# Patient Record
Sex: FEMALE | Race: WHITE
[De-identification: names, ages, dates, MRNs, and addresses within clinical notes are randomized per-mention and may not be internally consistent; named-entity substitution may affect disease eponyms.]

---

## 2018-08-23 ENCOUNTER — APPOINTMENT (OUTPATIENT)
Dept: INTERNAL MEDICINE | Facility: CLINIC | Age: 83
End: 2018-08-23
Payer: MEDICARE

## 2018-08-23 VITALS
HEIGHT: 64 IN | BODY MASS INDEX: 23.9 KG/M2 | DIASTOLIC BLOOD PRESSURE: 70 MMHG | OXYGEN SATURATION: 96 % | WEIGHT: 140 LBS | SYSTOLIC BLOOD PRESSURE: 108 MMHG | RESPIRATION RATE: 14 BRPM | HEART RATE: 76 BPM | TEMPERATURE: 97.6 F

## 2018-08-23 PROCEDURE — 90670 PCV13 VACCINE IM: CPT

## 2018-08-23 PROCEDURE — G0009: CPT

## 2018-08-23 PROCEDURE — 99214 OFFICE O/P EST MOD 30 MIN: CPT | Mod: 25

## 2018-08-23 NOTE — HISTORY OF PRESENT ILLNESS
[FreeTextEntry8] : cc: gout\par \par Pt is here to establish care. She had a gout attack in April and was started on allopurinol. \par She could not walk at the time and was hospitalized in [facility]. She now has right big toe redness for 10 days, but has no pain. \par \par Has left shoulder pain and has gotten cortisone injections which help temporarily. \par \par She did eat breakfast this morning.

## 2018-08-23 NOTE — PHYSICAL EXAM
[No Acute Distress] : no acute distress [Well Nourished] : well nourished [Well Developed] : well developed [Well-Appearing] : well-appearing [No Respiratory Distress] : no respiratory distress  [Clear to Auscultation] : lungs were clear to auscultation bilaterally [Normal Rate] : normal rate  [Regular Rhythm] : with a regular rhythm [Pedal Pulses Present] : the pedal pulses are present [Soft] : abdomen soft [Non Tender] : non-tender [Normal Affect] : the affect was normal [Normal Insight/Judgement] : insight and judgment were intact [de-identified] : trace to 1+ edema [de-identified] : toes with cool to touch and bluish discoloration [de-identified] : using a walker to walk

## 2018-08-23 NOTE — REVIEW OF SYSTEMS
[Fatigue] : fatigue [Dyspnea on Exertion] : dyspnea on exertion [Joint Pain] : joint pain [Negative] : Heme/Lymph [Chest Pain] : no chest pain

## 2018-08-24 ENCOUNTER — MEDICATION RENEWAL (OUTPATIENT)
Age: 83
End: 2018-08-24

## 2018-08-24 LAB
25(OH)D3 SERPL-MCNC: 33.9 NG/ML
ALBUMIN SERPL ELPH-MCNC: 4 G/DL
ALP BLD-CCNC: 77 U/L
ALT SERPL-CCNC: 16 U/L
ANION GAP SERPL CALC-SCNC: 10 MMOL/L
AST SERPL-CCNC: 27 U/L
BASOPHILS # BLD AUTO: 0.04 K/UL
BASOPHILS NFR BLD AUTO: 0.7 %
BILIRUB SERPL-MCNC: 0.5 MG/DL
BUN SERPL-MCNC: 33 MG/DL
CALCIUM SERPL-MCNC: 9.5 MG/DL
CHLORIDE SERPL-SCNC: 101 MMOL/L
CHOLEST SERPL-MCNC: 157 MG/DL
CHOLEST/HDLC SERPL: 1.9 RATIO
CO2 SERPL-SCNC: 30 MMOL/L
CREAT SERPL-MCNC: 0.96 MG/DL
EOSINOPHIL # BLD AUTO: 0.08 K/UL
EOSINOPHIL NFR BLD AUTO: 1.4 %
FOLATE SERPL-MCNC: 13.3 NG/ML
GLUCOSE SERPL-MCNC: 77 MG/DL
HBA1C MFR BLD HPLC: 6.4 %
HCT VFR BLD CALC: 35.3 %
HDLC SERPL-MCNC: 84 MG/DL
HGB BLD-MCNC: 11.2 G/DL
IMM GRANULOCYTES NFR BLD AUTO: 0.2 %
LDLC SERPL CALC-MCNC: 65 MG/DL
LYMPHOCYTES # BLD AUTO: 1.47 K/UL
LYMPHOCYTES NFR BLD AUTO: 25.4 %
MAN DIFF?: NORMAL
MCHC RBC-ENTMCNC: 31.7 GM/DL
MCHC RBC-ENTMCNC: 31.7 PG
MCV RBC AUTO: 100 FL
MONOCYTES # BLD AUTO: 0.65 K/UL
MONOCYTES NFR BLD AUTO: 11.2 %
NEUTROPHILS # BLD AUTO: 3.54 K/UL
NEUTROPHILS NFR BLD AUTO: 61.1 %
PLATELET # BLD AUTO: 203 K/UL
POTASSIUM SERPL-SCNC: 5.1 MMOL/L
PROT SERPL-MCNC: 7.1 G/DL
RBC # BLD: 3.53 M/UL
RBC # FLD: 16.1 %
SODIUM SERPL-SCNC: 141 MMOL/L
TRIGL SERPL-MCNC: 40 MG/DL
TSH SERPL-ACNC: 0.52 UIU/ML
URATE SERPL-MCNC: 5.5 MG/DL
VIT B12 SERPL-MCNC: 455 PG/ML
WBC # FLD AUTO: 5.79 K/UL

## 2019-08-19 ENCOUNTER — RX RENEWAL (OUTPATIENT)
Age: 84
End: 2019-08-19

## 2019-09-17 ENCOUNTER — RX RENEWAL (OUTPATIENT)
Age: 84
End: 2019-09-17

## 2019-09-23 ENCOUNTER — RX RENEWAL (OUTPATIENT)
Age: 84
End: 2019-09-23

## 2020-02-03 ENCOUNTER — EMERGENCY (EMERGENCY)
Facility: HOSPITAL | Age: 85
LOS: 0 days | Discharge: HOME | End: 2020-02-03
Attending: EMERGENCY MEDICINE
Payer: MEDICARE

## 2020-02-03 VITALS — HEIGHT: 63 IN | WEIGHT: 132.06 LBS

## 2020-02-03 VITALS
OXYGEN SATURATION: 98 % | DIASTOLIC BLOOD PRESSURE: 61 MMHG | RESPIRATION RATE: 18 BRPM | TEMPERATURE: 98 F | HEART RATE: 61 BPM | SYSTOLIC BLOOD PRESSURE: 119 MMHG

## 2020-02-03 DIAGNOSIS — I35.1 NONRHEUMATIC AORTIC (VALVE) INSUFFICIENCY: ICD-10-CM

## 2020-02-03 DIAGNOSIS — I34.0 NONRHEUMATIC MITRAL (VALVE) INSUFFICIENCY: ICD-10-CM

## 2020-02-03 DIAGNOSIS — E78.5 HYPERLIPIDEMIA, UNSPECIFIED: ICD-10-CM

## 2020-02-03 DIAGNOSIS — I48.91 UNSPECIFIED ATRIAL FIBRILLATION: ICD-10-CM

## 2020-02-03 DIAGNOSIS — I50.9 HEART FAILURE, UNSPECIFIED: ICD-10-CM

## 2020-02-03 DIAGNOSIS — Z95.0 PRESENCE OF CARDIAC PACEMAKER: Chronic | ICD-10-CM
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
## 2020-02-03 DIAGNOSIS — I11.0 HYPERTENSIVE HEART DISEASE WITH HEART FAILURE: ICD-10-CM
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
## 2020-02-03 DIAGNOSIS — S89.92XA UNSPECIFIED INJURY OF LEFT LOWER LEG, INITIAL ENCOUNTER: ICD-10-CM

## 2020-02-03 DIAGNOSIS — Z79.01 LONG TERM (CURRENT) USE OF ANTICOAGULANTS: ICD-10-CM

## 2020-02-03 DIAGNOSIS — S80.12XA CONTUSION OF LEFT LOWER LEG, INITIAL ENCOUNTER: ICD-10-CM

## 2020-02-03 DIAGNOSIS — Z96.659 PRESENCE OF UNSPECIFIED ARTIFICIAL KNEE JOINT: ICD-10-CM

## 2020-02-03 DIAGNOSIS — Z95.0 PRESENCE OF CARDIAC PACEMAKER: ICD-10-CM

## 2020-02-03 DIAGNOSIS — J44.9 CHRONIC OBSTRUCTIVE PULMONARY DISEASE, UNSPECIFIED: ICD-10-CM

## 2020-02-03 DIAGNOSIS — E03.9 HYPOTHYROIDISM, UNSPECIFIED: ICD-10-CM

## 2020-02-03 DIAGNOSIS — M85.50 ANEURYSMAL BONE CYST, UNSPECIFIED SITE: ICD-10-CM

## 2020-02-03 DIAGNOSIS — Z96.659 PRESENCE OF UNSPECIFIED ARTIFICIAL KNEE JOINT: Chronic | ICD-10-CM

## 2020-02-03 PROCEDURE — 73590 X-RAY EXAM OF LOWER LEG: CPT | Mod: 26,LT

## 2020-02-03 PROCEDURE — 99284 EMERGENCY DEPT VISIT MOD MDM: CPT | Mod: 25

## 2020-02-03 PROCEDURE — 73610 X-RAY EXAM OF ANKLE: CPT | Mod: 26,LT

## 2020-02-03 PROCEDURE — 99284 EMERGENCY DEPT VISIT MOD MDM: CPT

## 2020-02-03 PROCEDURE — 73610 X-RAY EXAM OF ANKLE: CPT | Mod: LT

## 2020-02-03 PROCEDURE — 73590 X-RAY EXAM OF LOWER LEG: CPT | Mod: LT

## 2020-02-03 RX ORDER — ACETAMINOPHEN 500 MG
1000 TABLET ORAL ONCE
Refills: 0 | Status: COMPLETED | OUTPATIENT
Start: 2020-02-03 | End: 2020-02-03

## 2020-02-03 RX ORDER — OXYCODONE HYDROCHLORIDE 5 MG/1
5 TABLET ORAL ONCE
Refills: 0 | Status: DISCONTINUED | OUTPATIENT
Start: 2020-02-03 | End: 2020-02-03

## 2020-02-03 RX ADMIN — Medication 1000 MILLIGRAM(S): at 18:31

## 2020-02-03 RX ADMIN — OXYCODONE HYDROCHLORIDE 5 MILLIGRAM(S): 5 TABLET ORAL at 18:30

## 2020-02-03 NOTE — ED STATDOCS - PATIENT PORTAL LINK FT
You can access the FollowMyHealth Patient Portal offered by [facility] by registering at the following website: [email or web address]. By joining [facility]’s FollowMyHealth portal, you will also be able to view your health information using other applications (apps) compatible with our system.

## 2020-02-03 NOTE — ED STATDOCS - SKIN, MLM
+30 x 10 cm hematoma to mid lower left leg along belly anterior tibialis with +TTP and mild +swelling extending to dorsum of foot, skin intact, no active bleeding

## 2020-02-03 NOTE — ED STATDOCS - PMH
Afib    Anemia    Anxiety    Aortic valve regurgitation    CAD (coronary artery disease)    CHF (congestive heart failure)    COPD (chronic obstructive pulmonary disease)    Gout    HTN (hypertension)    Hypothyroid    Mitral valve insufficiency    Osteopenia

## 2020-02-03 NOTE — ED STATDOCS - OBJECTIVE STATEMENT
89 y/o female with PMHx of Afib, HTN, HLD, CAD, CHF, s/p pacemaker, and s/p knee replacement presents to the ED c/o +left leg pain. Pt banged her leg in her car door around 11:30 AM this morning and has had pain since then. Also +ecchymosis and +edema to LLE. Has been able to ambulate since. No fever. On daily Xarelto. Allergic to codeine.

## 2020-02-03 NOTE — ED ADULT NURSE NOTE - OBJECTIVE STATEMENT
Pt. c/o of pain to left lower leg s/p hitting it on car door this AM. pt. has edema to LLE, pt. is able to ambulate with steady gait and walker. pt. uses walker at home, pt. daughter states she will stay with pt. tonight for observation and assistance with ADLs.

## 2020-02-03 NOTE — ED STATDOCS - PROGRESS NOTE DETAILS
signed Jerica Manzo PA-C Pt seen in intake initially by Dr Casas.   89F struck left lower leg on car door this morning. No significant findings on xray. Pt on xarelto. Leg wrapped with ace wrap by dr Casas for hematoma. rcommend outpt f/u with pts PMD. return precautions given. Pt albulates at baseline with walker. return precautions given. Pt feeling well, pt and family agree with DC and plan of care.

## 2020-02-03 NOTE — ED STATDOCS - NSFOLLOWUPINSTRUCTIONS_ED_ALL_ED_FT
tylenol as needed for pain.    acewrap for comfort and compression  follow up with wound care  if pain continues to worsen return to ER Hematoma  A hematoma is a collection of blood under the skin, in an organ, in a body space, in a joint space, or in other tissue. The blood can thicken (clot) to form a lump that you can see and feel. The lump is often firm and may become sore and tender. Most hematomas get better in a few days to weeks. However, some hematomas may be serious and require medical care. Hematomas can range from very small to very large.  What are the causes?  This condition is caused by:  A blunt or penetrating injury.A leakage from a blood vessel under the skin.Some medical procedures, including surgeries, such as oral surgery, face lifts, and surgeries on the joints.Some medical conditions that cause bleeding or bruising. There may be multiple hematomas that appear in different areas of the body.What increases the risk?  You are more likely to develop this condition if:  You are an older adult.You use blood thinners.What are the signs or symptoms?     Symptoms of this condition depend on where the hematoma is located.   Common symptoms of a hematoma that is under the skin include:  A firm lump on the body.Pain and tenderness in the area.Bruising. Blue, dark blue, purple-red, or yellowish skin (discoloration) may appear at the site of the hematoma if the hematoma is close to the surface of the skin.Common symptoms of a hematoma that is deep in the tissues or body spaces may be less obvious. They include:  A collection of blood in the stomach (intra-abdominal hematoma). This may cause pain in the abdomen, weakness, fainting, and shortness of breath. A collection of blood in the head (intracranial hematoma). This may cause a headache or symptoms such as weakness, trouble speaking or understanding, or a change in consciousness. How is this diagnosed?  This condition is diagnosed based on:  Your medical history.A physical exam.Imaging tests, such as an ultrasound or CT scan. These may be needed if your health care provider suspects a hematoma in deeper tissues or body spaces.Blood tests. These may be needed if your health care provider believes that the hematoma is caused by a medical condition.How is this treated?  Treatment for this condition depends on the cause, size, and location of the hematoma. Treatment may include:  Doing nothing. The majority of hematomas do not need treatment as many of them go away on their own over time.Surgery or close monitoring. This may be needed for large hematomas or hematomas that affect vital organs.Medicines. Medicines may be given if there is an underlying medical cause for the hematoma.Follow these instructions at home:  Managing pain, stiffness, and swelling        If directed, put ice on the affected area.  Put ice in a plastic bag.Place a towel between your skin and the bag.Leave the ice on for 20 minutes, 2–3 times a day for the first couple of days.If directed, apply heat to the affected area after applying ice for a couple of days. Use the heat source that your health care provider recommends, such as a moist heat pack or a heating pad.  Place a towel between your skin and the heat source. Leave the heat on for 20–30 minutes. Remove the heat if your skin turns bright red. This is especially important if you are unable to feel pain, heat, or cold. You may have a greater risk of getting burned.Raise (elevate) the affected area above the level of your heart while you are sitting or lying down.If told, wrap the affected area with an elastic bandage. The bandage applies pressure (compression) to the area, which may help to reduce swelling and promote healing. Do not wrap the bandage too tightly around the affected area.If your hematoma is on a leg or foot (lower extremity) and is painful, your health care provider may recommend crutches. Use them as told by your health care provider.General instructions     Take over-the-counter and prescription medicines only as told by your health care provider.Keep all follow-up visits as told by your health care provider. This is important.Contact a health care provider if:  You have a fever.The swelling or discoloration gets worse.You develop more hematomas.Get help right away if:  Your pain is worse or your pain is not controlled with medicine.Your skin over the hematoma breaks or starts bleeding.Your hematoma is in your chest or abdomen and you have weakness, shortness of breath, or a change in consciousness.You have a hematoma on your scalp that is caused by a fall or injury, and you also have:  A headache that gets worse.Trouble speaking or understanding speech. Weakness.Change in alertness or consciousness.Summary  A hematoma is a collection of blood under the skin, in an organ, in a body space, in a joint space, or in other tissue.This condition usually does not need treatment because many hematomas go away on their own over time.Large hematomas, or those that may affect vital organs, may need surgical drainage or monitoring. If the hematoma is caused by a medical condition, medicines may be prescribed.Get help right away if your hematoma breaks or starts to bleed, you have shortness of breath, or you have a headache or trouble speaking after a fall.This information is not intended to replace advice given to you by your health care provider. Make sure you discuss any questions you have with your health care provider.     CALL YOUR DOCTOR TOMORROW FOR FOLLOW UP IN 1-2 DAYS. RETURN TO ER FOR ANY WORSENING SYMPTOMS OR NEW CONCERNS.

## 2020-02-03 NOTE — ED STATDOCS - ATTENDING CONTRIBUTION TO CARE
I, Fanny Casas MD,  performed the initial face to face bedside interview with this patient regarding history of present illness, review of symptoms and relevant past medical, social and family history.  I completed an independent physical examination.  I was the initial provider who evaluated this patient. I have signed out the follow up of any pending tests (i.e. labs, radiological studies) to the ACP.  I have communicated the patient’s plan of care and disposition with the ACP.  The history, relevant review of systems, past medical and surgical history, medical decision making, and physical examination was documented by the scribe in my presence and I attest to the accuracy of the documentation.

## 2020-02-03 NOTE — ED ADULT NURSE NOTE - NSIMPLEMENTINTERV_GEN_ALL_ED
Implemented All Fall with Harm Risk Interventions:  [facility] to call system. Call bell, personal items and telephone within reach. Instruct patient to call for assistance. Room bathroom lighting operational. Non-slip footwear when patient is off stretcher. Physically safe environment: no spills, clutter or unnecessary equipment. Stretcher in lowest position, wheels locked, appropriate side rails in place. Provide visual cue, wrist band, yellow gown, etc. Monitor gait and stability. Monitor for mental status changes and reorient to person, place, and time. Review medications for side effects contributing to fall risk. Reinforce activity limits and safety measures with patient and family. Provide visual clues: red socks.

## 2020-02-03 NOTE — ED ADULT TRIAGE NOTE - CHIEF COMPLAINT QUOTE
Pt states she was opening her car door this morning at 11:30 am and banged her left foot, did not fall, on daily Xarelto, now painful and swollen

## 2021-03-15 NOTE — ED ADULT TRIAGE NOTE - PAIN RATING/NUMBER SCALE (0-10): REST
Post-Care Instructions: I reviewed with the patient in detail post-care instructions. Patient is to wear sunprotection, and avoid picking at any of the treated lesions. Pt may apply Vaseline to crusted or scabbing areas. Number Of Freeze-Thaw Cycles: 2 freeze-thaw cycles Detail Level: Detailed Render Note In Bullet Format When Appropriate: No Duration Of Freeze Thaw-Cycle (Seconds): 10 Consent: The patient's consent was obtained including but not limited to risks of crusting, scabbing, blistering, scarring, darker or lighter pigmentary change, recurrence, incomplete removal and infection. 10 no

## 2021-08-19 ENCOUNTER — RESULT REVIEW (OUTPATIENT)
Age: 86
End: 2021-08-19

## 2021-10-14 ENCOUNTER — APPOINTMENT (OUTPATIENT)
Dept: INTERNAL MEDICINE | Facility: CLINIC | Age: 86
End: 2021-10-14

## 2022-02-20 ENCOUNTER — INPATIENT (INPATIENT)
Facility: HOSPITAL | Age: 87
LOS: 8 days | Discharge: NURSING, REHAB OR LONG-TERM CARE | DRG: 535 | End: 2022-03-01
Attending: EMERGENCY MEDICINE | Admitting: SURGERY
Payer: MEDICARE

## 2022-02-20 VITALS
SYSTOLIC BLOOD PRESSURE: 124 MMHG | WEIGHT: 130.07 LBS | HEART RATE: 85 BPM | TEMPERATURE: 98 F | RESPIRATION RATE: 17 BRPM | HEIGHT: 63 IN | DIASTOLIC BLOOD PRESSURE: 72 MMHG

## 2022-02-20 DIAGNOSIS — Z95.0 PRESENCE OF CARDIAC PACEMAKER: Chronic | ICD-10-CM

## 2022-02-20 DIAGNOSIS — Z96.659 PRESENCE OF UNSPECIFIED ARTIFICIAL KNEE JOINT: Chronic | ICD-10-CM

## 2022-02-20 DIAGNOSIS — S32.591A OTHER SPECIFIED FRACTURE OF RIGHT PUBIS, INITIAL ENCOUNTER FOR CLOSED FRACTURE: ICD-10-CM

## 2022-02-20 PROBLEM — J44.9 CHRONIC OBSTRUCTIVE PULMONARY DISEASE, UNSPECIFIED: Chronic | Status: ACTIVE | Noted: 2020-02-03

## 2022-02-20 PROBLEM — M10.9 GOUT, UNSPECIFIED: Chronic | Status: ACTIVE | Noted: 2020-02-03

## 2022-02-20 PROBLEM — E03.9 HYPOTHYROIDISM, UNSPECIFIED: Chronic | Status: ACTIVE | Noted: 2020-02-03

## 2022-02-20 PROBLEM — M85.80 OTHER SPECIFIED DISORDERS OF BONE DENSITY AND STRUCTURE, UNSPECIFIED SITE: Chronic | Status: ACTIVE | Noted: 2020-02-03

## 2022-02-20 PROBLEM — I10 ESSENTIAL (PRIMARY) HYPERTENSION: Chronic | Status: ACTIVE | Noted: 2020-02-03

## 2022-02-20 PROBLEM — I48.91 UNSPECIFIED ATRIAL FIBRILLATION: Chronic | Status: ACTIVE | Noted: 2020-02-03

## 2022-02-20 PROBLEM — I35.1 NONRHEUMATIC AORTIC (VALVE) INSUFFICIENCY: Chronic | Status: ACTIVE | Noted: 2020-02-03

## 2022-02-20 PROBLEM — I50.9 HEART FAILURE, UNSPECIFIED: Chronic | Status: ACTIVE | Noted: 2020-02-03

## 2022-02-20 PROBLEM — F41.9 ANXIETY DISORDER, UNSPECIFIED: Chronic | Status: ACTIVE | Noted: 2020-02-03

## 2022-02-20 PROBLEM — I25.10 ATHEROSCLEROTIC HEART DISEASE OF NATIVE CORONARY ARTERY WITHOUT ANGINA PECTORIS: Chronic | Status: ACTIVE | Noted: 2020-02-03

## 2022-02-20 PROBLEM — D64.9 ANEMIA, UNSPECIFIED: Chronic | Status: ACTIVE | Noted: 2020-02-03

## 2022-02-20 PROBLEM — I34.0 NONRHEUMATIC MITRAL (VALVE) INSUFFICIENCY: Chronic | Status: ACTIVE | Noted: 2020-02-03

## 2022-02-20 LAB
ALBUMIN SERPL ELPH-MCNC: 3.5 G/DL — SIGNIFICANT CHANGE UP (ref 3.3–5)
ALP SERPL-CCNC: 97 U/L — SIGNIFICANT CHANGE UP (ref 40–120)
ALT FLD-CCNC: 28 U/L — SIGNIFICANT CHANGE UP (ref 12–78)
ANION GAP SERPL CALC-SCNC: 3 MMOL/L — LOW (ref 5–17)
APTT BLD: 41.8 SEC — HIGH (ref 27.5–35.5)
AST SERPL-CCNC: 46 U/L — HIGH (ref 15–37)
BASOPHILS # BLD AUTO: 0.04 K/UL — SIGNIFICANT CHANGE UP (ref 0–0.2)
BASOPHILS NFR BLD AUTO: 0.4 % — SIGNIFICANT CHANGE UP (ref 0–2)
BILIRUB SERPL-MCNC: 0.8 MG/DL — SIGNIFICANT CHANGE UP (ref 0.2–1.2)
BUN SERPL-MCNC: 41 MG/DL — HIGH (ref 7–23)
CALCIUM SERPL-MCNC: 9 MG/DL — SIGNIFICANT CHANGE UP (ref 8.5–10.1)
CHLORIDE SERPL-SCNC: 105 MMOL/L — SIGNIFICANT CHANGE UP (ref 96–108)
CO2 SERPL-SCNC: 33 MMOL/L — HIGH (ref 22–31)
CREAT SERPL-MCNC: 1.32 MG/DL — HIGH (ref 0.5–1.3)
EOSINOPHIL # BLD AUTO: 0.02 K/UL — SIGNIFICANT CHANGE UP (ref 0–0.5)
EOSINOPHIL NFR BLD AUTO: 0.2 % — SIGNIFICANT CHANGE UP (ref 0–6)
GLUCOSE SERPL-MCNC: 127 MG/DL — HIGH (ref 70–99)
HCT VFR BLD CALC: 33.8 % — LOW (ref 34.5–45)
HGB BLD-MCNC: 10.8 G/DL — LOW (ref 11.5–15.5)
IMM GRANULOCYTES NFR BLD AUTO: 0.3 % — SIGNIFICANT CHANGE UP (ref 0–1.5)
INR BLD: 1.36 RATIO — HIGH (ref 0.88–1.16)
LYMPHOCYTES # BLD AUTO: 0.87 K/UL — LOW (ref 1–3.3)
LYMPHOCYTES # BLD AUTO: 7.7 % — LOW (ref 13–44)
MCHC RBC-ENTMCNC: 32 GM/DL — SIGNIFICANT CHANGE UP (ref 32–36)
MCHC RBC-ENTMCNC: 34.1 PG — HIGH (ref 27–34)
MCV RBC AUTO: 106.6 FL — HIGH (ref 80–100)
MONOCYTES # BLD AUTO: 0.86 K/UL — SIGNIFICANT CHANGE UP (ref 0–0.9)
MONOCYTES NFR BLD AUTO: 7.6 % — SIGNIFICANT CHANGE UP (ref 2–14)
NEUTROPHILS # BLD AUTO: 9.49 K/UL — HIGH (ref 1.8–7.4)
NEUTROPHILS NFR BLD AUTO: 83.8 % — HIGH (ref 43–77)
PLATELET # BLD AUTO: 126 K/UL — LOW (ref 150–400)
POTASSIUM SERPL-MCNC: 5.2 MMOL/L — SIGNIFICANT CHANGE UP (ref 3.5–5.3)
POTASSIUM SERPL-SCNC: 5.2 MMOL/L — SIGNIFICANT CHANGE UP (ref 3.5–5.3)
PROT SERPL-MCNC: 7 GM/DL — SIGNIFICANT CHANGE UP (ref 6–8.3)
PROTHROM AB SERPL-ACNC: 15.7 SEC — HIGH (ref 10.6–13.6)
RBC # BLD: 3.17 M/UL — LOW (ref 3.8–5.2)
RBC # FLD: 15.9 % — HIGH (ref 10.3–14.5)
SARS-COV-2 RNA SPEC QL NAA+PROBE: SIGNIFICANT CHANGE UP
SODIUM SERPL-SCNC: 141 MMOL/L — SIGNIFICANT CHANGE UP (ref 135–145)
WBC # BLD: 11.31 K/UL — HIGH (ref 3.8–10.5)
WBC # FLD AUTO: 11.31 K/UL — HIGH (ref 3.8–10.5)

## 2022-02-20 PROCEDURE — 85027 COMPLETE CBC AUTOMATED: CPT

## 2022-02-20 PROCEDURE — 27197 CLSD TX PELVIC RING FX: CPT

## 2022-02-20 PROCEDURE — 82533 TOTAL CORTISOL: CPT

## 2022-02-20 PROCEDURE — 72192 CT PELVIS W/O DYE: CPT | Mod: 26,MG

## 2022-02-20 PROCEDURE — 71250 CT THORAX DX C-: CPT

## 2022-02-20 PROCEDURE — 73502 X-RAY EXAM HIP UNI 2-3 VIEWS: CPT | Mod: 26,RT

## 2022-02-20 PROCEDURE — G1004: CPT

## 2022-02-20 PROCEDURE — 84484 ASSAY OF TROPONIN QUANT: CPT

## 2022-02-20 PROCEDURE — 71045 X-RAY EXAM CHEST 1 VIEW: CPT | Mod: 26

## 2022-02-20 PROCEDURE — 83735 ASSAY OF MAGNESIUM: CPT

## 2022-02-20 PROCEDURE — 99285 EMERGENCY DEPT VISIT HI MDM: CPT

## 2022-02-20 PROCEDURE — U0005: CPT

## 2022-02-20 PROCEDURE — 82607 VITAMIN B-12: CPT

## 2022-02-20 PROCEDURE — 82746 ASSAY OF FOLIC ACID SERUM: CPT

## 2022-02-20 PROCEDURE — 82803 BLOOD GASES ANY COMBINATION: CPT

## 2022-02-20 PROCEDURE — 84443 ASSAY THYROID STIM HORMONE: CPT

## 2022-02-20 PROCEDURE — 97162 PT EVAL MOD COMPLEX 30 MIN: CPT | Mod: GP

## 2022-02-20 PROCEDURE — 84100 ASSAY OF PHOSPHORUS: CPT

## 2022-02-20 PROCEDURE — 73552 X-RAY EXAM OF FEMUR 2/>: CPT | Mod: 26,RT

## 2022-02-20 PROCEDURE — 81001 URINALYSIS AUTO W/SCOPE: CPT

## 2022-02-20 PROCEDURE — 83605 ASSAY OF LACTIC ACID: CPT

## 2022-02-20 PROCEDURE — 73560 X-RAY EXAM OF KNEE 1 OR 2: CPT | Mod: 26,LT

## 2022-02-20 PROCEDURE — 87040 BLOOD CULTURE FOR BACTERIA: CPT

## 2022-02-20 PROCEDURE — 97116 GAIT TRAINING THERAPY: CPT | Mod: GP

## 2022-02-20 PROCEDURE — 85025 COMPLETE CBC W/AUTO DIFF WBC: CPT

## 2022-02-20 PROCEDURE — 71045 X-RAY EXAM CHEST 1 VIEW: CPT

## 2022-02-20 PROCEDURE — 36415 COLL VENOUS BLD VENIPUNCTURE: CPT

## 2022-02-20 PROCEDURE — 85730 THROMBOPLASTIN TIME PARTIAL: CPT

## 2022-02-20 PROCEDURE — 82962 GLUCOSE BLOOD TEST: CPT

## 2022-02-20 PROCEDURE — 80053 COMPREHEN METABOLIC PANEL: CPT

## 2022-02-20 PROCEDURE — 93010 ELECTROCARDIOGRAM REPORT: CPT

## 2022-02-20 PROCEDURE — 80076 HEPATIC FUNCTION PANEL: CPT

## 2022-02-20 PROCEDURE — 93970 EXTREMITY STUDY: CPT

## 2022-02-20 PROCEDURE — 93308 TTE F-UP OR LMTD: CPT

## 2022-02-20 PROCEDURE — 93005 ELECTROCARDIOGRAM TRACING: CPT

## 2022-02-20 PROCEDURE — 93306 TTE W/DOPPLER COMPLETE: CPT

## 2022-02-20 PROCEDURE — 94640 AIRWAY INHALATION TREATMENT: CPT

## 2022-02-20 PROCEDURE — 76376 3D RENDER W/INTRP POSTPROCES: CPT | Mod: 26

## 2022-02-20 PROCEDURE — 83880 ASSAY OF NATRIURETIC PEPTIDE: CPT

## 2022-02-20 PROCEDURE — 97530 THERAPEUTIC ACTIVITIES: CPT | Mod: GP

## 2022-02-20 PROCEDURE — 82550 ASSAY OF CK (CPK): CPT

## 2022-02-20 PROCEDURE — C9113: CPT

## 2022-02-20 PROCEDURE — 84145 PROCALCITONIN (PCT): CPT

## 2022-02-20 PROCEDURE — 85610 PROTHROMBIN TIME: CPT

## 2022-02-20 PROCEDURE — 80048 BASIC METABOLIC PNL TOTAL CA: CPT

## 2022-02-20 PROCEDURE — U0003: CPT

## 2022-02-20 PROCEDURE — 72170 X-RAY EXAM OF PELVIS: CPT | Mod: 26,59

## 2022-02-20 RX ORDER — METOPROLOL TARTRATE 50 MG
25 TABLET ORAL DAILY
Refills: 0 | Status: DISCONTINUED | OUTPATIENT
Start: 2022-02-20 | End: 2022-02-22

## 2022-02-20 RX ORDER — PANTOPRAZOLE SODIUM 20 MG/1
40 TABLET, DELAYED RELEASE ORAL DAILY
Refills: 0 | Status: DISCONTINUED | OUTPATIENT
Start: 2022-02-20 | End: 2022-02-26

## 2022-02-20 RX ORDER — ONDANSETRON 8 MG/1
4 TABLET, FILM COATED ORAL ONCE
Refills: 0 | Status: COMPLETED | OUTPATIENT
Start: 2022-02-20 | End: 2022-02-20

## 2022-02-20 RX ORDER — ONDANSETRON 8 MG/1
4 TABLET, FILM COATED ORAL EVERY 6 HOURS
Refills: 0 | Status: DISCONTINUED | OUTPATIENT
Start: 2022-02-20 | End: 2022-03-01

## 2022-02-20 RX ORDER — MORPHINE SULFATE 50 MG/1
2 CAPSULE, EXTENDED RELEASE ORAL ONCE
Refills: 0 | Status: DISCONTINUED | OUTPATIENT
Start: 2022-02-20 | End: 2022-02-20

## 2022-02-20 RX ORDER — FUROSEMIDE 40 MG
1 TABLET ORAL
Qty: 0 | Refills: 0 | DISCHARGE

## 2022-02-20 RX ORDER — CALCIUM CARBONATE 500(1250)
3 TABLET ORAL EVERY 6 HOURS
Refills: 0 | Status: DISCONTINUED | OUTPATIENT
Start: 2022-02-20 | End: 2022-03-01

## 2022-02-20 RX ORDER — SODIUM CHLORIDE 9 MG/ML
3 INJECTION INTRAMUSCULAR; INTRAVENOUS; SUBCUTANEOUS ONCE
Refills: 0 | Status: COMPLETED | OUTPATIENT
Start: 2022-02-20 | End: 2022-02-20

## 2022-02-20 RX ADMIN — SODIUM CHLORIDE 3 MILLILITER(S): 9 INJECTION INTRAMUSCULAR; INTRAVENOUS; SUBCUTANEOUS at 16:49

## 2022-02-20 RX ADMIN — MORPHINE SULFATE 2 MILLIGRAM(S): 50 CAPSULE, EXTENDED RELEASE ORAL at 18:04

## 2022-02-20 RX ADMIN — ONDANSETRON 4 MILLIGRAM(S): 8 TABLET, FILM COATED ORAL at 18:04

## 2022-02-20 NOTE — ED PROVIDER NOTE - PROGRESS NOTE DETAILS
Sandro Jonas for attending Dr. Pettit:  Entered EKG Sandro Jonas for attending Dr. Pettit:  XR suspicious for right superior/inferior pubic rami fractures. Ortho resident aware of ED consult, paging trauma on call. ZHOU Hinojosa MD:  Dr. Bunch aware of case, requests surgicla resident to see pt & initiate eval/admission. ZHOU Hinojosa MD:  Dr. Bunch aware of case, requests surgical resident to see pt & initiate eval/admission.

## 2022-02-20 NOTE — ED ADULT NURSE NOTE - OBJECTIVE STATEMENT
pt presents to ed via ems for evaluation of hip pain- pt reports mechanical fall this morning with walker- denies head strike denies loc . pt on Xarelto, ekg done

## 2022-02-20 NOTE — H&P ADULT - NSHPPHYSICALEXAM_GEN_ALL_CORE
Primary Survey:  Airway: Intact  Breathing: Intact  Circulation: Intact  Deformities: GCS 15      Secondary Survey:  Gen: Awake, Alert and in NAD  HEENT: Pupils equal and  reactive bilaterally, No  blood present in bilateral nares, no hemotympanum, no abrasions  Chest: No gross deformity, Equal chest rise and breathe sounds bilaterally, no audible wheeze or stridor  CV: S1S2 present, no audible murmur  Abd: Soft, non distended, No gross sign of trauma, non tender  Pelvis: Stable palpable Femoral artery bilateral,   Perineum/Genitalia/Rectal: No traumatic injury  RUE: Decreased range of motion present, Sensory intact, Shoulder pain. No gross deformities of the joint, distal pulses palpated  LUE: Decreased range of motion present, Sensory intact, shoulder pain. No gross deformities of the joint, distal pulses palpated  RLE: Full range of motion present, Sensory intact, No gross deformities of the joint, distal pulse palpated  LLE: Full range of motion present, Sensory intact, No gross deformities of the joint, distal pulse palpated Primary Survey:  Airway: Intact  Breathing: Intact  Circulation: Intact  Deformities: GCS 15      Secondary Survey:  Gen: Awake, Alert and in NAD  HEENT: Pupils equal and  reactive bilaterally, No  blood present in bilateral nares, no hemotympanum, no abrasions  Chest: No gross deformity, Equal chest rise and breathe sounds bilaterally, no audible wheeze or stridor  CV: S1S2 present, no audible murmur  Abd: Soft, non distended, No gross sign of trauma, non tender  Pelvis: Stable palpable Femoral artery bilateral,   Perineum/Genitalia/Rectal: No traumatic injury  RUE: Decreased range of motion present, Sensory intact, Shoulder pain. No gross deformities of the joint, distal pulses palpated  LUE: Decreased range of motion present, Sensory intact, shoulder pain. No gross deformities of the joint, distal pulses palpated  RLE: decrease range of motion present, Sensory intact, No gross deformities of the joint, distal pulse palpated  LLE: Decreased range of motion present, Sensory intact, pain on exam of knee. No gross deformities of the joint, distal pulse palpated

## 2022-02-20 NOTE — CONSULT NOTE ADULT - ATTENDING COMMENTS
Orthopedic Sports Attending:  Agree with above resident/PA note.  Note edited where necessary.    Patient seen and images reviewed. Right inf/sup pubic rami fractures. They appear to be stable on xray. Recommend WBAT, PT and pain control. No surgical intervention indicated at this time. May follow up in the office in 2 weeks.   William Dewey, DO  Orthopaedic Surgery

## 2022-02-20 NOTE — ED PROVIDER NOTE - MUSCULOSKELETAL, MLM
Spine appears normal, range of motion is not limited, no muscle or joint tenderness, neck : nt, supple without pain, pelvis: nt and stable,   right foot: nt, normal motor sensory exam, 2+ DP pulse. PT unable to SLR at right hip due to posterior hip pain. +posterior right hip tenderness, no gross deformity, swelling. RLE swelling.  Left SLR 30 degrees with pain to right groin area.

## 2022-02-20 NOTE — H&P ADULT - NSICDXPASTMEDICALHX_GEN_ALL_CORE_FT
PAST MEDICAL HISTORY:  Afib     Anemia     Anxiety     Aortic valve regurgitation     CAD (coronary artery disease)     CHF (congestive heart failure)     COPD (chronic obstructive pulmonary disease)     Gout     HTN (hypertension)     Hypothyroid     Mitral valve insufficiency     Osteopenia

## 2022-02-20 NOTE — H&P ADULT - HISTORY OF PRESENT ILLNESS
Pt is a 89 yo F that presented to the ED for evaluation of right hip pain. She stated that she was ambulating with her walker this morning, when it got stuck on her bathroom door causing her to fall with right buttock. She called her daughter who took her to her house because of the fact that she lives alone. She stated that she has been able to ambulate minimally to get into the car to the ED since the fall but with pain. She reports that she lives at home alone with a home aide during the day for 2 hours. Pt was released from [facility] in late november after knee surgery.  On exam has some lateral tenderness at the left knee. Has had a left TKA at [facility].  Otherwise denies head strike/LOC, numbness/tingling, weakness. Pt has an extensive cardiac history but is currently looking to establish are with someone here.     PmHx: Afib on Xarelto 15mg QD, Hypothyroid, CHF, Gout, CAD, Mitral Valve Regurg, Aortal valve insufficiency, HTN  PsHx: B/L cataract surgery and B/L Total Knee Replacement    PCP: Dr Keith

## 2022-02-20 NOTE — ED PROVIDER NOTE - ENMT, MLM
Airway patent, Nasal mucosa clear. Mouth with slightly dry  mucosa. Throat has no vesicles,  uvula is midline, OP clear,  no evidence of facial injury.

## 2022-02-20 NOTE — H&P ADULT - NSHPLABSRESULTS_GEN_ALL_CORE
10.8<L>                141  | 33<H>| 41<H>        11.31<H> >-----------< 126<L>   ------------------------< 127<H>                          33.8<L>                5.2  | 105  | 1.32<H>                                                                     Ca 9.0   Mg x     Ph x        < from: CT Pelvis Bony Only No Cont (02.20.22 @ 18:00) >    FINDINGS:    BONE: Nondisplaced fracture of the right sacral ala. Fractures of the   right superior and inferior pubic rami. No additional fractures are   identified. Diffuse osteopenia.    JOINTS: No dislocation. Multilevel spondylosis and facet arthropathy of   the imaged lumbar spine. Bilateral sacroiliac joint arthropathy.   Degeneration of the pubic symphysis. Bilateral hip cartilage space   narrowing, subcortical cystic change, and marginal osteophyte formation.   No large joint effusion.    SOFT TISSUE: Vascular calcifications. Calcified fibroid. No pelvic free   fluid or lymphadenopathy. Diverticular disease of the sigmoid colon   without CT evidence of acute diverticulitis. Atrophy and fatty   infiltration of the bilateral hip and thigh musculature.      IMPRESSION:  1.  Nondisplaced fracture of the right sacral ala.  2.  Minimally displaced fractures of the right superior and inferior   pubic rami.

## 2022-02-20 NOTE — ED ADULT NURSE NOTE - NSIMPLEMENTINTERV_GEN_ALL_ED
Implemented All Fall with Harm Risk Interventions:  [place] to call system. Call bell, personal items and telephone within reach. Instruct patient to call for assistance. Room bathroom lighting operational. Non-slip footwear when patient is off stretcher. Physically safe environment: no spills, clutter or unnecessary equipment. Stretcher in lowest position, wheels locked, appropriate side rails in place. Provide visual cue, wrist band, yellow gown, etc. Monitor gait and stability. Monitor for mental status changes and reorient to person, place, and time. Review medications for side effects contributing to fall risk. Reinforce activity limits and safety measures with patient and family. Provide visual clues: red socks.

## 2022-02-20 NOTE — ED PROVIDER NOTE - CLINICAL SUMMARY MEDICAL DECISION MAKING FREE TEXT BOX
89 y/o female with a PMHx of COPD, former smoker, P Afib s/p pacemaker , HTN, anxiety, hypothyroid, gout, CHF, CAD with cardiomyopathy, osteopenia, MV insufficiency presents to the ED s/p fall this morning while ambulating with walker. +posterior right hip pain. Pt unable to bare weight upon RLE due to pain. No distal RLE neurovascular compromise.  Unable to Right SLR due to pain. No head injury. Plan: XR; chest, pelvis, hip right femur, labs, fall precaution. Observe and Reassess

## 2022-02-20 NOTE — CONSULT NOTE ADULT - ASSESSMENT
89F with R LC1 Injury    Plan:  Medical management appreciated  Imaging reviewed with above findings appreciated  XR inlet/outlet pelvis ordered for comparison at outpatient follow up  XR L knee ordered due to pain on secondary  WBAT/PT  Pain control PRN  DVT ppx per primary team  No acute orthopedic surgical intervention indicated at this time. Orthopedically stable for discharge. Patient to follow up with her previous orthopedic surgeon or Dr. Dewey as outpatient for further evaluation and treatment  Will discuss with attending and advise if any changes to plan 89F with R LC1 Injury    Plan:  Medical management appreciated  Imaging reviewed with above findings appreciated  XR inlet/outlet pelvis ordered for comparison at outpatient follow up  XR L knee ordered due to pain on secondary  WBAT/PT  Pain control PRN  DVT ppx per primary team  If xrays of left knee show no acute elaine pathology then no acute orthopedic surgical intervention indicated at this time. Orthopedically stable for discharge. Patient to follow up with her previous orthopedic surgeon or Dr. Dewey as outpatient for further evaluation and treatment  Will discuss with attending and advise if any changes to plan

## 2022-02-20 NOTE — ED PROVIDER NOTE - OBJECTIVE STATEMENT
89 y/o female with a PMHx of COPD, former smoker, Afib s/p pacemaker , HTN, anxiety, hypothyroid, gout, CHF, CAD with cardiomyopathy, osteopenia, MV insufficiency presents to the ED s/p fall this morning while ambulating with walker. Reports her walker got caught on bathroom door resulting in fall on right posterior buttock. +aching hip pain, minimal to absent rest, sharp and severe with movement., weight bearing and ambulation attempt. Denies head injury, lumbar back pain  LOC. Last PO at noon,. On Xarelto.+COVID vax x3.  PCP: Dr. Magana

## 2022-02-20 NOTE — H&P ADULT - ASSESSMENT
89 YO F with right sacral ala fracture and superior and inferior rami fracture    Multimodal pain control  Incentive spirometery  F/U of left knee  Vitals, IS/OS Q 4  Diet:   Soft Cardiac  GI prophylaxis  Local wound care  Activity: WBAT              PT  Hold A/C  Resume other home med  Cardiology consulted  Hospitalist consulted  Orthopedic  following   SW for eventual D/C planning      Case discussed with Dr Bunch

## 2022-02-20 NOTE — ED ADULT TRIAGE NOTE - CHIEF COMPLAINT QUOTE
Pt presents to ED for trip and fall this am sp ambulating with walker. Pt denies head strike, denies LOC. Pt on xarelo. Pt ambulatory after fall with 2 assist. Pt endorsing right sided hip pain. Last alleve taken at 12.

## 2022-02-20 NOTE — CONSULT NOTE ADULT - SUBJECTIVE AND OBJECTIVE BOX
89F presents to [facility] Emergency Department for evaluation of right hip pain. She reports that she was ambulating with her walker this morning, when it caught her bathroom door causing her to fall with right buttock/hip pain. She reports that she has been able to ambulate since the fall but with pain. She reports that she lives at home alone. Otherwise denies head strike/LOC, numbness/tingling, weakness, or any other acute orthopedic complaints.     Vital Signs Last 24 Hrs  T(C): 36.6 (20 Feb 2022 14:42), Max: 36.6 (20 Feb 2022 14:42)  T(F): 97.9 (20 Feb 2022 14:42), Max: 97.9 (20 Feb 2022 14:42)  HR: 85 (20 Feb 2022 14:42) (85 - 85)  BP: 124/72 (20 Feb 2022 14:42) (124/72 - 124/72)  BP(mean): --  RR: 17 (20 Feb 2022 14:42) (17 - 17)  SpO2: --                          10.8   11.31 )-----------( 126      ( 20 Feb 2022 16:43 )             33.8       Exam:  General: Awake alert no acute distress  RLE:   Skin intact. No obvious abrasions/lacerations appreciated  TTP over proximal femur/hip. No other bony TTP appreciated.   Unable to SLR  No pain with log roll or axial load.   +Gsc/TA/EHL/FHL, SILT  DP pulse palpable  Compartments soft and compressible  No calf TTP     Secondary Assessment:  NC/AT, NTTP of clavicles, NTTP of C-,T-,L-Spine, NTTP of Pelvis  UEs: NTTP of Shoulders, Elbows, Wrists, Hands; NT with AROM/PROM of Shoulders, Elbows, Wrists, Hands; AIN/PIN/Med/Uln/Msc/Rad/Ax intact  LLE: Unable to SLR secondary to contralateral groin pain, NT with Log Roll, NT with Heel Strike, (+) TTP over left knee; NTTP of Hip, Ankle, Foot; NT with AROM/PROM of Hip, Knee, Ankle, Foot; Q/H/Gsc/TA/EHL/FHL intact    XR and CT imaging reviewed with right LC1 injury   89F presents to [facility] Emergency Department for evaluation of right hip pain. She reports that she was ambulating with her walker this morning, when it caught her bathroom door causing her to fall with right buttock. She reports that she has been able to ambulate minimally to get into the car to the ED since the fall but with pain. She reports that she lives at home alone with a home aide during the day for 2 hours. On exam has some lateral tenderness at the left knee. Has had a left TKA at [facility].  Otherwise denies head strike/LOC, numbness/tingling, weakness, or any other acute orthopedic complaints.     Vital Signs Last 24 Hrs  T(C): 36.6 (20 Feb 2022 14:42), Max: 36.6 (20 Feb 2022 14:42)  T(F): 97.9 (20 Feb 2022 14:42), Max: 97.9 (20 Feb 2022 14:42)  HR: 85 (20 Feb 2022 14:42) (85 - 85)  BP: 124/72 (20 Feb 2022 14:42) (124/72 - 124/72)  RR: 17 (20 Feb 2022 14:42) (17 - 17)                            10.8   11.31 )-----------( 126      ( 20 Feb 2022 16:43 )             33.8       Exam:  General: Awake alert no acute distress  RLE:   Skin intact. No obvious abrasions/lacerations appreciated  TTP over proximal femur/hip. No other bony TTP appreciated.   Unable to SLR secondary to right groin pain   No pain with log roll or axial load.   +Gsc/TA/EHL/FHL, SILT  DP pulse palpable  Compartments soft and compressible  No calf TTP     Secondary Assessment:  NC/AT, NTTP of clavicles, NTTP of C-,T-,L-Spine, NTTP of Pelvis  UEs: NTTP of Shoulders, Elbows, Wrists, Hands; NT with AROM/PROM of Shoulders, Elbows, Wrists, Hands; AIN/PIN/Med/Uln/Msc/Rad/Ax intact  LLE: Unable to SLR secondary to contralateral groin pain, NT with Log Roll, NT with Heel Strike, (+) TTP over lateral left knee; NTTP of Hip, Ankle, Foot; NT with AROM/PROM of Hip, Knee, Ankle, Foot; Q/H/Gsc/TA/EHL/FHL intact    XR and CT imaging reviewed with right LC1 injury

## 2022-02-21 DIAGNOSIS — I50.9 HEART FAILURE, UNSPECIFIED: ICD-10-CM

## 2022-02-21 DIAGNOSIS — I10 ESSENTIAL (PRIMARY) HYPERTENSION: ICD-10-CM

## 2022-02-21 DIAGNOSIS — I48.20 CHRONIC ATRIAL FIBRILLATION, UNSPECIFIED: ICD-10-CM

## 2022-02-21 LAB
ALBUMIN SERPL ELPH-MCNC: 3.6 G/DL — SIGNIFICANT CHANGE UP (ref 3.3–5)
ALP SERPL-CCNC: 92 U/L — SIGNIFICANT CHANGE UP (ref 40–120)
ALT FLD-CCNC: 28 U/L — SIGNIFICANT CHANGE UP (ref 12–78)
ANION GAP SERPL CALC-SCNC: 4 MMOL/L — LOW (ref 5–17)
APPEARANCE UR: CLEAR — SIGNIFICANT CHANGE UP
AST SERPL-CCNC: 38 U/L — HIGH (ref 15–37)
BASOPHILS # BLD AUTO: 0.03 K/UL — SIGNIFICANT CHANGE UP (ref 0–0.2)
BASOPHILS NFR BLD AUTO: 0.5 % — SIGNIFICANT CHANGE UP (ref 0–2)
BILIRUB SERPL-MCNC: 0.8 MG/DL — SIGNIFICANT CHANGE UP (ref 0.2–1.2)
BILIRUB UR-MCNC: NEGATIVE — SIGNIFICANT CHANGE UP
BUN SERPL-MCNC: 49 MG/DL — HIGH (ref 7–23)
CALCIUM SERPL-MCNC: 8.7 MG/DL — SIGNIFICANT CHANGE UP (ref 8.5–10.1)
CHLORIDE SERPL-SCNC: 103 MMOL/L — SIGNIFICANT CHANGE UP (ref 96–108)
CO2 SERPL-SCNC: 31 MMOL/L — SIGNIFICANT CHANGE UP (ref 22–31)
COLOR SPEC: YELLOW — SIGNIFICANT CHANGE UP
CREAT SERPL-MCNC: 1.62 MG/DL — HIGH (ref 0.5–1.3)
DIFF PNL FLD: NEGATIVE — SIGNIFICANT CHANGE UP
EOSINOPHIL # BLD AUTO: 0.11 K/UL — SIGNIFICANT CHANGE UP (ref 0–0.5)
EOSINOPHIL NFR BLD AUTO: 1.7 % — SIGNIFICANT CHANGE UP (ref 0–6)
GLUCOSE SERPL-MCNC: 111 MG/DL — HIGH (ref 70–99)
GLUCOSE UR QL: NEGATIVE — SIGNIFICANT CHANGE UP
HCT VFR BLD CALC: 32.4 % — LOW (ref 34.5–45)
HCT VFR BLD CALC: 36 % — SIGNIFICANT CHANGE UP (ref 34.5–45)
HGB BLD-MCNC: 10 G/DL — LOW (ref 11.5–15.5)
HGB BLD-MCNC: 11.4 G/DL — LOW (ref 11.5–15.5)
IMM GRANULOCYTES NFR BLD AUTO: 0.3 % — SIGNIFICANT CHANGE UP (ref 0–1.5)
KETONES UR-MCNC: NEGATIVE — SIGNIFICANT CHANGE UP
LEUKOCYTE ESTERASE UR-ACNC: NEGATIVE — SIGNIFICANT CHANGE UP
LYMPHOCYTES # BLD AUTO: 1.02 K/UL — SIGNIFICANT CHANGE UP (ref 1–3.3)
LYMPHOCYTES # BLD AUTO: 15.5 % — SIGNIFICANT CHANGE UP (ref 13–44)
MAGNESIUM SERPL-MCNC: 2.8 MG/DL — HIGH (ref 1.6–2.6)
MCHC RBC-ENTMCNC: 30.9 GM/DL — LOW (ref 32–36)
MCHC RBC-ENTMCNC: 31.7 GM/DL — LOW (ref 32–36)
MCHC RBC-ENTMCNC: 33.8 PG — SIGNIFICANT CHANGE UP (ref 27–34)
MCHC RBC-ENTMCNC: 34.2 PG — HIGH (ref 27–34)
MCV RBC AUTO: 108.1 FL — HIGH (ref 80–100)
MCV RBC AUTO: 109.5 FL — HIGH (ref 80–100)
MONOCYTES # BLD AUTO: 0.63 K/UL — SIGNIFICANT CHANGE UP (ref 0–0.9)
MONOCYTES NFR BLD AUTO: 9.6 % — SIGNIFICANT CHANGE UP (ref 2–14)
NEUTROPHILS # BLD AUTO: 4.75 K/UL — SIGNIFICANT CHANGE UP (ref 1.8–7.4)
NEUTROPHILS NFR BLD AUTO: 72.4 % — SIGNIFICANT CHANGE UP (ref 43–77)
NITRITE UR-MCNC: NEGATIVE — SIGNIFICANT CHANGE UP
NT-PROBNP SERPL-SCNC: HIGH PG/ML (ref 0–450)
PH UR: 5 — SIGNIFICANT CHANGE UP (ref 5–8)
PHOSPHATE SERPL-MCNC: 5 MG/DL — HIGH (ref 2.5–4.5)
PLATELET # BLD AUTO: 103 K/UL — LOW (ref 150–400)
PLATELET # BLD AUTO: 98 K/UL — LOW (ref 150–400)
POTASSIUM SERPL-MCNC: 5.5 MMOL/L — HIGH (ref 3.5–5.3)
POTASSIUM SERPL-SCNC: 5.5 MMOL/L — HIGH (ref 3.5–5.3)
PROT SERPL-MCNC: 7 GM/DL — SIGNIFICANT CHANGE UP (ref 6–8.3)
PROT UR-MCNC: NEGATIVE MG/DL — SIGNIFICANT CHANGE UP
RBC # BLD: 2.96 M/UL — LOW (ref 3.8–5.2)
RBC # BLD: 3.33 M/UL — LOW (ref 3.8–5.2)
RBC # FLD: 15.9 % — HIGH (ref 10.3–14.5)
RBC # FLD: 16 % — HIGH (ref 10.3–14.5)
SODIUM SERPL-SCNC: 138 MMOL/L — SIGNIFICANT CHANGE UP (ref 135–145)
SP GR SPEC: 1.01 — SIGNIFICANT CHANGE UP (ref 1.01–1.02)
TROPONIN I, HIGH SENSITIVITY RESULT: 210.61 NG/L — HIGH
UROBILINOGEN FLD QL: NEGATIVE — SIGNIFICANT CHANGE UP
WBC # BLD: 6.3 K/UL — SIGNIFICANT CHANGE UP (ref 3.8–10.5)
WBC # BLD: 6.56 K/UL — SIGNIFICANT CHANGE UP (ref 3.8–10.5)
WBC # FLD AUTO: 6.3 K/UL — SIGNIFICANT CHANGE UP (ref 3.8–10.5)
WBC # FLD AUTO: 6.56 K/UL — SIGNIFICANT CHANGE UP (ref 3.8–10.5)

## 2022-02-21 PROCEDURE — 71250 CT THORAX DX C-: CPT | Mod: 26

## 2022-02-21 PROCEDURE — 99223 1ST HOSP IP/OBS HIGH 75: CPT

## 2022-02-21 RX ORDER — SODIUM CHLORIDE 9 MG/ML
500 INJECTION INTRAMUSCULAR; INTRAVENOUS; SUBCUTANEOUS ONCE
Refills: 0 | Status: COMPLETED | OUTPATIENT
Start: 2022-02-21 | End: 2022-02-21

## 2022-02-21 RX ORDER — FUROSEMIDE 40 MG
60 TABLET ORAL DAILY
Refills: 0 | Status: DISCONTINUED | OUTPATIENT
Start: 2022-02-21 | End: 2022-02-22

## 2022-02-21 RX ORDER — LEVOTHYROXINE SODIUM 125 MCG
125 TABLET ORAL DAILY
Refills: 0 | Status: DISCONTINUED | OUTPATIENT
Start: 2022-02-21 | End: 2022-03-01

## 2022-02-21 RX ORDER — ALLOPURINOL 300 MG
100 TABLET ORAL DAILY
Refills: 0 | Status: DISCONTINUED | OUTPATIENT
Start: 2022-02-21 | End: 2022-03-01

## 2022-02-21 RX ORDER — AZITHROMYCIN 500 MG/1
500 TABLET, FILM COATED ORAL ONCE
Refills: 0 | Status: COMPLETED | OUTPATIENT
Start: 2022-02-21 | End: 2022-02-21

## 2022-02-21 RX ORDER — CEFTRIAXONE 500 MG/1
1000 INJECTION, POWDER, FOR SOLUTION INTRAMUSCULAR; INTRAVENOUS EVERY 24 HOURS
Refills: 0 | Status: DISCONTINUED | OUTPATIENT
Start: 2022-02-21 | End: 2022-02-22

## 2022-02-21 RX ORDER — ALBUTEROL 90 UG/1
2 AEROSOL, METERED ORAL EVERY 6 HOURS
Refills: 0 | Status: DISCONTINUED | OUTPATIENT
Start: 2022-02-21 | End: 2022-03-01

## 2022-02-21 RX ORDER — ACETAMINOPHEN 500 MG
1000 TABLET ORAL ONCE
Refills: 0 | Status: COMPLETED | OUTPATIENT
Start: 2022-02-21 | End: 2022-02-21

## 2022-02-21 RX ORDER — ACETAMINOPHEN 500 MG
1000 TABLET ORAL EVERY 6 HOURS
Refills: 0 | Status: DISCONTINUED | OUTPATIENT
Start: 2022-02-21 | End: 2022-02-27

## 2022-02-21 RX ORDER — RIVAROXABAN 15 MG-20MG
15 KIT ORAL
Refills: 0 | Status: DISCONTINUED | OUTPATIENT
Start: 2022-02-21 | End: 2022-02-21

## 2022-02-21 RX ORDER — POTASSIUM CHLORIDE 20 MEQ
20 PACKET (EA) ORAL
Refills: 0 | Status: DISCONTINUED | OUTPATIENT
Start: 2022-02-21 | End: 2022-02-21

## 2022-02-21 RX ORDER — KETOROLAC TROMETHAMINE 30 MG/ML
15 SYRINGE (ML) INJECTION EVERY 6 HOURS
Refills: 0 | Status: DISCONTINUED | OUTPATIENT
Start: 2022-02-21 | End: 2022-02-22

## 2022-02-21 RX ORDER — BUDESONIDE AND FORMOTEROL FUMARATE DIHYDRATE 160; 4.5 UG/1; UG/1
2 AEROSOL RESPIRATORY (INHALATION)
Refills: 0 | Status: DISCONTINUED | OUTPATIENT
Start: 2022-02-21 | End: 2022-03-01

## 2022-02-21 RX ORDER — AZITHROMYCIN 500 MG/1
250 TABLET, FILM COATED ORAL DAILY
Refills: 0 | Status: DISCONTINUED | OUTPATIENT
Start: 2022-02-22 | End: 2022-02-22

## 2022-02-21 RX ORDER — ACETAMINOPHEN 500 MG
1000 TABLET ORAL ONCE
Refills: 0 | Status: COMPLETED | OUTPATIENT
Start: 2022-02-21 | End: 2022-02-24

## 2022-02-21 RX ADMIN — Medication 15 MILLIGRAM(S): at 19:54

## 2022-02-21 RX ADMIN — AZITHROMYCIN 500 MILLIGRAM(S): 500 TABLET, FILM COATED ORAL at 15:09

## 2022-02-21 RX ADMIN — Medication 400 MILLIGRAM(S): at 10:15

## 2022-02-21 RX ADMIN — CEFTRIAXONE 100 MILLIGRAM(S): 500 INJECTION, POWDER, FOR SOLUTION INTRAMUSCULAR; INTRAVENOUS at 15:07

## 2022-02-21 RX ADMIN — SODIUM CHLORIDE 166.67 MILLILITER(S): 9 INJECTION INTRAMUSCULAR; INTRAVENOUS; SUBCUTANEOUS at 14:07

## 2022-02-21 RX ADMIN — PANTOPRAZOLE SODIUM 40 MILLIGRAM(S): 20 TABLET, DELAYED RELEASE ORAL at 10:14

## 2022-02-21 RX ADMIN — ALBUTEROL 2 PUFF(S): 90 AEROSOL, METERED ORAL at 16:29

## 2022-02-21 RX ADMIN — ALBUTEROL 2 PUFF(S): 90 AEROSOL, METERED ORAL at 21:17

## 2022-02-21 RX ADMIN — Medication 100 MILLIGRAM(S): at 15:10

## 2022-02-21 RX ADMIN — SODIUM CHLORIDE 166.67 MILLILITER(S): 9 INJECTION INTRAMUSCULAR; INTRAVENOUS; SUBCUTANEOUS at 15:07

## 2022-02-21 NOTE — PHYSICAL THERAPY INITIAL EVALUATION ADULT - GENERAL OBSERVATIONS, REHAB EVAL
Pt seen for 30min PT bedside Eval. Pt s/p fall +Rt Pubic rami fxs and sacral ala fx, WBAT RLE. Pt rec'd semi supine in bed in NAD, seclining OOB 2/2 reports feeling lightheaded, 4L O2 NC not inplace, willing to participate in bedisde eval. O2 in place satting 90-92% RN aware. History taken, pt ROM WFL, and strength grossly 3+/5 throughout, except RLE limited 2/2 pain, pt requires asssist to move RLE in bed. Pt left semi supine in bed in NAD, all needs met, +bed alarm, RN Марина aware.

## 2022-02-21 NOTE — PROGRESS NOTE ADULT - SUBJECTIVE AND OBJECTIVE BOX
CC:Patient is a 89y old  Female who presents with a chief complaint of Fall (20 Feb 2022 20:35)      Subjective:  Pt seen and examined at bedside. Patient continues to have tendernss right hip and gluteus. Patient denies fever, chills, SOB and CP. Urinary retention overnight and straight cath.     ROS:      Vital Signs Last 24 Hrs  T(C): 36.5 (20 Feb 2022 23:00), Max: 36.6 (20 Feb 2022 14:42)  T(F): 97.7 (20 Feb 2022 23:00), Max: 97.9 (20 Feb 2022 14:42)  HR: 110 (20 Feb 2022 23:00) (85 - 110)  BP: 101/71 (20 Feb 2022 23:00) (101/71 - 124/72)  BP(mean): --  RR: 18 (20 Feb 2022 23:00) (17 - 18)  SpO2: 98% (20 Feb 2022 23:00) (98% - 98%)    Labs:                                10.8   11.31 )-----------( 126      ( 20 Feb 2022 16:43 )             33.8     CBC Full  -  ( 20 Feb 2022 16:43 )  WBC Count : 11.31 K/uL  RBC Count : 3.17 M/uL  Hemoglobin : 10.8 g/dL  Hematocrit : 33.8 %  Platelet Count - Automated : 126 K/uL  Mean Cell Volume : 106.6 fl  Mean Cell Hemoglobin : 34.1 pg  Mean Cell Hemoglobin Concentration : 32.0 gm/dL  Auto Neutrophil # : 9.49 K/uL  Auto Lymphocyte # : 0.87 K/uL  Auto Monocyte # : 0.86 K/uL  Auto Eosinophil # : 0.02 K/uL  Auto Basophil # : 0.04 K/uL  Auto Neutrophil % : 83.8 %  Auto Lymphocyte % : 7.7 %  Auto Monocyte % : 7.6 %  Auto Eosinophil % : 0.2 %  Auto Basophil % : 0.4 %    02-20    141  |  105  |  41<H>  ----------------------------<  127<H>  5.2   |  33<H>  |  1.32<H>    Ca    9.0      20 Feb 2022 16:43    TPro  7.0  /  Alb  3.5  /  TBili  0.8  /  DBili  x   /  AST  46<H>  /  ALT  28  /  AlkPhos  97  02-20    LIVER FUNCTIONS - ( 20 Feb 2022 16:43 )  Alb: 3.5 g/dL / Pro: 7.0 gm/dL / ALK PHOS: 97 U/L / ALT: 28 U/L / AST: 46 U/L / GGT: x           PT/INR - ( 20 Feb 2022 16:43 )   PT: 15.7 sec;   INR: 1.36 ratio         PTT - ( 20 Feb 2022 16:43 )  PTT:41.8 sec      Meds:  acetaminophen   IVPB .. 1000 milliGRAM(s) IV Intermittent once  calcium carbonate    500 mG (Tums) Chewable 3 Tablet(s) Chew every 6 hours PRN  metoprolol tartrate 25 milliGRAM(s) Oral daily  ondansetron Injectable 4 milliGRAM(s) IV Push every 6 hours PRN  pantoprazole  Injectable 40 milliGRAM(s) IV Push daily      Radiology:      Physical exam:  Pt is aaox3  Pt in no acute distress  Psych: normal affect  Resp: CTAB  CVS: S1S2(+)  ABD: bowel sounds (+), soft, non distended, no rebound, no guarding  EXT: no calf tenderness or edema to exam b/l, on VTE prophylaxis  Skin: no adverse skin changes to exam

## 2022-02-21 NOTE — CONSULT NOTE ADULT - SUBJECTIVE AND OBJECTIVE BOX
PCP:    REQUESTING PHYSICIAN:    REASON FOR CONSULT:    CHIEF COMPLAINT:    HPI:  Pt is a 89 yo F that presented to the ED for evaluation of right hip pain. She stated that she was ambulating with her walker this morning, when it got stuck on her bathroom door causing her to fall with right buttock. She called her daughter who took her to her house because of the fact that she lives alone. She stated that she has been able to ambulate minimally to get into the car to the ED since the fall but with pain. Has had a left TKA at [facility].  Otherwise denies head strike/LOC, numbness/tingling, weakness. Pt has an extensive cardiac history but is currently looking to establish are with someone here. Cardiology called to evaluate cardiac status. Pt denies chest pain but reiterated her cardiac history as above. Pt has PPM but denies coronary intervention in the past.     PmHx: Afib on Xarelto 15mg QD, Hypothyroid, CHF, Gout, CAD, Mitral Valve Regurg, Aortal valve insufficiency, HTN  PsHx: B/L cataract surgery and B/L Total Knee Replacement    PCP: Dr Keith (20 Feb 2022 20:35)      PAST MEDICAL & SURGICAL HISTORY:  Mitral valve insufficiency    Aortic valve regurgitation    Osteopenia    CAD (coronary artery disease)    Gout    Hypothyroid    CHF (congestive heart failure)    Anxiety    Anemia    HTN (hypertension)    Afib    COPD (chronic obstructive pulmonary disease)    Status cardiac pacemaker    S/P knee replacement        Allergies    codeine (Unknown)    Intolerances        SOCIAL HISTORY:    FAMILY HISTORY:  No pertinent family history in first degree relatives        MEDICATIONS:  MEDICATIONS  (STANDING):  ALBUTerol    90 MICROgram(s) HFA Inhaler 2 Puff(s) Inhalation every 6 hours  allopurinol 100 milliGRAM(s) Oral daily  budesonide 160 MICROgram(s)/formoterol 4.5 MICROgram(s) Inhaler 2 Puff(s) Inhalation two times a day  furosemide    Tablet 60 milliGRAM(s) Oral daily  levothyroxine 125 MICROGram(s) Oral daily  metoprolol tartrate 25 milliGRAM(s) Oral daily  pantoprazole  Injectable 40 milliGRAM(s) IV Push daily    MEDICATIONS  (PRN):  calcium carbonate    500 mG (Tums) Chewable 3 Tablet(s) Chew every 6 hours PRN Dyspepsia  ondansetron Injectable 4 milliGRAM(s) IV Push every 6 hours PRN Nausea      REVIEW OF SYSTEMS:    CONSTITUTIONAL: No weakness, fevers or chills  EYES/ENT: No visual changes;  No vertigo or throat pain   NECK: No pain or stiffness  RESPIRATORY: No cough, wheezing, hemoptysis; No shortness of breath  CARDIOVASCULAR: No chest pain or palpitations  GASTROINTESTINAL: No abdominal or epigastric pain. No nausea, vomiting, or hematemesis; No diarrhea or constipation. No melena or hematochezia.  GENITOURINARY: No dysuria, frequency or hematuria  NEUROLOGICAL: No numbness or weakness  SKIN: No itching, burning, rashes, or lesions   All other review of systems is negative unless indicated above    Vital Signs Last 24 Hrs  T(C): 36.6 (21 Feb 2022 08:23), Max: 36.6 (20 Feb 2022 14:42)  T(F): 97.8 (21 Feb 2022 08:23), Max: 97.9 (20 Feb 2022 14:42)  HR: 89 (21 Feb 2022 08:23) (85 - 110)  BP: 95/64 (21 Feb 2022 08:23) (95/64 - 124/72)  BP(mean): --  RR: 18 (21 Feb 2022 08:23) (17 - 18)  SpO2: 94% (21 Feb 2022 08:23) (94% - 98%)    I&O's Summary      PHYSICAL EXAM:    Constitutional: NAD, awake and alert, well-developed  HEENT: PERR, EOMI,  No oral cyananosis.  Neck:  supple,  No JVD  Respiratory: Breath sounds are clear bilaterally, No wheezing, rales or rhonchi  Cardiovascular: S1 and S2,irr, 1/6 REBECA Murmurs, gallops or rubs  Gastrointestinal: Bowel Sounds present, soft, nontender.   Extremities: pain with movement of lower extremities. Edema  Vascular: 2+ peripheral pulses  Neurological: A/O x 3, no focal deficits  Musculoskeletal: no calf tenderness.  Skin: No rashes.      LABS: All Labs Reviewed:                        11.4   6.56  )-----------( 103      ( 21 Feb 2022 07:10 )             36.0                         10.8   11.31 )-----------( 126      ( 20 Feb 2022 16:43 )             33.8     21 Feb 2022 07:10    138    |  103    |  49     ----------------------------<  111    5.5     |  31     |  1.62   20 Feb 2022 16:43    141    |  105    |  41     ----------------------------<  127    5.2     |  33     |  1.32     Ca    8.7        21 Feb 2022 07:10  Ca    9.0        20 Feb 2022 16:43  Phos  5.0       21 Feb 2022 07:10  Mg     2.8       21 Feb 2022 07:10    TPro  7.0    /  Alb  3.6    /  TBili  0.8    /  DBili  x      /  AST  38     /  ALT  28     /  AlkPhos  92     21 Feb 2022 07:10  TPro  7.0    /  Alb  3.5    /  TBili  0.8    /  DBili  x      /  AST  46     /  ALT  28     /  AlkPhos  97     20 Feb 2022 16:43    PT/INR - ( 20 Feb 2022 16:43 )   PT: 15.7 sec;   INR: 1.36 ratio         PTT - ( 20 Feb 2022 16:43 )  PTT:41.8 sec      Blood Culture:         RADIOLOGY/EKG:

## 2022-02-21 NOTE — CONSULT NOTE ADULT - SUBJECTIVE AND OBJECTIVE BOX
History of Present Illness:   Pt is a 89 yo F that presented to the ED for evaluation of right hip pain. She stated that she was ambulating with her walker this morning, when it got stuck on her bathroom door causing her to fall on her  right buttock. She called her daughter who took her to her house because of the fact that she lives alone. She stated that she has been able to ambulate minimally to get into the car to the ED since the fall but with pain. She reports that she lives at home alone with a home aide during the day for 2 hours. Pt was released from [facility] in late november after knee surgery.  On exam has some lateral tenderness at the left knee. Has had a left TKR at \[facility]".  Otherwise denies head strike/LOC    2.21: patient seen on 3E, not confused, able to give history; c/o some pelvis pain with movements.  requires 4l NC    PAST MEDICAL HISTORY:  Afib   Anemia   Anxiety   Aortic valve regurgitation   CAD (coronary artery disease)   CHF (congestive heart failure)   COPD (chronic obstructive pulmonary disease)   Gout   HTN (hypertension)   Hypothyroid   Mitral valve insufficiency   Osteopenia.     PAST SURGICAL HISTORY:  S/P knee replacement   Status cardiac pacemaker.   Cataracts bilaterally     FAMILY HISTORY:  mother lived till 101  father lived till 87  no h/o CAD/CVA        REVIEW OF SYSTEMS:    CONSTITUTIONAL: No weakness, No fevers or chills  ENT: No ear ache, No sorethroat  NECK: No pain, No stiffness  RESPIRATORY: No cough, No wheezing, No hemoptysis; No dyspnea  CARDIOVASCULAR: No chest pain, No palpitations  GASTROINTESTINAL: No abd pain, No nausea, No vomiting, No hematemesis, No diarrhea or constipation. No melena, No hematochezia.  GENITOURINARY: No dysuria, No  hematuria  NEUROLOGICAL: No diplopia, No paresthesia, No motor dysfunction  MUSCULOSKELETAL: No arthralgia, No myalgia  SKIN: No rashes, or lesions   PSYCH: no anxiety, no suicidal ideation    All other review of systems is negative unless indicated above    Vital Signs Last 24 Hrs  T(C): 36.6 (21 Feb 2022 08:23), Max: 36.6 (20 Feb 2022 14:42)  T(F): 97.8 (21 Feb 2022 08:23), Max: 97.9 (20 Feb 2022 14:42)  HR: 89 (21 Feb 2022 08:23) (85 - 110)  BP: 95/64 (21 Feb 2022 08:23) (95/64 - 124/72)  BP(mean): --  RR: 18 (21 Feb 2022 08:23) (17 - 18)  SpO2: 94% (21 Feb 2022 08:23) (94% - 98%)    PHYSICAL EXAM:    GENERAL: NAD  HEENT:  NC/AT, EOMI, PERRLA, No scleral icterus, Moist mucous membranes  NECK: Supple, No JVD  CNS:  Alert & Oriented X3, Motor Strength 5/5 B/L upper and lower extremities; DTRs 2+ intact   LUNG: Decresaed Breath sounds, Rt crackles, no wheezing   HEART: RRR; No murmurs, No rubs  ABDOMEN: +BS, ST/ND/NT  GENITOURINARY: Voiding, Bladder not distended  EXTREMITIES:  2+ Peripheral Pulses, No clubbing, No cyanosis, No tibial edema  MUSCULOSKELTAL: Joints normal ROM, No TTP, No effusion  VAGINAL: deferred  SKIN: no rashes, intact skin  RECTAL: deferred, not indicated  BREAST: deferred                          11.4   6.56  )-----------( 103      ( 21 Feb 2022 07:10 )             36.0     02-21    138  |  103  |  49<H>  ----------------------------<  111<H>  5.5<H>   |  31  |  1.62<H>    Ca    8.7      21 Feb 2022 07:10  Phos  5.0     02-21  Mg     2.8     02-21    TPro  7.0  /  Alb  3.6  /  TBili  0.8  /  DBili  x   /  AST  38<H>  /  ALT  28  /  AlkPhos  92  02-21    Vancomycin levels:   Cultures:     MEDICATIONS  (STANDING):  metoprolol tartrate 25 milliGRAM(s) Oral daily  pantoprazole  Injectable 40 milliGRAM(s) IV Push daily    MEDICATIONS  (PRN):  calcium carbonate    500 mG (Tums) Chewable 3 Tablet(s) Chew every 6 hours PRN Dyspepsia  ondansetron Injectable 4 milliGRAM(s) IV Push every 6 hours PRN Nausea      all labs reviewed  all imaging reviewed      a/p:    1. Rt pelvis fracture due to mechanical fall:  analgesia prn  PT / Ortho evaluations     2. Hypoxic respiratory failure:  h/o COPD, not wheezing  CT Chest  c/w bronchodilators, ICS    3. Afib: rate controlled on bblockers    History of Present Illness:   Pt is a 89 yo F that presented to the ED for evaluation of right hip pain. She stated that she was ambulating with her walker this morning, when it got stuck on her bathroom door causing her to fall on her  right buttock. She called her daughter who took her to her house because of the fact that she lives alone. She stated that she has been able to ambulate minimally to get into the car to the ED since the fall but with pain. She reports that she lives at home alone with a home aide during the day for 2 hours. Pt was released from [facility] in late november after knee surgery.  On exam has some lateral tenderness at the left knee. Has had a left TKR at [facility].  Otherwise denies head strike/LOC    2.21: patient seen on 3E, not confused, able to give history; c/o some pelvis pain with movements.  requires 4l NC    PAST MEDICAL HISTORY:  Afib   Anemia   Anxiety   Aortic valve regurgitation   CAD (coronary artery disease)   CHF (congestive heart failure)   COPD (chronic obstructive pulmonary disease)   Gout   HTN (hypertension)   Hypothyroid   Mitral valve insufficiency   Osteopenia.     PAST SURGICAL HISTORY:  S/P knee replacement   Status cardiac pacemaker.   Cataracts bilaterally     FAMILY HISTORY:  mother lived till 101  father lived till 87  no h/o CAD/CVA        REVIEW OF SYSTEMS:    CONSTITUTIONAL: No weakness, No fevers or chills  ENT: No ear ache, No sorethroat  NECK: No pain, No stiffness  RESPIRATORY: No cough, No wheezing, No hemoptysis; No dyspnea  CARDIOVASCULAR: No chest pain, No palpitations  GASTROINTESTINAL: No abd pain, No nausea, No vomiting, No hematemesis, No diarrhea or constipation. No melena, No hematochezia.  GENITOURINARY: No dysuria, No  hematuria  NEUROLOGICAL: No diplopia, No paresthesia, No motor dysfunction  MUSCULOSKELETAL: No arthralgia, No myalgia  SKIN: No rashes, or lesions   PSYCH: no anxiety, no suicidal ideation    All other review of systems is negative unless indicated above    Vital Signs Last 24 Hrs  T(C): 36.6 (21 Feb 2022 08:23), Max: 36.6 (20 Feb 2022 14:42)  T(F): 97.8 (21 Feb 2022 08:23), Max: 97.9 (20 Feb 2022 14:42)  HR: 89 (21 Feb 2022 08:23) (85 - 110)  BP: 95/64 (21 Feb 2022 08:23) (95/64 - 124/72)  BP(mean): --  RR: 18 (21 Feb 2022 08:23) (17 - 18)  SpO2: 94% (21 Feb 2022 08:23) (94% - 98%)    PHYSICAL EXAM:    GENERAL: NAD  HEENT:  NC/AT, EOMI, PERRLA, No scleral icterus, Moist mucous membranes  NECK: Supple, No JVD  CNS:  Alert & Oriented X3, Motor Strength 5/5 B/L upper and lower extremities; DTRs 2+ intact   LUNG: Decresaed Breath sounds, Rt crackles, no wheezing   HEART: RRR; No murmurs, No rubs  ABDOMEN: +BS, ST/ND/NT  GENITOURINARY: Voiding, Bladder not distended  EXTREMITIES:  2+ Peripheral Pulses, No clubbing, No cyanosis, No tibial edema  MUSCULOSKELTAL: Joints normal ROM, No TTP, No effusion  VAGINAL: deferred  SKIN: no rashes, intact skin  RECTAL: deferred, not indicated  BREAST: deferred                          11.4   6.56  )-----------( 103      ( 21 Feb 2022 07:10 )             36.0     02-21    138  |  103  |  49<H>  ----------------------------<  111<H>  5.5<H>   |  31  |  1.62<H>    Ca    8.7      21 Feb 2022 07:10  Phos  5.0     02-21  Mg     2.8     02-21    TPro  7.0  /  Alb  3.6  /  TBili  0.8  /  DBili  x   /  AST  38<H>  /  ALT  28  /  AlkPhos  92  02-21    Vancomycin levels:   Cultures:     MEDICATIONS  (STANDING):  metoprolol tartrate 25 milliGRAM(s) Oral daily  pantoprazole  Injectable 40 milliGRAM(s) IV Push daily    MEDICATIONS  (PRN):  calcium carbonate    500 mG (Tums) Chewable 3 Tablet(s) Chew every 6 hours PRN Dyspepsia  ondansetron Injectable 4 milliGRAM(s) IV Push every 6 hours PRN Nausea      all labs reviewed  all imaging reviewed      a/p:    1. Rt pelvis fracture due to mechanical fall:  analgesia prn  PT / Ortho evaluations     2. Hypoxic respiratory failure:  Chf vs Copd  h/o COPD, not wheezing  CT Chest  c/w bronchodilators, ICS    3. HFpEF:  restart Lasix 60mg daily AM  check BNP , check Troponin    4. Afib: rate controlled on bblockers   restart Xarelto    History of Present Illness:   Pt is a 89 yo F that presented to the ED for evaluation of right hip pain. She stated that she was ambulating with her walker this morning, when it got stuck on her bathroom door causing her to fall on her  right buttock. She called her daughter who took her to her house because of the fact that she lives alone. She stated that she has been able to ambulate minimally to get into the car to the ED since the fall but with pain. She reports that she lives at home alone with a home aide during the day for 2 hours. Pt was released from [facility] [place] in late november after knee surgery.  On exam has some lateral tenderness at the left knee. Has had a left TKR at [facility].  Otherwise denies head strike/LOC    2.21: patient seen on 3E, not confused, able to give history; c/o some pelvis pain with movements.  requires 4l NC    PAST MEDICAL HISTORY:  Afib   Anemia   Anxiety   Aortic valve regurgitation   CAD (coronary artery disease)   CHF (congestive heart failure)   COPD (chronic obstructive pulmonary disease)   Gout   HTN (hypertension)   Hypothyroid   Mitral valve insufficiency   Osteopenia.     PAST SURGICAL HISTORY:  S/P knee replacement   Status cardiac pacemaker.   Cataracts bilaterally     FAMILY HISTORY:  mother lived till 101  father lived till 87  no h/o CAD/CVA        REVIEW OF SYSTEMS:    CONSTITUTIONAL: No weakness, No fevers or chills  ENT: No ear ache, No sorethroat  NECK: No pain, No stiffness  RESPIRATORY: No cough, No wheezing, No hemoptysis; No dyspnea  CARDIOVASCULAR: No chest pain, No palpitations  GASTROINTESTINAL: No abd pain, No nausea, No vomiting, No hematemesis, No diarrhea or constipation. No melena, No hematochezia.  GENITOURINARY: No dysuria, No  hematuria  NEUROLOGICAL: No diplopia, No paresthesia, No motor dysfunction  MUSCULOSKELETAL: No arthralgia, No myalgia  SKIN: No rashes, or lesions   PSYCH: no anxiety, no suicidal ideation    All other review of systems is negative unless indicated above    Vital Signs Last 24 Hrs  T(C): 36.6 (21 Feb 2022 08:23), Max: 36.6 (20 Feb 2022 14:42)  T(F): 97.8 (21 Feb 2022 08:23), Max: 97.9 (20 Feb 2022 14:42)  HR: 89 (21 Feb 2022 08:23) (85 - 110)  BP: 95/64 (21 Feb 2022 08:23) (95/64 - 124/72)  BP(mean): --  RR: 18 (21 Feb 2022 08:23) (17 - 18)  SpO2: 94% (21 Feb 2022 08:23) (94% - 98%)    PHYSICAL EXAM:    GENERAL: NAD  HEENT:  NC/AT, EOMI, PERRLA, No scleral icterus, Moist mucous membranes  NECK: Supple, No JVD  CNS:  Alert & Oriented X3, Motor Strength 5/5 B/L upper and lower extremities; DTRs 2+ intact   LUNG: Decresaed Breath sounds, Rt crackles, no wheezing   HEART: RRR; No murmurs, No rubs  ABDOMEN: +BS, ST/ND/NT  GENITOURINARY: Voiding, Bladder not distended  EXTREMITIES:  2+ Peripheral Pulses, No clubbing, No cyanosis, No tibial edema  MUSCULOSKELTAL: Joints normal ROM, No TTP, No effusion  VAGINAL: deferred  SKIN: no rashes, intact skin  RECTAL: deferred, not indicated  BREAST: deferred                          11.4   6.56  )-----------( 103      ( 21 Feb 2022 07:10 )             36.0     02-21    138  |  103  |  49<H>  ----------------------------<  111<H>  5.5<H>   |  31  |  1.62<H>    Ca    8.7      21 Feb 2022 07:10  Phos  5.0     02-21  Mg     2.8     02-21    TPro  7.0  /  Alb  3.6  /  TBili  0.8  /  DBili  x   /  AST  38<H>  /  ALT  28  /  AlkPhos  92  02-21      MEDICATIONS  (STANDING):  acetaminophen   IVPB .. 1000 milliGRAM(s) IV Intermittent every 6 hours  ALBUTerol    90 MICROgram(s) HFA Inhaler 2 Puff(s) Inhalation every 6 hours  allopurinol 100 milliGRAM(s) Oral daily  budesonide 160 MICROgram(s)/formoterol 4.5 MICROgram(s) Inhaler 2 Puff(s) Inhalation two times a day  cefTRIAXone   IVPB 1000 milliGRAM(s) IV Intermittent every 24 hours  furosemide    Tablet 60 milliGRAM(s) Oral daily  levothyroxine 125 MICROGram(s) Oral daily  metoprolol tartrate 25 milliGRAM(s) Oral daily  pantoprazole  Injectable 40 milliGRAM(s) IV Push daily      CT Chest: RLL Pna      a/p:    1. Rt pelvis fracture due to mechanical fall:  analgesia prn  PT / Ortho evaluations     2. Acute  Hypoxic respiratory failure due to Pna:  h/o COPD, not wheezing  CT Chest noted  Ceftriaxone/Zithromax  Blood Cx x 2  c/w bronchodilators, ICS    3. HFpEF: compensated  will hold diuretic due to hypotension   check BNP , check Troponin  cardiology eval noted    4. Afib: rate controlled on bblockers   restart Xarelto

## 2022-02-21 NOTE — PHYSICAL THERAPY INITIAL EVALUATION ADULT - PRECAUTIONS/LIMITATIONS, REHAB EVAL
fall precautions CTAP: Nondisplaced fracture of the right sacral ala.  Minimally displaced fractures of the right superior and inferior pubic rami./fall precautions

## 2022-02-21 NOTE — PHYSICAL THERAPY INITIAL EVALUATION ADULT - ACTIVE RANGE OF MOTION EXAMINATION, REHAB EVAL
RLE limited 2/2 pain/bilateral upper extremity Active ROM was WFL (within functional limits)/Left LE Active ROM was WFL (within functional limits)

## 2022-02-21 NOTE — PATIENT PROFILE ADULT - FALL HARM RISK - HARM RISK INTERVENTIONS

## 2022-02-21 NOTE — PROGRESS NOTE ADULT - SUBJECTIVE AND OBJECTIVE BOX
Called to see pt because multiple nurses unable to place jackson catheter.     Vital Signs Last 24 Hrs  T(C): 36.4 (21 Feb 2022 16:31), Max: 36.6 (21 Feb 2022 08:23)  T(F): 97.5 (21 Feb 2022 16:31), Max: 97.8 (21 Feb 2022 08:23)  HR: 87 (21 Feb 2022 16:31) (86 - 110)  BP: 91/54 (21 Feb 2022 16:31) (72/42 - 101/71)  BP(mean): --  RR: 18 (21 Feb 2022 15:34) (18 - 18)  SpO2: 93% (21 Feb 2022 15:34) (93% - 98%)    I&O's Summary    21 Feb 2022 07:01  -  21 Feb 2022 19:09  --------------------------------------------------------  IN: 0 mL / OUT: 600 mL / NET: -600 mL        Physical Exam  Gen: NAD, A&Ox3  :  Under sterile conditions, placed 16Fr Coude Cath without any problems             UO-600cc, Urine yellow, no clots                          10.0   6.30  )-----------( 98       ( 21 Feb 2022 17:43 )             32.4       02-21    138  |  103  |  49<H>  ----------------------------<  111<H>  5.5<H>   |  31  |  1.62<H>    Ca    8.7      21 Feb 2022 07:10  Phos  5.0     02-21  Mg     2.8     02-21    TPro  7.0  /  Alb  3.6  /  TBili  0.8  /  DBili  x   /  AST  38<H>  /  ALT  28  /  AlkPhos  92  02-21      A/P: 89y/o Female with urinary retention   Leave jackson to bag drainage.

## 2022-02-21 NOTE — PHYSICAL THERAPY INITIAL EVALUATION ADULT - PERTINENT HX OF CURRENT PROBLEM, REHAB EVAL
89yo Female with pmh of TRACY, CAD, HTN, COPD, hypothyroidism, present to  s/p fall with right sacral ala fracture and superior and inferior rami fracture

## 2022-02-21 NOTE — PROGRESS NOTE ADULT - ASSESSMENT
89 YO F with right sacral ala fracture and superior and inferior rami fracture    Plan:   Cardiology consult  PT eval for dispo   Multimodal pain control  Incentive spirometery  F/U of left knee  Vitals, IS/OS Q 4  Diet: Soft Cardiac  GI prophylaxis  Local wound care  Activity: WBAT  PT  Hold A/C  Resume other home med  Hospitalist consulted  Orthopedic  following   SW for eventual D/C planning      Case discussed with Dr Bunch

## 2022-02-22 DIAGNOSIS — I95.9 HYPOTENSION, UNSPECIFIED: ICD-10-CM

## 2022-02-22 DIAGNOSIS — R77.8 OTHER SPECIFIED ABNORMALITIES OF PLASMA PROTEINS: ICD-10-CM

## 2022-02-22 LAB
ADD ON TEST-SPECIMEN IN LAB: SIGNIFICANT CHANGE UP
ALBUMIN SERPL ELPH-MCNC: 2.6 G/DL — LOW (ref 3.3–5)
ALBUMIN SERPL ELPH-MCNC: 2.9 G/DL — LOW (ref 3.3–5)
ALP SERPL-CCNC: 77 U/L — SIGNIFICANT CHANGE UP (ref 40–120)
ALP SERPL-CCNC: 81 U/L — SIGNIFICANT CHANGE UP (ref 40–120)
ALT FLD-CCNC: 24 U/L — SIGNIFICANT CHANGE UP (ref 12–78)
ALT FLD-CCNC: 25 U/L — SIGNIFICANT CHANGE UP (ref 12–78)
ANION GAP SERPL CALC-SCNC: 3 MMOL/L — LOW (ref 5–17)
ANION GAP SERPL CALC-SCNC: 6 MMOL/L — SIGNIFICANT CHANGE UP (ref 5–17)
ANION GAP SERPL CALC-SCNC: 7 MMOL/L — SIGNIFICANT CHANGE UP (ref 5–17)
ANION GAP SERPL CALC-SCNC: 8 MMOL/L — SIGNIFICANT CHANGE UP (ref 5–17)
ANION GAP SERPL CALC-SCNC: 8 MMOL/L — SIGNIFICANT CHANGE UP (ref 5–17)
APPEARANCE UR: ABNORMAL
APTT BLD: 148.8 SEC — CRITICAL HIGH (ref 27.5–35.5)
APTT BLD: 176.8 SEC — CRITICAL HIGH (ref 27.5–35.5)
APTT BLD: 36.3 SEC — HIGH (ref 27.5–35.5)
AST SERPL-CCNC: 33 U/L — SIGNIFICANT CHANGE UP (ref 15–37)
AST SERPL-CCNC: 33 U/L — SIGNIFICANT CHANGE UP (ref 15–37)
BASE EXCESS BLDCOV CALC-SCNC: -1.4 MMOL/L — SIGNIFICANT CHANGE UP (ref -9.3–0.3)
BASE EXCESS BLDV CALC-SCNC: -1.4 MMOL/L — SIGNIFICANT CHANGE UP
BASE EXCESS BLDV CALC-SCNC: -5.6 MMOL/L — SIGNIFICANT CHANGE UP
BASOPHILS # BLD AUTO: 0.03 K/UL — SIGNIFICANT CHANGE UP (ref 0–0.2)
BASOPHILS NFR BLD AUTO: 0.3 % — SIGNIFICANT CHANGE UP (ref 0–2)
BILIRUB SERPL-MCNC: 0.5 MG/DL — SIGNIFICANT CHANGE UP (ref 0.2–1.2)
BILIRUB SERPL-MCNC: 0.6 MG/DL — SIGNIFICANT CHANGE UP (ref 0.2–1.2)
BILIRUB UR-MCNC: NEGATIVE — SIGNIFICANT CHANGE UP
BLOOD GAS COMMENTS, VENOUS: SIGNIFICANT CHANGE UP
BLOOD GAS COMMENTS, VENOUS: SIGNIFICANT CHANGE UP
BUN SERPL-MCNC: 58 MG/DL — HIGH (ref 7–23)
BUN SERPL-MCNC: 59 MG/DL — HIGH (ref 7–23)
BUN SERPL-MCNC: 61 MG/DL — HIGH (ref 7–23)
BUN SERPL-MCNC: 62 MG/DL — HIGH (ref 7–23)
BUN SERPL-MCNC: 63 MG/DL — HIGH (ref 7–23)
CALCIUM SERPL-MCNC: 7.9 MG/DL — LOW (ref 8.5–10.1)
CALCIUM SERPL-MCNC: 8 MG/DL — LOW (ref 8.5–10.1)
CALCIUM SERPL-MCNC: 8 MG/DL — LOW (ref 8.5–10.1)
CALCIUM SERPL-MCNC: 8.4 MG/DL — LOW (ref 8.5–10.1)
CALCIUM SERPL-MCNC: 8.8 MG/DL — SIGNIFICANT CHANGE UP (ref 8.5–10.1)
CHLORIDE SERPL-SCNC: 100 MMOL/L — SIGNIFICANT CHANGE UP (ref 96–108)
CHLORIDE SERPL-SCNC: 102 MMOL/L — SIGNIFICANT CHANGE UP (ref 96–108)
CHLORIDE SERPL-SCNC: 102 MMOL/L — SIGNIFICANT CHANGE UP (ref 96–108)
CHLORIDE SERPL-SCNC: 103 MMOL/L — SIGNIFICANT CHANGE UP (ref 96–108)
CHLORIDE SERPL-SCNC: 99 MMOL/L — SIGNIFICANT CHANGE UP (ref 96–108)
CO2 BLDCOV-SCNC: 29 MMOL/L — SIGNIFICANT CHANGE UP
CO2 BLDV-SCNC: 27 MMOL/L — HIGH (ref 22–26)
CO2 BLDV-SCNC: 29 MMOL/L — HIGH (ref 22–26)
CO2 SERPL-SCNC: 24 MMOL/L — SIGNIFICANT CHANGE UP (ref 22–31)
CO2 SERPL-SCNC: 26 MMOL/L — SIGNIFICANT CHANGE UP (ref 22–31)
CO2 SERPL-SCNC: 27 MMOL/L — SIGNIFICANT CHANGE UP (ref 22–31)
CO2 SERPL-SCNC: 27 MMOL/L — SIGNIFICANT CHANGE UP (ref 22–31)
CO2 SERPL-SCNC: 29 MMOL/L — SIGNIFICANT CHANGE UP (ref 22–31)
COLOR SPEC: YELLOW — SIGNIFICANT CHANGE UP
CREAT SERPL-MCNC: 1.91 MG/DL — HIGH (ref 0.5–1.3)
CREAT SERPL-MCNC: 2.01 MG/DL — HIGH (ref 0.5–1.3)
CREAT SERPL-MCNC: 2.18 MG/DL — HIGH (ref 0.5–1.3)
CREAT SERPL-MCNC: 2.24 MG/DL — HIGH (ref 0.5–1.3)
CREAT SERPL-MCNC: 2.3 MG/DL — HIGH (ref 0.5–1.3)
DIFF PNL FLD: ABNORMAL
EOSINOPHIL # BLD AUTO: 0.18 K/UL — SIGNIFICANT CHANGE UP (ref 0–0.5)
EOSINOPHIL NFR BLD AUTO: 1.9 % — SIGNIFICANT CHANGE UP (ref 0–6)
GAS PNL BLDCOV: 7.26 — SIGNIFICANT CHANGE UP (ref 7.25–7.45)
GLUCOSE SERPL-MCNC: 110 MG/DL — HIGH (ref 70–99)
GLUCOSE SERPL-MCNC: 116 MG/DL — HIGH (ref 70–99)
GLUCOSE SERPL-MCNC: 275 MG/DL — HIGH (ref 70–99)
GLUCOSE SERPL-MCNC: 29 MG/DL — CRITICAL LOW (ref 70–99)
GLUCOSE SERPL-MCNC: 39 MG/DL — CRITICAL LOW (ref 70–99)
GLUCOSE UR QL: NEGATIVE — SIGNIFICANT CHANGE UP
HCO3 BLDCOV-SCNC: 27 MMOL/L — SIGNIFICANT CHANGE UP
HCO3 BLDV-SCNC: 25 MMOL/L — SIGNIFICANT CHANGE UP (ref 22–29)
HCO3 BLDV-SCNC: 27 MMOL/L — SIGNIFICANT CHANGE UP (ref 22–29)
HCT VFR BLD CALC: 30.5 % — LOW (ref 34.5–45)
HCT VFR BLD CALC: 30.5 % — LOW (ref 34.5–45)
HCT VFR BLD CALC: 32.5 % — LOW (ref 34.5–45)
HCT VFR BLD CALC: 33.4 % — LOW (ref 34.5–45)
HGB BLD-MCNC: 10.3 G/DL — LOW (ref 11.5–15.5)
HGB BLD-MCNC: 10.7 G/DL — LOW (ref 11.5–15.5)
HGB BLD-MCNC: 9.7 G/DL — LOW (ref 11.5–15.5)
HGB BLD-MCNC: 9.8 G/DL — LOW (ref 11.5–15.5)
IMM GRANULOCYTES NFR BLD AUTO: 0.6 % — SIGNIFICANT CHANGE UP (ref 0–1.5)
INR BLD: 1.28 RATIO — HIGH (ref 0.88–1.16)
KETONES UR-MCNC: NEGATIVE — SIGNIFICANT CHANGE UP
LACTATE SERPL-SCNC: 1.3 MMOL/L — SIGNIFICANT CHANGE UP (ref 0.7–2)
LACTATE SERPL-SCNC: 1.7 MMOL/L — SIGNIFICANT CHANGE UP (ref 0.7–2)
LEUKOCYTE ESTERASE UR-ACNC: ABNORMAL
LYMPHOCYTES # BLD AUTO: 2.58 K/UL — SIGNIFICANT CHANGE UP (ref 1–3.3)
LYMPHOCYTES # BLD AUTO: 26.6 % — SIGNIFICANT CHANGE UP (ref 13–44)
MAGNESIUM SERPL-MCNC: 2.4 MG/DL — SIGNIFICANT CHANGE UP (ref 1.6–2.6)
MAGNESIUM SERPL-MCNC: 2.5 MG/DL — SIGNIFICANT CHANGE UP (ref 1.6–2.6)
MCHC RBC-ENTMCNC: 31.7 GM/DL — LOW (ref 32–36)
MCHC RBC-ENTMCNC: 31.8 GM/DL — LOW (ref 32–36)
MCHC RBC-ENTMCNC: 32 GM/DL — SIGNIFICANT CHANGE UP (ref 32–36)
MCHC RBC-ENTMCNC: 32.1 GM/DL — SIGNIFICANT CHANGE UP (ref 32–36)
MCHC RBC-ENTMCNC: 34.4 PG — HIGH (ref 27–34)
MCHC RBC-ENTMCNC: 34.9 PG — HIGH (ref 27–34)
MCV RBC AUTO: 107.4 FL — HIGH (ref 80–100)
MCV RBC AUTO: 108.2 FL — HIGH (ref 80–100)
MCV RBC AUTO: 108.5 FL — HIGH (ref 80–100)
MCV RBC AUTO: 108.7 FL — HIGH (ref 80–100)
MONOCYTES # BLD AUTO: 0.56 K/UL — SIGNIFICANT CHANGE UP (ref 0–0.9)
MONOCYTES NFR BLD AUTO: 5.8 % — SIGNIFICANT CHANGE UP (ref 2–14)
NEUTROPHILS # BLD AUTO: 6.28 K/UL — SIGNIFICANT CHANGE UP (ref 1.8–7.4)
NEUTROPHILS NFR BLD AUTO: 64.8 % — SIGNIFICANT CHANGE UP (ref 43–77)
NITRITE UR-MCNC: NEGATIVE — SIGNIFICANT CHANGE UP
PCO2 BLDCOV: 60 MMHG — HIGH (ref 27–49)
PCO2 BLDV: 60 MMHG — HIGH (ref 39–42)
PCO2 BLDV: 71 MMHG — HIGH (ref 39–42)
PH BLDV: 7.15 — LOW (ref 7.32–7.43)
PH BLDV: 7.26 — LOW (ref 7.32–7.43)
PH UR: 5 — SIGNIFICANT CHANGE UP (ref 5–8)
PHOSPHATE SERPL-MCNC: 4.3 MG/DL — SIGNIFICANT CHANGE UP (ref 2.5–4.5)
PHOSPHATE SERPL-MCNC: 4.4 MG/DL — SIGNIFICANT CHANGE UP (ref 2.5–4.5)
PLATELET # BLD AUTO: 100 K/UL — LOW (ref 150–400)
PLATELET # BLD AUTO: 110 K/UL — LOW (ref 150–400)
PLATELET # BLD AUTO: 116 K/UL — LOW (ref 150–400)
PLATELET # BLD AUTO: 118 K/UL — LOW (ref 150–400)
PO2 BLDCOA: 98 MMHG — HIGH (ref 17–41)
PO2 BLDV: 49 MMHG — SIGNIFICANT CHANGE UP
PO2 BLDV: 98 MMHG — SIGNIFICANT CHANGE UP
POTASSIUM SERPL-MCNC: 4.1 MMOL/L — SIGNIFICANT CHANGE UP (ref 3.5–5.3)
POTASSIUM SERPL-MCNC: 4.4 MMOL/L — SIGNIFICANT CHANGE UP (ref 3.5–5.3)
POTASSIUM SERPL-MCNC: 4.4 MMOL/L — SIGNIFICANT CHANGE UP (ref 3.5–5.3)
POTASSIUM SERPL-MCNC: 4.8 MMOL/L — SIGNIFICANT CHANGE UP (ref 3.5–5.3)
POTASSIUM SERPL-MCNC: 6.7 MMOL/L — CRITICAL HIGH (ref 3.5–5.3)
POTASSIUM SERPL-SCNC: 4.1 MMOL/L — SIGNIFICANT CHANGE UP (ref 3.5–5.3)
POTASSIUM SERPL-SCNC: 4.4 MMOL/L — SIGNIFICANT CHANGE UP (ref 3.5–5.3)
POTASSIUM SERPL-SCNC: 4.4 MMOL/L — SIGNIFICANT CHANGE UP (ref 3.5–5.3)
POTASSIUM SERPL-SCNC: 4.8 MMOL/L — SIGNIFICANT CHANGE UP (ref 3.5–5.3)
POTASSIUM SERPL-SCNC: 6.7 MMOL/L — CRITICAL HIGH (ref 3.5–5.3)
PROCALCITONIN SERPL-MCNC: 1.67 NG/ML — HIGH (ref 0.02–0.1)
PROT SERPL-MCNC: 5.9 GM/DL — LOW (ref 6–8.3)
PROT SERPL-MCNC: 6.1 GM/DL — SIGNIFICANT CHANGE UP (ref 6–8.3)
PROT UR-MCNC: 30 MG/DL
PROTHROM AB SERPL-ACNC: 14.7 SEC — HIGH (ref 10.6–13.6)
RBC # BLD: 2.81 M/UL — LOW (ref 3.8–5.2)
RBC # BLD: 2.82 M/UL — LOW (ref 3.8–5.2)
RBC # BLD: 2.99 M/UL — LOW (ref 3.8–5.2)
RBC # BLD: 3.11 M/UL — LOW (ref 3.8–5.2)
RBC # FLD: 15.6 % — HIGH (ref 10.3–14.5)
RBC # FLD: 15.6 % — HIGH (ref 10.3–14.5)
RBC # FLD: 15.8 % — HIGH (ref 10.3–14.5)
RBC # FLD: 15.9 % — HIGH (ref 10.3–14.5)
SAO2 % BLDCOV: 98 % — SIGNIFICANT CHANGE UP
SAO2 % BLDV: 75.5 % — SIGNIFICANT CHANGE UP
SAO2 % BLDV: 98.1 % — SIGNIFICANT CHANGE UP
SODIUM SERPL-SCNC: 132 MMOL/L — LOW (ref 135–145)
SODIUM SERPL-SCNC: 132 MMOL/L — LOW (ref 135–145)
SODIUM SERPL-SCNC: 135 MMOL/L — SIGNIFICANT CHANGE UP (ref 135–145)
SODIUM SERPL-SCNC: 135 MMOL/L — SIGNIFICANT CHANGE UP (ref 135–145)
SODIUM SERPL-SCNC: 137 MMOL/L — SIGNIFICANT CHANGE UP (ref 135–145)
SP GR SPEC: 1.02 — SIGNIFICANT CHANGE UP (ref 1.01–1.02)
TROPONIN I, HIGH SENSITIVITY RESULT: 141.88 NG/L — HIGH
UROBILINOGEN FLD QL: NEGATIVE — SIGNIFICANT CHANGE UP
WBC # BLD: 6.63 K/UL — SIGNIFICANT CHANGE UP (ref 3.8–10.5)
WBC # BLD: 8.92 K/UL — SIGNIFICANT CHANGE UP (ref 3.8–10.5)
WBC # BLD: 9.04 K/UL — SIGNIFICANT CHANGE UP (ref 3.8–10.5)
WBC # BLD: 9.69 K/UL — SIGNIFICANT CHANGE UP (ref 3.8–10.5)
WBC # FLD AUTO: 6.63 K/UL — SIGNIFICANT CHANGE UP (ref 3.8–10.5)
WBC # FLD AUTO: 8.92 K/UL — SIGNIFICANT CHANGE UP (ref 3.8–10.5)
WBC # FLD AUTO: 9.04 K/UL — SIGNIFICANT CHANGE UP (ref 3.8–10.5)
WBC # FLD AUTO: 9.69 K/UL — SIGNIFICANT CHANGE UP (ref 3.8–10.5)

## 2022-02-22 PROCEDURE — 93970 EXTREMITY STUDY: CPT | Mod: 26

## 2022-02-22 PROCEDURE — 99233 SBSQ HOSP IP/OBS HIGH 50: CPT

## 2022-02-22 PROCEDURE — 99231 SBSQ HOSP IP/OBS SF/LOW 25: CPT

## 2022-02-22 PROCEDURE — 99292 CRITICAL CARE ADDL 30 MIN: CPT

## 2022-02-22 PROCEDURE — 71045 X-RAY EXAM CHEST 1 VIEW: CPT | Mod: 26

## 2022-02-22 PROCEDURE — 99291 CRITICAL CARE FIRST HOUR: CPT

## 2022-02-22 PROCEDURE — 93010 ELECTROCARDIOGRAM REPORT: CPT

## 2022-02-22 PROCEDURE — 93306 TTE W/DOPPLER COMPLETE: CPT | Mod: 26

## 2022-02-22 RX ORDER — SODIUM CHLORIDE 9 MG/ML
10 INJECTION INTRAMUSCULAR; INTRAVENOUS; SUBCUTANEOUS
Refills: 0 | Status: DISCONTINUED | OUTPATIENT
Start: 2022-02-22 | End: 2022-03-01

## 2022-02-22 RX ORDER — DEXTROSE 50 % IN WATER 50 %
25 SYRINGE (ML) INTRAVENOUS ONCE
Refills: 0 | Status: COMPLETED | OUTPATIENT
Start: 2022-02-22 | End: 2022-02-22

## 2022-02-22 RX ORDER — SODIUM CHLORIDE 9 MG/ML
1000 INJECTION, SOLUTION INTRAVENOUS ONCE
Refills: 0 | Status: COMPLETED | OUTPATIENT
Start: 2022-02-22 | End: 2022-02-22

## 2022-02-22 RX ORDER — PHENYLEPHRINE HYDROCHLORIDE 10 MG/ML
0.1 INJECTION INTRAVENOUS
Qty: 40 | Refills: 0 | Status: DISCONTINUED | OUTPATIENT
Start: 2022-02-22 | End: 2022-02-26

## 2022-02-22 RX ORDER — DOBUTAMINE HCL 250MG/20ML
2.5 VIAL (ML) INTRAVENOUS
Qty: 500 | Refills: 0 | Status: DISCONTINUED | OUTPATIENT
Start: 2022-02-22 | End: 2022-02-22

## 2022-02-22 RX ORDER — SODIUM ZIRCONIUM CYCLOSILICATE 10 G/10G
10 POWDER, FOR SUSPENSION ORAL ONCE
Refills: 0 | Status: COMPLETED | OUTPATIENT
Start: 2022-02-22 | End: 2022-02-22

## 2022-02-22 RX ORDER — MILRINONE LACTATE 1 MG/ML
0.12 INJECTION, SOLUTION INTRAVENOUS
Qty: 20 | Refills: 0 | Status: DISCONTINUED | OUTPATIENT
Start: 2022-02-22 | End: 2022-02-24

## 2022-02-22 RX ORDER — HEPARIN SODIUM 5000 [USP'U]/ML
INJECTION INTRAVENOUS; SUBCUTANEOUS
Qty: 25000 | Refills: 0 | Status: DISCONTINUED | OUTPATIENT
Start: 2022-02-22 | End: 2022-02-22

## 2022-02-22 RX ORDER — POLYETHYLENE GLYCOL 3350 17 G/17G
17 POWDER, FOR SOLUTION ORAL DAILY
Refills: 0 | Status: DISCONTINUED | OUTPATIENT
Start: 2022-02-22 | End: 2022-02-26

## 2022-02-22 RX ORDER — PIPERACILLIN AND TAZOBACTAM 4; .5 G/20ML; G/20ML
3.38 INJECTION, POWDER, LYOPHILIZED, FOR SOLUTION INTRAVENOUS EVERY 8 HOURS
Refills: 0 | Status: DISCONTINUED | OUTPATIENT
Start: 2022-02-22 | End: 2022-02-22

## 2022-02-22 RX ORDER — PIPERACILLIN AND TAZOBACTAM 4; .5 G/20ML; G/20ML
3.38 INJECTION, POWDER, LYOPHILIZED, FOR SOLUTION INTRAVENOUS ONCE
Refills: 0 | Status: DISCONTINUED | OUTPATIENT
Start: 2022-02-22 | End: 2022-02-22

## 2022-02-22 RX ORDER — VASOPRESSIN 20 [USP'U]/ML
0.04 INJECTION INTRAVENOUS
Qty: 50 | Refills: 0 | Status: DISCONTINUED | OUTPATIENT
Start: 2022-02-22 | End: 2022-02-24

## 2022-02-22 RX ORDER — DEXTROSE 50 % IN WATER 50 %
50 SYRINGE (ML) INTRAVENOUS ONCE
Refills: 0 | Status: COMPLETED | OUTPATIENT
Start: 2022-02-22 | End: 2022-02-22

## 2022-02-22 RX ORDER — HEPARIN SODIUM 5000 [USP'U]/ML
5000 INJECTION INTRAVENOUS; SUBCUTANEOUS EVERY 6 HOURS
Refills: 0 | Status: DISCONTINUED | OUTPATIENT
Start: 2022-02-22 | End: 2022-02-26

## 2022-02-22 RX ORDER — HEPARIN SODIUM 5000 [USP'U]/ML
INJECTION INTRAVENOUS; SUBCUTANEOUS
Qty: 25000 | Refills: 0 | Status: DISCONTINUED | OUTPATIENT
Start: 2022-02-22 | End: 2022-02-26

## 2022-02-22 RX ORDER — DEXTROSE 10 % IN WATER 10 %
1000 INTRAVENOUS SOLUTION INTRAVENOUS
Refills: 0 | Status: DISCONTINUED | OUTPATIENT
Start: 2022-02-22 | End: 2022-02-23

## 2022-02-22 RX ORDER — FUROSEMIDE 40 MG
40 TABLET ORAL ONCE
Refills: 0 | Status: COMPLETED | OUTPATIENT
Start: 2022-02-22 | End: 2022-02-22

## 2022-02-22 RX ORDER — CHLORHEXIDINE GLUCONATE 213 G/1000ML
1 SOLUTION TOPICAL
Refills: 0 | Status: DISCONTINUED | OUTPATIENT
Start: 2022-02-22 | End: 2022-03-01

## 2022-02-22 RX ORDER — CALCIUM GLUCONATE 100 MG/ML
2 VIAL (ML) INTRAVENOUS ONCE
Refills: 0 | Status: COMPLETED | OUTPATIENT
Start: 2022-02-22 | End: 2022-02-22

## 2022-02-22 RX ORDER — PIPERACILLIN AND TAZOBACTAM 4; .5 G/20ML; G/20ML
3.38 INJECTION, POWDER, LYOPHILIZED, FOR SOLUTION INTRAVENOUS EVERY 12 HOURS
Refills: 0 | Status: DISCONTINUED | OUTPATIENT
Start: 2022-02-22 | End: 2022-02-28

## 2022-02-22 RX ORDER — INSULIN HUMAN 100 [IU]/ML
10 INJECTION, SOLUTION SUBCUTANEOUS ONCE
Refills: 0 | Status: COMPLETED | OUTPATIENT
Start: 2022-02-22 | End: 2022-02-22

## 2022-02-22 RX ORDER — HEPARIN SODIUM 5000 [USP'U]/ML
2000 INJECTION INTRAVENOUS; SUBCUTANEOUS EVERY 6 HOURS
Refills: 0 | Status: DISCONTINUED | OUTPATIENT
Start: 2022-02-22 | End: 2022-02-22

## 2022-02-22 RX ORDER — SENNA PLUS 8.6 MG/1
2 TABLET ORAL AT BEDTIME
Refills: 0 | Status: DISCONTINUED | OUTPATIENT
Start: 2022-02-22 | End: 2022-03-01

## 2022-02-22 RX ORDER — HEPARIN SODIUM 5000 [USP'U]/ML
4500 INJECTION INTRAVENOUS; SUBCUTANEOUS EVERY 6 HOURS
Refills: 0 | Status: DISCONTINUED | OUTPATIENT
Start: 2022-02-22 | End: 2022-02-22

## 2022-02-22 RX ORDER — SODIUM CHLORIDE 9 MG/ML
1000 INJECTION, SOLUTION INTRAVENOUS
Refills: 0 | Status: DISCONTINUED | OUTPATIENT
Start: 2022-02-22 | End: 2022-02-23

## 2022-02-22 RX ORDER — HEPARIN SODIUM 5000 [USP'U]/ML
2500 INJECTION INTRAVENOUS; SUBCUTANEOUS EVERY 6 HOURS
Refills: 0 | Status: DISCONTINUED | OUTPATIENT
Start: 2022-02-22 | End: 2022-02-26

## 2022-02-22 RX ORDER — LANOLIN ALCOHOL/MO/W.PET/CERES
3 CREAM (GRAM) TOPICAL AT BEDTIME
Refills: 0 | Status: DISCONTINUED | OUTPATIENT
Start: 2022-02-22 | End: 2022-03-01

## 2022-02-22 RX ADMIN — Medication 3 MILLIGRAM(S): at 21:50

## 2022-02-22 RX ADMIN — Medication 50 MILLILITER(S): at 10:25

## 2022-02-22 RX ADMIN — HEPARIN SODIUM 0 UNIT(S)/HR: 5000 INJECTION INTRAVENOUS; SUBCUTANEOUS at 22:14

## 2022-02-22 RX ADMIN — Medication 25 GRAM(S): at 07:12

## 2022-02-22 RX ADMIN — ALBUTEROL 2 PUFF(S): 90 AEROSOL, METERED ORAL at 08:57

## 2022-02-22 RX ADMIN — VASOPRESSIN 2.4 UNIT(S)/MIN: 20 INJECTION INTRAVENOUS at 10:57

## 2022-02-22 RX ADMIN — Medication 200 GRAM(S): at 05:07

## 2022-02-22 RX ADMIN — Medication 40 MILLIGRAM(S): at 22:31

## 2022-02-22 RX ADMIN — ALBUTEROL 2 PUFF(S): 90 AEROSOL, METERED ORAL at 21:34

## 2022-02-22 RX ADMIN — HEPARIN SODIUM 900 UNIT(S)/HR: 5000 INJECTION INTRAVENOUS; SUBCUTANEOUS at 15:31

## 2022-02-22 RX ADMIN — PIPERACILLIN AND TAZOBACTAM 25 GRAM(S): 4; .5 INJECTION, POWDER, LYOPHILIZED, FOR SOLUTION INTRAVENOUS at 13:26

## 2022-02-22 RX ADMIN — PHENYLEPHRINE HYDROCHLORIDE 2.21 MICROGRAM(S)/KG/MIN: 10 INJECTION INTRAVENOUS at 16:30

## 2022-02-22 RX ADMIN — Medication 50 MILLILITER(S): at 03:30

## 2022-02-22 RX ADMIN — PIPERACILLIN AND TAZOBACTAM 25 GRAM(S): 4; .5 INJECTION, POWDER, LYOPHILIZED, FOR SOLUTION INTRAVENOUS at 21:51

## 2022-02-22 RX ADMIN — MILRINONE LACTATE 2.43 MICROGRAM(S)/KG/MIN: 1 INJECTION, SOLUTION INTRAVENOUS at 23:12

## 2022-02-22 RX ADMIN — HEPARIN SODIUM 700 UNIT(S)/HR: 5000 INJECTION INTRAVENOUS; SUBCUTANEOUS at 23:17

## 2022-02-22 RX ADMIN — INSULIN HUMAN 10 UNIT(S): 100 INJECTION, SOLUTION SUBCUTANEOUS at 03:28

## 2022-02-22 RX ADMIN — BUDESONIDE AND FORMOTEROL FUMARATE DIHYDRATE 2 PUFF(S): 160; 4.5 AEROSOL RESPIRATORY (INHALATION) at 21:34

## 2022-02-22 RX ADMIN — HEPARIN SODIUM 0 UNIT(S)/HR: 5000 INJECTION INTRAVENOUS; SUBCUTANEOUS at 14:07

## 2022-02-22 RX ADMIN — SODIUM ZIRCONIUM CYCLOSILICATE 10 GRAM(S): 10 POWDER, FOR SUSPENSION ORAL at 04:04

## 2022-02-22 RX ADMIN — SODIUM CHLORIDE 1000 MILLILITER(S): 9 INJECTION, SOLUTION INTRAVENOUS at 01:57

## 2022-02-22 RX ADMIN — Medication 125 MICROGRAM(S): at 05:07

## 2022-02-22 RX ADMIN — CHLORHEXIDINE GLUCONATE 1 APPLICATION(S): 213 SOLUTION TOPICAL at 05:08

## 2022-02-22 RX ADMIN — Medication 50 MILLILITER(S): at 07:45

## 2022-02-22 RX ADMIN — PANTOPRAZOLE SODIUM 40 MILLIGRAM(S): 20 TABLET, DELAYED RELEASE ORAL at 09:26

## 2022-02-22 RX ADMIN — SODIUM CHLORIDE 50 MILLILITER(S): 9 INJECTION, SOLUTION INTRAVENOUS at 10:48

## 2022-02-22 RX ADMIN — PHENYLEPHRINE HYDROCHLORIDE 2.21 MICROGRAM(S)/KG/MIN: 10 INJECTION INTRAVENOUS at 09:27

## 2022-02-22 RX ADMIN — Medication 50 MILLILITER(S): at 10:49

## 2022-02-22 RX ADMIN — PHENYLEPHRINE HYDROCHLORIDE 2.21 MICROGRAM(S)/KG/MIN: 10 INJECTION INTRAVENOUS at 03:48

## 2022-02-22 RX ADMIN — HEPARIN SODIUM 1100 UNIT(S)/HR: 5000 INJECTION INTRAVENOUS; SUBCUTANEOUS at 07:27

## 2022-02-22 NOTE — PROCEDURE NOTE - NSINFORMCONSENT_GEN_A_CORE
This was an emergent procedure.
Daughter Telephone/Benefits, risks, and possible complications of procedure explained to patient/caregiver who verbalized understanding and gave verbal consent.
Spoke to daughter/Benefits, risks, and possible complications of procedure explained to patient/caregiver who verbalized understanding and gave verbal consent.

## 2022-02-22 NOTE — PROGRESS NOTE ADULT - ASSESSMENT
89 YO F with right sacral ala fracture and superior and inferior rami fracture    Plan:   Cardiology consult appreciated  PT eval for dispo   Multimodal pain control  Incentive spirometery  Vitals, IS/OS Q 4  Diet: Soft Cardiac  GI prophylaxis  f/u nephrology plan for possible hemodialysis  medical management for hyperkalemia at this time  Local wound care  Activity: WBAT  PT  Hold A/C  Resume other home med  Hospitalist consulted  Orthopedic  following    for eventual D/C planning      Case discussed with Dr Casey

## 2022-02-22 NOTE — PROGRESS NOTE ADULT - ASSESSMENT
A/P: 89 female with hypotension likely from septic shock and pneumonia  CAD  Hypothyroidism  CHF  HTN  COPD    Plan:  CCU    PULM: Continue NC O2, Change Antibiotics to Zosyn    Cardio: Likely hypotensive from septic shock, ECHO PND.  Continue Brandon and Vaso for Hypotension, Continue UFH    Renal: JOAQUÍN likely from ATN.  Repeat BMP at noon.  Monitor UO    GI: NPO for now    ENDO: Continue Synthroid    ID: On Zosyn now, D/C Rocephin and Zithromax, send UA

## 2022-02-22 NOTE — PROGRESS NOTE ADULT - SUBJECTIVE AND OBJECTIVE BOX
Patient is a 89y old  Female who presents with a chief complaint of Fall (2022 19:09)      BRIEF HOSPITAL COURSE: Pt is a 89 yo F that presented to the ED for evaluation of right hip pain. She stated that she was ambulating with her walker this morning, when it got stuck on her bathroom door causing her to fall with right buttock. She called her daughter who took her to her house because of the fact that she lives alone. She stated that she has been able to ambulate minimally to get into the car to the ED since the fall but with pain. Has had a left TKA at [facility].  Otherwise denies head strike/LOC, numbness/tingling, weakness. Pt has an extensive cardiac history but is currently looking to establish are with someone here. Pt has PPM but denies coronary intervention in the past.    Rapid response called, Pt is awake and alert stating she doesn't feel well. Pt is hypotensive 62/40, 's, RR 17. Fluid bolus ordered for Hypotension and EKG ordered showing no evidence of Ischemia, unchanged from her EKG on admission. Upon pt chart, pt has been having refractory hypotension despite fluid bolus administration. Labs drawn CBC, CMP, Mag, Phos, Lactate, Procal and coags ordered. Pt Blood pressure 78/55 post fluid bolus, will admit to CCU for closer monitoring given persistent hypotension and high risk of decompensation.    Events last 24 hours: Pt continues to have refractory hypotension despite fluid resuscitation phenylephrine gtt started. Worsening renal function and hyperkalemia, Insulin/dextrose and Ca gluconate given.    PAST MEDICAL & SURGICAL HISTORY:  Mitral valve insufficiency    Aortic valve regurgitation    Osteopenia    CAD (coronary artery disease)    Gout    Hypothyroid    CHF (congestive heart failure)    Anxiety    Anemia    HTN (hypertension)    Afib    COPD (chronic obstructive pulmonary disease)    Status cardiac pacemaker    S/P knee replacement        Review of Systems:  CONSTITUTIONAL: No fever, chills, or fatigue.  EYES: No eye pain, visual disturbances, or discharge.  ENMT:  No difficulty hearing, tinnitus, or vertigo. No sinus or throat pain.  NECK: No pain or stiffness.  RESPIRATORY: No shortness of breath, cough, or wheezing.  CARDIOVASCULAR: No chest pain, palpitations, dizziness, or leg swelling.  GASTROINTESTINAL: No abdominal or epigastric pain. No nausea, vomiting, diarrhea, or constipation. No hematemesis, melena, or hematochezia.  GENITOURINARY: No dysuria, increased frequency, hematuria, or incontinence.  NEUROLOGICAL: No headaches, memory loss, loss of strength, numbness, or tremors.  SKIN: No itching, burning, rashes, or lesions.  MUSCULOSKELETAL: No joint pain or swelling. No muscle, back, or extremity pain.  PSYCHIATRIC: No depression, anxiety, mood swings, or difficulty sleeping.    Medications:  azithromycin   Tablet 250 milliGRAM(s) Oral daily  cefTRIAXone   IVPB 1000 milliGRAM(s) IV Intermittent every 24 hours    phenylephrine    Infusion 0.1 MICROgram(s)/kG/Min IV Continuous <Continuous>    ALBUTerol    90 MICROgram(s) HFA Inhaler 2 Puff(s) Inhalation every 6 hours  budesonide 160 MICROgram(s)/formoterol 4.5 MICROgram(s) Inhaler 2 Puff(s) Inhalation two times a day    acetaminophen   IVPB .. 1000 milliGRAM(s) IV Intermittent every 6 hours  acetaminophen   IVPB .. 1000 milliGRAM(s) IV Intermittent once  ondansetron Injectable 4 milliGRAM(s) IV Push every 6 hours PRN        calcium carbonate    500 mG (Tums) Chewable 3 Tablet(s) Chew every 6 hours PRN  pantoprazole  Injectable 40 milliGRAM(s) IV Push daily      allopurinol 100 milliGRAM(s) Oral daily  levothyroxine 125 MICROGram(s) Oral daily    calcium gluconate IVPB 2 Gram(s) IV Intermittent once      chlorhexidine 2% Cloths 1 Application(s) Topical <User Schedule>            ICU Vital Signs Last 24 Hrs  T(C): 36 (2022 02:32), Max: 37 (2022 01:42)  T(F): 96.8 (2022 02:32), Max: 98.6 (2022 01:42)  HR: 101 (2022 04:00) (66 - 102)  BP: 82/33 (2022 04:00) (62/40 - 95/64)  BP(mean): 45 (2022 04:00) (45 - 67)  ABP: --  ABP(mean): --  RR: 18 (2022 04:00) (16 - 19)  SpO2: 92% (2022 04:00) (91% - 96%)          I&O's Detail    2022 07:01  -  2022 04:34  --------------------------------------------------------  IN:  Total IN: 0 mL    OUT:    Intermittent Catheterization - Urethral (mL): 600 mL  Total OUT: 600 mL    Total NET: -600 mL          LABS:                        10.3   6.63  )-----------( 100      ( 2022 02:11 )             32.5         132<L>  |  100  |  63<H>  ----------------------------<  116<H>  6.7<HH>   |  29  |  2.30<H>    Ca    7.9<L>      2022 02:11  Phos  5.0       Mg     2.8         TPro  6.1  /  Alb  2.9<L>  /  TBili  0.6  /  DBili  x   /  AST  33  /  ALT  24  /  AlkPhos  81            CAPILLARY BLOOD GLUCOSE      POCT Blood Glucose.: 208 mg/dL (2022 03:57)    PT/INR - ( 2022 02:12 )   PT: 14.7 sec;   INR: 1.28 ratio         PTT - ( 2022 02:12 )  PTT:36.3 sec  Urinalysis Basic - ( 2022 07:49 )    Color: Yellow / Appearance: Clear / S.010 / pH: x  Gluc: x / Ketone: Negative  / Bili: Negative / Urobili: Negative   Blood: x / Protein: negative mg/dL / Nitrite: Negative   Leuk Esterase: Negative / RBC: x / WBC x   Sq Epi: x / Non Sq Epi: x / Bacteria: x      CULTURES:      Physical Examination:    General: Well appearing, lying in bed NAD      HEENT: Pupils equal, reactive to light. Symmetric. No scleral icterus or injection.    PULM: Clear/ diminshed to auscultation B/L. No wheezes, rales, or rhonchi appreciated No significant sputum production or increased respiratory effort.    NECK: Supple, no lymphadenopathy, trachea midline.    CVS: AFIB, no murmurs appreciated, +s1/s2.    ABD: Soft, nondistended, nontender, normoactive bowel sounds.    EXT: No edema, nontender.    SKIN: Warm and well perfused, no rashes noted.    NEURO: Alert, oriented, interactive, nonfocal.    POCUS: Showing Reduced EF approx 40-50% awaiting offical echo        RADIOLOGY: < from: CT Chest No Cont (22 @ 11:00) >  PROCEDURE DATE:  2022          INTERPRETATION:  .  ACC: [id number]  INDICATION: copd, hypoxia  TECHNIQUE: Unenhanced CT of the chest. Coronal, sagittal, and MIP images   were reconstructed and reviewed.  COMPARISON: No prior chest CT.    FINDINGS:    AIRWAYS, LUNGS and PLEURA: Patent central airways. Patchy opacity at the   right base may represent atelectasis or pneumonia. Interlobular septal   thickening and small pleural effusions likely representing superimposed   pulmonary edema.  MEDIASTINUM AND CHICA: No lymphadenopathy.    HEART AND VESSELS: Cardiomegaly. Left pacer in place. Coronary and aortic   calcifications. No pericardial effusion. Thoracic aorta normal in   diameter. Dilated pulmonary artery.    VISUALIZED UPPER ABDOMEN: Arterial calcifications.    CHEST WALL AND BONES: No aggressive osseous lesion. Osteopenia. Advanced   which under changes of the glenohumeral joints.    LOWER NECK: Within normal limits.    IMPRESSION:    Patchy opacity at the right base may represent atelectasis or pneumonia.   Interlobular septal thickening and small pleural effusions likely   representing superimposed pulmonary edema.    --- End of Report ---            ERNESTINA ADAM MD; Attending Radiologist    < end of copied text >

## 2022-02-22 NOTE — PROGRESS NOTE ADULT - SUBJECTIVE AND OBJECTIVE BOX
Pt seen and examined at bedside. Patient continues to have tendernss right hip and gluteus. Central line placed this AM by ICU team. Patient denies fever, chills, SOB and CP.     Vital Signs Last 24 Hrs  T(C): 35.6 (22 Feb 2022 08:00), Max: 37 (22 Feb 2022 01:42)  T(F): 96 (22 Feb 2022 08:00), Max: 98.6 (22 Feb 2022 01:42)  HR: 104 (22 Feb 2022 09:00) (66 - 133)  BP: 97/45 (22 Feb 2022 09:00) (62/40 - 147/84)  BP(mean): 59 (22 Feb 2022 09:00) (45 - 98)  RR: 21 (22 Feb 2022 09:00) (15 - 26)  SpO2: 96% (22 Feb 2022 09:00) (88% - 98%)  Labs:      CARDIAC MARKERS ( 22 Feb 2022 05:51 )  x     / x     / 103 U/L / x     / x                                10.7   9.69  )-----------( 110      ( 22 Feb 2022 05:51 )             33.4     CBC Full  -  ( 22 Feb 2022 05:51 )  WBC Count : 9.69 K/uL  RBC Count : 3.11 M/uL  Hemoglobin : 10.7 g/dL  Hematocrit : 33.4 %  Platelet Count - Automated : 110 K/uL  Mean Cell Volume : 107.4 fl  Mean Cell Hemoglobin : 34.4 pg  Mean Cell Hemoglobin Concentration : 32.0 gm/dL  Auto Neutrophil # : 6.28 K/uL  Auto Lymphocyte # : 2.58 K/uL  Auto Monocyte # : 0.56 K/uL  Auto Eosinophil # : 0.18 K/uL  Auto Basophil # : 0.03 K/uL  Auto Neutrophil % : 64.8 %  Auto Lymphocyte % : 26.6 %  Auto Monocyte % : 5.8 %  Auto Eosinophil % : 1.9 %  Auto Basophil % : 0.3 %    02-22    137  |  102  |  61<H>  ----------------------------<  39<LL>  4.1   |  27  |  2.24<H>    Ca    8.4<L>      22 Feb 2022 09:58  Phos  4.4     02-22  Mg     2.4     02-22    TPro  6.1  /  Alb  2.9<L>  /  TBili  0.6  /  DBili  x   /  AST  33  /  ALT  24  /  AlkPhos  81  02-22    LIVER FUNCTIONS - ( 22 Feb 2022 02:11 )  Alb: 2.9 g/dL / Pro: 6.1 gm/dL / ALK PHOS: 81 U/L / ALT: 24 U/L / AST: 33 U/L / GGT: x           PT/INR - ( 22 Feb 2022 02:12 )   PT: 14.7 sec;   INR: 1.28 ratio         PTT - ( 22 Feb 2022 02:12 )  PTT:36.3 sec              Physical exam:  Pt is aaox3  Pt in no acute distress  Psych: normal affect  Resp: CTAB  CVS: S1S2(+)  ABD: bowel sounds (+), soft, non distended, no rebound, no guarding  EXT: no calf tenderness or edema to exam b/l, on VTE prophylaxis. tender to palpation over gluteus, suprapubic, LLE knee. b/l LE edema      CC: [id number] EXAM:  CT CHEST                          PROCEDURE DATE:  02/21/2022          INTERPRETATION:  .  ACC: [id number]  INDICATION: copd, hypoxia  TECHNIQUE: Unenhanced CT of the chest. Coronal, sagittal, and MIP images   were reconstructed and reviewed.  COMPARISON: No prior chest CT.    FINDINGS:    AIRWAYS, LUNGS and PLEURA: Patent central airways. Patchy opacity at the   right base may represent atelectasis or pneumonia. Interlobular septal   thickening and small pleural effusions likely representing superimposed   pulmonary edema.  MEDIASTINUM AND CHICA: No lymphadenopathy.    HEART AND VESSELS: Cardiomegaly. Left pacer in place. Coronary and aortic   calcifications. No pericardial effusion. Thoracic aorta normal in   diameter. Dilated pulmonary artery.    VISUALIZED UPPER ABDOMEN: Arterial calcifications.    CHEST WALL AND BONES: No aggressive osseous lesion. Osteopenia. Advanced   which under changes of the glenohumeral joints.    LOWER NECK: Within normal limits.    IMPRESSION:    Patchy opacity at the right base may represent atelectasis or pneumonia.   Interlobular septal thickening and small pleural effusions likely   representing superimposed pulmonary edema.    --- End of Report ---            ERNESTINA ADAM MD; Attending Radiologist  This document has been electronically signed. Feb 21 2022 11:29AM      ACC: [id number] EXAM:  XR KNEE 1-2 VIEWS LT                          PROCEDURE DATE:  02/20/2022          INTERPRETATION:  History: Fall.    FINDINGS: Frontal, lateral and oblique projections of the left knee.    Patient has history of total knee replacement. Prosthesis is intact with   good articulation. No acute fracture. No evidence of knee joint effusion.    Vascular calcification noted.    IMPRESSION:    History of total knee replacement with intact hardware.    --- End of Report ---            ABI RAMIREZ MD; Attending Radiologist  This document has been electronically signed. Feb 21 2022  1:12PM    ACC: [id number] EXAM:  XR PELVIS AP ONLY 1-2 VIEWS                          PROCEDURE DATE:  02/20/2022          INTERPRETATION:  History: Fracture.    FINDINGS: Inlet and outlet views of the pelvis performed.    Correlation with plain films earlier same day at 4: 12:00 PM    Fracture right superior and inferior pubic rami again noted. Mild   distraction superior pubic ramus fracture noted.    IMPRESSION:    Fracture right superior and inferior pubic rami with mild distraction   superior pubic ramus fracture.    --- End of Report ---            ABI RAMIREZ MD; Attending Radiologist  This document has been electronically signed. Feb 21 2022 12:54PM

## 2022-02-22 NOTE — PROGRESS NOTE ADULT - SUBJECTIVE AND OBJECTIVE BOX
History of Present Illness:   Pt is a 89 yo F that presented to the ED for evaluation of right hip pain. She stated that she was ambulating with her walker this morning, when it got stuck on her bathroom door causing her to fall on her  right buttock. She called her daughter who took her to her house because of the fact that she lives alone. She stated that she has been able to ambulate minimally to get into the car to the ED since the fall but with pain. She reports that she lives at home alone with a home aide during the day for 2 hours. Pt was released from [facility] in late november after knee surgery.  On exam has some lateral tenderness at the left knee. Has had a left TKR [facility].  Otherwise denies head strike/LOC  -hosp course sign for hypotension requiring ICU care and pressor support     2.21: patient seen on 3E, not confused, able to give history; c/o some pelvis pain with movements.  requires 4l NC  2.22: on pressors ,lethargic       REVIEW OF SYSTEMS:  unable to obtain     All other review of systems is negative unless indicated above    Vital Signs Last 24 Hrs  T(C): 34.8 (22 Feb 2022 12:00), Max: 37 (22 Feb 2022 01:42)  T(F): 94.6 (22 Feb 2022 12:00), Max: 98.6 (22 Feb 2022 01:42)  HR: 88 (22 Feb 2022 13:00) (66 - 133)  BP: 110/79 (22 Feb 2022 13:00) (62/40 - 147/84)  BP(mean): 86 (22 Feb 2022 13:00) (45 - 101)  RR: 13 (22 Feb 2022 13:00) (13 - 26)  SpO2: 95% (22 Feb 2022 13:00) (88% - 100%)    PHYSICAL EXAM:    GENERAL: NAD  HEENT:  NC/AT, EOMI, PERRLA, No scleral icterus, Moist mucous membranes  NECK: Supple, No JVD  CNS:  Alert & Oriented X3, Motor Strength 5/5 B/L upper and lower extremities; DTRs 2+ intact   LUNG: Decresaed Breath sounds, Rt crackles, no wheezing   HEART: RRR; No murmurs, No rubs  ABDOMEN: +BS, ST/ND/NT  GENITOURINARY: Voiding, Bladder not distended  EXTREMITIES:  2+ Peripheral Pulses, No clubbing, No cyanosis, No tibial edema  MUSCULOSKELTAL: Joints normal ROM, No TTP, No effusion  VAGINAL: deferred  SKIN: no rashes, intact skin  RECTAL: deferred, not indicated  BREAST: deferred               Labs:                        9.7    9.04  )-----------( 118      ( 22 Feb 2022 13:20 )             30.5     02-22    135  |  103  |  58<H>  ----------------------------<  110<H>  4.4   |  24  |  2.01<H>    Ca    8.0<L>      22 Feb 2022 12:00  Phos  4.4     02-22  Mg     2.4     02-22    TPro  6.5  /  Alb  3.1<L>  /  TBili  0.6  /  DBili  0.2  /  AST  36  /  ALT  26  /  AlkPhos  87  02-22         MEDICATIONS  (STANDING):  acetaminophen   IVPB .. 1000 milliGRAM(s) IV Intermittent every 6 hours  ALBUTerol    90 MICROgram(s) HFA Inhaler 2 Puff(s) Inhalation every 6 hours  allopurinol 100 milliGRAM(s) Oral daily  budesonide 160 MICROgram(s)/formoterol 4.5 MICROgram(s) Inhaler 2 Puff(s) Inhalation two times a day  chlorhexidine 2% Cloths 1 Application(s) Topical <User Schedule>  heparin  Infusion.  Unit(s)/Hr (11 mL/Hr) IV Continuous <Continuous>  levothyroxine 125 MICROGram(s) Oral daily  pantoprazole  Injectable 40 milliGRAM(s) IV Push daily  phenylephrine    Infusion 0.1 MICROgram(s)/kG/Min (2.21 mL/Hr) IV Continuous <Continuous>  piperacillin/tazobactam IVPB.. 3.375 Gram(s) IV Intermittent every 12 hours  vasopressin Infusion 0.04 Unit(s)/Min (2.4 mL/Hr) IV Continuous <Continuous>      CT Chest: RLL Pna      a/p:    1. Acute  Hypoxic respiratory failure due to Pna  Septic Shock  CT Chest noted  Antibiotic changed to Zosyn IV day#1  on pressor support  Blood Cx x 2 pending  c/w bronchodilators, ICS    2. Rt pelvis fracture due to mechanical fall: stable   analgesia prn  PT / Ortho evaluations     3. HFpEF: compensated  will hold diuretic due to hypotension   check BNP , check Troponin  cardiology eval noted    4. Afib: rate controlled   on IV heparin    History of Present Illness:   Pt is a 89 yo F that presented to the ED for evaluation of right hip pain. She stated that she was ambulating with her walker this morning, when it got stuck on her bathroom door causing her to fall on her  right buttock. She called her daughter who took her to her house because of the fact that she lives alone. She stated that she has been able to ambulate minimally to get into the car to the ED since the fall but with pain. She reports that she lives at home alone with a home aide during the day for 2 hours. Pt was released from [facility] in late november after knee surgery.  On exam has some lateral tenderness at the left knee. Has had a left TKR at [facility].  Otherwise denies head strike/LOC  -hosp course sign for hypotension requiring ICU care and pressor support     2.21: patient seen on 3E, not confused, able to give history; c/o some pelvis pain with movements.  requires 4l NC  2.22: on pressors ,lethargic       REVIEW OF SYSTEMS:  unable to obtain     All other review of systems is negative unless indicated above    Vital Signs Last 24 Hrs  T(C): 34.8 (22 Feb 2022 12:00), Max: 37 (22 Feb 2022 01:42)  T(F): 94.6 (22 Feb 2022 12:00), Max: 98.6 (22 Feb 2022 01:42)  HR: 88 (22 Feb 2022 13:00) (66 - 133)  BP: 110/79 (22 Feb 2022 13:00) (62/40 - 147/84)  BP(mean): 86 (22 Feb 2022 13:00) (45 - 101)  RR: 13 (22 Feb 2022 13:00) (13 - 26)  SpO2: 95% (22 Feb 2022 13:00) (88% - 100%)    PHYSICAL EXAM:    GENERAL: NAD  HEENT:  NC/AT, EOMI, PERRLA, No scleral icterus, Moist mucous membranes  NECK: Supple, No JVD  CNS:  Alert & Oriented X3, Motor Strength 5/5 B/L upper and lower extremities; DTRs 2+ intact   LUNG: Decresaed Breath sounds, Rt crackles, no wheezing   HEART: RRR; No murmurs, No rubs  ABDOMEN: +BS, ST/ND/NT  GENITOURINARY: Voiding, Bladder not distended  EXTREMITIES:  2+ Peripheral Pulses, No clubbing, No cyanosis, No tibial edema  MUSCULOSKELTAL: Joints normal ROM, No TTP, No effusion  VAGINAL: deferred  SKIN: no rashes, intact skin  RECTAL: deferred, not indicated  BREAST: deferred               Labs:                        9.7    9.04  )-----------( 118      ( 22 Feb 2022 13:20 )             30.5     02-22    135  |  103  |  58<H>  ----------------------------<  110<H>  4.4   |  24  |  2.01<H>    Ca    8.0<L>      22 Feb 2022 12:00  Phos  4.4     02-22  Mg     2.4     02-22    TPro  6.5  /  Alb  3.1<L>  /  TBili  0.6  /  DBili  0.2  /  AST  36  /  ALT  26  /  AlkPhos  87  02-22         MEDICATIONS  (STANDING):  acetaminophen   IVPB .. 1000 milliGRAM(s) IV Intermittent every 6 hours  ALBUTerol    90 MICROgram(s) HFA Inhaler 2 Puff(s) Inhalation every 6 hours  allopurinol 100 milliGRAM(s) Oral daily  budesonide 160 MICROgram(s)/formoterol 4.5 MICROgram(s) Inhaler 2 Puff(s) Inhalation two times a day  chlorhexidine 2% Cloths 1 Application(s) Topical <User Schedule>  heparin  Infusion.  Unit(s)/Hr (11 mL/Hr) IV Continuous <Continuous>  levothyroxine 125 MICROGram(s) Oral daily  pantoprazole  Injectable 40 milliGRAM(s) IV Push daily  phenylephrine    Infusion 0.1 MICROgram(s)/kG/Min (2.21 mL/Hr) IV Continuous <Continuous>  piperacillin/tazobactam IVPB.. 3.375 Gram(s) IV Intermittent every 12 hours  vasopressin Infusion 0.04 Unit(s)/Min (2.4 mL/Hr) IV Continuous <Continuous>      CT Chest: RLL Pna      a/p:    1. Acute  Hypoxic respiratory failure due to Pna  Septic Shock  CT Chest noted  Antibiotic changed to Zosyn IV day#1  on pressor support  Blood Cx x 2 pending  c/w bronchodilators, ICS    2. Rt pelvis fracture due to mechanical fall: stable   analgesia prn  PT / Ortho evaluations     3. HFpEF: compensated  will hold diuretic due to hypotension   check BNP , check Troponin  cardiology eval noted    4. Afib: rate controlled   on IV heparin     5. ARF superimposed on Ckd :  likely due to ATN in the setting of sepsis

## 2022-02-22 NOTE — RAPID RESPONSE TEAM SUMMARY - NSSITUATIONBACKGROUNDRRT_GEN_ALL_CORE
Pt is a 89 yo F that presented to the ED for evaluation of right hip pain. She stated that she was ambulating with her walker this morning, when it got stuck on her bathroom door causing her to fall with right buttock. She called her daughter who took her to her house because of the fact that she lives alone. She stated that she has been able to ambulate minimally to get into the car to the ED since the fall but with pain. Has had a left TKA at [facility].  Otherwise denies head strike/LOC, numbness/tingling, weakness. Pt has an extensive cardiac history but is currently looking to establish are with someone here. Pt has PPM but denies coronary intervention in the past.

## 2022-02-22 NOTE — PROVIDER CONTACT NOTE (EICU) - RECOMMENDATIONS
unclear etiology of hypotension.  labs significant for sonja, hyperK, elevated trop.  rec'd 3 liters fluid with little uop. repeat labs, treat K, obtain blood pH, repeat trop, pressors. possible renal cx for HD

## 2022-02-22 NOTE — PROGRESS NOTE ADULT - SUBJECTIVE AND OBJECTIVE BOX
HPI:  Pt is a 89 yo F that presented to the ED for evaluation of right hip pain. She stated that she was ambulating with her walker when it got stuck on her bathroom door causing her to fall on right buttock. Pt was released from [facility] in late november after knee surgery.  On exam has some lateral tenderness at the left knee. Has had a left TKA at [facility].      Patient transferred to the CCU for Hypotension    : Patient seen in bed encephalopathic, hypotensive on escalating doses of pressors, oliguric     PCP: Dr Keith       PAST MEDICAL & SURGICAL HISTORY:  Mitral valve insufficiency    Aortic valve regurgitation    Osteopenia    CAD (coronary artery disease)    Gout    Hypothyroid    CHF (congestive heart failure)    Anxiety    Anemia    HTN (hypertension)    Afib    COPD (chronic obstructive pulmonary disease)    Status cardiac pacemaker    S/P knee replacement        FAMILY HISTORY:  No pertinent family history in first degree relatives        Social Hx:    Allergies    codeine (Unknown)    Intolerances          Weight (kg): 64.7 ( @ 02:32)    ICU Vital Signs Last 24 Hrs  T(C): 35.6 (2022 08:00), Max: 37 (2022 01:42)  T(F): 96 (2022 08:00), Max: 98.6 (2022 01:42)  HR: 104 (2022 09:00) (66 - 133)  BP: 97/45 (2022 09:00) (62/40 - 147/84)  BP(mean): 59 (2022 09:00) (45 - 98)  ABP: 94/47 (2022 09:00) (91/50 - 113/63)  ABP(mean): 66 (2022 09:00) (66 - 104)  RR: 21 (2022 09:00) (15 - 26)  SpO2: 96% (2022 09:00) (88% - 98%)          I&O's Summary    2022 07:01  -  2022 07:00  --------------------------------------------------------  IN: 0 mL / OUT: 875 mL / NET: -875 mL                              10.7   9.69  )-----------( 110      ( 2022 05:51 )             33.4           137  |  102  |  61<H>  ----------------------------<  39<LL>  4.1   |  27  |  2.24<H>    Ca    8.4<L>      2022 09:58  Phos  4.4       Mg     2.4         TPro  6.1  /  Alb  2.9<L>  /  TBili  0.6  /  DBili  x   /  AST  33  /  ALT  24  /  AlkPhos  81                  Urinalysis Basic - ( 2022 07:49 )    Color: Yellow / Appearance: Clear / S.010 / pH: x  Gluc: x / Ketone: Negative  / Bili: Negative / Urobili: Negative   Blood: x / Protein: negative mg/dL / Nitrite: Negative   Leuk Esterase: Negative / RBC: x / WBC x   Sq Epi: x / Non Sq Epi: x / Bacteria: x        MEDICATIONS  (STANDING):  acetaminophen   IVPB .. 1000 milliGRAM(s) IV Intermittent every 6 hours  acetaminophen   IVPB .. 1000 milliGRAM(s) IV Intermittent once  ALBUTerol    90 MICROgram(s) HFA Inhaler 2 Puff(s) Inhalation every 6 hours  allopurinol 100 milliGRAM(s) Oral daily  azithromycin   Tablet 250 milliGRAM(s) Oral daily  budesonide 160 MICROgram(s)/formoterol 4.5 MICROgram(s) Inhaler 2 Puff(s) Inhalation two times a day  cefTRIAXone   IVPB 1000 milliGRAM(s) IV Intermittent every 24 hours  chlorhexidine 2% Cloths 1 Application(s) Topical <User Schedule>  dextrose 10%. 1000 milliLiter(s) (50 mL/Hr) IV Continuous <Continuous>  dextrose 5%. 1000 milliLiter(s) (50 mL/Hr) IV Continuous <Continuous>  dextrose 50% Injectable 50 milliLiter(s) IV Push once  dextrose 50% Injectable 50 milliLiter(s) IV Push once  heparin  Infusion.  Unit(s)/Hr (11 mL/Hr) IV Continuous <Continuous>  levothyroxine 125 MICROGram(s) Oral daily  pantoprazole  Injectable 40 milliGRAM(s) IV Push daily  phenylephrine    Infusion 0.1 MICROgram(s)/kG/Min (2.21 mL/Hr) IV Continuous <Continuous>  vasopressin Infusion 0.04 Unit(s)/Min (2.4 mL/Hr) IV Continuous <Continuous>    MEDICATIONS  (PRN):  calcium carbonate    500 mG (Tums) Chewable 3 Tablet(s) Chew every 6 hours PRN Dyspepsia  heparin   Injectable 5000 Unit(s) IV Push every 6 hours PRN For aPTT less than 40  heparin   Injectable 2500 Unit(s) IV Push every 6 hours PRN For aPTT between 40 - 57  ondansetron Injectable 4 milliGRAM(s) IV Push every 6 hours PRN Nausea      DVT Prophylaxis: UFH    Advanced Directives:  Discussed with:    Visit Information: 90 min    ** Time is exclusive of billed procedures and/or teaching and/or routine family updates.

## 2022-02-22 NOTE — PROCEDURE NOTE - NSPROCDETAILS_GEN_ALL_CORE
location identified, draped/prepped, sterile technique used, needle inserted/introduced/positive blood return obtained via catheter/connected to a pressurized flush line/sutured in place/hemostasis with direct pressure, dressing applied/Seldinger technique/all materials/supplies accounted for at end of procedure
guidewire recovered/lumen(s) aspirated and flushed/sterile dressing applied/sterile technique, catheter placed/ultrasound guidance with use of sterile gel and probe cove
guidewire recovered/lumen(s) aspirated and flushed/sterile dressing applied/sterile technique, catheter placed/ultrasound guidance with use of sterile gel and probe cove

## 2022-02-22 NOTE — PROGRESS NOTE ADULT - SUBJECTIVE AND OBJECTIVE BOX
REASON FOR CONSULT: elevated troponin     CHIEF COMPLAINT:    HPI:  Pt is a 89 yo F that presented to the ED for evaluation of right hip pain. She stated that she was ambulating with her walker this morning, when it got stuck on her bathroom door causing her to fall with right buttock. She called her daughter who took her to her house because of the fact that she lives alone. She stated that she has been able to ambulate minimally to get into the car to the ED since the fall but with pain. Has had a left TKA at [facility].  Otherwise denies head strike/LOC, numbness/tingling, weakness. Pt has an extensive cardiac history but is currently looking to establish are with someone here. Cardiology called to evaluate cardiac status. Pt denies chest pain but reiterated her cardiac history as above. Pt has PPM but denies coronary intervention in the past.     PmHx: Afib on Xarelto 15mg QD, Hypothyroid, CHF, Gout, CAD, Mitral Valve Regurg, Aortal valve insufficiency, HTN  PsHx: B/L cataract surgery and B/L Total Knee Replacement    PCP: Dr Keith (20 Feb 2022 20:35)    2/22/22  Events noted , patient was hypotensive ,stable hemoglobin , became less responsive last night , markedly hypoglycemic , started on pressors , patient very lethargic arousable with vocal stimuli , hypoxic on VM  , heart rate high 90s         PAST MEDICAL & SURGICAL HISTORY:    Atrial fibrillation   Mitral valve insufficiency    Aortic valve regurgitation    Osteopenia    CAD (coronary artery disease)    Gout    Hypothyroid    CHF (congestive heart failure)    Anxiety    Anemia    HTN (hypertension)    Afib    COPD (chronic obstructive pulmonary disease)    Status cardiac pacemaker    S/P knee replacement        Allergies    codeine (Unknown)    Intolerances        SOCIAL HISTORY: non smoker     FAMILY HISTORY:  No pertinent family history in first degree relatives        MEDICATIONS:  MEDICATIONS  (STANDING):  ALBUTerol    90 MICROgram(s) HFA Inhaler 2 Puff(s) Inhalation every 6 hours  allopurinol 100 milliGRAM(s) Oral daily  budesonide 160 MICROgram(s)/formoterol 4.5 MICROgram(s) Inhaler 2 Puff(s) Inhalation two times a day  furosemide    Tablet 60 milliGRAM(s) Oral daily  levothyroxine 125 MICROGram(s) Oral daily  metoprolol tartrate 25 milliGRAM(s) Oral daily  pantoprazole  Injectable 40 milliGRAM(s) IV Push daily    MEDICATIONS  (PRN):  calcium carbonate    500 mG (Tums) Chewable 3 Tablet(s) Chew every 6 hours PRN Dyspepsia  ondansetron Injectable 4 milliGRAM(s) IV Push every 6 hours PRN Nausea      REVIEW OF SYSTEMS:     limited due to lethargy     Vital Signs Last 24 Hrs  T(C): 36.6 (21 Feb 2022 08:23), Max: 36.6 (20 Feb 2022 14:42)  T(F): 97.8 (21 Feb 2022 08:23), Max: 97.9 (20 Feb 2022 14:42)  HR: 89 (21 Feb 2022 08:23) (85 - 110)  BP: 95/64 (21 Feb 2022 08:23) (95/64 - 124/72)  BP(mean): --  RR: 18 (21 Feb 2022 08:23) (17 - 18)  SpO2: 94% (21 Feb 2022 08:23) (94% - 98%)    I&O's Summary      PHYSICAL EXAM:    Constitutional: lethargic   HEENT: PERR, EOMI,  No oral cyananosis.  Neck:  supple,  No JVD  Respiratory: Breath sounds are clear bilaterally, No wheezing, rales or rhonchi  Cardiovascular: S1 and S2,irr, 1/6 REBECA Murmurs, gallops or rubs  Gastrointestinal: Bowel Sounds present, soft, nontender.   Extremities: pain with movement of lower extremities. Edema  Vascular: 2+ peripheral pulses  Neurological:  lethargic   Musculoskeletal: no calf tenderness.  Skin: No rashes.      LABS: All Labs Reviewed:                        11.4   6.56  )-----------( 103      ( 21 Feb 2022 07:10 )             36.0                         10.8   11.31 )-----------( 126      ( 20 Feb 2022 16:43 )             33.8     21 Feb 2022 07:10    138    |  103    |  49     ----------------------------<  111    5.5     |  31     |  1.62   20 Feb 2022 16:43    141    |  105    |  41     ----------------------------<  127    5.2     |  33     |  1.32     Ca    8.7        21 Feb 2022 07:10  Ca    9.0        20 Feb 2022 16:43  Phos  5.0       21 Feb 2022 07:10  Mg     2.8       21 Feb 2022 07:10    TPro  7.0    /  Alb  3.6    /  TBili  0.8    /  DBili  x      /  AST  38     /  ALT  28     /  AlkPhos  92     21 Feb 2022 07:10  TPro  7.0    /  Alb  3.5    /  TBili  0.8    /  DBili  x      /  AST  46     /  ALT  28     /  AlkPhos  97     20 Feb 2022 16:43    PT/INR - ( 20 Feb 2022 16:43 )   PT: 15.7 sec;   INR: 1.36 ratio         PTT - ( 20 Feb 2022 16:43 )  PTT:41.8 sec      Blood Culture:       Monitor atrial fibrillation with CVR           RADIOLOGY/EKG:

## 2022-02-22 NOTE — CONSULT NOTE ADULT - ASSESSMENT
89 yo woman with A FIB, CM and CHF admitted with sacral ala and pelvic rami fx.  JOAQUÍN due to hypotension.  Continue pressor support for perfusion.  Shock of unclear etiology, cardiac vs infectious.   Check Echo for EF.   BC done and on  ceftriaxone.  Repeat labs with K 4.4.    No indication for urgent dialysis intervention.  Check repeat labs.

## 2022-02-22 NOTE — PROCEDURE NOTE - ADDITIONAL PROCEDURE DETAILS
Patient is a 89 yo female admitted to ICU with shock, JOAQUÍN, hyperkalemia, hypoglycemia, CHF, afib who is requiring high dose vasopressors. Patient with limited IV access and central venous access required.   Patient originally with L IJ that was not in proper position. Catheter removed, pressure applied and hemostasis obtained.   Patient with poor vasculature. Line attempted in R IJ and R Fem vein with unsuccessful passage of guide wire. Wire removed on both attempts, pressure applied, hemostasis obtained and dressings applied.    L fem accessed under ultrasound guidance. Vessel visualized and accessed, wire passed easily, placement confirmed with ultrasound. VBG sent confirming. Minimal blood loss.
CVC tip in the Right brachiocephalic vein, Dr Duncan aware and will readdress line.

## 2022-02-22 NOTE — PROCEDURE NOTE - NSINDICATIONS_GEN_A_CORE
arterial puncture to obtain ABG's/critical patient/monitoring purposes
critical illness/emergency venous access/hypertonic/irritant infusion/venous access
critical illness/emergency venous access/venous access

## 2022-02-22 NOTE — PROVIDER CONTACT NOTE (CHANGE IN STATUS NOTIFICATION) - ASSESSMENT
pt BP 62/40; manually taken. pt reports of feeling like she doesn't feel well, dizzy, nausea, (zofran given prior to RR).

## 2022-02-22 NOTE — PROCEDURE NOTE - NSPOSTPRCRAD_GEN_A_CORE
Femoral vein/central line located in the
CVC tip in the Right brachiocephalic/post-procedure radiography performed

## 2022-02-22 NOTE — CONSULT NOTE ADULT - SUBJECTIVE AND OBJECTIVE BOX
Chief complaints. Post mechanical fall at home.    HPI:  89 yo woman with PMHX of A fib/PPM, CAD with CM, CHF and HTN presented post mechanical fall at home.  Walker got stuck on bathroom door leading to fall.  Admitted with Sacral ala and pelvic rami fx.  Given Toradol x 1 and Morphine for pain control.  Hx of CHF on lasix and K at home.  Creat 1.3 with no known hx of CKD.  Noted with borderline BP yesterday, lasix held, NS ~ 2 L given for volume resuscitation.  CT chest done for resp insuff with R base patchy inf--Ceftriaxone started.  Post RR for Hypotension last night.  Resuscitated with 1L LR.  Phenylephrine started.  stats lab with K 6.7 and creat of 2.3.  Lokelma, Ca gluconate, D 50 and insulin given.    PMHX and PSHX.  1.A FIB on Xarelto/  PPM  2.HTN  3.CAD with CM  4. CHF  5.COPD  6.L TKR    FAMILY HISTORY:  No pertinent family history in first degree relatives    SOCIAL HISTORY : Former smoker.    Allergies :  codeine (Unknown)    REVIEW OF SYSTEMS :  unable to obtain    MEDICATIONS  (STANDING):  acetaminophen   IVPB .. 1000 milliGRAM(s) IV Intermittent every 6 hours  acetaminophen   IVPB .. 1000 milliGRAM(s) IV Intermittent once  ALBUTerol    90 MICROgram(s) HFA Inhaler 2 Puff(s) Inhalation every 6 hours  allopurinol 100 milliGRAM(s) Oral daily  azithromycin   Tablet 250 milliGRAM(s) Oral daily  budesonide 160 MICROgram(s)/formoterol 4.5 MICROgram(s) Inhaler 2 Puff(s) Inhalation two times a day  cefTRIAXone   IVPB 1000 milliGRAM(s) IV Intermittent every 24 hours  chlorhexidine 2% Cloths 1 Application(s) Topical <User Schedule>  dextrose 50% Injectable 25 Gram(s) IV Push once  DOBUTamine Infusion 2.5 MICROgram(s)/kG/Min (4.85 mL/Hr) IV Continuous <Continuous>  heparin  Infusion.  Unit(s)/Hr (11 mL/Hr) IV Continuous <Continuous>  levothyroxine 125 MICROGram(s) Oral daily  pantoprazole  Injectable 40 milliGRAM(s) IV Push daily  phenylephrine    Infusion 0.1 MICROgram(s)/kG/Min (2.21 mL/Hr) IV Continuous <Continuous>    MEDICATIONS  (PRN):  calcium carbonate    500 mG (Tums) Chewable 3 Tablet(s) Chew every 6 hours PRN Dyspepsia  heparin   Injectable 5000 Unit(s) IV Push every 6 hours PRN For aPTT less than 40  heparin   Injectable 2500 Unit(s) IV Push every 6 hours PRN For aPTT between 40 - 57  ondansetron Injectable 4 milliGRAM(s) IV Push every 6 hours PRN Nausea    Vital Signs Last 24 Hrs  T(C): 36 (2022 02:32), Max: 37 (2022 01:42)  T(F): 96.8 (2022 02:32), Max: 98.6 (2022 01:42)  HR: 101 (2022 04:00) (66 - 102)  BP: 82/33 (2022 04:00) (62/40 - 95/64)  BP(mean): 45 (2022 04:00) (45 - 67)  RR: 18 (2022 04:00) (16 - 19)  SpO2: 92% (2022 04:00) (91% - 96%)  Daily     Daily Weight in k.7 (2022 02:32)  I&O's Summary    2022 07:01  -  2022 07:00  --------------------------------------------------------  IN: 0 mL / OUT: 875 mL / NET: -875 mL    PHYSICAL EXAM:  GEN: Alert and responsive.  Unable to assess cognition.    HEENT: FM in place  NECK : supple  CV: S1S2 RRR  LUNGS: bilateral rhonchi  ABD: soft  EXT: trace edema    LABS:                        10.7   9.69  )-----------( 110      ( 2022 05:51 )             33.4     02-    135  |  102  |  62<H>  ----------------------------<  29<LL>  4.4   |  27  |  2.18<H>    Ca    8.8      2022 05:51  Phos  4.4       Mg     2.4         TPro  6.1  /  Alb  2.9<L>  /  TBili  0.6  /  DBili  x   /  AST  33  /  ALT  24  /  AlkPhos  81      PT/INR - ( 2022 02:12 )   PT: 14.7 sec;   INR: 1.28 ratio         PTT - ( 2022 02:12 )  PTT:36.3 sec  Urinalysis Basic - ( 2022 07:49 )    Color: Yellow / Appearance: Clear / S.010 / pH: x  Gluc: x / Ketone: Negative  / Bili: Negative / Urobili: Negative   Blood: x / Protein: negative mg/dL / Nitrite: Negative   Leuk Esterase: Negative / RBC: x / WBC x   Sq Epi: x / Non Sq Epi: x / Bacteria: x      Magnesium, Serum: 2.4 mg/dL ( @ 05:51)  Phosphorus Level, Serum: 4.4 mg/dL ( @ 05:51)  Magnesium, Serum: 2.8 mg/dL ( @ 07:10)  Phosphorus Level, Serum: 5.0 mg/dL ( @ 07:10)

## 2022-02-22 NOTE — PROGRESS NOTE ADULT - ASSESSMENT
Pt is a 89 yo F wit Pmhx of Afib on xarelto, Hypothyroid, CHF, CAD, MVR, AI and HTn admitted to  s/p Fall found to have Right inf/sup pubic rami fractures. Pt now presenting to  for    1. Shock unclear etiology  2. JOAQUÍN/ARF  3. Hyperkalemia  4. CHF  5. AFIB    Neuro:  - No active issues  - Avoid Neuro Delirious / sedative medications    CV:  - Pt Hypotensive, started Brandon gtt actively titrating to maintain a MAP >65.  - Avoiding fluid overload  - EKG ordered for hyperkalemia,  earlier troponin 210 will trend, BNP 47120  - Bedside POCUS showing Reduced EF, Official ECHO ordered  - Cardiology Following  - Hold antihypertensive medications    Pulm:  - Supplemental oxygen to maintain SpO2 > 92%,  - Incentive spirometry                  GI:  - No Active issues      Renal:  - Even to net negative fluid balance as tolerated by hemodynamics, pt history of CHF unknown EF.    - Worsening Renal Function Continue to monitor Bun/Cr 2.3 and UOP.  - Worsening hyperkalemia 6.7, Insulin/dextrose, Ca gluconate, Lokelma given  - Replacing electrolytes as needed with Goal K> 4, PO> 3, Mg> 2               - Strict I&O's, Espinoza to BSD  - Avoid Nephro toxic medication  - Will consult nephrology    Heme:  - SCDs for DVT/PE ppx   - Hgb 10.3    ID:  - WC 6.63 remains afebrile with no antipyretics administered, Lactate 1.7  - Continue Azithromycin/Ceftriaxone  - Microbiology and Radiology reviewed   - trend CBC with diff, CMP and fever curve    Endo:  - ISS for aggressive glycemic control to limit FS glucose to < 180mg/dl.  - Keep Euthyroid    Critical Care Time:  50 minutes were spent assessing the patient's presenting problems of acute illness that pose a high probability of life threatening  deterioration or end organ damage / dysfunction.  Medical desicion making includes initiation / continuation of plan or care review data/ labwork/ radiographic study, direct patient care bedside ,  discussions with  consultants regarding care,  evaluation and interpretation of vital signs, any necessary ventilator management,   NIV or BIPAP changes  or initiation,    discussions with multidisciplinary team,  am or pm rounds, discussions of goals of care with patient and family all non-inclusive of procedures.    Plan discussed with Dr Nelson Pt is a 89 yo F wit Pmhx of Afib on xarelto, Hypothyroid, CHF, CAD, MVR, AI and HTn admitted to  s/p Fall found to have Right inf/sup pubic rami fractures. Pt now presenting to  for    1. Shock unclear etiology  2. JOAQUÍN/ARF  3. Hyperkalemia  4. CHF  5. AFIB    Neuro:  - No active issues  - Avoid Neuro Delirious / sedative medications    CV:  - Pt Hypotensive, started Brandon gtt actively titrating to maintain a MAP >65.  - Avoiding fluid overload  - EKG ordered for hyperkalemia,  earlier troponin 210 will trend, BNP 52495  - Bedside POCUS showing Reduced EF, Official ECHO ordered  - Cardiology Following  - Hold antihypertensive medications    Pulm:  - Supplemental oxygen to maintain SpO2 > 92%,  - Incentive spirometry                  GI:  - No Active issues      Renal:  - Even to net negative fluid balance as tolerated by hemodynamics, pt history of CHF unknown EF.    - Worsening Renal Function Continue to monitor Bun/Cr 2.3 and UOP.  - Worsening hyperkalemia 6.7, Insulin/dextrose, Ca gluconate, Lokelma given  - Replacing electrolytes as needed with Goal K> 4, PO> 3, Mg> 2               - Strict I&O's, Espinoza to BSD  - Avoid Nephro toxic medication  - Will consult nephrology    Heme:  - SCDs for DVT/PE ppx   - Hgb 10.3  - Heparin gtt started 2/2 elevated troponin, okay from surgical stand point    ID:  - WC 6.63 remains afebrile with no antipyretics administered, Lactate 1.7  - Continue Azithromycin/Ceftriaxone  - Microbiology and Radiology reviewed   - trend CBC with diff, CMP and fever curve    Endo:  - ISS for aggressive glycemic control to limit FS glucose to < 180mg/dl.  - Keep Euthyroid    Critical Care Time:  50 minutes were spent assessing the patient's presenting problems of acute illness that pose a high probability of life threatening  deterioration or end organ damage / dysfunction.  Medical desicion making includes initiation / continuation of plan or care review data/ labwork/ radiographic study, direct patient care bedside ,  discussions with  consultants regarding care,  evaluation and interpretation of vital signs, any necessary ventilator management,   NIV or BIPAP changes  or initiation,    discussions with multidisciplinary team,  am or pm rounds, discussions of goals of care with patient and family all non-inclusive of procedures.    Plan discussed with Dr Nelson

## 2022-02-22 NOTE — RAPID RESPONSE TEAM SUMMARY - NSADDTLFINDINGSRRT_GEN_ALL_CORE
Upon initial evaluation of Pt, Pt is awake and alert stating she doesn't feel well. Pt is hypotensive 62/40, 's, RR 17. Fluid bolus ordered for Hypotension and EKG ordered showing no evidence of Ischemia, unchanged from her EKG on admission. Upon pt chart, pt has been having refractory hypotension despite fluid bolus administration. Labs drawn CBC, CMP, Mag, Phos, Lactate, Procal and coags ordered. Pt Blood pressure 78/55 post fluid bolus, will admit to CCU for closer monitoring given persistent hypotension and high risk of decompensation.

## 2022-02-23 LAB
ANION GAP SERPL CALC-SCNC: 6 MMOL/L — SIGNIFICANT CHANGE UP (ref 5–17)
ANION GAP SERPL CALC-SCNC: 7 MMOL/L — SIGNIFICANT CHANGE UP (ref 5–17)
APTT BLD: 110 SEC — HIGH (ref 27.5–35.5)
APTT BLD: 75.1 SEC — HIGH (ref 27.5–35.5)
APTT BLD: 93.8 SEC — HIGH (ref 27.5–35.5)
BUN SERPL-MCNC: 51 MG/DL — HIGH (ref 7–23)
BUN SERPL-MCNC: 54 MG/DL — HIGH (ref 7–23)
CALCIUM SERPL-MCNC: 7.6 MG/DL — LOW (ref 8.5–10.1)
CALCIUM SERPL-MCNC: 7.7 MG/DL — LOW (ref 8.5–10.1)
CHLORIDE SERPL-SCNC: 98 MMOL/L — SIGNIFICANT CHANGE UP (ref 96–108)
CHLORIDE SERPL-SCNC: 99 MMOL/L — SIGNIFICANT CHANGE UP (ref 96–108)
CO2 SERPL-SCNC: 28 MMOL/L — SIGNIFICANT CHANGE UP (ref 22–31)
CO2 SERPL-SCNC: 29 MMOL/L — SIGNIFICANT CHANGE UP (ref 22–31)
CREAT SERPL-MCNC: 1.6 MG/DL — HIGH (ref 0.5–1.3)
CREAT SERPL-MCNC: 1.75 MG/DL — HIGH (ref 0.5–1.3)
GLUCOSE SERPL-MCNC: 187 MG/DL — HIGH (ref 70–99)
GLUCOSE SERPL-MCNC: 254 MG/DL — HIGH (ref 70–99)
HCT VFR BLD CALC: 26.5 % — LOW (ref 34.5–45)
HGB BLD-MCNC: 8.7 G/DL — LOW (ref 11.5–15.5)
MAGNESIUM SERPL-MCNC: 2.3 MG/DL — SIGNIFICANT CHANGE UP (ref 1.6–2.6)
MCHC RBC-ENTMCNC: 32.8 GM/DL — SIGNIFICANT CHANGE UP (ref 32–36)
MCHC RBC-ENTMCNC: 34.5 PG — HIGH (ref 27–34)
MCV RBC AUTO: 105.2 FL — HIGH (ref 80–100)
NT-PROBNP SERPL-SCNC: HIGH PG/ML (ref 0–450)
PHOSPHATE SERPL-MCNC: 4 MG/DL — SIGNIFICANT CHANGE UP (ref 2.5–4.5)
PLATELET # BLD AUTO: 108 K/UL — LOW (ref 150–400)
POTASSIUM SERPL-MCNC: 3.8 MMOL/L — SIGNIFICANT CHANGE UP (ref 3.5–5.3)
POTASSIUM SERPL-MCNC: 4.1 MMOL/L — SIGNIFICANT CHANGE UP (ref 3.5–5.3)
POTASSIUM SERPL-SCNC: 3.8 MMOL/L — SIGNIFICANT CHANGE UP (ref 3.5–5.3)
POTASSIUM SERPL-SCNC: 4.1 MMOL/L — SIGNIFICANT CHANGE UP (ref 3.5–5.3)
PROCALCITONIN SERPL-MCNC: 1.16 NG/ML — HIGH (ref 0.02–0.1)
RBC # BLD: 2.52 M/UL — LOW (ref 3.8–5.2)
RBC # FLD: 15.3 % — HIGH (ref 10.3–14.5)
SODIUM SERPL-SCNC: 133 MMOL/L — LOW (ref 135–145)
SODIUM SERPL-SCNC: 134 MMOL/L — LOW (ref 135–145)
WBC # BLD: 7.97 K/UL — SIGNIFICANT CHANGE UP (ref 3.8–10.5)
WBC # FLD AUTO: 7.97 K/UL — SIGNIFICANT CHANGE UP (ref 3.8–10.5)

## 2022-02-23 PROCEDURE — 99233 SBSQ HOSP IP/OBS HIGH 50: CPT

## 2022-02-23 PROCEDURE — 99231 SBSQ HOSP IP/OBS SF/LOW 25: CPT

## 2022-02-23 PROCEDURE — 99291 CRITICAL CARE FIRST HOUR: CPT

## 2022-02-23 PROCEDURE — 99292 CRITICAL CARE ADDL 30 MIN: CPT

## 2022-02-23 RX ORDER — SENNA PLUS 8.6 MG/1
2 TABLET ORAL ONCE
Refills: 0 | Status: COMPLETED | OUTPATIENT
Start: 2022-02-23 | End: 2022-02-23

## 2022-02-23 RX ORDER — FUROSEMIDE 40 MG
40 TABLET ORAL ONCE
Refills: 0 | Status: DISCONTINUED | OUTPATIENT
Start: 2022-02-23 | End: 2022-02-23

## 2022-02-23 RX ORDER — POLYETHYLENE GLYCOL 3350 17 G/17G
17 POWDER, FOR SOLUTION ORAL ONCE
Refills: 0 | Status: COMPLETED | OUTPATIENT
Start: 2022-02-23 | End: 2022-02-23

## 2022-02-23 RX ORDER — MIDODRINE HYDROCHLORIDE 2.5 MG/1
10 TABLET ORAL EVERY 8 HOURS
Refills: 0 | Status: DISCONTINUED | OUTPATIENT
Start: 2022-02-23 | End: 2022-02-26

## 2022-02-23 RX ORDER — FUROSEMIDE 40 MG
40 TABLET ORAL ONCE
Refills: 0 | Status: COMPLETED | OUTPATIENT
Start: 2022-02-23 | End: 2022-02-23

## 2022-02-23 RX ORDER — ACETAMINOPHEN 500 MG
650 TABLET ORAL EVERY 6 HOURS
Refills: 0 | Status: DISCONTINUED | OUTPATIENT
Start: 2022-02-23 | End: 2022-03-01

## 2022-02-23 RX ADMIN — BUDESONIDE AND FORMOTEROL FUMARATE DIHYDRATE 2 PUFF(S): 160; 4.5 AEROSOL RESPIRATORY (INHALATION) at 09:00

## 2022-02-23 RX ADMIN — ALBUTEROL 2 PUFF(S): 90 AEROSOL, METERED ORAL at 15:16

## 2022-02-23 RX ADMIN — Medication 125 MICROGRAM(S): at 05:53

## 2022-02-23 RX ADMIN — ALBUTEROL 2 PUFF(S): 90 AEROSOL, METERED ORAL at 20:46

## 2022-02-23 RX ADMIN — POLYETHYLENE GLYCOL 3350 17 GRAM(S): 17 POWDER, FOR SOLUTION ORAL at 02:46

## 2022-02-23 RX ADMIN — Medication 100 MILLIGRAM(S): at 09:27

## 2022-02-23 RX ADMIN — POLYETHYLENE GLYCOL 3350 17 GRAM(S): 17 POWDER, FOR SOLUTION ORAL at 09:27

## 2022-02-23 RX ADMIN — HEPARIN SODIUM 600 UNIT(S)/HR: 5000 INJECTION INTRAVENOUS; SUBCUTANEOUS at 20:35

## 2022-02-23 RX ADMIN — PANTOPRAZOLE SODIUM 40 MILLIGRAM(S): 20 TABLET, DELAYED RELEASE ORAL at 09:27

## 2022-02-23 RX ADMIN — SENNA PLUS 2 TABLET(S): 8.6 TABLET ORAL at 22:57

## 2022-02-23 RX ADMIN — Medication 40 MILLIGRAM(S): at 06:49

## 2022-02-23 RX ADMIN — HEPARIN SODIUM 600 UNIT(S)/HR: 5000 INJECTION INTRAVENOUS; SUBCUTANEOUS at 14:03

## 2022-02-23 RX ADMIN — MIDODRINE HYDROCHLORIDE 10 MILLIGRAM(S): 2.5 TABLET ORAL at 14:37

## 2022-02-23 RX ADMIN — SENNA PLUS 2 TABLET(S): 8.6 TABLET ORAL at 02:46

## 2022-02-23 RX ADMIN — PIPERACILLIN AND TAZOBACTAM 25 GRAM(S): 4; .5 INJECTION, POWDER, LYOPHILIZED, FOR SOLUTION INTRAVENOUS at 09:27

## 2022-02-23 RX ADMIN — HEPARIN SODIUM 600 UNIT(S)/HR: 5000 INJECTION INTRAVENOUS; SUBCUTANEOUS at 08:02

## 2022-02-23 RX ADMIN — MIDODRINE HYDROCHLORIDE 10 MILLIGRAM(S): 2.5 TABLET ORAL at 09:29

## 2022-02-23 RX ADMIN — PHENYLEPHRINE HYDROCHLORIDE 2.21 MICROGRAM(S)/KG/MIN: 10 INJECTION INTRAVENOUS at 11:42

## 2022-02-23 RX ADMIN — BUDESONIDE AND FORMOTEROL FUMARATE DIHYDRATE 2 PUFF(S): 160; 4.5 AEROSOL RESPIRATORY (INHALATION) at 20:46

## 2022-02-23 RX ADMIN — PIPERACILLIN AND TAZOBACTAM 25 GRAM(S): 4; .5 INJECTION, POWDER, LYOPHILIZED, FOR SOLUTION INTRAVENOUS at 22:57

## 2022-02-23 RX ADMIN — Medication 650 MILLIGRAM(S): at 02:46

## 2022-02-23 RX ADMIN — ALBUTEROL 2 PUFF(S): 90 AEROSOL, METERED ORAL at 08:58

## 2022-02-23 RX ADMIN — MIDODRINE HYDROCHLORIDE 10 MILLIGRAM(S): 2.5 TABLET ORAL at 22:57

## 2022-02-23 RX ADMIN — VASOPRESSIN 2.4 UNIT(S)/MIN: 20 INJECTION INTRAVENOUS at 21:25

## 2022-02-23 NOTE — PROGRESS NOTE ADULT - SUBJECTIVE AND OBJECTIVE BOX
History of Present Illness:   Pt is a 89 yo F that presented to the ED for evaluation of right hip pain. She stated that she was ambulating with her walker this morning, when it got stuck on her bathroom door causing her to fall on her  right buttock. She called her daughter who took her to her house because of the fact that she lives alone. She stated that she has been able to ambulate minimally to get into the car to the ED since the fall but with pain. She reports that she lives at home alone with a home aide during the day for 2 hours. Pt was released from [facility] in late november after knee surgery.  On exam has some lateral tenderness at the left knee. Has had a left TKR at hospitals.  Otherwise denies head strike/LOC  -hosp course sign for hypotension requiring ICU care and pressor support     2.21: patient seen on 3E, not confused, able to give history; c/o some pelvis pain with movements.  requires 4l NC  2.22: on pressors ,lethargic   2.23: alert, oriented to name and place, anxious      REVIEW OF SYSTEMS:  unable to obtain     All other review of systems is negative unless indicated above    Vital Signs Last 24 Hrs  T(C): 36.1 (23 Feb 2022 12:00), Max: 37.1 (22 Feb 2022 21:57)  T(F): 97 (23 Feb 2022 12:00), Max: 98.7 (22 Feb 2022 21:57)  HR: 107 (23 Feb 2022 14:00) (77 - 110)  BP: 54/12 (23 Feb 2022 01:00) (54/12 - 117/70)  BP(mean): 18 (23 Feb 2022 01:00) (18 - 77)  RR: 19 (23 Feb 2022 14:00) (13 - 26)  SpO2: 96% (23 Feb 2022 14:00) (57% - 100%)  PHYSICAL EXAM:    GENERAL: NAD  HEENT:  NC/AT, EOMI, PERRLA, No scleral icterus, Moist mucous membranes  NECK: Supple, No JVD  CNS:  Alert & Oriented X3, Motor Strength 5/5 B/L upper and lower extremities; DTRs 2+ intact   LUNG: Decresaed Breath sounds, Rt crackles, no wheezing   HEART: RRR; No murmurs, No rubs  ABDOMEN: +BS, ST/ND/NT  GENITOURINARY: Voiding, Bladder not distended  EXTREMITIES:  2+ Peripheral Pulses, No clubbing, No cyanosis, No tibial edema  MUSCULOSKELTAL: Joints normal ROM, No TTP, No effusion  VAGINAL: deferred  SKIN: no rashes, intact skin  RECTAL: deferred, not indicated  BREAST: deferred               Labs:                        9.7    9.04  )-----------( 118      ( 22 Feb 2022 13:20 )             30.5     02-22    135  |  103  |  58<H>  ----------------------------<  110<H>  4.4   |  24  |  2.01<H>    Ca    8.0<L>      22 Feb 2022 12:00  Phos  4.4     02-22  Mg     2.4     02-22    TPro  6.5  /  Alb  3.1<L>  /  TBili  0.6  /  DBili  0.2  /  AST  36  /  ALT  26  /  AlkPhos  87  02-22      MEDICATIONS  (STANDING):  acetaminophen   IVPB .. 1000 milliGRAM(s) IV Intermittent every 6 hours  acetaminophen   IVPB .. 1000 milliGRAM(s) IV Intermittent once  ALBUTerol    90 MICROgram(s) HFA Inhaler 2 Puff(s) Inhalation every 6 hours  allopurinol 100 milliGRAM(s) Oral daily  budesonide 160 MICROgram(s)/formoterol 4.5 MICROgram(s) Inhaler 2 Puff(s) Inhalation two times a day  chlorhexidine 2% Cloths 1 Application(s) Topical <User Schedule>  heparin  Infusion.  Unit(s)/Hr (11 mL/Hr) IV Continuous <Continuous>  levothyroxine 125 MICROGram(s) Oral daily  midodrine 10 milliGRAM(s) Oral every 8 hours  milrinone Infusion 0.125 MICROgram(s)/kG/Min (2.43 mL/Hr) IV Continuous <Continuous>  pantoprazole  Injectable 40 milliGRAM(s) IV Push daily  phenylephrine    Infusion 0.1 MICROgram(s)/kG/Min (2.21 mL/Hr) IV Continuous <Continuous>  piperacillin/tazobactam IVPB.. 3.375 Gram(s) IV Intermittent every 12 hours  polyethylene glycol 3350 17 Gram(s) Oral daily  senna 2 Tablet(s) Oral at bedtime  vasopressin Infusion 0.04 Unit(s)/Min (2.4 mL/Hr) IV Continuous <Continuous>        CT Chest: RLL Pna      a/p:    1. Acute  Hypoxic respiratory failure due to Pna  Septic Shock  CT Chest noted  Antibiotic changed to Zosyn IV day#2  on pressor support  Blood Cx x 2 pending  c/w bronchodilators, ICS    2. Cardiogenic shock due to sepsis and Rt Heart failure/Severe Pulm Htn:  s/p Milrinone  on intermittent diuretic  on IV Heparin for possible pulmonary emboli     3.  Rt pelvis fracture due to mechanical fall: stable   analgesia prn  PT / Ortho evaluations     4. Afib: rate controlled   on IV heparin     5. ARF superimposed on Ckd :  likely due to ATN and prerenal state in the setting of sepsis/cardiogenic shock

## 2022-02-23 NOTE — DIETITIAN INITIAL EVALUATION ADULT. - PERTINENT MEDS FT
MEDICATIONS  (STANDING):  acetaminophen   IVPB .. 1000 milliGRAM(s) IV Intermittent every 6 hours  acetaminophen   IVPB .. 1000 milliGRAM(s) IV Intermittent once  ALBUTerol    90 MICROgram(s) HFA Inhaler 2 Puff(s) Inhalation every 6 hours  allopurinol 100 milliGRAM(s) Oral daily  budesonide 160 MICROgram(s)/formoterol 4.5 MICROgram(s) Inhaler 2 Puff(s) Inhalation two times a day  chlorhexidine 2% Cloths 1 Application(s) Topical <User Schedule>  heparin  Infusion.  Unit(s)/Hr (11 mL/Hr) IV Continuous <Continuous>  levothyroxine 125 MICROGram(s) Oral daily  midodrine 10 milliGRAM(s) Oral every 8 hours  milrinone Infusion 0.125 MICROgram(s)/kG/Min (2.43 mL/Hr) IV Continuous <Continuous>  pantoprazole  Injectable 40 milliGRAM(s) IV Push daily  phenylephrine    Infusion 0.1 MICROgram(s)/kG/Min (2.21 mL/Hr) IV Continuous <Continuous>  piperacillin/tazobactam IVPB.. 3.375 Gram(s) IV Intermittent every 12 hours  polyethylene glycol 3350 17 Gram(s) Oral daily  senna 2 Tablet(s) Oral at bedtime  vasopressin Infusion 0.04 Unit(s)/Min (2.4 mL/Hr) IV Continuous <Continuous>    MEDICATIONS  (PRN):  acetaminophen     Tablet .. 650 milliGRAM(s) Oral every 6 hours PRN Mild Pain (1 - 3)  calcium carbonate    500 mG (Tums) Chewable 3 Tablet(s) Chew every 6 hours PRN Dyspepsia  heparin   Injectable 5000 Unit(s) IV Push every 6 hours PRN For aPTT less than 40  heparin   Injectable 2500 Unit(s) IV Push every 6 hours PRN For aPTT between 40 - 57  melatonin 3 milliGRAM(s) Oral at bedtime PRN Insomnia  ondansetron Injectable 4 milliGRAM(s) IV Push every 6 hours PRN Nausea  sodium chloride 0.9% lock flush 10 milliLiter(s) IV Push every 1 hour PRN Pre/post blood products, medications, blood draw, and to maintain line patency

## 2022-02-23 NOTE — DIETITIAN INITIAL EVALUATION ADULT. - PHYSCIAL ASSESSMENT
Xu scale-14  Skin- +skin tears  Last BM documented- none since admission x 3 days (consider bowel meds)

## 2022-02-23 NOTE — PROGRESS NOTE ADULT - SUBJECTIVE AND OBJECTIVE BOX
PCP:    REQUESTING PHYSICIAN:    REASON FOR CONSULT:    CHIEF COMPLAINT:    HPI:  Pt is a 89 yo F that presented to the ED for evaluation of right hip pain. She stated that she was ambulating with her walker this morning, when it got stuck on her bathroom door causing her to fall with right buttock. She called her daughter who took her to her house because of the fact that she lives alone. She stated that she has been able to ambulate minimally to get into the car to the ED since the fall but with pain. Has had a left TKA at [facility].  Otherwise denies head strike/LOC, numbness/tingling, weakness. Pt has an extensive cardiac history but is currently looking to establish are with someone here. Cardiology called to evaluate cardiac status. Pt denies chest pain but reiterated her cardiac history as above. Pt has PPM but denies coronary intervention in the past.     PmHx: Afib on Xarelto 15mg QD, Hypothyroid, CHF, Gout, CAD, Mitral Valve Regurg, Aortal valve insufficiency, HTN  PsHx: B/L cataract surgery and B/L Total Knee Replacement    PCP: Dr Keith (20 Feb 2022 20:35)    2/22/22  Events noted , patient was hypotensive ,stable hemoglobin , became less responsive last night , markedly hypoglycemic , started on pressors , patient very lethargic arousable with vocal stimuli , hypoxic on VM  , heart rate high 90s     2/23/22: Pt comfortable, awake and alert, on 3L NC, mildly hypotensive on multiple pressors.     /PmHx: Afib on Xarelto 15mg QD, Hypothyroid, CHF, Gout, CAD, Mitral Valve Regurg, Aortal valve insufficiency, HTN  PsHx: B/L cataract surgery and B/L Total Knee Replacement    PCP: Dr Keith (20 Feb 2022 20:35)      PAST MEDICAL & SURGICAL HISTORY:  Mitral valve insufficiency    Aortic valve regurgitation    Osteopenia    CAD (coronary artery disease)    Gout    Hypothyroid    CHF (congestive heart failure)    Anxiety    Anemia    Afib    COPD (chronic obstructive pulmonary disease)    HTN (hypertension)    Status cardiac pacemak  /S/P knee replacement        SOCIAL HISTORY:    FAMILY HISTORY:  No pertinent family history in first degree relatives      ALLERGIES:  Allergies    codeine (Unknown)    Intolerances    MEDICATIONS:    MEDICATIONS  (STANDING):  acetaminophen   IVPB .. 1000 milliGRAM(s) IV Intermittent every 6 hours  acetaminophen   IVPB .. 1000 milliGRAM(s) IV Intermittent once  ALBUTerol    90 MICROgram(s) HFA Inhaler 2 Puff(s) Inhalation every 6 hours  allopurinol 100 milliGRAM(s) Oral daily  budesonide 160 MICROgram(s)/formoterol 4.5 MICROgram(s) Inhaler 2 Puff(s) Inhalation two times a day  chlorhexidine 2% Cloths 1 Application(s) Topical <User Schedule>  heparin  Infusion.  Unit(s)/Hr (11 mL/Hr) IV Continuous <Continuous>  levothyroxine 125 MICROGram(s) Oral daily  midodrine 10 milliGRAM(s) Oral every 8 hours  milrinone Infusion 0.125 MICROgram(s)/kG/Min (2.43 mL/Hr) IV Continuous <Continuous>  pantoprazole  Injectable 40 milliGRAM(s) IV Push daily  phenylephrine    Infusion 0.1 MICROgram(s)/kG/Min (2.21 mL/Hr) IV Continuous <Continuous>  piperacillin/tazobactam IVPB.. 3.375 Gram(s) IV Intermittent every 12 hours  polyethylene glycol 3350 17 Gram(s) Oral daily  senna 2 Tablet(s) Oral at bedtime  vasopressin Infusion 0.04 Unit(s)/Min (2.4 mL/Hr) IV Continuous <Continuous>    MEDICATIONS  (PRN):  acetaminophen     Tablet .. 650 milliGRAM(s) Oral every 6 hours PRN Mild Pain (1 - 3)  calcium carbonate    500 mG (Tums) Chewable 3 Tablet(s) Chew every 6 hours PRN Dyspepsia  heparin   Injectable 5000 Unit(s) IV Push every 6 hours PRN For aPTT less than 40  heparin   Injectable 2500 Unit(s) IV Push every 6 hours PRN For aPTT between 40 - 57  melatonin 3 milliGRAM(s) Oral at bedtime PRN Insomnia  ondansetron Injectable 4 milliGRAM(s) IV Push every 6 hours PRN Nausea  sodium chloride 0.9% lock flush 10 milliLiter(s) IV Push every 1 hour PRN Pre/post blood products, medications, blood draw, and to maintain line patency    Vital Signs Last 24 Hrs  T(C): 36.3 (23 Feb 2022 08:00), Max: 37.1 (22 Feb 2022 21:57)  T(F): 97.4 (23 Feb 2022 08:00), Max: 98.7 (22 Feb 2022 21:57)  HR: 108 (23 Feb 2022 08:00) (77 - 111)  BP: 54/12 (23 Feb 2022 01:00) (54/12 - 118/49)  BP(mean): 18 (23 Feb 2022 01:00) (18 - 101)  RR: 16 (23 Feb 2022 08:00) (13 - 26)  SpO2: 98% (23 Feb 2022 08:00) (57% - 100%)       Daily I&O's Summary    22 Feb 2022 07:01  -  23 Feb 2022 07:00  --------------------------------------------------------  IN: 3604.1 mL / OUT: 1250 mL / NET: 2354.1 mL    23 Feb 2022 07:01  -  23 Feb 2022 09:00  --------------------------------------------------------  IN: 281 mL / OUT: 475 mL / NET: -194 mL        PHYSICAL EXAM:    Constitutional: NAD, awake and alert, well-developed  HEENT: PERR, EOMI,  No oral cyananosis.  Neck:  supple,  No JVD  Respiratory: Breath sounds are clear bilaterally, No wheezing, rales or rhonchi  Cardiovascular: irregular rhythm, 1/6 systolic murmur, No gallops or rubs  Gastrointestinal: Bowel Sounds present, soft, nontender.   Extremities: No peripheral edema. No clubbing or cyanosis.  Vascular: 2+ peripheral pulses  Neurological: A/O x 3, no focal deficits  Musculoskeletal: no calf tenderness.  Skin: No rashes. ecchymosis at site of triple lumen    LABS: All Labs Reviewed:                        8.7    7.97  )-----------( 108      ( 23 Feb 2022 04:40 )             26.5                         9.7    9.04  )-----------( 118      ( 22 Feb 2022 13:20 )             30.5                         9.8    8.92  )-----------( 116      ( 22 Feb 2022 10:10 )             30.5     23 Feb 2022 04:40    134    |  99     |  54     ----------------------------<  187    4.1     |  29     |  1.75   22 Feb 2022 22:30    132    |  99     |  59     ----------------------------<  275    4.8     |  26     |  1.91   22 Feb 2022 12:00    135    |  103    |  58     ----------------------------<  110    4.4     |  24     |  2.01     Ca    7.6        23 Feb 2022 04:40  Ca    8.0        22 Feb 2022 22:30  Ca    8.0        22 Feb 2022 12:00  Phos  4.0       23 Feb 2022 04:40  Phos  4.3       22 Feb 2022 22:30  Phos  4.4       22 Feb 2022 05:51  Mg     2.3       23 Feb 2022 04:40  Mg     2.5       22 Feb 2022 22:30  Mg     2.4       22 Feb 2022 05:51    TPro  5.9    /  Alb  2.6    /  TBili  0.5    /  DBili  x      /  AST  33     /  ALT  25     /  AlkPhos  77     22 Feb 2022 22:30  TPro  6.5    /  Alb  3.1    /  TBili  0.6    /  DBili  0.2    /  AST  36     /  ALT  26     /  AlkPhos  87     22 Feb 2022 05:51  TPro  6.1    /  Alb  2.9    /  TBili  0.6    /  DBili  x      /  AST  33     /  ALT  24     /  AlkPhos  81     22 Feb 2022 02:11    PT/INR - ( 22 Feb 2022 02:12 )   PT: 14.7 sec;   INR: 1.28 ratio         PTT - ( 23 Feb 2022 04:40 )  PTT:110.0 sec  CARDIAC MARKERS ( 22 Feb 2022 05:51 )  x     / x     / 103 U/L / x     / x          Blood Culture: Organism --  Gram Stain Blood -- Gram Stain --  Specimen Source .Blood None  Culture-Blood --    Organism --  Gram Stain Blood -- Gram Stain --  Specimen Source .Blood None  Culture-Blood --      02-23 @ 04:40  Pro Bnp [id number]  02-21 @ 14:26  Pro Bnp [id number]    RADIOLOGY/EKG:      ECHO/CARDIAC CATHTERIZATION/STRESS TEST:  < from: TTE Echo Complete w/o Contrast w/ Doppler (02.22.22 @ 12:28) >   The left ventricle cavity is underdistended.   Endocardium is not well visualized, however, overall left ventricular   systolic function appears hyperdynamic. Technically Difficult Study.   Septal flattening is seen; this finding is consistent with right heart   pressure / volume overload.   Estimated left ventricular ejection fraction is 75 %.   Normal appearing left atrium.   The right atrium appears severely dilated.   A device wire is seen in the RV and RA.   The right ventricle is at least moderately dilated with decreased   contractility.   There are fibrocalcific changes noted to the aortic valve leaflets with   restriction in leaflet excursion. Transaortic gradients are elevated but   do not meet the criteria for aortic stenosis. * aortic insufficiency   noted.   Fibrocalcific changes noted to the mitral valve leaflets with preserved   leaflet excursion.   Mildmitral annular calcification is present.   Mild (1+) mitral regurgitation is present.   The tricuspid valve leaflets are thin and pliable; valve motion is   normal.   Severe (4+) tricuspid valve regurgitation is present.   Severe pulmonary hypertension.   Mild pulmonic valvular regurgitation (1+) is present.   No evidence of pericardial effusion.   Pleural effusion - is present.   IVC is dilated and not collapsing with inspiration.    < end of copied text >

## 2022-02-23 NOTE — PROGRESS NOTE ADULT - ASSESSMENT
89 YO F with right sacral ala fracture and superior and inferior rami fracture    Plan:   Cardiology consult appreciated  PT eval for dispo   Multimodal pain control  Incentive spirometery  Vitals, IS/OS Q 4  Diet: Soft Cardiac  GI prophylaxis  f/u nephrology plan   Local wound care  Activity: WBAT  PT  Hold A/C  Resume other home med  Hospitalist consulted  Orthopedic  following   SW for eventual D/C planning      Case discussed with Dr Casey   89 YO F with right sacral ala fracture and superior and inferior rami fracture    Plan:   Cardiology consult appreciated  PT eval for dispo   Multimodal pain control  Incentive spirometery  Vitals, IS/OS Q 4  Diet: Soft Cardiac  GI prophylaxis  f/u nephrology plan   Local wound care  Activity: WBAT  PT  Hold A/C  Resume other home med  Hospitalist consulted  Orthopedic  following   SW for eventual D/C planning

## 2022-02-23 NOTE — PROGRESS NOTE ADULT - SUBJECTIVE AND OBJECTIVE BOX
HPI:  Pt is a 89 yo F that presented to the ED for evaluation of right hip pain. She stated that she was ambulating with her walker when it got stuck on her bathroom door causing her to fall on right buttock. Pt was released from [facility] in late november after knee surgery.  On exam has some lateral tenderness at the left knee. Has had a left TKA at hospitals.      Patient transferred to the CCU for Hypotension    : Patient seen in bed encephalopathic, hypotensive on escalating doses of pressors, oliguric   : MS Improving, Weaning Pressors, JOAQUÍN improving        PAST MEDICAL & SURGICAL HISTORY:  Mitral valve insufficiency    Aortic valve regurgitation    Osteopenia    CAD (coronary artery disease)    Gout    Hypothyroid    CHF (congestive heart failure)    Anxiety    Anemia    HTN (hypertension)    Afib    COPD (chronic obstructive pulmonary disease)    Status cardiac pacemaker    S/P knee replacement        FAMILY HISTORY:  No pertinent family history in first degree relatives        Social Hx:    Allergies    codeine (Unknown)    Intolerances            ICU Vital Signs Last 24 Hrs  T(C): 36.3 (2022 08:00), Max: 37.1 (2022 21:57)  T(F): 97.4 (2022 08:00), Max: 98.7 (2022 21:57)  HR: 110 (2022 09:00) (77 - 111)  BP: 54/12 (2022 01:00) (54/12 - 118/49)  BP(mean): 18 (2022 01:00) (18 - 101)  ABP: 94/50 (2022 09:00) (83/42 - 114/63)  ABP(mean): 69 (2022 09:00) (58 - 84)  RR: 18 (2022 09:00) (13 - 26)  SpO2: 95% (2022 09:00) (57% - 100%)          I&O's Summary    2022 07:01  -  2022 07:00  --------------------------------------------------------  IN: 3604.1 mL / OUT: 1250 mL / NET: 2354.1 mL    2022 07:01  -  2022 09:55  --------------------------------------------------------  IN: 281 mL / OUT: 475 mL / NET: -194 mL                              8.7    7.97  )-----------( 108      ( 2022 04:40 )             26.5           134<L>  |  99  |  54<H>  ----------------------------<  187<H>  4.1   |  29  |  1.75<H>    Ca    7.6<L>      2022 04:40  Phos  4.0       Mg     2.3         TPro  5.9<L>  /  Alb  2.6<L>  /  TBili  0.5  /  DBili  x   /  AST  33  /  ALT  25  /  AlkPhos  77        CARDIAC MARKERS ( 2022 05:51 )  x     / x     / 103 U/L / x     / x                Urinalysis Basic - ( 2022 12:00 )    Color: Yellow / Appearance: Slightly Turbid / S.020 / pH: x  Gluc: x / Ketone: Negative  / Bili: Negative / Urobili: Negative   Blood: x / Protein: 30 mg/dL / Nitrite: Negative   Leuk Esterase: Moderate / RBC: >50 /HPF / WBC 11-25   Sq Epi: x / Non Sq Epi: Occasional / Bacteria: Moderate        MEDICATIONS  (STANDING):  acetaminophen   IVPB .. 1000 milliGRAM(s) IV Intermittent every 6 hours  acetaminophen   IVPB .. 1000 milliGRAM(s) IV Intermittent once  ALBUTerol    90 MICROgram(s) HFA Inhaler 2 Puff(s) Inhalation every 6 hours  allopurinol 100 milliGRAM(s) Oral daily  budesonide 160 MICROgram(s)/formoterol 4.5 MICROgram(s) Inhaler 2 Puff(s) Inhalation two times a day  chlorhexidine 2% Cloths 1 Application(s) Topical <User Schedule>  heparin  Infusion.  Unit(s)/Hr (11 mL/Hr) IV Continuous <Continuous>  levothyroxine 125 MICROGram(s) Oral daily  midodrine 10 milliGRAM(s) Oral every 8 hours  milrinone Infusion 0.125 MICROgram(s)/kG/Min (2.43 mL/Hr) IV Continuous <Continuous>  pantoprazole  Injectable 40 milliGRAM(s) IV Push daily  phenylephrine    Infusion 0.1 MICROgram(s)/kG/Min (2.21 mL/Hr) IV Continuous <Continuous>  piperacillin/tazobactam IVPB.. 3.375 Gram(s) IV Intermittent every 12 hours  polyethylene glycol 3350 17 Gram(s) Oral daily  senna 2 Tablet(s) Oral at bedtime  vasopressin Infusion 0.04 Unit(s)/Min (2.4 mL/Hr) IV Continuous <Continuous>    MEDICATIONS  (PRN):  acetaminophen     Tablet .. 650 milliGRAM(s) Oral every 6 hours PRN Mild Pain (1 - 3)  calcium carbonate    500 mG (Tums) Chewable 3 Tablet(s) Chew every 6 hours PRN Dyspepsia  heparin   Injectable 5000 Unit(s) IV Push every 6 hours PRN For aPTT less than 40  heparin   Injectable 2500 Unit(s) IV Push every 6 hours PRN For aPTT between 40 - 57  melatonin 3 milliGRAM(s) Oral at bedtime PRN Insomnia  ondansetron Injectable 4 milliGRAM(s) IV Push every 6 hours PRN Nausea  sodium chloride 0.9% lock flush 10 milliLiter(s) IV Push every 1 hour PRN Pre/post blood products, medications, blood draw, and to maintain line patency      DVT Prophylaxis: UFH    Advanced Directives:  Discussed with:    Visit Information: 45 min    ** Time is exclusive of billed procedures and/or teaching and/or routine family updates.

## 2022-02-23 NOTE — PROGRESS NOTE ADULT - ASSESSMENT
A/P: 89 female with hypotension likely from septic shock and pneumonia  CAD  Hypothyroidism  CHF  HTN  COPD    Plan:  CCU    PULM: Continue NC O2, Continue Zosyn for possible PNA.  Continue UFH for Possible PE    Cardio: Likely hypotensive from septic shock and RV Failure, ECHO noted to have a hyperdynamic underfilled LV and a dilated RV and Severe PAH.  Will D/C Inotropes and follow response for now monitor UO.  Given Severe PAH she may benefit from Pulmonary artery dilation actually once more stable.  Continue to optimize volume status.  S/P 2 Doses Lasix 40mg.  Will continue Diuresis on a PRN basis for now given LV is hyperdynamic.  Wean off Pressors    Renal: JOAQUÍN likely from ATN. Monitor UO.  BPM at 3 PM    GI: PO Diet    ENDO: Continue Synthroid    ID: On Zosyn now, UA was positive and could be another source for Septic Shock    2/22: B/L LE Dopplers were negative for a DVT    GOC: Now a DNR/DNI

## 2022-02-23 NOTE — DIETITIAN NUTRITION RISK NOTIFICATION - ADDITIONAL COMMENTS/DIETITIAN RECOMMENDATIONS
· Additional Recommendations  1) liberalize diet to low sodium 2) ensure enlive po shakes in between meals (increased demand of protein) 3) monitor weights track trends 4) Maintain aspiration precautions, back of bed >35 degrees. 5) MVI w/minerals and Vitamin C daily to meet RDI's and promote healing 6) GOC (address nutrition support) 7) If no BM x > 3 days consider bowel meds prn

## 2022-02-23 NOTE — PROGRESS NOTE ADULT - SUBJECTIVE AND OBJECTIVE BOX
Pt seen and examined at bedside. Patient continues to have tendernss right hip and gluteus. Central line placed this AM by ICU team. Patient denies fever, chills, SOB and CP.     Vital Signs Last 24 Hrs  T(C): 35.6 (22 Feb 2022 08:00), Max: 37 (22 Feb 2022 01:42)  T(F): 96 (22 Feb 2022 08:00), Max: 98.6 (22 Feb 2022 01:42)  HR: 104 (22 Feb 2022 09:00) (66 - 133)  BP: 97/45 (22 Feb 2022 09:00) (62/40 - 147/84)  BP(mean): 59 (22 Feb 2022 09:00) (45 - 98)  RR: 21 (22 Feb 2022 09:00) (15 - 26)  SpO2: 96% (22 Feb 2022 09:00) (88% - 98%)  Labs:      CARDIAC MARKERS ( 22 Feb 2022 05:51 )  x     / x     / 103 U/L / x     / x                                10.7   9.69  )-----------( 110      ( 22 Feb 2022 05:51 )             33.4     CBC Full  -  ( 22 Feb 2022 05:51 )  WBC Count : 9.69 K/uL  RBC Count : 3.11 M/uL  Hemoglobin : 10.7 g/dL  Hematocrit : 33.4 %  Platelet Count - Automated : 110 K/uL  Mean Cell Volume : 107.4 fl  Mean Cell Hemoglobin : 34.4 pg  Mean Cell Hemoglobin Concentration : 32.0 gm/dL  Auto Neutrophil # : 6.28 K/uL  Auto Lymphocyte # : 2.58 K/uL  Auto Monocyte # : 0.56 K/uL  Auto Eosinophil # : 0.18 K/uL  Auto Basophil # : 0.03 K/uL  Auto Neutrophil % : 64.8 %  Auto Lymphocyte % : 26.6 %  Auto Monocyte % : 5.8 %  Auto Eosinophil % : 1.9 %  Auto Basophil % : 0.3 %    02-22    137  |  102  |  61<H>  ----------------------------<  39<LL>  4.1   |  27  |  2.24<H>    Ca    8.4<L>      22 Feb 2022 09:58  Phos  4.4     02-22  Mg     2.4     02-22    TPro  6.1  /  Alb  2.9<L>  /  TBili  0.6  /  DBili  x   /  AST  33  /  ALT  24  /  AlkPhos  81  02-22    LIVER FUNCTIONS - ( 22 Feb 2022 02:11 )  Alb: 2.9 g/dL / Pro: 6.1 gm/dL / ALK PHOS: 81 U/L / ALT: 24 U/L / AST: 33 U/L / GGT: x           PT/INR - ( 22 Feb 2022 02:12 )   PT: 14.7 sec;   INR: 1.28 ratio         PTT - ( 22 Feb 2022 02:12 )  PTT:36.3 sec              Physical exam:  Pt is aaox3  Pt in no acute distress  Psych: normal affect  Resp: CTAB  CVS: S1S2(+)  ABD: bowel sounds (+), soft, non distended, no rebound, no guarding  EXT: no calf tenderness or edema to exam b/l, on VTE prophylaxis. tender to palpation over gluteus, suprapubic, LLE knee. b/l LE edema      CC: [id number] EXAM:  CT CHEST                          PROCEDURE DATE:  02/21/2022          INTERPRETATION:  .  ACC: [id number]  INDICATION: copd, hypoxia  TECHNIQUE: Unenhanced CT of the chest. Coronal, sagittal, and MIP images   were reconstructed and reviewed.  COMPARISON: No prior chest CT.    FINDINGS:    AIRWAYS, LUNGS and PLEURA: Patent central airways. Patchy opacity at the   right base may represent atelectasis or pneumonia. Interlobular septal   thickening and small pleural effusions likely representing superimposed   pulmonary edema.  MEDIASTINUM AND CHICA: No lymphadenopathy.    HEART AND VESSELS: Cardiomegaly. Left pacer in place. Coronary and aortic   calcifications. No pericardial effusion. Thoracic aorta normal in   diameter. Dilated pulmonary artery.    VISUALIZED UPPER ABDOMEN: Arterial calcifications.    CHEST WALL AND BONES: No aggressive osseous lesion. Osteopenia. Advanced   which under changes of the glenohumeral joints.    LOWER NECK: Within normal limits.    IMPRESSION:    Patchy opacity at the right base may represent atelectasis or pneumonia.   Interlobular septal thickening and small pleural effusions likely   representing superimposed pulmonary edema.    --- End of Report ---            ERNESTINA ADAM MD; Attending Radiologist  This document has been electronically signed. Feb 21 2022 11:29AM      ACC: [id number] EXAM:  XR KNEE 1-2 VIEWS LT                          PROCEDURE DATE:  02/20/2022          INTERPRETATION:  History: Fall.    FINDINGS: Frontal, lateral and oblique projections of the left knee.    Patient has history of total knee replacement. Prosthesis is intact with   good articulation. No acute fracture. No evidence of knee joint effusion.    Vascular calcification noted.    IMPRESSION:    History of total knee replacement with intact hardware.    --- End of Report ---            ABI RAMIREZ MD; Attending Radiologist  This document has been electronically signed. Feb 21 2022  1:12PM    ACC: [id number] EXAM:  XR PELVIS AP ONLY 1-2 VIEWS                          PROCEDURE DATE:  02/20/2022          INTERPRETATION:  History: Fracture.    FINDINGS: Inlet and outlet views of the pelvis performed.    Correlation with plain films earlier same day at 4: 12:00 PM    Fracture right superior and inferior pubic rami again noted. Mild   distraction superior pubic ramus fracture noted.    IMPRESSION:    Fracture right superior and inferior pubic rami with mild distraction   superior pubic ramus fracture.    --- End of Report ---            ABI RAMIREZ MD; Attending Radiologist  This document has been electronically signed. Feb 21 2022 12:54PM

## 2022-02-23 NOTE — DIETITIAN INITIAL EVALUATION ADULT. - PERTINENT LABORATORY DATA
02-23    134<L>  |  99  |  54<H>  ----------------------------<  187<H>  4.1   |  29  |  1.75<H>    Ca    7.6<L>      23 Feb 2022 04:40  Phos  4.0     02-23  Mg     2.3     02-23    TPro  5.9<L>  /  Alb  2.6<L>  /  TBili  0.5  /  DBili  x   /  AST  33  /  ALT  25  /  AlkPhos  77  02-22  BMI: BMI (kg/m2): 25.3 (02-22-22 @ 02:32)  HbA1c:   Glucose: POCT Blood Glucose.: 187 mg/dL (02-23-22 @ 07:42)    BP: 54/12 (02-23-22 @ 01:00) (54/12 - 147/84)  Lipid Panel:

## 2022-02-23 NOTE — PROGRESS NOTE ADULT - ASSESSMENT
89 yo F with extensive PMHx including CAD, prior MI, ischemic cardiomyopathy HFrEF, severe pHTN, valvular disease, AFib on Eliquis, PPM, hypothyroidism, COPD, who presented to ED for evaluation of right hip pain s/p mechanical fall without LOC at home. Found to have superior and inferior pubic rami fracture and sacral fracture.    Now transferred to CCU with acute shock  TTE with hyperdynamic RV, severe pHTN, severe TR, RV pressure overload, decreased RV contractility, IVC plethoric  Uncertain etiology, acute cardiogenic shock, acute cor pulmonale in the setting of severe pHTN and valvular disease  ? obstructive shock related to fat embolus from pelvic fx  LE duplex neg for DVT, already anticoagulated on Eliquis doubt massive PE  Unknown baseline cardiac function, per EMR h/o ischemic CM HFrEF 35-45% no other information available at this time    Two pressor shock, on Phenylephrine and Vasopressin  Added Milrinone for RV preload reduction and right sided inotropic support  Requires deresuscitation in view of RV failure, given Lasix 40mg IVP   Clinically improving in response, able to titrate down Phenylephrine gtt  Closely monitoring dynamic end points of perfusion 89 yo F with extensive PMHx including CAD, prior MI, ischemic cardiomyopathy HFrEF, severe pHTN, valvular disease, AFib on Eliquis, PPM, hypothyroidism, COPD, who presented to ED for evaluation of right hip pain s/p mechanical fall without LOC at home. Found to have superior and inferior pubic rami fracture and sacral fracture.    Now transferred to CCU with acute shock  TTE with hyperdynamic RV, severe pHTN, severe TR, RV pressure overload, decreased RV contractility, IVC plethoric  Uncertain etiology, acute cardiogenic shock, acute cor pulmonale in the setting of severe pHTN and valvular disease  ? obstructive shock related to fat embolus from pelvic fx  LE duplex neg for DVT, already anticoagulated on Eliquis doubt massive PE  Unknown baseline cardiac function, per EMR h/o ischemic CM HFrEF 35-45% no other information available at this time    Two pressor shock, on Phenylephrine and Vasopressin  Added Milrinone for RV preload reduction and right sided inotropic support  Requires deresuscitation in view of RV failure, given Lasix 40mg IVP   Clinically improving in response, able to titrate down Phenylephrine gtt  Closely monitoring dynamic end points of perfusion  Continue Zosyn empirically for possible component of sepsis, f/u UCx r/o UTI 89 yo F with extensive PMHx including CAD, prior MI, ischemic cardiomyopathy HFrEF, severe pHTN, valvular disease, AFib on Eliquis, PPM, hypothyroidism, COPD, who presented to ED for evaluation of right hip pain s/p mechanical fall without LOC at home. Found to have superior and inferior pubic rami fracture and sacral fracture.    Now transferred to CCU with acute shock  TTE with hyperdynamic RV, severe pHTN, severe TR, RV pressure overload, decreased RV contractility, IVC plethoric  Uncertain etiology, acute cardiogenic shock, acute cor pulmonale in the setting of severe pHTN and valvular disease  ? obstructive shock related to fat embolus from pelvic fx  LE duplex neg for DVT, already anticoagulated on Eliquis doubt massive PE  Unknown baseline cardiac function, per EMR h/o ischemic CM HFrEF 35-45% no other information available at this time    Two pressor shock, on Phenylephrine and Vasopressin  Added Milrinone for RV preload reduction and right sided inotropic support  Requires deresuscitation in view of RV failure, given Lasix 40mg IVP   Clinically improving in response, able to titrate down Phenylephrine gtt  Closely monitoring dynamic end points of perfusion  Continue Zosyn empirically for possible component of distributive shock due to sepsis, f/u UCx to eval for UTI 89 yo F with extensive PMHx including CAD, prior MI, ischemic cardiomyopathy HFrEF, severe pHTN, valvular disease, AFib on Eliquis, PPM, hypothyroidism, COPD, who presented to ED for evaluation of right hip pain s/p mechanical fall without LOC at home. Found to have superior and inferior pubic rami fracture and sacral fracture.    Now transferred to CCU with acute shock  TTE with hyperdynamic RV, severe pHTN, severe TR, RV pressure overload, decreased RV contractility, IVC plethoric  Uncertain etiology, acute cardiogenic shock, acute cor pulmonale in the setting of severe pHTN and valvular disease      ? obstructive shock related to fat embolus from pelvic fx    vs   distributive shock related to sepsis / UTI with acute cor pulmonale exacerbated by fluid resuscitation    LE duplex neg for DVT, already anticoagulated on Eliquis doubt massive PE  Unknown baseline cardiac function, per EMR h/o ischemic CM HFrEF 35-45% no other information available at this time    Two pressor shock, on Phenylephrine and Vasopressin  Distal extremities warm, however given multipressor shock decision made to trial Milrinone for RV preload reduction and right sided inotropic support  Requires deresuscitation in view of RV failure, given Lasix 40mg IVP   Clinically improving in response, able to titrate down Phenylephrine gtt  Closely monitoring dynamic end points of perfusion  Continue Zosyn empirically for possible component of distributive shock due to sepsis, f/u UCx. BCx NTD.

## 2022-02-23 NOTE — PROGRESS NOTE ADULT - SUBJECTIVE AND OBJECTIVE BOX
Patient is a 89y old  Female who presents with a chief complaint of Fall (2022 14:26)      BRIEF HOSPITAL COURSE: ***      Events last 24 hours: ***      PAST MEDICAL & SURGICAL HISTORY:  Mitral valve insufficiency    Aortic valve regurgitation    Osteopenia    CAD (coronary artery disease)    Gout    Hypothyroid    CHF (congestive heart failure)    Anxiety    Anemia    HTN (hypertension)    Afib    COPD (chronic obstructive pulmonary disease)    Status cardiac pacemaker    S/P knee replacement            SOCIAL HISTORY:        Review of Systems:  CONSTITUTIONAL: No fever, chills, or fatigue  EYES: No visual disturbances  ENMT:  No difficulty hearing  NECK: No pain  RESPIRATORY: No cough. No shortness of breath  CARDIOVASCULAR: No chest pain, palpitations, or leg swelling  GASTROINTESTINAL: No abdominal pain. No nausea, vomiting, diarrhea, or constipation. No hematemesis, melena or hematochezia  GENITOURINARY: No dysuria, frequency, hematuria, or incontinence  NEUROLOGICAL: No headaches, loss of strength, numbness, or tremors  SKIN: No rashes  MUSCULOSKELETAL: No back or extremity pain. No calf pain  PSYCHIATRIC: No depression, anxiety, or difficulty sleeping      [  ] Due to altered mental status/intubation, subjective information was not able to be obtained from the patient. History was obtained, to the extent possible, from review of the chart and collateral sources of information.        Medications:  piperacillin/tazobactam IVPB.. 3.375 Gram(s) IV Intermittent every 12 hours    milrinone Infusion 0.125 MICROgram(s)/kG/Min IV Continuous <Continuous>  phenylephrine    Infusion 0.1 MICROgram(s)/kG/Min IV Continuous <Continuous>    ALBUTerol    90 MICROgram(s) HFA Inhaler 2 Puff(s) Inhalation every 6 hours  budesonide 160 MICROgram(s)/formoterol 4.5 MICROgram(s) Inhaler 2 Puff(s) Inhalation two times a day    acetaminophen     Tablet .. 650 milliGRAM(s) Oral every 6 hours PRN  acetaminophen   IVPB .. 1000 milliGRAM(s) IV Intermittent every 6 hours  acetaminophen   IVPB .. 1000 milliGRAM(s) IV Intermittent once  melatonin 3 milliGRAM(s) Oral at bedtime PRN  ondansetron Injectable 4 milliGRAM(s) IV Push every 6 hours PRN      heparin   Injectable 5000 Unit(s) IV Push every 6 hours PRN  heparin   Injectable 2500 Unit(s) IV Push every 6 hours PRN  heparin  Infusion.  Unit(s)/Hr IV Continuous <Continuous>    calcium carbonate    500 mG (Tums) Chewable 3 Tablet(s) Chew every 6 hours PRN  pantoprazole  Injectable 40 milliGRAM(s) IV Push daily  polyethylene glycol 3350 17 Gram(s) Oral daily  senna 2 Tablet(s) Oral at bedtime      allopurinol 100 milliGRAM(s) Oral daily  levothyroxine 125 MICROGram(s) Oral daily  vasopressin Infusion 0.04 Unit(s)/Min IV Continuous <Continuous>    sodium chloride 0.9% lock flush 10 milliLiter(s) IV Push every 1 hour PRN      chlorhexidine 2% Cloths 1 Application(s) Topical <User Schedule>            ICU Vital Signs Last 24 Hrs  T(C): 36.2 (2022 00:05), Max: 37.1 (2022 21:57)  T(F): 97.2 (2022 00:05), Max: 98.7 (2022 21:57)  HR: 108 (2022 02:00) (77 - 133)  BP: 54/12 (2022 01:00) (54/12 - 147/84)  BP(mean): 18 (2022 01:00) (18 - 101)  ABP: 114/63 (2022 02:00) (83/42 - 114/63)  ABP(mean): 84 (2022 02:00) (58 - 104)  RR: 26 (2022 02:00) (13 - 26)  SpO2: 95% (2022 02:00) (66% - 100%)          I&O's Detail    2022 07:01  -  2022 07:00  --------------------------------------------------------  IN:    dextrose 5%: 25 mL    Phenylephrine: 48.5 mL  Total IN: 73.5 mL    OUT:    Indwelling Catheter - Urethral (mL): 275 mL    Intermittent Catheterization - Urethral (mL): 600 mL  Total OUT: 875 mL    Total NET: -801.5 mL      2022 07:01  -  2022 03:41  --------------------------------------------------------  IN:    dextrose 10%: 500 mL    dextrose 5%: 100 mL    Heparin Infusion: 89 mL    IV PiggyBack: 100 mL    Oral Fluid: 360 mL    Phenylephrine: 371.9 mL    Vasopressin: 21.6 mL  Total IN: 1542.5 mL    OUT:    Indwelling Catheter - Urethral (mL): 250 mL  Total OUT: 250 mL    Total NET: 1292.5 mL            LABS:                        9.7    9.04  )-----------( 118      ( 2022 13:20 )             30.5     02-22    132<L>  |  99  |  59<H>  ----------------------------<  275<H>  4.8   |  26  |  1.91<H>    Ca    8.0<L>      2022 22:30  Phos  4.3     02-22  Mg     2.5     02-22    TPro  5.9<L>  /  Alb  2.6<L>  /  TBili  0.5  /  DBili  x   /  AST  33  /  ALT  25  /  AlkPhos  77  02-22      CARDIAC MARKERS ( 2022 05:51 )  x     / x     / 103 U/L / x     / x          CAPILLARY BLOOD GLUCOSE      POCT Blood Glucose.: 145 mg/dL (2022 01:25)    PT/INR - ( 2022 02:12 )   PT: 14.7 sec;   INR: 1.28 ratio         PTT - ( 2022 21:00 )  PTT:176.8 sec  Urinalysis Basic - ( 2022 12:00 )    Color: Yellow / Appearance: Slightly Turbid / S.020 / pH: x  Gluc: x / Ketone: Negative  / Bili: Negative / Urobili: Negative   Blood: x / Protein: 30 mg/dL / Nitrite: Negative   Leuk Esterase: Moderate / RBC: >50 /HPF / WBC 11-25   Sq Epi: x / Non Sq Epi: Occasional / Bacteria: Moderate      CULTURES:  Culture Results:   No growth to date. ( @ 14:26)  Culture Results:   No growth to date. ( @ 14:21)            Physical Examination:    General: No acute distress.      HEENT: Pupils equal, reactive to light.  Symmetric.    PULM: Clear to auscultation bilaterally, no significant sputum production    CVS: Regular rate and rhythm, no murmurs, rubs, or gallops    ABD: Soft, nondistended, nontender, normoactive bowel sounds, no masses    EXT: No edema, nontender    SKIN: Warm and well perfused, no rashes noted.    NEURO: Alert, oriented, interactive, nonfocal        RADIOLOGY: ***        INVASIVE LINES:  INDWELLING HACKETT:  VTE PROPHYLAXIS:  CAM ICU:  CODE STATUS:        CRITICAL CARE TIME SPENT: *** minutes spent performing frequent bedside reassessments and augmenting plan of care to address problems of acute critical illness that pose high probability of life threatening deterioration and/or end organ damage/dysfunction and discussing goals of care, non-inclusive of time spent on procedures performed. Patient is a 89y old  Female who presents with a chief complaint of Fall (2022 14:26)      BRIEF HOSPITAL COURSE: 89 yo F with extensive PMHx including CAD, prior MI, ischemic cardiomyopathy HFrEF, severe pHTN, valvular disease, AFib on Eliquis, PPM, hypothyroidism, COPD, that presented to the ED for evaluation of right hip pain. She stated that she was ambulating with her walker when it got stuck on her bathroom door causing her to fall predominantly on her right buttock. She called her daughter who took her to her house because of the fact that she lives alone. She stated that she has been able to ambulate minimally to get into the car to the ED since the fall but with pain.    Early morning  RRT called for hypotension. Patient was given multiple aliquots of IVF bolus without improvement. Also refractory hypoglycemia. Subsequently transferred to CCU for treatment of shock, uncertain etiology, JOAQUÍN.      Events last 24 hours: Remains in shock on Phenylephrine and Vasopressin. TTE reviewed, acute cor pulmonale, plethoric IVC. Given Lasix and started on Milrinone with improvement in vasopressor requirement from 0.7 mcg --> 0.3.        PAST MEDICAL & SURGICAL HISTORY:  Mitral valve insufficiency    Aortic valve regurgitation    Osteopenia    CAD (coronary artery disease)    Gout    Hypothyroid    CHF (congestive heart failure)    Anxiety    Anemia    HTN (hypertension)    Afib    COPD (chronic obstructive pulmonary disease)    Status cardiac pacemaker    S/P knee replacement          Review of Systems: +constipation. No pelvic pain while laying flat.      Medications:  piperacillin/tazobactam IVPB.. 3.375 Gram(s) IV Intermittent every 12 hours    milrinone Infusion 0.125 MICROgram(s)/kG/Min IV Continuous <Continuous>  phenylephrine    Infusion 0.1 MICROgram(s)/kG/Min IV Continuous <Continuous>    ALBUTerol    90 MICROgram(s) HFA Inhaler 2 Puff(s) Inhalation every 6 hours  budesonide 160 MICROgram(s)/formoterol 4.5 MICROgram(s) Inhaler 2 Puff(s) Inhalation two times a day    acetaminophen     Tablet .. 650 milliGRAM(s) Oral every 6 hours PRN  acetaminophen   IVPB .. 1000 milliGRAM(s) IV Intermittent every 6 hours  acetaminophen   IVPB .. 1000 milliGRAM(s) IV Intermittent once  melatonin 3 milliGRAM(s) Oral at bedtime PRN  ondansetron Injectable 4 milliGRAM(s) IV Push every 6 hours PRN      heparin   Injectable 5000 Unit(s) IV Push every 6 hours PRN  heparin   Injectable 2500 Unit(s) IV Push every 6 hours PRN  heparin  Infusion.  Unit(s)/Hr IV Continuous <Continuous>    calcium carbonate    500 mG (Tums) Chewable 3 Tablet(s) Chew every 6 hours PRN  pantoprazole  Injectable 40 milliGRAM(s) IV Push daily  polyethylene glycol 3350 17 Gram(s) Oral daily  senna 2 Tablet(s) Oral at bedtime      allopurinol 100 milliGRAM(s) Oral daily  levothyroxine 125 MICROGram(s) Oral daily  vasopressin Infusion 0.04 Unit(s)/Min IV Continuous <Continuous>    sodium chloride 0.9% lock flush 10 milliLiter(s) IV Push every 1 hour PRN      chlorhexidine 2% Cloths 1 Application(s) Topical <User Schedule>            ICU Vital Signs Last 24 Hrs  T(C): 36.2 (2022 00:05), Max: 37.1 (2022 21:57)  T(F): 97.2 (2022 00:05), Max: 98.7 (2022 21:57)  HR: 108 (2022 02:00) (77 - 133)  BP: 54/12 (2022 01:00) (54/12 - 147/84)  BP(mean): 18 (2022 01:00) (18 - 101)  ABP: 114/63 (2022 02:00) (83/42 - 114/63)  ABP(mean): 84 (2022 02:00) (58 - 104)  RR: 26 (2022 02:00) (13 - 26)  SpO2: 95% (2022 02:00) (66% - 100%)          I&O's Detail    2022 07:01  -  2022 07:00  --------------------------------------------------------  IN:    dextrose 5%: 25 mL    Phenylephrine: 48.5 mL  Total IN: 73.5 mL    OUT:    Indwelling Catheter - Urethral (mL): 275 mL    Intermittent Catheterization - Urethral (mL): 600 mL  Total OUT: 875 mL    Total NET: -801.5 mL      2022 07:01  -  2022 03:41  --------------------------------------------------------  IN:    dextrose 10%: 500 mL    dextrose 5%: 100 mL    Heparin Infusion: 89 mL    IV PiggyBack: 100 mL    Oral Fluid: 360 mL    Phenylephrine: 371.9 mL    Vasopressin: 21.6 mL  Total IN: 1542.5 mL    OUT:    Indwelling Catheter - Urethral (mL): 250 mL  Total OUT: 250 mL    Total NET: 1292.5 mL            LABS:                        9.7    9.04  )-----------( 118      ( 2022 13:20 )             30.5     02-22    132<L>  |  99  |  59<H>  ----------------------------<  275<H>  4.8   |  26  |  1.91<H>    Ca    8.0<L>      2022 22:30  Phos  4.3       Mg     2.5         TPro  5.9<L>  /  Alb  2.6<L>  /  TBili  0.5  /  DBili  x   /  AST  33  /  ALT  25  /  AlkPhos  77  02-      CARDIAC MARKERS ( 2022 05:51 )  x     / x     / 103 U/L / x     / x          CAPILLARY BLOOD GLUCOSE      POCT Blood Glucose.: 145 mg/dL (2022 01:25)    PT/INR - ( 2022 02:12 )   PT: 14.7 sec;   INR: 1.28 ratio         PTT - ( 2022 21:00 )  PTT:176.8 sec  Urinalysis Basic - ( 2022 12:00 )    Color: Yellow / Appearance: Slightly Turbid / S.020 / pH: x  Gluc: x / Ketone: Negative  / Bili: Negative / Urobili: Negative   Blood: x / Protein: 30 mg/dL / Nitrite: Negative   Leuk Esterase: Moderate / RBC: >50 /HPF / WBC 11-25   Sq Epi: x / Non Sq Epi: Occasional / Bacteria: Moderate      CULTURES:  Culture Results:   No growth to date. ( @ 14:26)  Culture Results:   No growth to date. ( @ 14:21)            Physical Examination:    General: No acute distress.      HEENT: Pupils equal, reactive to light.  Symmetric.    PULM: Clear to auscultation bilaterally, no significant sputum production    CVS: AFib    ABD: Soft, nondistended, nontender, normoactive bowel sounds    EXT: No edema, nontender    SKIN: Warm and well perfused, cap refill <2 seconds    NEURO: Alert, oriented, interactive, nonfocal        RADIOLOGY: prior images reviewed         INVASIVE LINES: Right axillary arterial line and femoral TLC placed   INDWELLING HACKETT: Y - prolonged immobility and strict I&Os in critical illness  VTE PROPHYLAXIS: Heparin gtt  CAM ICU: -  CODE STATUS: DNR DNI        CRITICAL CARE TIME SPENT: 70 minutes spent performing frequent bedside reassessments and augmenting plan of care to address problems of acute critical illness that pose high probability of life threatening deterioration and/or end organ damage/dysfunction and discussing goals of care, non-inclusive of time spent on procedures performed.

## 2022-02-23 NOTE — PROGRESS NOTE ADULT - SUBJECTIVE AND OBJECTIVE BOX
NEPHROLOGY INTERVAL HPI/OVERNIGHT EVENTS:    Date of Service: 22 @ 09:38    --Alert and appears more comfortable today.  Now on Milrinone, Midodrine and Vasopressin.  Echo with severe pulmonary HTN.    HPI:  89 yo woman with PMHX of A fib/PPM, CAD with CM, CHF and HTN presented post mechanical fall at home.  Walker got stuck on bathroom door leading to fall.  Admitted with Sacral ala and pelvic rami fx.  Given Toradol x 1 and Morphine for pain control.  Hx of CHF on lasix and K at home.  Creat 1.3 with no known hx of CKD.  Noted with borderline BP yesterday, lasix held, NS ~ 2 L given for volume resuscitation.  CT chest done for resp insuff with R base patchy inf--Ceftriaxone started.  Post RR for Hypotension last night.  Resuscitated with 1L LR.  Phenylephrine started.  stats lab with K 6.7 and creat of 2.3.  Lokelma, Ca gluconate, D 50 and insulin given.    PMHX and PSHX.  1.A FIB on Xarelto/  PPM  2.HTN  3.CAD with CM  4. CHF  5.COPD  6.L TKR    MEDICATIONS  (STANDING):  acetaminophen   IVPB .. 1000 milliGRAM(s) IV Intermittent every 6 hours  acetaminophen   IVPB .. 1000 milliGRAM(s) IV Intermittent once  ALBUTerol    90 MICROgram(s) HFA Inhaler 2 Puff(s) Inhalation every 6 hours  allopurinol 100 milliGRAM(s) Oral daily  budesonide 160 MICROgram(s)/formoterol 4.5 MICROgram(s) Inhaler 2 Puff(s) Inhalation two times a day  chlorhexidine 2% Cloths 1 Application(s) Topical <User Schedule>  heparin  Infusion.  Unit(s)/Hr (11 mL/Hr) IV Continuous <Continuous>  levothyroxine 125 MICROGram(s) Oral daily  midodrine 10 milliGRAM(s) Oral every 8 hours  milrinone Infusion 0.125 MICROgram(s)/kG/Min (2.43 mL/Hr) IV Continuous <Continuous>  pantoprazole  Injectable 40 milliGRAM(s) IV Push daily  phenylephrine    Infusion 0.1 MICROgram(s)/kG/Min (2.21 mL/Hr) IV Continuous <Continuous>  piperacillin/tazobactam IVPB.. 3.375 Gram(s) IV Intermittent every 12 hours  polyethylene glycol 3350 17 Gram(s) Oral daily  senna 2 Tablet(s) Oral at bedtime  vasopressin Infusion 0.04 Unit(s)/Min (2.4 mL/Hr) IV Continuous <Continuous>    MEDICATIONS  (PRN):  acetaminophen     Tablet .. 650 milliGRAM(s) Oral every 6 hours PRN Mild Pain (1 - 3)  calcium carbonate    500 mG (Tums) Chewable 3 Tablet(s) Chew every 6 hours PRN Dyspepsia  heparin   Injectable 5000 Unit(s) IV Push every 6 hours PRN For aPTT less than 40  heparin   Injectable 2500 Unit(s) IV Push every 6 hours PRN For aPTT between 40 - 57  melatonin 3 milliGRAM(s) Oral at bedtime PRN Insomnia  ondansetron Injectable 4 milliGRAM(s) IV Push every 6 hours PRN Nausea  sodium chloride 0.9% lock flush 10 milliLiter(s) IV Push every 1 hour PRN Pre/post blood products, medications, blood draw, and to maintain line patency    Vital Signs Last 24 Hrs  T(C): 36.3 (2022 08:00), Max: 37.1 (2022 21:57)  T(F): 97.4 (2022 08:00), Max: 98.7 (2022 21:57)  HR: 108 (2022 08:00) (77 - 111)  BP: 54/12 (2022 01:00) (54/12 - 118/49)  BP(mean): 18 (2022 01:00) (18 - 101)  RR: 16 (2022 08:00) (13 - 26)  SpO2: 98% (2022 08:00) (57% - 100%)   @ :  -   @ 07:00  --------------------------------------------------------  IN: 3604.1 mL / OUT: 1250 mL / NET: 2354.1 mL     @ 07: @ 09:38  --------------------------------------------------------  IN: 281 mL / OUT: 475 mL / NET: -194 mL    PHYSICAL EXAM:  GENERAL: Alert, no acute distress.  CHEST/LUNG: scattered rhonchi  HEART: S1S2 tachy  ABDOMEN: soft  EXTREMITIES: no edema  SKIN:     LABS:                        8.7    7.97  )-----------( 108      ( 2022 04:40 )             26.5         134<L>  |  99  |  54<H>  ----------------------------<  187<H>  4.1   |  29  |  1.75<H>    Ca    7.6<L>      2022 04:40  Phos  4.0       Mg     2.3         TPro  5.9<L>  /  Alb  2.6<L>  /  TBili  0.5  /  DBili  x   /  AST  33  /  ALT  25  /  AlkPhos  77      PT/INR - ( 2022 02:12 )   PT: 14.7 sec;   INR: 1.28 ratio         PTT - ( 2022 04:40 )  PTT:110.0 sec  Urinalysis Basic - ( 2022 12:00 )    Color: Yellow / Appearance: Slightly Turbid / S.020 / pH: x  Gluc: x / Ketone: Negative  / Bili: Negative / Urobili: Negative   Blood: x / Protein: 30 mg/dL / Nitrite: Negative   Leuk Esterase: Moderate / RBC: >50 /HPF / WBC 11-25   Sq Epi: x / Non Sq Epi: Occasional / Bacteria: Moderate      Magnesium, Serum: 2.3 mg/dL ( @ 04:40)  Phosphorus Level, Serum: 4.0 mg/dL ( @ 04:40)  Magnesium, Serum: 2.5 mg/dL ( @ 22:30)  Phosphorus Level, Serum: 4.3 mg/dL ( @ 22:30)    RADIOLOGY & ADDITIONAL TESTS:  < from: TTE Echo Complete w/o Contrast w/ Doppler (22 @ 12:28) >   The left ventricle cavity is underdistended.   Endocardium is not well visualized, however, overall left ventricular   systolic function appears hyperdynamic. Technically Difficult Study.   Septal flattening is seen; this finding is consistent with right heart   pressure / volume overload.   Estimated left ventricular ejection fraction is 75 %.   Normal appearing left atrium.   The right atrium appears severely dilated.   A device wire is seen in the RV and RA.   The right ventricle is at least moderately dilated with decreased   contractility.   There are fibrocalcific changes noted to the aortic valve leaflets with   restriction in leaflet excursion. Transaortic gradients are elevated but   do not meet the criteria for aortic stenosis. * aortic insufficiency   noted.   Fibrocalcific changes noted to the mitral valve leaflets with preserved   leaflet excursion.   Mildmitral annular calcification is present.   Mild (1+) mitral regurgitation is present.   The tricuspid valve leaflets are thin and pliable; valve motion is   normal.   Severe (4+) tricuspid valve regurgitation is present.   Severe pulmonary hypertension.   Mild pulmonic valvular regurgitation (1+) is present.   No evidence of pericardial effusion.   Pleural effusion - is present.   IVC is dilated and not collapsing with inspiration.     Signature     ----------------------------------------------------------------   Electronically signed by Edward David MD(Interpreting   physician) on 2022 05:03 PM   ----------------------------------------------------------------      < end of copied text >

## 2022-02-23 NOTE — PROGRESS NOTE ADULT - ASSESSMENT
89 yo woman with A FIB, CM and CHF admitted with sacral ala and pelvic rami fx.  JOAQUÍN due to hypotension.  Continue pressor support for perfusion.  Shock of unclear etiology, cardiac vs infectious.   Check Echo for EF.   BC done and on  ceftriaxone.  Repeat labs with K 4.4.    No indication for urgent dialysis intervention.  Check repeat labs.    2/23 SY  --JOAQUÍN due to shock.  Noted for LV underdistention due to severe Pulmonary HTN.  Unclear if a component of infection as well/UTI.    Continue pressor support.    Cardiology follow up appreciated.    D/w Dr. Mckenzie.   Hold off further diuretics.

## 2022-02-23 NOTE — DIETITIAN INITIAL EVALUATION ADULT. - ADD RECOMMEND
1) liberalize diet to low sodium 2) ensure enlive po shakes in between meals (increased demand of protein) 3) monitor weights track trends 4) Maintain aspiration precautions, back of bed >35 degrees. 5) MVI w/minerals and Vitamin C daily to meet RDI's and promote healing 6) GOC (address nutrition support) 7) If no BM x > 3 days consider bowel meds prn

## 2022-02-23 NOTE — DIETITIAN INITIAL EVALUATION ADULT. - MALNUTRITION
Severe protein-calorie malnutrition in context of acute illness R/T suboptimal nutrition to meet increased demand 2/2 fx's AEB moderate muscle/fat wasting.

## 2022-02-23 NOTE — DIETITIAN INITIAL EVALUATION ADULT. - OTHER INFO
89 yo woman with PMHX of A fib/PPM, CAD with CM, CHF and HTN presented post mechanical fall at home. Walker got stuck on bathroom door leading to fall. Admitted with Sacral ala and pelvic rami fx. Given Toradol x 1 and Morphine for pain control. Hx of CHF on lasix and K at home. Creat 1.3 with no known hx of CKD. Noted with borderline BP yesterday, Lasix held, NS ~ 2 L given for volume resuscitation. CT chest done for respiratory insufficiency with R base patchy inf--Ceftriaxone started. Post RR for Hypotension last night. Resuscitated with 1L LR.  Phenylephrine started. stats lab with K 6.7 and creat of 2.3.  Lokelma, Ca gluconate, D 50 and insulin given.  Pt current with fair po intake ~50% intake. NFPE indicates moderate muscle/fat wasting. No BM noted since admission x 3 days (no bowel meds noted). Last hospital weight indicates +gain possibly 2/2 fluid accumulation which can also be masking current weight loss? Current therapeutic diet not warranted due to poor nutritional status and advancing age (89yo). Will continue to monitor and follow up prn. Criteria met for severe malnutrition. See below for recommendations.

## 2022-02-24 DIAGNOSIS — I50.810 RIGHT HEART FAILURE, UNSPECIFIED: ICD-10-CM

## 2022-02-24 LAB
ANION GAP SERPL CALC-SCNC: 8 MMOL/L — SIGNIFICANT CHANGE UP (ref 5–17)
APTT BLD: 76 SEC — HIGH (ref 27.5–35.5)
BUN SERPL-MCNC: 48 MG/DL — HIGH (ref 7–23)
CALCIUM SERPL-MCNC: 8 MG/DL — LOW (ref 8.5–10.1)
CHLORIDE SERPL-SCNC: 99 MMOL/L — SIGNIFICANT CHANGE UP (ref 96–108)
CO2 SERPL-SCNC: 29 MMOL/L — SIGNIFICANT CHANGE UP (ref 22–31)
CREAT SERPL-MCNC: 1.36 MG/DL — HIGH (ref 0.5–1.3)
GLUCOSE SERPL-MCNC: 128 MG/DL — HIGH (ref 70–99)
HCT VFR BLD CALC: 26.6 % — LOW (ref 34.5–45)
HGB BLD-MCNC: 8.5 G/DL — LOW (ref 11.5–15.5)
MAGNESIUM SERPL-MCNC: 2.2 MG/DL — SIGNIFICANT CHANGE UP (ref 1.6–2.6)
MCHC RBC-ENTMCNC: 32 GM/DL — SIGNIFICANT CHANGE UP (ref 32–36)
MCHC RBC-ENTMCNC: 34 PG — SIGNIFICANT CHANGE UP (ref 27–34)
MCV RBC AUTO: 106.4 FL — HIGH (ref 80–100)
PHOSPHATE SERPL-MCNC: 3.5 MG/DL — SIGNIFICANT CHANGE UP (ref 2.5–4.5)
PLATELET # BLD AUTO: 144 K/UL — LOW (ref 150–400)
POTASSIUM SERPL-MCNC: 4 MMOL/L — SIGNIFICANT CHANGE UP (ref 3.5–5.3)
POTASSIUM SERPL-SCNC: 4 MMOL/L — SIGNIFICANT CHANGE UP (ref 3.5–5.3)
RBC # BLD: 2.5 M/UL — LOW (ref 3.8–5.2)
RBC # FLD: 15.3 % — HIGH (ref 10.3–14.5)
SODIUM SERPL-SCNC: 136 MMOL/L — SIGNIFICANT CHANGE UP (ref 135–145)
WBC # BLD: 7.94 K/UL — SIGNIFICANT CHANGE UP (ref 3.8–10.5)
WBC # FLD AUTO: 7.94 K/UL — SIGNIFICANT CHANGE UP (ref 3.8–10.5)

## 2022-02-24 PROCEDURE — 99231 SBSQ HOSP IP/OBS SF/LOW 25: CPT

## 2022-02-24 PROCEDURE — 99291 CRITICAL CARE FIRST HOUR: CPT

## 2022-02-24 PROCEDURE — 99233 SBSQ HOSP IP/OBS HIGH 50: CPT

## 2022-02-24 PROCEDURE — 99232 SBSQ HOSP IP/OBS MODERATE 35: CPT

## 2022-02-24 RX ORDER — LACTULOSE 10 G/15ML
30 SOLUTION ORAL
Refills: 0 | Status: DISCONTINUED | OUTPATIENT
Start: 2022-02-24 | End: 2022-02-25

## 2022-02-24 RX ORDER — POLYETHYLENE GLYCOL 3350 17 G/17G
17 POWDER, FOR SOLUTION ORAL DAILY
Refills: 0 | Status: DISCONTINUED | OUTPATIENT
Start: 2022-02-24 | End: 2022-03-01

## 2022-02-24 RX ADMIN — PHENYLEPHRINE HYDROCHLORIDE 2.21 MICROGRAM(S)/KG/MIN: 10 INJECTION INTRAVENOUS at 03:26

## 2022-02-24 RX ADMIN — PHENYLEPHRINE HYDROCHLORIDE 2.21 MICROGRAM(S)/KG/MIN: 10 INJECTION INTRAVENOUS at 15:43

## 2022-02-24 RX ADMIN — LACTULOSE 30 GRAM(S): 10 SOLUTION ORAL at 09:23

## 2022-02-24 RX ADMIN — Medication 400 MILLIGRAM(S): at 09:23

## 2022-02-24 RX ADMIN — HEPARIN SODIUM 600 UNIT(S)/HR: 5000 INJECTION INTRAVENOUS; SUBCUTANEOUS at 07:10

## 2022-02-24 RX ADMIN — Medication 650 MILLIGRAM(S): at 21:11

## 2022-02-24 RX ADMIN — PIPERACILLIN AND TAZOBACTAM 25 GRAM(S): 4; .5 INJECTION, POWDER, LYOPHILIZED, FOR SOLUTION INTRAVENOUS at 09:45

## 2022-02-24 RX ADMIN — Medication 125 MICROGRAM(S): at 06:04

## 2022-02-24 RX ADMIN — Medication 650 MILLIGRAM(S): at 04:55

## 2022-02-24 RX ADMIN — BUDESONIDE AND FORMOTEROL FUMARATE DIHYDRATE 2 PUFF(S): 160; 4.5 AEROSOL RESPIRATORY (INHALATION) at 20:19

## 2022-02-24 RX ADMIN — Medication 650 MILLIGRAM(S): at 04:54

## 2022-02-24 RX ADMIN — ALBUTEROL 2 PUFF(S): 90 AEROSOL, METERED ORAL at 14:58

## 2022-02-24 RX ADMIN — ALBUTEROL 2 PUFF(S): 90 AEROSOL, METERED ORAL at 08:24

## 2022-02-24 RX ADMIN — POLYETHYLENE GLYCOL 3350 17 GRAM(S): 17 POWDER, FOR SOLUTION ORAL at 09:23

## 2022-02-24 RX ADMIN — MIDODRINE HYDROCHLORIDE 10 MILLIGRAM(S): 2.5 TABLET ORAL at 12:37

## 2022-02-24 RX ADMIN — BUDESONIDE AND FORMOTEROL FUMARATE DIHYDRATE 2 PUFF(S): 160; 4.5 AEROSOL RESPIRATORY (INHALATION) at 08:24

## 2022-02-24 RX ADMIN — Medication 100 MILLIGRAM(S): at 09:24

## 2022-02-24 RX ADMIN — PIPERACILLIN AND TAZOBACTAM 25 GRAM(S): 4; .5 INJECTION, POWDER, LYOPHILIZED, FOR SOLUTION INTRAVENOUS at 21:06

## 2022-02-24 RX ADMIN — PANTOPRAZOLE SODIUM 40 MILLIGRAM(S): 20 TABLET, DELAYED RELEASE ORAL at 09:24

## 2022-02-24 RX ADMIN — MIDODRINE HYDROCHLORIDE 10 MILLIGRAM(S): 2.5 TABLET ORAL at 06:04

## 2022-02-24 RX ADMIN — Medication 1000 MILLIGRAM(S): at 09:54

## 2022-02-24 RX ADMIN — ALBUTEROL 2 PUFF(S): 90 AEROSOL, METERED ORAL at 20:19

## 2022-02-24 RX ADMIN — LACTULOSE 30 GRAM(S): 10 SOLUTION ORAL at 21:05

## 2022-02-24 RX ADMIN — SENNA PLUS 2 TABLET(S): 8.6 TABLET ORAL at 21:06

## 2022-02-24 RX ADMIN — MIDODRINE HYDROCHLORIDE 10 MILLIGRAM(S): 2.5 TABLET ORAL at 21:06

## 2022-02-24 RX ADMIN — Medication 650 MILLIGRAM(S): at 21:05

## 2022-02-24 NOTE — PROGRESS NOTE ADULT - ASSESSMENT
89 YO F with right sacral ala fracture and superior and inferior rami fracture, currently on low dose pressors    Plan:   Cardiology consult appreciated  PT eval for dispo   Multimodal pain control  Incentive spirometery  Vitals, IS/OS Q 4  Diet: Soft Cardiac  GI prophylaxis  nephrology plan appreciated  Activity: WBAT  PT  SW for eventual D/C planning      Case discussed with Dr Casey   89 YO F with right sacral ala fracture and superior and inferior rami fracture, currently on low dose pressors    Plan:   Cardiology consult appreciated  PT eval for dispo   Multimodal pain control  Incentive spirometery  Vitals, IS/OS Q 4  Diet: Soft Cardiac  GI prophylaxis  nephrology plan appreciated  Activity: WBAT  PT  SW for eventual D/C planning

## 2022-02-24 NOTE — PROGRESS NOTE ADULT - SUBJECTIVE AND OBJECTIVE BOX
Pt seen and examined at bedside. Patient continues to have tendernss right hip and gluteus. Patient denies fever, chills, SOB and CP.     Vital Signs Last 24 Hrs  T(C): 36.3 (24 Feb 2022 05:49), Max: 36.4 (23 Feb 2022 16:00)  T(F): 97.3 (24 Feb 2022 05:49), Max: 97.6 (23 Feb 2022 16:00)  HR: 92 (24 Feb 2022 09:00) (84 - 143)  BP: --  BP(mean): --  RR: 19 (24 Feb 2022 09:00) (14 - 24)  SpO2: 94% (24 Feb 2022 09:00) (87% - 100%)  Labs:                                8.5    7.94  )-----------( 144      ( 24 Feb 2022 06:10 )             26.6     CBC Full  -  ( 24 Feb 2022 06:10 )  WBC Count : 7.94 K/uL  RBC Count : 2.50 M/uL  Hemoglobin : 8.5 g/dL  Hematocrit : 26.6 %  Platelet Count - Automated : 144 K/uL  Mean Cell Volume : 106.4 fl  Mean Cell Hemoglobin : 34.0 pg  Mean Cell Hemoglobin Concentration : 32.0 gm/dL  Auto Neutrophil # : x  Auto Lymphocyte # : x  Auto Monocyte # : x  Auto Eosinophil # : x  Auto Basophil # : x  Auto Neutrophil % : x  Auto Lymphocyte % : x  Auto Monocyte % : x  Auto Eosinophil % : x  Auto Basophil % : x    02-24    136  |  99  |  48<H>  ----------------------------<  128<H>  4.0   |  29  |  1.36<H>    Ca    8.0<L>      24 Feb 2022 06:10  Phos  3.5     02-24  Mg     2.2     02-24    TPro  5.9<L>  /  Alb  2.6<L>  /  TBili  0.5  /  DBili  x   /  AST  33  /  ALT  25  /  AlkPhos  77  02-22    LIVER FUNCTIONS - ( 22 Feb 2022 22:30 )  Alb: 2.6 g/dL / Pro: 5.9 gm/dL / ALK PHOS: 77 U/L / ALT: 25 U/L / AST: 33 U/L / GGT: x           PTT - ( 24 Feb 2022 06:10 )  PTT:76.0 sec      Physical exam:  Pt is aaox3  Pt in no acute distress  Psych: normal affect  Resp: CTAB  CVS: S1S2(+)  ABD: bowel sounds (+), soft, non distended, no rebound, no guarding  EXT: no calf tenderness or edema to exam b/l, on VTE prophylaxis. tender to palpation over gluteus, suprapubic, LLE knee. b/l LE edema

## 2022-02-24 NOTE — PROGRESS NOTE ADULT - ASSESSMENT
Assessment:  1. Cardiogenic shock  2. Acute Cor Pulmonale   3. Severe pulm htn  4. Fat emboli from pelvic fx??  5. JOAQUÍN     Plan:  Neuro: Melatonin as needed. Avoid neurosuppressant medications  CV: Patient in cardiogenic shock, was on Milrinone and being diuresed w/good response. Has been weaned off and is only on Brandon w/decreasing UOP, consider restarting inotropic support w/diuresis. Actively titrating vasopressors to maintain MAP>65mmHg   Pulm: No acute issues   Renal: JOAQUÍN, improving. UOP decreasing. Strict I/Os, goal UOP >0.5cc/kg/hr. Trend renal function and electrolytes, replete as needed. Avoid nephrotoxic agents  GI: PPI, DASH diet  ID: On Zosyn for possible PNA. F/u cultures. Trend WBC/fevers/procalcitonin   Endo: C/w Synthroid. Goal blood glucose <180     CRITICAL CARE TIME SPENT: 38 minutes assessing presenting problems of acute illness, which pose high probability of life threatening deterioration or end organ damage/dysfunction, as well as medical decision making including initiating plan of care, reviewing data, reviewing radiologic exams, discussing with multidisciplinary team,  discussing goals of care with patient/family, and writing this note.  Non-inclusive of procedures performed

## 2022-02-24 NOTE — PROGRESS NOTE ADULT - SUBJECTIVE AND OBJECTIVE BOX
Patient is a 89y old  Female who presents with a chief complaint of Fall (2022 15:37)    BRIEF HOSPITAL COURSE:   89 yo F with extensive PMHx including CAD, prior MI, ischemic cardiomyopathy HFrEF, severe pHTN, valvular disease, AFib on Eliquis, PPM, hypothyroidism, COPD, that presented to the ED for evaluation of right hip pain. She stated that she was ambulating with her walker when it got stuck on her bathroom door causing her to fall predominantly on her right buttock. She called her daughter who took her to her house because of the fact that she lives alone. She stated that she has been able to ambulate minimally to get into the car to the ED since the fall but with pain.    Events last 24 hours:  - Patient w/increasing Brandon requirements   - UOP poor, 175cc for me tonight       Review of Systems:  Unable to assess     PAST MEDICAL & SURGICAL HISTORY:  Mitral valve insufficiency  Aortic valve regurgitation  Osteopenia  CAD (coronary artery disease)  Gout  Hypothyroid  CHF (congestive heart failure)  Anxiety  Anemia  HTN (hypertension)  Afib  COPD (chronic obstructive pulmonary disease)  Status cardiac pacemaker  S/P knee replacement    Medications:  piperacillin/tazobactam IVPB.. 3.375 Gram(s) IV Intermittent every 12 hours  midodrine 10 milliGRAM(s) Oral every 8 hours  milrinone Infusion 0.125 MICROgram(s)/kG/Min IV Continuous <Continuous>  phenylephrine    Infusion 0.1 MICROgram(s)/kG/Min IV Continuous <Continuous>  ALBUTerol    90 MICROgram(s) HFA Inhaler 2 Puff(s) Inhalation every 6 hours  budesonide 160 MICROgram(s)/formoterol 4.5 MICROgram(s) Inhaler 2 Puff(s) Inhalation two times a day  acetaminophen     Tablet .. 650 milliGRAM(s) Oral every 6 hours PRN  acetaminophen   IVPB .. 1000 milliGRAM(s) IV Intermittent every 6 hours  acetaminophen   IVPB .. 1000 milliGRAM(s) IV Intermittent once  melatonin 3 milliGRAM(s) Oral at bedtime PRN  ondansetron Injectable 4 milliGRAM(s) IV Push every 6 hours PRN  heparin   Injectable 5000 Unit(s) IV Push every 6 hours PRN  heparin   Injectable 2500 Unit(s) IV Push every 6 hours PRN  heparin  Infusion.  Unit(s)/Hr IV Continuous <Continuous>  calcium carbonate    500 mG (Tums) Chewable 3 Tablet(s) Chew every 6 hours PRN  pantoprazole  Injectable 40 milliGRAM(s) IV Push daily  polyethylene glycol 3350 17 Gram(s) Oral daily  senna 2 Tablet(s) Oral at bedtime  allopurinol 100 milliGRAM(s) Oral daily  levothyroxine 125 MICROGram(s) Oral daily  vasopressin Infusion 0.04 Unit(s)/Min IV Continuous <Continuous>  sodium chloride 0.9% lock flush 10 milliLiter(s) IV Push every 1 hour PRN  chlorhexidine 2% Cloths 1 Application(s) Topical <User Schedule>    ICU Vital Signs Last 24 Hrs  T(C): 36.2 (2022 00:57), Max: 36.4 (2022 16:00)  T(F): 97.2 (2022 00:57), Max: 97.6 (2022 16:00)  HR: 96 (2022 01:00) (84 - 143)  ABP: 80/39 (2022 01:00) (74/40 - 121/66)  ABP(mean): 55 (2022 01:00) (53 - 89)  RR: 19 (2022 01:00) (14 - 24)  SpO2: 92% (2022 01:00) (57% - 100%)    I&O's Detail    2022 07:01  -  2022 07:00  --------------------------------------------------------  IN:    dextrose 10%: 1331.3 mL    dextrose 5%: 100 mL    Heparin Infusion: 348.4 mL    IV PiggyBack: 200 mL    Milrinone: 119.1 mL    Oral Fluid: 360 mL    Phenylephrine: 1074.1 mL    Vasopressin: 71.3 mL  Total IN: 3604.1 mL    OUT:    Indwelling Catheter - Urethral (mL): 1250 mL  Total OUT: 1250 mL    Total NET: 2354.1 mL    2022 07:01  -  2022 02:22  --------------------------------------------------------  IN:    Heparin Infusion: 72 mL    IV PiggyBack: 100 mL    Milrinone: 13.2 mL    Oral Fluid: 600 mL    Phenylephrine: 152.7 mL    Vasopressin: 15.6 mL  Total IN: 953.5 mL    OUT:    Indwelling Catheter - Urethral (mL): 1100 mL  Total OUT: 1100 mL    Total NET: -146.5 mL    LABS:                        8.7    7.97  )-----------( 108      ( 2022 04:40 )             26.5         133<L>  |  98  |  51<H>  ----------------------------<  254<H>  3.8   |  28  |  1.60<H>    Ca    7.7<L>      2022 13:30  Phos  4.0       Mg     2.3         TPro  5.9<L>  /  Alb  2.6<L>  /  TBili  0.5  /  DBili  x   /  AST  33  /  ALT  25  /  AlkPhos  77      CARDIAC MARKERS ( 2022 05:51 )  x     / x     / 103 U/L / x     / x        POCT Blood Glucose.: 187 mg/dL (2022 07:42)    PTT - ( 2022 18:30 )  PTT:75.1 sec    Urinalysis Basic - ( 2022 12:00 )  Color: Yellow / Appearance: Slightly Turbid / S.020 / pH: x  Gluc: x / Ketone: Negative  / Bili: Negative / Urobili: Negative   Blood: x / Protein: 30 mg/dL / Nitrite: Negative   Leuk Esterase: Moderate / RBC: >50 /HPF / WBC 11-25   Sq Epi: x / Non Sq Epi: Occasional / Bacteria: Moderate    CULTURES:  Culture Results:   No growth to date. ( @ 14:26)  Culture Results:   No growth to date. ( @ 14:21)    Physical Examination:  General: No acute distress.    HEENT: Pupils equal, reactive to light.  Symmetric.  PULM: Diminished breath sounds b/l  NECK: Supple, no lymphadenopathy, trachea midline  CVS: Regular rate and rhythm, no murmurs, rubs, or gallops  ABD: Soft, nondistended, nontender, normoactive bowel sounds, no masses  EXT: b/l LE edema   SKIN: Warm and well perfused, no rashes noted.  RADIOLOGY:   < from: CT Chest No Cont (22 @ 11:00) >    ACC: [id number] EXAM:  CT CHEST                          PROCEDURE DATE:  2022      INTERPRETATION:  .  ACC: [id number]  INDICATION: copd, hypoxia  TECHNIQUE: Unenhanced CT of the chest. Coronal, sagittal, and MIP images   were reconstructed and reviewed.  COMPARISON: No prior chest CT.    FINDINGS:    AIRWAYS, LUNGS and PLEURA: Patent central airways. Patchy opacity at the   right base may represent atelectasis or pneumonia. Interlobular septal   thickening and small pleural effusions likely representing superimposed   pulmonary edema.  MEDIASTINUM AND CHICA: No lymphadenopathy.    HEART AND VESSELS: Cardiomegaly. Left pacer in place. Coronary and aortic   calcifications. No pericardial effusion. Thoracic aorta normal in   diameter. Dilated pulmonary artery.    VISUALIZED UPPER ABDOMEN: Arterial calcifications.    CHEST WALL AND BONES: No aggressive osseous lesion. Osteopenia. Advanced   which under changes of the glenohumeral joints.    LOWER NECK: Within normal limits.    IMPRESSION:    Patchy opacity at the right base may represent atelectasis or pneumonia.   Interlobular septal thickening and small pleural effusions likely   representing superimposed pulmonary edema.    --- End of Report ---    ERNESTINA ADAM MD; Attending Radiologist  This document has been electronically signed. 2022 11:29AM    < end of copied text >

## 2022-02-24 NOTE — PROGRESS NOTE ADULT - ASSESSMENT
89 yo woman with A FIB, CM and CHF admitted with sacral ala and pelvic rami fx.  JOAQUÍN due to hypotension.  Continue pressor support for perfusion.  Shock of unclear etiology, cardiac vs infectious.   Check Echo for EF.   BC done and on  ceftriaxone.  Repeat labs with K 4.4.    No indication for urgent dialysis intervention.  Check repeat labs.    2/23 SY  --JOAQUÍN due to shock.  Noted for LV underdistention due to severe Pulmonary HTN.  Unclear if a component of infection as well/UTI.    Continue pressor support.    Cardiology follow up appreciated.    D/w Dr. Mckenzie.   Hold off further diuretics.    2/24 SY  --JOAQUÍN/ shock with right sided HF due to severe pulmonary HTN  --Good UO post Milrinone.  --Continue pressor support with Midodrine and Phenylephrine.  --Continue abtx.

## 2022-02-24 NOTE — PROGRESS NOTE ADULT - SUBJECTIVE AND OBJECTIVE BOX
REASON FOR VISIT: R heart failure    HPI:  89 year old woman with a history of atrial fibrillation, right heart failure, severe tricuspid regurgitation severe pulmonary HTN, CAD, HTN, gout admitted on 2/20/22 with a pelvic fracture following a fall with hospitalization complicated by hypotension (requiring vasopressor), hypoxia.    2/22/22  Events noted , patient was hypotensive ,stable hemoglobin , became less responsive last night , markedly hypoglycemic , started on pressors , patient very lethargic arousable with vocal stimuli , hypoxic on VM  , heart rate high 90s     2/23/22: Pt comfortable, awake and alert, on 3L NC, mildly hypotensive on multiple pressors.   2/24/22:  Somnolent but easily awakened; says she doesn't know how she feels but denies pain.    MEDICATIONS  (STANDING):  acetaminophen   IVPB .. 1000 milliGRAM(s) IV Intermittent every 6 hours  ALBUTerol    90 MICROgram(s) HFA Inhaler 2 Puff(s) Inhalation every 6 hours  allopurinol 100 milliGRAM(s) Oral daily  budesonide 160 MICROgram(s)/formoterol 4.5 MICROgram(s) Inhaler 2 Puff(s) Inhalation two times a day  chlorhexidine 2% Cloths 1 Application(s) Topical <User Schedule>  heparin  Infusion.  Unit(s)/Hr (11 mL/Hr) IV Continuous <Continuous>  lactulose Syrup 30 Gram(s) Oral two times a day  levothyroxine 125 MICROGram(s) Oral daily  midodrine 10 milliGRAM(s) Oral every 8 hours  pantoprazole  Injectable 40 milliGRAM(s) IV Push daily  phenylephrine    Infusion 0.1 MICROgram(s)/kG/Min (2.21 mL/Hr) IV Continuous <Continuous>  piperacillin/tazobactam IVPB.. 3.375 Gram(s) IV Intermittent every 12 hours  polyethylene glycol 3350 17 Gram(s) Oral daily  polyethylene glycol 3350 17 Gram(s) Oral daily  senna 2 Tablet(s) Oral at bedtime    Vital Signs Last 24 Hrs  T(C): 36.3 (24 Feb 2022 05:49), Max: 36.4 (23 Feb 2022 16:00)  T(F): 97.3 (24 Feb 2022 05:49), Max: 97.6 (23 Feb 2022 16:00)  HR: 85 (24 Feb 2022 10:00) (84 - 143)  BP: 118/62  RR: 16 (24 Feb 2022 10:00) (14 - 24)  SpO2: 94% (24 Feb 2022 09:00) (87% - 99%)    PHYSICAL EXAM:  Constitutional: NAD, drowsy, semirecumbent in CCU bed  Neck:  Ecchymoses on neck  Respiratory: Breath sounds are clear bilaterally  Cardiovascular: irregular, normal rate, systolic murmur  Gastrointestinal: Bowel Sounds present, soft, nontender.   Extremities: No pedal edema.     LABS:                     8.5    7.94  )-----------( 144      ( 24 Feb 2022 06:10 )             26.6     136  |  99  |  48<H>  ----------------------------<  128<H>  4.0   |  29  |  1.36<H>    Ca    8.0<L>      24 Feb 2022 06:10  Phos  3.5     02-24  Mg     2.2     02-24    TPro  5.9<L>  /  Alb  2.6<L>  /  TBili  0.5  /  DBili  x   /  AST  33  /  ALT  25  /  AlkPhos  77  02-22    TroponinI hsT: <-141.88, <-161.96, <-210.61    TTE Echo Complete w/o Contrast w/ Doppler (02.22.22 @ 12:28):   The left ventricle cavity is underdistended. Endocardium is not well visualized, however, overall left ventricular systolic function appears hyperdynamic.  Estimated left ventricular ejection fraction is 75 %.   Septal flattening is seen; this finding is consistent with right heart pressure / volume overload.   The right atrium appears severely dilated.   The right ventricle is at least moderately dilated with decreased contractility.   There are fibrocalcific changes noted to the aortic valve leaflets with restriction in leaflet excursion.    Mild mitral regurgitation.   Severe (4+) tricuspid valve regurgitation.   Severe pulmonary hypertension.   Mild pulmonic valvular regurgitation.   IVC is dilated and not collapsing with inspiration.    Tele: AF    CT Chest No Cont (02.21.22 @ 11:00):  Patchy opacity at the right base may represent atelectasis or pneumonia. Interlobular septal thickening and small pleural effusions likely representing superimposed pulmonary edema.

## 2022-02-24 NOTE — PROGRESS NOTE ADULT - SUBJECTIVE AND OBJECTIVE BOX
History of Present Illness:   Pt is a 89 yo F that presented to the ED for evaluation of right hip pain. She stated that she was ambulating with her walker this morning, when it got stuck on her bathroom door causing her to fall on her  right buttock. She called her daughter who took her to her house because of the fact that she lives alone. She stated that she has been able to ambulate minimally to get into the car to the ED since the fall but with pain. She reports that she lives at home alone with a home aide during the day for 2 hours. Pt was released from [facility] in late november after knee surgery.  On exam has some lateral tenderness at the left knee. Has had a left TKR at [facility].  Otherwise denies head strike/LOC  -hosp course sign for hypotension requiring ICU care and pressor support     2.21: patient seen on 3E, not confused, able to give history; c/o some pelvis pain with movements.  requires 4l NC  2.22: on pressors ,lethargic   2.23: alert, oriented to name and place, anxious  2.24: more alert today, good urine output, still on pressors       REVIEW OF SYSTEMS:  unable to obtain     All other review of systems is negative unless indicated above    Vital Signs Last 24 Hrs  T(C): 36.3 (24 Feb 2022 05:49), Max: 36.4 (23 Feb 2022 16:00)  T(F): 97.3 (24 Feb 2022 05:49), Max: 97.6 (23 Feb 2022 16:00)  HR: 71 (24 Feb 2022 15:00) (71 - 143)  RR: 20 (24 Feb 2022 13:00) (14 - 24)  SpO2: 99% (24 Feb 2022 15:00) (87% - 99%)    PHYSICAL EXAM:    GENERAL: NAD  HEENT:  NC/AT, EOMI, PERRLA, No scleral icterus, Moist mucous membranes  NECK: Supple, No JVD  CNS:  Alert & Oriented X3, Motor Strength 5/5 B/L upper and lower extremities; DTRs 2+ intact   LUNG: Decreased Breath sounds, Rt crackles, no wheezing   HEART: RRR; No murmurs, No rubs  ABDOMEN: +BS, ST/ND/NT  GENITOURINARY: Voiding, Bladder not distended  EXTREMITIES:  2+ Peripheral Pulses, No clubbing, No cyanosis, No tibial edema  MUSCULOSKELTAL: Joints normal ROM, No TTP, No effusion  VAGINAL: deferred  SKIN: no rashes, intact skin  RECTAL: deferred, not indicated  BREAST: deferred               Labs:                        8.5    7.94  )-----------( 144      ( 24 Feb 2022 06:10 )             26.6     02-24    136  |  99  |  48<H>  ----------------------------<  128<H>  4.0   |  29  |  1.36<H>    Ca    8.0<L>      24 Feb 2022 06:10  Phos  3.5     02-24  Mg     2.2     02-24    TPro  5.9<L>  /  Alb  2.6<L>  /  TBili  0.5  /  DBili  x   /  AST  33  /  ALT  25  /  AlkPhos  77  02-22      MEDICATIONS  (STANDING):  acetaminophen   IVPB .. 1000 milliGRAM(s) IV Intermittent every 6 hours  ALBUTerol    90 MICROgram(s) HFA Inhaler 2 Puff(s) Inhalation every 6 hours  allopurinol 100 milliGRAM(s) Oral daily  budesonide 160 MICROgram(s)/formoterol 4.5 MICROgram(s) Inhaler 2 Puff(s) Inhalation two times a day  chlorhexidine 2% Cloths 1 Application(s) Topical <User Schedule>  heparin  Infusion.  Unit(s)/Hr (11 mL/Hr) IV Continuous <Continuous>  lactulose Syrup 30 Gram(s) Oral two times a day  levothyroxine 125 MICROGram(s) Oral daily  midodrine 10 milliGRAM(s) Oral every 8 hours  pantoprazole  Injectable 40 milliGRAM(s) IV Push daily  phenylephrine    Infusion 0.1 MICROgram(s)/kG/Min (2.21 mL/Hr) IV Continuous <Continuous>  piperacillin/tazobactam IVPB.. 3.375 Gram(s) IV Intermittent every 12 hours  polyethylene glycol 3350 17 Gram(s) Oral daily  senna 2 Tablet(s) Oral at bedtime        CT Chest: RLL Pna      a/p:    1. Acute  Hypoxic respiratory failure due to Pna  Septic Shock  CT Chest noted  Antibiotic changed to Zosyn IV day#3  on pressor support  Blood Cx x 2 pending  c/w bronchodilators, ICS    2. Cardiogenic shock due to sepsis and Rt Heart failure/Severe Pulm Htn:  on Milrinone  on intermittent diuretic  on IV Heparin for possible pulmonary emboli     3.  Rt pelvis fracture due to mechanical fall: stable   analgesia prn  PT / Ortho evaluations     4. Afib: rate controlled   on IV heparin     5. ARF superimposed on Ckd : improving   likely due to ATN and prerenal state in the setting of sepsis/cardiogenic shock

## 2022-02-24 NOTE — PROGRESS NOTE ADULT - SUBJECTIVE AND OBJECTIVE BOX
NEPHROLOGY INTERVAL HPI/OVERNIGHT EVENTS:    Date of Service: 22 @ 08:47    --Remains on pressors--now on Phenylephrine and Midodrine.  Alert, no apparent distress. I and O even at 1500cc.  C/o constipation.  --Alert and appears more comfortable today.  Now on Milrinone, Midodrine and Vasopressin.  Echo with severe pulmonary HTN.    HPI:  89 yo woman with PMHX of A fib/PPM, CAD with CM, CHF and HTN presented post mechanical fall at home.  Walker got stuck on bathroom door leading to fall.  Admitted with Sacral ala and pelvic rami fx.  Given Toradol x 1 and Morphine for pain control.  Hx of CHF on lasix and K at home.  Creat 1.3 with no known hx of CKD.  Noted with borderline BP yesterday, lasix held, NS ~ 2 L given for volume resuscitation.  CT chest done for resp insuff with R base patchy inf--Ceftriaxone started.  Post RR for Hypotension last night.  Resuscitated with 1L LR.  Phenylephrine started.  stats lab with K 6.7 and creat of 2.3.  Lokelma, Ca gluconate, D 50 and insulin given.    PMHX and PSHX.  1.A FIB on Xarelto/  PPM  2.HTN  3.CAD with CM  4. CHF  5.COPD  6.L TKR      MEDICATIONS  (STANDING):  acetaminophen   IVPB .. 1000 milliGRAM(s) IV Intermittent every 6 hours  acetaminophen   IVPB .. 1000 milliGRAM(s) IV Intermittent once  ALBUTerol    90 MICROgram(s) HFA Inhaler 2 Puff(s) Inhalation every 6 hours  allopurinol 100 milliGRAM(s) Oral daily  budesonide 160 MICROgram(s)/formoterol 4.5 MICROgram(s) Inhaler 2 Puff(s) Inhalation two times a day  chlorhexidine 2% Cloths 1 Application(s) Topical <User Schedule>  heparin  Infusion.  Unit(s)/Hr (11 mL/Hr) IV Continuous <Continuous>  levothyroxine 125 MICROGram(s) Oral daily  midodrine 10 milliGRAM(s) Oral every 8 hours  pantoprazole  Injectable 40 milliGRAM(s) IV Push daily  phenylephrine    Infusion 0.1 MICROgram(s)/kG/Min (2.21 mL/Hr) IV Continuous <Continuous>  piperacillin/tazobactam IVPB.. 3.375 Gram(s) IV Intermittent every 12 hours  polyethylene glycol 3350 17 Gram(s) Oral daily  senna 2 Tablet(s) Oral at bedtime    MEDICATIONS  (PRN):  acetaminophen     Tablet .. 650 milliGRAM(s) Oral every 6 hours PRN Mild Pain (1 - 3)  calcium carbonate    500 mG (Tums) Chewable 3 Tablet(s) Chew every 6 hours PRN Dyspepsia  heparin   Injectable 5000 Unit(s) IV Push every 6 hours PRN For aPTT less than 40  heparin   Injectable 2500 Unit(s) IV Push every 6 hours PRN For aPTT between 40 - 57  melatonin 3 milliGRAM(s) Oral at bedtime PRN Insomnia  ondansetron Injectable 4 milliGRAM(s) IV Push every 6 hours PRN Nausea  sodium chloride 0.9% lock flush 10 milliLiter(s) IV Push every 1 hour PRN Pre/post blood products, medications, blood draw, and to maintain line patency    Vital Signs Last 24 Hrs  T(C): 36.3 (2022 05:49), Max: 36.4 (2022 16:00)  T(F): 97.3 (2022 05:49), Max: 97.6 (2022 16:00)  HR: 94 (2022 08:00) (84 - 143)  BP: --  BP(mean): --  RR: 20 (2022 08:00) (14 - 24)  SpO2: 87% (2022 08:00) (87% - 100%)  Daily     Daily Weight in k.5 (2022 05:49)     @ 07:01  -   @ 07:00  --------------------------------------------------------  IN: 1546.5 mL / OUT: 1525 mL / NET: 21.5 mL    PHYSICAL EXAM:  GENERAL: comfortable.  CHEST/LUNG : fair air entry  HEART: S1S2 RRR  ABDOMEN: soft  EXTREMITIES: no edema  SKIN:     LABS:                        8.5    7.94  )-----------( 144      ( 2022 06:10 )             26.6         136  |  99  |  48<H>  ----------------------------<  128<H>  4.0   |  29  |  1.36<H>    Ca    8.0<L>      2022 06:10  Phos  3.5       Mg     2.2         TPro  5.9<L>  /  Alb  2.6<L>  /  TBili  0.5  /  DBili  x   /  AST  33  /  ALT  25  /  AlkPhos  77  -    PTT - ( 2022 06:10 )  PTT:76.0 sec  Urinalysis Basic - ( 2022 12:00 )    Color: Yellow / Appearance: Slightly Turbid / S.020 / pH: x  Gluc: x / Ketone: Negative  / Bili: Negative / Urobili: Negative   Blood: x / Protein: 30 mg/dL / Nitrite: Negative   Leuk Esterase: Moderate / RBC: >50 /HPF / WBC 11-25   Sq Epi: x / Non Sq Epi: Occasional / Bacteria: Moderate      Magnesium, Serum: 2.2 mg/dL ( @ 06:10)  Phosphorus Level, Serum: 3.5 mg/dL ( @ 06:10)          RADIOLOGY & ADDITIONAL TESTS:

## 2022-02-24 NOTE — PROGRESS NOTE ADULT - PROBLEM SELECTOR PLAN 4
Right heart failure with severe pulmonary HTN and severe tricuspid regurgitation.  No prior visits at [facility] but these conditions are thought to be chronic.  Diurese PRN.

## 2022-02-24 NOTE — PROGRESS NOTE ADULT - ASSESSMENT
A/P: 89 female with hypotension likely from septic shock and pneumonia  CAD  Hypothyroidism  CHF  HTN  COPD    Plan:  CCU    PULM: Continue NC O2, Continue Zosyn for possible PNA.  Continue UFH for Possible PE    Cardio: Likely hypotensive from septic shock and RV Failure, ECHO noted to have a hyperdynamic underfilled LV and a dilated RV and Severe PAH. Patient UO fine of inotrope.  Given Severe PAH she may benefit from Pulmonary artery dilation actually once more stable.  Continue to optimize volume status.  Will continue Diuresis on a PRN basis for now given LV is hyperdynamic but none today.  Repeat ECHO on 2/24.  Wean off Pressors    Renal: JOAQUÍN likely from ATN. Monitor UO.    GI: PO Diet    ENDO: Continue Synthroid    ID: On Zosyn now, UA was positive and could be another source for Septic Shock    2/22: B/L LE Dopplers were negative for a DVT    GOC: Now a DNR/DNI

## 2022-02-24 NOTE — PROGRESS NOTE ADULT - SUBJECTIVE AND OBJECTIVE BOX
HPI:  Pt is a 89 yo F that presented to the ED for evaluation of right hip pain. She stated that she was ambulating with her walker when it got stuck on her bathroom door causing her to fall on right buttock. Pt was released from [facility] [place] by [facility] in late november after knee surgery.  On exam has some lateral tenderness at the left knee. Has had a left TKA at [facility].      Patient transferred to the CCU for Hypotension    : Patient seen in bed encephalopathic, hypotensive on escalating doses of pressors, oliguric   : MS Improving, Weaning Pressors, JOAQUÍN improving  : She remains on Brandon, JOAQUÍN continues to improve, good UO, taking PO        PAST MEDICAL & SURGICAL HISTORY:  Mitral valve insufficiency    Aortic valve regurgitation    Osteopenia    CAD (coronary artery disease)    Gout    Hypothyroid    CHF (congestive heart failure)    Anxiety    Anemia    HTN (hypertension)    Afib    COPD (chronic obstructive pulmonary disease)    Status cardiac pacemaker    S/P knee replacement        FAMILY HISTORY:  No pertinent family history in first degree relatives        Social Hx:    Allergies    codeine (Unknown)    Intolerances            ICU Vital Signs Last 24 Hrs  T(C): 36.3 (2022 05:49), Max: 36.4 (2022 16:00)  T(F): 97.3 (2022 05:49), Max: 97.6 (2022 16:00)  HR: 85 (2022 11:00) (84 - 143)  BP: --  BP(mean): --  ABP: 119/59 (2022 11:00) (74/40 - 121/66)  ABP(mean): 83 (2022 11:00) (53 - 89)  RR: 18 (2022 11:00) (14 - 24)  SpO2: 94% (2022 09:00) (87% - 99%)          I&O's Summary    2022 07:01  -  2022 07:00  --------------------------------------------------------  IN: 1546.5 mL / OUT: 1525 mL / NET: 21.5 mL                              8.5    7.94  )-----------( 144      ( 2022 06:10 )             26.6           136  |  99  |  48<H>  ----------------------------<  128<H>  4.0   |  29  |  1.36<H>    Ca    8.0<L>      2022 06:10  Phos  3.5       Mg     2.2         TPro  5.9<L>  /  Alb  2.6<L>  /  TBili  0.5  /  DBili  x   /  AST  33  /  ALT  25  /  AlkPhos  77                  Urinalysis Basic - ( 2022 12:00 )    Color: Yellow / Appearance: Slightly Turbid / S.020 / pH: x  Gluc: x / Ketone: Negative  / Bili: Negative / Urobili: Negative   Blood: x / Protein: 30 mg/dL / Nitrite: Negative   Leuk Esterase: Moderate / RBC: >50 /HPF / WBC 11-25   Sq Epi: x / Non Sq Epi: Occasional / Bacteria: Moderate        MEDICATIONS  (STANDING):  acetaminophen   IVPB .. 1000 milliGRAM(s) IV Intermittent every 6 hours  ALBUTerol    90 MICROgram(s) HFA Inhaler 2 Puff(s) Inhalation every 6 hours  allopurinol 100 milliGRAM(s) Oral daily  budesonide 160 MICROgram(s)/formoterol 4.5 MICROgram(s) Inhaler 2 Puff(s) Inhalation two times a day  chlorhexidine 2% Cloths 1 Application(s) Topical <User Schedule>  heparin  Infusion.  Unit(s)/Hr (11 mL/Hr) IV Continuous <Continuous>  lactulose Syrup 30 Gram(s) Oral two times a day  levothyroxine 125 MICROGram(s) Oral daily  midodrine 10 milliGRAM(s) Oral every 8 hours  pantoprazole  Injectable 40 milliGRAM(s) IV Push daily  phenylephrine    Infusion 0.1 MICROgram(s)/kG/Min (2.21 mL/Hr) IV Continuous <Continuous>  piperacillin/tazobactam IVPB.. 3.375 Gram(s) IV Intermittent every 12 hours  polyethylene glycol 3350 17 Gram(s) Oral daily  polyethylene glycol 3350 17 Gram(s) Oral daily  senna 2 Tablet(s) Oral at bedtime    MEDICATIONS  (PRN):  acetaminophen     Tablet .. 650 milliGRAM(s) Oral every 6 hours PRN Mild Pain (1 - 3)  calcium carbonate    500 mG (Tums) Chewable 3 Tablet(s) Chew every 6 hours PRN Dyspepsia  heparin   Injectable 5000 Unit(s) IV Push every 6 hours PRN For aPTT less than 40  heparin   Injectable 2500 Unit(s) IV Push every 6 hours PRN For aPTT between 40 - 57  melatonin 3 milliGRAM(s) Oral at bedtime PRN Insomnia  ondansetron Injectable 4 milliGRAM(s) IV Push every 6 hours PRN Nausea  sodium chloride 0.9% lock flush 10 milliLiter(s) IV Push every 1 hour PRN Pre/post blood products, medications, blood draw, and to maintain line patency      DVT Prophylaxis: UFH    Advanced Directives:  Discussed with:    Visit Information: 30 min    ** Time is exclusive of billed procedures and/or teaching and/or routine family updates.

## 2022-02-25 LAB
ANION GAP SERPL CALC-SCNC: 7 MMOL/L — SIGNIFICANT CHANGE UP (ref 5–17)
APTT BLD: 90.9 SEC — HIGH (ref 27.5–35.5)
BUN SERPL-MCNC: 45 MG/DL — HIGH (ref 7–23)
CALCIUM SERPL-MCNC: 8.6 MG/DL — SIGNIFICANT CHANGE UP (ref 8.5–10.1)
CHLORIDE SERPL-SCNC: 105 MMOL/L — SIGNIFICANT CHANGE UP (ref 96–108)
CO2 SERPL-SCNC: 28 MMOL/L — SIGNIFICANT CHANGE UP (ref 22–31)
CREAT SERPL-MCNC: 1.2 MG/DL — SIGNIFICANT CHANGE UP (ref 0.5–1.3)
GLUCOSE SERPL-MCNC: 112 MG/DL — HIGH (ref 70–99)
HCT VFR BLD CALC: 28.3 % — LOW (ref 34.5–45)
HGB BLD-MCNC: 9.2 G/DL — LOW (ref 11.5–15.5)
MAGNESIUM SERPL-MCNC: 2.8 MG/DL — HIGH (ref 1.6–2.6)
MCHC RBC-ENTMCNC: 32.5 GM/DL — SIGNIFICANT CHANGE UP (ref 32–36)
MCHC RBC-ENTMCNC: 34.6 PG — HIGH (ref 27–34)
MCV RBC AUTO: 106.4 FL — HIGH (ref 80–100)
NRBC # BLD: 1 /100 WBCS — HIGH (ref 0–0)
PHOSPHATE SERPL-MCNC: 3.2 MG/DL — SIGNIFICANT CHANGE UP (ref 2.5–4.5)
PLATELET # BLD AUTO: 167 K/UL — SIGNIFICANT CHANGE UP (ref 150–400)
POTASSIUM SERPL-MCNC: 4 MMOL/L — SIGNIFICANT CHANGE UP (ref 3.5–5.3)
POTASSIUM SERPL-SCNC: 4 MMOL/L — SIGNIFICANT CHANGE UP (ref 3.5–5.3)
RBC # BLD: 2.66 M/UL — LOW (ref 3.8–5.2)
RBC # FLD: 15.8 % — HIGH (ref 10.3–14.5)
SODIUM SERPL-SCNC: 140 MMOL/L — SIGNIFICANT CHANGE UP (ref 135–145)
WBC # BLD: 7.14 K/UL — SIGNIFICANT CHANGE UP (ref 3.8–10.5)
WBC # FLD AUTO: 7.14 K/UL — SIGNIFICANT CHANGE UP (ref 3.8–10.5)

## 2022-02-25 PROCEDURE — 99231 SBSQ HOSP IP/OBS SF/LOW 25: CPT

## 2022-02-25 PROCEDURE — 93308 TTE F-UP OR LMTD: CPT | Mod: 26

## 2022-02-25 PROCEDURE — 99232 SBSQ HOSP IP/OBS MODERATE 35: CPT

## 2022-02-25 PROCEDURE — 99291 CRITICAL CARE FIRST HOUR: CPT

## 2022-02-25 PROCEDURE — 99233 SBSQ HOSP IP/OBS HIGH 50: CPT

## 2022-02-25 RX ORDER — FUROSEMIDE 40 MG
20 TABLET ORAL ONCE
Refills: 0 | Status: COMPLETED | OUTPATIENT
Start: 2022-02-25 | End: 2022-02-25

## 2022-02-25 RX ADMIN — HEPARIN SODIUM 600 UNIT(S)/HR: 5000 INJECTION INTRAVENOUS; SUBCUTANEOUS at 07:30

## 2022-02-25 RX ADMIN — Medication 100 MILLIGRAM(S): at 09:42

## 2022-02-25 RX ADMIN — ALBUTEROL 2 PUFF(S): 90 AEROSOL, METERED ORAL at 08:10

## 2022-02-25 RX ADMIN — PIPERACILLIN AND TAZOBACTAM 25 GRAM(S): 4; .5 INJECTION, POWDER, LYOPHILIZED, FOR SOLUTION INTRAVENOUS at 09:42

## 2022-02-25 RX ADMIN — CHLORHEXIDINE GLUCONATE 1 APPLICATION(S): 213 SOLUTION TOPICAL at 05:03

## 2022-02-25 RX ADMIN — PIPERACILLIN AND TAZOBACTAM 25 GRAM(S): 4; .5 INJECTION, POWDER, LYOPHILIZED, FOR SOLUTION INTRAVENOUS at 21:27

## 2022-02-25 RX ADMIN — BUDESONIDE AND FORMOTEROL FUMARATE DIHYDRATE 2 PUFF(S): 160; 4.5 AEROSOL RESPIRATORY (INHALATION) at 08:13

## 2022-02-25 RX ADMIN — HEPARIN SODIUM 600 UNIT(S)/HR: 5000 INJECTION INTRAVENOUS; SUBCUTANEOUS at 07:33

## 2022-02-25 RX ADMIN — BUDESONIDE AND FORMOTEROL FUMARATE DIHYDRATE 2 PUFF(S): 160; 4.5 AEROSOL RESPIRATORY (INHALATION) at 20:24

## 2022-02-25 RX ADMIN — PANTOPRAZOLE SODIUM 40 MILLIGRAM(S): 20 TABLET, DELAYED RELEASE ORAL at 09:41

## 2022-02-25 RX ADMIN — ALBUTEROL 2 PUFF(S): 90 AEROSOL, METERED ORAL at 13:35

## 2022-02-25 RX ADMIN — MIDODRINE HYDROCHLORIDE 10 MILLIGRAM(S): 2.5 TABLET ORAL at 17:47

## 2022-02-25 RX ADMIN — PHENYLEPHRINE HYDROCHLORIDE 2.21 MICROGRAM(S)/KG/MIN: 10 INJECTION INTRAVENOUS at 17:58

## 2022-02-25 RX ADMIN — Medication 125 MICROGRAM(S): at 05:02

## 2022-02-25 RX ADMIN — SENNA PLUS 2 TABLET(S): 8.6 TABLET ORAL at 21:28

## 2022-02-25 RX ADMIN — Medication 20 MILLIGRAM(S): at 09:41

## 2022-02-25 RX ADMIN — MIDODRINE HYDROCHLORIDE 10 MILLIGRAM(S): 2.5 TABLET ORAL at 05:01

## 2022-02-25 RX ADMIN — HEPARIN SODIUM 600 UNIT(S)/HR: 5000 INJECTION INTRAVENOUS; SUBCUTANEOUS at 04:58

## 2022-02-25 RX ADMIN — ALBUTEROL 2 PUFF(S): 90 AEROSOL, METERED ORAL at 20:24

## 2022-02-25 RX ADMIN — MIDODRINE HYDROCHLORIDE 10 MILLIGRAM(S): 2.5 TABLET ORAL at 21:27

## 2022-02-25 NOTE — PROGRESS NOTE ADULT - SUBJECTIVE AND OBJECTIVE BOX
Pt seen and examined at bedside. Patient continues to have tendernss right hip and gluteus. Patient denies fever, chills, SOB and CP. Remains on phenylephrine for blood pressure support.        Vital Signs (24 Hrs):  T(C): 36.2 (22 @ 04:27), Max: 36.2 (22 @ 04:27)  HR: 108 (22 @ 09:00) (71 - 108)  BP: --  RR: 18 (22 @ 09:00) (12 - 29)  SpO2: 83% (22 @ 09:00) (62% - 100%)  Wt(kg): --  Daily     Daily Weight in k.8 (2022 02:00)    I&O's Summary    2022 07:01  -  2022 07:00  --------------------------------------------------------  IN: 1434 mL / OUT: 1200 mL / NET: 234 mL      Physical exam:  Pt is aaox3  Pt in no acute distress  Psych: normal affect  Resp: CTAB  CVS: S1S2(+)  ABD: bowel sounds (+), soft, non distended, no rebound, no guarding  EXT: no calf tenderness or edema to exam b/l, on VTE prophylaxis. tender to palpation over gluteus, suprapubic, LLE knee. b/l LE edema    .  LABS:                         9.2    7.14  )-----------( 167      ( 2022 06:30 )             28.3         140  |  105  |  45<H>  ----------------------------<  112<H>  4.0   |  28  |  1.20    Ca    8.6      2022 06:30  Phos  3.2       Mg     2.8           PTT - ( 2022 06:30 )  PTT:90.9 sec          RADIOLOGY, EKG & ADDITIONAL TESTS: Reviewed.

## 2022-02-25 NOTE — PROGRESS NOTE ADULT - ASSESSMENT
89 YO F with right sacral ala fracture and superior and inferior rami fracture, currently on low dose pressors    Plan:   Cardiology consult appreciated  PT eval for dispo   Multimodal pain control  Incentive spirometery  Vitals, IS/OS Q 4  Diet: Soft Cardiac  GI prophylaxis  nephrology plan appreciated  Activity: WBAT  PT  SW for eventual D/C planning

## 2022-02-25 NOTE — PROGRESS NOTE ADULT - SUBJECTIVE AND OBJECTIVE BOX
HPI:  Pt is a 89 yo F that presented to the ED for evaluation of right hip pain. She stated that she was ambulating with her walker when it got stuck on her bathroom door causing her to fall on right buttock. Pt was released from [facility] in late november after knee surgery.  On exam has some lateral tenderness at the left knee. Has had a left TKA at \[facility]".      Patient transferred to the CCU for Hypotension    2/22: Patient seen in bed encephalopathic, hypotensive on escalating doses of pressors, oliguric   2/23: MS Improving, Weaning Pressors, JOAQUÍN improving  2/24: She remains on Brandon, JOAQUÍN continues to improve, good UO, taking PO  2/25: Patient remains on a low dose on brandon, tolerating PO, good UO        PAST MEDICAL & SURGICAL HISTORY:  Mitral valve insufficiency    Aortic valve regurgitation    Osteopenia    CAD (coronary artery disease)    Gout    Hypothyroid    CHF (congestive heart failure)    Anxiety    Anemia    HTN (hypertension)    Afib    COPD (chronic obstructive pulmonary disease)    Status cardiac pacemaker    S/P knee replacement        FAMILY HISTORY:  No pertinent family history in first degree relatives        Social Hx:    Allergies    codeine (Unknown)    Intolerances            ICU Vital Signs Last 24 Hrs  T(C): 36.2 (25 Feb 2022 04:27), Max: 36.2 (25 Feb 2022 04:27)  T(F): 97.2 (25 Feb 2022 04:27), Max: 97.2 (25 Feb 2022 04:27)  HR: 108 (25 Feb 2022 09:00) (71 - 108)  BP: --  BP(mean): --  ABP: 125/67 (25 Feb 2022 09:00) (61/50 - 138/68)  ABP(mean): 93 (25 Feb 2022 09:00) (56 - 337)  RR: 18 (25 Feb 2022 09:00) (12 - 29)  SpO2: 83% (25 Feb 2022 09:00) (62% - 100%)          I&O's Summary    24 Feb 2022 07:01  -  25 Feb 2022 07:00  --------------------------------------------------------  IN: 1434 mL / OUT: 1200 mL / NET: 234 mL                              9.2    7.14  )-----------( 167      ( 25 Feb 2022 06:30 )             28.3       02-25    140  |  105  |  45<H>  ----------------------------<  112<H>  4.0   |  28  |  1.20    Ca    8.6      25 Feb 2022 06:30  Phos  3.2     02-25  Mg     2.8     02-25                      MEDICATIONS  (STANDING):  acetaminophen   IVPB .. 1000 milliGRAM(s) IV Intermittent every 6 hours  ALBUTerol    90 MICROgram(s) HFA Inhaler 2 Puff(s) Inhalation every 6 hours  allopurinol 100 milliGRAM(s) Oral daily  budesonide 160 MICROgram(s)/formoterol 4.5 MICROgram(s) Inhaler 2 Puff(s) Inhalation two times a day  chlorhexidine 2% Cloths 1 Application(s) Topical <User Schedule>  heparin  Infusion.  Unit(s)/Hr (11 mL/Hr) IV Continuous <Continuous>  levothyroxine 125 MICROGram(s) Oral daily  midodrine 10 milliGRAM(s) Oral every 8 hours  pantoprazole  Injectable 40 milliGRAM(s) IV Push daily  phenylephrine    Infusion 0.1 MICROgram(s)/kG/Min (2.21 mL/Hr) IV Continuous <Continuous>  piperacillin/tazobactam IVPB.. 3.375 Gram(s) IV Intermittent every 12 hours  polyethylene glycol 3350 17 Gram(s) Oral daily  polyethylene glycol 3350 17 Gram(s) Oral daily  senna 2 Tablet(s) Oral at bedtime    MEDICATIONS  (PRN):  acetaminophen     Tablet .. 650 milliGRAM(s) Oral every 6 hours PRN Mild Pain (1 - 3)  calcium carbonate    500 mG (Tums) Chewable 3 Tablet(s) Chew every 6 hours PRN Dyspepsia  heparin   Injectable 5000 Unit(s) IV Push every 6 hours PRN For aPTT less than 40  heparin   Injectable 2500 Unit(s) IV Push every 6 hours PRN For aPTT between 40 - 57  melatonin 3 milliGRAM(s) Oral at bedtime PRN Insomnia  ondansetron Injectable 4 milliGRAM(s) IV Push every 6 hours PRN Nausea  sodium chloride 0.9% lock flush 10 milliLiter(s) IV Push every 1 hour PRN Pre/post blood products, medications, blood draw, and to maintain line patency      DVT Prophylaxis: UFH    Advanced Directives:  Discussed with:    Visit Information: 30 min    ** Time is exclusive of billed procedures and/or teaching and/or routine family updates.

## 2022-02-25 NOTE — PROGRESS NOTE ADULT - SUBJECTIVE AND OBJECTIVE BOX
History of Present Illness:   Pt is a 89 yo F that presented to the ED for evaluation of right hip pain. She stated that she was ambulating with her walker this morning, when it got stuck on her bathroom door causing her to fall on her  right buttock. She called her daughter who took her to her house because of the fact that she lives alone. She stated that she has been able to ambulate minimally to get into the car to the ED since the fall but with pain. She reports that she lives at home alone with a home aide during the day for 2 hours. Pt was released from [facility] in late november after knee surgery.  On exam has some lateral tenderness at the left knee. Has had a left TKR at \[facility]".  Otherwise denies head strike/LOC  -hosp course sign for hypotension requiring ICU care and pressor support     2.21: patient seen on 3E, not confused, able to give history; c/o some pelvis pain with movements.  requires 4l NC  2.22: on pressors ,lethargic   2.23: alert, oriented to name and place, anxious  2.24: more alert today, good urine output, still on pressors   2.25: no distress, +gen weakness       REVIEW OF SYSTEMS:  unable to obtain     All other review of systems is negative unless indicated above    Vital Signs Last 24 Hrs  T(C): 36.2 (25 Feb 2022 10:00), Max: 36.2 (25 Feb 2022 04:27)  T(F): 97.2 (25 Feb 2022 10:00), Max: 97.2 (25 Feb 2022 04:27)  HR: 109 (25 Feb 2022 14:00) (71 - 111)  RR: 19 (25 Feb 2022 14:00) (12 - 29)  SpO2: 98% (25 Feb 2022 14:00) (62% - 100%)  PHYSICAL EXAM:    GENERAL: NAD  HEENT:  NC/AT, EOMI, PERRLA, No scleral icterus, Moist mucous membranes  NECK: Supple, No JVD  CNS:  Alert & Oriented X3, Motor Strength 5/5 B/L upper and lower extremities; DTRs 2+ intact   LUNG: Decreased Breath sounds, Rt crackles, no wheezing   HEART: RRR; No murmurs, No rubs  ABDOMEN: +BS, ST/ND/NT  GENITOURINARY: Voiding, Bladder not distended  EXTREMITIES:  2+ Peripheral Pulses, No clubbing, No cyanosis, No tibial edema  MUSCULOSKELTAL: Joints normal ROM, No TTP, No effusion  VAGINAL: deferred  SKIN: no rashes, intact skin  RECTAL: deferred, not indicated  BREAST: deferred    Labs:                        9.2    7.14  )-----------( 167      ( 25 Feb 2022 06:30 )             28.3     02-25    140  |  105  |  45<H>  ----------------------------<  112<H>  4.0   |  28  |  1.20    Ca    8.6      25 Feb 2022 06:30  Phos  3.2     02-25  Mg     2.8     02-25    MEDICATIONS  (STANDING):  acetaminophen   IVPB .. 1000 milliGRAM(s) IV Intermittent every 6 hours  ALBUTerol    90 MICROgram(s) HFA Inhaler 2 Puff(s) Inhalation every 6 hours  allopurinol 100 milliGRAM(s) Oral daily  budesonide 160 MICROgram(s)/formoterol 4.5 MICROgram(s) Inhaler 2 Puff(s) Inhalation two times a day  chlorhexidine 2% Cloths 1 Application(s) Topical <User Schedule>  heparin  Infusion.  Unit(s)/Hr (11 mL/Hr) IV Continuous <Continuous>  lactulose Syrup 30 Gram(s) Oral two times a day  levothyroxine 125 MICROGram(s) Oral daily  midodrine 10 milliGRAM(s) Oral every 8 hours  pantoprazole  Injectable 40 milliGRAM(s) IV Push daily  phenylephrine    Infusion 0.1 MICROgram(s)/kG/Min (2.21 mL/Hr) IV Continuous <Continuous>  piperacillin/tazobactam IVPB.. 3.375 Gram(s) IV Intermittent every 12 hours  polyethylene glycol 3350 17 Gram(s) Oral daily  senna 2 Tablet(s) Oral at bedtime        CT Chest: RLL Pna      a/p:    1. Acute  Hypoxic respiratory failure due to Pna  Septic Shock  CT Chest noted  Antibiotic changed to Zosyn IV day#4  on pressor support  Blood Cx x 2 pending  c/w bronchodilators, ICS    2. Cardiogenic shock due to sepsis and Rt Heart failure/Severe Pulm Htn:  s/p Milrinone  on intermittent diuretic  on IV Heparin for possible pulmonary emboli     3.  Rt pelvis fracture due to mechanical fall: stable   analgesia prn  PT / Ortho evaluations     4. Afib: rate controlled   on IV heparin     5. ARF superimposed on Ckd : improving   likely due to ATN and prerenal state in the setting of sepsis/cardiogenic shock

## 2022-02-25 NOTE — PROGRESS NOTE ADULT - SUBJECTIVE AND OBJECTIVE BOX
REASON FOR VISIT: R heart failure    HPI:  89 year old woman with a history of atrial fibrillation, congestive heart failure (last hospitalization was at [facility] in September 2021), severe tricuspid regurgitation, severe pulmonary HTN, CAD, HTN, gout admitted on 2/20/22 with a pelvic fracture following a fall with hospitalization complicated by hypotension (requiring vasopressor), hypoxia.    2/22/22  Events noted , patient was hypotensive ,stable hemoglobin , became less responsive last night , markedly hypoglycemic , started on pressors , patient very lethargic arousable with vocal stimuli , hypoxic on VM  , heart rate high 90s     2/23/22: Pt comfortable, awake and alert, on 3L NC, mildly hypotensive on multiple pressors.   2/24/22:  Somnolent but easily awakened; says she doesn't know how she feels but denies pain.  2/25/22:  Much more awake and interactive today -- complaining only of fatigue.    MEDICATIONS  (STANDING):  acetaminophen   IVPB .. 1000 milliGRAM(s) IV Intermittent every 6 hours  ALBUTerol    90 MICROgram(s) HFA Inhaler 2 Puff(s) Inhalation every 6 hours  allopurinol 100 milliGRAM(s) Oral daily  budesonide 160 MICROgram(s)/formoterol 4.5 MICROgram(s) Inhaler 2 Puff(s) Inhalation two times a day  chlorhexidine 2% Cloths 1 Application(s) Topical <User Schedule>  heparin  Infusion.  Unit(s)/Hr (11 mL/Hr) IV Continuous <Continuous>  levothyroxine 125 MICROGram(s) Oral daily  midodrine 10 milliGRAM(s) Oral every 8 hours  pantoprazole  Injectable 40 milliGRAM(s) IV Push daily  phenylephrine    Infusion 0.1 MICROgram(s)/kG/Min (2.21 mL/Hr) IV Continuous <Continuous>  piperacillin/tazobactam IVPB.. 3.375 Gram(s) IV Intermittent every 12 hours  polyethylene glycol 3350 17 Gram(s) Oral daily  polyethylene glycol 3350 17 Gram(s) Oral daily  senna 2 Tablet(s) Oral at bedtime    MEDICATIONS  (PRN):  acetaminophen     Tablet .. 650 milliGRAM(s) Oral every 6 hours PRN Mild Pain (1 - 3)  calcium carbonate    500 mG (Tums) Chewable 3 Tablet(s) Chew every 6 hours PRN Dyspepsia  heparin   Injectable 5000 Unit(s) IV Push every 6 hours PRN For aPTT less than 40  heparin   Injectable 2500 Unit(s) IV Push every 6 hours PRN For aPTT between 40 - 57  melatonin 3 milliGRAM(s) Oral at bedtime PRN Insomnia  ondansetron Injectable 4 milliGRAM(s) IV Push every 6 hours PRN Nausea  sodium chloride 0.9% lock flush 10 milliLiter(s) IV Push every 1 hour PRN Pre/post blood products, medications, blood draw, and to maintain line patency    Vital Signs Last 24 Hrs  T(C): 36.2 (25 Feb 2022 10:00), Max: 36.2 (25 Feb 2022 04:27)  T(F): 97.2 (25 Feb 2022 10:00), Max: 97.2 (25 Feb 2022 04:27)  HR: 111 (25 Feb 2022 11:00) (71 - 111)  BP: 107/52  RR: 16 (25 Feb 2022 11:00) (12 - 29)  SpO2: 83% (25 Feb 2022 09:00) (62% - 100%)    PHYSICAL EXAM:  Constitutional: NAD, semirecumbent in bedside recliner  Neck:  Ecchymoses on neck  Respiratory: Breath sounds are clear bilaterally  Cardiovascular: irregular, mild tachycardia, systolic murmur  Gastrointestinal: Bowel Sounds present, soft, nontender.   Extremities: Legs with venous stasis skin changes    LABS:                      9.2    7.14  )-----------( 167      ( 25 Feb 2022 06:30 )             28.3     140  |  105  |  45<H>  ----------------------------<  112<H>  4.0   |  28  |  1.20    Ca    8.6      25 Feb 2022 06:30  Phos  3.2     02-25  Mg     2.8     02-25    TroponinI hsT: <-141.88, <-161.96, <-210.61    TTE Echo Complete w/o Contrast w/ Doppler (02.22.22 @ 12:28):   The left ventricle cavity is underdistended. Endocardium is not well visualized, however, overall left ventricular systolic function appears hyperdynamic.  Estimated left ventricular ejection fraction is 75 %.   Septal flattening is seen; this finding is consistent with right heart pressure / volume overload.   The right atrium appears severely dilated.   The right ventricle is at least moderately dilated with decreased contractility.   There are fibrocalcific changes noted to the aortic valve leaflets with restriction in leaflet excursion.    Mild mitral regurgitation.   Severe (4+) tricuspid valve regurgitation.   Severe pulmonary hypertension.   Mild pulmonic valvular regurgitation.   IVC is dilated and not collapsing with inspiration.    Tele: AF    CT Chest No Cont (02.21.22 @ 11:00):  Patchy opacity at the right base may represent atelectasis or pneumonia. Interlobular septal thickening and small pleural effusions likely representing superimposed pulmonary edema.

## 2022-02-25 NOTE — PROGRESS NOTE ADULT - PROBLEM SELECTOR PLAN 4
Right heart failure with severe pulmonary HTN and severe tricuspid regurgitation, likely chronic with outpatient furosemide use; diurese PRN.

## 2022-02-25 NOTE — PROGRESS NOTE ADULT - ASSESSMENT
A/P: 89 female with hypotension likely from septic shock and pneumonia  CAD  Hypothyroidism  CHF  HTN  COPD    Plan:  CCU    PULM: Continue NC O2, Continue Zosyn for possible PNA.  Continue UFH for Possible PE.  Will convert to a DOAC for Emperic PE treatment at this time.      Cardio: Likely hypotensive from septic shock and RV Failure, repear ECHO noted to have a hyperdynamic underfilled LV and a dilated RV and Severe PAH again.   Continue to optimize volume status.  Will continue Diuresis on a PRN basis.  Lasix 20 x 1 today.  Wean off Pressors, continue Midodrine    Renal: JOAQUÍN likely from ATN. Has improved. Monitor UO.    GI: PO Diet, constipation improved.  D/C Lactulose.  Continue Miralax    ENDO: Continue Synthroid    ID: On Zosyn now, UA was positive and could be another source for Septic Shock.  Will complete 7 days-2/28 2/22: B/L LE Dopplers were negative for a DVT    GOC: Now a DNR/DNI    OOB, PT

## 2022-02-26 LAB
ANION GAP SERPL CALC-SCNC: 5 MMOL/L — SIGNIFICANT CHANGE UP (ref 5–17)
APTT BLD: 88.5 SEC — HIGH (ref 27.5–35.5)
BUN SERPL-MCNC: 34 MG/DL — HIGH (ref 7–23)
CALCIUM SERPL-MCNC: 8.6 MG/DL — SIGNIFICANT CHANGE UP (ref 8.5–10.1)
CHLORIDE SERPL-SCNC: 104 MMOL/L — SIGNIFICANT CHANGE UP (ref 96–108)
CO2 SERPL-SCNC: 31 MMOL/L — SIGNIFICANT CHANGE UP (ref 22–31)
CREAT SERPL-MCNC: 1.03 MG/DL — SIGNIFICANT CHANGE UP (ref 0.5–1.3)
CULTURE RESULTS: SIGNIFICANT CHANGE UP
CULTURE RESULTS: SIGNIFICANT CHANGE UP
GLUCOSE SERPL-MCNC: 135 MG/DL — HIGH (ref 70–99)
HCT VFR BLD CALC: 28.8 % — LOW (ref 34.5–45)
HGB BLD-MCNC: 9.2 G/DL — LOW (ref 11.5–15.5)
MAGNESIUM SERPL-MCNC: 2.4 MG/DL — SIGNIFICANT CHANGE UP (ref 1.6–2.6)
MCHC RBC-ENTMCNC: 31.9 GM/DL — LOW (ref 32–36)
MCHC RBC-ENTMCNC: 33.9 PG — SIGNIFICANT CHANGE UP (ref 27–34)
MCV RBC AUTO: 106.3 FL — HIGH (ref 80–100)
NRBC # BLD: 3 /100 WBCS — HIGH (ref 0–0)
PHOSPHATE SERPL-MCNC: 2.9 MG/DL — SIGNIFICANT CHANGE UP (ref 2.5–4.5)
PLATELET # BLD AUTO: 176 K/UL — SIGNIFICANT CHANGE UP (ref 150–400)
POTASSIUM SERPL-MCNC: 4 MMOL/L — SIGNIFICANT CHANGE UP (ref 3.5–5.3)
POTASSIUM SERPL-SCNC: 4 MMOL/L — SIGNIFICANT CHANGE UP (ref 3.5–5.3)
RBC # BLD: 2.71 M/UL — LOW (ref 3.8–5.2)
RBC # FLD: 16 % — HIGH (ref 10.3–14.5)
SODIUM SERPL-SCNC: 140 MMOL/L — SIGNIFICANT CHANGE UP (ref 135–145)
SPECIMEN SOURCE: SIGNIFICANT CHANGE UP
SPECIMEN SOURCE: SIGNIFICANT CHANGE UP
WBC # BLD: 7.73 K/UL — SIGNIFICANT CHANGE UP (ref 3.8–10.5)
WBC # FLD AUTO: 7.73 K/UL — SIGNIFICANT CHANGE UP (ref 3.8–10.5)

## 2022-02-26 PROCEDURE — 99231 SBSQ HOSP IP/OBS SF/LOW 25: CPT

## 2022-02-26 PROCEDURE — 99291 CRITICAL CARE FIRST HOUR: CPT

## 2022-02-26 PROCEDURE — 99232 SBSQ HOSP IP/OBS MODERATE 35: CPT

## 2022-02-26 PROCEDURE — 99233 SBSQ HOSP IP/OBS HIGH 50: CPT

## 2022-02-26 RX ORDER — MIDODRINE HYDROCHLORIDE 2.5 MG/1
5 TABLET ORAL EVERY 8 HOURS
Refills: 0 | Status: DISCONTINUED | OUTPATIENT
Start: 2022-02-26 | End: 2022-02-28

## 2022-02-26 RX ORDER — DIGOXIN 250 MCG
125 TABLET ORAL DAILY
Refills: 0 | Status: DISCONTINUED | OUTPATIENT
Start: 2022-02-26 | End: 2022-03-01

## 2022-02-26 RX ORDER — ACETAMINOPHEN 500 MG
650 TABLET ORAL EVERY 6 HOURS
Refills: 0 | Status: DISCONTINUED | OUTPATIENT
Start: 2022-02-26 | End: 2022-03-01

## 2022-02-26 RX ORDER — RIVAROXABAN 15 MG-20MG
15 KIT ORAL
Refills: 0 | Status: DISCONTINUED | OUTPATIENT
Start: 2022-02-26 | End: 2022-03-01

## 2022-02-26 RX ORDER — PANTOPRAZOLE SODIUM 20 MG/1
40 TABLET, DELAYED RELEASE ORAL
Refills: 0 | Status: DISCONTINUED | OUTPATIENT
Start: 2022-02-26 | End: 2022-03-01

## 2022-02-26 RX ADMIN — HEPARIN SODIUM 600 UNIT(S)/HR: 5000 INJECTION INTRAVENOUS; SUBCUTANEOUS at 07:26

## 2022-02-26 RX ADMIN — MIDODRINE HYDROCHLORIDE 10 MILLIGRAM(S): 2.5 TABLET ORAL at 05:41

## 2022-02-26 RX ADMIN — PIPERACILLIN AND TAZOBACTAM 25 GRAM(S): 4; .5 INJECTION, POWDER, LYOPHILIZED, FOR SOLUTION INTRAVENOUS at 11:41

## 2022-02-26 RX ADMIN — Medication 650 MILLIGRAM(S): at 15:28

## 2022-02-26 RX ADMIN — MIDODRINE HYDROCHLORIDE 5 MILLIGRAM(S): 2.5 TABLET ORAL at 21:15

## 2022-02-26 RX ADMIN — Medication 125 MICROGRAM(S): at 09:55

## 2022-02-26 RX ADMIN — PIPERACILLIN AND TAZOBACTAM 25 GRAM(S): 4; .5 INJECTION, POWDER, LYOPHILIZED, FOR SOLUTION INTRAVENOUS at 21:15

## 2022-02-26 RX ADMIN — BUDESONIDE AND FORMOTEROL FUMARATE DIHYDRATE 2 PUFF(S): 160; 4.5 AEROSOL RESPIRATORY (INHALATION) at 21:20

## 2022-02-26 RX ADMIN — PANTOPRAZOLE SODIUM 40 MILLIGRAM(S): 20 TABLET, DELAYED RELEASE ORAL at 09:55

## 2022-02-26 RX ADMIN — Medication 125 MICROGRAM(S): at 05:41

## 2022-02-26 RX ADMIN — SENNA PLUS 1 TABLET(S): 8.6 TABLET ORAL at 21:15

## 2022-02-26 RX ADMIN — CHLORHEXIDINE GLUCONATE 1 APPLICATION(S): 213 SOLUTION TOPICAL at 05:41

## 2022-02-26 RX ADMIN — ALBUTEROL 2 PUFF(S): 90 AEROSOL, METERED ORAL at 13:49

## 2022-02-26 RX ADMIN — ALBUTEROL 2 PUFF(S): 90 AEROSOL, METERED ORAL at 20:38

## 2022-02-26 RX ADMIN — Medication 100 MILLIGRAM(S): at 11:42

## 2022-02-26 RX ADMIN — MIDODRINE HYDROCHLORIDE 10 MILLIGRAM(S): 2.5 TABLET ORAL at 13:01

## 2022-02-26 RX ADMIN — HEPARIN SODIUM 600 UNIT(S)/HR: 5000 INJECTION INTRAVENOUS; SUBCUTANEOUS at 07:20

## 2022-02-26 RX ADMIN — RIVAROXABAN 15 MILLIGRAM(S): KIT at 17:32

## 2022-02-26 RX ADMIN — Medication 3 MILLIGRAM(S): at 21:14

## 2022-02-26 RX ADMIN — ALBUTEROL 2 PUFF(S): 90 AEROSOL, METERED ORAL at 09:00

## 2022-02-26 RX ADMIN — BUDESONIDE AND FORMOTEROL FUMARATE DIHYDRATE 2 PUFF(S): 160; 4.5 AEROSOL RESPIRATORY (INHALATION) at 09:00

## 2022-02-26 NOTE — PROGRESS NOTE ADULT - ASSESSMENT
Impression:  1. pelvic fracture sp fall  2. acute urinary retention  3. septic shock  4. acute UTI  5. chronic RV dysfunction/severe pHTN  6. chronic atrial fibrillation  7. severe protein malnutrition    Plan:  Neuro - nonfocal, avoiding neurosuppressants, melatonin for sleep hygiene    CV -  off pressors now, decrease dosing of midodrine to 5mg TID to see if will tolerate          protection of MAP to keep >65 with pressors as needed          Echo with chronic RV dysfunction likely, severe pHTN, LV function nml          afib chronic, rate controlled, goal HR<110, cont digoxin          keep K~4 and Mg>2 for optimal arrhythmia suppression          full AC now with xeralto          Cardio following    Pulm -  currently stable on NC             breathing unlabored    GI -  PO diet as tolerated, bowel regimen    Renal - Cr stable, strict I/Os, jackson to remain secondary to acute retention as per urology, BMP in am    Heme -  Full AC with xeralto, no signs of active bleeding, H/H stable, anemia macrocytic, check B12 and folate levels,                transfuse for Hbg<7 or signs of active bleeding     ID - + u/A, BCx NG, cont full course of empiric IV abx full 7days, afebrile, WBC normal, Abx discontinuation based on               clinical features and culture data.    Endo -  Aggressive glycemic control to limit FS glucose to < 180mg/dl, BS stable, check TSH, cont synthroid.       Impression:  1. pelvic fracture sp fall  2. acute urinary retention  3. septic shock  4. acute UTI  5. chronic RV dysfunction/severe pHTN  6. chronic atrial fibrillation  7. severe protein malnutrition    Plan:  Neuro - nonfocal, avoiding neurosuppressants, melatonin for sleep hygiene    CV -  off pressors now, decrease dosing of midodrine to 5mg TID to see if will tolerate          protection of MAP to keep >65 with pressors as needed          Echo with chronic RV dysfunction likely, severe pHTN, LV function nml          afib chronic, rate controlled, goal HR<110, cont digoxin          keep K~4 and Mg>2 for optimal arrhythmia suppression          full AC now with xeralto          Cardio following    Pulm -  currently stable on NC             breathing unlabored    GI -  PO diet as tolerated, bowel regimen    Renal - Cr stable, strict I/Os, jackson to remain secondary to acute retention as per urology, BMP in am    Heme -  Full AC with xeralto, no signs of active bleeding, H/H stable, anemia macrocytic, check B12 and folate levels,                transfuse for Hbg<7 or signs of active bleeding     ID - + u/A, BCx NG, cont full course of empiric IV abx full 7days, afebrile, WBC normal, Abx discontinuation based on               clinical features and culture data.    Endo -  Aggressive glycemic control to limit FS glucose to < 180mg/dl, BS stable, check TSH, cont synthroid.      GOC: DNR/DNI Impression:  1. pelvic fracture sp fall  2. acute urinary retention  3. septic shock  4. acute UTI  5. chronic RV dysfunction/severe pHTN  6. chronic atrial fibrillation  7. severe protein malnutrition    Plan:  Neuro - nonfocal, avoiding neurosuppressants, melatonin for sleep hygiene    CV -  off pressors now, decrease dosing of midodrine to 5mg TID to see if will tolerate          protection of MAP to keep >65 with pressors as needed          Echo with chronic RV dysfunction likely, severe pHTN, LV function nml          afib chronic, rate controlled, goal HR<110, cont digoxin          keep K~4 and Mg>2 for optimal arrhythmia suppression          full AC now with xeralto          Cardio following    Pulm -  currently stable on RA             breathing unlabored, no wheezing             cont bronchodilators and inhaled steroids    GI -  PO diet as tolerated, bowel regimen    Renal - Cr stable, strict I/Os, jackson to remain secondary to acute retention as per urology, BMP in am    Heme -  Full AC with xeralto, no signs of active bleeding, H/H stable, anemia macrocytic, check B12 and folate levels,                transfuse for Hbg<7 or signs of active bleeding     ID - + u/A, BCx NG, cont full course of empiric IV abx full 7days, afebrile, WBC normal, Abx discontinuation based on               clinical features and culture data.    Endo -  Aggressive glycemic control to limit FS glucose to < 180mg/dl, BS stable, check TSH, cont synthroid.      GOC: DNR/DNI Impression:  1. pelvic fracture sp fall  2. acute urinary retention  3. septic shock  4. acute UTI  5. chronic RV dysfunction/severe pHTN  6. chronic atrial fibrillation  7. severe protein malnutrition    Plan:  Neuro - nonfocal, avoiding neurosuppressants, melatonin for sleep hygiene    CV -  off pressors now, decrease dosing of midodrine to 5mg TID to see if will tolerate          protection of MAP to keep >65 with pressors as needed          Echo with chronic RV dysfunction likely, severe pHTN, LV function nml          afib chronic, rate controlled, goal HR<110, cont digoxin          keep K~4 and Mg>2 for optimal arrhythmia suppression          full AC now with xeralto          PRN lasix as needed to keep even to negative fluid balance, overall net negative.          Cardio following    Pulm -  currently stable on RA             breathing unlabored, no wheezing             cont bronchodilators and inhaled steroids    GI -  PO diet as tolerated, bowel regimen    Renal - Cr stable, strict I/Os, jackson to remain secondary to acute retention as per urology, BMP in am    Heme -  Full AC with xeralto, no signs of active bleeding, H/H stable, anemia macrocytic, check B12 and folate levels,                transfuse for Hbg<7 or signs of active bleeding     ID - + u/A, BCx NG, cont full course of empiric IV abx full 7days, afebrile, WBC normal, Abx discontinuation based on               clinical features and culture data.    Endo -  Aggressive glycemic control to limit FS glucose to < 180mg/dl, BS stable, check TSH, cont synthroid.      GOC: DNR/DNI

## 2022-02-26 NOTE — PROGRESS NOTE ADULT - SUBJECTIVE AND OBJECTIVE BOX
HPI:  Pt is a 89 yo F that presented to the ED for evaluation of right hip pain. She stated that she was ambulating with her walker when it got stuck on her bathroom door causing her to fall on right buttock. Pt was released from [facility] in late november after knee surgery.  On exam has some lateral tenderness at the left knee. Has had a left TKA at [facility].      Patient transferred to the CCU for Hypotension    2/22: Patient seen in bed encephalopathic, hypotensive on escalating doses of pressors, oliguric   2/23: MS Improving, Weaning Pressors, JOAQUÍN improving  2/24: She remains on Brandon, JOAQUÍN continues to improve, good UO, taking PO  2/25: Patient remains on a low dose on brandon, tolerating PO, good UO  2/26: Was on brandon over night, responded well to lasix, tolerating PO      PAST MEDICAL & SURGICAL HISTORY:  Mitral valve insufficiency    Aortic valve regurgitation    Osteopenia    CAD (coronary artery disease)    Gout    Hypothyroid    CHF (congestive heart failure)    Anxiety    Anemia    HTN (hypertension)    Afib    COPD (chronic obstructive pulmonary disease)    Status cardiac pacemaker    S/P knee replacement        FAMILY HISTORY:  No pertinent family history in first degree relatives        Social Hx:    Allergies    codeine (Unknown)    Intolerances            ICU Vital Signs Last 24 Hrs  T(C): 36.7 (26 Feb 2022 06:00), Max: 36.7 (26 Feb 2022 06:00)  T(F): 98 (26 Feb 2022 06:00), Max: 98 (26 Feb 2022 06:00)  HR: 96 (26 Feb 2022 09:00) (84 - 125)  BP: --  BP(mean): --  ABP: 120/59 (26 Feb 2022 09:00) (89/39 - 134/67)  ABP(mean): 84 (26 Feb 2022 09:00) (59 - 225)  RR: 21 (26 Feb 2022 09:00) (13 - 26)  SpO2: 99% (26 Feb 2022 09:00) (88% - 100%)          I&O's Summary    25 Feb 2022 07:01  -  26 Feb 2022 07:00  --------------------------------------------------------  IN: 1211.5 mL / OUT: 2500 mL / NET: -1288.5 mL                              9.2    7.73  )-----------( 176      ( 26 Feb 2022 06:00 )             28.8       02-26    140  |  104  |  34<H>  ----------------------------<  135<H>  4.0   |  31  |  1.03    Ca    8.6      26 Feb 2022 06:00  Phos  2.9     02-26  Mg     2.4     02-26    MEDICATIONS  (STANDING):  acetaminophen   IVPB .. 1000 milliGRAM(s) IV Intermittent every 6 hours  ALBUTerol    90 MICROgram(s) HFA Inhaler 2 Puff(s) Inhalation every 6 hours  allopurinol 100 milliGRAM(s) Oral daily  budesonide 160 MICROgram(s)/formoterol 4.5 MICROgram(s) Inhaler 2 Puff(s) Inhalation two times a day  chlorhexidine 2% Cloths 1 Application(s) Topical <User Schedule>  digoxin     Tablet 125 MICROGram(s) Oral daily  heparin  Infusion.  Unit(s)/Hr (11 mL/Hr) IV Continuous <Continuous>  levothyroxine 125 MICROGram(s) Oral daily  midodrine 10 milliGRAM(s) Oral every 8 hours  pantoprazole    Tablet 40 milliGRAM(s) Oral before breakfast  piperacillin/tazobactam IVPB.. 3.375 Gram(s) IV Intermittent every 12 hours  polyethylene glycol 3350 17 Gram(s) Oral daily  polyethylene glycol 3350 17 Gram(s) Oral daily  rivaroxaban 15 milliGRAM(s) Oral with dinner  senna 2 Tablet(s) Oral at bedtime    MEDICATIONS  (PRN):  acetaminophen     Tablet .. 650 milliGRAM(s) Oral every 6 hours PRN Temp greater or equal to 38C (100.4F), Moderate Pain (4 - 6)  acetaminophen     Tablet .. 650 milliGRAM(s) Oral every 6 hours PRN Mild Pain (1 - 3)  calcium carbonate    500 mG (Tums) Chewable 3 Tablet(s) Chew every 6 hours PRN Dyspepsia  heparin   Injectable 5000 Unit(s) IV Push every 6 hours PRN For aPTT less than 40  heparin   Injectable 2500 Unit(s) IV Push every 6 hours PRN For aPTT between 40 - 57  melatonin 3 milliGRAM(s) Oral at bedtime PRN Insomnia  ondansetron Injectable 4 milliGRAM(s) IV Push every 6 hours PRN Nausea  sodium chloride 0.9% lock flush 10 milliLiter(s) IV Push every 1 hour PRN Pre/post blood products, medications, blood draw, and to maintain line patency      DVT Prophylaxis:    Advanced Directives:  Discussed with:    Visit Information: 30 min    ** Time is exclusive of billed procedures and/or teaching and/or routine family updates.

## 2022-02-26 NOTE — PROGRESS NOTE ADULT - ASSESSMENT
89 YO F with right sacral ala fracture and superior and inferior rami fracture, off pressors.    Plan:   Cardiology consult appreciated  PT eval for dispo   Multimodal pain control  Incentive spirometery  Vitals, IS/OS Q 4  Diet: Soft Cardiac  GI prophylaxis  nephrology plan appreciated  Activity: WBAT  PT  SW for eventual D/C planning

## 2022-02-26 NOTE — PROGRESS NOTE ADULT - ASSESSMENT
A/P: 89 female with hypotension likely from septic shock and pneumonia  CAD  Hypothyroidism  CHF  HTN  COPD    Plan:  CCU    PULM: Continue NC O2, Continue Zosyn for possible PNA.  Change UFH to Xarelto.    Cardio: Likely hypotensive from septic shock and RV Failure, repear ECHO noted to have a hyperdynamic underfilled LV and a dilated RV and Severe PAH again.  Continue to optimize volume status.  Will continue Diuresis on a PRN basis. Start Digoxin today.  Likely to resume Po Lasix tomorrow   Wean off Pressors, continue Midodrine    Renal: JOAQUÍN likely from ATN. Has improved. Monitor UO.    GI: PO Diet, constipation improved.  Continue Miralax    ENDO: Continue Synthroid    ID: On Zosyn now, UA was positive and could be another source for Septic Shock.  Will complete 7 days-2/28 2/22: B/L LE Dopplers were negative for a DVT    GOC: Now a DNR/DNI    OOB, PT

## 2022-02-26 NOTE — PROGRESS NOTE ADULT - SUBJECTIVE AND OBJECTIVE BOX
REASON FOR VISIT: R heart failure    HPI:  89 year old woman with a history of atrial fibrillation, congestive heart failure (last hospitalization was at [facility] in September 2021), severe tricuspid regurgitation, severe pulmonary HTN, CAD, HTN, gout admitted on 2/20/22 with a pelvic fracture following a fall with hospitalization complicated by hypotension (requiring vasopressor), hypoxia.    2/22/22  Events noted , patient was hypotensive ,stable hemoglobin , became less responsive last night , markedly hypoglycemic , started on pressors , patient very lethargic arousable with vocal stimuli , hypoxic on VM  , heart rate high 90s     2/23/22: Pt comfortable, awake and alert, on 3L NC, mildly hypotensive on multiple pressors.   2/24/22:  Somnolent but easily awakened; says she doesn't know how she feels but denies pain.  2/25/22:  Much more awake and interactive today -- complaining only of fatigue.  2/26/22:  "I had a coughing spell" earlier; now comfortable but weak/fatigued.    MEDICATIONS  (STANDING):  acetaminophen   IVPB .. 1000 milliGRAM(s) IV Intermittent every 6 hours  ALBUTerol    90 MICROgram(s) HFA Inhaler 2 Puff(s) Inhalation every 6 hours  allopurinol 100 milliGRAM(s) Oral daily  budesonide 160 MICROgram(s)/formoterol 4.5 MICROgram(s) Inhaler 2 Puff(s) Inhalation two times a day  chlorhexidine 2% Cloths 1 Application(s) Topical <User Schedule>  heparin  Infusion.  Unit(s)/Hr (11 mL/Hr) IV Continuous <Continuous>  levothyroxine 125 MICROGram(s) Oral daily  midodrine 10 milliGRAM(s) Oral every 8 hours  pantoprazole  Injectable 40 milliGRAM(s) IV Push daily  phenylephrine    Infusion 0.1 MICROgram(s)/kG/Min (2.21 mL/Hr) IV Continuous <Continuous>  piperacillin/tazobactam IVPB.. 3.375 Gram(s) IV Intermittent every 12 hours  polyethylene glycol 3350 17 Gram(s) Oral daily  polyethylene glycol 3350 17 Gram(s) Oral daily  rivaroxaban 15 milliGRAM(s) Oral with dinner  senna 2 Tablet(s) Oral at bedtime    Vital Signs Last 24 Hrs  T(C): 36.7 (26 Feb 2022 06:00), Max: 36.7 (26 Feb 2022 06:00)  T(F): 98 (26 Feb 2022 06:00), Max: 98 (26 Feb 2022 06:00)  HR: 96 (26 Feb 2022 09:00) (84 - 125)  BP: 120/59  RR: 21 (26 Feb 2022 09:00) (13 - 26)  SpO2: 99% (26 Feb 2022 09:00) (88% - 100%)    PHYSICAL EXAM:  Constitutional: NAD, supine in bed, appears stated age and frail  Neck:  Ecchymoses on neck/chest  Respiratory: Breath sounds are clear bilaterally  Cardiovascular: irregular, HR ~ 100; systolic murmur  Gastrointestinal: Bowel Sounds present, soft, nontender.   Extremities: Legs with venous stasis skin changes, and mild edema (pitting)    LABS:                      9.2    7.73  )-----------( 176      ( 26 Feb 2022 06:00 )             28.8     140  |  104  |  34<H>  ----------------------------<  135<H>  4.0   |  31  |  1.03    Ca    8.6      26 Feb 2022 06:00  Phos  2.9     02-26  Mg     2.4     02-26    TroponinI hsT: <-141.88, <-161.96, <-210.61    TTE Echo Complete w/o Contrast w/ Doppler (02.22.22 @ 12:28):   The left ventricle cavity is underdistended. Endocardium is not well visualized, however, overall left ventricular systolic function appears hyperdynamic.  Estimated left ventricular ejection fraction is 75 %.   Septal flattening is seen; this finding is consistent with right heart pressure / volume overload.   The right atrium appears severely dilated.   The right ventricle is at least moderately dilated with decreased contractility.   There are fibrocalcific changes noted to the aortic valve leaflets with restriction in leaflet excursion.    Mild mitral regurgitation.   Severe (4+) tricuspid valve regurgitation.   Severe pulmonary hypertension.   Mild pulmonic valvular regurgitation.   IVC is dilated and not collapsing with inspiration.    Tele: AF    CT Chest No Cont (02.21.22 @ 11:00):  Patchy opacity at the right base may represent atelectasis or pneumonia. Interlobular septal thickening and small pleural effusions likely representing superimposed pulmonary edema.

## 2022-02-26 NOTE — PROGRESS NOTE ADULT - SUBJECTIVE AND OBJECTIVE BOX
History of Present Illness:   Pt is a 89 yo F that presented to the ED for evaluation of right hip pain. She stated that she was ambulating with her walker this morning, when it got stuck on her bathroom door causing her to fall on her  right buttock. She called her daughter who took her to her house because of the fact that she lives alone. She stated that she has been able to ambulate minimally to get into the car to the ED since the fall but with pain. She reports that she lives at home alone with a home aide during the day for 2 hours. Pt was released from [facility] in late november after knee surgery.  On exam has some lateral tenderness at the left knee. Has had a left TKR at [facility].  Otherwise denies head strike/LOC  -hosp course sign for hypotension requiring ICU care and pressor support     2.21: patient seen on 3E, not confused, able to give history; c/o some pelvis pain with movements.  requires 4l NC  2.22: on pressors ,lethargic   2.23: alert, oriented to name and place, anxious  2.24: more alert today, good urine output, still on pressors   2.25: no distress, +gen weakness   2.26: sat in the chair yesterday; Left IJ CVC removed  doing well, no cp, no sob      REVIEW OF SYSTEMS:  unable to obtain     All other review of systems is negative unless indicated above    Vital Signs Last 24 Hrs  T(C): 36.2 (25 Feb 2022 10:00), Max: 36.2 (25 Feb 2022 04:27)  T(F): 97.2 (25 Feb 2022 10:00), Max: 97.2 (25 Feb 2022 04:27)  HR: 109 (25 Feb 2022 14:00) (71 - 111)  RR: 19 (25 Feb 2022 14:00) (12 - 29)  SpO2: 98% (25 Feb 2022 14:00) (62% - 100%)  PHYSICAL EXAM:    GENERAL: NAD  HEENT:  NC/AT, EOMI, PERRLA, No scleral icterus, Moist mucous membranes  NECK: Supple, No JVD  CNS:  Alert & Oriented X3, Motor Strength 5/5 B/L upper and lower extremities; DTRs 2+ intact   LUNG: Decreased Breath sounds, Rt crackles, no wheezing   HEART: RRR; No murmurs, No rubs  ABDOMEN: +BS, ST/ND/NT  GENITOURINARY: Voiding, Bladder not distended  EXTREMITIES:  2+ Peripheral Pulses, No clubbing, No cyanosis, No tibial edema  MUSCULOSKELTAL: Joints normal ROM, No TTP, No effusion  VAGINAL: deferred  SKIN: no rashes, intact skin  RECTAL: deferred, not indicated  BREAST: deferred    Labs:                        9.2    7.14  )-----------( 167      ( 25 Feb 2022 06:30 )             28.3     02-25    140  |  105  |  45<H>  ----------------------------<  112<H>  4.0   |  28  |  1.20    Ca    8.6      25 Feb 2022 06:30  Phos  3.2     02-25  Mg     2.8     02-25    MEDICATIONS  (STANDING):  acetaminophen   IVPB .. 1000 milliGRAM(s) IV Intermittent every 6 hours  ALBUTerol    90 MICROgram(s) HFA Inhaler 2 Puff(s) Inhalation every 6 hours  allopurinol 100 milliGRAM(s) Oral daily  budesonide 160 MICROgram(s)/formoterol 4.5 MICROgram(s) Inhaler 2 Puff(s) Inhalation two times a day  chlorhexidine 2% Cloths 1 Application(s) Topical <User Schedule>  heparin  Infusion.  Unit(s)/Hr (11 mL/Hr) IV Continuous <Continuous>  lactulose Syrup 30 Gram(s) Oral two times a day  levothyroxine 125 MICROGram(s) Oral daily  midodrine 10 milliGRAM(s) Oral every 8 hours  pantoprazole  Injectable 40 milliGRAM(s) IV Push daily  phenylephrine    Infusion 0.1 MICROgram(s)/kG/Min (2.21 mL/Hr) IV Continuous <Continuous>  piperacillin/tazobactam IVPB.. 3.375 Gram(s) IV Intermittent every 12 hours  polyethylene glycol 3350 17 Gram(s) Oral daily  senna 2 Tablet(s) Oral at bedtime        CT Chest: RLL Pna      a/p:    1. Acute  Hypoxic respiratory failure due to Pna  Septic Shock  CT Chest noted  Antibiotic changed to Zosyn IV day#5  off pressor support  Blood Cx x 2 : ngtd  c/w bronchodilators, ICS    2. Cardiogenic shock due to sepsis and Rt Heart failure/Severe Pulm Htn:  off Milrinone  on intermittent diuretic  on IV Heparin for possible pulmonary emboli     3.  Rt pelvis fracture due to mechanical fall: stable   analgesia prn  PT / Ortho evaluations     4. Afib: rate controlled   on IV heparin; change to NOAC when she is transferred to floor     5. ARF superimposed on Ckd : improved  likely due to ATN and prerenal state in the setting of sepsis/cardiogenic shock

## 2022-02-26 NOTE — PROGRESS NOTE ADULT - SUBJECTIVE AND OBJECTIVE BOX
Pt seen and examined at bedside. Patient continues to have tendernss right hip and gluteus. Patient denies fever, chills, SOB and CP. Phenylephrine shut off this morning.    Vital Signs Last 24 Hrs  T(C): 36.7 (26 Feb 2022 06:00), Max: 36.7 (26 Feb 2022 06:00)  T(F): 98 (26 Feb 2022 06:00), Max: 98 (26 Feb 2022 06:00)  HR: 96 (26 Feb 2022 09:00) (84 - 125)  BP: --  BP(mean): --  RR: 21 (26 Feb 2022 09:00) (13 - 26)  SpO2: 99% (26 Feb 2022 09:00) (88% - 100%)      Physical exam:  Pt is aaox3  Pt in no acute distress  Psych: normal affect  Resp: CTAB  CVS: S1S2(+)  ABD: bowel sounds (+), soft, non distended, no rebound, no guarding  EXT: no calf tenderness or edema to exam b/l, on VTE prophylaxis. tender to palpation over gluteus, suprapubic, LLE knee. b/l LE edema    .  LABS:                                    9.2    7.73  )-----------( 176      ( 26 Feb 2022 06:00 )             28.8   02-26    140  |  104  |  34<H>  ----------------------------<  135<H>  4.0   |  31  |  1.03    Ca    8.6      26 Feb 2022 06:00  Phos  2.9     02-26  Mg     2.4     02-26     Pt seen and examined at bedside. Patient continues to have tendernss right hip and gluteus. Patient denies fever, chills, SOB and CP. Phenylephrine shut off this morning.    ROS: as abovementioned otherwise negative    Vital Signs Last 24 Hrs  T(C): 36.7 (26 Feb 2022 06:00), Max: 36.7 (26 Feb 2022 06:00)  T(F): 98 (26 Feb 2022 06:00), Max: 98 (26 Feb 2022 06:00)  HR: 96 (26 Feb 2022 09:00) (84 - 125)  BP: --  BP(mean): --  RR: 21 (26 Feb 2022 09:00) (13 - 26)  SpO2: 99% (26 Feb 2022 09:00) (88% - 100%)      Physical exam:  Pt is aaox3  Pt in no acute distress  Psych: normal affect  Resp: CTAB  CVS: S1S2(+)  ABD: bowel sounds (+), soft, non distended, no rebound, no guarding  EXT: no calf tenderness or edema to exam b/l, on VTE prophylaxis. tender to palpation over gluteus, suprapubic, LLE knee. b/l LE edema    .  LABS:                                    9.2    7.73  )-----------( 176      ( 26 Feb 2022 06:00 )             28.8   02-26    140  |  104  |  34<H>  ----------------------------<  135<H>  4.0   |  31  |  1.03    Ca    8.6      26 Feb 2022 06:00  Phos  2.9     02-26  Mg     2.4     02-26

## 2022-02-26 NOTE — PROGRESS NOTE ADULT - SUBJECTIVE AND OBJECTIVE BOX
Patient is a 89y old  Female who presents with a chief complaint of Fall (26 Feb 2022 13:35)      BRIEF HOSPITAL COURSE: ***    Events last 24 hours: ***    PAST MEDICAL & SURGICAL HISTORY:  Mitral valve insufficiency    Aortic valve regurgitation    Osteopenia    CAD (coronary artery disease)    Gout    Hypothyroid    CHF (congestive heart failure)    Anxiety    Anemia    HTN (hypertension)    Afib    COPD (chronic obstructive pulmonary disease)    Status cardiac pacemaker    S/P knee replacement        Review of Systems:  CONSTITUTIONAL: No fever, chills, or fatigue  EYES: No eye pain, visual disturbances, or discharge  ENMT:  No difficulty hearing, tinnitus, vertigo; No sinus or throat pain  NECK: No pain or stiffness  RESPIRATORY: No cough, wheezing, chills or hemoptysis; No shortness of breath  CARDIOVASCULAR: No chest pain, palpitations, dizziness, or leg swelling  GASTROINTESTINAL: No abdominal or epigastric pain. No nausea, vomiting, or hematemesis; No diarrhea or constipation. No melena or hematochezia.  GENITOURINARY: No dysuria, frequency, hematuria, or incontinence  NEUROLOGICAL: No headaches, memory loss, loss of strength, numbness, or tremors  SKIN: No itching, burning, rashes, or lesions   MUSCULOSKELETAL: No joint pain or swelling; No muscle, back, or extremity pain  PSYCHIATRIC: No depression, anxiety, mood swings, or difficulty sleeping      Medications:  piperacillin/tazobactam IVPB.. 3.375 Gram(s) IV Intermittent every 12 hours    digoxin     Tablet 125 MICROGram(s) Oral daily  midodrine 10 milliGRAM(s) Oral every 8 hours    ALBUTerol    90 MICROgram(s) HFA Inhaler 2 Puff(s) Inhalation every 6 hours  budesonide 160 MICROgram(s)/formoterol 4.5 MICROgram(s) Inhaler 2 Puff(s) Inhalation two times a day    acetaminophen     Tablet .. 650 milliGRAM(s) Oral every 6 hours PRN  acetaminophen     Tablet .. 650 milliGRAM(s) Oral every 6 hours PRN  acetaminophen   IVPB .. 1000 milliGRAM(s) IV Intermittent every 6 hours  melatonin 3 milliGRAM(s) Oral at bedtime PRN  ondansetron Injectable 4 milliGRAM(s) IV Push every 6 hours PRN      rivaroxaban 15 milliGRAM(s) Oral with dinner    calcium carbonate    500 mG (Tums) Chewable 3 Tablet(s) Chew every 6 hours PRN  pantoprazole    Tablet 40 milliGRAM(s) Oral before breakfast  polyethylene glycol 3350 17 Gram(s) Oral daily  senna 2 Tablet(s) Oral at bedtime      allopurinol 100 milliGRAM(s) Oral daily  levothyroxine 125 MICROGram(s) Oral daily    sodium chloride 0.9% lock flush 10 milliLiter(s) IV Push every 1 hour PRN      chlorhexidine 2% Cloths 1 Application(s) Topical <User Schedule>            ICU Vital Signs Last 24 Hrs  T(C): 36.4 (26 Feb 2022 15:00), Max: 36.7 (26 Feb 2022 06:00)  T(F): 97.5 (26 Feb 2022 15:00), Max: 98 (26 Feb 2022 06:00)  HR: 84 (26 Feb 2022 19:00) (71 - 125)  BP: --  BP(mean): --  ABP: 107/46 (26 Feb 2022 19:00) (89/39 - 134/74)  ABP(mean): 71 (26 Feb 2022 19:00) (59 - 100)  RR: 22 (26 Feb 2022 19:00) (9 - 26)  SpO2: 97% (26 Feb 2022 18:00) (73% - 100%)                LABS:                        9.2    7.73  )-----------( 176      ( 26 Feb 2022 06:00 )             28.8     02-26    140  |  104  |  34<H>  ----------------------------<  135<H>  4.0   |  31  |  1.03    Ca    8.6      26 Feb 2022 06:00  Phos  2.9     02-26  Mg     2.4     02-26            CAPILLARY BLOOD GLUCOSE        PTT - ( 26 Feb 2022 06:00 )  PTT:88.5 sec    CULTURES:  Culture Results:   No Growth Final (02-21 @ 14:26)  Culture Results:   No Growth Final (02-21 @ 14:21)      Physical Examination:    General: No acute distress.  Alert, oriented, interactive, nonfocal    HEENT: Pupils equal, reactive to light.  Symmetric.    PULM: Clear to auscultation bilaterally, no significant sputum production    CVS: Regular rate and rhythm, no murmurs, rubs, or gallops    ABD: Soft, nondistended, nontender, normoactive bowel sounds, no masses    EXT: No edema, nontender    SKIN: Warm and well perfused, no rashes noted.    RADIOLOGY: ***    CRITICAL CARE TIME SPENT: ***   Patient is a 89y old  Female who presents with a chief complaint of Fall (26 Feb 2022 13:35)      BRIEF HOSPITAL COURSE: 89F with PMHx  CAD, MI, severe pHTN, TR, AFib on Eliquis, PPM, hypothyroidism, COPD, that presented to the ED for evaluation of right hip pain following a fall at home. Found to have pelvic fracture. Initially admitted to medical floor. Course complicated by development of  shock requiring pressors, JOAQUÍN, acute urinary retention.    Events last 24 hours: afebrile, weaned off pressors this am, tolerating diet with poor intake. Denies CP, SOB, abd pain, N/V, diarrhea.     PAST MEDICAL & SURGICAL HISTORY:  Mitral valve insufficiency    Aortic valve regurgitation    Osteopenia    CAD (coronary artery disease)    Gout    Hypothyroid    CHF (congestive heart failure)    Anxiety    Anemia    HTN (hypertension)    Afib    COPD (chronic obstructive pulmonary disease)    Status cardiac pacemaker    S/P knee replacement        Review of Systems:  12pt ROS negative unless otherwise stated above      Medications:  piperacillin/tazobactam IVPB.. 3.375 Gram(s) IV Intermittent every 12 hours    digoxin     Tablet 125 MICROGram(s) Oral daily  midodrine 10 milliGRAM(s) Oral every 8 hours    ALBUTerol    90 MICROgram(s) HFA Inhaler 2 Puff(s) Inhalation every 6 hours  budesonide 160 MICROgram(s)/formoterol 4.5 MICROgram(s) Inhaler 2 Puff(s) Inhalation two times a day    acetaminophen     Tablet .. 650 milliGRAM(s) Oral every 6 hours PRN  acetaminophen     Tablet .. 650 milliGRAM(s) Oral every 6 hours PRN  acetaminophen   IVPB .. 1000 milliGRAM(s) IV Intermittent every 6 hours  melatonin 3 milliGRAM(s) Oral at bedtime PRN  ondansetron Injectable 4 milliGRAM(s) IV Push every 6 hours PRN      rivaroxaban 15 milliGRAM(s) Oral with dinner    calcium carbonate    500 mG (Tums) Chewable 3 Tablet(s) Chew every 6 hours PRN  pantoprazole    Tablet 40 milliGRAM(s) Oral before breakfast  polyethylene glycol 3350 17 Gram(s) Oral daily  senna 2 Tablet(s) Oral at bedtime      allopurinol 100 milliGRAM(s) Oral daily  levothyroxine 125 MICROGram(s) Oral daily    sodium chloride 0.9% lock flush 10 milliLiter(s) IV Push every 1 hour PRN      chlorhexidine 2% Cloths 1 Application(s) Topical <User Schedule>            ICU Vital Signs Last 24 Hrs  T(C): 36.4 (26 Feb 2022 15:00), Max: 36.7 (26 Feb 2022 06:00)  T(F): 97.5 (26 Feb 2022 15:00), Max: 98 (26 Feb 2022 06:00)  HR: 84 (26 Feb 2022 19:00) (71 - 125)  BP: --  BP(mean): --  ABP: 107/46 (26 Feb 2022 19:00) (89/39 - 134/74)  ABP(mean): 71 (26 Feb 2022 19:00) (59 - 100)  RR: 22 (26 Feb 2022 19:00) (9 - 26)  SpO2: 97% (26 Feb 2022 18:00) (73% - 100%)      I&O's Summary    25 Feb 2022 07:01  -  26 Feb 2022 07:00  --------------------------------------------------------  IN: 1211.5 mL / OUT: 2500 mL / NET: -1288.5 mL    26 Feb 2022 07:01  -  26 Feb 2022 20:15  --------------------------------------------------------  IN: 800 mL / OUT: 500 mL / NET: 300 mL        LABS:                        9.2    7.73  )-----------( 176      ( 26 Feb 2022 06:00 )             28.8     02-26    140  |  104  |  34<H>  ----------------------------<  135<H>  4.0   |  31  |  1.03    Ca    8.6      26 Feb 2022 06:00  Phos  2.9     02-26  Mg     2.4     02-26            CAPILLARY BLOOD GLUCOSE        PTT - ( 26 Feb 2022 06:00 )  PTT:88.5 sec    CULTURES:  Culture Results:   No Growth Final (02-21 @ 14:26)  Culture Results:   No Growth Final (02-21 @ 14:21)      Physical Examination:    General: No acute distress.  Alert, oriented, interactive, nonfocal    HEENT: Pupils equal, reactive to light.  Symmetric, sclera anicteric    PULM: Diminished at bases bilaterally, no wheezing or rales noted     CVS: irregular rate and rhythm    ABD: Soft, nondistended, nontender, normoactive bowel sounds, no rebound or guarding    EXT: No edema    SKIN: Warm and well perfused, no rashes noted.    RADIOLOGY:   EXAM:  ECHO TTE WO CON FOLLOW UP LTD         PROCEDURE DATE:  02/25/2022        INTERPRETATION:  Transthoracic Echocardiography Report (TTE)     Demographics     Patient name         SELENE GOMEZ       Age           89 year(s)     Med Rec #            [id number]          Gender        Female     Account #            [id number]       Date of Birth 06/26/1928     Interpreting         Norbert David MD   Room Number   0037   Physician     Referring Physician  Jonah Nuno MD   Sonographer   Dalia Gomes,                                                         [facility]     Date of study        02/25/2022 08:46                        AM     Height               62.99 in           Weight        142.64 pounds    Type of Study:     TTE procedure: ECHO TTE WO CON FOLLOW UP LTD     BP: 137/75 mmHg     Study Location: [facility] Quality: Limited study    Indications   1) R57.0 - Cardiogenic shock    M-Mode Measurements (cm)     LVEDd: 3.86 cm                       LVESd: 2.51 cm    Doppler Measurements:     TR Velocity:410 cm/s   TR Gradient:67.24 mmHg   Estimated RAP:15 mmHg   RVSP:82 mmHg     Findings     Tricuspid Valve   The tricuspid valve leaflets are thin and pliable; valve motion is   normal.   Severe (4+) tricuspid valve regurgitation is present.   Severe pulmonary hypertension.     Left Ventricle   Limited study to assess left ventricular function.   The left ventricle cavity is underdistended.   The interventricular septum appears flattened consistent with an RV   pressure/volume overload.   Estimated left ventricular ejection fraction is 70 %.     Right Atrium   The right atrium appears severely dilated.   A device wire is seen in the RV and RA.     Right Ventricle   The right ventricle is mildly dilated.     Pericardial Effusion   No evidence of a significant pericardial effusion.(Trace)     Pleural Effusion   Pleural effusion - is present.     Miscellaneous   IVC is dilated and not collapsing with inspiration.     Impression     Summary     Limited study to assess left ventricular function.   The left ventricle cavity is underdistended.   The interventricular septum appears flattened consistent with an RV   pressure/volume overload.   Estimated left ventricular ejection fraction is 70 %.   The right atrium appears severely dilated.   A device wire is seen in the RV and RA.   The right ventricle is mildly dilated.   The tricuspid valve leaflets are thin and pliable; valve motion is   normal.   Severe (4+) tricuspid valve regurgitation is present.   Severe pulmonary hypertension.   No evidence of a significant pericardial effusion.(Trace)   Pleural effusion - is present.     Signature     ----------------------------------------------------------------   Electronically signed by Norbert David MD(Interpreting   physician) on 02/25/2022 01:46 PM   ----------------------------------------------------------------    Valves     Tricuspid Valve     TR Velocity: 410 cm/s                Estimated RAP: 15 mmHg   TR Gradient: 67.24 mmHg              Estimated RVSP: 82 mmHg     Pulmonic Valve              Estimated PASP: 82.24 mmHg    Structures     Left Ventricle     Diastolic Dimension: 3.86 cm          Systolic Dimension: 2.51 cm     FS: 34.97 %     Right Atrium     RA Systolic Pressure: 15 mmHg     Right Ventricle              RV Systolic Pressure: 82.24 mmHg     Snapshots     Insert item                NORBERT DAVID MD; Attending Cardiologist  This document has been electronically signed. Feb 25 2022  1:46PM  ACC: [id number] EXAM:  US DPLX LWR EXT VEINS COMPL BI                          PROCEDURE DATE:  02/22/2022          INTERPRETATION:  CLINICAL INFORMATION: Pelvic fracture    COMPARISON: May 17, 2013    TECHNIQUE: Duplex sonography of the BILATERAL LOWER extremity veins with   color and spectral Doppler, with and without compression.    FINDINGS:    RIGHT:  Normal compressibility of the RIGHT common femoral, femoral and popliteal   veins.  Doppler examination shows normal spontaneous and phasic flow.  No RIGHT calf vein thrombosis is detected.    LEFT:  Normal compressibility of the LEFT common femoral, femoral and popliteal   veins.  Doppler examination shows normal spontaneous and phasic flow.  No LEFT calf vein thrombosis is detected.    IMPRESSION:  No evidence of deep venous thrombosis in either lower extremity.          --- End of Report ---            ALIN CHÁVEZ JR, MD; Attending Radiologist  This document has been electronically signed. Feb 22 2022  4:13PM       Patient is a 89y old  Female who presents with a chief complaint of Fall (26 Feb 2022 13:35)      BRIEF HOSPITAL COURSE: 89F with PMHx  CAD, MI, severe pHTN, TR, AFib on Eliquis, PPM, hypothyroidism, COPD, that presented to the ED for evaluation of right hip pain following a fall at home. Found to have pelvic fracture. Initially admitted to medical floor. Course complicated by development of  shock requiring pressors, JOAQUÍN, acute urinary retention.    Events last 24 hours: afebrile, weaned off pressors this am, tolerating diet with poor intake. Denies CP, SOB, abd pain, N/V, diarrhea.     PAST MEDICAL & SURGICAL HISTORY:  Mitral valve insufficiency    Aortic valve regurgitation    Osteopenia    CAD (coronary artery disease)    Gout    Hypothyroid    CHF (congestive heart failure)    Anxiety    Anemia    HTN (hypertension)    Afib    COPD (chronic obstructive pulmonary disease)    Status cardiac pacemaker    S/P knee replacement        Review of Systems:  12pt ROS negative unless otherwise stated above      Medications:  piperacillin/tazobactam IVPB.. 3.375 Gram(s) IV Intermittent every 12 hours    digoxin     Tablet 125 MICROGram(s) Oral daily  midodrine 10 milliGRAM(s) Oral every 8 hours    ALBUTerol    90 MICROgram(s) HFA Inhaler 2 Puff(s) Inhalation every 6 hours  budesonide 160 MICROgram(s)/formoterol 4.5 MICROgram(s) Inhaler 2 Puff(s) Inhalation two times a day    acetaminophen     Tablet .. 650 milliGRAM(s) Oral every 6 hours PRN  acetaminophen     Tablet .. 650 milliGRAM(s) Oral every 6 hours PRN  acetaminophen   IVPB .. 1000 milliGRAM(s) IV Intermittent every 6 hours  melatonin 3 milliGRAM(s) Oral at bedtime PRN  ondansetron Injectable 4 milliGRAM(s) IV Push every 6 hours PRN      rivaroxaban 15 milliGRAM(s) Oral with dinner    calcium carbonate    500 mG (Tums) Chewable 3 Tablet(s) Chew every 6 hours PRN  pantoprazole    Tablet 40 milliGRAM(s) Oral before breakfast  polyethylene glycol 3350 17 Gram(s) Oral daily  senna 2 Tablet(s) Oral at bedtime      allopurinol 100 milliGRAM(s) Oral daily  levothyroxine 125 MICROGram(s) Oral daily    sodium chloride 0.9% lock flush 10 milliLiter(s) IV Push every 1 hour PRN      chlorhexidine 2% Cloths 1 Application(s) Topical <User Schedule>            ICU Vital Signs Last 24 Hrs  T(C): 36.4 (26 Feb 2022 15:00), Max: 36.7 (26 Feb 2022 06:00)  T(F): 97.5 (26 Feb 2022 15:00), Max: 98 (26 Feb 2022 06:00)  HR: 84 (26 Feb 2022 19:00) (71 - 125)  BP: --  BP(mean): --  ABP: 107/46 (26 Feb 2022 19:00) (89/39 - 134/74)  ABP(mean): 71 (26 Feb 2022 19:00) (59 - 100)  RR: 22 (26 Feb 2022 19:00) (9 - 26)  SpO2: 97% (26 Feb 2022 18:00) (73% - 100%)      I&O's Summary    25 Feb 2022 07:01  -  26 Feb 2022 07:00  --------------------------------------------------------  IN: 1211.5 mL / OUT: 2500 mL / NET: -1288.5 mL    26 Feb 2022 07:01  -  26 Feb 2022 20:15  --------------------------------------------------------  IN: 800 mL / OUT: 500 mL / NET: 300 mL        LABS:                        9.2    7.73  )-----------( 176      ( 26 Feb 2022 06:00 )             28.8     02-26    140  |  104  |  34<H>  ----------------------------<  135<H>  4.0   |  31  |  1.03    Ca    8.6      26 Feb 2022 06:00  Phos  2.9     02-26  Mg     2.4     02-26            CAPILLARY BLOOD GLUCOSE        PTT - ( 26 Feb 2022 06:00 )  PTT:88.5 sec    CULTURES:  Culture Results:   No Growth Final (02-21 @ 14:26)  Culture Results:   No Growth Final (02-21 @ 14:21)      Physical Examination:    General: No acute distress.  Alert, oriented, interactive, nonfocal    HEENT: Pupils equal, reactive to light.  Symmetric, sclera anicteric    PULM: Diminished at bases bilaterally, no wheezing or rales noted     CVS: irregular rate and rhythm    ABD: Soft, nondistended, nontender, normoactive bowel sounds, no rebound or guarding    EXT: + LE pitting edema    SKIN: Warm and well perfused, no rashes noted.    RADIOLOGY:   EXAM:  ECHO TTE WO CON FOLLOW UP LTD         PROCEDURE DATE:  02/25/2022        INTERPRETATION:  Transthoracic Echocardiography Report (TTE)     Demographics     Patient name         SELENE GOMEZ       Age           89 year(s)     Med Rec #            [id number]          Gender        Female     Account #            [id number]       Date of Birth 06/26/1928     Interpreting         Norbert David MD   Room Number   0037   Physician     Referring Physician  Jonah Nuno MD   Sonographer   Dalia Gomes,                                                         [facility]     Date of study        02/25/2022 08:46                        AM     Height               62.99 in           Weight        142.64 pounds    Type of Study:     TTE procedure: ECHO TTE WO CON FOLLOW UP LTD     BP: 137/75 mmHg     Study Location: [facility] Quality: Limited study    Indications   1) R57.0 - Cardiogenic shock    M-Mode Measurements (cm)     LVEDd: 3.86 cm                       LVESd: 2.51 cm    Doppler Measurements:     TR Velocity:410 cm/s   TR Gradient:67.24 mmHg   Estimated RAP:15 mmHg   RVSP:82 mmHg     Findings     Tricuspid Valve   The tricuspid valve leaflets are thin and pliable; valve motion is   normal.   Severe (4+) tricuspid valve regurgitation is present.   Severe pulmonary hypertension.     Left Ventricle   Limited study to assess left ventricular function.   The left ventricle cavity is underdistended.   The interventricular septum appears flattened consistent with an RV   pressure/volume overload.   Estimated left ventricular ejection fraction is 70 %.     Right Atrium   The right atrium appears severely dilated.   A device wire is seen in the RV and RA.     Right Ventricle   The right ventricle is mildly dilated.     Pericardial Effusion   No evidence of a significant pericardial effusion.(Trace)     Pleural Effusion   Pleural effusion - is present.     Miscellaneous   IVC is dilated and not collapsing with inspiration.     Impression     Summary     Limited study to assess left ventricular function.   The left ventricle cavity is underdistended.   The interventricular septum appears flattened consistent with an RV   pressure/volume overload.   Estimated left ventricular ejection fraction is 70 %.   The right atrium appears severely dilated.   A device wire is seen in the RV and RA.   The right ventricle is mildly dilated.   The tricuspid valve leaflets are thin and pliable; valve motion is   normal.   Severe (4+) tricuspid valve regurgitation is present.   Severe pulmonary hypertension.   No evidence of a significant pericardial effusion.(Trace)   Pleural effusion - is present.     Signature     ----------------------------------------------------------------   Electronically signed by Norbert David MD(Interpreting   physician) on 02/25/2022 01:46 PM   ----------------------------------------------------------------    Valves     Tricuspid Valve     TR Velocity: 410 cm/s                Estimated RAP: 15 mmHg   TR Gradient: 67.24 mmHg              Estimated RVSP: 82 mmHg     Pulmonic Valve              Estimated PASP: 82.24 mmHg    Structures     Left Ventricle     Diastolic Dimension: 3.86 cm          Systolic Dimension: 2.51 cm     FS: 34.97 %     Right Atrium     RA Systolic Pressure: 15 mmHg     Right Ventricle              RV Systolic Pressure: 82.24 mmHg     Snapshots     Insert item                NORBERT DAVID MD; Attending Cardiologist  This document has been electronically signed. Feb 25 2022  1:46PM  ACC: [id number] EXAM:  US DPLX LWR EXT VEINS COMPL BI                          PROCEDURE DATE:  02/22/2022          INTERPRETATION:  CLINICAL INFORMATION: Pelvic fracture    COMPARISON: May 17, 2013    TECHNIQUE: Duplex sonography of the BILATERAL LOWER extremity veins with   color and spectral Doppler, with and without compression.    FINDINGS:    RIGHT:  Normal compressibility of the RIGHT common femoral, femoral and popliteal   veins.  Doppler examination shows normal spontaneous and phasic flow.  No RIGHT calf vein thrombosis is detected.    LEFT:  Normal compressibility of the LEFT common femoral, femoral and popliteal   veins.  Doppler examination shows normal spontaneous and phasic flow.  No LEFT calf vein thrombosis is detected.    IMPRESSION:  No evidence of deep venous thrombosis in either lower extremity.          --- End of Report ---            ALIN CHÁVEZ JR, MD; Attending Radiologist  This document has been electronically signed. Feb 22 2022  4:13PM

## 2022-02-26 NOTE — PROGRESS NOTE ADULT - PROBLEM SELECTOR PLAN 4
Right heart failure with severe pulmonary HTN and severe tricuspid regurgitation, likely chronic with outpatient furosemide use; diurese PRN.  Digoxin may provide some benefit -- will discuss with critical care team.  Continue supplemental O2

## 2022-02-27 LAB
ANION GAP SERPL CALC-SCNC: 2 MMOL/L — LOW (ref 5–17)
APTT BLD: 37.1 SEC — HIGH (ref 27.5–35.5)
BUN SERPL-MCNC: 28 MG/DL — HIGH (ref 7–23)
CALCIUM SERPL-MCNC: 8.6 MG/DL — SIGNIFICANT CHANGE UP (ref 8.5–10.1)
CHLORIDE SERPL-SCNC: 103 MMOL/L — SIGNIFICANT CHANGE UP (ref 96–108)
CO2 SERPL-SCNC: 32 MMOL/L — HIGH (ref 22–31)
CREAT SERPL-MCNC: 0.92 MG/DL — SIGNIFICANT CHANGE UP (ref 0.5–1.3)
FOLATE SERPL-MCNC: >20 NG/ML — SIGNIFICANT CHANGE UP
GLUCOSE SERPL-MCNC: 122 MG/DL — HIGH (ref 70–99)
HCT VFR BLD CALC: 26.7 % — LOW (ref 34.5–45)
HGB BLD-MCNC: 8.6 G/DL — LOW (ref 11.5–15.5)
MAGNESIUM SERPL-MCNC: 2.4 MG/DL — SIGNIFICANT CHANGE UP (ref 1.6–2.6)
MCHC RBC-ENTMCNC: 32.2 GM/DL — SIGNIFICANT CHANGE UP (ref 32–36)
MCHC RBC-ENTMCNC: 34.3 PG — HIGH (ref 27–34)
MCV RBC AUTO: 106.4 FL — HIGH (ref 80–100)
NRBC # BLD: 3 /100 WBCS — HIGH (ref 0–0)
PHOSPHATE SERPL-MCNC: 2.7 MG/DL — SIGNIFICANT CHANGE UP (ref 2.5–4.5)
PLATELET # BLD AUTO: 155 K/UL — SIGNIFICANT CHANGE UP (ref 150–400)
POTASSIUM SERPL-MCNC: 4.2 MMOL/L — SIGNIFICANT CHANGE UP (ref 3.5–5.3)
POTASSIUM SERPL-SCNC: 4.2 MMOL/L — SIGNIFICANT CHANGE UP (ref 3.5–5.3)
RBC # BLD: 2.51 M/UL — LOW (ref 3.8–5.2)
RBC # FLD: 16 % — HIGH (ref 10.3–14.5)
SARS-COV-2 RNA SPEC QL NAA+PROBE: SIGNIFICANT CHANGE UP
SODIUM SERPL-SCNC: 137 MMOL/L — SIGNIFICANT CHANGE UP (ref 135–145)
TSH SERPL-MCNC: 0.78 UU/ML — SIGNIFICANT CHANGE UP (ref 0.34–4.82)
VIT B12 SERPL-MCNC: 989 PG/ML — SIGNIFICANT CHANGE UP (ref 232–1245)
WBC # BLD: 6.89 K/UL — SIGNIFICANT CHANGE UP (ref 3.8–10.5)
WBC # FLD AUTO: 6.89 K/UL — SIGNIFICANT CHANGE UP (ref 3.8–10.5)

## 2022-02-27 PROCEDURE — 99232 SBSQ HOSP IP/OBS MODERATE 35: CPT

## 2022-02-27 PROCEDURE — 99233 SBSQ HOSP IP/OBS HIGH 50: CPT

## 2022-02-27 RX ORDER — ACETAMINOPHEN 500 MG
1000 TABLET ORAL ONCE
Refills: 0 | Status: COMPLETED | OUTPATIENT
Start: 2022-02-27 | End: 2022-02-27

## 2022-02-27 RX ORDER — OXYCODONE AND ACETAMINOPHEN 5; 325 MG/1; MG/1
1 TABLET ORAL EVERY 6 HOURS
Refills: 0 | Status: DISCONTINUED | OUTPATIENT
Start: 2022-02-27 | End: 2022-03-01

## 2022-02-27 RX ORDER — METOPROLOL TARTRATE 50 MG
12.5 TABLET ORAL EVERY 12 HOURS
Refills: 0 | Status: DISCONTINUED | OUTPATIENT
Start: 2022-02-27 | End: 2022-03-01

## 2022-02-27 RX ORDER — KETOROLAC TROMETHAMINE 30 MG/ML
15 SYRINGE (ML) INJECTION ONCE
Refills: 0 | Status: DISCONTINUED | OUTPATIENT
Start: 2022-02-27 | End: 2022-02-27

## 2022-02-27 RX ADMIN — ALBUTEROL 2 PUFF(S): 90 AEROSOL, METERED ORAL at 08:56

## 2022-02-27 RX ADMIN — MIDODRINE HYDROCHLORIDE 5 MILLIGRAM(S): 2.5 TABLET ORAL at 13:30

## 2022-02-27 RX ADMIN — OXYCODONE AND ACETAMINOPHEN 1 TABLET(S): 5; 325 TABLET ORAL at 17:26

## 2022-02-27 RX ADMIN — Medication 125 MICROGRAM(S): at 09:07

## 2022-02-27 RX ADMIN — Medication 12.5 MILLIGRAM(S): at 21:28

## 2022-02-27 RX ADMIN — CHLORHEXIDINE GLUCONATE 1 APPLICATION(S): 213 SOLUTION TOPICAL at 06:26

## 2022-02-27 RX ADMIN — BUDESONIDE AND FORMOTEROL FUMARATE DIHYDRATE 2 PUFF(S): 160; 4.5 AEROSOL RESPIRATORY (INHALATION) at 08:56

## 2022-02-27 RX ADMIN — ALBUTEROL 2 PUFF(S): 90 AEROSOL, METERED ORAL at 14:38

## 2022-02-27 RX ADMIN — SENNA PLUS 1 TABLET(S): 8.6 TABLET ORAL at 21:27

## 2022-02-27 RX ADMIN — ALBUTEROL 2 PUFF(S): 90 AEROSOL, METERED ORAL at 20:51

## 2022-02-27 RX ADMIN — POLYETHYLENE GLYCOL 3350 17 GRAM(S): 17 POWDER, FOR SOLUTION ORAL at 09:14

## 2022-02-27 RX ADMIN — BUDESONIDE AND FORMOTEROL FUMARATE DIHYDRATE 2 PUFF(S): 160; 4.5 AEROSOL RESPIRATORY (INHALATION) at 20:52

## 2022-02-27 RX ADMIN — Medication 125 MICROGRAM(S): at 06:25

## 2022-02-27 RX ADMIN — Medication 15 MILLIGRAM(S): at 13:23

## 2022-02-27 RX ADMIN — RIVAROXABAN 15 MILLIGRAM(S): KIT at 17:12

## 2022-02-27 RX ADMIN — Medication 325 MILLIGRAM(S): at 06:28

## 2022-02-27 RX ADMIN — MIDODRINE HYDROCHLORIDE 5 MILLIGRAM(S): 2.5 TABLET ORAL at 21:27

## 2022-02-27 RX ADMIN — PIPERACILLIN AND TAZOBACTAM 25 GRAM(S): 4; .5 INJECTION, POWDER, LYOPHILIZED, FOR SOLUTION INTRAVENOUS at 21:28

## 2022-02-27 RX ADMIN — Medication 650 MILLIGRAM(S): at 09:07

## 2022-02-27 RX ADMIN — Medication 12.5 MILLIGRAM(S): at 09:07

## 2022-02-27 RX ADMIN — Medication 15 MILLIGRAM(S): at 14:08

## 2022-02-27 RX ADMIN — PANTOPRAZOLE SODIUM 40 MILLIGRAM(S): 20 TABLET, DELAYED RELEASE ORAL at 09:06

## 2022-02-27 RX ADMIN — MIDODRINE HYDROCHLORIDE 5 MILLIGRAM(S): 2.5 TABLET ORAL at 06:25

## 2022-02-27 RX ADMIN — PIPERACILLIN AND TAZOBACTAM 25 GRAM(S): 4; .5 INJECTION, POWDER, LYOPHILIZED, FOR SOLUTION INTRAVENOUS at 10:52

## 2022-02-27 RX ADMIN — OXYCODONE AND ACETAMINOPHEN 1 TABLET(S): 5; 325 TABLET ORAL at 16:29

## 2022-02-27 RX ADMIN — Medication 100 MILLIGRAM(S): at 09:07

## 2022-02-27 NOTE — PROGRESS NOTE ADULT - SUBJECTIVE AND OBJECTIVE BOX
HPI:  Pt is a 89 yo F that presented to the ED for evaluation of right hip pain. She stated that she was ambulating with her walker when it got stuck on her bathroom door causing her to fall on right buttock. Pt was released from [facility] in late november after knee surgery.  On exam has some lateral tenderness at the left knee. Has had a left TKA at [facility].      Patient transferred to the CCU for Hypotension    2/22: Patient seen in bed encephalopathic, hypotensive on escalating doses of pressors, oliguric   2/23: MS Improving, Weaning Pressors, JOAQUÍN improving  2/24: She remains on Brandon, JOAQUÍN continues to improve, good UO, taking PO  2/25: Patient remains on a low dose on brandon, tolerating PO, good UO  2/26: Was on brandon over night, responded well to lasix, tolerating PO  2/27: Off Pressors, no events over night      PAST MEDICAL & SURGICAL HISTORY:  Mitral valve insufficiency    Aortic valve regurgitation    Osteopenia    CAD (coronary artery disease)    Gout    Hypothyroid    CHF (congestive heart failure)    Anxiety    Anemia    HTN (hypertension)    Afib    COPD (chronic obstructive pulmonary disease)    Status cardiac pacemaker    S/P knee replacement        FAMILY HISTORY:  No pertinent family history in first degree relatives        Social Hx:    Allergies    codeine (Unknown)    Intolerances            ICU Vital Signs Last 24 Hrs  T(C): 36.8 (27 Feb 2022 07:27), Max: 36.8 (27 Feb 2022 07:27)  T(F): 98.2 (27 Feb 2022 07:27), Max: 98.2 (27 Feb 2022 07:27)  HR: 100 (27 Feb 2022 06:00) (64 - 103)  BP: --  BP(mean): --  ABP: 113/52 (27 Feb 2022 06:00) (86/36 - 134/74)  ABP(mean): 78 (27 Feb 2022 06:00) (56 - 100)  RR: 20 (27 Feb 2022 06:00) (9 - 26)  SpO2: 93% (27 Feb 2022 00:00) (73% - 100%)          I&O's Summary    26 Feb 2022 07:01  -  27 Feb 2022 07:00  --------------------------------------------------------  IN: 900 mL / OUT: 1200 mL / NET: -300 mL                              8.6    6.89  )-----------( 155      ( 27 Feb 2022 06:45 )             26.7       02-27    137  |  103  |  28<H>  ----------------------------<  122<H>  4.2   |  32<H>  |  0.92    Ca    8.6      27 Feb 2022 06:45  Phos  2.7     02-27  Mg     2.4     02-27                      MEDICATIONS  (STANDING):  acetaminophen   IVPB .. 1000 milliGRAM(s) IV Intermittent once  acetaminophen   IVPB .. 1000 milliGRAM(s) IV Intermittent every 6 hours  ALBUTerol    90 MICROgram(s) HFA Inhaler 2 Puff(s) Inhalation every 6 hours  allopurinol 100 milliGRAM(s) Oral daily  budesonide 160 MICROgram(s)/formoterol 4.5 MICROgram(s) Inhaler 2 Puff(s) Inhalation two times a day  chlorhexidine 2% Cloths 1 Application(s) Topical <User Schedule>  digoxin     Tablet 125 MICROGram(s) Oral daily  levothyroxine 125 MICROGram(s) Oral daily  metoprolol tartrate 12.5 milliGRAM(s) Oral every 12 hours  midodrine 5 milliGRAM(s) Oral every 8 hours  pantoprazole    Tablet 40 milliGRAM(s) Oral before breakfast  piperacillin/tazobactam IVPB.. 3.375 Gram(s) IV Intermittent every 12 hours  polyethylene glycol 3350 17 Gram(s) Oral daily  rivaroxaban 15 milliGRAM(s) Oral with dinner  senna 2 Tablet(s) Oral at bedtime    MEDICATIONS  (PRN):  acetaminophen     Tablet .. 650 milliGRAM(s) Oral every 6 hours PRN Temp greater or equal to 38C (100.4F), Moderate Pain (4 - 6)  acetaminophen     Tablet .. 650 milliGRAM(s) Oral every 6 hours PRN Mild Pain (1 - 3)  calcium carbonate    500 mG (Tums) Chewable 3 Tablet(s) Chew every 6 hours PRN Dyspepsia  melatonin 3 milliGRAM(s) Oral at bedtime PRN Insomnia  ondansetron Injectable 4 milliGRAM(s) IV Push every 6 hours PRN Nausea  sodium chloride 0.9% lock flush 10 milliLiter(s) IV Push every 1 hour PRN Pre/post blood products, medications, blood draw, and to maintain line patency      DVT Prophylaxis: DOAC    Advanced Directives:  Discussed with:    Visit Information:    ** Time is exclusive of billed procedures and/or teaching and/or routine family updates.

## 2022-02-27 NOTE — PROGRESS NOTE ADULT - PROBLEM SELECTOR PLAN 4
Right heart failure with severe pulmonary HTN and severe tricuspid regurgitation, suspected chronic; she has been receiving intermittent IV Lasix -- will likely need daily oral Lasix resumed prior to discharge; continue digoxin, supplemental O2

## 2022-02-27 NOTE — PROGRESS NOTE ADULT - SUBJECTIVE AND OBJECTIVE BOX
Pt seen and examined at bedside. No new complaints at this time. Patient denies fever, chills, SOB and CP. Remains off pressors.    ROS: as abovementioned otherwise negative    Vital Signs Last 24 Hrs  T(C): 36.4 (26 Feb 2022 15:00), Max: 36.7 (26 Feb 2022 06:00)  T(F): 97.5 (26 Feb 2022 15:00), Max: 98 (26 Feb 2022 06:00)  HR: 100 (27 Feb 2022 04:00) (64 - 103)  BP: --  BP(mean): --  RR: 16 (27 Feb 2022 04:00) (9 - 26)  SpO2: 93% (27 Feb 2022 00:00) (73% - 100%)    Physical exam:  Pt is aaox3  Pt in no acute distress  Psych: normal affect  Resp: CTAB  CVS: S1S2(+)  ABD: bowel sounds (+), soft, non distended, no rebound, no guarding  EXT: no calf tenderness or edema to exam b/l, on VTE prophylaxis. tender to palpation over gluteus, suprapubic, LLE knee. b/l LE edema    .  LABS:                                    9.2    7.73  )-----------( 176      ( 26 Feb 2022 06:00 )             28.8   02-26    140  |  104  |  34<H>  ----------------------------<  135<H>  4.0   |  31  |  1.03    Ca    8.6      26 Feb 2022 06:00  Phos  2.9     02-26  Mg     2.4     02-26

## 2022-02-27 NOTE — PROGRESS NOTE ADULT - ASSESSMENT
A/P: 89 female with hypotension likely from septic shock and pneumonia  CAD  RV Failure  Hypothyroidism  CHF  HTN  COPD    Plan:  CCU    PULM: Continue NC O2, Continue Zosyn for possible PNA.  Change UFH to Xarelto.    Cardio:  Off Pressors.  Continue Dig and added back low dose Lopressor today.  Will add back PO Lasic on 3/28.  Wean off Midodrine    Renal: JOAQUÍN likely from ATN has as improved. Monitor UO.    GI: PO Diet, constipation improved.  Continue Miralax    ENDO: Continue Synthroid    ID: On Zosyn now, UA was positive and could be another source for Septic Shock.  Will complete 7 days-2/28 2/22: B/L LE Dopplers were negative for a DVT    GOC: Now a DNR/DNI    D/C A-Line    OOB, PT

## 2022-02-27 NOTE — PROGRESS NOTE ADULT - ASSESSMENT
89 YO F with right sacral ala fracture and superior and inferior rami fracture, off pressors.    Plan:   Cardiology consult appreciated, on Xarelto  PT eval for dispo   Multimodal pain control  Incentive spirometry  Vitals, IS/OS Q 4  Diet: Soft Cardiac  GI prophylaxis  nephrology plan appreciated  Activity: WBAT  PT  SW for eventual D/C planning    Plan discussed with Dr. Casey

## 2022-02-27 NOTE — PROGRESS NOTE ADULT - ASSESSMENT
Impression:  1. pelvic fracture sp fall  2. acute urinary retention  3. septic shock  4. acute UTI  5. chronic RV dysfunction/severe pHTN  6. chronic atrial fibrillation  7. severe protein malnutrition    Plan:  Neuro - nonfocal, avoiding neurosuppressants, melatonin for sleep hygiene    CV -  low dose BBlockers          midodrine with weaning off as tolerates for MAP>65          will need to closely observe on tele while on both given potential bradycardia in conjunction          Echo with chronic RV dysfunction likely, severe pHTN, LV function nml          afib chronic, rate controlled, goal HR<110, cont digoxin          keep K~4 and Mg>2 for optimal arrhythmia suppression          full AC now with xeralto          minimal + fluid balance, lasix to restart tomorrow, goal net even to negative fluid balance          Cardio following    Pulm -  currently stable on RA             breathing unlabored, no wheezing             cont bronchodilators and inhaled steroids    GI -  PO diet as tolerated, bowel regimen, re-engagement with dietary for recs on supplement addition to meals    Renal - Cr stable, strict I/Os, jackson to remain secondary to acute retention as per urology/trauma, BMP in am    Heme -  Full AC with xeralto, no signs of active bleeding, H/H stable, anemia macrocytic, B12 and folate levels ok,                transfuse for Hbg<7 or signs of active bleeding     ID - + u/A, BCx NG, cont full course of empiric IV abx full 7days (to complete on 2/28), afebrile, WBC normal, Abx discontinuation based on               clinical features and culture data.    Endo -  Aggressive glycemic control to limit FS glucose to < 180mg/dl, BS stable, TSH ok, cont current synthroid dosing, did have hyperkalemia and              hyponatremia on presentation with subsequent hypotension, check am cortisol level.      GOC: DNR/DNI

## 2022-02-27 NOTE — PROGRESS NOTE ADULT - SUBJECTIVE AND OBJECTIVE BOX
Patient is a 89y old  Female who presents with a chief complaint of Fall (27 Feb 2022 12:29)      BRIEF HOSPITAL COURSE:  89F with PMHx  CAD, MI, severe pHTN, TR, AFib on Eliquis, PPM, hypothyroidism, COPD, that presented to the ED for evaluation of right hip pain following a fall at home. Found to have pelvic fracture. Initially admitted to medical floor. Course complicated by development of  shock requiring pressors, JOAQUÍN, acute urinary retention.    Events last 24 hours: afebrile today, tolerating PO lopressor but still requiring midodrine to support BP, tolerating diet with poor intake. Denies CP, SOB, abd pain, N/V, diarrhea.       PAST MEDICAL & SURGICAL HISTORY:  Mitral valve insufficiency    Aortic valve regurgitation    Osteopenia    CAD (coronary artery disease)    Gout    Hypothyroid    CHF (congestive heart failure)    Anxiety    Anemia    HTN (hypertension)    Afib    COPD (chronic obstructive pulmonary disease)    Status cardiac pacemaker    S/P knee replacement        Review of Systems:  12pt ROS negative unless otherwise stated above      Medications:  piperacillin/tazobactam IVPB.. 3.375 Gram(s) IV Intermittent every 12 hours    digoxin     Tablet 125 MICROGram(s) Oral daily  metoprolol tartrate 12.5 milliGRAM(s) Oral every 12 hours  midodrine 5 milliGRAM(s) Oral every 8 hours    ALBUTerol    90 MICROgram(s) HFA Inhaler 2 Puff(s) Inhalation every 6 hours  budesonide 160 MICROgram(s)/formoterol 4.5 MICROgram(s) Inhaler 2 Puff(s) Inhalation two times a day    acetaminophen     Tablet .. 650 milliGRAM(s) Oral every 6 hours PRN  acetaminophen     Tablet .. 650 milliGRAM(s) Oral every 6 hours PRN  acetaminophen   IVPB .. 1000 milliGRAM(s) IV Intermittent once  melatonin 3 milliGRAM(s) Oral at bedtime PRN  ondansetron Injectable 4 milliGRAM(s) IV Push every 6 hours PRN  oxycodone    5 mG/acetaminophen 325 mG 1 Tablet(s) Oral every 6 hours PRN      rivaroxaban 15 milliGRAM(s) Oral with dinner    calcium carbonate    500 mG (Tums) Chewable 3 Tablet(s) Chew every 6 hours PRN  pantoprazole    Tablet 40 milliGRAM(s) Oral before breakfast  polyethylene glycol 3350 17 Gram(s) Oral daily  senna 2 Tablet(s) Oral at bedtime      allopurinol 100 milliGRAM(s) Oral daily  levothyroxine 125 MICROGram(s) Oral daily    sodium chloride 0.9% lock flush 10 milliLiter(s) IV Push every 1 hour PRN      chlorhexidine 2% Cloths 1 Application(s) Topical <User Schedule>            ICU Vital Signs Last 24 Hrs  T(C): 36.7 (27 Feb 2022 13:00), Max: 36.8 (27 Feb 2022 07:27)  T(F): 98.1 (27 Feb 2022 13:00), Max: 98.2 (27 Feb 2022 07:27)  HR: 73 (27 Feb 2022 21:00) (70 - 100)  BP: 106/53 (27 Feb 2022 21:00) (90/46 - 114/54)  BP(mean): 65 (27 Feb 2022 21:00) (56 - 73)  ABP: 114/51 (27 Feb 2022 08:00) (98/41 - 131/60)  ABP(mean): 76 (27 Feb 2022 08:00) (64 - 88)  RR: 18 (27 Feb 2022 21:00) (15 - 26)  SpO2: 93% (27 Feb 2022 14:40) (89% - 97%)      I&O's Summary    26 Feb 2022 07:01  -  27 Feb 2022 07:00  --------------------------------------------------------  IN: 900 mL / OUT: 1200 mL / NET: -300 mL    27 Feb 2022 07:01  -  27 Feb 2022 23:35  --------------------------------------------------------  IN: 700 mL / OUT: 300 mL / NET: 400 mL              LABS:                        8.6    6.89  )-----------( 155      ( 27 Feb 2022 06:45 )             26.7     02-27    137  |  103  |  28<H>  ----------------------------<  122<H>  4.2   |  32<H>  |  0.92    Ca    8.6      27 Feb 2022 06:45  Phos  2.7     02-27  Mg     2.4     02-27            CAPILLARY BLOOD GLUCOSE        PTT - ( 27 Feb 2022 06:45 )  PTT:37.1 sec    CULTURES:  Culture Results:   No Growth Final (02-21 @ 14:26)  Culture Results:   No Growth Final (02-21 @ 14:21)      Physical Examination:    General: No acute distress.  Alert, oriented, interactive, nonfocal    HEENT: Pupils equal, reactive to light.  Symmetric, sclera anicteric    PULM: Diminished at bases bilaterally, no wheezing or rales noted     CVS: irregular rate and rhythm    ABD: Soft, nondistended, nontender, normoactive bowel sounds, no rebound or guarding    EXT: + LE pitting edema    SKIN: Warm and well perfused, no rashes noted.    RADIOLOGY:   EXAM:  ECHO TTE WO CON FOLLOW UP LTD         PROCEDURE DATE:  02/25/2022        INTERPRETATION:  Transthoracic Echocardiography Report (TTE)     Demographics     Patient name         SELENE GOMEZ       Age           89 year(s)     Med Rec #            [id number]          Gender        Female     Account #            [id number]       Date of Birth 06/26/1928     Interpreting         Norbert David MD   Room Number   0037   Physician     Referring Physician  Jonah Nuno MD   Sonographer   Daila Gomes,                                                         [facility]     Date of study        02/25/2022 08:46                        AM     Height               62.99 in           Weight        142.64 pounds    Type of Study:     TTE procedure: ECHO TTE WO CON FOLLOW UP LTD     BP: 137/75 mmHg     Study Location: [facility] Quality: Limited study    Indications   1) R57.0 - Cardiogenic shock    M-Mode Measurements (cm)     LVEDd: 3.86 cm                       LVESd: 2.51 cm    Doppler Measurements:     TR Velocity:410 cm/s   TR Gradient:67.24 mmHg   Estimated RAP:15 mmHg   RVSP:82 mmHg     Findings     Tricuspid Valve   The tricuspid valve leaflets are thin and pliable; valve motion is   normal.   Severe (4+) tricuspid valve regurgitation is present.   Severe pulmonary hypertension.     Left Ventricle   Limited study to assess left ventricular function.   The left ventricle cavity is underdistended.   The interventricular septum appears flattened consistent with an RV   pressure/volume overload.   Estimated left ventricular ejection fraction is 70 %.     Right Atrium   The right atrium appears severely dilated.   A device wire is seen in the RV and RA.     Right Ventricle   The right ventricle is mildly dilated.     Pericardial Effusion   No evidence of a significant pericardial effusion.(Trace)     Pleural Effusion   Pleural effusion - is present.     Miscellaneous   IVC is dilated and not collapsing with inspiration.     Impression     Summary     Limited study to assess left ventricular function.   The left ventricle cavity is underdistended.   The interventricular septum appears flattened consistent with an RV   pressure/volume overload.   Estimated left ventricular ejection fraction is 70 %.   The right atrium appears severely dilated.   A device wire is seen in the RV and RA.   The right ventricle is mildly dilated.   The tricuspid valve leaflets are thin and pliable; valve motion is   normal.   Severe (4+) tricuspid valve regurgitation is present.   Severe pulmonary hypertension.   No evidence of a significant pericardial effusion.(Trace)   Pleural effusion - is present.     Signature     ----------------------------------------------------------------   Electronically signed by Norbert David MD(Interpreting   physician) on 02/25/2022 01:46 PM   ----------------------------------------------------------------    Valves     Tricuspid Valve     TR Velocity: 410 cm/s                Estimated RAP: 15 mmHg   TR Gradient: 67.24 mmHg              Estimated RVSP: 82 mmHg     Pulmonic Valve              Estimated PASP: 82.24 mmHg    Structures     Left Ventricle     Diastolic Dimension: 3.86 cm          Systolic Dimension: 2.51 cm     FS: 34.97 %     Right Atrium     RA Systolic Pressure: 15 mmHg     Right Ventricle              RV Systolic Pressure: 82.24 mmHg     Snapshots     Insert item                NORBERT DAVID MD; Attending Cardiologist  This document has been electronically signed. Feb 25 2022  1:46PM  ACC: [id number] EXAM:  US DPLX LWR EXT VEINS COMPL BI                          PROCEDURE DATE:  02/22/2022          INTERPRETATION:  CLINICAL INFORMATION: Pelvic fracture    COMPARISON: May 17, 2013    TECHNIQUE: Duplex sonography of the BILATERAL LOWER extremity veins with   color and spectral Doppler, with and without compression.    FINDINGS:    RIGHT:  Normal compressibility of the RIGHT common femoral, femoral and popliteal   veins.  Doppler examination shows normal spontaneous and phasic flow.  No RIGHT calf vein thrombosis is detected.    LEFT:  Normal compressibility of the LEFT common femoral, femoral and popliteal   veins.  Doppler examination shows normal spontaneous and phasic flow.  No LEFT calf vein thrombosis is detected.    IMPRESSION:  No evidence of deep venous thrombosis in either lower extremity.          --- End of Report ---            ALIN CHÁVEZ JR, MD; Attending Radiologist  This document has been electronically signed. Feb 22 2022  4:13PM

## 2022-02-27 NOTE — PROGRESS NOTE ADULT - SUBJECTIVE AND OBJECTIVE BOX
History of Present Illness:   Pt is a 89 yo F that presented to the ED for evaluation of right hip pain. She stated that she was ambulating with her walker this morning, when it got stuck on her bathroom door causing her to fall on her  right buttock. She called her daughter who took her to her house because of the fact that she lives alone. She stated that she has been able to ambulate minimally to get into the car to the ED since the fall but with pain. She reports that she lives at home alone with a home aide during the day for 2 hours. Pt was released from [facility] in late november after knee surgery.  On exam has some lateral tenderness at the left knee. Has had a left TKR at [facility].  Otherwise denies head strike/LOC  -hosp course sign for hypotension requiring ICU care and pressor support     2.21: patient seen on 3E, not confused, able to give history; c/o some pelvis pain with movements.  requires 4l NC  2.22: on pressors ,lethargic   2.23: alert, oriented to name and place, anxious  2.24: more alert today, good urine output, still on pressors   2.25: no distress, +gen weakness   2.26: sat in the chair yesterday; Left IJ CVC removed  doing well, no cp, no sob  2.27: doing well, alert, not confused, no distress  A line removed today      REVIEW OF SYSTEMS:    CONSTITUTIONAL: No weakness, No fevers or chills  ENT: No ear ache, No sorethroat  NECK: No pain, No stiffness  RESPIRATORY: No cough, No wheezing, No hemoptysis; No dyspnea  CARDIOVASCULAR: No chest pain, No palpitations  GASTROINTESTINAL: No abd pain, No nausea, No vomiting, No hematemesis, No diarrhea or constipation. No melena, No hematochezia.  GENITOURINARY: No dysuria, No  hematuria  NEUROLOGICAL: No diplopia, No paresthesia, No motor dysfunction  SKIN: No rashes, or lesions   PSYCH: no anxiety, no suicidal ideation    All other review of systems is negative unless indicated above    All other review of systems is negative unless indicated above    Vital Signs Last 24 Hrs  T(C): 36.8 (27 Feb 2022 07:27), Max: 36.8 (27 Feb 2022 07:27)  T(F): 98.2 (27 Feb 2022 07:27), Max: 98.2 (27 Feb 2022 07:27)  HR: 92 (27 Feb 2022 10:00) (64 - 100)  BP: 114/51 (27 Feb 2022 10:00) (114/51 - 114/51)  BP(mean): 66 (27 Feb 2022 10:00) (66 - 66)  RR: 16 (27 Feb 2022 10:00) (9 - 26)  SpO2: 93% (27 Feb 2022 10:00) (73% - 100%)  PHYSICAL EXAM:    GENERAL: NAD  HEENT:  NC/AT, EOMI, PERRLA, No scleral icterus, Moist mucous membranes  NECK: Supple, No JVD  CNS:  Alert & Oriented X3, Motor Strength 5/5 B/L upper and lower extremities; DTRs 2+ intact   LUNG: Decreased Breath sounds, Rt crackles, no wheezing   HEART: RRR; No murmurs, No rubs  ABDOMEN: +BS, ST/ND/NT  GENITOURINARY: Voiding, Bladder not distended  EXTREMITIES:  2+ Peripheral Pulses, No clubbing, No cyanosis, No tibial edema  MUSCULOSKELTAL: Joints normal ROM, No TTP, No effusion  VAGINAL: deferred  SKIN: no rashes, intact skin  RECTAL: deferred, not indicated  BREAST: deferred    Labs:                        9.2    7.14  )-----------( 167      ( 25 Feb 2022 06:30 )             28.3     02-25    140  |  105  |  45<H>  ----------------------------<  112<H>  4.0   |  28  |  1.20    Ca    8.6      25 Feb 2022 06:30  Phos  3.2     02-25  Mg     2.8     02-25    MEDICATIONS  (STANDING):  acetaminophen   IVPB .. 1000 milliGRAM(s) IV Intermittent every 6 hours  ALBUTerol    90 MICROgram(s) HFA Inhaler 2 Puff(s) Inhalation every 6 hours  allopurinol 100 milliGRAM(s) Oral daily  budesonide 160 MICROgram(s)/formoterol 4.5 MICROgram(s) Inhaler 2 Puff(s) Inhalation two times a day  chlorhexidine 2% Cloths 1 Application(s) Topical <User Schedule>  heparin  Infusion.  Unit(s)/Hr (11 mL/Hr) IV Continuous <Continuous>  lactulose Syrup 30 Gram(s) Oral two times a day  levothyroxine 125 MICROGram(s) Oral daily  midodrine 10 milliGRAM(s) Oral every 8 hours  pantoprazole  Injectable 40 milliGRAM(s) IV Push daily  phenylephrine    Infusion 0.1 MICROgram(s)/kG/Min (2.21 mL/Hr) IV Continuous <Continuous>  piperacillin/tazobactam IVPB.. 3.375 Gram(s) IV Intermittent every 12 hours  polyethylene glycol 3350 17 Gram(s) Oral daily  senna 2 Tablet(s) Oral at bedtime        CT Chest: RLL Pna      a/p:    1. Acute  Hypoxic respiratory failure due to Pna  Septic Shock due to Pna  CT Chest noted  Antibiotic changed to Zosyn IV day#6  off pressor support  Blood Cx x 2 : ngtd  c/w bronchodilators, ICS    2. Cardiogenic shock due to sepsis and Rt Heart failure/Severe Pulm Htn:  off Milrinone  on intermittent diuretic as needed   on IV Heparin for possible pulmonary emboli     3.  Rt pelvis fracture due to mechanical fall: stable   analgesia prn  PT / Ortho evaluations     4. Afib: rate controlled   on IV heparin; change to NOAC when she is transferred to floor     5. ARF superimposed on Ckd : improved  likely due to ATN and prerenal state in the setting of sepsis/cardiogenic shock

## 2022-02-27 NOTE — PROGRESS NOTE ADULT - SUBJECTIVE AND OBJECTIVE BOX
REASON FOR VISIT: R heart failure    HPI:  89 year old woman with a history of atrial fibrillation, congestive heart failure (last hospitalization was at [facility] in September 2021), severe tricuspid regurgitation, severe pulmonary HTN, CAD, HTN, gout admitted on 2/20/22 with a pelvic fracture following a fall with hospitalization complicated by hypotension (requiring vasopressor), hypoxia.    2/22/22  Events noted , patient was hypotensive ,stable hemoglobin , became less responsive last night , markedly hypoglycemic , started on pressors , patient very lethargic arousable with vocal stimuli , hypoxic on VM  , heart rate high 90s     2/23/22: Pt comfortable, awake and alert, on 3L NC, mildly hypotensive on multiple pressors.   2/24/22:  Somnolent but easily awakened; says she doesn't know how she feels but denies pain.  2/25/22:  Much more awake and interactive today -- complaining only of fatigue.  2/26/22:  "I had a coughing spell" earlier; now comfortable but weak/fatigued.  2/27/22:  Pelvic pain with movement; fatigue; constipation.    MEDICATIONS  (STANDING):  acetaminophen   IVPB .. 1000 milliGRAM(s) IV Intermittent every 6 hours  ALBUTerol    90 MICROgram(s) HFA Inhaler 2 Puff(s) Inhalation every 6 hours  allopurinol 100 milliGRAM(s) Oral daily  budesonide 160 MICROgram(s)/formoterol 4.5 MICROgram(s) Inhaler 2 Puff(s) Inhalation two times a day  chlorhexidine 2% Cloths 1 Application(s) Topical <User Schedule>  digoxin     Tablet 125 MICROGram(s) Oral daily  levothyroxine 125 MICROGram(s) Oral daily  midodrine 5 milliGRAM(s) Oral every 8 hours  pantoprazole    Tablet 40 milliGRAM(s) Oral before breakfast  piperacillin/tazobactam IVPB.. 3.375 Gram(s) IV Intermittent every 12 hours  polyethylene glycol 3350 17 Gram(s) Oral daily  rivaroxaban 15 milliGRAM(s) Oral with dinner  senna 2 Tablet(s) Oral at bedtime    Vital Signs Last 24 Hrs  T(C): 36.4 (26 Feb 2022 15:00), Max: 36.4 (26 Feb 2022 15:00)  T(F): 97.5 (26 Feb 2022 15:00), Max: 97.5 (26 Feb 2022 15:00)  HR: 100 (27 Feb 2022 06:00) (64 - 103)  BP: 113/52  RR: 20 (27 Feb 2022 06:00) (9 - 26)  SpO2: 93% (27 Feb 2022 00:00) (73% - 100%)    PHYSICAL EXAM:  Constitutional: NAD, supine in bed  Neck:  Ecchymoses on neck/chest  Respiratory: Breath sounds are clear bilaterally  Cardiovascular: irregular, systolic murmur  Gastrointestinal: Bowel Sounds present, soft, nontender.   Extremities: Legs with venous stasis skin changes, and mild edema     LABS:                            8.6    6.89  )-----------( 155      ( 27 Feb 2022 06:45 )             26.7     140  |  104  |  34<H>  ----------------------------<  135<H>  4.0   |  31  |  1.03    Ca    8.6      26 Feb 2022 06:00  Phos  2.9     02-26  Mg     2.4     02-26    TroponinI hsT: <-141.88, <-161.96, <-210.61    TTE Echo Complete w/o Contrast w/ Doppler (02.22.22 @ 12:28):   The left ventricle cavity is underdistended. Endocardium is not well visualized, however, overall left ventricular systolic function appears hyperdynamic.  Estimated left ventricular ejection fraction is 75 %.   Septal flattening is seen; this finding is consistent with right heart pressure / volume overload.   The right atrium appears severely dilated.   The right ventricle is at least moderately dilated with decreased contractility.   There are fibrocalcific changes noted to the aortic valve leaflets with restriction in leaflet excursion.    Mild mitral regurgitation.   Severe (4+) tricuspid valve regurgitation.   Severe pulmonary hypertension.   Mild pulmonic valvular regurgitation.   IVC is dilated and not collapsing with inspiration.    Tele: AF    CT Chest No Cont (02.21.22 @ 11:00):  Patchy opacity at the right base may represent atelectasis or pneumonia. Interlobular septal thickening and small pleural effusions likely representing superimposed pulmonary edema.

## 2022-02-28 LAB
ANION GAP SERPL CALC-SCNC: 3 MMOL/L — LOW (ref 5–17)
BUN SERPL-MCNC: 34 MG/DL — HIGH (ref 7–23)
CALCIUM SERPL-MCNC: 8.7 MG/DL — SIGNIFICANT CHANGE UP (ref 8.5–10.1)
CHLORIDE SERPL-SCNC: 101 MMOL/L — SIGNIFICANT CHANGE UP (ref 96–108)
CO2 SERPL-SCNC: 34 MMOL/L — HIGH (ref 22–31)
CORTIS AM PEAK SERPL-MCNC: 8.2 UG/DL — SIGNIFICANT CHANGE UP (ref 6–18.4)
CREAT SERPL-MCNC: 1.23 MG/DL — SIGNIFICANT CHANGE UP (ref 0.5–1.3)
GLUCOSE SERPL-MCNC: 109 MG/DL — HIGH (ref 70–99)
HCT VFR BLD CALC: 28.2 % — LOW (ref 34.5–45)
HGB BLD-MCNC: 8.9 G/DL — LOW (ref 11.5–15.5)
MAGNESIUM SERPL-MCNC: 2.6 MG/DL — SIGNIFICANT CHANGE UP (ref 1.6–2.6)
MCHC RBC-ENTMCNC: 31.6 GM/DL — LOW (ref 32–36)
MCHC RBC-ENTMCNC: 34.4 PG — HIGH (ref 27–34)
MCV RBC AUTO: 108.9 FL — HIGH (ref 80–100)
NRBC # BLD: 2 /100 WBCS — HIGH (ref 0–0)
PHOSPHATE SERPL-MCNC: 4 MG/DL — SIGNIFICANT CHANGE UP (ref 2.5–4.5)
PLATELET # BLD AUTO: 160 K/UL — SIGNIFICANT CHANGE UP (ref 150–400)
POTASSIUM SERPL-MCNC: 4.6 MMOL/L — SIGNIFICANT CHANGE UP (ref 3.5–5.3)
POTASSIUM SERPL-SCNC: 4.6 MMOL/L — SIGNIFICANT CHANGE UP (ref 3.5–5.3)
RBC # BLD: 2.59 M/UL — LOW (ref 3.8–5.2)
RBC # FLD: 16.3 % — HIGH (ref 10.3–14.5)
SODIUM SERPL-SCNC: 138 MMOL/L — SIGNIFICANT CHANGE UP (ref 135–145)
WBC # BLD: 6.32 K/UL — SIGNIFICANT CHANGE UP (ref 3.8–10.5)
WBC # FLD AUTO: 6.32 K/UL — SIGNIFICANT CHANGE UP (ref 3.8–10.5)

## 2022-02-28 PROCEDURE — 99231 SBSQ HOSP IP/OBS SF/LOW 25: CPT

## 2022-02-28 PROCEDURE — 99233 SBSQ HOSP IP/OBS HIGH 50: CPT

## 2022-02-28 PROCEDURE — 99232 SBSQ HOSP IP/OBS MODERATE 35: CPT

## 2022-02-28 RX ORDER — FUROSEMIDE 40 MG
20 TABLET ORAL DAILY
Refills: 0 | Status: DISCONTINUED | OUTPATIENT
Start: 2022-02-28 | End: 2022-03-01

## 2022-02-28 RX ADMIN — ALBUTEROL 2 PUFF(S): 90 AEROSOL, METERED ORAL at 13:07

## 2022-02-28 RX ADMIN — Medication 125 MICROGRAM(S): at 09:22

## 2022-02-28 RX ADMIN — Medication 12.5 MILLIGRAM(S): at 21:14

## 2022-02-28 RX ADMIN — Medication 20 MILLIGRAM(S): at 11:24

## 2022-02-28 RX ADMIN — Medication 100 MILLIGRAM(S): at 09:21

## 2022-02-28 RX ADMIN — Medication 12.5 MILLIGRAM(S): at 09:21

## 2022-02-28 RX ADMIN — BUDESONIDE AND FORMOTEROL FUMARATE DIHYDRATE 2 PUFF(S): 160; 4.5 AEROSOL RESPIRATORY (INHALATION) at 08:00

## 2022-02-28 RX ADMIN — PANTOPRAZOLE SODIUM 40 MILLIGRAM(S): 20 TABLET, DELAYED RELEASE ORAL at 06:15

## 2022-02-28 RX ADMIN — ALBUTEROL 2 PUFF(S): 90 AEROSOL, METERED ORAL at 07:59

## 2022-02-28 RX ADMIN — SENNA PLUS 2 TABLET(S): 8.6 TABLET ORAL at 21:13

## 2022-02-28 RX ADMIN — RIVAROXABAN 15 MILLIGRAM(S): KIT at 16:06

## 2022-02-28 RX ADMIN — Medication 3 MILLIGRAM(S): at 23:00

## 2022-02-28 RX ADMIN — OXYCODONE AND ACETAMINOPHEN 1 TABLET(S): 5; 325 TABLET ORAL at 14:56

## 2022-02-28 RX ADMIN — Medication 125 MICROGRAM(S): at 06:15

## 2022-02-28 RX ADMIN — PIPERACILLIN AND TAZOBACTAM 25 GRAM(S): 4; .5 INJECTION, POWDER, LYOPHILIZED, FOR SOLUTION INTRAVENOUS at 09:40

## 2022-02-28 RX ADMIN — MIDODRINE HYDROCHLORIDE 5 MILLIGRAM(S): 2.5 TABLET ORAL at 06:14

## 2022-02-28 RX ADMIN — POLYETHYLENE GLYCOL 3350 17 GRAM(S): 17 POWDER, FOR SOLUTION ORAL at 09:22

## 2022-02-28 NOTE — PROGRESS NOTE ADULT - ASSESSMENT
89 y/o F that presented to the ED for evaluation of right hip pain. She stated that she was ambulating with her walker this morning, when it got stuck on her bathroom door causing her to fall on her  right buttock. Patient found to have sacral fracture and pubic ramus fracture.  Hospital course significant for hypotension requiring ICU care and pressor support.  Admitted by Trauma surgery.      1. Acute Hypoxic respiratory failure due to sepsis from PNA:    CT Chest noted.  Antibiotic changed to Zosyn IV day#7.    Off pressor support  Blood Cx x 2 : ngtd  c/w bronchodilators, ICS    2. Cardiogenic shock due to sepsis and Rt Heart failure/Severe Pulm Htn:  off Milrinone  on intermittent diuretic as needed   on IV Heparin for possible pulmonary emboli     3.  Pubic ramus fx due to mechanical fall: stable   analgesia prn  PT / Ortho evaluations     4. Afib: rate controlled   Cont xarelto.      5. ARF superimposed on Ckd : improved  likely due to ATN and prerenal state in the setting of sepsis/cardiogenic shock     6.  Urinary Retention:    As per trauma, patient had hematoma in area of bladder.    Further management of jackson as per trauma.      Discharge planning as per trauma surgery.

## 2022-02-28 NOTE — PROGRESS NOTE ADULT - CARDIOVASCULAR DETAILS
positive S1/positive S2
irregular rate and rhythm/positive S1/positive S2
positive S1/positive S2

## 2022-02-28 NOTE — PROGRESS NOTE ADULT - ASSESSMENT
A/P: 89 female with hypotension likely from septic shock and pneumonia  CAD  RV Failure  Hypothyroidism  CHF  HTN  COPD    Plan:  CCU    PULM: Continue NC O2, D/C Zosyn 2/28.      Cardio:  Continue Lopressor and Dig.  Added lack Lasix 20 qd 2/28. D/C Midodrine    Renal: JOAQUÍN improved    GI: PO Diet, constipation improved.  Continue Miralax    ENDO: Continue Synthroid    ID: Completed Zosyn on 2/28 for UTI vs PNA    2/22: B/L LE Dopplers were negative for a DVT    GOC: Now a DNR/DNI    OOB, PT    Transfer to reg floor.  D/W Dr. Dunlap

## 2022-02-28 NOTE — PROGRESS NOTE ADULT - GASTROINTESTINAL DETAILS
soft/no distention

## 2022-02-28 NOTE — PROGRESS NOTE ADULT - SUBJECTIVE AND OBJECTIVE BOX
HPI:  Pt is a 89 yo F that presented to the ED for evaluation of right hip pain. She stated that she was ambulating with her walker when it got stuck on her bathroom door causing her to fall on right buttock. Pt was released from [facility] in late november after knee surgery.  On exam has some lateral tenderness at the left knee. Has had a left TKA at [facility].      Patient transferred to the CCU for Hypotension    2/22: Patient seen in bed encephalopathic, hypotensive on escalating doses of pressors, oliguric   2/23: MS Improving, Weaning Pressors, JOAQUÍN improving  2/24: She remains on Brandon, JOAQUÍN continues to improve, good UO, taking PO  2/25: Patient remains on a low dose on brandon, tolerating PO, good UO  2/26: Was on brandon over night, responded well to lasix, tolerating PO  2/27: Off Pressors, no events over night  2/28: No events over night      PAST MEDICAL & SURGICAL HISTORY:  Mitral valve insufficiency    Aortic valve regurgitation    Osteopenia    CAD (coronary artery disease)    Gout    Hypothyroid    CHF (congestive heart failure)    Anxiety    Anemia    HTN (hypertension)    Afib    COPD (chronic obstructive pulmonary disease)    Status cardiac pacemaker    S/P knee replacement        FAMILY HISTORY:  No pertinent family history in first degree relatives        Social Hx:    Allergies    codeine (Unknown)    Intolerances            ICU Vital Signs Last 24 Hrs  T(C): 35.7 (28 Feb 2022 08:12), Max: 36.7 (27 Feb 2022 13:00)  T(F): 96.3 (28 Feb 2022 08:12), Max: 98.1 (27 Feb 2022 13:00)  HR: 72 (28 Feb 2022 08:01) (63 - 92)  BP: 115/69 (28 Feb 2022 06:00) (90/46 - 115/69)  BP(mean): 77 (28 Feb 2022 06:00) (55 - 77)  ABP: --  ABP(mean): --  RR: 12 (28 Feb 2022 07:00) (12 - 26)  SpO2: 100% (28 Feb 2022 06:00) (89% - 100%)          I&O's Summary    27 Feb 2022 07:01  -  28 Feb 2022 07:00  --------------------------------------------------------  IN: 1280 mL / OUT: 625 mL / NET: 655 mL                              8.9    6.32  )-----------( 160      ( 28 Feb 2022 06:47 )             28.2       02-28    138  |  101  |  34<H>  ----------------------------<  109<H>  4.6   |  34<H>  |  1.23    Ca    8.7      28 Feb 2022 06:47  Phos  4.0     02-28  Mg     2.6     02-28                      MEDICATIONS  (STANDING):  acetaminophen   IVPB .. 1000 milliGRAM(s) IV Intermittent once  ALBUTerol    90 MICROgram(s) HFA Inhaler 2 Puff(s) Inhalation every 6 hours  allopurinol 100 milliGRAM(s) Oral daily  budesonide 160 MICROgram(s)/formoterol 4.5 MICROgram(s) Inhaler 2 Puff(s) Inhalation two times a day  chlorhexidine 2% Cloths 1 Application(s) Topical <User Schedule>  digoxin     Tablet 125 MICROGram(s) Oral daily  levothyroxine 125 MICROGram(s) Oral daily  metoprolol tartrate 12.5 milliGRAM(s) Oral every 12 hours  midodrine 5 milliGRAM(s) Oral every 8 hours  pantoprazole    Tablet 40 milliGRAM(s) Oral before breakfast  piperacillin/tazobactam IVPB.. 3.375 Gram(s) IV Intermittent every 12 hours  polyethylene glycol 3350 17 Gram(s) Oral daily  rivaroxaban 15 milliGRAM(s) Oral with dinner  senna 2 Tablet(s) Oral at bedtime    MEDICATIONS  (PRN):  acetaminophen     Tablet .. 650 milliGRAM(s) Oral every 6 hours PRN Temp greater or equal to 38C (100.4F), Moderate Pain (4 - 6)  acetaminophen     Tablet .. 650 milliGRAM(s) Oral every 6 hours PRN Mild Pain (1 - 3)  calcium carbonate    500 mG (Tums) Chewable 3 Tablet(s) Chew every 6 hours PRN Dyspepsia  melatonin 3 milliGRAM(s) Oral at bedtime PRN Insomnia  ondansetron Injectable 4 milliGRAM(s) IV Push every 6 hours PRN Nausea  oxycodone    5 mG/acetaminophen 325 mG 1 Tablet(s) Oral every 6 hours PRN Severe Pain (7 - 10)  sodium chloride 0.9% lock flush 10 milliLiter(s) IV Push every 1 hour PRN Pre/post blood products, medications, blood draw, and to maintain line patency      DVT Prophylaxis: DOAC    Advanced Directives:  Discussed with:    Visit Information:    ** Time is exclusive of billed procedures and/or teaching and/or routine family updates.

## 2022-02-28 NOTE — PROGRESS NOTE ADULT - PROBLEM SELECTOR PLAN 4
Right heart failure with severe pulmonary HTN and severe tricuspid regurgitation, suspected chronic; she has been receiving intermittent IV Lasix -- will likely need daily oral Lasix long term; continue digoxin Right heart failure with severe pulmonary HTN and severe tricuspid regurgitation, suspected chronic; she has been receiving intermittent IV Lasix -- will likely need daily oral Lasix long term; continue digoxin    Discussed with Dr. Spakrs

## 2022-02-28 NOTE — PROGRESS NOTE ADULT - ASSESSMENT
89 YO F with right sacral ala fracture and superior and inferior rami fracture, off pressors.    Plan:   Cardiology consult appreciated, on Xarelto  PT eval for dispo   Multimodal pain control  Incentive spirometry  Vitals, IS/OS Q 4  Diet: Soft Cardiac  GI prophylaxis  nephrology plan appreciated  Activity: WBAT  PT  SW for eventual D/C planning    Plan discussed with Dr. Rojo

## 2022-02-28 NOTE — PROGRESS NOTE ADULT - RS GEN PE MLT RESP DETAILS PC
breath sounds equal/no rales/no rhonchi/no wheezes

## 2022-02-28 NOTE — PROGRESS NOTE ADULT - SUBJECTIVE AND OBJECTIVE BOX
Pt seen and examined at bedside. No new complaints at this time. Patient denies fever, chills, SOB and CP. Remains off pressors.    Vital Signs Last 24 Hrs  T(C): 36.7 (27 Feb 2022 13:00), Max: 36.7 (27 Feb 2022 13:00)  T(F): 98.1 (27 Feb 2022 13:00), Max: 98.1 (27 Feb 2022 13:00)  HR: 72 (28 Feb 2022 08:01) (63 - 99)  BP: 115/69 (28 Feb 2022 06:00) (90/46 - 115/69)  BP(mean): 77 (28 Feb 2022 06:00) (55 - 77)  RR: 12 (28 Feb 2022 07:00) (12 - 26)  SpO2: 100% (28 Feb 2022 06:00) (89% - 100%)  Labs:                                8.9    6.32  )-----------( 160      ( 28 Feb 2022 06:47 )             28.2     CBC Full  -  ( 28 Feb 2022 06:47 )  WBC Count : 6.32 K/uL  RBC Count : 2.59 M/uL  Hemoglobin : 8.9 g/dL  Hematocrit : 28.2 %  Platelet Count - Automated : 160 K/uL  Mean Cell Volume : 108.9 fl  Mean Cell Hemoglobin : 34.4 pg  Mean Cell Hemoglobin Concentration : 31.6 gm/dL  Auto Neutrophil # : x  Auto Lymphocyte # : x  Auto Monocyte # : x  Auto Eosinophil # : x  Auto Basophil # : x  Auto Neutrophil % : x  Auto Lymphocyte % : x  Auto Monocyte % : x  Auto Eosinophil % : x  Auto Basophil % : x    02-28    138  |  101  |  34<H>  ----------------------------<  109<H>  4.6   |  34<H>  |  1.23    Ca    8.7      28 Feb 2022 06:47  Phos  4.0     02-28  Mg     2.6     02-28        PTT - ( 27 Feb 2022 06:45 )  PTT:37.1 sec        Physical exam:  Pt is aaox3  Pt in no acute distress  Psych: normal affect  Resp: CTAB  CVS: S1S2(+)  ABD: bowel sounds (+), soft, non distended, no rebound, no guarding  EXT: no calf tenderness or edema to exam b/l, on VTE prophylaxis. tender to palpation over gluteus, suprapubic, LLE knee. b/l LE edema   CC:Patient is a 89y old  Female who presents with a chief complaint of Fall (28 Feb 2022 09:42)    Pt seen and examined at bedside. No new complaints at this time. Patient denies fever, chills, SOB and CP. Remains off pressors.    ROS: as abovementioned otherwise negative    Vital Signs Last 24 Hrs  T(C): 36.7 (27 Feb 2022 13:00), Max: 36.7 (27 Feb 2022 13:00)  T(F): 98.1 (27 Feb 2022 13:00), Max: 98.1 (27 Feb 2022 13:00)  HR: 72 (28 Feb 2022 08:01) (63 - 99)  BP: 115/69 (28 Feb 2022 06:00) (90/46 - 115/69)  BP(mean): 77 (28 Feb 2022 06:00) (55 - 77)  RR: 12 (28 Feb 2022 07:00) (12 - 26)  SpO2: 100% (28 Feb 2022 06:00) (89% - 100%)  Labs:                                8.9    6.32  )-----------( 160      ( 28 Feb 2022 06:47 )             28.2     CBC Full  -  ( 28 Feb 2022 06:47 )  WBC Count : 6.32 K/uL  RBC Count : 2.59 M/uL  Hemoglobin : 8.9 g/dL  Hematocrit : 28.2 %  Platelet Count - Automated : 160 K/uL  Mean Cell Volume : 108.9 fl  Mean Cell Hemoglobin : 34.4 pg  Mean Cell Hemoglobin Concentration : 31.6 gm/dL  Auto Neutrophil # : x  Auto Lymphocyte # : x  Auto Monocyte # : x  Auto Eosinophil # : x  Auto Basophil # : x  Auto Neutrophil % : x  Auto Lymphocyte % : x  Auto Monocyte % : x  Auto Eosinophil % : x  Auto Basophil % : x    02-28    138  |  101  |  34<H>  ----------------------------<  109<H>  4.6   |  34<H>  |  1.23    Ca    8.7      28 Feb 2022 06:47  Phos  4.0     02-28  Mg     2.6     02-28        PTT - ( 27 Feb 2022 06:45 )  PTT:37.1 sec        Physical exam:  Pt is aaox3  Pt in no acute distress  Psych: normal affect  HEENT: NCAT  Resp: CTAB  CVS: S1S2(+)  ABD: bowel sounds (+), soft, non distended, no rebound, no guarding  EXT: no calf tenderness or edema to exam b/l, on VTE prophylaxis. tender to palpation over gluteus, suprapubic, LLE knee. b/l LE edema  Skin: + ecchymosis to extremities and neck region  : jackson

## 2022-02-28 NOTE — PROGRESS NOTE ADULT - SUBJECTIVE AND OBJECTIVE BOX
REASON FOR VISIT: R heart failure    HPI:  89 year old woman with a history of atrial fibrillation, congestive heart failure (last hospitalization was at [facility] in September 2021), severe tricuspid regurgitation, severe pulmonary HTN, CAD, HTN, gout admitted on 2/20/22 with a pelvic fracture following a fall with hospitalization complicated by hypotension (requiring vasopressor), hypoxia.    2/22/22  Events noted , patient was hypotensive ,stable hemoglobin , became less responsive last night , markedly hypoglycemic , started on pressors , patient very lethargic arousable with vocal stimuli , hypoxic on VM  , heart rate high 90s     2/23/22: Pt comfortable, awake and alert, on 3L NC, mildly hypotensive on multiple pressors.   2/24/22:  Somnolent but easily awakened; says she doesn't know how she feels but denies pain.  2/25/22:  Much more awake and interactive today -- complaining only of fatigue.  2/26/22:  "I had a coughing spell" earlier; now comfortable but weak/fatigued.  2/27/22:  Pelvic pain with movement; fatigue; constipation.  2/28/22: pt c/o hip pain with movement, feels weak after moving from bed to chair. Off NC and midodrine    MEDICATIONS  (STANDING):  acetaminophen   IVPB .. 1000 milliGRAM(s) IV Intermittent once  ALBUTerol    90 MICROgram(s) HFA Inhaler 2 Puff(s) Inhalation every 6 hours  allopurinol 100 milliGRAM(s) Oral daily  budesonide 160 MICROgram(s)/formoterol 4.5 MICROgram(s) Inhaler 2 Puff(s) Inhalation two times a day  chlorhexidine 2% Cloths 1 Application(s) Topical <User Schedule>  digoxin     Tablet 125 MICROGram(s) Oral daily  furosemide    Tablet 20 milliGRAM(s) Oral daily  levothyroxine 125 MICROGram(s) Oral daily  metoprolol tartrate 12.5 milliGRAM(s) Oral every 12 hours  pantoprazole    Tablet 40 milliGRAM(s) Oral before breakfast  polyethylene glycol 3350 17 Gram(s) Oral daily  rivaroxaban 15 milliGRAM(s) Oral with dinner  senna 2 Tablet(s) Oral at bedtime    Vital Signs Last 24 Hrs  T(C): 35.7 (28 Feb 2022 08:12), Max: 35.7 (28 Feb 2022 08:12)  T(F): 96.3 (28 Feb 2022 08:12), Max: 96.3 (28 Feb 2022 08:12)  HR: 70 (28 Feb 2022 13:09) (63 - 89)  BP: 102/62 (28 Feb 2022 15:00) (90/46 - 115/69)  BP(mean): 72 (28 Feb 2022 15:00) (55 - 77)  RR: 17 (28 Feb 2022 15:00) (12 - 25)  SpO2: 100% (28 Feb 2022 06:00) (96% - 100%)    PHYSICAL EXAM:  Constitutional: NAD, sitting up in chair  Neck:  Ecchymoses on neck/chest  Respiratory: Breath sounds are clear bilaterally  Cardiovascular: irregular, systolic murmur  Gastrointestinal: Bowel Sounds present, soft, nontender.   Extremities: Legs with venous stasis skin changes, and mild edema     LABS:                               8.9    6.32  )-----------( 160      ( 28 Feb 2022 06:47 )             28.2     02-28    138  |  101  |  34<H>  ----------------------------<  109<H>  4.6   |  34<H>  |  1.23    Ca    8.7      28 Feb 2022 06:47  Phos  4.0     02-28  Mg     2.6     02-28    PTT - ( 27 Feb 2022 06:45 )  PTT:37.1 sec    TroponinI hsT: <-141.88, <-161.96, <-210.61    TTE Echo Complete w/o Contrast w/ Doppler (02.22.22 @ 12:28):   The left ventricle cavity is underdistended. Endocardium is not well visualized, however, overall left ventricular systolic function appears hyperdynamic.  Estimated left ventricular ejection fraction is 75 %.   Septal flattening is seen; this finding is consistent with right heart pressure / volume overload.   The right atrium appears severely dilated.   The right ventricle is at least moderately dilated with decreased contractility.   There are fibrocalcific changes noted to the aortic valve leaflets with restriction in leaflet excursion.    Mild mitral regurgitation.   Severe (4+) tricuspid valve regurgitation.   Severe pulmonary hypertension.   Mild pulmonic valvular regurgitation.   IVC is dilated and not collapsing with inspiration.    CT Chest No Cont (02.21.22 @ 11:00):  Patchy opacity at the right base may represent atelectasis or pneumonia. Interlobular septal thickening and small pleural effusions likely representing superimposed pulmonary edema.    Tele: AFib [id number]

## 2022-02-28 NOTE — PROGRESS NOTE ADULT - SUBJECTIVE AND OBJECTIVE BOX
89 yo F that presented to the ED for evaluation of right hip pain. She stated that she was ambulating with her walker this morning, when it got stuck on her bathroom door causing her to fall on her  right buttock. She called her daughter who took her to her house because of the fact that she lives alone. She stated that she has been able to ambulate minimally to get into the car to the ED since the fall but with pain. She reports that she lives at home alone with a home aide during the day for 2 hours. Pt was released from [facility] in late november after knee surgery.  On exam has some lateral tenderness at the left knee. Has had a left TKR at \[facility]".  Otherwise denies head strike/LOC  -hosp course sign for hypotension requiring ICU care and pressor support     2.21: patient seen on 3E, not confused, able to give history; c/o some pelvis pain with movements.  requires 4l NC  2.22: on pressors ,lethargic   2.23: alert, oriented to name and place, anxious  2.24: more alert today, good urine output, still on pressors   2.25: no distress, +gen weakness   2.26: sat in the chair yesterday; Left IJ CVC removed  doing well, no cp, no sob  2.27: doing well, alert, not confused, no distress  A line removed today  2/28:  Pt seen.  Sitting up in chair.  Frustrated she hasn't gotten up/ jackson is still in.      ROS:  All other review of systems is negative unless indicated above    Vital Signs Last 24 Hrs  T(C): 36.4 (28 Feb 2022 16:54), Max: 36.4 (28 Feb 2022 16:54)  T(F): 97.5 (28 Feb 2022 16:54), Max: 97.5 (28 Feb 2022 16:54)  HR: 73 (28 Feb 2022 16:54) (63 - 89)  BP: 96/61 (28 Feb 2022 16:54) (91/50 - 115/69)  BP(mean): 72 (28 Feb 2022 15:00) (55 - 77)  RR: 18 (28 Feb 2022 16:54) (12 - 25)  SpO2: 94% (28 Feb 2022 16:54) (94% - 100%)    PE:    GENERAL: NAD  HEENT:  NC/AT, EOMI, PERRLA, No scleral icterus, Moist mucous membranes  NECK: Supple, No JVD  CNS:  Alert & Oriented X3, Motor Strength 5/5 B/L upper and lower extremities; DTRs 2+ intact   LUNG: Decreased Breath sounds, Rt crackles, no wheezing   HEART: RRR; No murmurs, No rubs  ABDOMEN: +BS, ST/ND/NT  GENITOURINARY: jackson  EXTREMITIES:  2+ Peripheral Pulses, No clubbing, No cyanosis, No tibial edema  MUSCULOSKELTAL: Joints normal ROM, No TTP, No effusion  VAGINAL: deferred  SKIN: multiple ecchymosis.    Labs:  02-28    138  |  101  |  34<H>  ----------------------------<  109<H>  4.6   |  34<H>  |  1.23    Ca    8.7      28 Feb 2022 06:47  Phos  4.0     02-28  Mg     2.6     02-28                          8.9    6.32  )-----------( 160      ( 28 Feb 2022 06:47 )             28.2     PTT - ( 27 Feb 2022 06:45 )  PTT:37.1 sec    MEDICATIONS  (STANDING):  acetaminophen   IVPB .. 1000 milliGRAM(s) IV Intermittent once  ALBUTerol    90 MICROgram(s) HFA Inhaler 2 Puff(s) Inhalation every 6 hours  allopurinol 100 milliGRAM(s) Oral daily  budesonide 160 MICROgram(s)/formoterol 4.5 MICROgram(s) Inhaler 2 Puff(s) Inhalation two times a day  chlorhexidine 2% Cloths 1 Application(s) Topical <User Schedule>  digoxin     Tablet 125 MICROGram(s) Oral daily  furosemide    Tablet 20 milliGRAM(s) Oral daily  levothyroxine 125 MICROGram(s) Oral daily  metoprolol tartrate 12.5 milliGRAM(s) Oral every 12 hours  pantoprazole    Tablet 40 milliGRAM(s) Oral before breakfast  polyethylene glycol 3350 17 Gram(s) Oral daily  rivaroxaban 15 milliGRAM(s) Oral with dinner  senna 2 Tablet(s) Oral at bedtime    MEDICATIONS  (PRN):  acetaminophen     Tablet .. 650 milliGRAM(s) Oral every 6 hours PRN Temp greater or equal to 38C (100.4F), Moderate Pain (4 - 6)  acetaminophen     Tablet .. 650 milliGRAM(s) Oral every 6 hours PRN Mild Pain (1 - 3)  calcium carbonate    500 mG (Tums) Chewable 3 Tablet(s) Chew every 6 hours PRN Dyspepsia  melatonin 3 milliGRAM(s) Oral at bedtime PRN Insomnia  ondansetron Injectable 4 milliGRAM(s) IV Push every 6 hours PRN Nausea  oxycodone    5 mG/acetaminophen 325 mG 1 Tablet(s) Oral every 6 hours PRN Severe Pain (7 - 10)  sodium chloride 0.9% lock flush 10 milliLiter(s) IV Push every 1 hour PRN Pre/post blood products, medications, blood draw, and to maintain line patency      CT Chest: RLL Pna

## 2022-02-28 NOTE — PROGRESS NOTE ADULT - ATTENDING COMMENTS
89 YO F with right sacral ala fracture and superior and inferior rami fracture, off pressors.    Plan:   Cardiology consult appreciated, on Xarelto  PT eval for dispo   Multimodal pain control  Incentive spirometry  Vitals, IS/OS Q 4  Diet: Soft Cardiac  GI prophylaxis  nephrology plan appreciated  Activity: WBAT  PT  SW for eventual D/C planning  Fall risk precautions
89 YO F with right sacral ala fracture and superior and inferior rami fracture, off pressors.    Plan:   Cardiology consult appreciated  PT eval for dispo   Multimodal pain control  Incentive spirometery  Vitals, IS/OS Q 4  Diet: Soft Cardiac  GI prophylaxis  nephrology plan appreciated  Activity: WBAT  PT  SW for eventual D/C planning
agree w/ above.

## 2022-03-01 ENCOUNTER — TRANSCRIPTION ENCOUNTER (OUTPATIENT)
Age: 87
End: 2022-03-01

## 2022-03-01 VITALS — OXYGEN SATURATION: 97 %

## 2022-03-01 LAB
ANION GAP SERPL CALC-SCNC: 6 MMOL/L — SIGNIFICANT CHANGE UP (ref 5–17)
BUN SERPL-MCNC: 36 MG/DL — HIGH (ref 7–23)
CALCIUM SERPL-MCNC: 8.5 MG/DL — SIGNIFICANT CHANGE UP (ref 8.5–10.1)
CHLORIDE SERPL-SCNC: 101 MMOL/L — SIGNIFICANT CHANGE UP (ref 96–108)
CO2 SERPL-SCNC: 32 MMOL/L — HIGH (ref 22–31)
CREAT SERPL-MCNC: 1 MG/DL — SIGNIFICANT CHANGE UP (ref 0.5–1.3)
EGFR: 53 ML/MIN/1.73M2 — LOW
GLUCOSE SERPL-MCNC: 107 MG/DL — HIGH (ref 70–99)
HCT VFR BLD CALC: 27.8 % — LOW (ref 34.5–45)
HGB BLD-MCNC: 8.8 G/DL — LOW (ref 11.5–15.5)
MAGNESIUM SERPL-MCNC: 2.6 MG/DL — SIGNIFICANT CHANGE UP (ref 1.6–2.6)
MCHC RBC-ENTMCNC: 31.7 GM/DL — LOW (ref 32–36)
MCHC RBC-ENTMCNC: 34 PG — SIGNIFICANT CHANGE UP (ref 27–34)
MCV RBC AUTO: 107.3 FL — HIGH (ref 80–100)
NRBC # BLD: 2 /100 WBCS — HIGH (ref 0–0)
PHOSPHATE SERPL-MCNC: 3.4 MG/DL — SIGNIFICANT CHANGE UP (ref 2.5–4.5)
PLATELET # BLD AUTO: 158 K/UL — SIGNIFICANT CHANGE UP (ref 150–400)
POTASSIUM SERPL-MCNC: 4.2 MMOL/L — SIGNIFICANT CHANGE UP (ref 3.5–5.3)
POTASSIUM SERPL-SCNC: 4.2 MMOL/L — SIGNIFICANT CHANGE UP (ref 3.5–5.3)
RBC # BLD: 2.59 M/UL — LOW (ref 3.8–5.2)
RBC # FLD: 16.1 % — HIGH (ref 10.3–14.5)
SODIUM SERPL-SCNC: 139 MMOL/L — SIGNIFICANT CHANGE UP (ref 135–145)
WBC # BLD: 5.88 K/UL — SIGNIFICANT CHANGE UP (ref 3.8–10.5)
WBC # FLD AUTO: 5.88 K/UL — SIGNIFICANT CHANGE UP (ref 3.8–10.5)

## 2022-03-01 PROCEDURE — 99238 HOSP IP/OBS DSCHRG MGMT 30/<: CPT

## 2022-03-01 PROCEDURE — 99232 SBSQ HOSP IP/OBS MODERATE 35: CPT

## 2022-03-01 RX ORDER — DIGOXIN 250 MCG
1 TABLET ORAL
Qty: 0 | Refills: 0 | DISCHARGE
Start: 2022-03-01

## 2022-03-01 RX ORDER — OXYCODONE AND ACETAMINOPHEN 5; 325 MG/1; MG/1
1 TABLET ORAL
Qty: 0 | Refills: 0 | DISCHARGE
Start: 2022-03-01

## 2022-03-01 RX ORDER — LANOLIN ALCOHOL/MO/W.PET/CERES
1 CREAM (GRAM) TOPICAL
Qty: 0 | Refills: 0 | DISCHARGE
Start: 2022-03-01

## 2022-03-01 RX ORDER — SENNA PLUS 8.6 MG/1
2 TABLET ORAL
Qty: 0 | Refills: 0 | DISCHARGE
Start: 2022-03-01

## 2022-03-01 RX ORDER — ALBUTEROL 90 UG/1
2 AEROSOL, METERED ORAL
Qty: 0 | Refills: 0 | DISCHARGE
Start: 2022-03-01

## 2022-03-01 RX ORDER — RNA INGREDIENT BNT-162B2 0.23 G/1.8ML
0.3 INJECTION, SUSPENSION INTRAMUSCULAR
Qty: 0 | Refills: 0 | DISCHARGE

## 2022-03-01 RX ORDER — CALCIUM CARBONATE 500(1250)
3 TABLET ORAL
Qty: 0 | Refills: 0 | DISCHARGE
Start: 2022-03-01

## 2022-03-01 RX ORDER — POLYETHYLENE GLYCOL 3350 17 G/17G
17 POWDER, FOR SOLUTION ORAL
Qty: 0 | Refills: 0 | DISCHARGE
Start: 2022-03-01

## 2022-03-01 RX ADMIN — Medication 125 MICROGRAM(S): at 05:53

## 2022-03-01 RX ADMIN — BUDESONIDE AND FORMOTEROL FUMARATE DIHYDRATE 2 PUFF(S): 160; 4.5 AEROSOL RESPIRATORY (INHALATION) at 08:40

## 2022-03-01 RX ADMIN — Medication 12.5 MILLIGRAM(S): at 09:15

## 2022-03-01 RX ADMIN — Medication 125 MICROGRAM(S): at 09:15

## 2022-03-01 RX ADMIN — OXYCODONE AND ACETAMINOPHEN 1 TABLET(S): 5; 325 TABLET ORAL at 01:56

## 2022-03-01 RX ADMIN — Medication 100 MILLIGRAM(S): at 09:15

## 2022-03-01 RX ADMIN — PANTOPRAZOLE SODIUM 40 MILLIGRAM(S): 20 TABLET, DELAYED RELEASE ORAL at 05:54

## 2022-03-01 RX ADMIN — Medication 20 MILLIGRAM(S): at 09:14

## 2022-03-01 RX ADMIN — OXYCODONE AND ACETAMINOPHEN 1 TABLET(S): 5; 325 TABLET ORAL at 01:27

## 2022-03-01 RX ADMIN — ALBUTEROL 2 PUFF(S): 90 AEROSOL, METERED ORAL at 14:23

## 2022-03-01 RX ADMIN — ALBUTEROL 2 PUFF(S): 90 AEROSOL, METERED ORAL at 08:40

## 2022-03-01 NOTE — PROGRESS NOTE ADULT - PROBLEM SELECTOR PROBLEM 4
Right heart failure
Right heart failure
Heart failure, unspecified HF chronicity, unspecified heart failure type
Right heart failure
Heart failure, unspecified HF chronicity, unspecified heart failure type
Right heart failure

## 2022-03-01 NOTE — PROGRESS NOTE ADULT - PROBLEM SELECTOR PLAN 4
Right heart failure with severe pulmonary HTN and severe tricuspid regurgitation, suspected chronic;  oral Lasix long term; continue digoxin monitor levels as oP

## 2022-03-01 NOTE — PROGRESS NOTE ADULT - PROBLEM SELECTOR PLAN 2
Elevated troponin (peak 210 upon presentation and now down-trending); ACS not suspected -- attributed to nonischemic injury.
Elevated troponin (peak 210 upon presentation and now down-trending); ACS not suspected.
Elevated troponin (peak 210 upon presentation and now down-trending); ACS not suspected -- attributed to nonischemic injury.
Elevated troponin (peak 210 upon presentation and now down-trending); ACS not suspected -- attributed to nonischemic injury.
possibly due to demand related ischemia due to sepsis , JOAQUÍN   follow up echo  ,ecotrin  81 mg po daily
possibly due to  due to sepsis , JOAQUÍN   follow up echo reviewed.
Elevated troponin (peak 210 upon presentation and now down-trending); ACS not suspected -- attributed to nonischemic injury.
Elevated troponin (peak 210 upon presentation and now down-trending); ACS not suspected -- attributed to nonischemic injury.

## 2022-03-01 NOTE — PROGRESS NOTE ADULT - ASSESSMENT
89 YO F with right sacral ala fracture and superior and inferior rami fracture, off pressors. No leukocytosis, hh stable. BUN elevated, creatinine normal.     Plan:   Cardiology consult appreciated, on Xarelto  PT eval for dispo   Multimodal pain control  Incentive spirometry  Vitals, IS/OS Q 4  Diet: Soft Cardiac  GI prophylaxis  Nephrology plan appreciated. Keep jackson  Activity: WBAT  PT  SW for eventual D/C planning    Plan discussed with Dr. Casey.

## 2022-03-01 NOTE — PROGRESS NOTE ADULT - NUTRITIONAL ASSESSMENT
This patient has been assessed with a concern for Malnutrition and has been determined to have a diagnosis/diagnoses of Severe protein-calorie malnutrition.    This patient is being managed with:   Diet DASH/TLC-  Sodium & Cholesterol Restricted  Entered: Feb 21 2022 12:54AM    

## 2022-03-01 NOTE — PROGRESS NOTE ADULT - SUBJECTIVE AND OBJECTIVE BOX
CC:Patient is a 89y old  Female who presents with a chief complaint of Fall (28 Feb 2022 09:42)    Pt seen and examined at bedside. No new complaints at this time. Patient denies fever, chills, SOB and CP. Remains off pressors.    ROS: as abovementioned otherwise negative    Physical exam:  Pt is aaox3  Pt in no acute distress  Psych: normal affect  HEENT: NCAT  Resp: CTAB  CVS: S1S2(+)  ABD: bowel sounds (+), soft, non distended, no rebound, no guarding  EXT: no calf tenderness or edema to exam b/l, on VTE prophylaxis. tender to palpation over gluteus, suprapubic, LLE knee. b/l LE edema  Skin: + ecchymosis to extremities and neck region  : jackson in place

## 2022-03-01 NOTE — DISCHARGE NOTE NURSING/CASE MANAGEMENT/SOCIAL WORK - PATIENT PORTAL LINK FT
You can access the FollowMyHealth Patient Portal offered by [facility] by registering at the following website: [email or web address]. By joining [employer]’s FollowMyHealth portal, you will also be able to view your health information using other applications (apps) compatible with our system.

## 2022-03-01 NOTE — PROGRESS NOTE ADULT - SUBJECTIVE AND OBJECTIVE BOX
REASON FOR VISIT: R heart failure    HPI:  89 year old woman with a history of atrial fibrillation, congestive heart failure (last hospitalization was at [facility] in September 2021), severe tricuspid regurgitation, severe pulmonary HTN, CAD, HTN, gout admitted on 2/20/22 with a pelvic fracture following a fall with hospitalization complicated by hypotension (requiring vasopressor), hypoxia.    2/22/22  Events noted , patient was hypotensive ,stable hemoglobin , became less responsive last night , markedly hypoglycemic , started on pressors , patient very lethargic arousable with vocal stimuli , hypoxic on VM  , heart rate high 90s     2/23/22: Pt comfortable, awake and alert, on 3L NC, mildly hypotensive on multiple pressors.   2/24/22:  Somnolent but easily awakened; says she doesn't know how she feels but denies pain.  2/25/22:  Much more awake and interactive today -- complaining only of fatigue.  2/26/22:  "I had a coughing spell" earlier; now comfortable but weak/fatigued.  2/27/22:  Pelvic pain with movement; fatigue; constipation.  2/28/22: pt c/o hip pain with movement, feels weak after moving from bed to chair. Off NC and midodrine  3/1/22  Patient is getting out of bed with PT , feels tired , no chest pain or shortness of breath      MEDICATIONS  (STANDING):  acetaminophen   IVPB .. 1000 milliGRAM(s) IV Intermittent once  ALBUTerol    90 MICROgram(s) HFA Inhaler 2 Puff(s) Inhalation every 6 hours  allopurinol 100 milliGRAM(s) Oral daily  budesonide 160 MICROgram(s)/formoterol 4.5 MICROgram(s) Inhaler 2 Puff(s) Inhalation two times a day  chlorhexidine 2% Cloths 1 Application(s) Topical <User Schedule>  digoxin     Tablet 125 MICROGram(s) Oral daily  furosemide    Tablet 20 milliGRAM(s) Oral daily  levothyroxine 125 MICROGram(s) Oral daily  metoprolol tartrate 12.5 milliGRAM(s) Oral every 12 hours  pantoprazole    Tablet 40 milliGRAM(s) Oral before breakfast  polyethylene glycol 3350 17 Gram(s) Oral daily  rivaroxaban 15 milliGRAM(s) Oral with dinner  senna 2 Tablet(s) Oral at bedtime    MEDICATIONS  (PRN):  acetaminophen     Tablet .. 650 milliGRAM(s) Oral every 6 hours PRN Temp greater or equal to 38C (100.4F), Moderate Pain (4 - 6)  acetaminophen     Tablet .. 650 milliGRAM(s) Oral every 6 hours PRN Mild Pain (1 - 3)  calcium carbonate    500 mG (Tums) Chewable 3 Tablet(s) Chew every 6 hours PRN Dyspepsia  melatonin 3 milliGRAM(s) Oral at bedtime PRN Insomnia  ondansetron Injectable 4 milliGRAM(s) IV Push every 6 hours PRN Nausea  oxycodone    5 mG/acetaminophen 325 mG 1 Tablet(s) Oral every 6 hours PRN Severe Pain (7 - 10)  sodium chloride 0.9% lock flush 10 milliLiter(s) IV Push every 1 hour PRN Pre/post blood products, medications, blood draw, and to maintain line patency      Vital Signs Last 24 Hrs  T(C): 36.2 (01 Mar 2022 08:35), Max: 36.8 (28 Feb 2022 20:33)  T(F): 97.2 (01 Mar 2022 08:35), Max: 98.2 (28 Feb 2022 20:33)  HR: 74 (01 Mar 2022 08:43) (70 - 80)  BP: 108/48 (01 Mar 2022 08:35)  checked while sitting   BP(mean): 72 (28 Feb 2022 15:00) (72 - 74)  RR: 16 (01 Mar 2022 08:35) (16 - 22)  SpO2: 97% (01 Mar 2022 08:43) (94% - 97%)    I&O's Summary    28 Feb 2022 07:01  -  01 Mar 2022 07:00  --------------------------------------------------------  IN: 820 mL / OUT: 1600 mL / NET: -780 mL      PHYSICAL EXAM:  Constitutional: NAD, sitting up in chair  Neck:  Ecchymoses on neck/chest  Respiratory: Breath sounds are clear bilaterally  Cardiovascular: irregular, systolic murmur  Gastrointestinal: Bowel Sounds present, soft, nontender.   Extremities: Legs with venous stasis skin changes, and mild edema     LABS:                                              8.8    5.88  )-----------( 158      ( 01 Mar 2022 06:49 )             27.8     03-01    139  |  101  |  36<H>  ----------------------------<  107<H>  4.2   |  32<H>  |  1.00    Ca    8.5      01 Mar 2022 06:49  Phos  3.4     03-01  Mg     2.6     03-01      TroponinI hsT: <-141.88, <-161.96, <-210.61    TTE Echo Complete w/o Contrast w/ Doppler (02.22.22 @ 12:28):   The left ventricle cavity is underdistended. Endocardium is not well visualized, however, overall left ventricular systolic function appears hyperdynamic.  Estimated left ventricular ejection fraction is 75 %.   Septal flattening is seen; this finding is consistent with right heart pressure / volume overload.   The right atrium appears severely dilated.   The right ventricle is at least moderately dilated with decreased contractility.   There are fibrocalcific changes noted to the aortic valve leaflets with restriction in leaflet excursion.    Mild mitral regurgitation.   Severe (4+) tricuspid valve regurgitation.   Severe pulmonary hypertension.   Mild pulmonic valvular regurgitation.   IVC is dilated and not collapsing with inspiration.    CT Chest No Cont (02.21.22 @ 11:00):  Patchy opacity at the right base may represent atelectasis or pneumonia. Interlobular septal thickening and small pleural effusions likely representing superimposed pulmonary edema.    Tele: AFib HR 70-80s

## 2022-03-01 NOTE — DISCHARGE NOTE PROVIDER - CARE PROVIDERS DIRECT ADDRESSES
[email or web address]

## 2022-03-01 NOTE — PROGRESS NOTE ADULT - PROBLEM SELECTOR PLAN 1
Improved / Stable hemodynamics -- remains off pressors and midodrine today
hypotension , hypoglycemia , [id number] ? pneumonia ? stable hemoglobin ,  appears to be due to sepsis ,JOAQUÍN  continue IV fluid dextrose , pressor , antibiotics , will obtain echo ,
Improving hemodynamics -- Brandno-Synephrine currently off; hypotension has been attributed to sepsis    * I discussed case with intensivist, Dr Mckenzie.  2/26/22
mildly hypotensive on multiple pressors due to sepsis, cont antibiotics  TTE 2/22/22 showing hyperdynamic LV with septal flattening, LVEF 75%. severe pulm HTN, severe TR, dilated RA and RV. Plan to wean milrinone as discussed with intensivist. Avoid diuresis as patient is preload dependent.
Persistent hypotension (presumed sepsis)  + chronic illness) and requiring Brandon-Synephrine to maintain perfusing pressure.    * I discussed case with intensivist, Dr Mckenzie,
Persistent hypotension -- intermittently requiring Brandon-Synephrine; presumed sepsis contributing to hypotension / fatigue.    * I discussed case with intensivist, Dr Mckenzie.  2/25/22
Improved / Stable hemodynamics -- remains off pressors and midodrine   on low dose lasix , lopressor
Improved / Stable hemodynamics -- remains off Brandon-Synephrine; d/c arterial BP line    * I discussed case with intensivist, Dr Mckenzie.  2/27/22

## 2022-03-01 NOTE — PROGRESS NOTE ADULT - PROBLEM SELECTOR PLAN 3
Chronic AF; rate mildly elevated; favor restarting metoprolol -- 12.5mg q12hr; continue Xarelto.
controlled ventricular rate , continue heparin
Chronic AF; improved HR control today-  cont metoprolol tartrate 12.5mg q12hr; continue Xarelto.
Chronic AF with adequate ventricular rate control at present time; continue IV unfractionated heparin.
Chronic AF; improved HR control today-  cont metoprolol tartrate 12.5mg q12hr; continue Xarelto.
controlled ventricular rate , was xarelto on hold , started on IV heparin
Chronic AF; plan to change UFH to Xarelto this evening; will need to resume metoprolol for optimization of HR when BP allows (? 1-2 days).
Chronic AF; will need to resume metoprolol for optimization of HR when BP allows; consider transitioning UFH back to Xarelto soon.

## 2022-03-01 NOTE — DISCHARGE NOTE PROVIDER - DETAILS OF MALNUTRITION DIAGNOSIS/DIAGNOSES
This patient has been assessed with a concern for Malnutrition and was treated during this hospitalization for the following Nutrition diagnosis/diagnoses:     -  02/23/2022: Severe protein-calorie malnutrition

## 2022-03-01 NOTE — PROGRESS NOTE ADULT - PROVIDER SPECIALTY LIST ADULT
CCU
Critical Care
Critical Care
Hospitalist
Nephrology
Critical Care
Hospitalist
Nephrology
Surgery
Trauma Surgery
Urology
Cardiology
Critical Care
Nephrology
Surgery
Hospitalist
Hospitalist
Surgery
Trauma Surgery
Hospitalist
Cardiology
Critical Care
Critical Care
Cardiology
Critical Care
Cardiology
Critical Care
Cardiology
Cardiology
Critical Care

## 2022-03-01 NOTE — DISCHARGE NOTE PROVIDER - NSDCCPCAREPLAN_GEN_ALL_CORE_FT
I have reviewed discharge instructions with the patient. The patient verbalized understanding. Patient armband removed and shredded
PRINCIPAL DISCHARGE DIAGNOSIS  Diagnosis: Closed fracture of multiple pubic rami, right, initial encounter  Assessment and Plan of Treatment:

## 2022-03-01 NOTE — DISCHARGE NOTE PROVIDER - CARE PROVIDER_API CALL
Augusto Fernandez (DO)  Orthopaedic Surgery  [address] [place] [address]  Phone: [phone]  Fax: [phone]  Follow Up Time:     Shaheen Garcia)  Cardiovascular Disease; Internal Medicine  [address] [facility] [address]  [place] [address]  Phone: [phone]  Fax: [phone]  Follow Up Time:

## 2022-03-01 NOTE — DISCHARGE NOTE PROVIDER - HOSPITAL COURSE
89 yo F that presented to the ED for evaluation of right hip pain. She stated that she was ambulating with her walker this morning, when it got stuck on her bathroom door causing her to fall on her  right buttock. She called her daughter who took her to her house because of the fact that she lives alone. She stated that she has been able to ambulate minimally to get into the car to the ED since the fall but with pain. She reports that she lives at home alone with a home aide during the day for 2 hours. Pt was released from [facility] in late november after knee surgery.  On exam has some lateral tenderness at the left knee. Has had a left TKR at [facility].  Otherwise denies head strike/LOC  -hosp course sign for hypotension requiring ICU care and pressor support.      Vital Signs Last 24 Hrs  T(C): 36.2 (01 Mar 2022 08:35), Max: 36.8 (28 Feb 2022 20:33)  T(F): 97.2 (01 Mar 2022 08:35), Max: 98.2 (28 Feb 2022 20:33)  HR: 74 (01 Mar 2022 08:43) (70 - 80)  BP: 108/48 (01 Mar 2022 08:35) (91/63 - 115/62)  BP(mean): 72 (28 Feb 2022 15:00) (72 - 74)  RR: 16 (01 Mar 2022 08:35) (16 - 22)  SpO2: 97% (01 Mar 2022 08:43) (94% - 97%)                                8.8    5.88  )-----------( 158      ( 01 Mar 2022 06:49 )             27.8         03-01    139  |  101  |  36<H>  ----------------------------<  107<H>  4.2   |  32<H>  |  1.00    Ca    8.5      01 Mar 2022 06:49  Phos  3.4     03-01  Mg     2.6     03-01

## 2022-03-01 NOTE — DISCHARGE NOTE PROVIDER - NSDCMRMEDTOKEN_GEN_ALL_CORE_FT
albuterol 90 mcg/inh inhalation aerosol: 2 puff(s) inhaled every 6 hours  allopurinol 100 mg oral tablet: 1 tab(s) orally once a day  calcium carbonate 500 mg (200 mg elemental calcium) oral tablet, chewable: 3 tab(s) orally every 6 hours, As needed, Dyspepsia  digoxin 125 mcg (0.125 mg) oral tablet: 1 tab(s) orally once a day  furosemide 20 mg oral tablet: 1 tab(s) orally once a day (in the morning)  ***take with 40mg , total dose 60mg***  furosemide 40 mg oral tablet: 1 tab(s) orally once a day with 20mg  ***total dose 60mg***  levothyroxine 125 mcg (0.125 mg) oral tablet: 1 tab(s) orally once a day  melatonin 3 mg oral tablet: 1 tab(s) orally once a day (at bedtime), As needed, Insomnia  Metoprolol Succinate ER 25 mg oral tablet, extended release: 1 -2  tab(s) orally once a day  ***dependant on BP level***  Multiple Vitamins oral tablet: 1 tab(s) orally once a day  oxycodone-acetaminophen 5 mg-325 mg oral tablet: 1 tab(s) orally every 6 hours, As needed, Severe Pain (7 - 10)  polyethylene glycol 3350 oral powder for reconstitution: 17 gram(s) orally once a day  potassium chloride 20 mEq oral tablet, extended release: 1 tab(s) orally 2 times a day  senna oral tablet: 2 tab(s) orally once a day (at bedtime)  Tylenol 500 mg oral tablet: 2 tab(s) orally every 6 hours, As Needed  Xarelto 15 mg oral tablet: 1 tab(s) orally once a day (in the evening)

## 2022-03-01 NOTE — DISCHARGE NOTE NURSING/CASE MANAGEMENT/SOCIAL WORK - NSDCPEFALRISK_GEN_ALL_CORE
For information on Fall & Injury Prevention, visit: [email or web address] OR  [email or web address] OR  https://www.cdc.gov/steadi/patient.html

## 2022-03-04 DIAGNOSIS — I48.20 CHRONIC ATRIAL FIBRILLATION, UNSPECIFIED: ICD-10-CM

## 2022-03-04 DIAGNOSIS — Z88.5 ALLERGY STATUS TO NARCOTIC AGENT: ICD-10-CM

## 2022-03-04 DIAGNOSIS — N39.0 URINARY TRACT INFECTION, SITE NOT SPECIFIED: ICD-10-CM

## 2022-03-04 DIAGNOSIS — I42.9 CARDIOMYOPATHY, UNSPECIFIED: ICD-10-CM

## 2022-03-04 DIAGNOSIS — N18.9 CHRONIC KIDNEY DISEASE, UNSPECIFIED: ICD-10-CM

## 2022-03-04 DIAGNOSIS — I13.0 HYPERTENSIVE HEART AND CHRONIC KIDNEY DISEASE WITH HEART FAILURE AND STAGE 1 THROUGH STAGE 4 CHRONIC KIDNEY DISEASE, OR UNSPECIFIED CHRONIC KIDNEY DISEASE: ICD-10-CM

## 2022-03-04 DIAGNOSIS — F41.9 ANXIETY DISORDER, UNSPECIFIED: ICD-10-CM

## 2022-03-04 DIAGNOSIS — R57.0 CARDIOGENIC SHOCK: ICD-10-CM

## 2022-03-04 DIAGNOSIS — J96.01 ACUTE RESPIRATORY FAILURE WITH HYPOXIA: ICD-10-CM

## 2022-03-04 DIAGNOSIS — I50.32 CHRONIC DIASTOLIC (CONGESTIVE) HEART FAILURE: ICD-10-CM

## 2022-03-04 DIAGNOSIS — E87.5 HYPERKALEMIA: ICD-10-CM

## 2022-03-04 DIAGNOSIS — E43 UNSPECIFIED SEVERE PROTEIN-CALORIE MALNUTRITION: ICD-10-CM

## 2022-03-04 DIAGNOSIS — Z95.0 PRESENCE OF CARDIAC PACEMAKER: ICD-10-CM

## 2022-03-04 DIAGNOSIS — R65.21 SEVERE SEPSIS WITH SEPTIC SHOCK: ICD-10-CM

## 2022-03-04 DIAGNOSIS — A41.9 SEPSIS, UNSPECIFIED ORGANISM: ICD-10-CM

## 2022-03-04 DIAGNOSIS — S32.19XA OTHER FRACTURE OF SACRUM, INITIAL ENCOUNTER FOR CLOSED FRACTURE: ICD-10-CM

## 2022-03-04 DIAGNOSIS — Z79.890 HORMONE REPLACEMENT THERAPY: ICD-10-CM

## 2022-03-04 DIAGNOSIS — K59.00 CONSTIPATION, UNSPECIFIED: ICD-10-CM

## 2022-03-04 DIAGNOSIS — I25.10 ATHEROSCLEROTIC HEART DISEASE OF NATIVE CORONARY ARTERY WITHOUT ANGINA PECTORIS: ICD-10-CM

## 2022-03-04 DIAGNOSIS — J44.9 CHRONIC OBSTRUCTIVE PULMONARY DISEASE, UNSPECIFIED: ICD-10-CM
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
## 2022-03-04 DIAGNOSIS — E16.2 HYPOGLYCEMIA, UNSPECIFIED: ICD-10-CM

## 2022-03-04 DIAGNOSIS — Z87.891 PERSONAL HISTORY OF NICOTINE DEPENDENCE: ICD-10-CM

## 2022-03-04 DIAGNOSIS — M85.80 OTHER SPECIFIED DISORDERS OF BONE DENSITY AND STRUCTURE, UNSPECIFIED SITE: ICD-10-CM

## 2022-03-04 DIAGNOSIS — R33.9 RETENTION OF URINE, UNSPECIFIED: ICD-10-CM

## 2022-03-04 DIAGNOSIS — N17.0 ACUTE KIDNEY FAILURE WITH TUBULAR NECROSIS: ICD-10-CM
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
## 2022-03-04 DIAGNOSIS — Z66 DO NOT RESUSCITATE: ICD-10-CM

## 2022-03-04 DIAGNOSIS — G93.40 ENCEPHALOPATHY, UNSPECIFIED: ICD-10-CM

## 2022-03-04 DIAGNOSIS — E03.9 HYPOTHYROIDISM, UNSPECIFIED: ICD-10-CM

## 2022-03-04 DIAGNOSIS — I08.3 COMBINED RHEUMATIC DISORDERS OF MITRAL, AORTIC AND TRICUSPID VALVES: ICD-10-CM

## 2022-03-04 DIAGNOSIS — S32.591A OTHER SPECIFIED FRACTURE OF RIGHT PUBIS, INITIAL ENCOUNTER FOR CLOSED FRACTURE: ICD-10-CM

## 2022-03-04 DIAGNOSIS — J18.9 PNEUMONIA, UNSPECIFIED ORGANISM: ICD-10-CM

## 2022-03-04 DIAGNOSIS — Z96.652 PRESENCE OF LEFT ARTIFICIAL KNEE JOINT: ICD-10-CM

## 2022-03-04 DIAGNOSIS — I27.20 PULMONARY HYPERTENSION, UNSPECIFIED: ICD-10-CM

## 2022-03-11 ENCOUNTER — INPATIENT (INPATIENT)
Facility: HOSPITAL | Age: 87
LOS: 9 days | Discharge: NURSING, REHAB OR LONG-TERM CARE | DRG: 291 | End: 2022-03-21
Attending: HOSPITALIST | Admitting: HOSPITALIST
Payer: MEDICARE

## 2022-03-11 VITALS
DIASTOLIC BLOOD PRESSURE: 63 MMHG | WEIGHT: 130.95 LBS | HEIGHT: 63 IN | RESPIRATION RATE: 32 BRPM | OXYGEN SATURATION: 100 % | HEART RATE: 106 BPM | SYSTOLIC BLOOD PRESSURE: 149 MMHG | TEMPERATURE: 100 F

## 2022-03-11 DIAGNOSIS — Z96.659 PRESENCE OF UNSPECIFIED ARTIFICIAL KNEE JOINT: Chronic | ICD-10-CM

## 2022-03-11 DIAGNOSIS — Z95.0 PRESENCE OF CARDIAC PACEMAKER: Chronic | ICD-10-CM

## 2022-03-11 DIAGNOSIS — J96.01 ACUTE RESPIRATORY FAILURE WITH HYPOXIA: ICD-10-CM

## 2022-03-11 LAB
ALBUMIN SERPL ELPH-MCNC: 3.1 G/DL — LOW (ref 3.3–5)
ALP SERPL-CCNC: 142 U/L — HIGH (ref 30–120)
ALT FLD-CCNC: 20 U/L DA — SIGNIFICANT CHANGE UP (ref 10–60)
ANION GAP SERPL CALC-SCNC: 1 MMOL/L — LOW (ref 5–17)
ANISOCYTOSIS BLD QL: SLIGHT — SIGNIFICANT CHANGE UP
APPEARANCE UR: CLEAR — SIGNIFICANT CHANGE UP
APTT BLD: 39.8 SEC — HIGH (ref 27.5–35.5)
AST SERPL-CCNC: 58 U/L — HIGH (ref 10–40)
BACTERIA # UR AUTO: ABNORMAL
BASE EXCESS BLDA CALC-SCNC: 12.3 MMOL/L — HIGH (ref -2–3)
BASE EXCESS BLDA CALC-SCNC: 15.1 MMOL/L — HIGH (ref -2–3)
BASO STIPL BLD QL SMEAR: PRESENT — SIGNIFICANT CHANGE UP
BASOPHILS # BLD AUTO: 0.08 K/UL — SIGNIFICANT CHANGE UP (ref 0–0.2)
BASOPHILS NFR BLD AUTO: 1.3 % — SIGNIFICANT CHANGE UP (ref 0–2)
BILIRUB SERPL-MCNC: 0.9 MG/DL — SIGNIFICANT CHANGE UP (ref 0.2–1.2)
BILIRUB UR-MCNC: NEGATIVE — SIGNIFICANT CHANGE UP
BLOOD GAS COMMENTS ARTERIAL: SIGNIFICANT CHANGE UP
BUN SERPL-MCNC: 32 MG/DL — HIGH (ref 7–23)
CALCIUM SERPL-MCNC: 8 MG/DL — LOW (ref 8.4–10.5)
CHLORIDE SERPL-SCNC: 104 MMOL/L — SIGNIFICANT CHANGE UP (ref 96–108)
CO2 SERPL-SCNC: 36 MMOL/L — HIGH (ref 22–31)
COLOR SPEC: SIGNIFICANT CHANGE UP
CREAT SERPL-MCNC: 0.93 MG/DL — SIGNIFICANT CHANGE UP (ref 0.5–1.3)
CRP SERPL-MCNC: 21 MG/L — HIGH
DIFF PNL FLD: ABNORMAL
DIGOXIN SERPL-MCNC: 1.4 NG/ML — SIGNIFICANT CHANGE UP (ref 0.8–2)
EGFR: 57 ML/MIN/1.73M2 — LOW
EOSINOPHIL # BLD AUTO: 0.03 K/UL — SIGNIFICANT CHANGE UP (ref 0–0.5)
EOSINOPHIL NFR BLD AUTO: 0.5 % — SIGNIFICANT CHANGE UP (ref 0–6)
EPI CELLS # UR: SIGNIFICANT CHANGE UP
FLU A RESULT: SIGNIFICANT CHANGE UP
FLU A RESULT: SIGNIFICANT CHANGE UP
FLUAV AG NPH QL: SIGNIFICANT CHANGE UP
FLUBV AG NPH QL: SIGNIFICANT CHANGE UP
GAS PNL BLDA: SIGNIFICANT CHANGE UP
GLUCOSE SERPL-MCNC: 111 MG/DL — HIGH (ref 70–99)
GLUCOSE UR QL: NEGATIVE MG/DL — SIGNIFICANT CHANGE UP
HCO3 BLDA-SCNC: 39 MMOL/L — HIGH (ref 21–28)
HCO3 BLDA-SCNC: 40 MMOL/L — HIGH (ref 21–28)
HCT VFR BLD CALC: 33.1 % — LOW (ref 34.5–45)
HGB BLD-MCNC: 10.1 G/DL — LOW (ref 11.5–15.5)
HOROWITZ INDEX BLDA+IHG-RTO: 100 — SIGNIFICANT CHANGE UP
HOROWITZ INDEX BLDA+IHG-RTO: 40 — SIGNIFICANT CHANGE UP
HYALINE CASTS # UR AUTO: ABNORMAL
IMM GRANULOCYTES NFR BLD AUTO: 0.6 % — SIGNIFICANT CHANGE UP (ref 0–1.5)
INR BLD: 2.03 RATIO — HIGH (ref 0.88–1.16)
KETONES UR-MCNC: NEGATIVE — SIGNIFICANT CHANGE UP
LACTATE SERPL-SCNC: 1 MMOL/L — SIGNIFICANT CHANGE UP (ref 0.7–2)
LEUKOCYTE ESTERASE UR-ACNC: NEGATIVE — SIGNIFICANT CHANGE UP
LYMPHOCYTES # BLD AUTO: 1.38 K/UL — SIGNIFICANT CHANGE UP (ref 1–3.3)
LYMPHOCYTES # BLD AUTO: 21.9 % — SIGNIFICANT CHANGE UP (ref 13–44)
MACROCYTES BLD QL: SIGNIFICANT CHANGE UP
MANUAL SMEAR VERIFICATION: SIGNIFICANT CHANGE UP
MCHC RBC-ENTMCNC: 30.5 GM/DL — LOW (ref 32–36)
MCHC RBC-ENTMCNC: 34.6 PG — HIGH (ref 27–34)
MCV RBC AUTO: 113.4 FL — HIGH (ref 80–100)
MONOCYTES # BLD AUTO: 0.79 K/UL — SIGNIFICANT CHANGE UP (ref 0–0.9)
MONOCYTES NFR BLD AUTO: 12.5 % — SIGNIFICANT CHANGE UP (ref 2–14)
NEUTROPHILS # BLD AUTO: 3.98 K/UL — SIGNIFICANT CHANGE UP (ref 1.8–7.4)
NEUTROPHILS NFR BLD AUTO: 63.2 % — SIGNIFICANT CHANGE UP (ref 43–77)
NITRITE UR-MCNC: NEGATIVE — SIGNIFICANT CHANGE UP
NRBC # BLD: 0 /100 WBCS — SIGNIFICANT CHANGE UP (ref 0–0)
NT-PROBNP SERPL-SCNC: HIGH PG/ML (ref 0–450)
PCO2 BLDA: 61 MMHG — HIGH (ref 32–35)
PCO2 BLDA: 81 MMHG — CRITICAL HIGH (ref 32–35)
PH BLDA: 7.29 — LOW (ref 7.35–7.45)
PH BLDA: 7.42 — SIGNIFICANT CHANGE UP (ref 7.35–7.45)
PH UR: 5 — SIGNIFICANT CHANGE UP (ref 5–8)
PLAT MORPH BLD: NORMAL — SIGNIFICANT CHANGE UP
PLATELET # BLD AUTO: 209 K/UL — SIGNIFICANT CHANGE UP (ref 150–400)
PO2 BLDA: 345 MMHG — HIGH (ref 83–108)
PO2 BLDA: 99 MMHG — SIGNIFICANT CHANGE UP (ref 83–108)
POTASSIUM SERPL-MCNC: 5.7 MMOL/L — HIGH (ref 3.5–5.3)
POTASSIUM SERPL-SCNC: 5.7 MMOL/L — HIGH (ref 3.5–5.3)
PROCALCITONIN SERPL-MCNC: 0.09 NG/ML — SIGNIFICANT CHANGE UP (ref 0.02–0.1)
PROT SERPL-MCNC: 6.8 G/DL — SIGNIFICANT CHANGE UP (ref 6–8.3)
PROT UR-MCNC: NEGATIVE MG/DL — SIGNIFICANT CHANGE UP
PROTHROM AB SERPL-ACNC: 23.5 SEC — HIGH (ref 10.5–13.4)
RBC # BLD: 2.92 M/UL — LOW (ref 3.8–5.2)
RBC # FLD: 16 % — HIGH (ref 10.3–14.5)
RBC BLD AUTO: ABNORMAL
RBC CASTS # UR COMP ASSIST: ABNORMAL /HPF (ref 0–4)
RSV RESULT: DETECTED
RSV RNA RESP QL NAA+PROBE: DETECTED
SAO2 % BLDA: 98.8 % — HIGH (ref 94–98)
SAO2 % BLDA: 99.7 % — HIGH (ref 94–98)
SARS-COV-2 RNA SPEC QL NAA+PROBE: SIGNIFICANT CHANGE UP
SODIUM SERPL-SCNC: 141 MMOL/L — SIGNIFICANT CHANGE UP (ref 135–145)
SP GR SPEC: 1.01 — SIGNIFICANT CHANGE UP (ref 1.01–1.02)
UROBILINOGEN FLD QL: NEGATIVE MG/DL — SIGNIFICANT CHANGE UP
WBC # BLD: 6.3 K/UL — SIGNIFICANT CHANGE UP (ref 3.8–10.5)
WBC # FLD AUTO: 6.3 K/UL — SIGNIFICANT CHANGE UP (ref 3.8–10.5)
WBC UR QL: SIGNIFICANT CHANGE UP

## 2022-03-11 PROCEDURE — 93010 ELECTROCARDIOGRAM REPORT: CPT

## 2022-03-11 PROCEDURE — 71045 X-RAY EXAM CHEST 1 VIEW: CPT | Mod: 26

## 2022-03-11 PROCEDURE — 99223 1ST HOSP IP/OBS HIGH 75: CPT | Mod: AI

## 2022-03-11 PROCEDURE — 71250 CT THORAX DX C-: CPT | Mod: 26

## 2022-03-11 PROCEDURE — 99285 EMERGENCY DEPT VISIT HI MDM: CPT

## 2022-03-11 RX ORDER — METOPROLOL TARTRATE 50 MG
5 TABLET ORAL EVERY 6 HOURS
Refills: 0 | Status: DISCONTINUED | OUTPATIENT
Start: 2022-03-11 | End: 2022-03-12

## 2022-03-11 RX ORDER — LEVOTHYROXINE SODIUM 125 MCG
62.5 TABLET ORAL AT BEDTIME
Refills: 0 | Status: DISCONTINUED | OUTPATIENT
Start: 2022-03-11 | End: 2022-03-12

## 2022-03-11 RX ORDER — ACETAMINOPHEN 500 MG
650 TABLET ORAL ONCE
Refills: 0 | Status: COMPLETED | OUTPATIENT
Start: 2022-03-11 | End: 2022-03-11

## 2022-03-11 RX ORDER — PANTOPRAZOLE SODIUM 20 MG/1
40 TABLET, DELAYED RELEASE ORAL DAILY
Refills: 0 | Status: DISCONTINUED | OUTPATIENT
Start: 2022-03-11 | End: 2022-03-13

## 2022-03-11 RX ORDER — SODIUM CHLORIDE 9 MG/ML
1000 INJECTION INTRAMUSCULAR; INTRAVENOUS; SUBCUTANEOUS ONCE
Refills: 0 | Status: COMPLETED | OUTPATIENT
Start: 2022-03-11 | End: 2022-03-11

## 2022-03-11 RX ORDER — IPRATROPIUM/ALBUTEROL SULFATE 18-103MCG
3 AEROSOL WITH ADAPTER (GRAM) INHALATION EVERY 8 HOURS
Refills: 0 | Status: DISCONTINUED | OUTPATIENT
Start: 2022-03-11 | End: 2022-03-21

## 2022-03-11 RX ORDER — FUROSEMIDE 40 MG
40 TABLET ORAL
Refills: 0 | Status: DISCONTINUED | OUTPATIENT
Start: 2022-03-11 | End: 2022-03-12

## 2022-03-11 RX ORDER — PIPERACILLIN AND TAZOBACTAM 4; .5 G/20ML; G/20ML
3.38 INJECTION, POWDER, LYOPHILIZED, FOR SOLUTION INTRAVENOUS ONCE
Refills: 0 | Status: COMPLETED | OUTPATIENT
Start: 2022-03-11 | End: 2022-03-11

## 2022-03-11 RX ORDER — VANCOMYCIN HCL 1 G
1000 VIAL (EA) INTRAVENOUS ONCE
Refills: 0 | Status: COMPLETED | OUTPATIENT
Start: 2022-03-11 | End: 2022-03-11

## 2022-03-11 RX ORDER — FUROSEMIDE 40 MG
40 TABLET ORAL ONCE
Refills: 0 | Status: COMPLETED | OUTPATIENT
Start: 2022-03-11 | End: 2022-03-11

## 2022-03-11 RX ADMIN — Medication 3 MILLILITER(S): at 21:04

## 2022-03-11 RX ADMIN — PIPERACILLIN AND TAZOBACTAM 3.38 GRAM(S): 4; .5 INJECTION, POWDER, LYOPHILIZED, FOR SOLUTION INTRAVENOUS at 10:15

## 2022-03-11 RX ADMIN — Medication 250 MILLIGRAM(S): at 09:39

## 2022-03-11 RX ADMIN — PIPERACILLIN AND TAZOBACTAM 200 GRAM(S): 4; .5 INJECTION, POWDER, LYOPHILIZED, FOR SOLUTION INTRAVENOUS at 09:39

## 2022-03-11 RX ADMIN — SODIUM CHLORIDE 1000 MILLILITER(S): 9 INJECTION INTRAMUSCULAR; INTRAVENOUS; SUBCUTANEOUS at 09:40

## 2022-03-11 RX ADMIN — Medication 40 MILLIGRAM(S): at 11:38

## 2022-03-11 RX ADMIN — Medication 650 MILLIGRAM(S): at 09:40

## 2022-03-11 RX ADMIN — Medication 3 MILLILITER(S): at 14:37

## 2022-03-11 RX ADMIN — PANTOPRAZOLE SODIUM 40 MILLIGRAM(S): 20 TABLET, DELAYED RELEASE ORAL at 22:07

## 2022-03-11 RX ADMIN — Medication 650 MILLIGRAM(S): at 10:10

## 2022-03-11 RX ADMIN — Medication 1000 MILLIGRAM(S): at 11:00

## 2022-03-11 RX ADMIN — Medication 62.5 MICROGRAM(S): at 22:06

## 2022-03-11 RX ADMIN — Medication 40 MILLIGRAM(S): at 17:13

## 2022-03-11 RX ADMIN — SODIUM CHLORIDE 1000 MILLILITER(S): 9 INJECTION INTRAMUSCULAR; INTRAVENOUS; SUBCUTANEOUS at 10:55

## 2022-03-11 NOTE — H&P ADULT - NSHPLABSRESULTS_GEN_ALL_CORE
Labs:                        10.1   6.30  )-----------( 209      ( 11 Mar 2022 09:30 )             33.1     03-11    141  |  104  |  32<H>  ----------------------------<  111<H>  5.7<H>   |  36<H>  |  0.93    Ca    8.0<L>      11 Mar 2022 09:30    TPro  6.8  /  Alb  3.1<L>  /  TBili  0.9  /  DBili  x   /  AST  58<H>  /  ALT  20  /  AlkPhos  142<H>  03-11        ABG - ( 11 Mar 2022 09:37 )  pH, Arterial: 7.29  pH, Blood: x     /  pCO2: 81    /  pO2: 345   / HCO3: 39    / Base Excess: 12.3  /  SaO2: 99.7      PT/INR - ( 11 Mar 2022 09:30 )   PT: 23.5 sec;   INR: 2.03 ratio    PTT - ( 11 Mar 2022 09:30 )  PTT:39.8 sec    Lactate Trend  03-11 @ 09:30 Lactate:1.0     EKG:   Personally Reviewed:  [X] YES     Imaging:   Personally Reviewed:  [X] YES

## 2022-03-11 NOTE — ED ADULT TRIAGE NOTE - CHIEF COMPLAINT QUOTE
As per  EMS " the staff found her out of breath this morning during their rounds , she was weak and a little confused , her sat was low 58 % - " Pt BIBA from [facility] for SOB neb tx x2 given  Pt presented with wheezing and rhonchi 02 sat @ 100 % PMH COPD

## 2022-03-11 NOTE — CONSULT NOTE ADULT - ASSESSMENT
The patient is a 89 year old female with a history of hypothyroidism, atrial fibrillation, pacemaker, COPD, CAD, chronic diastolic heart failure, severe TR, severe pulm HTN who presents with shortness of breath in the setting of acute on chronic diastolic heart failure, COPD, possible PNA.    Plan:  - ECG with known AF and intermittent ventricular pacing  - CXR with bilateral pleural effusions  - BNP 80167  - Echo 2/22 with normal LV systolic function, severe TR, severe pulm HTN  - ABG with CO2 retention  - Continue BIPAP  - Continue furosemide 40 mg IV q12h  - Continue rivaroxaban 15 mg daily  - Continue digoxin 0.125 mg daily  - Continue metoprolol succinate 25 mg daily  - Receiving IV antibiotics for possible PNA

## 2022-03-11 NOTE — ED PROVIDER NOTE - OBJECTIVE STATEMENT
89 yo F with hx of CHF, from [facility] BIBA for eval of resp distress today. Pt unable to give any hx. Denies pain. PCP Eliane.

## 2022-03-11 NOTE — H&P ADULT - HISTORY OF PRESENT ILLNESS
89 y.o. female w/ hx of CHF, COPD, PPM, gout, hypothyroidism, HTN, and STEMI BIBEMS today from [facility] for evaluation of respiratory distress. Pt unable to give any history 2/2 respiratory status. Per EMS report pt. was hypoxic to 50% on 4L nasal cannula, NRB placed w/ improvement in O2 sats to 70s.      ED Course;  - Placed on bipap  - Broad spectrum coverage w/ Zosyn and Vanco  - Lasix 40mg  - Methylprednisone 40mg  - CBC, ABG, Coags, UA,blood and urine culture, lactate, procalcitonin, BNP  - CXR done  - Chest CT done  - EKG done 89 y.o. female w/ hx of CHF, COPD, PPM, gout, hypothyroidism, HTN, and STEMI BIBEMS today from [facility] for evaluation of respiratory distress. Pt unable to give any history 2/2 respiratory status. Per EMS report pt. was hypoxic to 50% on 4L nasal cannula, NRB placed w/ improvement in O2 sats to 70s.      ED Course;  - Placed on bipap  - Broad spectrum coverage w/ Zosyn and Vanco  - Lasix 40mg  - Methylprednisone 40mg  - CBC, ABG, Coags, UA,blood and urine culture, lactate, procalcitonin, BNP  - CXR done  - Chest CT done  - EKG done    Seen by pulmonary and cardiology  SPCU monitoring was recommended

## 2022-03-11 NOTE — H&P ADULT - NSHPPHYSICALEXAM_GEN_ALL_CORE
PHYSICAL EXAM:  Vital Signs Last 24 Hrs  T(C): 36.7 (11 Mar 2022 11:30), Max: 37.9 (11 Mar 2022 09:14)  T(F): 98 (11 Mar 2022 11:30), Max: 100.2 (11 Mar 2022 09:14)  HR: 81 (11 Mar 2022 11:30) (76 - 108)  BP: 122/64 (11 Mar 2022 11:30) (117/56 - 149/63)  BP(mean): --  RR: 20 (11 Mar 2022 11:30) (20 - 32)  SpO2: 97% (11 Mar 2022 11:30) (82% - 100%)    GENERAL: NAD, well-groomed, well-developed  HEAD:  Atraumatic, Normocephalic  EYES: PERRLA, conjunctiva and sclera clear  ENMT: On bipap  NECK: Supple, No JVD  NERVOUS SYSTEM:  Awake, following on all 4 extremities  CHEST/LUNG: Coarse throughout, poor ventilatory effort, + cough  HEART: Irregular, rate normal  ABDOMEN: Distended, bowel sounds present  : Espinoza present, urine clear and yellow  EXTREMITIES:  2+ peripheral pulses, generalized edema PHYSICAL EXAM:  Vital Signs Last 24 Hrs  T(C): 36.7 (11 Mar 2022 11:30), Max: 37.9 (11 Mar 2022 09:14)  T(F): 98 (11 Mar 2022 11:30), Max: 100.2 (11 Mar 2022 09:14)  HR: 81 (11 Mar 2022 11:30) (76 - 108)  BP: 122/64 (11 Mar 2022 11:30) (117/56 - 149/63)  BP(mean): --  RR: 20 (11 Mar 2022 11:30) (20 - 32)  SpO2: 97% (11 Mar 2022 11:30) (82% - 100%)    GENERAL: lethargic  HEAD:  Atraumatic, Normocephalic  EYES: PERRLA, conjunctiva and sclera clear  ENMT: On bipap  NECK: Supple, No JVD  NERVOUS SYSTEM:  Awake, following on all 4 extremities  CHEST/LUNG: Coarse throughout, poor ventilatory effort, + cough  HEART: Irregular, rate normal  ABDOMEN: Distended, bowel sounds present  : Espinoza present, urine clear and yellow  EXTREMITIES:  2+ peripheral pulses, generalized edema PHYSICAL EXAM:  Vital Signs Last 24 Hrs  T(C): 36.7 (11 Mar 2022 11:30), Max: 37.9 (11 Mar 2022 09:14)  T(F): 98 (11 Mar 2022 11:30), Max: 100.2 (11 Mar 2022 09:14)  HR: 81 (11 Mar 2022 11:30) (76 - 108)  BP: 122/64 (11 Mar 2022 11:30) (117/56 - 149/63)  BP(mean): --  RR: 20 (11 Mar 2022 11:30) (20 - 32)  SpO2: 97% (11 Mar 2022 11:30) (82% - 100%)    GENERAL: lethargic but arousable  HEAD:  Atraumatic, Normocephalic  EYES: PERRLA, conjunctiva and sclera clear  ENMT: On bipap  NECK: Supple, No JVD  NERVOUS SYSTEM: lethargic but arousable   CHEST/LUNG: Coarse throughout, poor ventilatory effort, + cough  HEART: Irregular, rate normal  ABDOMEN: Distended, bowel sounds present  : Espinoza present, urine clear and yellow  EXTREMITIES:  2+ peripheral pulses, generalized edema

## 2022-03-11 NOTE — PATIENT PROFILE ADULT - FALL HARM RISK - HARM RISK INTERVENTIONS

## 2022-03-11 NOTE — ED PROVIDER NOTE - CLINICAL SUMMARY MEDICAL DECISION MAKING FREE TEXT BOX
89 CHF with resp distress. Plan - CXR, EKG, labs, may need Bipap or intubation. Pt is full code. Dr Williamson at bedside.

## 2022-03-11 NOTE — CONSULT NOTE ADULT - SUBJECTIVE AND OBJECTIVE BOX
History of Present Illness: The patient is a 89 year old female with a history of hypothyroidism, atrial fibrillation, pacemaker, COPD, CAD, chronic diastolic heart failure, severe TR, severe pulm HTN who presents with shortness of breath. The patient is a poor historian; history limited by BIPAP. She was recently hospitalized and sent to [facility], where she became short of breath.    Past Medical/Surgical History:  hypothyroidism, atrial fibrillation, pacemaker, COPD, CAD, chronic diastolic heart failure, severe TR, severe pulm HTN    Medications:  Home Medications:  levothyroxine 125 mcg (0.125 mg) oral tablet: 1 tab(s) orally once a day (11 Mar 2022 10:31)  metoprolol succinate 25 mg daily  rivaroxaban 15 mg daily  digoxin 0.125 mg daily  furosemide 60 mg daily    Family History: Non-contributory family history of premature cardiovascular atherosclerotic disease    Social History: No tobacco, alcohol or drug use    Review of Systems:  General: No fevers, chills, weight gain  Skin: No rashes, color changes  Cardiovascular: No chest pain, orthopnea  Respiratory: +shortness of breath, cough  Gastrointestinal: No nausea, abdominal pain  Genitourinary: No incontinence, pain with urination  Musculoskeletal: No pain, swelling, decreased range of motion  Neurological: No headache, weakness  Psychiatric: No depression, anxiety  Endocrine: No weight gain, increased thirst  All other systems are comprehensively negative.    Physical Exam:  Vitals:        Vital Signs Last 24 Hrs  T(C): 37.9 (11 Mar 2022 09:14), Max: 37.9 (11 Mar 2022 09:14)  T(F): 100.2 (11 Mar 2022 09:14), Max: 100.2 (11 Mar 2022 09:14)  HR: 90 (11 Mar 2022 10:00) (90 - 108)  BP: 117/56 (11 Mar 2022 10:00) (117/56 - 149/63)  BP(mean): --  RR: 20 (11 Mar 2022 10:00) (20 - 32)  SpO2: 97% (11 Mar 2022 10:00) (82% - 100%)  General: NAD  HEENT: MMM  Neck: +JVD, no carotid bruit  Lungs: Diffuse wheezing  CV: RRR, nl S1/S2, no M/R/G  Abdomen: S/NT/ND, +BS  Extremities: 2+ LE edema, no cyanosis  Neuro: AAOx3, non-focal  Skin: No rash    Labs:                        10.1   6.30  )-----------( 209      ( 11 Mar 2022 09:30 )             33.1     03-11    141  |  104  |  32<H>  ----------------------------<  111<H>  5.7<H>   |  36<H>  |  0.93    Ca    8.0<L>      11 Mar 2022 09:30    TPro  6.8  /  Alb  3.1<L>  /  TBili  0.9  /  DBili  x   /  AST  58<H>  /  ALT  20  /  AlkPhos  142<H>  03-11        PT/INR - ( 11 Mar 2022 09:30 )   PT: 23.5 sec;   INR: 2.03 ratio         PTT - ( 11 Mar 2022 09:30 )  PTT:39.8 sec    ECG: AF, LAD, intermittent ventricular pacing

## 2022-03-11 NOTE — CONSULT NOTE ADULT - SUBJECTIVE AND OBJECTIVE BOX
HPI:  89 y.o. female w/ hx of CHF, COPD, PPM, gout, hypothyroidism, HTN, and STEMI resident of [facility] who presented to the hospital with CC of acute respiratory distress found to be in Acute hypercapnia respiratory failure placed on BIPAP. Pt unable to give any history. No fever chills n/v/d documented. CT chest with scott effusions with compressive atelectasis. BNP elevated. Sp Vanc Zosyn x 1 dose in ED.    Infectious Disease consult was called to evaluate pt and for antibiotic management.      Past Medical & Surgical Hx:  PAST MEDICAL & SURGICAL HISTORY:  Chronic obstructive pulmonary disease (COPD)  HTN (hypertension)  Cardiac pacemaker  Gout  Hypothyroidism  Insomnia  Chronic CHF  History of ST elevation myocardial infarction (STEMI)  No significant past surgical history      Social History--  EtOH: denies   Tobacco: denies   Drug Use: denies     FAMILY HISTORY:  No pertinent family history in first degree relatives      Allergies  codeine (Unknown)    Intolerances  NONE    Home Medications:  acetaminophen 500 mg oral tablet: 2 tab(s) orally every 8 hours (11 Mar 2022 13:51)  allopurinol 100 mg oral tablet: 1 tab(s) orally once a day (11 Mar 2022 13:51)  calcium carbonate 500 mg (200 mg elemental calcium) oral tablet, chewable: 3 tab(s) orally every 6 hours, As Needed - for indigestion, for heartburn (11 Mar 2022 13:51)  digoxin 125 mcg (0.125 mg) oral tablet: 1 tab(s) orally once a day (11 Mar 2022 13:51)  furosemide 20 mg oral tablet: 1 tab(s) orally once a day (11 Mar 2022 13:51)  furosemide 40 mg oral tablet: 1 tab(s) orally once a day (11 Mar 2022 13:51)  levothyroxine 125 mcg (0.125 mg) oral tablet: 1 tab(s) orally once a day (11 Mar 2022 13:51)  Melatonin 5 mg oral tablet: 1 tab(s) orally once a day (at bedtime) (11 Mar 2022 13:51)  Metoprolol Succinate ER 25 mg oral tablet, extended release: 1 tab(s) orally once a day (11 Mar 2022 13:51)  Multi Vitamin+: 1 tab(s) orally once a day (11 Mar 2022 13:51)  Pepto-Bismol 262 mg/15 mL oral suspension: 15 milliliter(s) orally 2 times a day, As Needed - for indigestion (11 Mar 2022 13:51)  potassium chloride 20 mEq oral tablet, extended release: 1 tab(s) orally 2 times a day (11 Mar 2022 13:51)  Tussin 100 mg/5 mL oral liquid: 10 milliliter(s) orally every 6 hours (11 Mar 2022 13:51)  Vitamin D2 1.25 mg (50,000 intl units) oral capsule: 1 cap(s) orally once a week (11 Mar 2022 13:51)  Xarelto 15 mg oral tablet: 1 tab(s) orally once a day (in the evening) (11 Mar 2022 13:51)      Current Inpatient Medications :    ANTIBIOTICS:       OTHER RELEVANT MEDICATIONS :  albuterol/ipratropium for Nebulization 3 milliLiter(s) Nebulizer every 8 hours  furosemide   Injectable 40 milliGRAM(s) IV Push two times a day  methylPREDNISolone sodium succinate Injectable 40 milliGRAM(s) IV Push every 12 hours  metoprolol tartrate Injectable 5 milliGRAM(s) IV Push every 6 hours      ROS:  Unable to obtain due to : Pt's condition      Physical Exam:  Vital Signs Last 24 Hrs  T(C): 37.3 (11 Mar 2022 12:14), Max: 37.9 (11 Mar 2022 09:14)  T(F): 99.2 (11 Mar 2022 12:14), Max: 100.2 (11 Mar 2022 09:14)  HR: 79 (11 Mar 2022 14:00) (65 - 108)  BP: 115/49 (11 Mar 2022 14:00) (97/47 - 149/63)  BP(mean): 68 (11 Mar 2022 14:00) (62 - 79)  RR: 22 (11 Mar 2022 14:00) (18 - 33)  SpO2: 92% (11 Mar 2022 14:00) (82% - 100%)  Height (cm): 160 (03-11 @ 09:14)  Weight (kg): 59.4 (03-11 @ 09:14)  BMI (kg/m2): 23.2 (03-11 @ 09:14)  BSA (m2): 1.62 (03-11 @ 09:14)    General: Lethargic on BIPAP  Neck: supple, trachea midline  Lungs: Decreased, no wheeze/rhonchi  Cardiovascular: regular rate and rhythm, S1 S2  Abdomen: soft, nontender, ND, bowel sounds normal  Neurological:  Lethargic  Skin: no rash  Extremities: + edema  Foot skin dry and peeling    Labs:                         10.1   6.30  )-----------( 209      ( 11 Mar 2022 09:30 )             33.1     03-11    141  |  104  |  32<H>  ----------------------------<  111<H>  5.7<H>   |  36<H>  |  0.93    Ca    8.0<L>      11 Mar 2022 09:30    TPro  6.8  /  Alb  3.1<L>  /  TBili  0.9  /  DBili  x   /  AST  58<H>  /  ALT  20  /  AlkPhos  142<H>  03-11    RECENT CULTURES:          RADIOLOGY & ADDITIONAL STUDIES:    ACC: [id number] EXAM:  CT CHEST                          PROCEDURE DATE:  03/11/2022          INTERPRETATION:  CLINICAL INFORMATION: Respiratory distress.    COMPARISON: Chest radiograph 3/11/2022.  CT scan chest 2/21/2022.    CONTRAST/COMPLICATIONS:  IV Contrast: NONE  Oral Contrast: NONE  Complications: None reported at time of study completion    PROCEDURE:  CT of the Chest was performed.  Sagittal and coronal reformats were performed.    FINDINGS:    LUNGS AND AIRWAYS:  PLEURA:  There are small bilateral pleural effusions with underlying compressive   atelectasis lung bases.  The central airways remain patent.    MEDIASTINUM AND CHICA: No lymphadenopathy.    VESSELS: Atherosclerotic changes thoracic aorta and coronary artery   calcifications.  Prominence of the main pulmonary arterial trunk, which may be associated   with pulmonary arterial hypertension.    HEART: Cardiomegaly.  Cardiac pacemaker leads.   No pericardial effusion.    CHEST WALL AND LOWER NECK:  Cardiac device left chestwall.    VISUALIZED UPPER ABDOMEN:  Limited by streak artifact.  Nodular contour liver.  Left hepatic cyst.    BONES: Degenerative changes.    IMPRESSION:    Small bilateral pleural effusions with underlying compressive   atelectasis, similar to prior exam.      Assessment :   89 y.o. female w/ hx of CHF, COPD, PPM, gout, hypothyroidism, HTN, and STEMI resident of [facility] admitted with Acute hypercapnia respiratory failure sec COPD/CHF exacerbation. Placed on BIPAP. CT chest with scott effusions with compressive atelectasis. BNP elevated. Sp Vanc Zosyn x 1 dose in ED.  Afebrile WBC WNL Procalcitonin 0.09  Doubt pneumonia though high risk     Plan :   Defer antibiotics  Trend temps and cbc  Fu cultures  Steroids nebs per pulm  Diurese per cardiology  BIPAP per pulm critical care  DNRDNI    Continue with present regiment .  Approptiate use of antibiotics and adverse effects reviewed.      > 45 minutes spent in direct patient care reviewing  the notes, lab data/ imaging , discussion with multidisciplinary team. All questions were addressed and answered to the best of my capacity .    Thank you for allowing me to participate in the care of your patient .      Karis Goodwin MD  Infectious Disease  [phone]

## 2022-03-11 NOTE — H&P ADULT - NSICDXPASTMEDICALHX_GEN_ALL_CORE_FT
PAST MEDICAL HISTORY:  Cardiac pacemaker     Chronic CHF     Chronic obstructive pulmonary disease (COPD)     Gout     History of ST elevation myocardial infarction (STEMI)     HTN (hypertension)     Hypothyroidism     Insomnia

## 2022-03-11 NOTE — CONSULT NOTE ADULT - ASSESSMENT
pt with extensive med hx -   CHF  Pulm HTN severe  valv heart disease  COPD  Resp Distress  PVD  OP  OA  Fall prone - risk  Ataxic gait  pelvic fx  AF  Dyspepsia  PPM     pt with extensive med hx -   CHF  Pulm HTN severe  valv heart disease  COPD  Resp Distress  PVD  OP  OA  Fall prone - risk  Ataxic gait  pelvic fx  AF  Dyspepsia  PPM    will check CT chest  BIPAP - for resp distress - hypercapnea  ABG noted  Labs and imaging reviewed  Lasix in ED now - Cardio Eval planned - I and O - cvs rx regimen and BP control  Pulse ox monitoring - Card tele monitoring - SPCU admission - CHF and COPD ex - Full Code  GOC documented - Full Code - spoke with Daughter  NEBS and Steroids - for COPD  Diuresis as per Cardio  on AC for AF

## 2022-03-11 NOTE — H&P ADULT - ASSESSMENT
89 y.o. female w/ hx of CHF, COPD, PPM, gout, hypothyroidism, HTN, and STEMI BIBEMS today from [facility] for evaluation of respiratory distress. Pt unable to give any history 2/2 respiratory status. Per EMS report pt. was hypoxic to 50% on 4L nasal cannula, NRB placed w/ improvement in O2 sats to 70s. Pt. being admitted to SPCU for acute on chronic hypoxic and hypercarbic respiratory failure requiring bipap.    # Acute on chronic hypoxic/hypercarbic respiratory failure 2/2 PNA vs. COPD exacerbation vs. CHF exacerbation   - Present on admission  - ABG (11 Mar 2022 09:37): pH, Arterial: 7.29, pCO2: 81, pO2: 345, HCO3: 39, Base Excess: 12.3, SaO2: 99.7  - Official x-ray and CT scans pending results  - O2 sats improved on Bipap although WOB remains labored  - Continue methylprednisolone  - Lasix 40mg IVP given  - NPO while on bipap  - Trend ABGs, BNPs    # Possible sepsis 2/2 likely PNA  - Present on admission  - Evidenced by tachycardia, febrile to 37.8C, and tachypnea   - Although no leukocytosis, no bandemia and no lactate  - CXR and chest CT completed  - Received zosyn and vancomycin in ED  - Procalcitonin sent  - Trend WBCs  - Follow up w/ blood and urine cultures  - Consider sputum culture when able    # COPD  - Continue Bipap  - Continue methylprednisolone  - Continue duoneb    # CHF  - Continue bipap  - Lasix given, monitor UOP for response  - Strict I/Os    # Hypothyroid  - IV Synthroid since NPO    # HTN  - IV lopressor since NPO w/ hold parameters    # Gout  - Allopurinol on hold until respiratory status improves    # Insomnia  - Melatonin on hold until respiratory status improves    # DVT prophylaxis   - On Xarelto 15mg daily  - Switch to heparin SQ in the setting of acute illness    # ACP  - Full code per chart 89 y.o. female w/ hx of CHF, COPD, PPM, gout, hypothyroidism, HTN, and STEMI BIBEMS today from [facility] for evaluation of respiratory distress. Pt unable to give any history 2/2 respiratory status. Per EMS report pt. was hypoxic to 50% on 4L nasal cannula, NRB placed w/ improvement in O2 sats to 70s. Pt. being admitted to SPCU for acute on chronic hypoxic and hypercarbic respiratory failure requiring bipap.    # Acute on chronic hypoxic/hypercarbic respiratory failure 2/2 PNA vs. COPD exacerbation vs. CHF exacerbation   - Present on admission  - ABG (11 Mar 2022 09:37): pH, Arterial: 7.29, pCO2: 81, pO2: 345, HCO3: 39, Base Excess: 12.3, SaO2: 99.7  - Official x-ray and CT scans pending results  - O2 sats improved on Bipap although WOB remains labored  - Continue methylprednisolone  - Lasix 40mg IVP given  - NPO while on bipap  - Trend ABGs, BNPs    # Possible sepsis 2/2 likely PNA  - Present on admission  - Evidenced by tachycardia, febrile to 37.8C, and tachypnea   - Although no leukocytosis, no bandemia and no lactate  - CXR and chest CT completed  - Received zosyn and vancomycin in ED  - Procalcitonin sent  - Trend WBCs  - Follow up w/ blood and urine cultures  - Consider sputum culture when able    # COPD  - Continue Bipap  - Continue methylprednisolone  - Continue duoneb    # CHF  - BNP 11,290 on admission  - Continue bipap  - Lasix given, monitor UOP for response  - Strict I/Os  - Trend BNPs    # Hypothyroid  - IV Synthroid since NPO    # HTN  - IV lopressor since NPO w/ hold parameters    # Gout  - Allopurinol on hold until respiratory status improves    # Insomnia  - Melatonin on hold until respiratory status improves    # DVT prophylaxis   - On Xarelto 15mg daily  - Switch to heparin SQ in the setting of acute illness    # ACP  - Full code per chart 89 y.o. female w/ hx of CHF, COPD, PPM, gout, hypothyroidism, HTN, and STEMI BIBEMS today from [facility] for evaluation of respiratory distress. Pt unable to give any history 2/2 respiratory status. Per EMS report pt. was hypoxic to 50% on 4L nasal cannula, NRB placed w/ improvement in O2 sats to 70s. Pt. being admitted to SPCU for acute on chronic hypoxic and hypercarbic respiratory failure requiring bipap.    # Acute on chronic hypoxic/hypercarbic respiratory failure 2/2 PNA vs. COPD exacerbation vs. CHF exacerbation   - Present on admission  - ABG (11 Mar 2022 09:37): pH, Arterial: 7.29, pCO2: 81, pO2: 345, HCO3: 39, Base Excess: 12.3, SaO2: 99.7  - Official x-ray and CT scans pending results  - O2 sats improved on Bipap although WOB remains labored  - Continue methylprednisolone  - Lasix 40mg IVP given  - NPO while on bipap  - Trend ABGs, BNPs    # Possible sepsis 2/2 likely PNA  - Present on admission  - Evidenced by tachycardia, febrile to 37.8C, and tachypnea   - Although no leukocytosis, no bandemia and no lactate  - CXR and chest CT completed  - Received zosyn and vancomycin in ED  - Procalcitonin sent  - Trend WBCs  - Follow up w/ blood and urine cultures  - Consider sputum culture when able    # COPD  - Continue Bipap  - Continue methylprednisolone  - Continue duoneb    # CHF  - BNP 11,290 on admission  - Continue bipap  - Lasix given, monitor UOP for response  - Continue Digoxin IV  - Strict I/Os  - Trend BNPs    # Hypothyroid  - IV Synthroid since NPO    # HTN  - IV lopressor since NPO w/ hold parameters    # Gout  - Allopurinol on hold until respiratory status improves    # Insomnia  - Melatonin on hold until respiratory status improves    # DVT prophylaxis   - On Xarelto 15mg daily  - Switch to heparin SQ in the setting of acute illness    # ACP  - Full code per chart 89 y.o. female w/ hx of CHF, COPD, PPM, gout, hypothyroidism, HTN, and STEMI BIBEMS today from [facility] for evaluation of respiratory distress. Pt unable to give any history 2/2 respiratory status. Per EMS report pt. was hypoxic to 50% on 4L nasal cannula, NRB placed w/ improvement in O2 sats to 70s. Pt. being admitted to SPCU for acute on chronic hypoxic and hypercarbic respiratory failure requiring bipap.    # Acute on chronic hypoxic/hypercarbic respiratory failure 2/2 PNA vs. COPD exacerbation vs. CHF exacerbation   - Present on admission  - ABG (11 Mar 2022 09:37): pH, Arterial: 7.29, pCO2: 81, pO2: 345, HCO3: 39, Base Excess: 12.3, SaO2: 99.7  - Official x-ray and CT scans pending results  - O2 sats improved on Bipap although WOB remains labored  - Continue methylprednisolone  - NPO while on bipap  - Trend ABGs, BNPs  - Seen by cardiology and pulmonary  - cardiology recs appreciated, recommending to continue lasix , 40mg BID    # rule out Possible sepsis 2/2 likely PNA   - No leukocytosis, no bandemia and no lactate  - CXR and chest CT completed  - Received zosyn and vancomycin in ED  - Procalcitonin sent  - Trend WBCs  - Follow up w/ blood and urine cultures  - Consider sputum culture when able  - consulted Dr. Goodwin to evaluate for abx evaluation    # COPD  - Continue Bipap  - Continue methylprednisolone  - Continue duoneb  seen by pulmonary, recs appreciated    # CHF  - BNP 11,290 on admission  - Continue bipap  - Lasix given, monitor UOP for response  - Strict I/Os  - Trend BNPs  seen by cardiology, recs appreciated, lasix BID    # Hypothyroid  - IV Synthroid since NPO    # HTN  - IV lopressor since NPO w/ hold parameters    # Gout  - Allopurinol on hold until respiratory status improves    # Insomnia  - Melatonin on hold until respiratory status improves    # DVT prophylaxis   - switch to PO xarelto once able to take PO meds    # ACP  - GOC discussed with Sister and brother(at bedside),  DNR/DNI MOLST form filled out.

## 2022-03-11 NOTE — CONSULT NOTE ADULT - SUBJECTIVE AND OBJECTIVE BOX
Date/Time Patient Seen:  		  Referring MD:   Data Reviewed	       Patient is a 89y old  Female who presents with a chief complaint of     Subjective/HPI  vs noted  labs reviewed  er provider note reviewed  Imaging reviewed  old records reviewed  recent admission to  -   from [facility]    has children  non smoker  non drinker  from [facility] - where she was for [facility]  retired  single  family hx - HTN  ros - resp distress         PAST MEDICAL & SURGICAL HISTORY:        Medication list         MEDICATIONS  (STANDING):  acetaminophen  Suppository .. 650 milliGRAM(s) Rectal once  piperacillin/tazobactam IVPB. 3.375 Gram(s) IV Intermittent Once  sodium chloride 0.9% Bolus 1000 milliLiter(s) IV Bolus once  vancomycin  IVPB 1000 milliGRAM(s) IV Intermittent once    MEDICATIONS  (PRN):         Vitals log        ICU Vital Signs Last 24 Hrs  T(C): --  T(F): --  HR: --  BP: --  BP(mean): --  ABP: --  ABP(mean): --  RR: --  SpO2: --           Input and Output:  I&O's Detail      Lab Data                  Review of Systems	  resp distress      Objective     Physical Examination    heart s1s2  lung dec BS  abd soft  head nc  pvd  head nc  altered      Pertinent Lab findings & Imaging      Espinoza:  NO   Adequate UO     I&O's Detail           Discussed with:     Cultures:	        Radiology  TTE - severe Pulm HTN  CT chest - bilateral opacities

## 2022-03-11 NOTE — PROGRESS NOTE ADULT - SUBJECTIVE AND OBJECTIVE BOX
Patient is a 89y old  Female who presents with a chief complaint of Respiratory distress (11 Mar 2022 14:44)    BRIEF HOSPITAL COURSE:   89 y.o. female w/ hx of CHF, COPD, PPM, gout, hypothyroidism, HTN, and STEMI BIBEMS today from [facility] admitted to SPCU for acute on chronic hypoxic and hypercarbic respiratory failure requiring bipap.    Events last 24 hours:   -Pt remains on BiPAP 8/4 40%. On bedside rounds, pt seen pulling Vt of only 100-low 200s while sleeping, although arousable. Repeat ABG w/ improvement in pH/CO2. Switched to AVAPs, now pulling adequate Vt.   -Afebrile.     PAST MEDICAL & SURGICAL HISTORY:  Chronic obstructive pulmonary disease (COPD)  HTN (hypertension)  Cardiac pacemaker  Gout  Hypothyroidism  Insomnia  Chronic CHF  History of ST elevation myocardial infarction (STEMI)  No significant past surgical history    Review of Systems:  Due to pt being on NIPPV, subjective information was not able to be obtained from the patient. History was obtained, to the extent possible, from review of the chart and collateral sources of information.     Medications:  furosemide   Injectable 40 milliGRAM(s) IV Push two times a day  metoprolol tartrate Injectable 5 milliGRAM(s) IV Push every 6 hours  albuterol/ipratropium for Nebulization 3 milliLiter(s) Nebulizer every 8 hours  levothyroxine Injectable 62.5 MICROGram(s) IV Push at bedtime  methylPREDNISolone sodium succinate Injectable 40 milliGRAM(s) IV Push every 12 hours    ICU Vital Signs Last 24 Hrs  T(C): 36.8 (11 Mar 2022 16:01), Max: 37.9 (11 Mar 2022 09:14)  T(F): 98.2 (11 Mar 2022 16:01), Max: 100.2 (11 Mar 2022 09:14)  HR: 71 (11 Mar 2022 18:00) (65 - 108)  BP: 105/51 (11 Mar 2022 18:00) (97/47 - 149/63)  BP(mean): 65 (11 Mar 2022 18:00) (59 - 99)  ABP: --  ABP(mean): --  RR: 19 (11 Mar 2022 18:00) (18 - 33)  SpO2: 98% (11 Mar 2022 18:00) (82% - 100%)    ABG - ( 11 Mar 2022 20:15 )  pH, Arterial: 7.42  pH, Blood: x     /  pCO2: 61    /  pO2: 99    / HCO3: 40    / Base Excess: 15.1  /  SaO2: 98.8      I&O's Detail  11 Mar 2022 07:01  -  11 Mar 2022 20:30  --------------------------------------------------------  IN:  Total IN: 0 mL  OUT:    Indwelling Catheter - Urethral (mL): 1200 mL  Total OUT: 1200 mL  Total NET: -1200 mL    LABS:                        10.1   6.30  )-----------( 209      ( 11 Mar 2022 09:30 )             33.1   03-11  141  |  104  |  32<H>  ----------------------------<  111<H>  5.7<H>   |  36<H>  |  0.93  Ca    8.0<L>      11 Mar 2022 09:30  TPro  6.8  /  Alb  3.1<L>  /  TBili  0.9  /  DBili  x   /  AST  58<H>  /  ALT  20  /  AlkPhos  142<H>  03-11    CAPILLARY BLOOD GLUCOSE    PT/INR - ( 11 Mar 2022 09:30 )   PT: 23.5 sec;   INR: 2.03 ratio    PTT - ( 11 Mar 2022 09:30 )  PTT:39.8 sec    Urinalysis Basic - ( 11 Mar 2022 15:07 )  Color: Pale Yellow / Appearance: Clear / S.010 / pH: x  Gluc: x / Ketone: Negative  / Bili: Negative / Urobili: Negative mg/dL   Blood: x / Protein: Negative mg/dL / Nitrite: Negative   Leuk Esterase: Negative / RBC: 3-5 /HPF / WBC 0-2   Sq Epi: x / Non Sq Epi: Occasional / Bacteria: Moderate    CULTURES:      Physical Examination:  General: +NIPPV mask; arousable.   HEENT: PERRL.  NECK: Supple.  PULM: Decreases BS at bases b/l.  CVS: s1/s2.  ABD: Soft, nondistended, nontender, normoactive bowel sounds.  EXT: No edema, nontender.  SKIN: Warm.    RADIOLOGY:   < from: CT Chest No Cont (22 @ 10:51) >  ACC: [id number] EXAM:  CT CHEST                        PROCEDURE DATE:  2022    INTERPRETATION:  CLINICAL INFORMATION: Respiratory distress.  COMPARISON: Chest radiograph 3/11/2022.  CT scan chest 2022.  CONTRAST/COMPLICATIONS:  IV Contrast: NONE  Oral Contrast: NONE  Complications: None reported at time of study completion  PROCEDURE:  CT of the Chest was performed.  Sagittal and coronal reformats were performed.  IMPRESSION:  Small bilateral pleural effusions with underlying compressive   atelectasis, similar to prior exam.  Other findings as discussed above.      89 y.o. female w/ hx of CHF, COPD, PPM, gout, hypothyroidism, HTN, and STEMI BIBEMS today from [facility] admitted to SPCU for acute on chronic hypoxic and hypercarbic respiratory failure requiring bipap.  Assessment:  1. Acute hypoxic / hypercarbic respiratory failure  2. COPD exacerbation  3. Acute on Chronic HFpEF    NEURO:  -Monitor mental status closely, avoid neurosuppresants.    CV:  -Maintain goal MAP >65.  -Lopressor q8h.   -Lasix 40mg IVP BID.   TTE ordered for AM.    RESP:  -Remains on NIPPV, switched to AVAPS due to low Vt when sleeping. 12 / 360 / +5 / 15-25 / 40%.   -ABG w/ improvement in pH/CO2. Will attempt to wean off NIPPV in AM.   -Solumedrol 40 q8h.    RENAL:  -Renal function currently WNL.  -trend lytes/Scr daily with BMP  -I's and O's, goal UOP 0.5 cc/kg/hr  -renal dose meds and avoid nephrotoxins     GI:  -NPO.  -Protonix QD.     ENDO:  -Aggressive glycemic control to limit FS glucose to <180mg/dl. BS WNL.    ID:  -Afebrile, no leukocytosis. S/p Vanco / Zosyn x1 in ER. Will place on Zithromax IVPB for COPD exacerbation.  -BloodCx, UrineCx sent. Check Legionella, Strep, MRSA/MSSA, Influenza.     HEME:  -DVT ppx w/ SCDs. Restart Xarelto in AM when off NIPPV.     DISPO: DNR/DNI.     TIME SPENT: 37 minutes  Evaluating/treating patient, reviewing data/labs/imaging, discussing case with multidisciplinary team, discussing plan/goals of care with patient/family. Non-inclusive of procedure time.   Patient is a 89y old  Female who presents with a chief complaint of Respiratory distress (11 Mar 2022 14:44)    BRIEF HOSPITAL COURSE:   89 y.o. female w/ hx of CHF, COPD, PPM, gout, hypothyroidism, HTN, and STEMI BIBEMS today from [facility] admitted to SPCU for acute on chronic hypoxic and hypercarbic respiratory failure requiring bipap.    Events last 24 hours:   -Pt remains on BiPAP 8/4 40%. On bedside rounds, pt seen pulling Vt of only 100-low 200s while sleeping, although arousable. Repeat ABG w/ improvement in pH/CO2. Switched to AVAPs, now pulling adequate Vt.   -Afebrile.     PAST MEDICAL & SURGICAL HISTORY:  Chronic obstructive pulmonary disease (COPD)  HTN (hypertension)  Cardiac pacemaker  Gout  Hypothyroidism  Insomnia  Chronic CHF  History of ST elevation myocardial infarction (STEMI)  No significant past surgical history    Review of Systems:  Due to pt being on NIPPV, subjective information was not able to be obtained from the patient. History was obtained, to the extent possible, from review of the chart and collateral sources of information.     Medications:  furosemide   Injectable 40 milliGRAM(s) IV Push two times a day  metoprolol tartrate Injectable 5 milliGRAM(s) IV Push every 6 hours  albuterol/ipratropium for Nebulization 3 milliLiter(s) Nebulizer every 8 hours  levothyroxine Injectable 62.5 MICROGram(s) IV Push at bedtime  methylPREDNISolone sodium succinate Injectable 40 milliGRAM(s) IV Push every 12 hours    ICU Vital Signs Last 24 Hrs  T(C): 36.8 (11 Mar 2022 16:01), Max: 37.9 (11 Mar 2022 09:14)  T(F): 98.2 (11 Mar 2022 16:01), Max: 100.2 (11 Mar 2022 09:14)  HR: 71 (11 Mar 2022 18:00) (65 - 108)  BP: 105/51 (11 Mar 2022 18:00) (97/47 - 149/63)  BP(mean): 65 (11 Mar 2022 18:00) (59 - 99)  ABP: --  ABP(mean): --  RR: 19 (11 Mar 2022 18:00) (18 - 33)  SpO2: 98% (11 Mar 2022 18:00) (82% - 100%)    ABG - ( 11 Mar 2022 20:15 )  pH, Arterial: 7.42  pH, Blood: x     /  pCO2: 61    /  pO2: 99    / HCO3: 40    / Base Excess: 15.1  /  SaO2: 98.8      I&O's Detail  11 Mar 2022 07:01  -  11 Mar 2022 20:30  --------------------------------------------------------  IN:  Total IN: 0 mL  OUT:    Indwelling Catheter - Urethral (mL): 1200 mL  Total OUT: 1200 mL  Total NET: -1200 mL    LABS:                        10.1   6.30  )-----------( 209      ( 11 Mar 2022 09:30 )             33.1   03-11  141  |  104  |  32<H>  ----------------------------<  111<H>  5.7<H>   |  36<H>  |  0.93  Ca    8.0<L>      11 Mar 2022 09:30  TPro  6.8  /  Alb  3.1<L>  /  TBili  0.9  /  DBili  x   /  AST  58<H>  /  ALT  20  /  AlkPhos  142<H>  03-11    CAPILLARY BLOOD GLUCOSE    PT/INR - ( 11 Mar 2022 09:30 )   PT: 23.5 sec;   INR: 2.03 ratio    PTT - ( 11 Mar 2022 09:30 )  PTT:39.8 sec    Urinalysis Basic - ( 11 Mar 2022 15:07 )  Color: Pale Yellow / Appearance: Clear / S.010 / pH: x  Gluc: x / Ketone: Negative  / Bili: Negative / Urobili: Negative mg/dL   Blood: x / Protein: Negative mg/dL / Nitrite: Negative   Leuk Esterase: Negative / RBC: 3-5 /HPF / WBC 0-2   Sq Epi: x / Non Sq Epi: Occasional / Bacteria: Moderate    CULTURES:      Physical Examination:  General: +NIPPV mask; arousable.   HEENT: PERRL.  NECK: Supple.  PULM: Decreases BS at bases b/l.  CVS: s1/s2.  ABD: Soft, nondistended, nontender, normoactive bowel sounds.  EXT: No edema, nontender.  SKIN: Warm.    RADIOLOGY:   < from: CT Chest No Cont (22 @ 10:51) >  ACC: [id number] EXAM:  CT CHEST                        PROCEDURE DATE:  2022    INTERPRETATION:  CLINICAL INFORMATION: Respiratory distress.  COMPARISON: Chest radiograph 3/11/2022.  CT scan chest 2022.  CONTRAST/COMPLICATIONS:  IV Contrast: NONE  Oral Contrast: NONE  Complications: None reported at time of study completion  PROCEDURE:  CT of the Chest was performed.  Sagittal and coronal reformats were performed.  IMPRESSION:  Small bilateral pleural effusions with underlying compressive   atelectasis, similar to prior exam.  Other findings as discussed above.      89 y.o. female w/ hx of CHF, COPD, PPM, gout, hypothyroidism, HTN, and STEMI BIBEMS today from [facility] admitted to SPCU for acute on chronic hypoxic and hypercarbic respiratory failure requiring bipap.  Assessment:  1. Acute hypoxic / hypercarbic respiratory failure  2. COPD exacerbation  3. Acute on Chronic HFpEF    NEURO:  -Monitor mental status closely, avoid neurosuppresants.    CV:  -Maintain goal MAP >65.  -Lopressor q8h.   -Lasix 40mg IVP BID.   -TTE ordered for AM.  -Cardiology following.     RESP:  -Remains on NIPPV, switched to AVAPS due to low Vt when sleeping. 12 / 360 / +5 / 15-25 / 40%.   -ABG w/ improvement in pH/CO2. Will attempt to wean off NIPPV in AM.   -Solumedrol 40 q8h.    RENAL:  -Renal function currently WNL.  -trend lytes/Scr daily with BMP  -I's and O's, goal UOP 0.5 cc/kg/hr  -renal dose meds and avoid nephrotoxins     GI:  -NPO.  -Protonix QD.     ENDO:  -Aggressive glycemic control to limit FS glucose to <180mg/dl. BS WNL.  -Synthroid QD.     ID:  -Afebrile, no leukocytosis. S/p Vanco / Zosyn x1 in ER. ID following.  -BloodCx, UrineCx sent. Check Legionella, Strep, MRSA/MSSA, Influenza.     HEME:  -DVT ppx w/ SCDs. Restart Xarelto in AM when off NIPPV.     DISPO: DNR/DNI.     TIME SPENT: 37 minutes  Evaluating/treating patient, reviewing data/labs/imaging, discussing case with multidisciplinary team, discussing plan/goals of care with patient/family. Non-inclusive of procedure time.   Patient is a 89y old  Female who presents with a chief complaint of Respiratory distress (11 Mar 2022 14:44)    BRIEF HOSPITAL COURSE:   89 y.o. female w/ hx of CHF, COPD, PPM, gout, hypothyroidism, HTN, and STEMI BIBEMS today from [facility] admitted to SPCU for acute on chronic hypoxic and hypercarbic respiratory failure requiring bipap.    Events last 24 hours:   -Pt remains on BiPAP 8/4 40%. On bedside rounds, pt seen pulling Vt of only 100-low 200s while sleeping, although arousable. Repeat ABG w/ improvement in pH/CO2. Switched to AVAPs, now pulling adequate Vt.   -Afebrile.     PAST MEDICAL & SURGICAL HISTORY:  Chronic obstructive pulmonary disease (COPD)  HTN (hypertension)  Cardiac pacemaker  Gout  Hypothyroidism  Insomnia  Chronic CHF  History of ST elevation myocardial infarction (STEMI)  No significant past surgical history    Review of Systems:  Due to pt being on NIPPV, subjective information was not able to be obtained from the patient. History was obtained, to the extent possible, from review of the chart and collateral sources of information.     Medications:  furosemide   Injectable 40 milliGRAM(s) IV Push two times a day  metoprolol tartrate Injectable 5 milliGRAM(s) IV Push every 6 hours  albuterol/ipratropium for Nebulization 3 milliLiter(s) Nebulizer every 8 hours  levothyroxine Injectable 62.5 MICROGram(s) IV Push at bedtime  methylPREDNISolone sodium succinate Injectable 40 milliGRAM(s) IV Push every 12 hours    ICU Vital Signs Last 24 Hrs  T(C): 36.8 (11 Mar 2022 16:01), Max: 37.9 (11 Mar 2022 09:14)  T(F): 98.2 (11 Mar 2022 16:01), Max: 100.2 (11 Mar 2022 09:14)  HR: 71 (11 Mar 2022 18:00) (65 - 108)  BP: 105/51 (11 Mar 2022 18:00) (97/47 - 149/63)  BP(mean): 65 (11 Mar 2022 18:00) (59 - 99)  ABP: --  ABP(mean): --  RR: 19 (11 Mar 2022 18:00) (18 - 33)  SpO2: 98% (11 Mar 2022 18:00) (82% - 100%)    ABG - ( 11 Mar 2022 20:15 )  pH, Arterial: 7.42  pH, Blood: x     /  pCO2: 61    /  pO2: 99    / HCO3: 40    / Base Excess: 15.1  /  SaO2: 98.8      I&O's Detail  11 Mar 2022 07:01  -  11 Mar 2022 20:30  --------------------------------------------------------  IN:  Total IN: 0 mL  OUT:    Indwelling Catheter - Urethral (mL): 1200 mL  Total OUT: 1200 mL  Total NET: -1200 mL    LABS:                        10.1   6.30  )-----------( 209      ( 11 Mar 2022 09:30 )             33.1   03-11  141  |  104  |  32<H>  ----------------------------<  111<H>  5.7<H>   |  36<H>  |  0.93  Ca    8.0<L>      11 Mar 2022 09:30  TPro  6.8  /  Alb  3.1<L>  /  TBili  0.9  /  DBili  x   /  AST  58<H>  /  ALT  20  /  AlkPhos  142<H>  03-11    CAPILLARY BLOOD GLUCOSE    PT/INR - ( 11 Mar 2022 09:30 )   PT: 23.5 sec;   INR: 2.03 ratio    PTT - ( 11 Mar 2022 09:30 )  PTT:39.8 sec    Urinalysis Basic - ( 11 Mar 2022 15:07 )  Color: Pale Yellow / Appearance: Clear / S.010 / pH: x  Gluc: x / Ketone: Negative  / Bili: Negative / Urobili: Negative mg/dL   Blood: x / Protein: Negative mg/dL / Nitrite: Negative   Leuk Esterase: Negative / RBC: 3-5 /HPF / WBC 0-2   Sq Epi: x / Non Sq Epi: Occasional / Bacteria: Moderate    CULTURES:      Physical Examination:  General: +NIPPV mask; arousable.   HEENT: PERRL.  NECK: Supple.  PULM: Decreases BS at bases b/l.  CVS: s1/s2.  ABD: Soft, nondistended, nontender, normoactive bowel sounds.  EXT: No edema, nontender.  SKIN: Warm.    RADIOLOGY:   < from: CT Chest No Cont (22 @ 10:51) >  ACC: [id number] EXAM:  CT CHEST                        PROCEDURE DATE:  2022    INTERPRETATION:  CLINICAL INFORMATION: Respiratory distress.  COMPARISON: Chest radiograph 3/11/2022.  CT scan chest 2022.  CONTRAST/COMPLICATIONS:  IV Contrast: NONE  Oral Contrast: NONE  Complications: None reported at time of study completion  PROCEDURE:  CT of the Chest was performed.  Sagittal and coronal reformats were performed.  IMPRESSION:  Small bilateral pleural effusions with underlying compressive   atelectasis, similar to prior exam.  Other findings as discussed above.      89 y.o. female w/ hx of CHF, COPD, PPM, gout, hypothyroidism, HTN, and STEMI BIBEMS today from [facility] admitted to SPCU for acute on chronic hypoxic and hypercarbic respiratory failure requiring bipap.  Assessment:  1. Acute hypoxic / hypercarbic respiratory failure  2. COPD exacerbation  3. Acute on Chronic HFpEF    NEURO:  -Monitor mental status closely, avoid neurosuppresants.    CV:  -Maintain goal MAP >65.  -Lopressor q8h.   -Lasix 40mg IVP BID.   -TTE ordered for AM.  -Cardiology following.     RESP:  -Remains on NIPPV, switched to AVAPS due to low Vt when sleeping. 12 / 300 / +5 / 15-25 / 40%.   -ABG w/ improvement in pH/CO2. Will attempt to wean off NIPPV in AM.   -Solumedrol 40 q8h.    RENAL:  -Renal function currently WNL.  -trend lytes/Scr daily with BMP  -I's and O's, goal UOP 0.5 cc/kg/hr  -renal dose meds and avoid nephrotoxins     GI:  -NPO.  -Protonix QD.     ENDO:  -Aggressive glycemic control to limit FS glucose to <180mg/dl. BS WNL.  -Synthroid QD.     ID:  -Afebrile, no leukocytosis. S/p Vanco / Zosyn x1 in ER. ID following.  -BloodCx, UrineCx sent. Check Legionella, Strep, MRSA/MSSA, Influenza.     HEME:  -DVT ppx w/ SCDs. Restart Xarelto in AM when off NIPPV.     DISPO: DNR/DNI.     TIME SPENT: 37 minutes  Evaluating/treating patient, reviewing data/labs/imaging, discussing case with multidisciplinary team, discussing plan/goals of care with patient/family. Non-inclusive of procedure time.

## 2022-03-11 NOTE — ED ADULT NURSE NOTE - OBJECTIVE STATEMENT
As per  EMS " the staff found her out of breath this morning during their rounds , she was weak and a little confused , her sat was low 58 % - " Pt BIBA from [facility] for SOB neb tx x2 given  Pt presented with wheezing and rhonchi 02 sat @ 100 % PMH COPD    came in with indwelling jackson, As per  EMS " the staff found her out of breath this morning during their rounds , she was weak and a little confused , her sat was low 58 % - " Pt BIBA from [facility] for SOB neb tx x2 given  Pt presented with wheezing and rhonchi 02 sat @ 100 % PMH COPD    came in with indwelling jackson, patient is tolerating well with bipap, Labs drawn & sent results pending. IV accessed and tolerating. ekg done, will continue to monitor.

## 2022-03-11 NOTE — ED PROVIDER NOTE - CARE PLAN
Principal Discharge DX:	Acute respiratory failure with hypoxia  Secondary Diagnosis:	Acute on chronic systolic congestive heart failure  Secondary Diagnosis:	COPD exacerbation   1

## 2022-03-11 NOTE — ED ADULT NURSE NOTE - CHIEF COMPLAINT QUOTE
As per  EMS " the staff found her out of breath this morning during their rounds , she was weak and a little confused , her sat was low 58 % - " Pt BIBA from [facility] for SOB neb tx x2 given  Pt presented with wheezing and rhonchi 02 sat @ 100 % PMH COPD

## 2022-03-12 LAB
ANION GAP SERPL CALC-SCNC: 6 MMOL/L — SIGNIFICANT CHANGE UP (ref 5–17)
BASE EXCESS BLDA CALC-SCNC: 16.7 MMOL/L — HIGH (ref -2–3)
BLOOD GAS COMMENTS ARTERIAL: SIGNIFICANT CHANGE UP
BLOOD GAS COMMENTS ARTERIAL: SIGNIFICANT CHANGE UP
BUN SERPL-MCNC: 32 MG/DL — HIGH (ref 7–23)
CALCIUM SERPL-MCNC: 8 MG/DL — LOW (ref 8.4–10.5)
CHLORIDE SERPL-SCNC: 106 MMOL/L — SIGNIFICANT CHANGE UP (ref 96–108)
CO2 SERPL-SCNC: 33 MMOL/L — HIGH (ref 22–31)
CREAT SERPL-MCNC: 1.06 MG/DL — SIGNIFICANT CHANGE UP (ref 0.5–1.3)
CULTURE RESULTS: SIGNIFICANT CHANGE UP
EGFR: 49 ML/MIN/1.73M2 — LOW
GAS PNL BLDA: SIGNIFICANT CHANGE UP
GLUCOSE SERPL-MCNC: 138 MG/DL — HIGH (ref 70–99)
HCO3 BLDA-SCNC: 40 MMOL/L — HIGH (ref 21–28)
HCT VFR BLD CALC: 30.8 % — LOW (ref 34.5–45)
HGB BLD-MCNC: 9.4 G/DL — LOW (ref 11.5–15.5)
HOROWITZ INDEX BLDA+IHG-RTO: 35 — SIGNIFICANT CHANGE UP
LEGIONELLA AG UR QL: NEGATIVE — SIGNIFICANT CHANGE UP
MAGNESIUM SERPL-MCNC: 2.1 MG/DL — SIGNIFICANT CHANGE UP (ref 1.6–2.6)
MCHC RBC-ENTMCNC: 30.5 GM/DL — LOW (ref 32–36)
MCHC RBC-ENTMCNC: 33.7 PG — SIGNIFICANT CHANGE UP (ref 27–34)
MCV RBC AUTO: 110.4 FL — HIGH (ref 80–100)
MRSA PCR RESULT.: SIGNIFICANT CHANGE UP
NRBC # BLD: 0 /100 WBCS — SIGNIFICANT CHANGE UP (ref 0–0)
PCO2 BLDA: 57 MMHG — HIGH (ref 32–35)
PH BLDA: 7.46 — HIGH (ref 7.35–7.45)
PHOSPHATE SERPL-MCNC: 3.7 MG/DL — SIGNIFICANT CHANGE UP (ref 2.5–4.5)
PLATELET # BLD AUTO: 150 K/UL — SIGNIFICANT CHANGE UP (ref 150–400)
PO2 BLDA: 77 MMHG — LOW (ref 83–108)
POTASSIUM SERPL-MCNC: 4.1 MMOL/L — SIGNIFICANT CHANGE UP (ref 3.5–5.3)
POTASSIUM SERPL-SCNC: 4.1 MMOL/L — SIGNIFICANT CHANGE UP (ref 3.5–5.3)
RBC # BLD: 2.79 M/UL — LOW (ref 3.8–5.2)
RBC # FLD: 15.7 % — HIGH (ref 10.3–14.5)
S AUREUS DNA NOSE QL NAA+PROBE: SIGNIFICANT CHANGE UP
S PNEUM AG UR QL: NEGATIVE — SIGNIFICANT CHANGE UP
SAO2 % BLDA: 97.8 % — SIGNIFICANT CHANGE UP (ref 94–98)
SODIUM SERPL-SCNC: 145 MMOL/L — SIGNIFICANT CHANGE UP (ref 135–145)
SPECIMEN SOURCE: SIGNIFICANT CHANGE UP
WBC # BLD: 2.41 K/UL — LOW (ref 3.8–10.5)
WBC # FLD AUTO: 2.41 K/UL — LOW (ref 3.8–10.5)

## 2022-03-12 PROCEDURE — 99233 SBSQ HOSP IP/OBS HIGH 50: CPT

## 2022-03-12 RX ORDER — RIVAROXABAN 15 MG-20MG
15 KIT ORAL
Refills: 0 | Status: DISCONTINUED | OUTPATIENT
Start: 2022-03-12 | End: 2022-03-21

## 2022-03-12 RX ORDER — DIGOXIN 250 MCG
125 TABLET ORAL DAILY
Refills: 0 | Status: DISCONTINUED | OUTPATIENT
Start: 2022-03-12 | End: 2022-03-21

## 2022-03-12 RX ORDER — LEVOTHYROXINE SODIUM 125 MCG
125 TABLET ORAL DAILY
Refills: 0 | Status: DISCONTINUED | OUTPATIENT
Start: 2022-03-12 | End: 2022-03-21

## 2022-03-12 RX ORDER — METOPROLOL TARTRATE 50 MG
25 TABLET ORAL DAILY
Refills: 0 | Status: DISCONTINUED | OUTPATIENT
Start: 2022-03-13 | End: 2022-03-20

## 2022-03-12 RX ORDER — FUROSEMIDE 40 MG
40 TABLET ORAL
Refills: 0 | Status: DISCONTINUED | OUTPATIENT
Start: 2022-03-13 | End: 2022-03-14

## 2022-03-12 RX ADMIN — RIVAROXABAN 15 MILLIGRAM(S): KIT at 17:32

## 2022-03-12 RX ADMIN — Medication 125 MICROGRAM(S): at 17:32

## 2022-03-12 RX ADMIN — Medication 3 MILLILITER(S): at 07:41

## 2022-03-12 RX ADMIN — Medication 5 MILLIGRAM(S): at 00:15

## 2022-03-12 RX ADMIN — Medication 3 MILLILITER(S): at 14:51

## 2022-03-12 RX ADMIN — Medication 40 MILLIGRAM(S): at 05:35

## 2022-03-12 RX ADMIN — Medication 40 MILLIGRAM(S): at 17:32

## 2022-03-12 RX ADMIN — PANTOPRAZOLE SODIUM 40 MILLIGRAM(S): 20 TABLET, DELAYED RELEASE ORAL at 11:37

## 2022-03-12 NOTE — DIETITIAN INITIAL EVALUATION ADULT. - ENTER TO (ML/KG)
You can access the [facility], offered by [facility], by registering with the following website: [email or web address]

## 2022-03-12 NOTE — PHYSICAL THERAPY INITIAL EVALUATION ADULT - GAIT TRAINING, PT EVAL
Problem: HEMODYNAMIC STATUS  Goal: Patient has stable vital signs and fluid balance  Outcome: Ongoing  Note: Pt VSS. No complaints of lightheadedness or dizziness.  Will continue to monitor Pt will ambulate with a RW for 20 feet with min A x 1 in 3-5 days

## 2022-03-12 NOTE — PHYSICAL THERAPY INITIAL EVALUATION ADULT - PERTINENT HX OF CURRENT PROBLEM, REHAB EVAL
89 y.o. female w/ hx of CHF, COPD, PPM, gout, hypothyroidism, HTN, and STEMI BIBEMS today from [facility] for evaluation of respiratory distress. Pt unable to give any history 2/2 respiratory status. Per EMS report pt. was hypoxic to 50% on 4L nasal cannula, NRB placed w/ improvement in O2 sats to 70s.

## 2022-03-12 NOTE — PROGRESS NOTE ADULT - ASSESSMENT
3 y.o. female w/ hx of CHF, COPD, PPM, gout, hypothyroidism, HTN, and STEMI resident of [facility] admitted with Acute hypercapnia respiratory failure sec COPD/CHF exacerbation. Placed on BIPAP. CT chest with scott effusions with compressive atelectasis. BNP elevated. Sp Vanc Zosyn x 1 dose in ED.  Afebrile WBC WNL Procalcitonin 0.09  rsv +    Plan :   Defer antibiotics  supportive care     Trend temps and cbc  blood cx neg  Steroids nebs per pulm  Diurese per cardiology  BIPAP per pulm critical care  DNRDNI

## 2022-03-12 NOTE — PROGRESS NOTE ADULT - SUBJECTIVE AND OBJECTIVE BOX
Patient is a 89y Female with a known history of :    HPI:  89 y.o. female w/ hx of CHF, COPD, PPM, gout, hypothyroidism, HTN, and STEMI BIBEMS today from [facility] for evaluation of respiratory distress. Pt unable to give any history 2/2 respiratory status. Per EMS report pt. was hypoxic to 50% on 4L nasal cannula, NRB placed w/ improvement in O2 sats to 70s.      ED Course;  - Placed on bipap  - Broad spectrum coverage w/ Zosyn and Vanco  - Lasix 40mg  - Methylprednisone 40mg  - CBC, ABG, Coags, UA,blood and urine culture, lactate, procalcitonin, BNP  - CXR done  - Chest CT done  - EKG done    Seen by pulmonary and cardiology  SPCU monitoring was recommended (11 Mar 2022 11:20)      REVIEW OF SYSTEMS:    CONSTITUTIONAL: No fever, weight loss, or fatigue  EYES: No eye pain, visual disturbances, or discharge  ENMT:  No difficulty hearing, tinnitus, vertigo; No sinus or throat pain  NECK: No pain or stiffness  BREASTS: No pain, masses, or nipple discharge  RESPIRATORY: No cough, wheezing, chills or hemoptysis; No shortness of breath  CARDIOVASCULAR: No chest pain, palpitations, dizziness, or leg swelling  GASTROINTESTINAL: No abdominal or epigastric pain. No nausea, vomiting, or hematemesis; No diarrhea or constipation. No melena or hematochezia.  GENITOURINARY: No dysuria, frequency, hematuria, or incontinence  NEUROLOGICAL: No headaches, memory loss, loss of strength, numbness, or tremors  SKIN: No itching, burning, rashes, or lesions   LYMPH NODES: No enlarged glands  ENDOCRINE: No heat or cold intolerance; No hair loss  MUSCULOSKELETAL: No joint pain or swelling; No muscle, back, or extremity pain  PSYCHIATRIC: No depression, anxiety, mood swings, or difficulty sleeping  HEME/LYMPH: No easy bruising, or bleeding gums  ALLERGY AND IMMUNOLOGIC: No hives or eczema    MEDICATIONS  (STANDING):  albuterol/ipratropium for Nebulization 3 milliLiter(s) Nebulizer every 8 hours  digoxin     Tablet 125 MICROGram(s) Oral daily  furosemide   Injectable 40 milliGRAM(s) IV Push two times a day  levothyroxine 125 MICROGram(s) Oral daily  methylPREDNISolone sodium succinate Injectable 40 milliGRAM(s) IV Push every 12 hours  pantoprazole  Injectable 40 milliGRAM(s) IV Push daily  rivaroxaban 15 milliGRAM(s) Oral with dinner    MEDICATIONS  (PRN):      ALLERGIES: codeine (Unknown)      FAMILY HISTORY:  No pertinent family history in first degree relatives        Social history:  Alochol:   Smoking:   Drug Use:   Marital Status:     PHYSICAL EXAMINATION:  -----------------------------  T(C): 36.6 (03-12-22 @ 03:54), Max: 37.3 (03-11-22 @ 12:14)  HR: 80 (03-12-22 @ 08:00) (65 - 88)  BP: 89/66 (03-12-22 @ 08:00) (89/66 - 138/58)  RR: 19 (03-12-22 @ 08:00) (15 - 25)  SpO2: 99% (03-12-22 @ 08:00) (92% - 100%)  Wt(kg): --    03-11 @ 07:01  -  03-12 @ 07:00  --------------------------------------------------------  IN:  Total IN: 0 mL    OUT:    Indwelling Catheter - Urethral (mL): 2400 mL  Total OUT: 2400 mL    Total NET: -2400 mL            Constitutional: well developed, normal appearance, well groomed, well nourished, no deformities and no acute distress.   Eyes: the conjunctiva exhibited no abnormalities and the eyelids demonstrated no xanthelasmas.   HEENT: normal oral mucosa, no oral pallor and no oral cyanosis.   Neck: normal jugular venous A waves present, normal jugular venous V waves present and no jugular venous velásquez A waves.   Pulmonary: no respiratory distress, normal respiratory rhythm and effort, no accessory muscle use and lungs were clear to auscultation bilaterally. Anteriorly  Cardiovascular: heart rate and rhythm were normal, normal S1 and S2 and no murmur, gallop, rub, heave or thrill are present.   Musculoskeletal: the gait could not be assessed.   Extremities: no clubbing of the fingernails, no localized cyanosis, no petechial hemorrhages and no ischemic changes.   Skin: normal skin color and pigmentation, no rash, no venous stasis, no skin lesions, no skin ulcer and no xanthoma was observed.     LABS:   --------  03-12    145  |  106  |  32<H>  ----------------------------<  138<H>  4.1   |  33<H>  |  1.06    Ca    8.0<L>      12 Mar 2022 06:28  Phos  3.7     03-12  Mg     2.1     03-12    TPro  6.8  /  Alb  3.1<L>  /  TBili  0.9  /  DBili  x   /  AST  58<H>  /  ALT  20  /  AlkPhos  142<H>  03-11                         9.4    2.41  )-----------( 150      ( 12 Mar 2022 06:28 )             30.8     PT/INR - ( 11 Mar 2022 09:30 )   PT: 23.5 sec;   INR: 2.03 ratio         PTT - ( 11 Mar 2022 09:30 )  PTT:39.8 sec              Radiology:

## 2022-03-12 NOTE — PROGRESS NOTE ADULT - SUBJECTIVE AND OBJECTIVE BOX
Patient is a 89y old  Female who presents with a chief complaint of Respiratory distress (11 Mar 2022 20:29)      FROM ADMISSION H+P:   HPI:  89 y.o. female w/ hx of CHF, COPD, PPM, gout, hypothyroidism, HTN, and STEMI BIBEMS today from [facility] for evaluation of respiratory distress. Pt unable to give any history 2/2 respiratory status. Per EMS report pt. was hypoxic to 50% on 4L nasal cannula, NRB placed w/ improvement in O2 sats to 70s.      ED Course;  - Placed on bipap  - Broad spectrum coverage w/ Zosyn and Vanco  - Lasix 40mg  - Methylprednisone 40mg  - CBC, ABG, Coags, UA,blood and urine culture, lactate, procalcitonin, BNP  - CXR done  - Chest CT done  - EKG done    Seen by pulmonary and cardiology  SPCU monitoring was recommended (11 Mar 2022 11:20)      INTERVAL HPI/OVERNIGHT EVENTS: Patient was seen and examined. No acute events occurred overnight. Feels well today. States this is the first day she feels lucid. States that she feels very dehydrated and thirsty. Requesting "a gallon of water" to drink. Also requesting to be changed. Denies chest pain, palpitations, dyspnea, headache, dizziness, abdominal pain, n/v/d.    I&O's Summary    11 Mar 2022 07:01  -  12 Mar 2022 07:00  --------------------------------------------------------  IN: 0 mL / OUT: 2400 mL / NET: -2400 mL        PAST MEDICAL & SURGICAL HISTORY:  Chronic obstructive pulmonary disease (COPD)    HTN (hypertension)    Cardiac pacemaker    Gout    Hypothyroidism    Insomnia    Chronic CHF    History of ST elevation myocardial infarction (STEMI)    No significant past surgical history        LABS:                        9.4    2.41  )-----------( 150      ( 12 Mar 2022 06:28 )             30.8     03-12    145  |  106  |  32<H>  ----------------------------<  138<H>  4.1   |  33<H>  |  1.06    Ca    8.0<L>      12 Mar 2022 06:28  Phos  3.7     03-12  Mg     2.1     03-12    TPro  6.8  /  Alb  3.1<L>  /  TBili  0.9  /  DBili  x   /  AST  58<H>  /  ALT  20  /  AlkPhos  142<H>  03-11    PT/INR - ( 11 Mar 2022 09:30 )   PT: 23.5 sec;   INR: 2.03 ratio         PTT - ( 11 Mar 2022 09:30 )  PTT:39.8 sec  Urinalysis Basic - ( 11 Mar 2022 15:07 )    Color: Pale Yellow / Appearance: Clear / S.010 / pH: x  Gluc: x / Ketone: Negative  / Bili: Negative / Urobili: Negative mg/dL   Blood: x / Protein: Negative mg/dL / Nitrite: Negative   Leuk Esterase: Negative / RBC: 3-5 /HPF / WBC 0-2   Sq Epi: x / Non Sq Epi: Occasional / Bacteria: Moderate      CAPILLARY BLOOD GLUCOSE        ABG - ( 12 Mar 2022 05:41 )  pH, Arterial: 7.46  pH, Blood: x     /  pCO2: 57    /  pO2: 77    / HCO3: 40    / Base Excess: 16.7  /  SaO2: 97.8                Urinalysis Basic - ( 11 Mar 2022 15:07 )    Color: Pale Yellow / Appearance: Clear / S.010 / pH: x  Gluc: x / Ketone: Negative  / Bili: Negative / Urobili: Negative mg/dL   Blood: x / Protein: Negative mg/dL / Nitrite: Negative   Leuk Esterase: Negative / RBC: 3-5 /HPF / WBC 0-2   Sq Epi: x / Non Sq Epi: Occasional / Bacteria: Moderate        MEDICATIONS  (STANDING):  albuterol/ipratropium for Nebulization 3 milliLiter(s) Nebulizer every 8 hours  furosemide   Injectable 40 milliGRAM(s) IV Push two times a day  levothyroxine Injectable 62.5 MICROGram(s) IV Push at bedtime  methylPREDNISolone sodium succinate Injectable 40 milliGRAM(s) IV Push every 12 hours  metoprolol tartrate Injectable 5 milliGRAM(s) IV Push every 6 hours  pantoprazole  Injectable 40 milliGRAM(s) IV Push daily    MEDICATIONS  (PRN):      REVIEW OF SYSTEMS:  CONSTITUTIONAL: No fever or chills  HEENT:  No headache, no sore throat; ADMITS dry mouth  RESPIRATORY: No cough, wheezing, or shortness of breath  CARDIOVASCULAR: No chest pain, palpitations  GASTROINTESTINAL: No abdominal pain, nausea, vomiting, or diarrhea  GENITOURINARY: No dysuria, frequency, or hematuria  NEUROLOGICAL: no focal weakness or dizziness  MUSCULOSKELETAL: no myalgias  PSYCH: no recent changes in mood    RADIOLOGY & ADDITIONAL TESTS:    Imaging Personally Reviewed:  [x] YES  [ ] NO    Consultant(s) Notes Reviewed:  [x] YES  [ ] NO    PHYSICAL EXAM:  T(C): 36.6 (22 @ 03:54), Max: 37.9 (22 @ 09:14)  HR: 78 (22 @ 07:45) (65 - 108)  BP: 96/44 (22 @ 06:00) (94/40 - 149/63)  RR: 15 (22 @ 06:00) (15 - 33)  SpO2: 100% (22 @ 07:45) (82% - 100%)    GENERAL: NAD, well-developed, well-groomed  HEENT:  anicteric, moist mucous membranes  CHEST/LUNG: decreased breath sounds, no wheezing  HEART:  RRR, S1, S2  ABDOMEN:  BS+, soft, nontender, nondistended  EXTREMITIES: no edema, cyanosis, or calf tenderness  NERVOUS SYSTEM: answers questions and follows commands appropriately  PSYCH: normal affect    Care Discussed with Consultants/Other Providers [x] YES  [ ] NO

## 2022-03-12 NOTE — PROGRESS NOTE ADULT - ASSESSMENT
The patient is a 89 year old female with a history of hypothyroidism, atrial fibrillation, pacemaker, COPD, CAD, chronic diastolic heart failure, severe TR, severe pulm HTN who presents with shortness of breath in the setting of acute on chronic diastolic heart failure, COPD, possible PNA.    3/12/22  Seen at [facility] ICU  Lying flat, sleeping comfortably  Easily arousable  No cardiac complaints offered    Plan:  - ECG with known AF and intermittent ventricular pacing  - CXR with bilateral pleural effusions  - BNP 04865  - RSV positive  - Lanoxin level-1.4  - Echo 2/22 with normal LV systolic function, severe TR, severe pulm HTN-awaiting official report  - ABG with CO2 retention  - Continue BIPAP  - Continue furosemide 40 mg IV q12h and probably transition to PO in am  - Continue rivaroxaban 15 mg daily  - Continue digoxin 0.125 mg daily  - Continue metoprolol succinate 25 mg daily  - Off IV antibiotics  - Being followed by ID, pulmonary

## 2022-03-12 NOTE — PROGRESS NOTE ADULT - ASSESSMENT
pt with extensive med hx -   CHF  Pulm HTN severe  valv heart disease  COPD  Resp Distress  PVD  OP  OA  Fall prone - risk  Ataxic gait  pelvic fx  AF  Dyspepsia  PPM    RSV positive  CT chest noted  off BIPAP this am   looks and feels better  BIPAP night time as tolerated      ct chest reviewed  BIPAP - for resp distress - hypercapnea  ABG noted  Labs and imaging reviewed  Lasix in ED now - Cardio Eval planned - I and O - cvs rx regimen and BP control  Pulse ox monitoring - Card tele monitoring - SPCU admission - CHF and COPD ex - Full Code  GOC documented - Full Code - spoke with Daughter  NEBS and Steroids - for COPD  Diuresis as per Cardio  on AC for AF pt with extensive med hx -   CHF  Pulm HTN severe  valv heart disease  COPD  Resp Distress  PVD  OP  OA  Fall prone - risk  Ataxic gait  pelvic fx  AF  Dyspepsia  PPM    RSV positive  CT chest noted  off BIPAP this am   looks and feels better  BIPAP night time as tolerated    ct chest reviewed  BIPAP - for resp distress - hypercapnea  ABG noted  Labs and imaging reviewed  Lasix in ED now - Cardio Eval planned - I and O - cvs rx regimen and BP control  Pulse ox monitoring - Card tele monitoring - SPCU admission - CHF and COPD ex - Full Code  NEBS and Steroids - for COPD  Diuresis as per Cardio  on AC for AF

## 2022-03-12 NOTE — PROGRESS NOTE ADULT - SUBJECTIVE AND OBJECTIVE BOX
[facility], Division of Infectious Diseases  MARCIE Rossi Y. Patel, S. Shah, G. Bg  [phone]  after hours and weekends [phone]  for Dr Goodwin    Name: PATRICIA MORTON  Age: 89y  Gender: Female  MRN: [id number]    Interval History--  Notes reviewed      Allergies    codeine (Unknown)    Intolerances        Medications--  Antibiotics:    Immunologic:    Other:  albuterol/ipratropium for Nebulization  digoxin     Tablet  furosemide   Injectable  levothyroxine  methylPREDNISolone sodium succinate Injectable  pantoprazole  Injectable  rivaroxaban      Review of Systems--  A 10-point review of systems was obtained.     Pertinent positives and negatives--  Constitutional: No fevers. No Chills. No Rigors.   Cardiovascular: No chest pain. No palpitations.  Respiratory: No shortness of breath. No cough.  Gastrointestinal: No nausea or vomiting. No diarrhea or constipation.   Psychiatric: Pleasant. Appropriate affect.    Review of systems otherwise negative except as previously noted.    Physical Examination--  Vital Signs: T(F): 97.9 (03-12-22 @ 03:54), Max: 98.4 (03-11-22 @ 21:21)  HR: 78 (03-12-22 @ 12:00)  BP: 116/95 (03-12-22 @ 12:00)  RR: 21 (03-12-22 @ 12:00)  SpO2: 100% (03-12-22 @ 12:00)  Wt(kg): --  General: Nontoxic-appearing Female in no acute distress.  HEENT: AT/NC.   Neck: Not rigid. No sense of mass.  Nodes: None palpable.  Lungs: Clear bilaterally without rales, wheezing or rhonchi  Heart: Regular rate and rhythm  abd soft   Extremities: No cyanosis or clubbing. No edema.   Skin: Warm. Dry. Good turgor. No rash. No vasculitic stigmata.  Psychiatric: Appropriate affect and mood for situation.         Laboratory Studies--  CBC                        9.4    2.41  )-----------( 150      ( 12 Mar 2022 06:28 )             30.8       Chemistries  03-12    145  |  106  |  32<H>  ----------------------------<  138<H>  4.1   |  33<H>  |  1.06    Ca    8.0<L>      12 Mar 2022 06:28  Phos  3.7     03-12  Mg     2.1     03-12    TPro  6.8  /  Alb  3.1<L>  /  TBili  0.9  /  DBili  x   /  AST  58<H>  /  ALT  20  /  AlkPhos  142<H>  03-11      Culture Data    Culture - Blood (collected 11 Mar 2022 13:03)  Source: .Blood Blood-Peripheral  Preliminary Report (12 Mar 2022 14:01):    No growth to date.    Culture - Blood (collected 11 Mar 2022 13:03)  Source: .Blood Blood-Peripheral  Preliminary Report (12 Mar 2022 14:01):    No growth to date.        rsRSV Result: Detected (03.11.22 @ 21:03) <   from: CT Chest No Cont (03.11.22 @ 10:51) >    VISUALIZED UPPER ABDOMEN:  Limited by streak artifact.  Nodular contour liver.  Left hepatic cyst.    BONES: Degenerative changes.    IMPRESSION:    Small bilateral pleural effusions with underlying compressive   atelectasis, similar to prior exam.    Other findings as discussed above.    < end of copied text >

## 2022-03-12 NOTE — PHYSICAL THERAPY INITIAL EVALUATION ADULT - GENERAL OBSERVATIONS, REHAB EVAL
Pt rec'd semifowlers in bed + supplemental 02 (3L02NC) + IV + telemetry. Pt agreeable to PT treatment which was tolerated fairly.

## 2022-03-12 NOTE — DIETITIAN INITIAL EVALUATION ADULT. - OTHER INFO
Per H&P, pt is a "89 y.o. female w/ hx of CHF, COPD, PPM, gout, hypothyroidism, HTN, and STEMI BIBEMS today from [facility] for evaluation of respiratory distress. Pt unable to give any history 2/2 respiratory status. Per EMS report pt. was hypoxic to 50% on 4L nasal cannula, NRB placed w/ improvement in O2 sats to 70s."    Pt seen for nutrition assessment secondary to SPCU admission. Visited pt this afternoon, pt resting in bed during time of visit. Pt admitted with acute on chronic hypoxic/hypercarbic respiratory failure 2/2 CHF exacerbation and RSV bronchitis. Patient is off BIPAP and now on 3L NC. Pt currently NPO. Awaiting SLP evaluation, SLP consulted. Pt requesting water during time of visit. Pt able to feed self, reports no chewing/swallowing difficulties. Wears partial dentures. NKFA. Denies N/V/D/C. Last BM today (3/12) per pt report, no BMs documented. CBW on admission 130#. Pt reports UBW of 131#. Pt reports weight has been stable. BL leg (1+) edema noted. Skin: pressure ulcer; stage I to BL heels. Pt admitted from [facility], transfer ht/wt of 63in, 131.4#. Unable to obtain nutrition/diet hx from transfer documents. Pt reports consuming low sodium regular consistency diet with thin liquids. PTA pt reports good appetite, consumes 3 meals/day. Does not consume pork, or garlic (diet office aware). Pt w/ hx of CHF, on lasix. Recommend low sodium diet when medically appropriate. Diet texture/modifications per SLP recommendations. RD to follow-up and will continue to monitor pt's nutritional status.

## 2022-03-12 NOTE — PROGRESS NOTE ADULT - SUBJECTIVE AND OBJECTIVE BOX
Date/Time Patient Seen:  		  Referring MD:   Data Reviewed	       Patient is a 89y old  Female who presents with a chief complaint of Respiratory distress (12 Mar 2022 07:48)      Subjective/HPI     PAST MEDICAL & SURGICAL HISTORY:  Chronic obstructive pulmonary disease (COPD)    HTN (hypertension)    Cardiac pacemaker    Gout    Hypothyroidism    Insomnia    Chronic CHF    History of ST elevation myocardial infarction (STEMI)    No significant past surgical history          Medication list         MEDICATIONS  (STANDING):  albuterol/ipratropium for Nebulization 3 milliLiter(s) Nebulizer every 8 hours  furosemide   Injectable 40 milliGRAM(s) IV Push two times a day  levothyroxine Injectable 62.5 MICROGram(s) IV Push at bedtime  methylPREDNISolone sodium succinate Injectable 40 milliGRAM(s) IV Push every 12 hours  metoprolol tartrate Injectable 5 milliGRAM(s) IV Push every 6 hours  pantoprazole  Injectable 40 milliGRAM(s) IV Push daily    MEDICATIONS  (PRN):         Vitals log        ICU Vital Signs Last 24 Hrs  T(C): 36.6 (12 Mar 2022 03:54), Max: 37.9 (11 Mar 2022 09:14)  T(F): 97.9 (12 Mar 2022 03:54), Max: 100.2 (11 Mar 2022 09:14)  HR: 80 (12 Mar 2022 08:00) (65 - 108)  BP: 89/66 (12 Mar 2022 08:00) (89/66 - 149/63)  BP(mean): 70 (12 Mar 2022 08:00) (56 - 99)  ABP: --  ABP(mean): --  RR: 19 (12 Mar 2022 08:00) (15 - 33)  SpO2: 99% (12 Mar 2022 08:00) (82% - 100%)           Input and Output:  I&O's Detail    11 Mar 2022 07:01  -  12 Mar 2022 07:00  --------------------------------------------------------  IN:  Total IN: 0 mL    OUT:    Indwelling Catheter - Urethral (mL): 2400 mL  Total OUT: 2400 mL    Total NET: -2400 mL          Lab Data                        9.4    2.41  )-----------( 150      ( 12 Mar 2022 06:28 )             30.8     03-12    145  |  106  |  32<H>  ----------------------------<  138<H>  4.1   |  33<H>  |  1.06    Ca    8.0<L>      12 Mar 2022 06:28  Phos  3.7     03-12  Mg     2.1     03-12    TPro  6.8  /  Alb  3.1<L>  /  TBili  0.9  /  DBili  x   /  AST  58<H>  /  ALT  20  /  AlkPhos  142<H>  03-11    ABG - ( 12 Mar 2022 05:41 )  pH, Arterial: 7.46  pH, Blood: x     /  pCO2: 57    /  pO2: 77    / HCO3: 40    / Base Excess: 16.7  /  SaO2: 97.8                    Review of Systems	      Objective     Physical Examination    heart s1s2  lung dec BS  abd soft  head nc  verbal  alert  feels better      Pertinent Lab findings & Imaging      Olga:  NO   Adequate UO     I&O's Detail    11 Mar 2022 07:01  -  12 Mar 2022 07:00  --------------------------------------------------------  IN:  Total IN: 0 mL    OUT:    Indwelling Catheter - Urethral (mL): 2400 mL  Total OUT: 2400 mL    Total NET: -2400 mL               Discussed with:     Cultures:	        Radiology

## 2022-03-12 NOTE — PHYSICAL THERAPY INITIAL EVALUATION ADULT - LIVES WITH, PROFILE
Pt's daughter called Anant Lawson [phone]. Requested an appointment with  for changes with her mom - Dementia.  out this week - full next week. Daughter does not want to wait. [facility]

## 2022-03-12 NOTE — DIETITIAN INITIAL EVALUATION ADULT. - PERTINENT MEDS FT
MEDICATIONS  (STANDING):  albuterol/ipratropium for Nebulization 3 milliLiter(s) Nebulizer every 8 hours  digoxin     Tablet 125 MICROGram(s) Oral daily  furosemide   Injectable 40 milliGRAM(s) IV Push two times a day  levothyroxine 125 MICROGram(s) Oral daily  methylPREDNISolone sodium succinate Injectable 40 milliGRAM(s) IV Push every 12 hours  pantoprazole  Injectable 40 milliGRAM(s) IV Push daily  rivaroxaban 15 milliGRAM(s) Oral with dinner

## 2022-03-12 NOTE — PHYSICAL THERAPY INITIAL EVALUATION ADULT - PRECAUTIONS/LIMITATIONS, REHAB EVAL
3L02NC/oxygen therapy device and L/min 3L02NC; + RSV/isolation precautions/oxygen therapy device and L/min

## 2022-03-12 NOTE — PROGRESS NOTE ADULT - SUBJECTIVE AND OBJECTIVE BOX
Patient is a 89y old  Female who presents with a chief complaint of Respiratory distress (12 Mar 2022 14:15)      BRIEF HOSPITAL COURSE:   89 yo f pmhx CHF, COPD, PPM, gout, hypothyroidism, HTN and STEMI admitted 3/11 hypoxic/hypercapnic respiratory failure requiring continuous bipap found to be RSV positive.     Events last 24 hours:   patient weaned off avaps to nc today.  avaps prn.       PAST MEDICAL & SURGICAL HISTORY:  Chronic obstructive pulmonary disease (COPD)  HTN (hypertension)  Cardiac pacemaker  Gout  Hypothyroidism  Insomnia  Chronic CHF  History of ST elevation myocardial infarction (STEMI)  No significant past surgical history      Allergies  codeine (Unknown)      FAMILY HISTORY:  No pertinent family history in first degree relatives      Social History:   from [place]      Review of Systems:  +cough      Physical Examination:    General: Elderly female, lying in bed, nad    HEENT: NC/AT, NC in place    PULM: Coarse b/l    CARD: Regular rate and rhythm, no murmurs, rubs, or gallops    ABD: Soft, nondistended, nontender, normoactive bowel sounds, no masses appreciated    EXT: + edema    SKIN: Warm     NEURO: Alert, interactive      Medications:  digoxin     Tablet 125 MICROGram(s) Oral daily  albuterol/ipratropium for Nebulization 3 milliLiter(s) Nebulizer every 8 hours  guaiFENesin Oral Liquid (Sugar-Free) 100 milliGRAM(s) Oral every 6 hours PRN  rivaroxaban 15 milliGRAM(s) Oral with dinner  pantoprazole  Injectable 40 milliGRAM(s) IV Push daily  levothyroxine 125 MICROGram(s) Oral daily  methylPREDNISolone sodium succinate Injectable 40 milliGRAM(s) IV Push every 12 hours      ICU Vital Signs Last 24 Hrs  T(C): 36.4 (12 Mar 2022 15:46), Max: 36.9 (11 Mar 2022 21:21)  T(F): 97.5 (12 Mar 2022 15:46), Max: 98.4 (11 Mar 2022 21:21)  HR: 87 (12 Mar 2022 20:16) (65 - 87)  BP: 121/60 (12 Mar 2022 18:00) (89/66 - 121/60)  BP(mean): 79 (12 Mar 2022 18:00) (56 - 104)  ABP: --  ABP(mean): --  RR: 19 (12 Mar 2022 18:00) (15 - 24)  SpO2: 100% (12 Mar 2022 20:16) (92% - 100%)    Vital Signs Last 24 Hrs  T(C): 36.4 (12 Mar 2022 15:46), Max: 36.9 (11 Mar 2022 21:21)  T(F): 97.5 (12 Mar 2022 15:46), Max: 98.4 (11 Mar 2022 21:21)  HR: 87 (12 Mar 2022 20:) (65 - 87)  BP: 121/60 (12 Mar 2022 18:00) (89/66 - 121/60)  BP(mean): 79 (12 Mar 2022 18:00) (56 - 104)  RR: 19 (12 Mar 2022 18:00) (15 - 24)  SpO2: 100% (12 Mar 2022 20:16) (92% - 100%)    ABG - ( 12 Mar 2022 05:41 )  pH, Arterial: 7.46  pH, Blood: x     /  pCO2: 57    /  pO2: 77    / HCO3: 40    / Base Excess: 16.7  /  SaO2: 97.8        I&O's Detail    11 Mar 2022 07:01  -  12 Mar 2022 07:00  --------------------------------------------------------  IN:  Total IN: 0 mL    OUT:    Indwelling Catheter - Urethral (mL): 2400 mL  Total OUT: 2400 mL  Total NET: -2400 mL      12 Mar 2022 06:01  -  12 Mar 2022 20:42  --------------------------------------------------------  IN:  Total IN: 0 mL    OUT:    Indwelling Catheter - Urethral (mL): 800 mL  Total OUT: 800 mL  Total NET: -800 mL      LABS:                        9.4    2.41  )-----------( 150      ( 12 Mar 2022 06:28 )             30.8     03-12    145  |  106  |  32<H>  ----------------------------<  138<H>  4.1   |  33<H>  |  1.06    Ca    8.0<L>      12 Mar 2022 06:28  Phos  3.7       Mg     2.1         TPro  6.8  /  Alb  3.1<L>  /  TBili  0.9  /  DBili  x   /  AST  58<H>  /  ALT  20  /  AlkPhos  142<H>        PT/INR - ( 11 Mar 2022 09:30 )   PT: 23.5 sec;   INR: 2.03 ratio    PTT - ( 11 Mar 2022 09:30 )  PTT:39.8 sec      Urinalysis Basic - ( 11 Mar 2022 15:07 )  Color: Pale Yellow / Appearance: Clear / S.010 / pH: x  Gluc: x / Ketone: Negative  / Bili: Negative / Urobili: Negative mg/dL   Blood: x / Protein: Negative mg/dL / Nitrite: Negative   Leuk Esterase: Negative / RBC: 3-5 /HPF / WBC 0-2   Sq Epi: x / Non Sq Epi: Occasional / Bacteria: Moderate      CULTURES:  Culture Results:   <10,000 CFU/mL Normal Urogenital Xiomara ( @ 21:02)  Culture Results:   No growth to date. ( @ 13:03)  Culture Results:   No growth to date. ( @ 13:03)      RADIOLOGY:   < from: CT Chest No Cont (22 @ 10:51) >  ACC: [id number] EXAM:  CT CHEST                          PROCEDURE DATE:  2022          INTERPRETATION:  CLINICAL INFORMATION: Respiratory distress.    COMPARISON: Chest radiograph 3/11/2022.  CT scan chest 2022.    CONTRAST/COMPLICATIONS:  IV Contrast: NONE  Oral Contrast: NONE  Complications: None reported at time of study completion    PROCEDURE:  CT of the Chest was performed.  Sagittal and coronal reformats were performed.    FINDINGS:    LUNGS AND AIRWAYS:  PLEURA:  There are small bilateral pleural effusions with underlying compressive   atelectasis lung bases.  The central airways remain patent.    MEDIASTINUM AND CHICA: No lymphadenopathy.    VESSELS: Atherosclerotic changes thoracic aorta and coronary artery   calcifications.  Prominence of the main pulmonary arterial trunk, which may be associated   with pulmonary arterial hypertension.    HEART: Cardiomegaly.  Cardiac pacemaker leads.   No pericardial effusion.    CHEST WALL AND LOWER NECK:  Cardiac device left chestwall.    VISUALIZED UPPER ABDOMEN:  Limited by streak artifact.  Nodular contour liver.  Left hepatic cyst.    BONES: Degenerative changes.    IMPRESSION:    Small bilateral pleural effusions with underlying compressive   atelectasis, similar to prior exam.    Other findings as discussed above.    --- End of Report ---    ALYSON ROSAS MD; Attending Radiologist  This document has been electronically signed. Mar 11 2022 11:46AM    < end of copied text >      SUPPLEMENTAL O2:   LINES:  IVF:  HACKETT:   PPx:   CONTACT: +RSV

## 2022-03-13 LAB
ANION GAP SERPL CALC-SCNC: 5 MMOL/L — SIGNIFICANT CHANGE UP (ref 5–17)
BUN SERPL-MCNC: 42 MG/DL — HIGH (ref 7–23)
CALCIUM SERPL-MCNC: 8.1 MG/DL — LOW (ref 8.4–10.5)
CHLORIDE SERPL-SCNC: 105 MMOL/L — SIGNIFICANT CHANGE UP (ref 96–108)
CO2 SERPL-SCNC: 37 MMOL/L — HIGH (ref 22–31)
CREAT SERPL-MCNC: 1.17 MG/DL — SIGNIFICANT CHANGE UP (ref 0.5–1.3)
EGFR: 44 ML/MIN/1.73M2 — LOW
GLUCOSE SERPL-MCNC: 159 MG/DL — HIGH (ref 70–99)
HCT VFR BLD CALC: 29.9 % — LOW (ref 34.5–45)
HGB BLD-MCNC: 9.2 G/DL — LOW (ref 11.5–15.5)
MAGNESIUM SERPL-MCNC: 2 MG/DL — SIGNIFICANT CHANGE UP (ref 1.6–2.6)
MCHC RBC-ENTMCNC: 30.8 GM/DL — LOW (ref 32–36)
MCHC RBC-ENTMCNC: 34.2 PG — HIGH (ref 27–34)
MCV RBC AUTO: 111.2 FL — HIGH (ref 80–100)
NRBC # BLD: 0 /100 WBCS — SIGNIFICANT CHANGE UP (ref 0–0)
PHOSPHATE SERPL-MCNC: 3.3 MG/DL — SIGNIFICANT CHANGE UP (ref 2.5–4.5)
PLATELET # BLD AUTO: 174 K/UL — SIGNIFICANT CHANGE UP (ref 150–400)
POTASSIUM SERPL-MCNC: 3.3 MMOL/L — LOW (ref 3.5–5.3)
POTASSIUM SERPL-SCNC: 3.3 MMOL/L — LOW (ref 3.5–5.3)
RBC # BLD: 2.69 M/UL — LOW (ref 3.8–5.2)
RBC # FLD: 15.9 % — HIGH (ref 10.3–14.5)
SODIUM SERPL-SCNC: 147 MMOL/L — HIGH (ref 135–145)
WBC # BLD: 4.25 K/UL — SIGNIFICANT CHANGE UP (ref 3.8–10.5)
WBC # FLD AUTO: 4.25 K/UL — SIGNIFICANT CHANGE UP (ref 3.8–10.5)

## 2022-03-13 PROCEDURE — 99233 SBSQ HOSP IP/OBS HIGH 50: CPT

## 2022-03-13 RX ORDER — POTASSIUM CHLORIDE 20 MEQ
10 PACKET (EA) ORAL
Refills: 0 | Status: COMPLETED | OUTPATIENT
Start: 2022-03-13 | End: 2022-03-13

## 2022-03-13 RX ORDER — LANOLIN ALCOHOL/MO/W.PET/CERES
5 CREAM (GRAM) TOPICAL AT BEDTIME
Refills: 0 | Status: DISCONTINUED | OUTPATIENT
Start: 2022-03-13 | End: 2022-03-21

## 2022-03-13 RX ORDER — PANTOPRAZOLE SODIUM 20 MG/1
40 TABLET, DELAYED RELEASE ORAL
Refills: 0 | Status: DISCONTINUED | OUTPATIENT
Start: 2022-03-13 | End: 2022-03-21

## 2022-03-13 RX ORDER — ALLOPURINOL 300 MG
100 TABLET ORAL DAILY
Refills: 0 | Status: DISCONTINUED | OUTPATIENT
Start: 2022-03-13 | End: 2022-03-21

## 2022-03-13 RX ORDER — POTASSIUM CHLORIDE 20 MEQ
40 PACKET (EA) ORAL ONCE
Refills: 0 | Status: DISCONTINUED | OUTPATIENT
Start: 2022-03-13 | End: 2022-03-13

## 2022-03-13 RX ADMIN — Medication 125 MICROGRAM(S): at 05:40

## 2022-03-13 RX ADMIN — Medication 100 MILLIGRAM(S): at 23:09

## 2022-03-13 RX ADMIN — Medication 100 MILLIEQUIVALENT(S): at 09:06

## 2022-03-13 RX ADMIN — Medication 100 MILLIGRAM(S): at 13:25

## 2022-03-13 RX ADMIN — Medication 40 MILLIGRAM(S): at 05:40

## 2022-03-13 RX ADMIN — Medication 3 MILLILITER(S): at 00:28

## 2022-03-13 RX ADMIN — Medication 125 MICROGRAM(S): at 05:39

## 2022-03-13 RX ADMIN — Medication 100 MILLIEQUIVALENT(S): at 07:53

## 2022-03-13 RX ADMIN — Medication 100 MILLIGRAM(S): at 05:40

## 2022-03-13 RX ADMIN — Medication 200 MILLIGRAM(S): at 09:10

## 2022-03-13 RX ADMIN — PANTOPRAZOLE SODIUM 40 MILLIGRAM(S): 20 TABLET, DELAYED RELEASE ORAL at 13:25

## 2022-03-13 RX ADMIN — Medication 40 MILLIGRAM(S): at 17:19

## 2022-03-13 RX ADMIN — Medication 40 MILLIGRAM(S): at 05:38

## 2022-03-13 RX ADMIN — Medication 100 MILLIEQUIVALENT(S): at 10:49

## 2022-03-13 RX ADMIN — Medication 3 MILLILITER(S): at 20:23

## 2022-03-13 RX ADMIN — Medication 3 MILLILITER(S): at 06:00

## 2022-03-13 RX ADMIN — Medication 5 MILLIGRAM(S): at 23:10

## 2022-03-13 RX ADMIN — RIVAROXABAN 15 MILLIGRAM(S): KIT at 17:19

## 2022-03-13 RX ADMIN — Medication 25 MILLIGRAM(S): at 05:40

## 2022-03-13 RX ADMIN — Medication 3 MILLILITER(S): at 13:35

## 2022-03-13 NOTE — PROGRESS NOTE ADULT - ASSESSMENT
The patient is a 89 year old female with a history of hypothyroidism, atrial fibrillation, pacemaker, COPD, CAD, chronic diastolic heart failure, severe TR, severe pulm HTN who presents with shortness of breath in the setting of acute on chronic diastolic heart failure, COPD, possible PNA.    3/13/22  Seen at [facility] ICU  Lying flat, sleeping comfortably  Easily arousable  No cardiac complaints offered  Potassium-3.3    Plan:  - ECG with known AF and intermittent ventricular pacing  - CXR with bilateral pleural effusions  - BNP 54811  - RSV positive  - K-supps and follow labs  - Lanoxin level-1.4  - Echo 2/22 with normal LV systolic function, severe TR, severe pulm HTN-awaiting official report  - ABG with CO2 retention  - Continue BIPAP  - On Furosemide-40mg BID PO  - Continue rivaroxaban 15 mg daily  - Continue digoxin 0.125 mg daily  - Continue metoprolol succinate 25 mg daily  - Off IV antibiotics  - Out of bed to chair with assistance if possible  - Being followed by ID, pulmonary

## 2022-03-13 NOTE — SWALLOW BEDSIDE ASSESSMENT ADULT - ASR SWALLOW REFERRAL
consider ENT consult to further assess laryngeal function if concern for abovementioned dysphonia/ENT

## 2022-03-13 NOTE — SWALLOW BEDSIDE ASSESSMENT ADULT - COMMENTS
Consult received and chart reviewed. Patient seen at bedside this AM for initial assessment of swallow function, at which time she was alert, cooperative, and denied pain. Patient on supplemental O2 via NC. Patient demonstrates ability to follow simple commands. Vocal quality noted as hoarse. Coughing noted at baseline. Patient reported she tolerates a regular and thin liquid diet level at baseline.    Per charting, the patient is a "89 y.o. female w/ hx of CHF, COPD, PPM, gout, hypothyroidism, HTN, and STEMI BIBEMS today from [facility] for evaluation of respiratory distress. Pt unable to give any history 2/2 respiratory status. Per EMS report pt. was hypoxic to 50% on 4L nasal cannula, NRB placed w/ improvement in O2 sats to 70s."     WBC WFL. CXR 3/11/22 revealed "Small bilateral pleural effusions with underlying compressive atelectasis, similar to prior exam."     Discussed results and recommendations with the Patient, RN, and Dr. Stanton via phone.

## 2022-03-13 NOTE — PROGRESS NOTE ADULT - SUBJECTIVE AND OBJECTIVE BOX
Date/Time Patient Seen:  		  Referring MD:   Data Reviewed	       Patient is a 89y old  Female who presents with a chief complaint of Respiratory distress (12 Mar 2022 20:42)      Subjective/HPI     PAST MEDICAL & SURGICAL HISTORY:  Chronic obstructive pulmonary disease (COPD)    HTN (hypertension)    Cardiac pacemaker    Gout    Hypothyroidism    Insomnia    Chronic CHF    History of ST elevation myocardial infarction (STEMI)    No significant past surgical history          Medication list         MEDICATIONS  (STANDING):  albuterol/ipratropium for Nebulization 3 milliLiter(s) Nebulizer every 8 hours  digoxin     Tablet 125 MICROGram(s) Oral daily  furosemide    Tablet 40 milliGRAM(s) Oral two times a day  levothyroxine 125 MICROGram(s) Oral daily  methylPREDNISolone sodium succinate Injectable 40 milliGRAM(s) IV Push every 12 hours  metoprolol succinate ER 25 milliGRAM(s) Oral daily  pantoprazole  Injectable 40 milliGRAM(s) IV Push daily  potassium chloride  10 mEq/100 mL IVPB 10 milliEquivalent(s) IV Intermittent every 1 hour  rivaroxaban 15 milliGRAM(s) Oral with dinner    MEDICATIONS  (PRN):  guaiFENesin Oral Liquid (Sugar-Free) 100 milliGRAM(s) Oral every 6 hours PRN Cough         Vitals log        ICU Vital Signs Last 24 Hrs  T(C): 36.4 (13 Mar 2022 06:00), Max: 36.5 (12 Mar 2022 22:00)  T(F): 97.6 (13 Mar 2022 06:00), Max: 97.7 (12 Mar 2022 22:00)  HR: 78 (13 Mar 2022 06:00) (74 - 89)  BP: 130/61 (13 Mar 2022 06:00) (89/66 - 130/61)  BP(mean): 78 (13 Mar 2022 06:00) (68 - 104)  ABP: --  ABP(mean): --  RR: 20 (13 Mar 2022 06:00) (19 - 25)  SpO2: 100% (13 Mar 2022 06:00) (92% - 100%)           Input and Output:  I&O's Detail    12 Mar 2022 06:01  -  13 Mar 2022 07:00  --------------------------------------------------------  IN:  Total IN: 0 mL    OUT:    Indwelling Catheter - Urethral (mL): 800 mL  Total OUT: 800 mL    Total NET: -800 mL          Lab Data                        9.2    4.25  )-----------( 174      ( 13 Mar 2022 06:06 )             29.9     03-13    147<H>  |  105  |  42<H>  ----------------------------<  159<H>  3.3<L>   |  37<H>  |  1.17    Ca    8.1<L>      13 Mar 2022 06:06  Phos  3.3     03-13  Mg     2.0     03-13    TPro  6.8  /  Alb  3.1<L>  /  TBili  0.9  /  DBili  x   /  AST  58<H>  /  ALT  20  /  AlkPhos  142<H>  03-11    ABG - ( 12 Mar 2022 05:41 )  pH, Arterial: 7.46  pH, Blood: x     /  pCO2: 57    /  pO2: 77    / HCO3: 40    / Base Excess: 16.7  /  SaO2: 97.8                    Review of Systems	      Objective     Physical Examination    heart s1s2  lung dec BS  abd soft  head nc      Pertinent Lab findings & Imaging      Olga:  NO   Adequate UO     I&O's Detail    12 Mar 2022 06:01  -  13 Mar 2022 07:00  --------------------------------------------------------  IN:  Total IN: 0 mL    OUT:    Indwelling Catheter - Urethral (mL): 800 mL  Total OUT: 800 mL    Total NET: -800 mL               Discussed with:     Cultures:	        Radiology

## 2022-03-13 NOTE — PROGRESS NOTE ADULT - ASSESSMENT
3 y.o. female w/ hx of CHF, COPD, PPM, gout, hypothyroidism, HTN, and STEMI resident of [facility] admitted with Acute hypercapnia respiratory failure sec COPD/CHF exacerbation. Placed on BIPAP. CT chest with scott effusions with compressive atelectasis. BNP elevated. Sp Vanc Zosyn x 1 dose in ED.  Afebrile WBC WNL Procalcitonin 0.09  rsv + viral bronchitis     Plan :   blood cx neg to date  urine legionella neg  strep neg  defer antibx     cont supportive care  supplemental oxygen as needed   Trend temps and cbc  Steroids nebs per pulm  Diurese per cardiology   rate control

## 2022-03-13 NOTE — PROGRESS NOTE ADULT - ASSESSMENT
89 y/o F w/ pmh of CHF, COPD, PPM, gout, hypothyroid, htn, afib, and STEMI, presented to [facility] for acute hypoxic/hypercarbic resp failure 2/2 to RSV+ requiring biab and admitted to [facility].    -Neuro: no acute issues, Insomnia on melatonin  -Cardiac: HTN/CHF/Afib cont lasix/dig/lopressor w/in parameters  -Resp: Acute Hypoxic/hypercarbic resp failure now improved cont NC to maintain o2 >90% cont steroids and pulm toliet  -GI: cont diet as ordered/tolerated, GI ppx w/ protonix  -Renal: Renal function stable, Mild HypoNa cont to monitor, HypoK repleted w/ KCL, cont to trend renal function and lytes  -ID: RSV cont supportive care w/ 02 therapy  -Endo: Hypothyroid cont synthroid  -Heme: DVT ppx w/ xarelto 2/2 to afib  -Dispo: Pt remains in the [facility] currently DNR/DNI, GOC per day team, dispo pending hospital course

## 2022-03-13 NOTE — PROGRESS NOTE ADULT - ASSESSMENT
89F with PMHx of hypothyroidism, atrial fibrillation, pacemaker, COPD, CAD, chronic diastolic heart failure, severe TR, severe pulm HTN who presents with acute on chronic hypoxic/hypercapnic respiratory failure in the setting of acute on chronic diastolic heart failure, COPD and RSV bronchitis.    #Acute on chronic hypoxic/hypercarbic respiratory failure 2/2 CHF exacerbation and RSV bronchitis.  - Present on admission  - Patient is off BIPAP and doing well on NC  - Supportive care for RSV - antitussives PRN  - IV solumedrol d/c today, continued on bronchodilators  - Appears compensated from a HF POV - switch to PO lasix  - Cardiology and pulmonary following  - Tolerating soft and bite sized diet well, will advance to regular consistency for now (patient requesting), SLP evaluation today (discussed with S&S)    #COPD  - Off BIPAP  - Continue duoneb, d/c solumedrol  - Seen by pulmonary, recs appreciated    #Acute on chronic diastolic CHF  - BNP 11,290 on admission  - Can use BIPAP PRN  - Excellent response to lasix perhaps overdiuresed noted by Cr trends, hypernatremia and patient's thirst  - Continue with PO furosemide  - Strict I/Os - net negative, will d/c jackson today  - Seen by cardiology, recs appreciated    #Atrial Fibrillation  - continue toprol XL  - continue xarelto for thromboprophylaxis    #Hypokalemia  - likely secondary to diuresis  - K being repleted this AM  - monitor BMP, Mg and Phos    #Hypothyroid  - Continue Synthroid    #HTN  - Continue toprol XL  - Monitor hemodynamics    #Gout  - Resume Allopurinol    #Insomnia  - Resume melatonin    #DVT prophylaxis   - Continue rivaroxaban    #ACP  - DNR/DNI, MOLST in chart

## 2022-03-13 NOTE — PROGRESS NOTE ADULT - ASSESSMENT
pt with extensive med hx -   CHF  Pulm HTN severe  valv heart disease  COPD  Resp Distress  PVD  OP  OA  Fall prone - risk  Ataxic gait  pelvic fx  AF  Dyspepsia  PPM    RSV positive  CT chest noted  looks and feels better  BIPAP night time as tolerated  strep and legionella ag neg  will dc Solumedrol this am     ct chest reviewed  BIPAP PRN - for resp distress - hypercapnea - night time use as tolerated  ABG noted  Labs and imaging reviewed  lasix diuresis - i and o - monitor Na and renal indices -   Pulse ox monitoring - Card tele monitoring - [facility] admission - CHF and COPD ex -   NEBS and s/p Steroids - for COPD  Diuresis as per Cardio  on AC for AF

## 2022-03-13 NOTE — PROGRESS NOTE ADULT - SUBJECTIVE AND OBJECTIVE BOX
Patient is a 89y Female with a known history of :    HPI:  89 y.o. female w/ hx of CHF, COPD, PPM, gout, hypothyroidism, HTN, and STEMI BIBEMS today from [facility] for evaluation of respiratory distress. Pt unable to give any history 2/2 respiratory status. Per EMS report pt. was hypoxic to 50% on 4L nasal cannula, NRB placed w/ improvement in O2 sats to 70s.      ED Course;  - Placed on bipap  - Broad spectrum coverage w/ Zosyn and Vanco  - Lasix 40mg  - Methylprednisone 40mg  - CBC, ABG, Coags, UA,blood and urine culture, lactate, procalcitonin, BNP  - CXR done  - Chest CT done  - EKG done    Seen by pulmonary and cardiology  SP[facility] monitoring was recommended (11 Mar 2022 11:20)      REVIEW OF SYSTEMS:    CONSTITUTIONAL: No fever, weight loss, or fatigue  EYES: No eye pain, visual disturbances, or discharge  ENMT:  No difficulty hearing, tinnitus, vertigo; No sinus or throat pain  NECK: No pain or stiffness  BREASTS: No pain, masses, or nipple discharge  RESPIRATORY: No cough, wheezing, chills or hemoptysis; No shortness of breath  CARDIOVASCULAR: No chest pain, palpitations, dizziness, or leg swelling  GASTROINTESTINAL: No abdominal or epigastric pain. No nausea, vomiting, or hematemesis; No diarrhea or constipation. No melena or hematochezia.  GENITOURINARY: No dysuria, frequency, hematuria, or incontinence  NEUROLOGICAL: No headaches, memory loss, loss of strength, numbness, or tremors  SKIN: No itching, burning, rashes, or lesions   LYMPH NODES: No enlarged glands  ENDOCRINE: No heat or cold intolerance; No hair loss  MUSCULOSKELETAL: No joint pain or swelling; No muscle, back, or extremity pain  PSYCHIATRIC: No depression, anxiety, mood swings, or difficulty sleeping  HEME/LYMPH: No easy bruising, or bleeding gums  ALLERGY AND IMMUNOLOGIC: No hives or eczema    MEDICATIONS  (STANDING):  albuterol/ipratropium for Nebulization 3 milliLiter(s) Nebulizer every 8 hours  allopurinol 100 milliGRAM(s) Oral daily  digoxin     Tablet 125 MICROGram(s) Oral daily  furosemide    Tablet 40 milliGRAM(s) Oral two times a day  levothyroxine 125 MICROGram(s) Oral daily  metoprolol succinate ER 25 milliGRAM(s) Oral daily  pantoprazole  Injectable 40 milliGRAM(s) IV Push daily  potassium chloride  10 mEq/100 mL IVPB 10 milliEquivalent(s) IV Intermittent every 1 hour  rivaroxaban 15 milliGRAM(s) Oral with dinner    MEDICATIONS  (PRN):  guaiFENesin Oral Liquid (Sugar-Free) 200 milliGRAM(s) Oral every 6 hours PRN Cough  melatonin 5 milliGRAM(s) Oral at bedtime PRN Insomnia      ALLERGIES: codeine (Unknown)      FAMILY HISTORY:  No pertinent family history in first degree relatives        Social history:  Alochol:   Smoking:   Drug Use:   Marital Status:     PHYSICAL EXAMINATION:  -----------------------------  T(C): 36.7 (03-13-22 @ 08:00), Max: 36.7 (03-13-22 @ 08:00)  HR: 77 (03-13-22 @ 08:00) (76 - 89)  BP: 127/54 (03-13-22 @ 08:00) (108/83 - 130/61)  RR: 23 (03-13-22 @ 08:00) (19 - 25)  SpO2: 98% (03-13-22 @ 08:00) (92% - 100%)  Wt(kg): --    03-12 @ 06:01  -  03-13 @ 07:00  --------------------------------------------------------  IN:  Total IN: 0 mL    OUT:    Indwelling Catheter - Urethral (mL): 800 mL  Total OUT: 800 mL    Total NET: -800 mL            Constitutional: well developed, normal appearance, well groomed, well nourished, no deformities and no acute distress.   Eyes: the conjunctiva exhibited no abnormalities and the eyelids demonstrated no xanthelasmas.   HEENT: normal oral mucosa, no oral pallor and no oral cyanosis.   Neck: normal jugular venous A waves present, normal jugular venous V waves present and no jugular venous velásquez A waves.   Pulmonary: no respiratory distress, normal respiratory rhythm and effort, no accessory muscle use and lungs were clear to auscultation bilaterally. Anteriorly  Cardiovascular: heart rate and rhythm were normal, normal S1 and S2 and no murmur, gallop, rub, heave or thrill are present.   Musculoskeletal: the gait could not be assessed.   Extremities: no clubbing of the fingernails, no localized cyanosis, no petechial hemorrhages and no ischemic changes.   Skin: normal skin color and pigmentation, no rash, no venous stasis, no skin lesions, no skin ulcer and no xanthoma was observed.       LABS:   --------  03-13    147<H>  |  105  |  42<H>  ----------------------------<  159<H>  3.3<L>   |  37<H>  |  1.17    Ca    8.1<L>      13 Mar 2022 06:06  Phos  3.3     03-13  Mg     2.0     03-13                           9.2    4.25  )-----------( 174      ( 13 Mar 2022 06:06 )             29.9             Culture Results:   <10,000 CFU/mL Normal Urogenital Xiomara (03-11 @ 21:02)  Culture Results:   No growth to date. (03-11 @ 13:03)  Culture Results:   No growth to date. (03-11 @ 13:03)    03-11 @ 21:02    Organism --   Gram Stain Blood -- Gram Stain --  Specimen Source Clean Catch Clean Catch (Midstream)  Culture-Blood --    03-11 @ 13:03    Organism --   Gram Stain Blood -- Gram Stain --  Specimen Source .Blood Blood-Peripheral  Culture-Blood --        Radiology:

## 2022-03-13 NOTE — PROGRESS NOTE ADULT - SUBJECTIVE AND OBJECTIVE BOX
Patient is a 89y old  Female who presents with a chief complaint of Respiratory distress (13 Mar 2022 07:09)      FROM ADMISSION H+P:   HPI:  89 y.o. female w/ hx of CHF, COPD, PPM, gout, hypothyroidism, HTN, and STEMI BIBEMS today from [facility] for evaluation of respiratory distress. Pt unable to give any history 2/2 respiratory status. Per EMS report pt. was hypoxic to 50% on 4L nasal cannula, NRB placed w/ improvement in O2 sats to 70s.      ED Course;  - Placed on bipap  - Broad spectrum coverage w/ Zosyn and Vanco  - Lasix 40mg  - Methylprednisone 40mg  - CBC, ABG, Coags, UA,blood and urine culture, lactate, procalcitonin, BNP  - CXR done  - Chest CT done  - EKG done    Seen by pulmonary and cardiology  [facility] monitoring was recommended (11 Mar 2022 11:20)      INTERVAL HPI/OVERNIGHT EVENTS: Patient was seen and examined. No acute events occurred overnight. She does state that last night was terrible as she was up coughing. Cough is her main complaint today. She feels comfortable on NC. UOP is good, jackson with pinkish tinged urine in the bag. She is requesting blueberries, cottage cheese and orange juice. For SLP eval today, has been tolerating soft & bite sized diet. Reports no problems with dysphagia in the past. Denies chest pain, palpitations, dyspnea, headache, dizziness, abdominal pain, n/v/d.     I&O's Summary    12 Mar 2022 06:01  -  13 Mar 2022 07:00  --------------------------------------------------------  IN: 0 mL / OUT: 800 mL / NET: -800 mL        PAST MEDICAL & SURGICAL HISTORY:  Chronic obstructive pulmonary disease (COPD)    HTN (hypertension)    Cardiac pacemaker    Gout    Hypothyroidism    Insomnia    Chronic CHF    History of ST elevation myocardial infarction (STEMI)    No significant past surgical history        LABS:                        9.2    4.25  )-----------( 174      ( 13 Mar 2022 06:06 )             29.9     03-13    147<H>  |  105  |  42<H>  ----------------------------<  159<H>  3.3<L>   |  37<H>  |  1.17    Ca    8.1<L>      13 Mar 2022 06:06  Phos  3.3     03-13  Mg     2.0     03-13    TPro  6.8  /  Alb  3.1<L>  /  TBili  0.9  /  DBili  x   /  AST  58<H>  /  ALT  20  /  AlkPhos  142<H>  0311    PT/INR - ( 11 Mar 2022 09:30 )   PT: 23.5 sec;   INR: 2.03 ratio         PTT - ( 11 Mar 2022 09:30 )  PTT:39.8 sec  Urinalysis Basic - ( 11 Mar 2022 15:07 )    Color: Pale Yellow / Appearance: Clear / S.010 / pH: x  Gluc: x / Ketone: Negative  / Bili: Negative / Urobili: Negative mg/dL   Blood: x / Protein: Negative mg/dL / Nitrite: Negative   Leuk Esterase: Negative / RBC: 3-5 /HPF / WBC 0-2   Sq Epi: x / Non Sq Epi: Occasional / Bacteria: Moderate      CAPILLARY BLOOD GLUCOSE        ABG - ( 12 Mar 2022 05:41 )  pH, Arterial: 7.46  pH, Blood: x     /  pCO2: 57    /  pO2: 77    / HCO3: 40    / Base Excess: 16.7  /  SaO2: 97.8                Urinalysis Basic - ( 11 Mar 2022 15:07 )    Color: Pale Yellow / Appearance: Clear / S.010 / pH: x  Gluc: x / Ketone: Negative  / Bili: Negative / Urobili: Negative mg/dL   Blood: x / Protein: Negative mg/dL / Nitrite: Negative   Leuk Esterase: Negative / RBC: 3-5 /HPF / WBC 0-2   Sq Epi: x / Non Sq Epi: Occasional / Bacteria: Moderate        MEDICATIONS  (STANDING):  albuterol/ipratropium for Nebulization 3 milliLiter(s) Nebulizer every 8 hours  digoxin     Tablet 125 MICROGram(s) Oral daily  furosemide    Tablet 40 milliGRAM(s) Oral two times a day  levothyroxine 125 MICROGram(s) Oral daily  metoprolol succinate ER 25 milliGRAM(s) Oral daily  pantoprazole  Injectable 40 milliGRAM(s) IV Push daily  potassium chloride  10 mEq/100 mL IVPB 10 milliEquivalent(s) IV Intermittent every 1 hour  rivaroxaban 15 milliGRAM(s) Oral with dinner    MEDICATIONS  (PRN):  guaiFENesin Oral Liquid (Sugar-Free) 100 milliGRAM(s) Oral every 6 hours PRN Cough      REVIEW OF SYSTEMS:  CONSTITUTIONAL: No fever or chills  HEENT:  No headache, no sore throat; ADMITS dry mouth  RESPIRATORY: No wheezing, or shortness of breath; ADMITS cough  CARDIOVASCULAR: No chest pain, palpitations  GASTROINTESTINAL: No abdominal pain, nausea, vomiting, or diarrhea  GENITOURINARY: No dysuria, frequency, or hematuria  NEUROLOGICAL: no focal weakness or dizziness  MUSCULOSKELETAL: no myalgias  PSYCH: no recent changes in mood    RADIOLOGY & ADDITIONAL TESTS:    Imaging Personally Reviewed:  [x] YES  [ ] NO    Consultant(s) Notes Reviewed:  [x] YES  [ ] NO    PHYSICAL EXAM:  T(C): 36.7 (22 @ 08:00), Max: 36.7 (22 @ 08:00)  HR: 77 (22 @ 08:00) (76 - 89)  BP: 127/54 (22 @ 08:00) (108/83 - 130/61)  RR: 23 (22 @ 08:00) (19 - 25)  SpO2: 98% (22 @ 08:00) (92% - 100%)    GENERAL: NAD, well-developed, well-groomed  HEENT:  anicteric, dry mucous membranes  CHEST/LUNG:  CTA bilaterally anteriorly; coughing  HEART:  RRR, S1, S2  ABDOMEN:  BS+, soft, nontender, nondistended  EXTREMITIES: no cyanosis or calf tenderness; trace edema  INTEGUMENT: scattered ecchymoses  NERVOUS SYSTEM: answers questions and follows commands appropriately  PSYCH: normal affect    Care Discussed with Consultants/Other Providers [x] YES  [ ] NO

## 2022-03-13 NOTE — PROGRESS NOTE ADULT - SUBJECTIVE AND OBJECTIVE BOX
HPI:       24 hour events: complains of coughing and unable to sleep but otherwise denies any other medical complains    Review of Systems:  Constitutional: No fever, chills, fatigue  Neuro: No headache, numbness, weakness  Resp: No cough, wheezing, shortness of breath  CVS: No chest pain, palpitations, leg swelling  GI: No abdominal pain, nausea, vomiting, diarrhea   : No dysuria, frequency, incontinence  Skin: No itching, burning, rashes, or lesions   Msk: No joint pain or swelling  Psych: No depression, anxiety, mood swings    T(F): 97 (03-13-22 @ 16:28), Max: 98.1 (03-13-22 @ 08:00)  HR: 85 (03-13-22 @ 20:31) (72 - 89)  BP: 117/62 (03-13-22 @ 20:00) (108/83 - 130/61)  RR: 26 (03-13-22 @ 20:00) (19 - 35)  SpO2: 99% (03-13-22 @ 20:31) (91% - 100%)  Wt(kg): --      03-12-22 @ 06:01  -  03-13-22 @ 07:00  --------------------------------------------------------  IN: 0 mL / OUT: 800 mL / NET: -800 mL    03-13-22 @ 07:01  -  03-13-22 @ 22:09  --------------------------------------------------------  IN: 350 mL / OUT: 600 mL / NET: -250 mL        CAPILLARY BLOOD GLUCOSE          I&O's Summary    12 Mar 2022 06:01  -  13 Mar 2022 07:00  --------------------------------------------------------  IN: 0 mL / OUT: 800 mL / NET: -800 mL    13 Mar 2022 07:01  -  13 Mar 2022 22:09  --------------------------------------------------------  IN: 350 mL / OUT: 600 mL / NET: -250 mL        Physical Exam:   Gen:  Neuro:  HEENT:  Resp:  CVS:  Abd:  Ext:  Skin:    Meds:    digoxin     Tablet Oral  furosemide    Tablet Oral  metoprolol succinate ER Oral    allopurinol Oral  levothyroxine Oral    albuterol/ipratropium for Nebulization Nebulizer  guaiFENesin Oral Liquid (Sugar-Free) Oral PRN    melatonin Oral PRN      rivaroxaban Oral    pantoprazole    Tablet Oral                                      9.2    4.25  )-----------( 174      ( 13 Mar 2022 06:06 )             29.9       03-13    147<H>  |  105  |  42<H>  ----------------------------<  159<H>  3.3<L>   |  37<H>  |  1.17    Ca    8.1<L>      13 Mar 2022 06:06  Phos  3.3     03-13  Mg     2.0     03-13                Clean Catch Clean Catch (Midstream)   <10,000 CFU/mL Normal Urogenital Xiomara -- 03-11 @ 21:02  .Blood Blood-Peripheral   No growth to date. -- 03-11 @ 13:03        Radiology:     CENTRAL LINE: N/Y          DATE INSERTED:              REMOVE: Y/N  HACKETT: N/Y                       DATE INSERTED:              REMOVE: Y/N  A-LINE: N/Y                       DATE INSERTED:              REMOVE: Y/N    GLOBAL ISSUE/BEST PRACTICE:  Analgesia:  Sedation:  CAM-ICU:   HOB elevation: yes  Stress ulcer prophylaxis:  VTE prophylaxis:  Glycemic control:  Nutrition:    CODE STATUS: ***    CRITICAL CARE TIME SPENT:  (Assessing presenting problems of acute illness, which pose high probability of life threatening deterioration or end organ damage/dysfunction, as well as medical decision making including initiating plan of care, reviewing data, reviewing radiologic exams, discussing with multidisciplinary team,  discussing goals of care with patient/family, and writing this note.  Non-inclusive of procedures performed)     HPI: 89 y/o F w/ pmh of CHF, COPD, PPM, gout, hypothyroid, htn and STEMI, presented to [facility] for acute hypoxic/hypercarbic resp failure 2/2 to RSV+ requiring biab and admitted to [facility].      24 hour events: pt now off bipap and on NC, pt seen and examined at bedside currently complains of coughing and unable to sleep but otherwise denies any other medical complains.    Review of Systems:  Constitutional: No fever, chills, fatigue  Neuro: No headache, numbness, weakness  Resp: +cough, no wheezing, no shortness of breath  CVS: No chest pain, palpitations, leg swelling  GI: No abdominal pain, nausea, vomiting, diarrhea   : No dysuria, frequency, incontinence  Skin: No itching, burning, rashes, or lesions   Msk: No joint pain or swelling  Psych: No depression, anxiety, mood swings    T(F): 97 (03-13-22 @ 16:28), Max: 98.1 (03-13-22 @ 08:00)  HR: 85 (03-13-22 @ 20:31) (72 - 89)  BP: 117/62 (03-13-22 @ 20:00) (108/83 - 130/61)  RR: 26 (03-13-22 @ 20:00) (19 - 35)  SpO2: 99% (03-13-22 @ 20:31) (91% - 100%)  Wt(kg): --      03-12-22 @ 06:01  -  03-13-22 @ 07:00  --------------------------------------------------------  IN: 0 mL / OUT: 800 mL / NET: -800 mL    03-13-22 @ 07:01  -  03-13-22 @ 22:09  --------------------------------------------------------  IN: 350 mL / OUT: 600 mL / NET: -250 mL        CAPILLARY BLOOD GLUCOSE          I&O's Summary    12 Mar 2022 06:01  -  13 Mar 2022 07:00  --------------------------------------------------------  IN: 0 mL / OUT: 800 mL / NET: -800 mL    13 Mar 2022 07:01  -  13 Mar 2022 22:09  --------------------------------------------------------  IN: 350 mL / OUT: 600 mL / NET: -250 mL        Physical Exam:   Gen: Comfortable in bed in NAD  Neuro: AAOx3  Resp: good air entry b/l  CVS: +RRR  Abd: BSx4, soft, nt/nd  Ext: +edema   Skin: warm/dry    Meds:    digoxin     Tablet Oral  furosemide    Tablet Oral  metoprolol succinate ER Oral    allopurinol Oral  levothyroxine Oral    albuterol/ipratropium for Nebulization Nebulizer  guaiFENesin Oral Liquid (Sugar-Free) Oral PRN    melatonin Oral PRN      rivaroxaban Oral    pantoprazole    Tablet Oral                            9.2    4.25  )-----------( 174      ( 13 Mar 2022 06:06 )             29.9       03-13    147<H>  |  105  |  42<H>  ----------------------------<  159<H>  3.3<L>   |  37<H>  |  1.17    Ca    8.1<L>      13 Mar 2022 06:06  Phos  3.3     03-13  Mg     2.0     03-13        Clean Catch Clean Catch (Midstream)   <10,000 CFU/mL Normal Urogenital Xiomara -- 03-11 @ 21:02  .Blood Blood-Peripheral   No growth to date. -- 03-11 @ 13:03        Radiology:     < from: US Transthoracic Echocardiogram w/Doppler Complete (03.12.22 @ 15:22) >  ACC: [id number] EXAM:  US TTE W DOPPLER COMPLETE                          PROCEDURE DATE:  03/12/2022          INTERPRETATION:  INDICATION: diastolic heart failure  Referring M.D.:Alyson Gimenez  Blood Pressure 94/44  Weight (pd) :140     Height (feet, inches):5'2"  Technician: Teri Haile    Dimensions:  LA 2.96       Normal Values: 2.0 - 4.0 cm  Ao 2.68        Normal Values: 2.0 - 3.8 cm  SEPTUM 0.9       Normal Values: 0.6 - 1.2 cm  PWT 0.6       Normal Values: 0.6 - 1.1 cm  LVIDd 3.27         Normal Values: 3.0 - 5.6 cm  LVIDs 2.33         Normal Values: 1.8 - 4.0 cm      OBSERVATIONS:    Mitral Valve: Normal mitral annulus and valve. No mitral regurgitation.  Aortic Valve/Aorta: Normal trileaflet aortic valve.  Tricuspid Valve: Normal tricuspid valve. Severe tricuspid regurgitation.  Pulmonic Valve: The pulmonic valve is not well visualized. Probably   normal.  Left Atrium: Enlarged  Right Atrium: Severely enlarged  Left Ventricle: Normal left ventricular size, thickness & systolic   function. The EF is approximately 55-60%.  Right Ventricle: Severely enlarged right ventricle, reduced systolic   function.  Pericardium/Pleura: No pericardial effusion noted.  Pulmonary/RV Pressure: The right ventricular systolic pressure is   estimated to be 63 mmHg, assuming that the right atrial pressure is   estimated to be 8 mmHg. This is consistent with severe pulmonary   hypertension..  LV Diastolic Function: Normal diastolic function.    Impression:  Severely dilated right atrium & right ventricle w/ reduced RV systolic   function.  Severe pulmonary hypertension.  Severe tricuspid regurgitation.  Normal left ventricular size, thickness & systolic function. The EF is   approximately 55-60%.    --- End of Report ---    JAROCHO WILKERSON MD; Attending Cardiologist  This document has been electronically signed. Mar 13 2022 10:24AM    < end of copied text >      < from: CT Chest No Cont (03.11.22 @ 10:51) >    ACC: [id number] EXAM:  CT CHEST                          PROCEDURE DATE:  03/11/2022          INTERPRETATION:  CLINICAL INFORMATION: Respiratory distress.    COMPARISON: Chest radiograph 3/11/2022.  CT scan chest 2/21/2022.    CONTRAST/COMPLICATIONS:  IV Contrast: NONE  Oral Contrast: NONE  Complications: None reported at time of study completion    PROCEDURE:  CT of the Chest was performed.  Sagittal and coronal reformats were performed.    FINDINGS:    LUNGS AND AIRWAYS:  PLEURA:  There are small bilateral pleural effusions with underlying compressive   atelectasis lung bases.  The central airways remain patent.    MEDIASTINUM AND CHICA: No lymphadenopathy.    VESSELS: Atherosclerotic changes thoracic aorta and coronary artery   calcifications.  Prominence of the main pulmonary arterial trunk, which may be associated   with pulmonary arterial hypertension.    HEART: Cardiomegaly.  Cardiac pacemaker leads.   No pericardial effusion.    CHEST WALL AND LOWER NECK:  Cardiac device left chestwall.    VISUALIZED UPPER ABDOMEN:  Limited by streak artifact.  Nodular contour liver.  Left hepatic cyst.    BONES: Degenerative changes.    IMPRESSION:    Small bilateral pleural effusions with underlying compressive   atelectasis, similar to prior exam.    Other findings as discussed above.    --- End of Report ---      ALYSON ROSAS MD; Attending Radiologist  This document has been electronically signed. Mar 11 2022 11:46AM    < end of copied text >      CENTRAL LINE: N  HACKETT: N  A-LINE: N    GLOBAL ISSUE/BEST PRACTICE:  Analgesia: N  Sedation: N  CAM-ICU: n/a  HOB elevation: Y  Stress ulcer prophylaxis: Y  VTE prophylaxis: Y  Glycemic control: Y  Nutrition: Y    CODE STATUS: DNR/DNI

## 2022-03-13 NOTE — PROGRESS NOTE ADULT - SUBJECTIVE AND OBJECTIVE BOX
[facility], Division of Infectious Diseases  MARCIE Rossi Y. Patel, S. Shah, G. Bg  [phone]  after hours and weekends [phone]    Name: PATRICIA MORTON  Age: 89y  Gender: Female  MRN: [id number]    Interval History--  Notes reviewed  comfortably sleeping  overnight cough      Allergies    codeine (Unknown)    Intolerances        Medications--  Antibiotics:    Immunologic:    Other:  albuterol/ipratropium for Nebulization  allopurinol  digoxin     Tablet  furosemide    Tablet  guaiFENesin Oral Liquid (Sugar-Free) PRN  levothyroxine  melatonin PRN  metoprolol succinate ER  pantoprazole    Tablet  rivaroxaban      Review of Systems--  A 10-point review of systems was obtained.     Pertinent positives and negatives--  Constitutional: No fevers. No Chills. No Rigors.   Cardiovascular: No chest pain. No palpitations.  Respiratory: No shortness of breath. +cough.  Gastrointestinal: No nausea or vomiting. No diarrhea or constipation.   Psychiatric: Pleasant. Appropriate affect.    Review of systems otherwise negative except as previously noted.    Physical Examination--  Vital Signs: T(F): 98.1 (03-13-22 @ 08:00), Max: 98.1 (03-13-22 @ 08:00)  HR: 74 (03-13-22 @ 12:00)  BP: 125/49 (03-13-22 @ 12:00)  RR: 19 (03-13-22 @ 12:00)  SpO2: 100% (03-13-22 @ 12:00)  Wt(kg): --  General: Nontoxic-appearing Female in no acute distress.  HEENT: AT/NC.   Neck: Not rigid. No sense of mass.  Nodes: None palpable.  Lungs: Clear bilaterally without rales, wheezing or rhonchi  Heart:s1s2  Abdomen: Bowel sounds present and normoactive. Soft. Nondistended. Nontender.   Extremities: No cyanosis or clubbing. No edema.   Skin: Warm. Dry. Good turgor. No rash. No vasculitic stigmata.  Psychiatric: Appropriate affect and mood for situation.         Laboratory Studies--  CBC                        9.2    4.25  )-----------( 174      ( 13 Mar 2022 06:06 )             29.9       Chemistries  03-13    147<H>  |  105  |  42<H>  ----------------------------<  159<H>  3.3<L>   |  37<H>  |  1.17    Ca    8.1<L>      13 Mar 2022 06:06  Phos  3.3     03-13  Mg     2.0     03-13        Culture Data    Culture - Urine (collected 11 Mar 2022 21:02)  Source: Clean Catch Clean Catch (Midstream)  Final Report (12 Mar 2022 16:27):    <10,000 CFU/mL Normal Urogenital Xiomara    Culture - Blood (collected 11 Mar 2022 13:03)  Source: .Blood Blood-Peripheral  Preliminary Report (12 Mar 2022 14:01):    No growth to date.    Culture - Blood (collected 11 Mar 2022 13:03)  Source: .Blood Blood-Peripheral  Preliminary Report (12 Mar 2022 14:01):    No growth to date.

## 2022-03-14 LAB
ALBUMIN SERPL ELPH-MCNC: 3 G/DL — LOW (ref 3.3–5)
ALP SERPL-CCNC: 114 U/L — SIGNIFICANT CHANGE UP (ref 30–120)
ALT FLD-CCNC: 31 U/L DA — SIGNIFICANT CHANGE UP (ref 10–60)
ANION GAP SERPL CALC-SCNC: 2 MMOL/L — LOW (ref 5–17)
AST SERPL-CCNC: 37 U/L — SIGNIFICANT CHANGE UP (ref 10–40)
BILIRUB SERPL-MCNC: 0.5 MG/DL — SIGNIFICANT CHANGE UP (ref 0.2–1.2)
BUN SERPL-MCNC: 44 MG/DL — HIGH (ref 7–23)
CALCIUM SERPL-MCNC: 7.8 MG/DL — LOW (ref 8.4–10.5)
CHLORIDE SERPL-SCNC: 102 MMOL/L — SIGNIFICANT CHANGE UP (ref 96–108)
CO2 SERPL-SCNC: 40 MMOL/L — HIGH (ref 22–31)
CREAT SERPL-MCNC: 1.2 MG/DL — SIGNIFICANT CHANGE UP (ref 0.5–1.3)
EGFR: 42 ML/MIN/1.73M2 — LOW
FERRITIN SERPL-MCNC: 98 NG/ML — SIGNIFICANT CHANGE UP (ref 15–150)
FOLATE SERPL-MCNC: 18.4 NG/ML — SIGNIFICANT CHANGE UP
GLUCOSE SERPL-MCNC: 155 MG/DL — HIGH (ref 70–99)
HCT VFR BLD CALC: 32.2 % — LOW (ref 34.5–45)
HGB BLD-MCNC: 9.7 G/DL — LOW (ref 11.5–15.5)
IRON SATN MFR SERPL: 10 % — LOW (ref 14–50)
IRON SATN MFR SERPL: 28 UG/DL — LOW (ref 30–160)
MAGNESIUM SERPL-MCNC: 2.1 MG/DL — SIGNIFICANT CHANGE UP (ref 1.6–2.6)
MCHC RBC-ENTMCNC: 30.1 GM/DL — LOW (ref 32–36)
MCHC RBC-ENTMCNC: 34.5 PG — HIGH (ref 27–34)
MCV RBC AUTO: 114.6 FL — HIGH (ref 80–100)
NRBC # BLD: 0 /100 WBCS — SIGNIFICANT CHANGE UP (ref 0–0)
PHOSPHATE SERPL-MCNC: 3.4 MG/DL — SIGNIFICANT CHANGE UP (ref 2.5–4.5)
PLATELET # BLD AUTO: 157 K/UL — SIGNIFICANT CHANGE UP (ref 150–400)
POTASSIUM SERPL-MCNC: 3.5 MMOL/L — SIGNIFICANT CHANGE UP (ref 3.5–5.3)
POTASSIUM SERPL-SCNC: 3.5 MMOL/L — SIGNIFICANT CHANGE UP (ref 3.5–5.3)
PROT SERPL-MCNC: 6.1 G/DL — SIGNIFICANT CHANGE UP (ref 6–8.3)
RBC # BLD: 2.81 M/UL — LOW (ref 3.8–5.2)
RBC # FLD: 15.6 % — HIGH (ref 10.3–14.5)
SODIUM SERPL-SCNC: 144 MMOL/L — SIGNIFICANT CHANGE UP (ref 135–145)
TIBC SERPL-MCNC: 285 UG/DL — SIGNIFICANT CHANGE UP (ref 220–430)
UIBC SERPL-MCNC: 257 UG/DL — SIGNIFICANT CHANGE UP (ref 110–370)
VIT B12 SERPL-MCNC: 750 PG/ML — SIGNIFICANT CHANGE UP (ref 232–1245)
WBC # BLD: 6.45 K/UL — SIGNIFICANT CHANGE UP (ref 3.8–10.5)
WBC # FLD AUTO: 6.45 K/UL — SIGNIFICANT CHANGE UP (ref 3.8–10.5)

## 2022-03-14 PROCEDURE — 99233 SBSQ HOSP IP/OBS HIGH 50: CPT

## 2022-03-14 RX ORDER — FUROSEMIDE 40 MG
40 TABLET ORAL DAILY
Refills: 0 | Status: DISCONTINUED | OUTPATIENT
Start: 2022-03-14 | End: 2022-03-16

## 2022-03-14 RX ADMIN — Medication 125 MICROGRAM(S): at 05:28

## 2022-03-14 RX ADMIN — Medication 3 MILLILITER(S): at 07:56

## 2022-03-14 RX ADMIN — Medication 25 MILLIGRAM(S): at 05:28

## 2022-03-14 RX ADMIN — PANTOPRAZOLE SODIUM 40 MILLIGRAM(S): 20 TABLET, DELAYED RELEASE ORAL at 06:49

## 2022-03-14 RX ADMIN — Medication 100 MILLIGRAM(S): at 11:30

## 2022-03-14 RX ADMIN — Medication 3 MILLILITER(S): at 19:59

## 2022-03-14 RX ADMIN — Medication 40 MILLIGRAM(S): at 05:28

## 2022-03-14 RX ADMIN — Medication 200 MILLIGRAM(S): at 20:40

## 2022-03-14 RX ADMIN — Medication 3 MILLILITER(S): at 14:37

## 2022-03-14 RX ADMIN — RIVAROXABAN 15 MILLIGRAM(S): KIT at 17:31

## 2022-03-14 NOTE — SWALLOW BEDSIDE ASSESSMENT ADULT - ASR SWALLOW RECOMMEND DIAG
objective testing is not warranted at this time d/t no overt signs on baseline diet level; will continue to monitor at bedside
Will follow monitor PO tolerance and determine if MBS warranted to rule out aspiration vs coughing due to recent admission for RSV

## 2022-03-14 NOTE — PROGRESS NOTE ADULT - SUBJECTIVE AND OBJECTIVE BOX
Chief Complaint: Shortness of breath    Interval Events: No events overnight.    Review of Systems:  General: No fevers, chills, weight gain  Skin: No rashes, color changes  Cardiovascular: No chest pain, orthopnea  Respiratory: No shortness of breath, cough  Gastrointestinal: No nausea, abdominal pain  Genitourinary: No incontinence, pain with urination  Musculoskeletal: No pain, swelling, decreased range of motion  Neurological: No headache, weakness  Psychiatric: No depression, anxiety  Endocrine: No weight gain, increased thirst  All other systems are comprehensively negative.    Physical Exam:  Vitals:        Vital Signs Last 24 Hrs  T(C): 35.4 (14 Mar 2022 08:13), Max: 36.3 (14 Mar 2022 04:07)  T(F): 95.7 (14 Mar 2022 08:13), Max: 97.4 (14 Mar 2022 04:07)  HR: 75 (14 Mar 2022 08:00) (68 - 85)  BP: 116/59 (14 Mar 2022 08:00) (99/59 - 125/49)  BP(mean): 77 (14 Mar 2022 08:00) (64 - 83)  RR: 22 (14 Mar 2022 08:00) (16 - 35)  SpO2: 100% (14 Mar 2022 08:00) (89% - 100%)  General: NAD  HEENT: MMM  Neck: No JVD, no carotid bruit  Lungs: CTAB  CV: RRR, nl S1/S2, no M/R/G  Abdomen: S/NT/ND, +BS  Extremities: No LE edema, no cyanosis  Neuro: AAOx3, non-focal  Skin: No rash    Labs:                        9.7    6.45  )-----------( 157      ( 14 Mar 2022 06:14 )             32.2     03-14    144  |  102  |  44<H>  ----------------------------<  155<H>  3.5   |  40<H>  |  1.20    Ca    7.8<L>      14 Mar 2022 06:14  Phos  3.4     03-14  Mg     2.1     03-14    TPro  6.1  /  Alb  3.0<L>  /  TBili  0.5  /  DBili  x   /  AST  37  /  ALT  31  /  AlkPhos  114  03-14            Telemetry: AF, ventricular paced

## 2022-03-14 NOTE — PROGRESS NOTE ADULT - SUBJECTIVE AND OBJECTIVE BOX
Date/Time Patient Seen:  		  Referring MD:   Data Reviewed	       Patient is a 89y old  Female who presents with a chief complaint of Respiratory distress (13 Mar 2022 22:09)      Subjective/HPI     PAST MEDICAL & SURGICAL HISTORY:  Chronic obstructive pulmonary disease (COPD)    HTN (hypertension)    Cardiac pacemaker    Gout    Hypothyroidism    Insomnia    Chronic CHF    History of ST elevation myocardial infarction (STEMI)    No significant past surgical history          Medication list         MEDICATIONS  (STANDING):  albuterol/ipratropium for Nebulization 3 milliLiter(s) Nebulizer every 8 hours  allopurinol 100 milliGRAM(s) Oral daily  digoxin     Tablet 125 MICROGram(s) Oral daily  furosemide    Tablet 40 milliGRAM(s) Oral two times a day  levothyroxine 125 MICROGram(s) Oral daily  metoprolol succinate ER 25 milliGRAM(s) Oral daily  pantoprazole    Tablet 40 milliGRAM(s) Oral before breakfast  rivaroxaban 15 milliGRAM(s) Oral with dinner    MEDICATIONS  (PRN):  guaiFENesin Oral Liquid (Sugar-Free) 200 milliGRAM(s) Oral every 6 hours PRN Cough  melatonin 5 milliGRAM(s) Oral at bedtime PRN Insomnia         Vitals log        ICU Vital Signs Last 24 Hrs  T(C): 36.3 (14 Mar 2022 04:07), Max: 36.7 (13 Mar 2022 08:00)  T(F): 97.4 (14 Mar 2022 04:07), Max: 98.1 (13 Mar 2022 08:00)  HR: 77 (14 Mar 2022 05:19) (72 - 85)  BP: 99/59 (14 Mar 2022 02:02) (99/59 - 127/54)  BP(mean): 72 (14 Mar 2022 02:02) (64 - 78)  ABP: --  ABP(mean): --  RR: 24 (14 Mar 2022 02:02) (19 - 35)  SpO2: 97% (14 Mar 2022 05:19) (89% - 100%)           Input and Output:  I&O's Detail    12 Mar 2022 06:01  -  13 Mar 2022 07:00  --------------------------------------------------------  IN:  Total IN: 0 mL    OUT:    Indwelling Catheter - Urethral (mL): 800 mL  Total OUT: 800 mL    Total NET: -800 mL      13 Mar 2022 07:01  -  14 Mar 2022 06:44  --------------------------------------------------------  IN:    Oral Fluid: 350 mL  Total IN: 350 mL    OUT:    Indwelling Catheter - Urethral (mL): 600 mL  Total OUT: 600 mL    Total NET: -250 mL          Lab Data                        9.7    6.45  )-----------( 157      ( 14 Mar 2022 06:14 )             32.2     03-14    144  |  102  |  44<H>  ----------------------------<  155<H>  3.5   |  40<H>  |  1.20    Ca    7.8<L>      14 Mar 2022 06:14  Phos  3.4     03-14  Mg     2.1     03-14    TPro  6.1  /  Alb  3.0<L>  /  TBili  0.5  /  DBili  x   /  AST  37  /  ALT  31  /  AlkPhos  114  03-14            Review of Systems	      Objective     Physical Examination    heart s1s2  lung dec BS  abd soft  head nc  verbal      Pertinent Lab findings & Imaging      Olga:  NO   Adequate UO     I&O's Detail    12 Mar 2022 06:01  -  13 Mar 2022 07:00  --------------------------------------------------------  IN:  Total IN: 0 mL    OUT:    Indwelling Catheter - Urethral (mL): 800 mL  Total OUT: 800 mL    Total NET: -800 mL      13 Mar 2022 07:01  -  14 Mar 2022 06:44  --------------------------------------------------------  IN:    Oral Fluid: 350 mL  Total IN: 350 mL    OUT:    Indwelling Catheter - Urethral (mL): 600 mL  Total OUT: 600 mL    Total NET: -250 mL               Discussed with:     Cultures:	        Radiology

## 2022-03-14 NOTE — PROGRESS NOTE ADULT - SUBJECTIVE AND OBJECTIVE BOX
Patient is a 89 y.o. female who presents with a chief complaint of respiratory distress.    INTERVAL HPI/OVERNIGHT EVENTS:  - Chart reviewed, consult notes reviewed  - No acute events overnight  - Pt. seen and examined at bedside, awake, following and in NAD    MEDICATIONS  (STANDING):  albuterol/ipratropium for Nebulization 3 milliLiter(s) Nebulizer every 8 hours  allopurinol 100 milliGRAM(s) Oral daily  digoxin     Tablet 125 MICROGram(s) Oral daily  furosemide    Tablet 40 milliGRAM(s) Oral two times a day  levothyroxine 125 MICROGram(s) Oral daily  metoprolol succinate ER 25 milliGRAM(s) Oral daily  pantoprazole    Tablet 40 milliGRAM(s) Oral before breakfast  rivaroxaban 15 milliGRAM(s) Oral with dinner    MEDICATIONS  (PRN):  guaiFENesin Oral Liquid (Sugar-Free) 200 milliGRAM(s) Oral every 6 hours PRN Cough  melatonin 5 milliGRAM(s) Oral at bedtime PRN Insomnia    Allergies  codeine (Unknown)    REVIEW OF SYSTEMS:  CONSTITUTIONAL: No fever or chills  ENMT: No difficulty hearing, tinnitus, vertigo; No sinus or throat pain  NECK: No pain or stiffness  RESPIRATORY: No cough, wheezing, or shortness of breath  CARDIOVASCULAR: No chest pain, palpitations, lightheadedness  GASTROINTESTINAL: No abdominal pain, no N/V  GENITOURINARY: No dysuria, frequency  NEUROLOGICAL: No headaches  SKIN: No itching, burning, rashes, or lesions   MUSCULOSKELETAL: No joint pain or swelling; No muscle, back, or extremity pain  PSYCHIATRIC: No depression, anxiety, or mood swings    Vital Signs Last 24 Hrs  T(C): 35.4 (14 Mar 2022 08:13), Max: 36.3 (14 Mar 2022 04:07)  T(F): 95.7 (14 Mar 2022 08:13), Max: 97.4 (14 Mar 2022 04:07)  HR: 75 (14 Mar 2022 08:00) (68 - 85)  BP: 116/59 (14 Mar 2022 08:00) (99/59 - 125/49)  BP(mean): 77 (14 Mar 2022 08:00) (64 - 83)  RR: 22 (14 Mar 2022 08:00) (16 - 35)  SpO2: 100% (14 Mar 2022 08:00) (89% - 100%)    PHYSICAL EXAM:  GENERAL: NAD, well-groomed, well-developed  HEAD:  Atraumatic, normocephalic  EYES: PERRLA, conjunctiva and sclera clear  ENMT: Moist mucous membranes, no lesions  NECK: Supple, no JVD  NERVOUS SYSTEM:  A+Ox3, all 4 extremities mobile, no gross sensory deficits  CHEST/LUNG: CTA B/L; no rales, rhonchi, wheezing, or rubs  HEART: RRR, + S1/S2  ABDOMEN: Soft, nontender, nondistended, bowel sounds present  EXTREMITIES:  2+ peripheral pulses, no clubbing, cyanosis, or edema    LABS:                        9.7    6.45  )-----------( 157      ( 14 Mar 2022 06:14 )             32.2     14 Mar 2022 06:14    144    |  102    |  44     ----------------------------<  155    3.5     |  40     |  1.20     Ca    7.8        14 Mar 2022 06:14  Phos  3.4       14 Mar 2022 06:14  Mg     2.1       14 Mar 2022 06:14    TPro  6.1    /  Alb  3.0    /  TBili  0.5    /  DBili  x      /  AST  37     /  ALT  31     /  AlkPhos  114    14 Mar 2022 06:14   Patient is a 89 y.o. female who presents with a chief complaint of respiratory distress.    INTERVAL HPI/OVERNIGHT EVENTS:  - Chart reviewed, consult notes reviewed  - No acute events overnight  - Pt. seen and examined at bedside, awake, following and in NAD    MEDICATIONS  (STANDING):  albuterol/ipratropium for Nebulization 3 milliLiter(s) Nebulizer every 8 hours  allopurinol 100 milliGRAM(s) Oral daily  digoxin     Tablet 125 MICROGram(s) Oral daily  furosemide    Tablet 40 milliGRAM(s) Oral two times a day  levothyroxine 125 MICROGram(s) Oral daily  metoprolol succinate ER 25 milliGRAM(s) Oral daily  pantoprazole    Tablet 40 milliGRAM(s) Oral before breakfast  rivaroxaban 15 milliGRAM(s) Oral with dinner    MEDICATIONS  (PRN):  guaiFENesin Oral Liquid (Sugar-Free) 200 milliGRAM(s) Oral every 6 hours PRN Cough  melatonin 5 milliGRAM(s) Oral at bedtime PRN Insomnia    Allergies  codeine (Unknown)    REVIEW OF SYSTEMS:  CONSTITUTIONAL: No fever or chills  ENMT: No difficulty hearing, tinnitus, vertigo; No sinus or throat pain  NECK: No pain or stiffness  RESPIRATORY: No cough, wheezing, or shortness of breath  CARDIOVASCULAR: No chest pain, palpitations, lightheadedness  GASTROINTESTINAL: No abdominal pain, no N/V  GENITOURINARY: No dysuria, frequency  NEUROLOGICAL: No headaches  SKIN: No itching, burning, rashes, or lesions   MUSCULOSKELETAL: No joint pain or swelling; No muscle, back, or extremity pain  PSYCHIATRIC: No depression, anxiety, or mood swings    Vital Signs Last 24 Hrs  T(C): 35.4 (14 Mar 2022 08:13), Max: 36.3 (14 Mar 2022 04:07)  T(F): 95.7 (14 Mar 2022 08:13), Max: 97.4 (14 Mar 2022 04:07)  HR: 75 (14 Mar 2022 08:00) (68 - 85)  BP: 116/59 (14 Mar 2022 08:00) (99/59 - 125/49)  BP(mean): 77 (14 Mar 2022 08:00) (64 - 83)  RR: 22 (14 Mar 2022 08:00) (16 - 35)  SpO2: 100% (14 Mar 2022 08:00) (89% - 100%)    PHYSICAL EXAM:  GENERAL: NAD  HEAD:  Atraumatic, normocephalic  EYES: PERRLA, conjunctiva and sclera clear  ENMT: Moist mucous membranes, no lesions  NECK: Supple, no JVD  NERVOUS SYSTEM:  A+Ox3, all 4 extremities mobile, no gross sensory deficits  CHEST/LUNG: CTA B/L; no rales, rhonchi, wheezing, or rubs  HEART: RRR, + S1/S2  ABDOMEN: Soft, nontender, nondistended, bowel sounds present  EXTREMITIES:  2+ peripheral pulses, no clubbing, cyanosis, or edema    LABS:                        9.7    6.45  )-----------( 157      ( 14 Mar 2022 06:14 )             32.2     14 Mar 2022 06:14    144    |  102    |  44     ----------------------------<  155    3.5     |  40     |  1.20     Ca    7.8        14 Mar 2022 06:14  Phos  3.4       14 Mar 2022 06:14  Mg     2.1       14 Mar 2022 06:14    TPro  6.1    /  Alb  3.0    /  TBili  0.5    /  DBili  x      /  AST  37     /  ALT  31     /  AlkPhos  114    14 Mar 2022 06:14

## 2022-03-14 NOTE — PROGRESS NOTE ADULT - ASSESSMENT
pt with extensive med hx -   CHF  Pulm HTN severe  valv heart disease  COPD  Resp Distress  PVD  OP  OA  Fall prone - risk  Ataxic gait  pelvic fx  AF  Dyspepsia  PPM    RSV positive  CT chest noted  strep and legionella ag neg  ID f/u noted  cardio f/u noted  TTE shows severe Pulm HTN - known   completed Systemic Steroids course      ct chest reviewed  BIPAP PRN - for resp distress - hypercapnea - night time use as tolerated  ABG noted  Labs and imaging reviewed  lasix diuresis - i and o - monitor Na and renal indices -   Pulse ox monitoring - Card tele monitoring - [facility] admission - CHF and COPD ex -   NEBS and s/p Steroids - for COPD  Diuresis as per Cardio  on AC for AF

## 2022-03-14 NOTE — SWALLOW BEDSIDE ASSESSMENT ADULT - ORAL PREPARATORY PHASE
Within functional limits Reduced coordination between respiration and mastication, requiring frequent rest breaks

## 2022-03-14 NOTE — PROGRESS NOTE ADULT - ASSESSMENT
89 y.o female with PMH of hypothyroidism, atrial fibrillation, pacemaker, COPD, CAD, chronic diastolic heart failure, severe TR, severe pulm HTN who presents with acute on chronic hypoxic/hypercapnic respiratory failure in the setting of acute on chronic diastolic heart failure, COPD and RSV bronchitis.    # Acute on chronic hypoxic/hypercarbic respiratory failure 2/2 CHF exacerbation and RSV bronchitis  - Present on admission  - Blood and urine C+S 3/11 NGTD  - Patient is off BIPAP and continues to do well on NC  - Supportive care for RSV - antitussives PRN  - IV solumedrol taper completed  - Appreciate cardiology and pulmonary recs    # COPD  - Off BIPAP, doing well on NC  - Continue duoneb  - IV solumedrol taper completed  - Pulmonary recs appreciated    # Acute on chronic diastolic CHF  - BNP 11,290 on admission  - Can use BIPAP PRN  - Consider holding lasix today or decreasing to daily from BID 2/2 up-trending BUN/ Cr. and 1.5L negative fluid balance  - Strict I/Os  - Card recs appreciated    # Atrial fibrillation  - Remains rate controlled  - Continue Toprol XL  - Continue Digoxin  - Continue Xarelto for thromboprophylaxis    # Hypokalemia  - Likely secondary to diuresis  - K WNL this AM  - Trend lytes    # Hypothyroid  - Continue Synthroid    # HTN  - Continue Toprol XL    # Gout  - Continue Allopurinol    # Insomnia  - Continue Melatonin    #DVT prophylaxis   - Continue Xarelto    # ACP  - DNR/DNI, MOLST in chart

## 2022-03-14 NOTE — PROGRESS NOTE ADULT - SUBJECTIVE AND OBJECTIVE BOX
PATRICIA MORTON is a 89yFemale , patient examined and chart reviewed.     INTERVAL HPI/ OVERNIGHT EVENTS:   Awake. On 3L NC.  RVP + RSV.    PAST MEDICAL & SURGICAL HISTORY:  Chronic obstructive pulmonary disease (COPD)  HTN (hypertension)  Cardiac pacemaker  Gout  Hypothyroidism  Insomnia  Chronic CHF  History of ST elevation myocardial infarction (STEMI)  No significant past surgical history        For details regarding the patient's social history, family history, and other miscellaneous elements, please refer the initial infectious diseases consultation and/or the admitting history and physical examination for this admission.    ROS:  Unable to obtain due to : Poor historian    Allergies  codeine (Unknown)      Current inpatient medications :    ANTIBIOTICS/RELEVANT:      albuterol/ipratropium for Nebulization 3 milliLiter(s) Nebulizer every 8 hours  allopurinol 100 milliGRAM(s) Oral daily  digoxin     Tablet 125 MICROGram(s) Oral daily  furosemide    Tablet 40 milliGRAM(s) Oral two times a day  guaiFENesin Oral Liquid (Sugar-Free) 200 milliGRAM(s) Oral every 6 hours PRN  levothyroxine 125 MICROGram(s) Oral daily  melatonin 5 milliGRAM(s) Oral at bedtime PRN  metoprolol succinate ER 25 milliGRAM(s) Oral daily  pantoprazole    Tablet 40 milliGRAM(s) Oral before breakfast  rivaroxaban 15 milliGRAM(s) Oral with dinner      Objective:    03-13 @ 07:01 - 03-14 @ 07:00  --------------------------------------------------------  IN: 350 mL / OUT: 1935 mL / NET: -1585 mL    03-14 @ 07:01  -  03-14 @ 11:57  --------------------------------------------------------  IN: 240 mL / OUT: 700 mL / NET: -460 mL      T(C): 35.4 (03-14-22 @ 08:13), Max: 36.3 (03-14-22 @ 04:07)  HR: 75 (03-14-22 @ 08:00) (68 - 85)  BP: 116/59 (03-14-22 @ 08:00) (99/59 - 125/49)  RR: 22 (03-14-22 @ 08:00) (16 - 35)  SpO2: 100% (03-14-22 @ 08:00) (89% - 100%)      Physical Exam:  General: no acute distress Weak  Neck: supple, trachea midline  Lungs: Decreased, no wheeze/rhonchi  Cardiovascular: regular rate and rhythm, S1 S2  Abdomen: soft, nontender,  bowel sounds normal  Neurological: awake   Skin: no rash  Extremities:+ edema      LABS:                          9.7    6.45  )-----------( 157      ( 14 Mar 2022 06:14 )             32.2       03-14    144  |  102  |  44<H>  ----------------------------<  155<H>  3.5   |  40<H>  |  1.20    Ca    7.8<L>      14 Mar 2022 06:14  Phos  3.4     03-14  Mg     2.1     03-14    TPro  6.1  /  Alb  3.0<L>  /  TBili  0.5  /  DBili  x   /  AST  37  /  ALT  31  /  AlkPhos  114  03-14      MICROBIOLOGY:    Culture - Urine (collected 11 Mar 2022 21:02)  Source: Clean Catch Clean Catch (Midstream)  Final Report (12 Mar 2022 16:27):    <10,000 CFU/mL Normal Urogenital Xiomara    Culture - Blood (collected 11 Mar 2022 13:03)  Source: .Blood Blood-Peripheral  Preliminary Report (12 Mar 2022 14:01):    No growth to date.    Culture - Blood (collected 11 Mar 2022 13:03)  Source: .Blood Blood-Peripheral  Preliminary Report (12 Mar 2022 14:01):    No growth to date.    FLU A B RSV Detection by PCR (03.11.22 @ 21:03)    Flu A Result: NotDetec: The Flu A B RSV assay is a Real-Time PCR test for the qualitative  detection and differentiation of Influenza A, Influenza B, and  Respiratory Syncytial Virus on nasopharyngeal swabs. The results should  be interpreted in the context of all clinical and laboratory findings.    Flu B Result: NotDetec    RSV Result: Detected      RADIOLOGY & ADDITIONAL STUDIES:    ACC: [id number] EXAM:  CT CHEST                          PROCEDURE DATE:  03/11/2022          INTERPRETATION:  CLINICAL INFORMATION: Respiratory distress.    COMPARISON: Chest radiograph 3/11/2022.  CT scan chest 2/21/2022.    CONTRAST/COMPLICATIONS:  IV Contrast: NONE  Oral Contrast: NONE  Complications: None reported at time of study completion    PROCEDURE:  CT of the Chest was performed.  Sagittal and coronal reformats were performed.    FINDINGS:    LUNGS AND AIRWAYS:  PLEURA:  There are small bilateral pleural effusions with underlying compressive   atelectasis lung bases.  The central airways remain patent.    MEDIASTINUM AND CHICA: No lymphadenopathy.    VESSELS: Atherosclerotic changes thoracic aorta and coronary artery   calcifications.  Prominence of the main pulmonary arterial trunk, which may be associated   with pulmonary arterial hypertension.    HEART: Cardiomegaly.  Cardiac pacemaker leads.   No pericardial effusion.    CHEST WALL AND LOWER NECK:  Cardiac device left chestwall.    VISUALIZED UPPER ABDOMEN:  Limited by streak artifact.  Nodular contour liver.  Left hepatic cyst.    BONES: Degenerative changes.    IMPRESSION:    Small bilateral pleural effusions with underlying compressive   atelectasis, similar to prior exam.      Assessment :   89 y.o. female w/ hx of CHF, COPD, PPM, gout, hypothyroidism, HTN, and STEMI resident of [facility] admitted with Acute hypercapnia respiratory failure sec COPD/CHF exacerbation. Placed on BIPAP. CT chest with scott effusions with compressive atelectasis. BNP elevated. Sp Vanc Zosyn x 1 dose in ED.  Afebrile WBC WNL Procalcitonin 0.09  RSV + viral bronchitis   blood cx neg to date  urine legionella neg  strep neg    Plan :   Monitor off antibiotics  Trend temps and cbc  Steroids nebs per pulm  Diurese per cardiology  Strict Aspiration precautions  DNRDNI  Stable from ID standpoint    Continue with present regiment.  Appropriate use of antibiotics and adverse effects reviewed.      > 35 minutes were spent in direct patient care reviewing notes, medications ,labs data/ imaging , discussion with multidisciplinary team.    Thank you for allowing me to participate in care of your patient .    Karis Goodwin MD  Infectious Disease  [phone]

## 2022-03-14 NOTE — PROGRESS NOTE ADULT - SUBJECTIVE AND OBJECTIVE BOX
PATRICIA MORTON  MRN-[id number]  Patient is a 89y old  Female who presents with a chief complaint of Respiratory distress (14 Mar 2022 11:56)    HPI:  89 y.o. female w/ hx of CHF, COPD, PPM, gout, hypothyroidism, HTN, and STEMI BIBEMS today from [facility] for evaluation of respiratory distress. Pt unable to give any history 2/2 respiratory status. Per EMS report pt. was hypoxic to 50% on 4L nasal cannula, NRB placed w/ improvement in O2 sats to 70s.      ED Course;  - Placed on bipap  - Broad spectrum coverage w/ Zosyn and Vanco  - Lasix 40mg  - Methylprednisone 40mg  - CBC, ABG, Coags, UA,blood and urine culture, lactate, procalcitonin, BNP  - CXR done  - Chest CT done  - EKG done    Seen by pulmonary and cardiology  [facility] monitoring was recommended (11 Mar 2022 11:20)      Events in last 24 hours:     REVIEW OF SYSTEMS:    Gen: No fever  EYES/ENT: No visual changes;  No vertigo or throat pain   NECK: No pain   RES:  No shortness of breath or Cough  CARD: No chest pain   GI: No abdominal pain  : No dysuria  NEURO: No weakness  SKIN: No itching, rashes     Physical Exam:  Vital Signs Last 24 Hrs  T(C): 36.6 (14 Mar 2022 15:55), Max: 36.6 (14 Mar 2022 15:55)  T(F): 97.9 (14 Mar 2022 15:55), Max: 97.9 (14 Mar 2022 15:55)  HR: 76 (14 Mar 2022 20:01) (65 - 85)  BP: 120/47 (14 Mar 2022 16:00) (99/59 - 124/55)  BP(mean): 66 (14 Mar 2022 16:00) (64 - 83)  RR: 26 (14 Mar 2022 16:00) (16 - 26)  SpO2: 100% (14 Mar 2022 20:01) (89% - 100%)    Gen:  Awake, alert   CNS: non focal 	  Neck: no JVD  RES : clear , no wheezing                      CVS: Regular  rhythm. Normal S1/S2  Abd: Soft, non-distended. Bowel sounds present.  Skin: No rash.  Ext:  no edema    ============================I/O===========================   I&O's Detail    13 Mar 2022 07:01  -  14 Mar 2022 07:00  --------------------------------------------------------  IN:    Oral Fluid: 350 mL  Total IN: 350 mL    OUT:    Indwelling Catheter - Urethral (mL): 600 mL    Voided (mL): 1335 mL  Total OUT: 1935 mL    Total NET: -1585 mL      14 Mar 2022 07:01  -  14 Mar 2022 20:29  --------------------------------------------------------  IN:    Oral Fluid: 590 mL  Total IN: 590 mL    OUT:    Indwelling Catheter - Urethral (mL): 1700 mL  Total OUT: 1700 mL    Total NET: -1110 mL        ============================ LABS =========================                        9.7    6.45  )-----------( 157      ( 14 Mar 2022 06:14 )             32.2     03-14    144  |  102  |  44<H>  ----------------------------<  155<H>  3.5   |  40<H>  |  1.20    Ca    7.8<L>      14 Mar 2022 06:14  Phos  3.4     03-14  Mg     2.1     03-14    TPro  6.1  /  Alb  3.0<L>  /  TBili  0.5  /  DBili  x   /  AST  37  /  ALT  31  /  AlkPhos  114  03-14    LIVER FUNCTIONS - ( 14 Mar 2022 06:14 )  Alb: 3.0 g/dL / Pro: 6.1 g/dL / ALK PHOS: 114 U/L / ALT: 31 U/L DA / AST: 37 U/L / GGT: x                 ======================Micro/Rad/Cardio=================  Culture: Reviewed   CXR: Reviewed  Echo:Reviewed  ======================================================  PAST MEDICAL & SURGICAL HISTORY:  Chronic obstructive pulmonary disease (COPD)    HTN (hypertension)    Cardiac pacemaker    Gout    Hypothyroidism    Insomnia    Chronic CHF    History of ST elevation myocardial infarction (STEMI)    No significant past surgical history        RESTRAINTS  ======================================================  I have evaluated this patient and have determined that restraints are warranted to optimize medical care. Patient was assessed for current physical and psychological risk factors as well as special needs. There are no medical conditions or limitations that would place this patient at risk while in restraints.  Type of restraint: bilat is wrist. The behavioral criteria for discontinuation of restraint:   -to prevent pulling at ET tube or other lines in place.   -combative aggressive behavior causing harm to pt themselves and staff   -The Attending was notified about the restraints     ====================ASSESSMENT ==============  1.   2.   3.   4.     Plan:  ====================== NEUROLOGY=====================  melatonin 5 milliGRAM(s) Oral at bedtime PRN Insomnia    ==================== RESPIRATORY======================  Mechanical Ventilation:    albuterol/ipratropium for Nebulization 3 milliLiter(s) Nebulizer every 8 hours  guaiFENesin Oral Liquid (Sugar-Free) 200 milliGRAM(s) Oral every 6 hours PRN Cough    ====================CARDIOVASCULAR==================  digoxin     Tablet 125 MICROGram(s) Oral daily  furosemide    Tablet 40 milliGRAM(s) Oral daily  metoprolol succinate ER 25 milliGRAM(s) Oral daily    ===================HEMATOLOGIC/ONC ===================  rivaroxaban 15 milliGRAM(s) Oral with dinner    ===================== RENAL =========================  Continue monitoring urine output    ==================== GASTROINTESTINAL===================  pantoprazole    Tablet 40 milliGRAM(s) Oral before breakfast    =======================    ENDOCRINE  =====================  allopurinol 100 milliGRAM(s) Oral daily  levothyroxine 125 MICROGram(s) Oral daily    ========================INFECTIOUS DISEASE================      Patient requires continuous monitoring with bedside rhythm monitoring, pulse oximetry, ventilator/ bipap  monitoring and intermittent blood gas analysis.    Care plan discussed with ICU care team.    Patient remained critical; required more than usual care; I have spent 35 minutes providing non-routine care, revaluated multiple times during the day.     PATRICIA MORTON  MRN-[id number]  Patient is a 89y old  Female who presents with a chief complaint of Respiratory distress (14 Mar 2022 11:56)    HPI:  89 y/o F w/ pmh of CHF, COPD, PPM, gout, hypothyroid, htn and STEMI, presented to [facility] for acute hypoxic/hypercarbic resp failure 2/2 to RSV+ requiring biab and admitted to [facility]. Now tele border      Events in last 24 hours:   off bipap and on NC, pt seen and examined at bedside currently denies any other medical complains. No obvious distress     REVIEW OF SYSTEMS:    Gen: No fever  EYES/ENT: No visual changes;  No vertigo or throat pain   NECK: No pain   RES:  No shortness of breath or +Cough  CARD: No chest pain   GI: No abdominal pain  : No dysuria  NEURO: No weakness  SKIN: No itching, rashes     Physical Exam:  Vital Signs Last 24 Hrs  T(C): 36.6 (14 Mar 2022 15:55), Max: 36.6 (14 Mar 2022 15:55)  T(F): 97.9 (14 Mar 2022 15:55), Max: 97.9 (14 Mar 2022 15:55)  HR: 76 (14 Mar 2022 20:01) (65 - 85)  BP: 120/47 (14 Mar 2022 16:00) (99/59 - 124/55)  BP(mean): 66 (14 Mar 2022 16:00) (64 - 83)  RR: 26 (14 Mar 2022 16:00) (16 - 26)  SpO2: 100% (14 Mar 2022 20:01) (89% - 100%)    Gen:  Comfortable in bed in NAD  CNS: non focal 	  Neck: no JVD  RES : clear , no wheezing                      CVS: Regular  rhythm. Normal S1/S2  Abd: Soft, non-distended. Bowel sounds present.  Skin: No rash.  Ext:  + edema    ============================I/O===========================   I&O's Detail    13 Mar 2022 07:01  -  14 Mar 2022 07:00  --------------------------------------------------------  IN:    Oral Fluid: 350 mL  Total IN: 350 mL    OUT:    Indwelling Catheter - Urethral (mL): 600 mL    Voided (mL): 1335 mL  Total OUT: 1935 mL    Total NET: -1585 mL      14 Mar 2022 07:01  -  14 Mar 2022 20:29  --------------------------------------------------------  IN:    Oral Fluid: 590 mL  Total IN: 590 mL    OUT:    Indwelling Catheter - Urethral (mL): 1700 mL  Total OUT: 1700 mL    Total NET: -1110 mL        ============================ LABS =========================                        9.7    6.45  )-----------( 157      ( 14 Mar 2022 06:14 )             32.2     03-14    144  |  102  |  44<H>  ----------------------------<  155<H>  3.5   |  40<H>  |  1.20    Ca    7.8<L>      14 Mar 2022 06:14  Phos  3.4     03-14  Mg     2.1     03-14    TPro  6.1  /  Alb  3.0<L>  /  TBili  0.5  /  DBili  x   /  AST  37  /  ALT  31  /  AlkPhos  114  03-14    LIVER FUNCTIONS - ( 14 Mar 2022 06:14 )  Alb: 3.0 g/dL / Pro: 6.1 g/dL / ALK PHOS: 114 U/L / ALT: 31 U/L DA / AST: 37 U/L / GGT: x                 ======================Micro/Rad/Cardio=================  Culture: Culture - Urine (03.11.22 @ 21:02)    Specimen Source: Clean Catch Clean Catch (Midstream)    Culture Results:   <10,000 CFU/mL Normal Urogenital Xiomara    Culture - Blood (03.11.22 @ 13:03)    Specimen Source: .Blood Blood-Peripheral    Culture Results:   No growth to date.    Culture - Blood (03.11.22 @ 13:03)    Specimen Source: .Blood Blood-Peripheral    Culture Results:   No growth to date.      CXR: < from: CT Chest No Cont (03.11.22 @ 10:51) >    IMPRESSION:    Small bilateral pleural effusions with underlying compressive   atelectasis, similar to prior exam.    Other findings as discussed above.    --- End of Report ---    < end of copied text >    Echo:< from: US Transthoracic Echocardiogram w/Doppler Complete (03.12.22 @ 15:22) >  Impression:  Severely dilated right atrium & right ventricle w/ reduced RV systolic   function.  Severe pulmonary hypertension.  Severe tricuspid regurgitation.  Normal left ventricular size, thickness & systolic function. The EF is   approximately 55-60%.    --- End of Report ---    < end of copied text >    ======================================================  PAST MEDICAL & SURGICAL HISTORY:  Chronic obstructive pulmonary disease (COPD)    HTN (hypertension)    Cardiac pacemaker    Gout    Hypothyroidism    Insomnia    Chronic CHF    History of ST elevation myocardial infarction (STEMI)    No significant past surgical history    ====================ASSESSMENT ==============  1. Acute Hypoxic Respiratory Failure   2. + RSV   3. Pleural effusion       Plan:    -melatonin 5 milliGRAM(s) Oral at bedtime PRN Insomnia  -Acute Hypoxic/hypercarbic resp failure now improved cont NC to maintain o2 >90%   -cont steroids and nebs    - pulm toliet  -digoxin     Tablet 125 MICROGram(s) Oral daily  -furosemide    Tablet 40 milliGRAM(s) Oral daily  -metoprolol succinate ER 25 milliGRAM(s) Oral daily  -rivaroxaban 15 milliGRAM(s) Oral with dinner  -Continue monitoring urine output and trend lytes  -Diet as tolerated   -pantoprazole    Tablet 40 milliGRAM(s) Oral before breakfast  -allopurinol 100 milliGRAM(s) Oral daily  -levothyroxine 125 MICROGram(s) Oral daily  - RSV cont supportive care w/ 02 therapy, monitor fever curve and WBC     Time spent 35 min

## 2022-03-14 NOTE — PROGRESS NOTE ADULT - ASSESSMENT
The patient is a 89 year old female with a history of hypothyroidism, atrial fibrillation, pacemaker, COPD, CAD, chronic diastolic heart failure, severe TR, severe pulm HTN who presents with shortness of breath in the setting of acute on chronic diastolic heart failure, COPD, RSV.    Plan:  - ECG with known AF and intermittent ventricular pacing  - CXR with bilateral pleural effusions  - BNP 93282  - Echo 2/22 with normal LV systolic function, severe TR, severe pulm HTN  - RSV positive  - Continue furosemide 40 mg PO bid  - Continue rivaroxaban 15 mg daily  - Continue digoxin 0.125 mg daily  - Continue metoprolol succinate 25 mg daily

## 2022-03-14 NOTE — SWALLOW BEDSIDE ASSESSMENT ADULT - COMMENTS
Chart reviewed order received for swallow reassessment.  Pt received upright in bed A&A Ox2-3, +O2NC 3L SpO2 96% throughout assessment, pain scale 0/10 pre & post assessment.  Swallow reassessment completed see below for details.  Pt educated regarding diet rx's, verbalized understanding, left as received NAD.  Case and pt presentation discussed with CAM Reis & Dr. Acosta, as per documentation, pt continues with frequent coughing and dyspnea on exertion.  Discussed no overt s/s aspiration during current assessment with PO, however, coughing noted at conclusion of assessment which did not appear related to PO.  MD & RN in agreement to continue current diet with speech tx to follow to monitor PO tolerance and determine if MBS warranted to rule out aspiration vs coughing due to current admission for RSV and hx COPD.  Will follow.    As per charting, pt is a "89 y/o F w/ pmh of CHF, COPD, PPM, gout, hypothyroid, htn, afib, and STEMI, presented to [facility] for acute hypoxic/hypercarbic resp failure 2/2 to RSV+ requiring biab and admitted to [facility]."    CT Chest 3/11/22: "Small bilateral pleural effusions with underlying compressive   atelectasis, similar to prior exam."    WBC 3/14/22: "6.45"

## 2022-03-15 LAB
ALBUMIN SERPL ELPH-MCNC: 2.8 G/DL — LOW (ref 3.3–5)
ALP SERPL-CCNC: 107 U/L — SIGNIFICANT CHANGE UP (ref 30–120)
ALT FLD-CCNC: 33 U/L DA — SIGNIFICANT CHANGE UP (ref 10–60)
ANION GAP SERPL CALC-SCNC: 2 MMOL/L — LOW (ref 5–17)
AST SERPL-CCNC: 34 U/L — SIGNIFICANT CHANGE UP (ref 10–40)
BILIRUB SERPL-MCNC: 0.5 MG/DL — SIGNIFICANT CHANGE UP (ref 0.2–1.2)
BUN SERPL-MCNC: 46 MG/DL — HIGH (ref 7–23)
CALCIUM SERPL-MCNC: 7.7 MG/DL — LOW (ref 8.4–10.5)
CHLORIDE SERPL-SCNC: 101 MMOL/L — SIGNIFICANT CHANGE UP (ref 96–108)
CO2 SERPL-SCNC: 41 MMOL/L — HIGH (ref 22–31)
CREAT SERPL-MCNC: 0.96 MG/DL — SIGNIFICANT CHANGE UP (ref 0.5–1.3)
EGFR: 55 ML/MIN/1.73M2 — LOW
GLUCOSE SERPL-MCNC: 117 MG/DL — HIGH (ref 70–99)
HCT VFR BLD CALC: 31.7 % — LOW (ref 34.5–45)
HGB BLD-MCNC: 9.7 G/DL — LOW (ref 11.5–15.5)
MAGNESIUM SERPL-MCNC: 2.2 MG/DL — SIGNIFICANT CHANGE UP (ref 1.6–2.6)
MCHC RBC-ENTMCNC: 30.6 GM/DL — LOW (ref 32–36)
MCHC RBC-ENTMCNC: 34.3 PG — HIGH (ref 27–34)
MCV RBC AUTO: 112 FL — HIGH (ref 80–100)
NRBC # BLD: 0 /100 WBCS — SIGNIFICANT CHANGE UP (ref 0–0)
PHOSPHATE SERPL-MCNC: 3.5 MG/DL — SIGNIFICANT CHANGE UP (ref 2.5–4.5)
PLATELET # BLD AUTO: 143 K/UL — LOW (ref 150–400)
POTASSIUM SERPL-MCNC: 3.4 MMOL/L — LOW (ref 3.5–5.3)
POTASSIUM SERPL-SCNC: 3.4 MMOL/L — LOW (ref 3.5–5.3)
PROT SERPL-MCNC: 5.8 G/DL — LOW (ref 6–8.3)
RBC # BLD: 2.83 M/UL — LOW (ref 3.8–5.2)
RBC # FLD: 15 % — HIGH (ref 10.3–14.5)
SODIUM SERPL-SCNC: 144 MMOL/L — SIGNIFICANT CHANGE UP (ref 135–145)
WBC # BLD: 5.9 K/UL — SIGNIFICANT CHANGE UP (ref 3.8–10.5)
WBC # FLD AUTO: 5.9 K/UL — SIGNIFICANT CHANGE UP (ref 3.8–10.5)

## 2022-03-15 PROCEDURE — 99233 SBSQ HOSP IP/OBS HIGH 50: CPT

## 2022-03-15 RX ADMIN — Medication 5 MILLIGRAM(S): at 00:47

## 2022-03-15 RX ADMIN — Medication 3 MILLILITER(S): at 14:09

## 2022-03-15 RX ADMIN — Medication 125 MICROGRAM(S): at 06:33

## 2022-03-15 RX ADMIN — Medication 3 MILLILITER(S): at 07:08

## 2022-03-15 RX ADMIN — Medication 100 MILLIGRAM(S): at 13:09

## 2022-03-15 RX ADMIN — Medication 40 MILLIGRAM(S): at 06:33

## 2022-03-15 RX ADMIN — PANTOPRAZOLE SODIUM 40 MILLIGRAM(S): 20 TABLET, DELAYED RELEASE ORAL at 06:33

## 2022-03-15 RX ADMIN — RIVAROXABAN 15 MILLIGRAM(S): KIT at 17:34

## 2022-03-15 RX ADMIN — Medication 3 MILLILITER(S): at 22:58

## 2022-03-15 RX ADMIN — Medication 125 MICROGRAM(S): at 06:32

## 2022-03-15 NOTE — PROGRESS NOTE ADULT - SUBJECTIVE AND OBJECTIVE BOX
PATRICIA MORTON is a 89yFemale , patient examined and chart reviewed.     INTERVAL HPI/ OVERNIGHT EVENTS:   Awake. On 3L NC.    PAST MEDICAL & SURGICAL HISTORY:  Chronic obstructive pulmonary disease (COPD)  HTN (hypertension)  Cardiac pacemaker  Gout  Hypothyroidism  Insomnia  Chronic CHF  History of ST elevation myocardial infarction (STEMI)  No significant past surgical history        For details regarding the patient's social history, family history, and other miscellaneous elements, please refer the initial infectious diseases consultation and/or the admitting history and physical examination for this admission.    ROS:  Unable to obtain due to : Poor historian    Allergies  codeine (Unknown)      Current inpatient medications :    ANTIBIOTICS/RELEVANT:    ICU Vital Signs Last 24 Hrs  T(C): 36.3 (15 Mar 2022 08:00), Max: 36.6 (14 Mar 2022 15:55)  T(F): 97.4 (15 Mar 2022 08:00), Max: 97.9 (14 Mar 2022 15:55)  HR: 66 (15 Mar 2022 08:00) (63 - 79)  BP: 108/53 (15 Mar 2022 08:00) (103/53 - 121/54)  BP(mean): 70 (15 Mar 2022 08:00) (66 - 80)  RR: 21 (15 Mar 2022 08:00) (20 - 29)  SpO2: 95% (15 Mar 2022 11:41) (93% - 100%)      Objective:  ICU Vital Signs Last 24 Hrs  T(C): 36.3 (15 Mar 2022 08:00), Max: 36.6 (14 Mar 2022 15:55)  T(F): 97.4 (15 Mar 2022 08:00), Max: 97.9 (14 Mar 2022 15:55)  HR: 66 (15 Mar 2022 08:00) (63 - 79)  BP: 108/53 (15 Mar 2022 08:00) (103/53 - 121/54)  BP(mean): 70 (15 Mar 2022 08:00) (66 - 80)  RR: 21 (15 Mar 2022 08:00) (20 - 29)  SpO2: 95% (15 Mar 2022 11:41) (93% - 100%)      Physical Exam:  General: no acute distress Weak  Neck: supple, trachea midline  Lungs: Decreased, no wheeze/rhonchi  Cardiovascular: regular rate and rhythm, S1 S2  Abdomen: soft, nontender,  bowel sounds normal  Neurological: awake   Skin: no rash  Extremities:+ edema      LABS:                        9.7    5.90  )-----------( 143      ( 15 Mar 2022 06:47 )             31.7   03-15    144  |  101  |  46<H>  ----------------------------<  117<H>  3.4<L>   |  41<H>  |  0.96    Ca    7.7<L>      15 Mar 2022 06:47  Phos  3.5     03-15  Mg     2.2     03-15    TPro  5.8<L>  /  Alb  2.8<L>  /  TBili  0.5  /  DBili  x   /  AST  34  /  ALT  33  /  AlkPhos  107  03-15      MICROBIOLOGY:    Culture - Urine (collected 11 Mar 2022 21:02)  Source: Clean Catch Clean Catch (Midstream)  Final Report (12 Mar 2022 16:27):    <10,000 CFU/mL Normal Urogenital Xiomara    Culture - Blood (collected 11 Mar 2022 13:03)  Source: .Blood Blood-Peripheral  Preliminary Report (12 Mar 2022 14:01):    No growth to date.    Culture - Blood (collected 11 Mar 2022 13:03)  Source: .Blood Blood-Peripheral  Preliminary Report (12 Mar 2022 14:01):    No growth to date.    FLU A B RSV Detection by PCR (03.11.22 @ 21:03)    Flu A Result: NotDete: The Flu A B RSV assay is a Real-Time PCR test for the qualitative  detection and differentiation of Influenza A, Influenza B, and  Respiratory Syncytial Virus on nasopharyngeal swabs. The results should  be interpreted in the context of all clinical and laboratory findings.    Flu B Result: NotDete    RSV Result: Detected      RADIOLOGY & ADDITIONAL STUDIES:    ACC: [id number] EXAM:  CT CHEST                          PROCEDURE DATE:  03/11/2022          INTERPRETATION:  CLINICAL INFORMATION: Respiratory distress.    COMPARISON: Chest radiograph 3/11/2022.  CT scan chest 2/21/2022.    CONTRAST/COMPLICATIONS:  IV Contrast: NONE  Oral Contrast: NONE  Complications: None reported at time of study completion    PROCEDURE:  CT of the Chest was performed.  Sagittal and coronal reformats were performed.    FINDINGS:    LUNGS AND AIRWAYS:  PLEURA:  There are small bilateral pleural effusions with underlying compressive   atelectasis lung bases.  The central airways remain patent.    MEDIASTINUM AND CHICA: No lymphadenopathy.    VESSELS: Atherosclerotic changes thoracic aorta and coronary artery   calcifications.  Prominence of the main pulmonary arterial trunk, which may be associated   with pulmonary arterial hypertension.    HEART: Cardiomegaly.  Cardiac pacemaker leads.   No pericardial effusion.    CHEST WALL AND LOWER NECK:  Cardiac device left chestwall.    VISUALIZED UPPER ABDOMEN:  Limited by streak artifact.  Nodular contour liver.  Left hepatic cyst.    BONES: Degenerative changes.    IMPRESSION:    Small bilateral pleural effusions with underlying compressive   atelectasis, similar to prior exam.      Assessment :   89 y.o. female w/ hx of CHF, COPD, PPM, gout, hypothyroidism, HTN, and STEMI resident of [facility] admitted with Acute hypercapnia respiratory failure sec COPD/CHF exacerbation. Placed on BIPAP. CT chest with scott effusions with compressive atelectasis. BNP elevated. Sp Vanc Zosyn x 1 dose in ED.  Afebrile WBC WNL Procalcitonin 0.09  RSV + viral bronchitis   blood cx neg to date  urine legionella neg  strep neg    Plan :   Monitor off antibiotics  Trend temps and cbc  Steroids nebs per pulm  Diurese per cardiology  Strict Aspiration precautions  DNRDNI  Stable from ID standpoint    Continue with present regiment.  Appropriate use of antibiotics and adverse effects reviewed.      > 35 minutes were spent in direct patient care reviewing notes, medications ,labs data/ imaging , discussion with multidisciplinary team.    Thank you for allowing me to participate in care of your patient .    Karis Goodwin MD  Infectious Disease  [phone]

## 2022-03-15 NOTE — PROGRESS NOTE ADULT - SUBJECTIVE AND OBJECTIVE BOX
Chief Complaint: Shortness of breath    Interval Events: No events overnight.    Review of Systems:  General: No fevers, chills, weight gain  Skin: No rashes, color changes  Cardiovascular: No chest pain, orthopnea  Respiratory: No shortness of breath, cough  Gastrointestinal: No nausea, abdominal pain  Genitourinary: No incontinence, pain with urination  Musculoskeletal: No pain, swelling, decreased range of motion  Neurological: No headache, weakness  Psychiatric: No depression, anxiety  Endocrine: No weight gain, increased thirst  All other systems are comprehensively negative.    Physical Exam:  Vital Signs Last 24 Hrs  T(C): 35.8 (15 Mar 2022 08:00), Max: 36.6 (14 Mar 2022 15:55)  T(F): 96.4 (15 Mar 2022 08:00), Max: 97.9 (14 Mar 2022 15:55)  HR: 63 (15 Mar 2022 07:10) (63 - 79)  BP: 103/53 (15 Mar 2022 06:28) (103/53 - 121/54)  BP(mean): 70 (15 Mar 2022 06:28) (66 - 80)  RR: 20 (15 Mar 2022 06:28) (20 - 29)  SpO2: 97% (15 Mar 2022 07:10) (93% - 100%)  General: NAD  HEENT: MMM  Neck: No JVD, no carotid bruit  Lungs: CTAB  CV: RRR, nl S1/S2, no M/R/G  Abdomen: S/NT/ND, +BS  Extremities: No LE edema, no cyanosis  Neuro: AAOx3, non-focal  Skin: No rash    Labs:                        9.7    6.45  )-----------( 157      ( 14 Mar 2022 06:14 )             32.2     03-14    144  |  102  |  44<H>  ----------------------------<  155<H>  3.5   |  40<H>  |  1.20    Ca    7.8<L>      14 Mar 2022 06:14  Phos  3.4     03-14  Mg     2.1     03-14    TPro  6.1  /  Alb  3.0<L>  /  TBili  0.5  /  DBili  x   /  AST  37  /  ALT  31  /  AlkPhos  114  03-14            Telemetry: AF, ventricular paced

## 2022-03-15 NOTE — PROGRESS NOTE ADULT - SUBJECTIVE AND OBJECTIVE BOX
Patient is a 89y old  Female who presents with a chief complaint of Respiratory distress (15 Mar 2022 12:18)      BRIEF HOSPITAL COURSE: Patient is a 89 yo female with a pmh of CHF, COPD, PPM, Gout, hypothyroidism HTN, STEMI and recent pelvic fracture who presented to [place] ED on 03/11/22 for respiratory distress. Patient positive for RSV on admission requiring bipap therapy. Patient was admitted to [facility] with acute hypoxic/hypercapnic respiratory failure, RSV and pleural effusion.     Events last 24 hours: Patient remains with cough, on NC, to further medical complaints.     PAST MEDICAL & SURGICAL HISTORY:  Chronic obstructive pulmonary disease (COPD)  HTN (hypertension)  Cardiac pacemaker  Gout  Hypothyroidism  Insomnia  Chronic CHF  History of ST elevation myocardial infarction (STEMI)  No significant past surgical history        Review of Systems:  CONSTITUTIONAL: No fever, chills  EYES: No visual changes. No visual disturbances, or discharge  ENMT:  No changes in hearing.   NECK: No pain  RESPIRATORY: No SOB, frequent cough. No wheezing.   CARDIOVASCULAR: No chest pain, palpitations  GASTROINTESTINAL: No abdominal or epigastric pain. No nausea, vomiting, No diarrhea or constipation. No melena.  GENITOURINARY: Espinoza in place.  NEUROLOGICAL: No headaches, loss of strength, no weakness  SKIN: Scattered bruising from prior fall/pelvic fracture    MUSCULOSKELETAL: Pelvic floor chronic pain.    Medications:  digoxin     Tablet 125 MICROGram(s) Oral daily  furosemide    Tablet 40 milliGRAM(s) Oral daily  metoprolol succinate ER 25 milliGRAM(s) Oral daily  albuterol/ipratropium for Nebulization 3 milliLiter(s) Nebulizer every 8 hours  guaiFENesin Oral Liquid (Sugar-Free) 200 milliGRAM(s) Oral every 6 hours PRN  melatonin 5 milliGRAM(s) Oral at bedtime PRN  rivaroxaban 15 milliGRAM(s) Oral with dinner  pantoprazole    Tablet 40 milliGRAM(s) Oral before breakfast  allopurinol 100 milliGRAM(s) Oral daily  levothyroxine 125 MICROGram(s) Oral daily      ICU Vital Signs Last 24 Hrs  T(C): 36.1 (15 Mar 2022 20:01), Max: 36.6 (15 Mar 2022 03:54)  T(F): 97 (15 Mar 2022 20:01), Max: 97.8 (15 Mar 2022 03:54)  HR: 69 (15 Mar 2022 20:00) (63 - 75)  BP: 126/58 (15 Mar 2022 20:00) (103/53 - 126/58)  BP(mean): 74 (15 Mar 2022 20:00) (70 - 82)  ABP: --  ABP(mean): --  RR: 28 (15 Mar 2022 20:00) (20 - 29)  SpO2: 99% (15 Mar 2022 20:00) (93% - 100%)      I&O's Detail    14 Mar 2022 07:01  -  15 Mar 2022 07:00  --------------------------------------------------------  IN:    Oral Fluid: 830 mL  Total IN: 830 mL    OUT:    Indwelling Catheter - Urethral (mL): 2200 mL  Total OUT: 2200 mL    Total NET: -1370 mL      15 Mar 2022 07:01  -  15 Mar 2022 22:33  --------------------------------------------------------  IN:    Oral Fluid: 780 mL  Total IN: 780 mL    OUT:    Indwelling Catheter - Urethral (mL): 1950 mL  Total OUT: 1950 mL    Total NET: -1170 mL      LABS:                        9.7    5.90  )-----------( 143      ( 15 Mar 2022 06:47 )             31.7     03-15    144  |  101  |  46<H>  ----------------------------<  117<H>  3.4<L>   |  41<H>  |  0.96    Ca    7.7<L>      15 Mar 2022 06:47  Phos  3.5     03-15  Mg     2.2     03-15    TPro  5.8<L>  /  Alb  2.8<L>  /  TBili  0.5  /  DBili  x   /  AST  34  /  ALT  33  /  AlkPhos  107  03-15      CAPILLARY BLOOD GLUCOSE      CULTURES:  Culture Results:   <10,000 CFU/mL Normal Urogenital Xiomara (03-11-22 @ 21:02)  Culture Results:   No growth to date. (03-11-22 @ 13:03)  Culture Results:   No growth to date. (03-11-22 @ 13:03)    Physical Examination:    General: No acute distress.  Alert, oriented, interactive    HEENT: Pupils equal, reactive to light.  Symmetric.    PULM: Frequent cough, able to clear secretions, Diminished breath sounds to b/l lower bases. No wheezing.    CVS: Regular rate and rhythm, no murmurs, rubs, or gallops    ABD: Soft, nondistended, normoactive bowel sounds    EXT: No edema    SKIN: Warm and well perfused, scattered ecchymosis     NEURO: A&Ox3, strength 5/5 all extremities, follows all commands      RADIOLOGY: < from: CT Chest No Cont (03.11.22 @ 10:51) >  INTERPRETATION:  CLINICAL INFORMATION: Respiratory distress.    COMPARISON: Chest radiograph 3/11/2022.  CT scan chest 2/21/2022.    CONTRAST/COMPLICATIONS:  IV Contrast: NONE  Oral Contrast: NONE  Complications: None reported at time of study completion    PROCEDURE:  CT of the Chest was performed.  Sagittal and coronal reformats were performed.    FINDINGS:    LUNGS AND AIRWAYS:  PLEURA:  There are small bilateral pleural effusions with underlying compressive   atelectasis lung bases.  The central airways remain patent.    MEDIASTINUM AND CHICA: No lymphadenopathy.    VESSELS: Atherosclerotic changes thoracic aorta and coronary artery   calcifications.  Prominence of the main pulmonary arterial trunk, which may be associated   with pulmonary arterial hypertension.    HEART: Cardiomegaly.  Cardiac pacemaker leads.   No pericardial effusion.    CHEST WALL AND LOWER NECK:  Cardiac device left chestwall.    VISUALIZED UPPER ABDOMEN:  Limited by streak artifact.  Nodular contour liver.  Left hepatic cyst.    BONES: Degenerative changes.    IMPRESSION:    Small bilateral pleural effusions with underlying compressive   atelectasis, similar to prior exam.    Other findings as discussed above.      TIME SPENT: 35 minutes  Evaluating/treating patient, reviewing data/labs/imaging, discussing case with multidisciplinary team, discussing plan/goals of care with patient/family. Non-inclusive of procedure time.

## 2022-03-15 NOTE — PROGRESS NOTE ADULT - ASSESSMENT
The patient is a 89 year old female with a history of hypothyroidism, atrial fibrillation, pacemaker, COPD, CAD, chronic diastolic heart failure, severe TR, severe pulm HTN who presents with shortness of breath in the setting of acute on chronic diastolic heart failure, COPD, RSV.    Plan:  - ECG with known AF and intermittent ventricular pacing  - CXR with bilateral pleural effusions  - BNP 12477  - Echo 2/22 with normal LV systolic function, severe TR, severe pulm HTN  - RSV positive  - Continue furosemide 40 mg PO bid  - Continue rivaroxaban 15 mg daily  - Continue digoxin 0.125 mg daily  - Continue metoprolol succinate 25 mg daily

## 2022-03-15 NOTE — PROGRESS NOTE ADULT - SUBJECTIVE AND OBJECTIVE BOX
Patient is a 89y old  Female who presents with a chief complaint of Respiratory distress (15 Mar 2022 06:26)      INTERVAL HPI/OVERNIGHT EVENTS:    No overnight events, seen at bedside.  spoke with son and updated him, all questions answered.     MEDICATIONS  (STANDING):  albuterol/ipratropium for Nebulization 3 milliLiter(s) Nebulizer every 8 hours  allopurinol 100 milliGRAM(s) Oral daily  digoxin     Tablet 125 MICROGram(s) Oral daily  furosemide    Tablet 40 milliGRAM(s) Oral daily  levothyroxine 125 MICROGram(s) Oral daily  metoprolol succinate ER 25 milliGRAM(s) Oral daily  pantoprazole    Tablet 40 milliGRAM(s) Oral before breakfast  rivaroxaban 15 milliGRAM(s) Oral with dinner    MEDICATIONS  (PRN):  guaiFENesin Oral Liquid (Sugar-Free) 200 milliGRAM(s) Oral every 6 hours PRN Cough  melatonin 5 milliGRAM(s) Oral at bedtime PRN Insomnia      Allergies    codeine (Unknown)    Intolerances    Vital Signs Last 24 Hrs  T(C): 36.3 (15 Mar 2022 08:00), Max: 36.6 (14 Mar 2022 15:55)  T(F): 97.4 (15 Mar 2022 08:00), Max: 97.9 (14 Mar 2022 15:55)  HR: 66 (15 Mar 2022 08:00) (63 - 79)  BP: 108/53 (15 Mar 2022 08:00) (103/53 - 121/54)  BP(mean): 70 (15 Mar 2022 08:00) (66 - 80)  RR: 21 (15 Mar 2022 08:00) (20 - 29)  SpO2: 97% (15 Mar 2022 08:00) (93% - 100%)    PHYSICAL EXAM:  GENERAL: NAD  HEAD:  Atraumatic, normocephalic  EYES: PERRLA, conjunctiva and sclera clear  ENMT: NC in place.  Moist mucous membranes, no lesions  NECK: Supple, no JVD  NERVOUS SYSTEM:  A+Ox3, all 4 extremities mobile, no gross sensory deficits  CHEST/LUNG: CTA B/L; no rales, rhonchi, wheezing, or rubs  HEART: RRR, + S1/S2  ABDOMEN: Soft, nontender, nondistended, bowel sounds present  EXTREMITIES:  2+ peripheral pulses, no clubbing, cyanosis, or edema    LABS:                        9.7    5.90  )-----------( 143      ( 15 Mar 2022 06:47 )             31.7     15 Mar 2022 06:47    144    |  101    |  46     ----------------------------<  117    3.4     |  41     |  0.96     Ca    7.7        15 Mar 2022 06:47  Phos  3.5       15 Mar 2022 06:47  Mg     2.2       15 Mar 2022 06:47    TPro  5.8    /  Alb  2.8    /  TBili  0.5    /  DBili  x      /  AST  34     /  ALT  33     /  AlkPhos  107    15 Mar 2022 06:47        CAPILLARY BLOOD GLUCOSE          RADIOLOGY & ADDITIONAL TESTS:

## 2022-03-15 NOTE — PROGRESS NOTE ADULT - ASSESSMENT
Patient is a 89 yo female with a pmh of CHF, COPD, PPM, Gout, hypothyroidism HTN, STEMI and recent pelvic fracture who is admitted with RSV, acute hypoxic resp failure, pleural effusions.    Problem:   - Acute hypoxic resp failure  - RSV  - B/l Pleural effusions    Plan:   - Previously on bipap, now on NC. Titrate o2 therapy to maintain spo2>92%  - C/w steroids and nebs  - Pulm toilet  - Supportive care for RSV, tylenol prn for fevers/mild pain  - On furosemide, c/w to assist in pleural effusion diffusion, consider albumin if worsening  - Needs aggressive PT/OT  - No need for abx at this time  - Trend infectious markers/WBC  - Trend and replace electrolytes as needed.  - Espinoza for urinary retention, can trial without tomorrow   - home meds ordered

## 2022-03-15 NOTE — PROGRESS NOTE ADULT - ASSESSMENT
pt with extensive med hx -   CHF  Pulm HTN severe  valv heart disease  COPD  Resp Distress  PVD  OP  OA  Fall prone - risk  Ataxic gait  pelvic fx  AF  Dyspepsia  PPM    RSV positive  CT chest noted  strep and legionella ag neg  ID f/u noted  cardio f/u noted  TTE shows severe Pulm HTN - known   completed Systemic Steroids course      ct chest reviewed  BIPAP PRN - for resp distress - hypercapnea - night time use as tolerated  ABG noted  Labs and imaging reviewed  lasix diuresis - i and o - monitor Na and renal indices -   Pulse ox monitoring - Card tele monitoring - CHF and COPD ex -   NEBS and s/p Steroids - for COPD  Diuresis as per Cardio  on AC for AF

## 2022-03-15 NOTE — PROGRESS NOTE ADULT - SUBJECTIVE AND OBJECTIVE BOX
Date/Time Patient Seen:  		  Referring MD:   Data Reviewed	       Patient is a 89y old  Female who presents with a chief complaint of Respiratory distress (14 Mar 2022 20:29)      Subjective/HPI     PAST MEDICAL & SURGICAL HISTORY:  Chronic obstructive pulmonary disease (COPD)    HTN (hypertension)    Cardiac pacemaker    Gout    Hypothyroidism    Insomnia    Chronic CHF    History of ST elevation myocardial infarction (STEMI)    No significant past surgical history          Medication list         MEDICATIONS  (STANDING):  albuterol/ipratropium for Nebulization 3 milliLiter(s) Nebulizer every 8 hours  allopurinol 100 milliGRAM(s) Oral daily  digoxin     Tablet 125 MICROGram(s) Oral daily  furosemide    Tablet 40 milliGRAM(s) Oral daily  levothyroxine 125 MICROGram(s) Oral daily  metoprolol succinate ER 25 milliGRAM(s) Oral daily  pantoprazole    Tablet 40 milliGRAM(s) Oral before breakfast  rivaroxaban 15 milliGRAM(s) Oral with dinner    MEDICATIONS  (PRN):  guaiFENesin Oral Liquid (Sugar-Free) 200 milliGRAM(s) Oral every 6 hours PRN Cough  melatonin 5 milliGRAM(s) Oral at bedtime PRN Insomnia         Vitals log        ICU Vital Signs Last 24 Hrs  T(C): 36.6 (15 Mar 2022 03:54), Max: 36.6 (14 Mar 2022 15:55)  T(F): 97.8 (15 Mar 2022 03:54), Max: 97.9 (14 Mar 2022 15:55)  HR: 70 (15 Mar 2022 05:32) (65 - 79)  BP: 114/54 (15 Mar 2022 04:00) (103/73 - 121/54)  BP(mean): 72 (15 Mar 2022 04:00) (66 - 83)  ABP: --  ABP(mean): --  RR: 29 (15 Mar 2022 04:00) (21 - 29)  SpO2: 98% (15 Mar 2022 05:32) (94% - 100%)           Input and Output:  I&O's Detail    13 Mar 2022 07:01  -  14 Mar 2022 07:00  --------------------------------------------------------  IN:    Oral Fluid: 350 mL  Total IN: 350 mL    OUT:    Indwelling Catheter - Urethral (mL): 600 mL    Voided (mL): 1335 mL  Total OUT: 1935 mL    Total NET: -1585 mL      14 Mar 2022 07:01  -  15 Mar 2022 06:26  --------------------------------------------------------  IN:    Oral Fluid: 770 mL  Total IN: 770 mL    OUT:    Indwelling Catheter - Urethral (mL): 2200 mL  Total OUT: 2200 mL    Total NET: -1430 mL          Lab Data                        9.7    6.45  )-----------( 157      ( 14 Mar 2022 06:14 )             32.2     03-14    144  |  102  |  44<H>  ----------------------------<  155<H>  3.5   |  40<H>  |  1.20    Ca    7.8<L>      14 Mar 2022 06:14  Phos  3.4     03-14  Mg     2.1     03-14    TPro  6.1  /  Alb  3.0<L>  /  TBili  0.5  /  DBili  x   /  AST  37  /  ALT  31  /  AlkPhos  114  03-14            Review of Systems	      Objective     Physical Examination    heart s1s2  lung dec BS  abd soft      Pertinent Lab findings & Imaging      Olga:  NO   Adequate UO     I&O's Detail    13 Mar 2022 07:01  -  14 Mar 2022 07:00  --------------------------------------------------------  IN:    Oral Fluid: 350 mL  Total IN: 350 mL    OUT:    Indwelling Catheter - Urethral (mL): 600 mL    Voided (mL): 1335 mL  Total OUT: 1935 mL    Total NET: -1585 mL      14 Mar 2022 07:01  -  15 Mar 2022 06:26  --------------------------------------------------------  IN:    Oral Fluid: 770 mL  Total IN: 770 mL    OUT:    Indwelling Catheter - Urethral (mL): 2200 mL  Total OUT: 2200 mL    Total NET: -1430 mL               Discussed with:     Cultures:	        Radiology

## 2022-03-16 ENCOUNTER — TRANSCRIPTION ENCOUNTER (OUTPATIENT)
Age: 87
End: 2022-03-16

## 2022-03-16 LAB
ALBUMIN SERPL ELPH-MCNC: 2.7 G/DL — LOW (ref 3.3–5)
ALBUMIN SERPL ELPH-MCNC: 2.8 G/DL — LOW (ref 3.3–5)
ALP SERPL-CCNC: 109 U/L — SIGNIFICANT CHANGE UP (ref 30–120)
ALP SERPL-CCNC: 111 U/L — SIGNIFICANT CHANGE UP (ref 30–120)
ALT FLD-CCNC: 23 U/L DA — SIGNIFICANT CHANGE UP (ref 10–60)
ALT FLD-CCNC: 27 U/L DA — SIGNIFICANT CHANGE UP (ref 10–60)
ANION GAP SERPL CALC-SCNC: -3 MMOL/L — LOW (ref 5–17)
ANION GAP SERPL CALC-SCNC: 0 MMOL/L — LOW (ref 5–17)
AST SERPL-CCNC: 27 U/L — SIGNIFICANT CHANGE UP (ref 10–40)
AST SERPL-CCNC: 28 U/L — SIGNIFICANT CHANGE UP (ref 10–40)
BASE EXCESS BLDA CALC-SCNC: 34.8 MMOL/L — HIGH (ref -2–3)
BASOPHILS # BLD AUTO: 0.01 K/UL — SIGNIFICANT CHANGE UP (ref 0–0.2)
BASOPHILS NFR BLD AUTO: 0.2 % — SIGNIFICANT CHANGE UP (ref 0–2)
BILIRUB SERPL-MCNC: 0.7 MG/DL — SIGNIFICANT CHANGE UP (ref 0.2–1.2)
BILIRUB SERPL-MCNC: 0.7 MG/DL — SIGNIFICANT CHANGE UP (ref 0.2–1.2)
BUN SERPL-MCNC: 27 MG/DL — HIGH (ref 7–23)
BUN SERPL-MCNC: 32 MG/DL — HIGH (ref 7–23)
CALCIUM SERPL-MCNC: 7.7 MG/DL — LOW (ref 8.4–10.5)
CALCIUM SERPL-MCNC: 7.9 MG/DL — LOW (ref 8.4–10.5)
CHLORIDE SERPL-SCNC: 100 MMOL/L — SIGNIFICANT CHANGE UP (ref 96–108)
CHLORIDE SERPL-SCNC: 97 MMOL/L — SIGNIFICANT CHANGE UP (ref 96–108)
CO2 SERPL-SCNC: 44 MMOL/L — HIGH (ref 22–31)
CO2 SERPL-SCNC: 48 MMOL/L — CRITICAL HIGH (ref 22–31)
CREAT SERPL-MCNC: 0.79 MG/DL — SIGNIFICANT CHANGE UP (ref 0.5–1.3)
CREAT SERPL-MCNC: 0.82 MG/DL — SIGNIFICANT CHANGE UP (ref 0.5–1.3)
CULTURE RESULTS: SIGNIFICANT CHANGE UP
CULTURE RESULTS: SIGNIFICANT CHANGE UP
EGFR: 67 ML/MIN/1.73M2 — SIGNIFICANT CHANGE UP
EGFR: 70 ML/MIN/1.73M2 — SIGNIFICANT CHANGE UP
EOSINOPHIL # BLD AUTO: 0.03 K/UL — SIGNIFICANT CHANGE UP (ref 0–0.5)
EOSINOPHIL NFR BLD AUTO: 0.5 % — SIGNIFICANT CHANGE UP (ref 0–6)
GLUCOSE SERPL-MCNC: 124 MG/DL — HIGH (ref 70–99)
GLUCOSE SERPL-MCNC: 134 MG/DL — HIGH (ref 70–99)
HCO3 BLDA-SCNC: 57 MMOL/L — CRITICAL HIGH (ref 21–28)
HCT VFR BLD CALC: 32.9 % — LOW (ref 34.5–45)
HGB BLD-MCNC: 10.1 G/DL — LOW (ref 11.5–15.5)
HOROWITZ INDEX BLDA+IHG-RTO: 36 — SIGNIFICANT CHANGE UP
IMM GRANULOCYTES NFR BLD AUTO: 0.2 % — SIGNIFICANT CHANGE UP (ref 0–1.5)
LYMPHOCYTES # BLD AUTO: 1.25 K/UL — SIGNIFICANT CHANGE UP (ref 1–3.3)
LYMPHOCYTES # BLD AUTO: 20.7 % — SIGNIFICANT CHANGE UP (ref 13–44)
MAGNESIUM SERPL-MCNC: 2.1 MG/DL — SIGNIFICANT CHANGE UP (ref 1.6–2.6)
MAGNESIUM SERPL-MCNC: 2.3 MG/DL — SIGNIFICANT CHANGE UP (ref 1.6–2.6)
MCHC RBC-ENTMCNC: 30.7 GM/DL — LOW (ref 32–36)
MCHC RBC-ENTMCNC: 34 PG — SIGNIFICANT CHANGE UP (ref 27–34)
MCV RBC AUTO: 110.8 FL — HIGH (ref 80–100)
MONOCYTES # BLD AUTO: 0.75 K/UL — SIGNIFICANT CHANGE UP (ref 0–0.9)
MONOCYTES NFR BLD AUTO: 12.4 % — SIGNIFICANT CHANGE UP (ref 2–14)
NEUTROPHILS # BLD AUTO: 3.98 K/UL — SIGNIFICANT CHANGE UP (ref 1.8–7.4)
NEUTROPHILS NFR BLD AUTO: 66 % — SIGNIFICANT CHANGE UP (ref 43–77)
NRBC # BLD: 0 /100 WBCS — SIGNIFICANT CHANGE UP (ref 0–0)
PCO2 BLDA: 67 MMHG — HIGH (ref 32–35)
PH BLDA: 7.54 — HIGH (ref 7.35–7.45)
PHOSPHATE SERPL-MCNC: 2.5 MG/DL — SIGNIFICANT CHANGE UP (ref 2.5–4.5)
PHOSPHATE SERPL-MCNC: 2.8 MG/DL — SIGNIFICANT CHANGE UP (ref 2.5–4.5)
PLATELET # BLD AUTO: 144 K/UL — LOW (ref 150–400)
PO2 BLDA: 67 MMHG — LOW (ref 83–108)
POTASSIUM SERPL-MCNC: 3.2 MMOL/L — LOW (ref 3.5–5.3)
POTASSIUM SERPL-MCNC: 3.9 MMOL/L — SIGNIFICANT CHANGE UP (ref 3.5–5.3)
POTASSIUM SERPL-SCNC: 3.2 MMOL/L — LOW (ref 3.5–5.3)
POTASSIUM SERPL-SCNC: 3.9 MMOL/L — SIGNIFICANT CHANGE UP (ref 3.5–5.3)
PROT SERPL-MCNC: 5.7 G/DL — LOW (ref 6–8.3)
PROT SERPL-MCNC: 5.9 G/DL — LOW (ref 6–8.3)
RBC # BLD: 2.97 M/UL — LOW (ref 3.8–5.2)
RBC # FLD: 14.7 % — HIGH (ref 10.3–14.5)
SAO2 % BLDA: 95.3 % — SIGNIFICANT CHANGE UP (ref 94–98)
SARS-COV-2 RNA SPEC QL NAA+PROBE: SIGNIFICANT CHANGE UP
SODIUM SERPL-SCNC: 142 MMOL/L — SIGNIFICANT CHANGE UP (ref 135–145)
SODIUM SERPL-SCNC: 144 MMOL/L — SIGNIFICANT CHANGE UP (ref 135–145)
SPECIMEN SOURCE: SIGNIFICANT CHANGE UP
SPECIMEN SOURCE: SIGNIFICANT CHANGE UP
WBC # BLD: 6.03 K/UL — SIGNIFICANT CHANGE UP (ref 3.8–10.5)
WBC # FLD AUTO: 6.03 K/UL — SIGNIFICANT CHANGE UP (ref 3.8–10.5)

## 2022-03-16 PROCEDURE — 71045 X-RAY EXAM CHEST 1 VIEW: CPT | Mod: 26

## 2022-03-16 PROCEDURE — 99233 SBSQ HOSP IP/OBS HIGH 50: CPT

## 2022-03-16 RX ORDER — ALLOPURINOL 300 MG
1 TABLET ORAL
Qty: 0 | Refills: 0 | DISCHARGE
Start: 2022-03-16

## 2022-03-16 RX ORDER — ACETAZOLAMIDE 250 MG/1
500 TABLET ORAL ONCE
Refills: 0 | Status: COMPLETED | OUTPATIENT
Start: 2022-03-16 | End: 2022-03-16

## 2022-03-16 RX ORDER — METOPROLOL TARTRATE 50 MG
1 TABLET ORAL
Qty: 0 | Refills: 0 | DISCHARGE
Start: 2022-03-16

## 2022-03-16 RX ORDER — ACETAMINOPHEN 500 MG
1000 TABLET ORAL ONCE
Refills: 0 | Status: COMPLETED | OUTPATIENT
Start: 2022-03-16 | End: 2022-03-16

## 2022-03-16 RX ORDER — FUROSEMIDE 40 MG
20 TABLET ORAL ONCE
Refills: 0 | Status: COMPLETED | OUTPATIENT
Start: 2022-03-16 | End: 2022-03-16

## 2022-03-16 RX ORDER — POTASSIUM CHLORIDE 20 MEQ
20 PACKET (EA) ORAL
Refills: 0 | Status: COMPLETED | OUTPATIENT
Start: 2022-03-16 | End: 2022-03-16

## 2022-03-16 RX ORDER — RIVAROXABAN 15 MG-20MG
1 KIT ORAL
Qty: 0 | Refills: 0 | DISCHARGE
Start: 2022-03-16

## 2022-03-16 RX ORDER — DIGOXIN 250 MCG
1 TABLET ORAL
Qty: 0 | Refills: 0 | DISCHARGE
Start: 2022-03-16

## 2022-03-16 RX ORDER — FUROSEMIDE 40 MG
1 TABLET ORAL
Qty: 0 | Refills: 0 | DISCHARGE
Start: 2022-03-16

## 2022-03-16 RX ORDER — FUROSEMIDE 40 MG
60 TABLET ORAL DAILY
Refills: 0 | Status: DISCONTINUED | OUTPATIENT
Start: 2022-03-17 | End: 2022-03-17

## 2022-03-16 RX ORDER — SODIUM CHLORIDE 9 MG/ML
250 INJECTION INTRAMUSCULAR; INTRAVENOUS; SUBCUTANEOUS ONCE
Refills: 0 | Status: COMPLETED | OUTPATIENT
Start: 2022-03-16 | End: 2022-03-16

## 2022-03-16 RX ORDER — LANOLIN ALCOHOL/MO/W.PET/CERES
1 CREAM (GRAM) TOPICAL
Qty: 0 | Refills: 0 | DISCHARGE
Start: 2022-03-16

## 2022-03-16 RX ORDER — FUROSEMIDE 40 MG
10 TABLET ORAL ONCE
Refills: 0 | Status: DISCONTINUED | OUTPATIENT
Start: 2022-03-16 | End: 2022-03-16

## 2022-03-16 RX ADMIN — Medication 100 MILLIGRAM(S): at 11:53

## 2022-03-16 RX ADMIN — Medication 20 MILLIEQUIVALENT(S): at 10:13

## 2022-03-16 RX ADMIN — Medication 20 MILLIGRAM(S): at 13:24

## 2022-03-16 RX ADMIN — Medication 25 MILLIGRAM(S): at 06:50

## 2022-03-16 RX ADMIN — Medication 40 MILLIGRAM(S): at 06:50

## 2022-03-16 RX ADMIN — Medication 5 MILLIGRAM(S): at 00:50

## 2022-03-16 RX ADMIN — Medication 400 MILLIGRAM(S): at 19:39

## 2022-03-16 RX ADMIN — Medication 3 MILLILITER(S): at 07:58

## 2022-03-16 RX ADMIN — ACETAZOLAMIDE 110 MILLIGRAM(S): 250 TABLET ORAL at 22:31

## 2022-03-16 RX ADMIN — Medication 3 MILLILITER(S): at 14:44

## 2022-03-16 RX ADMIN — Medication 125 MICROGRAM(S): at 06:49

## 2022-03-16 RX ADMIN — PANTOPRAZOLE SODIUM 40 MILLIGRAM(S): 20 TABLET, DELAYED RELEASE ORAL at 06:49

## 2022-03-16 RX ADMIN — Medication 200 MILLIGRAM(S): at 06:50

## 2022-03-16 RX ADMIN — RIVAROXABAN 15 MILLIGRAM(S): KIT at 16:57

## 2022-03-16 RX ADMIN — Medication 125 MICROGRAM(S): at 06:50

## 2022-03-16 RX ADMIN — SODIUM CHLORIDE 250 MILLILITER(S): 9 INJECTION INTRAMUSCULAR; INTRAVENOUS; SUBCUTANEOUS at 20:59

## 2022-03-16 RX ADMIN — Medication 3 MILLILITER(S): at 19:29

## 2022-03-16 RX ADMIN — Medication 20 MILLIEQUIVALENT(S): at 08:25

## 2022-03-16 RX ADMIN — Medication 1000 MILLIGRAM(S): at 20:09

## 2022-03-16 NOTE — PROGRESS NOTE ADULT - ASSESSMENT
Patient is a 89 yo female with a pmh of CHF, COPD, PPM, Gout, hypothyroidism HTN, STEMI and recent pelvic fracture who is admitted with RSV, acute hypoxic resp failure, pleural effusions.    Problem:   - Acute hypoxic resp failure  - RSV  - B/l Pleural effusions  - Metabolic alkalosis  - AMS    Plan:  - New onset confusion, ABG showing metabolic alkalosis with resp acidosis compensation (also hx of COPD). Patient refusing bipap. Remains on NC.  - Will give back 250 cc IVF NS with hopes of adding chloride in lowering bicarb. IVC on POCUS measuring 1.6-1.8.  - Electrolytes obtained, K and mag normal, no need for replacement in setting of alkalosis  - x1 500 mg of diamox to be given to assist in excretion of Hco3  - Patient able to maintain airway at this time, continue with close observation given pH shift with above therapy.  - F/u ABG and CMP in am.  - Titrate o2 therapy to maintain spo2>90%  - C/w steroids and nebs  - Pulm toilet  - Supportive care for RSV, tylenol prn for fevers/mild pain  - Needs aggressive PT/OT  - No need for abx at this time  - Trend infectious markers/WBC  - Trend and replace electrolytes as needed.  - Espinoza for urinary retention, can trial without tomorrow   - home meds ordered]  - Plan to DC to [facility] tomorrow.

## 2022-03-16 NOTE — PROVIDER CONTACT NOTE (CRITICAL VALUE NOTIFICATION) - ASSESSMENT
Pt  little confused, restless  ABG done  pt c/o headache,medicated iv tylenol 1000mg x1 at 19;39  pt said, " feel better " now

## 2022-03-16 NOTE — DISCHARGE NOTE NURSING/CASE MANAGEMENT/SOCIAL WORK - NSDCPEFALRISK_GEN_ALL_CORE
For information on Fall & Injury Prevention, visit: [email or web address] OR  [email or web address] OR  https://www.cdc.gov/steadi/patient.html

## 2022-03-16 NOTE — PROGRESS NOTE ADULT - SUBJECTIVE AND OBJECTIVE BOX
Date/Time Patient Seen:  		  Referring MD:   Data Reviewed	       Patient is a 89y old  Female who presents with a chief complaint of Respiratory distress (15 Mar 2022 20:40)      Subjective/HPI     PAST MEDICAL & SURGICAL HISTORY:  Chronic obstructive pulmonary disease (COPD)    HTN (hypertension)    Cardiac pacemaker    Gout    Hypothyroidism    Insomnia    Chronic CHF    History of ST elevation myocardial infarction (STEMI)    No significant past surgical history          Medication list         MEDICATIONS  (STANDING):  albuterol/ipratropium for Nebulization 3 milliLiter(s) Nebulizer every 8 hours  allopurinol 100 milliGRAM(s) Oral daily  digoxin     Tablet 125 MICROGram(s) Oral daily  furosemide    Tablet 40 milliGRAM(s) Oral daily  levothyroxine 125 MICROGram(s) Oral daily  metoprolol succinate ER 25 milliGRAM(s) Oral daily  pantoprazole    Tablet 40 milliGRAM(s) Oral before breakfast  rivaroxaban 15 milliGRAM(s) Oral with dinner    MEDICATIONS  (PRN):  guaiFENesin Oral Liquid (Sugar-Free) 200 milliGRAM(s) Oral every 6 hours PRN Cough  melatonin 5 milliGRAM(s) Oral at bedtime PRN Insomnia         Vitals log        ICU Vital Signs Last 24 Hrs  T(C): 36.4 (16 Mar 2022 05:56), Max: 36.4 (15 Mar 2022 15:43)  T(F): 97.6 (16 Mar 2022 05:56), Max: 97.6 (15 Mar 2022 15:43)  HR: 72 (16 Mar 2022 04:00) (63 - 74)  BP: 116/52 (16 Mar 2022 04:00) (108/53 - 126/58)  BP(mean): 70 (16 Mar 2022 04:00) (69 - 82)  ABP: --  ABP(mean): --  RR: 26 (16 Mar 2022 04:00) (21 - 28)  SpO2: 93% (16 Mar 2022 04:00) (92% - 100%)           Input and Output:  I&O's Detail    14 Mar 2022 07:01  -  15 Mar 2022 07:00  --------------------------------------------------------  IN:    Oral Fluid: 830 mL  Total IN: 830 mL    OUT:    Indwelling Catheter - Urethral (mL): 2200 mL  Total OUT: 2200 mL    Total NET: -1370 mL      15 Mar 2022 07:01  -  16 Mar 2022 06:50  --------------------------------------------------------  IN:    Oral Fluid: 960 mL  Total IN: 960 mL    OUT:    Indwelling Catheter - Urethral (mL): 2550 mL  Total OUT: 2550 mL    Total NET: -1590 mL          Lab Data                        10.1   6.03  )-----------( 144      ( 16 Mar 2022 06:17 )             32.9     03-15    144  |  101  |  46<H>  ----------------------------<  117<H>  3.4<L>   |  41<H>  |  0.96    Ca    7.7<L>      15 Mar 2022 06:47  Phos  3.5     03-15  Mg     2.2     03-15    TPro  5.8<L>  /  Alb  2.8<L>  /  TBili  0.5  /  DBili  x   /  AST  34  /  ALT  33  /  AlkPhos  107  03-15            Review of Systems	      Objective     Physical Examination  heart s1s2  lung dec BS  abd soft        Pertinent Lab findings & Imaging      Olga:  NO   Adequate UO     I&O's Detail    14 Mar 2022 07:01  -  15 Mar 2022 07:00  --------------------------------------------------------  IN:    Oral Fluid: 830 mL  Total IN: 830 mL    OUT:    Indwelling Catheter - Urethral (mL): 2200 mL  Total OUT: 2200 mL    Total NET: -1370 mL      15 Mar 2022 07:01  -  16 Mar 2022 06:50  --------------------------------------------------------  IN:    Oral Fluid: 960 mL  Total IN: 960 mL    OUT:    Indwelling Catheter - Urethral (mL): 2550 mL  Total OUT: 2550 mL    Total NET: -1590 mL               Discussed with:     Cultures:	        Radiology

## 2022-03-16 NOTE — PROGRESS NOTE ADULT - SUBJECTIVE AND OBJECTIVE BOX
Patient is a 89y old  Female who presents with a chief complaint of Respiratory distress (16 Mar 2022 06:50)      INTERVAL HPI/OVERNIGHT EVENTS: Patient seen and examined at bedside. No overnight events.      MEDICATIONS  (STANDING):  albuterol/ipratropium for Nebulization 3 milliLiter(s) Nebulizer every 8 hours  allopurinol 100 milliGRAM(s) Oral daily  digoxin     Tablet 125 MICROGram(s) Oral daily  furosemide    Tablet 40 milliGRAM(s) Oral daily  levothyroxine 125 MICROGram(s) Oral daily  metoprolol succinate ER 25 milliGRAM(s) Oral daily  pantoprazole    Tablet 40 milliGRAM(s) Oral before breakfast  potassium chloride    Tablet ER 20 milliEquivalent(s) Oral every 2 hours  rivaroxaban 15 milliGRAM(s) Oral with dinner    MEDICATIONS  (PRN):  guaiFENesin Oral Liquid (Sugar-Free) 200 milliGRAM(s) Oral every 6 hours PRN Cough  melatonin 5 milliGRAM(s) Oral at bedtime PRN Insomnia      Allergies    codeine (Unknown)    Intolerances        REVIEW OF SYSTEMS:  CONSTITUTIONAL: No fever or chills  HEENT:  No headache, no sore throat  RESPIRATORY: Admits cough, no wheezing, or shortness of breath  CARDIOVASCULAR: No chest pain, palpitations, or leg swelling  GASTROINTESTINAL: No abd pain, nausea, vomiting, or diarrhea  GENITOURINARY: No dysuria, frequency, or hematuria  NEUROLOGICAL: no focal weakness or dizziness  MUSCULOSKELETAL: no myalgias     Vital Signs Last 24 Hrs  T(C): 36.4 (16 Mar 2022 05:56), Max: 36.4 (15 Mar 2022 15:43)  T(F): 97.6 (16 Mar 2022 05:56), Max: 97.6 (15 Mar 2022 15:43)  HR: 70 (16 Mar 2022 08:00) (68 - 79)  BP: 130/61 (16 Mar 2022 06:49) (116/52 - 130/61)  BP(mean): 80 (16 Mar 2022 06:49) (69 - 82)  RR: 21 (16 Mar 2022 06:49) (21 - 28)  SpO2: 95% (16 Mar 2022 08:00) (92% - 100%)    PHYSICAL EXAM:  GENERAL: NAD. elderly  HEENT:  EOMI, moist mucous membranes  CHEST/LUNG:  diminished bs at bases, poor insp effort, (+) cough  HEART:  RRR, S1, S2  ABDOMEN:  BS+, soft, nontender, nondistended  EXTREMITIES: no edema, cyanosis, or calf tenderness  NERVOUS SYSTEM: AA&Ox2-3    LABS:                        10.1   6.03  )-----------( 144      ( 16 Mar 2022 06:17 )             32.9     CBC Full  -  ( 16 Mar 2022 06:17 )  WBC Count : 6.03 K/uL  Hemoglobin : 10.1 g/dL  Hematocrit : 32.9 %  Platelet Count - Automated : 144 K/uL  Mean Cell Volume : 110.8 fl  Mean Cell Hemoglobin : 34.0 pg  Mean Cell Hemoglobin Concentration : 30.7 gm/dL  Auto Neutrophil # : 3.98 K/uL  Auto Lymphocyte # : 1.25 K/uL  Auto Monocyte # : 0.75 K/uL  Auto Eosinophil # : 0.03 K/uL  Auto Basophil # : 0.01 K/uL  Auto Neutrophil % : 66.0 %  Auto Lymphocyte % : 20.7 %  Auto Monocyte % : 12.4 %  Auto Eosinophil % : 0.5 %  Auto Basophil % : 0.2 %    16 Mar 2022 06:17    144    |  100    |  32     ----------------------------<  124    3.2     |  44     |  0.79     Ca    7.9        16 Mar 2022 06:17  Phos  2.5       16 Mar 2022 06:17  Mg     2.3       16 Mar 2022 06:17    TPro  5.9    /  Alb  2.8    /  TBili  0.7    /  DBili  x      /  AST  28     /  ALT  27     /  AlkPhos  109    16 Mar 2022 06:17        CAPILLARY BLOOD GLUCOSE            Culture - Urine (collected 03-11-22 @ 21:02)  Source: Clean Catch Clean Catch (Midstream)  Final Report (03-12-22 @ 16:27):    <10,000 CFU/mL Normal Urogenital Xiomara    Culture - Blood (collected 03-11-22 @ 13:03)  Source: .Blood Blood-Peripheral  Preliminary Report (03-12-22 @ 14:01):    No growth to date.    Culture - Blood (collected 03-11-22 @ 13:03)  Source: .Blood Blood-Peripheral  Preliminary Report (03-12-22 @ 14:01):    No growth to date.        RADIOLOGY & ADDITIONAL TESTS: < from: CT Chest No Cont (03.11.22 @ 10:51) >    ACC: [id number] EXAM:  CT CHEST                          PROCEDURE DATE:  03/11/2022          INTERPRETATION:  CLINICAL INFORMATION: Respiratory distress.    COMPARISON: Chest radiograph 3/11/2022.  CT scan chest 2/21/2022.    CONTRAST/COMPLICATIONS:  IV Contrast: NONE  Oral Contrast: NONE  Complications: None reported at time of study completion    PROCEDURE:  CT of the Chest was performed.  Sagittal and coronal reformats were performed.    FINDINGS:    LUNGS AND AIRWAYS:  PLEURA:  There are small bilateral pleural effusions with underlying compressive   atelectasis lung bases.  The central airways remain patent.    MEDIASTINUM AND CHICA: No lymphadenopathy.    VESSELS: Atherosclerotic changes thoracic aorta and coronary artery   calcifications.  Prominence of the main pulmonary arterial trunk, which may be associated   with pulmonary arterial hypertension.    HEART: Cardiomegaly.  Cardiac pacemaker leads.   No pericardial effusion.    CHEST WALL AND LOWER NECK:  Cardiac device left chestwall.    VISUALIZED UPPER ABDOMEN:  Limited by streak artifact.  Nodular contour liver.  Left hepatic cyst.    BONES: Degenerative changes.    IMPRESSION:    Small bilateral pleural effusions with underlying compressive   atelectasis, similar to prior exam.    Other findings as discussed above.    --- End of Report ---            ALYSON ROSAS MD; Attending Radiologist  This document has been electronically signed. Mar 11 2022 11:46AM    < end of copied text >    < from: US Transthoracic Echocardiogram w/Doppler Complete (03.12.22 @ 15:22) >    ACC: [id number] EXAM:  US TTE W DOPPLER COMPLETE                          PROCEDURE DATE:  03/12/2022          INTERPRETATION:  INDICATION: diastolic heart failure  Referring M.DVinnie:Alyson Gimenez  Blood Pressure 94/44  Weight (pd) :140     Height (feet, inches):5'2"  Technician: Teri Haile    Dimensions:  LA 2.96       Normal Values: 2.0 - 4.0 cm  Ao 2.68        Normal Values: 2.0 - 3.8 cm  SEPTUM 0.9       Normal Values: 0.6 - 1.2 cm  PWT 0.6       Normal Values: 0.6 - 1.1 cm  LVIDd 3.27         Normal Values: 3.0 - 5.6 cm  LVIDs 2.33         Normal Values: 1.8 - 4.0 cm      OBSERVATIONS:    Mitral Valve: Normal mitral annulus and valve. No mitral regurgitation.  Aortic Valve/Aorta: Normal trileaflet aortic valve.  Tricuspid Valve: Normal tricuspid valve. Severe tricuspid regurgitation.  Pulmonic Valve: The pulmonic valve is not well visualized. Probably   normal.  Left Atrium: Enlarged  Right Atrium: Severely enlarged  Left Ventricle: Normal left ventricular size, thickness & systolic   function. The EF is approximately 55-60%.  Right Ventricle: Severely enlarged right ventricle, reduced systolic   function.  Pericardium/Pleura: No pericardial effusion noted.  Pulmonary/RV Pressure: The right ventricular systolic pressure is   estimated to be 63 mmHg, assuming that the right atrial pressure is   estimated to be 8 mmHg. This is consistent with severe pulmonary   hypertension..  LV Diastolic Function: Normal diastolic function.    Impression:  Severely dilated right atrium & right ventricle w/ reduced RV systolic   function.  Severe pulmonary hypertension.  Severe tricuspid regurgitation.  Normal left ventricular size, thickness & systolic function. The EF is   approximately 55-60%.    --- End of Report ---            JAROCHO WILKERSON MD; Attending Cardiologist  This document has been electronically signed. Mar 13 2022 10:24AM    < end of copied text >    Consultant(s) Notes Reviewed:  [x] YES  [ ] NO    Care Discussed with [x] Consultants  [x] Patient  [ ] Family  [ ]      [ x] Other; RN  DVT ppx

## 2022-03-16 NOTE — PROGRESS NOTE ADULT - SUBJECTIVE AND OBJECTIVE BOX
Patient is a 89y old  Female who presents with a chief complaint of Respiratory distress (15 Mar 2022 12:18)      BRIEF HOSPITAL COURSE: Patient is a 89 yo female with a pmh of CHF, COPD, PPM, Gout, hypothyroidism HTN, STEMI and recent pelvic fracture who presented to [facility] on 03/11/22 for respiratory distress. Patient positive for RSV on admission requiring bipap therapy. Patient was admitted to [facility] with acute hypoxic/hypercapnic respiratory failure, RSV and pleural effusion.     Events last 24 hours: Patient with improved cough, given extra lasix dose today. Found to be altered from baseline. ABG obtained showing metabolic alkalosis with critical Hco3.    PAST MEDICAL & SURGICAL HISTORY:  Chronic obstructive pulmonary disease (COPD)  HTN (hypertension)  Cardiac pacemaker  Gout  Hypothyroidism  Insomnia  Chronic CHF  History of ST elevation myocardial infarction (STEMI)  No significant past surgical history        Review of Systems:  CONSTITUTIONAL: No fever, chills  EYES: No visual changes. No visual disturbances, or discharge  ENMT:  No changes in hearing.   NECK: No pain  RESPIRATORY: No SOB, frequent cough. No wheezing. Improved breathing.  CARDIOVASCULAR: No chest pain, palpitations  GASTROINTESTINAL: No abdominal or epigastric pain. No nausea, vomiting, No diarrhea or constipation. No melena. Was able to have BM.  GENITOURINARY: Espinoza in place.  NEUROLOGICAL: No headaches, loss of strength, no weakness  SKIN: Scattered bruising from prior fall/pelvic fracture    MUSCULOSKELETAL: Pelvic floor chronic pain.    Medications:  digoxin     Tablet 125 MICROGram(s) Oral daily  furosemide    Tablet 40 milliGRAM(s) Oral daily  metoprolol succinate ER 25 milliGRAM(s) Oral daily  albuterol/ipratropium for Nebulization 3 milliLiter(s) Nebulizer every 8 hours  guaiFENesin Oral Liquid (Sugar-Free) 200 milliGRAM(s) Oral every 6 hours PRN  melatonin 5 milliGRAM(s) Oral at bedtime PRN  rivaroxaban 15 milliGRAM(s) Oral with dinner  pantoprazole    Tablet 40 milliGRAM(s) Oral before breakfast  allopurinol 100 milliGRAM(s) Oral daily  levothyroxine 125 MICROGram(s) Oral daily      ICU Vital Signs Last 24 Hrs  T(C): 36.1 (15 Mar 2022 20:01), Max: 36.6 (15 Mar 2022 03:54)  T(F): 97 (15 Mar 2022 20:01), Max: 97.8 (15 Mar 2022 03:54)  HR: 69 (15 Mar 2022 20:00) (63 - 75)  BP: 126/58 (15 Mar 2022 20:00) (103/53 - 126/58)  BP(mean): 74 (15 Mar 2022 20:00) (70 - 82)  ABP: --  ABP(mean): --  RR: 28 (15 Mar 2022 20:00) (20 - 29)  SpO2: 99% (15 Mar 2022 20:00) (93% - 100%)      I&O's Detail    14 Mar 2022 07:01  -  15 Mar 2022 07:00  --------------------------------------------------------  IN:    Oral Fluid: 830 mL  Total IN: 830 mL    OUT:    Indwelling Catheter - Urethral (mL): 2200 mL  Total OUT: 2200 mL    Total NET: -1370 mL      15 Mar 2022 07:01  -  15 Mar 2022 22:33  --------------------------------------------------------  IN:    Oral Fluid: 780 mL  Total IN: 780 mL    OUT:    Indwelling Catheter - Urethral (mL): 1950 mL  Total OUT: 1950 mL    Total NET: -1170 mL      LABS:                        9.7    5.90  )-----------( 143      ( 15 Mar 2022 06:47 )             31.7     03-15    144  |  101  |  46<H>  ----------------------------<  117<H>  3.4<L>   |  41<H>  |  0.96    Ca    7.7<L>      15 Mar 2022 06:47  Phos  3.5     03-15  Mg     2.2     03-15    TPro  5.8<L>  /  Alb  2.8<L>  /  TBili  0.5  /  DBili  x   /  AST  34  /  ALT  33  /  AlkPhos  107  03-15      CAPILLARY BLOOD GLUCOSE      CULTURES:  Culture Results:   <10,000 CFU/mL Normal Urogenital Xiomara (03-11-22 @ 21:02)  Culture Results:   No growth to date. (03-11-22 @ 13:03)  Culture Results:   No growth to date. (03-11-22 @ 13:03)    Physical Examination:    General: No acute distress.  Alert, oriented, interactive    HEENT: Pupils equal, reactive to light.  Symmetric.    PULM: Frequent cough, able to clear secretions, Diminished breath sounds to b/l lower bases. No wheezing.    CVS: Regular rate and rhythm, no murmurs, rubs, or gallops    ABD: Soft, nondistended, normoactive bowel sounds. x1 BM today.    EXT: No edema    SKIN: Warm and well perfused, scattered ecchymosis     NEURO: A&Ox2, new confusion, strength 5/5 all extremities, follows all commands      RADIOLOGY: < from: CT Chest No Cont (03.11.22 @ 10:51) >  INTERPRETATION:  CLINICAL INFORMATION: Respiratory distress.    COMPARISON: Chest radiograph 3/11/2022.  CT scan chest 2/21/2022.    CONTRAST/COMPLICATIONS:  IV Contrast: NONE  Oral Contrast: NONE  Complications: None reported at time of study completion    PROCEDURE:  CT of the Chest was performed.  Sagittal and coronal reformats were performed.    FINDINGS:    LUNGS AND AIRWAYS:  PLEURA:  There are small bilateral pleural effusions with underlying compressive   atelectasis lung bases.  The central airways remain patent.    MEDIASTINUM AND CHICA: No lymphadenopathy.    VESSELS: Atherosclerotic changes thoracic aorta and coronary artery   calcifications.  Prominence of the main pulmonary arterial trunk, which may be associated   with pulmonary arterial hypertension.    HEART: Cardiomegaly.  Cardiac pacemaker leads.   No pericardial effusion.    CHEST WALL AND LOWER NECK:  Cardiac device left chestwall.    VISUALIZED UPPER ABDOMEN:  Limited by streak artifact.  Nodular contour liver.  Left hepatic cyst.    BONES: Degenerative changes.    IMPRESSION:    Small bilateral pleural effusions with underlying compressive   atelectasis, similar to prior exam.    Other findings as discussed above.      TIME SPENT: 45 minutes  Evaluating/treating patient, reviewing data/labs/imaging, discussing case with multidisciplinary team, discussing plan/goals of care with patient/family. Non-inclusive of procedure time.

## 2022-03-16 NOTE — DISCHARGE NOTE PROVIDER - DISCHARGE DIET
DASH Diet/Low Sodium Diet/Soft and Bite-Sized Diet Regular Diet - No restrictions/DASH Diet/Low Sodium Diet

## 2022-03-16 NOTE — PROGRESS NOTE ADULT - SUBJECTIVE AND OBJECTIVE BOX
Chief Complaint: Shortness of breath    Interval Events: No events overnight.    Review of Systems:  General: No fevers, chills, weight gain  Skin: No rashes, color changes  Cardiovascular: No chest pain, orthopnea  Respiratory: No shortness of breath, cough  Gastrointestinal: No nausea, abdominal pain  Genitourinary: No incontinence, pain with urination  Musculoskeletal: No pain, swelling, decreased range of motion  Neurological: No headache, weakness  Psychiatric: No depression, anxiety  Endocrine: No weight gain, increased thirst  All other systems are comprehensively negative.    Physical Exam:  Vital Signs Last 24 Hrs  T(C): 36.4 (16 Mar 2022 05:56), Max: 36.4 (15 Mar 2022 15:43)  T(F): 97.6 (16 Mar 2022 05:56), Max: 97.6 (15 Mar 2022 15:43)  HR: 70 (16 Mar 2022 08:00) (68 - 79)  BP: 130/61 (16 Mar 2022 06:49) (116/52 - 130/61)  BP(mean): 80 (16 Mar 2022 06:49) (69 - 82)  RR: 21 (16 Mar 2022 06:49) (21 - 28)  SpO2: 95% (16 Mar 2022 08:00) (92% - 100%)  General: NAD  HEENT: MMM  Neck: No JVD, no carotid bruit  Lungs: CTAB  CV: RRR, nl S1/S2, no M/R/G  Abdomen: S/NT/ND, +BS  Extremities: No LE edema, no cyanosis  Neuro: AAOx3, non-focal  Skin: No rash    Labs:             03-16    144  |  100  |  32<H>  ----------------------------<  124<H>  3.2<L>   |  44<H>  |  0.79    Ca    7.9<L>      16 Mar 2022 06:17  Phos  2.5     03-16  Mg     2.3     03-16    TPro  5.9<L>  /  Alb  2.8<L>  /  TBili  0.7  /  DBili  x   /  AST  28  /  ALT  27  /  AlkPhos  109  03-16                        10.1   6.03  )-----------( 144      ( 16 Mar 2022 06:17 )             32.9       Telemetry: AF, ventricular paced

## 2022-03-16 NOTE — DISCHARGE NOTE PROVIDER - NSDCFUADDINST_GEN_ALL_CORE_FT
- Please make an appointment for follow up with your primary doctor within 1-3 days of discharge  - It is important to see your primary physician as well as the physicians listed below to perform a comprehensive medical review.  Call their offices for an appointment as soon as you leave the hospital.  You will also need to see them for renewal of your medications.  If you do not have a primary physician, contact the [facility] Physician Referral Service at [phone].  To obtain your results, you can access the [facility] Patient Portal at [email or web address].   - Your medical issues appear to be stable at this time, but if your symptoms recur or worsen, contact your physicians and/or return to the hospital if necessary.    -If you encounter any issues or questions with your medication, call your physicians before stopping the medication.    - Limit your diet to 2 grams of sodium daily.  - Patient or caretaker is responsible for making all appointments

## 2022-03-16 NOTE — DISCHARGE NOTE PROVIDER - NSDCMRMEDTOKEN_GEN_ALL_CORE_FT
acetaminophen 500 mg oral tablet: 2 tab(s) orally every 8 hours  allopurinol 100 mg oral tablet: 1 tab(s) orally once a day  calcium carbonate 500 mg (200 mg elemental calcium) oral tablet, chewable: 3 tab(s) orally every 6 hours, As Needed - for indigestion, for heartburn  digoxin 125 mcg (0.125 mg) oral tablet: 1 tab(s) orally once a day  furosemide 40 mg oral tablet: 1 tab(s) orally once a day  levothyroxine 125 mcg (0.125 mg) oral tablet: 1 tab(s) orally once a day  melatonin 5 mg oral tablet: 1 tab(s) orally once a day (at bedtime), As needed, Insomnia  metoprolol succinate 25 mg oral tablet, extended release: 1 tab(s) orally once a day  Multi Vitamin+: 1 tab(s) orally once a day  Pepto-Bismol 262 mg/15 mL oral suspension: 15 milliliter(s) orally 2 times a day, As Needed - for indigestion  potassium chloride 20 mEq oral tablet, extended release: 1 tab(s) orally once a day  rivaroxaban 15 mg oral tablet: 1 tab(s) orally once a day (before a meal)  Tussin 100 mg/5 mL oral liquid: 10 milliliter(s) orally every 6 hours  Vitamin D2 1.25 mg (50,000 intl units) oral capsule: 1 cap(s) orally once a week   acetaminophen 500 mg oral tablet: 2 tab(s) orally every 8 hours  allopurinol 100 mg oral tablet: 1 tab(s) orally once a day  calcium carbonate 500 mg (200 mg elemental calcium) oral tablet, chewable: 3 tab(s) orally every 6 hours, As Needed - for indigestion, for heartburn  digoxin 125 mcg (0.125 mg) oral tablet: 1 tab(s) orally once a day  furosemide 40 mg oral tablet: 1 tab(s) orally once a day  levothyroxine 125 mcg (0.125 mg) oral tablet: 1 tab(s) orally once a day  melatonin 5 mg oral tablet: 1 tab(s) orally once a day (at bedtime), As needed, Insomnia  metoprolol succinate 25 mg oral tablet, extended release: 1 tab(s) orally once a day (restart only if blood pressure stable)  Multi Vitamin+: 1 tab(s) orally once a day  Pepto-Bismol 262 mg/15 mL oral suspension: 15 milliliter(s) orally 2 times a day, As Needed - for indigestion  potassium chloride 20 mEq oral tablet, extended release: 1 tab(s) orally once a day  rivaroxaban 15 mg oral tablet: 1 tab(s) orally once a day (before a meal)  Tussin 100 mg/5 mL oral liquid: 10 milliliter(s) orally every 6 hours  Vitamin D2 1.25 mg (50,000 intl units) oral capsule: 1 cap(s) orally once a week

## 2022-03-16 NOTE — PROGRESS NOTE ADULT - ASSESSMENT
89 y.o female with PMH of hypothyroidism, atrial fibrillation, pacemaker, COPD, CAD, chronic diastolic heart failure, severe TR, severe pulm HTN who presents with acute on chronic hypoxic/hypercapnic respiratory failure in the setting of acute on chronic diastolic heart failure, COPD and RSV bronchitis.    # Acute on chronic hypoxic/hypercarbic respiratory failure 2/2 CHF exacerbation and RSV bronchitis  - Present on admission  - Blood and urine C+S 3/11 NGTD  - Patient is off BIPAP and continues to do well on NC  - Supportive care for RSV - antitussives PRN  - IV solumedrol taper completed  - Appreciate cardiology and pulmonary recs    # COPD  - Off BIPAP, doing well on NC  - Continue duoneb  - IV solumedrol taper completed  - Pulmonary recs appreciated    # Acute on chronic diastolic CHF  - BNP 11,290 on admission  - Can use BIPAP PRN  - On po lasix  - Strict I/Os  - Card recs appreciated    # Atrial fibrillation  - Remains rate controlled  - Continue Toprol XL  - Continue Digoxin  - Continue Xarelto for thromboprophylaxis    # Hypokalemia  - Likely secondary to diuresis  - Trend lytes    # Hypothyroid  - Continue Synthroid    # HTN  - Continue Toprol XL    # Gout  - Continue Allopurinol    # Insomnia  - Continue Melatonin    #DVT prophylaxis   - Continue Xarelto    # ACP  - DNR/DNI, MOLST in chart

## 2022-03-16 NOTE — DISCHARGE NOTE PROVIDER - NSDCCPCAREPLAN_GEN_ALL_CORE_FT
PRINCIPAL DISCHARGE DIAGNOSIS  Diagnosis: Acute respiratory failure with hypoxia  Assessment and Plan of Treatment: RSV (+). Continue supportive care. Continue oxygen as needed         SECONDARY DISCHARGE DIAGNOSES  Diagnosis: Acute on chronic systolic congestive heart failure  Assessment and Plan of Treatment: You were previously diagnosed with congestive heart failure. Please continue to take your medications as prescribed and follow up with your PCP and cardiologist for further management. Return to the ED immediatley or call your doctor if you develop worsening shortness of breath, chest pain or siginfficant swelling of your legs. Please weigh yourself daily and call your doctor immediatley if you notice weight gain of  more than 3lbs in 24 hrs or 5lbs within 1 week.     PRINCIPAL DISCHARGE DIAGNOSIS  Diagnosis: Acute respiratory failure with hypoxia  Assessment and Plan of Treatment: RSV (+). Continue supportive care. Continue oxygen as needed. Recommend BIPAP at night         SECONDARY DISCHARGE DIAGNOSES  Diagnosis: Acute on chronic systolic congestive heart failure  Assessment and Plan of Treatment: Lasix dose decreased to 40mg daily. Can consider increasing to 60mg daily (home dose) if appears volume overloaded. Monitor intake output and daily weights.    Diagnosis: Acute on chronic urinary retention  Assessment and Plan of Treatment: Attempt TOV once more ambulatory    Diagnosis: Afib  Assessment and Plan of Treatment: Restart Metoprolol if blood pressure stable

## 2022-03-16 NOTE — DISCHARGE NOTE NURSING/CASE MANAGEMENT/SOCIAL WORK - NSDCPETBCESMAN_GEN_ALL_CORE
Keep medications the same.    We will see you back in one year after an echo.  Our schedulers will call you next summer to schedule this.       If you are a smoker, it is important for your health to stop smoking. Please be aware that second hand smoke is also harmful.

## 2022-03-16 NOTE — PROGRESS NOTE ADULT - ASSESSMENT
The patient is a 89 year old female with a history of hypothyroidism, atrial fibrillation, pacemaker, COPD, CAD, chronic diastolic heart failure, severe TR, severe pulm HTN who presents with shortness of breath in the setting of acute on chronic diastolic heart failure, COPD, RSV.    Plan:  - ECG with known AF and intermittent ventricular pacing  - CXR with bilateral pleural effusions  - BNP 64174  - Echo 2/22 with normal LV systolic function, severe TR, severe pulm HTN  - RSV positive  - Continue furosemide 40 mg PO bid  - Continue rivaroxaban 15 mg daily  - Continue digoxin 0.125 mg daily  - Continue metoprolol succinate 25 mg daily The patient is a 89 year old female with a history of hypothyroidism, atrial fibrillation, pacemaker, COPD, CAD, chronic diastolic heart failure, severe TR, severe pulm HTN who presents with shortness of breath in the setting of acute on chronic diastolic heart failure, COPD, RSV.    Plan:  - ECG with known AF and intermittent ventricular pacing  - CXR with bilateral pleural effusions  - BNP 97245  - Echo 2/22 with normal LV systolic function, severe TR, severe pulm HTN  - RSV positive  - Continue furosemide 60 mg daily  - Continue rivaroxaban 15 mg daily  - Continue digoxin 0.125 mg daily  - Continue metoprolol succinate 25 mg daily

## 2022-03-16 NOTE — DISCHARGE NOTE PROVIDER - HOSPITAL COURSE
FROM ADMISSION H+P:   HPI:  89 y.o. female w/ hx of CHF, COPD, PPM, gout, hypothyroidism, HTN, and STEMI BIBEMS today from [facility] for evaluation of respiratory distress. Pt unable to give any history 2/2 respiratory status. Per EMS report pt. was hypoxic to 50% on 4L nasal cannula, NRB placed w/ improvement in O2 sats to 70s.      ---  HOSPITAL COURSE:   # Acute on chronic hypoxic/hypercarbic respiratory failure 2/2 CHF exacerbation and RSV bronchitis  - Blood and urine C+S 3/11 NGTD  - Patient is off BIPAP and continues to do well on NC  - Supportive care for RSV - antitussives PRN  - IV solumedrol taper completed  - Appreciate cardiology and pulmonary recs    # COPD  - Off BIPAP, doing well on NC  - Continue duoneb  - IV solumedrol taper completed  - Pulmonary recs appreciated    # Acute on chronic diastolic CHF  - BNP 11,290 on admission  - On po lasix, discussed with cardiology, home dose will be continued on discharge which is 60mg daily  - Strict I/Os  - Card recs appreciated    # Atrial fibrillation  - Remains rate controlled  - Continue Toprol XL  - Continue Digoxin  - Continue Xarelto for thromboprophylaxis    # Hypokalemia  - Likely secondary to diuresis  - Potassium supplemented inpatient   - Continue potassium on discharge   - Trend lytes    # Hypothyroid  - Continue Synthroid    # HTN  - Continue Toprol XL    # Gout  - Continue Allopurinol    # Insomnia  - Continue Melatonin    #DVT prophylaxis   - Continue Xarelto    # ACP  - DNR/DNI, MOLST in chart  ---  TIME SPENT:  I, the attending physician, was physically present for the key portions of the evaluation and management (E/M) service provided. The total amount of time spent reviewing the hospital notes, laboratory values, imaging findings, assessing/counseling the patient, discussing with consultant physicians, social work, nursing staff was 53 minutes FROM ADMISSION H+P:   HPI:  89 y.o. female w/ hx of CHF, COPD, PPM, gout, hypothyroidism, HTN, and STEMI BIBEMS today from [facility] for evaluation of respiratory distress. Pt unable to give any history 2/2 respiratory status. Per EMS report pt. was hypoxic to 50% on 4L nasal cannula, NRB placed w/ improvement in O2 sats to 70s.      ---  HOSPITAL COURSE:   # Acute on chronic hypoxic/hypercarbic respiratory failure 2/2 CHF exacerbation and RSV bronchitis  - Blood and urine C+S 3/11 NGTD  - Patient is off BIPAP and continues to do well on NC  - Supportive care for RSV - antitussives PRN  - IV solumedrol taper completed  - Appreciate cardiology and pulmonary recs  - Home dose will be Lasix 40mg qd and will need close monitoring     # COPD  - Off BIPAP, doing well on NC  - Continue duoneb  - IV solumedrol taper completed  - Pulmonary recs appreciated    # Acute on chronic diastolic CHF  - BNP 11,290 on admission  - On po lasix, discussed with cardiology, home dose will be continued on discharge which is 60mg daily  - Strict I/Os  - Card recs appreciated    # Atrial fibrillation  - Remains rate controlled  - Continue Toprol XL  - Continue Digoxin  - Continue Xarelto for thromboprophylaxis    # Hypokalemia  - Likely secondary to diuresis  - Potassium supplemented inpatient   - Continue potassium on discharge   - Trend lytes    # Hypothyroid  - Continue Synthroid    # HTN  - Hold Toprol for now, consider restarting if BP stable     # Gout  - Continue Allopurinol    # Insomnia  - Continue Melatonin    #DVT prophylaxis   - Continue Xarelto    # ACP  - DNR/DNI, MOLST in chart  ---  TIME SPENT:  I, the attending physician, was physically present for the key portions of the evaluation and management (E/M) service provided. The total amount of time spent reviewing the hospital notes, laboratory values, imaging findings, assessing/counseling the patient, discussing with consultant physicians, social work, nursing staff was 53 minutes

## 2022-03-16 NOTE — DISCHARGE NOTE PROVIDER - CARE PROVIDER_API CALL
Fan Del Rio)  Internal Medicine  [address], [address]  [place] [address]  Phone: [phone]  Fax: [phone]  Follow Up Time: 1-3 days

## 2022-03-16 NOTE — PROGRESS NOTE ADULT - SUBJECTIVE AND OBJECTIVE BOX
PATRICIA MORTON is a 89yFemale , patient examined and chart reviewed.     INTERVAL HPI/ OVERNIGHT EVENTS:   Awake. No events.   Afebrile.    PAST MEDICAL & SURGICAL HISTORY:  Chronic obstructive pulmonary disease (COPD)  HTN (hypertension)  Cardiac pacemaker  Gout  Hypothyroidism  Insomnia  Chronic CHF  History of ST elevation myocardial infarction (STEMI)  No significant past surgical history        For details regarding the patient's social history, family history, and other miscellaneous elements, please refer the initial infectious diseases consultation and/or the admitting history and physical examination for this admission.    ROS:  Unable to obtain due to : Poor historian    Allergies  codeine (Unknown)      Current inpatient medications :    ANTIBIOTICS/RELEVANT:    MEDICATIONS  (STANDING):  albuterol/ipratropium for Nebulization 3 milliLiter(s) Nebulizer every 8 hours  allopurinol 100 milliGRAM(s) Oral daily  digoxin     Tablet 125 MICROGram(s) Oral daily  furosemide    Tablet 40 milliGRAM(s) Oral daily  levothyroxine 125 MICROGram(s) Oral daily  metoprolol succinate ER 25 milliGRAM(s) Oral daily  pantoprazole    Tablet 40 milliGRAM(s) Oral before breakfast  rivaroxaban 15 milliGRAM(s) Oral with dinner    MEDICATIONS  (PRN):  guaiFENesin Oral Liquid (Sugar-Free) 200 milliGRAM(s) Oral every 6 hours PRN Cough  melatonin 5 milliGRAM(s) Oral at bedtime PRN Insomnia        Objective:  Vital Signs Last 24 Hrs  T(C): 36.4 (16 Mar 2022 05:56), Max: 36.4 (15 Mar 2022 15:43)  T(F): 97.6 (16 Mar 2022 05:56), Max: 97.6 (15 Mar 2022 15:43)  HR: 71 (16 Mar 2022 12:00) (68 - 79)  BP: 134/54 (16 Mar 2022 12:00) (116/52 - 134/54)  BP(mean): 78 (16 Mar 2022 12:00) (69 - 82)  RR: 23 (16 Mar 2022 12:00) (21 - 28)  SpO2: 96% (16 Mar 2022 12:00) (92% - 100%)      Physical Exam:  General: no acute distress Weak  Neck: supple, trachea midline  Lungs: Decreased, no wheeze/rhonchi  Cardiovascular: regular rate and rhythm, S1 S2  Abdomen: soft, nontender,  bowel sounds normal  Neurological: awake   Skin: no rash  Extremities:+ edema      LABS:                        10.1   6.03  )-----------( 144      ( 16 Mar 2022 06:17 )             32.9   03-16    144  |  100  |  32<H>  ----------------------------<  124<H>  3.2<L>   |  44<H>  |  0.79    Ca    7.9<L>      16 Mar 2022 06:17  Phos  2.5     03-16  Mg     2.3     03-16    TPro  5.9<L>  /  Alb  2.8<L>  /  TBili  0.7  /  DBili  x   /  AST  28  /  ALT  27  /  AlkPhos  109  03-16      MICROBIOLOGY:    Culture - Urine (collected 11 Mar 2022 21:02)  Source: Clean Catch Clean Catch (Midstream)  Final Report (12 Mar 2022 16:27):    <10,000 CFU/mL Normal Urogenital Xiomara    Culture - Blood (collected 11 Mar 2022 13:03)  Source: .Blood Blood-Peripheral  Preliminary Report (12 Mar 2022 14:01):    No growth to date.    Culture - Blood (collected 11 Mar 2022 13:03)  Source: .Blood Blood-Peripheral  Preliminary Report (12 Mar 2022 14:01):    No growth to date.    FLU A B RSV Detection by PCR (03.11.22 @ 21:03)    Flu A Result: NotDete: The Flu A B RSV assay is a Real-Time PCR test for the qualitative  detection and differentiation of Influenza A, Influenza B, and  Respiratory Syncytial Virus on nasopharyngeal swabs. The results should  be interpreted in the context of all clinical and laboratory findings.    Flu B Result: NotDete    RSV Result: Detected      RADIOLOGY & ADDITIONAL STUDIES:    ACC: [id number] EXAM:  CT CHEST                          PROCEDURE DATE:  03/11/2022          INTERPRETATION:  CLINICAL INFORMATION: Respiratory distress.    COMPARISON: Chest radiograph 3/11/2022.  CT scan chest 2/21/2022.    CONTRAST/COMPLICATIONS:  IV Contrast: NONE  Oral Contrast: NONE  Complications: None reported at time of study completion    PROCEDURE:  CT of the Chest was performed.  Sagittal and coronal reformats were performed.    FINDINGS:    LUNGS AND AIRWAYS:  PLEURA:  There are small bilateral pleural effusions with underlying compressive   atelectasis lung bases.  The central airways remain patent.    MEDIASTINUM AND CHICA: No lymphadenopathy.    VESSELS: Atherosclerotic changes thoracic aorta and coronary artery   calcifications.  Prominence of the main pulmonary arterial trunk, which may be associated   with pulmonary arterial hypertension.    HEART: Cardiomegaly.  Cardiac pacemaker leads.   No pericardial effusion.    CHEST WALL AND LOWER NECK:  Cardiac device left chestwall.    VISUALIZED UPPER ABDOMEN:  Limited by streak artifact.  Nodular contour liver.  Left hepatic cyst.    BONES: Degenerative changes.    IMPRESSION:    Small bilateral pleural effusions with underlying compressive   atelectasis, similar to prior exam.      Assessment :   89 y.o. female w/ hx of CHF, COPD, PPM, gout, hypothyroidism, HTN, and STEMI resident of [facility] admitted with Acute hypercapnia respiratory failure sec COPD/CHF exacerbation. Placed on BIPAP. CT chest with scott effusions with compressive atelectasis. BNP elevated. Sp Vanc Zosyn x 1 dose in ED.  Afebrile WBC WNL Procalcitonin 0.09  RSV + viral bronchitis   blood cx neg to date  urine legionella neg  strep neg  Clinically stable    Plan :   Monitor off antibiotics  Trend temps and cbc  Strict Aspiration precautions  DNRDNI  Stable from ID standpoint    Continue with present regiment.  Appropriate use of antibiotics and adverse effects reviewed.      > 35 minutes were spent in direct patient care reviewing notes, medications ,labs data/ imaging , discussion with multidisciplinary team.    Thank you for allowing me to participate in care of your patient .    Karis Goodwin MD  Infectious Disease  [phone]

## 2022-03-16 NOTE — DISCHARGE NOTE NURSING/CASE MANAGEMENT/SOCIAL WORK - PATIENT PORTAL LINK FT
You can access the FollowMyHealth Patient Portal offered by [facility] by registering at the following website: [email or web address]. By joining [facility]’s FollowMyHealth portal, you will also be able to view your health information using other applications (apps) compatible with our system.

## 2022-03-17 LAB
ANION GAP SERPL CALC-SCNC: -3 MMOL/L — LOW (ref 5–17)
BASE EXCESS BLDA CALC-SCNC: 28.8 MMOL/L — HIGH (ref -2–3)
BLOOD GAS COMMENTS ARTERIAL: SIGNIFICANT CHANGE UP
BUN SERPL-MCNC: 27 MG/DL — HIGH (ref 7–23)
CALCIUM SERPL-MCNC: 8 MG/DL — LOW (ref 8.4–10.5)
CHLORIDE SERPL-SCNC: 101 MMOL/L — SIGNIFICANT CHANGE UP (ref 96–108)
CO2 SERPL-SCNC: 49 MMOL/L — CRITICAL HIGH (ref 22–31)
CREAT SERPL-MCNC: 0.85 MG/DL — SIGNIFICANT CHANGE UP (ref 0.5–1.3)
EGFR: 64 ML/MIN/1.73M2 — SIGNIFICANT CHANGE UP
GAS PNL BLDA: SIGNIFICANT CHANGE UP
GLUCOSE SERPL-MCNC: 108 MG/DL — HIGH (ref 70–99)
HCO3 BLDA-SCNC: 53 MMOL/L — CRITICAL HIGH (ref 21–28)
HCT VFR BLD CALC: 35.2 % — SIGNIFICANT CHANGE UP (ref 34.5–45)
HGB BLD-MCNC: 10.7 G/DL — LOW (ref 11.5–15.5)
HOROWITZ INDEX BLDA+IHG-RTO: 28 — SIGNIFICANT CHANGE UP
MAGNESIUM SERPL-MCNC: 2.3 MG/DL — SIGNIFICANT CHANGE UP (ref 1.6–2.6)
MCHC RBC-ENTMCNC: 30.4 GM/DL — LOW (ref 32–36)
MCHC RBC-ENTMCNC: 34 PG — SIGNIFICANT CHANGE UP (ref 27–34)
MCV RBC AUTO: 111.7 FL — HIGH (ref 80–100)
NRBC # BLD: 0 /100 WBCS — SIGNIFICANT CHANGE UP (ref 0–0)
PCO2 BLDA: 78 MMHG — CRITICAL HIGH (ref 32–35)
PH BLDA: 7.44 — SIGNIFICANT CHANGE UP (ref 7.35–7.45)
PHOSPHATE SERPL-MCNC: 3.5 MG/DL — SIGNIFICANT CHANGE UP (ref 2.5–4.5)
PLATELET # BLD AUTO: 150 K/UL — SIGNIFICANT CHANGE UP (ref 150–400)
PO2 BLDA: 78 MMHG — LOW (ref 83–108)
POTASSIUM SERPL-MCNC: 3.7 MMOL/L — SIGNIFICANT CHANGE UP (ref 3.5–5.3)
POTASSIUM SERPL-SCNC: 3.7 MMOL/L — SIGNIFICANT CHANGE UP (ref 3.5–5.3)
RBC # BLD: 3.15 M/UL — LOW (ref 3.8–5.2)
RBC # FLD: 14.7 % — HIGH (ref 10.3–14.5)
SAO2 % BLDA: 97 % — SIGNIFICANT CHANGE UP (ref 94–98)
SODIUM SERPL-SCNC: 147 MMOL/L — HIGH (ref 135–145)
WBC # BLD: 5.48 K/UL — SIGNIFICANT CHANGE UP (ref 3.8–10.5)
WBC # FLD AUTO: 5.48 K/UL — SIGNIFICANT CHANGE UP (ref 3.8–10.5)

## 2022-03-17 PROCEDURE — 99233 SBSQ HOSP IP/OBS HIGH 50: CPT

## 2022-03-17 RX ORDER — ACETAZOLAMIDE 250 MG/1
250 TABLET ORAL DAILY
Refills: 0 | Status: DISCONTINUED | OUTPATIENT
Start: 2022-03-17 | End: 2022-03-21

## 2022-03-17 RX ADMIN — Medication 60 MILLIGRAM(S): at 05:56

## 2022-03-17 RX ADMIN — Medication 125 MICROGRAM(S): at 05:56

## 2022-03-17 RX ADMIN — Medication 25 MILLIGRAM(S): at 05:56

## 2022-03-17 RX ADMIN — RIVAROXABAN 15 MILLIGRAM(S): KIT at 17:11

## 2022-03-17 RX ADMIN — Medication 3 MILLILITER(S): at 14:41

## 2022-03-17 RX ADMIN — ACETAZOLAMIDE 250 MILLIGRAM(S): 250 TABLET ORAL at 11:42

## 2022-03-17 RX ADMIN — Medication 3 MILLILITER(S): at 07:42

## 2022-03-17 RX ADMIN — Medication 5 MILLIGRAM(S): at 21:10

## 2022-03-17 RX ADMIN — Medication 200 MILLIGRAM(S): at 21:10

## 2022-03-17 RX ADMIN — Medication 3 MILLILITER(S): at 22:41

## 2022-03-17 RX ADMIN — Medication 100 MILLIGRAM(S): at 11:42

## 2022-03-17 RX ADMIN — PANTOPRAZOLE SODIUM 40 MILLIGRAM(S): 20 TABLET, DELAYED RELEASE ORAL at 06:00

## 2022-03-17 NOTE — PROGRESS NOTE ADULT - SUBJECTIVE AND OBJECTIVE BOX
Patient is a 89y old  Female who presents with a chief complaint of Respiratory distress (17 Mar 2022 06:39)      INTERVAL HPI/OVERNIGHT EVENTS: Patient seen and examined at bedside. Discharge canceled yesterday as patient was tachypenic with increased work of breathing. Alkalosis noted last night. Given gentle hydration and Acetazolamide. Slightly more confused this morning but awake and alert. C/o cough and dyspnea       MEDICATIONS  (STANDING):  acetaZOLAMIDE    Tablet 250 milliGRAM(s) Oral daily  albuterol/ipratropium for Nebulization 3 milliLiter(s) Nebulizer every 8 hours  allopurinol 100 milliGRAM(s) Oral daily  digoxin     Tablet 125 MICROGram(s) Oral daily  furosemide    Tablet 60 milliGRAM(s) Oral daily  levothyroxine 125 MICROGram(s) Oral daily  metoprolol succinate ER 25 milliGRAM(s) Oral daily  pantoprazole    Tablet 40 milliGRAM(s) Oral before breakfast  rivaroxaban 15 milliGRAM(s) Oral with dinner    MEDICATIONS  (PRN):  guaiFENesin Oral Liquid (Sugar-Free) 200 milliGRAM(s) Oral every 6 hours PRN Cough  melatonin 5 milliGRAM(s) Oral at bedtime PRN Insomnia      Allergies    codeine (Unknown)    Intolerances          REVIEW OF SYSTEMS:  CONSTITUTIONAL: No fever or chills  HEENT:  No headache, no sore throat  RESPIRATORY: Admits cough, shortness of breath  CARDIOVASCULAR: No chest pain, palpitations, or leg swelling  GASTROINTESTINAL: No abd pain, nausea, vomiting, or diarrhea  GENITOURINARY: No dysuria, frequency, or hematuria  NEUROLOGICAL: no focal weakness or dizziness  MUSCULOSKELETAL: no myalgias      Vital Signs Last 24 Hrs  T(C): 36.7 (17 Mar 2022 08:10), Max: 36.8 (17 Mar 2022 00:00)  T(F): 98 (17 Mar 2022 08:10), Max: 98.2 (17 Mar 2022 00:00)  HR: 62 (17 Mar 2022 10:00) (60 - 86)  BP: 111/52 (17 Mar 2022 10:00) (111/52 - 139/58)  BP(mean): 70 (17 Mar 2022 10:00) (70 - 126)  RR: 21 (17 Mar 2022 10:00) (15 - 29)  SpO2: 97% (17 Mar 2022 10:00) (93% - 100%)    PHYSICAL EXAM:  GENERAL: NAD. elderly  HEENT:  EOMI, moist mucous membranes  CHEST/LUNG:  diminished bs at bases, poor insp effort, (+) cough, tachypneic   HEART:  RRR, S1, S2  ABDOMEN:  BS+, soft, nontender, nondistended  EXTREMITIES: no edema, cyanosis, or calf tenderness  NERVOUS SYSTEM: AA&Ox2-3    LABS:                        10.7   5.48  )-----------( 150      ( 17 Mar 2022 06:49 )             35.2     CBC Full  -  ( 17 Mar 2022 06:49 )  WBC Count : 5.48 K/uL  Hemoglobin : 10.7 g/dL  Hematocrit : 35.2 %  Platelet Count - Automated : 150 K/uL  Mean Cell Volume : 111.7 fl  Mean Cell Hemoglobin : 34.0 pg  Mean Cell Hemoglobin Concentration : 30.4 gm/dL  Auto Neutrophil # : x  Auto Lymphocyte # : x  Auto Monocyte # : x  Auto Eosinophil # : x  Auto Basophil # : x  Auto Neutrophil % : x  Auto Lymphocyte % : x  Auto Monocyte % : x  Auto Eosinophil % : x  Auto Basophil % : x    17 Mar 2022 06:49    147    |  101    |  27     ----------------------------<  108    3.7     |  49     |  0.85     Ca    8.0        17 Mar 2022 06:49  Phos  3.5       17 Mar 2022 06:49  Mg     2.3       17 Mar 2022 06:49    TPro  5.7    /  Alb  2.7    /  TBili  0.7    /  DBili  x      /  AST  27     /  ALT  23     /  AlkPhos  111    16 Mar 2022 20:51        CAPILLARY BLOOD GLUCOSE            Culture - Urine (collected 03-11-22 @ 21:02)  Source: Clean Catch Clean Catch (Midstream)  Final Report (03-12-22 @ 16:27):    <10,000 CFU/mL Normal Urogenital Xiomara    Culture - Blood (collected 03-11-22 @ 13:03)  Source: .Blood Blood-Peripheral  Final Report (03-16-22 @ 14:00):    No Growth Final    Culture - Blood (collected 03-11-22 @ 13:03)  Source: .Blood Blood-Peripheral  Final Report (03-16-22 @ 14:00):    No Growth Final        RADIOLOGY & ADDITIONAL TESTS: < from: Xray Chest 1 View- PORTABLE-Routine (Xray Chest 1 View- PORTABLE-Routine .) (03.16.22 @ 10:29) >    ACC: [id number] EXAM:  XR CHEST PORTABLE ROUTINE 1V                          PROCEDURE DATE:  03/16/2022          INTERPRETATION:  Portable chest radiograph    CLINICAL INFORMATION: Cough    TECHNIQUE:  Portable  AP chest radiograph.    COMPARISON: 3/11/2022 chest .    FINDINGS:  CATHETERS AND TUBES: None    PULMONARY:  LEFT basilar airspace consolidation and/or moderate effusion   obscures LEFT diaphragm contour and LEFT aortic line.   Haziness overlying the RIGHT lung base indicates pleural effusion and/or   basilar airspace disease.   Upper zones clear.  No pneumothorax.    HEART/VASCULAR: The  heart is enlarged in transverse diameter. Cardiac   device wire lead is within right ventricle.  .    BONES: Visualized osseous structures are intact.    IMPRESSION: Moderate LEFT effusion and/or basilar airspace consolidation.  Mild RIGHT effusion and/or posterior basilar airspace disease.    --- End of Report ---            LAYNE RIVAS MD; Attending Radiologist  This document has been electronically signed. Mar 17 2022  9:11AM    < end of copied text >    < from: US Transthoracic Echocardiogram w/Doppler Complete (03.12.22 @ 15:22) >    ACC: [id number] EXAM:  US TTE W DOPPLER COMPLETE                          PROCEDURE DATE:  03/12/2022          INTERPRETATION:  INDICATION: diastolic heart failure  Referring MPEARL:Chandana Gimenez  Blood Pressure 94/44  Weight (pd) :140     Height (feet, inches):5'2"  Technician: Teri Haile    Dimensions:  LA 2.96       Normal Values: 2.0 - 4.0 cm  Ao 2.68        Normal Values: 2.0 - 3.8 cm  SEPTUM 0.9       Normal Values: 0.6 - 1.2 cm  PWT 0.6       Normal Values: 0.6 - 1.1 cm  LVIDd 3.27         Normal Values: 3.0 - 5.6 cm  LVIDs 2.33         Normal Values: 1.8 - 4.0 cm      OBSERVATIONS:    Mitral Valve: Normal mitral annulus and valve. No mitral regurgitation.  Aortic Valve/Aorta: Normal trileaflet aortic valve.  Tricuspid Valve: Normal tricuspid valve. Severe tricuspid regurgitation.  Pulmonic Valve: The pulmonic valve is not well visualized. Probably   normal.  Left Atrium: Enlarged  Right Atrium: Severely enlarged  Left Ventricle: Normal left ventricular size, thickness & systolic   function. The EF is approximately 55-60%.  Right Ventricle: Severely enlarged right ventricle, reduced systolic   function.  Pericardium/Pleura: No pericardial effusion noted.  Pulmonary/RV Pressure: The right ventricular systolic pressure is   estimated to be 63 mmHg, assuming that the right atrial pressure is   estimated to be 8 mmHg. This is consistent with severe pulmonary   hypertension..  LV Diastolic Function: Normal diastolic function.    Impression:  Severely dilated right atrium & right ventricle w/ reduced RV systolic   function.  Severe pulmonary hypertension.  Severe tricuspid regurgitation.  Normal left ventricular size, thickness & systolic function. The EF is   approximately 55-60%.    --- End of Report ---            JAROCHO WILKERSON MD; Attending Cardiologist  This document has been electronically signed. Mar 13 2022 10:24AM    < end of copied text >      Consultant(s) Notes Reviewed:  [x] YES  [ ] NO    Care Discussed with [x] Consultants  [x] Patient  [ ] Family  [ ]      [ x] Other; RN  DVT ppx

## 2022-03-17 NOTE — PROGRESS NOTE ADULT - ASSESSMENT
pt with extensive med hx -   CHF  Pulm HTN severe  valv heart disease  COPD  Resp Distress  PVD  OP  OA  Fall prone - risk  Ataxic gait  pelvic fx  AF  Dyspepsia  PPM    ABG noted - restarted on BIPAP overnight - vs noted - Labs reviewed - monitor Mental Status - will repeat CXR    RSV positive  CT chest noted  strep and legionella ag neg  ID f/u noted  cardio f/u noted  TTE shows severe Pulm HTN - known   completed Systemic Steroids course    ct chest reviewed  BIPAP PRN - for resp distress - hypercapnea - night time use as tolerated  ABG noted  Labs and imaging reviewed  lasix diuresis - i and o - monitor Na and renal indices -   Pulse ox monitoring - Card tele monitoring - CHF and COPD ex -   NEBS and s/p Steroids - for COPD  Diuresis as per Cardio  on AC for AF

## 2022-03-17 NOTE — PROGRESS NOTE ADULT - SUBJECTIVE AND OBJECTIVE BOX
Date/Time Patient Seen:  		  Referring MD:   Data Reviewed	       Patient is a 89y old  Female who presents with a chief complaint of Respiratory distress (16 Mar 2022 22:00)      Subjective/HPI     PAST MEDICAL & SURGICAL HISTORY:  Chronic obstructive pulmonary disease (COPD)    HTN (hypertension)    Cardiac pacemaker    Gout    Hypothyroidism    Insomnia    Chronic CHF    History of ST elevation myocardial infarction (STEMI)    No significant past surgical history          Medication list         MEDICATIONS  (STANDING):  albuterol/ipratropium for Nebulization 3 milliLiter(s) Nebulizer every 8 hours  allopurinol 100 milliGRAM(s) Oral daily  digoxin     Tablet 125 MICROGram(s) Oral daily  furosemide    Tablet 60 milliGRAM(s) Oral daily  levothyroxine 125 MICROGram(s) Oral daily  metoprolol succinate ER 25 milliGRAM(s) Oral daily  pantoprazole    Tablet 40 milliGRAM(s) Oral before breakfast  rivaroxaban 15 milliGRAM(s) Oral with dinner    MEDICATIONS  (PRN):  guaiFENesin Oral Liquid (Sugar-Free) 200 milliGRAM(s) Oral every 6 hours PRN Cough  melatonin 5 milliGRAM(s) Oral at bedtime PRN Insomnia         Vitals log        ICU Vital Signs Last 24 Hrs  T(C): 36.5 (17 Mar 2022 06:00), Max: 36.8 (17 Mar 2022 00:00)  T(F): 97.7 (17 Mar 2022 06:00), Max: 98.2 (17 Mar 2022 00:00)  HR: 60 (17 Mar 2022 06:30) (60 - 79)  BP: 119/60 (17 Mar 2022 06:00) (112/55 - 139/58)  BP(mean): 78 (17 Mar 2022 06:00) (74 - 126)  ABP: --  ABP(mean): --  RR: 22 (17 Mar 2022 06:30) (15 - 28)  SpO2: 96% (17 Mar 2022 06:30) (93% - 100%)           Input and Output:  I&O's Detail    15 Mar 2022 07:01  -  16 Mar 2022 07:00  --------------------------------------------------------  IN:    Oral Fluid: 1020 mL  Total IN: 1020 mL    OUT:    Indwelling Catheter - Urethral (mL): 2550 mL  Total OUT: 2550 mL    Total NET: -1530 mL      16 Mar 2022 07:01  -  17 Mar 2022 06:39  --------------------------------------------------------  IN:    IV PiggyBack: 50 mL    Sodium Chloride 0.9% Bolus: 250 mL  Total IN: 300 mL    OUT:    Indwelling Catheter - Urethral (mL): 3150 mL  Total OUT: 3150 mL    Total NET: -2850 mL          Lab Data                        10.1   6.03  )-----------( 144      ( 16 Mar 2022 06:17 )             32.9     03-16    142  |  97  |  27<H>  ----------------------------<  134<H>  3.9   |  48<HH>  |  0.82    Ca    7.7<L>      16 Mar 2022 20:51  Phos  2.8     03-16  Mg     2.1     03-16    TPro  5.7<L>  /  Alb  2.7<L>  /  TBili  0.7  /  DBili  x   /  AST  27  /  ALT  23  /  AlkPhos  111  03-16    ABG - ( 17 Mar 2022 06:07 )  pH, Arterial: 7.44  pH, Blood: x     /  pCO2: 78    /  pO2: 78    / HCO3: 53    / Base Excess: 28.8  /  SaO2: 97.0                    Review of Systems	      Objective     Physical Examination    heart s1s2  lung dc bS  abd soft  head nc      Pertinent Lab findings & Imaging      Olga:  NO   Adequate UO     I&O's Detail    15 Mar 2022 07:01  -  16 Mar 2022 07:00  --------------------------------------------------------  IN:    Oral Fluid: 1020 mL  Total IN: 1020 mL    OUT:    Indwelling Catheter - Urethral (mL): 2550 mL  Total OUT: 2550 mL    Total NET: -1530 mL      16 Mar 2022 07:01  -  17 Mar 2022 06:39  --------------------------------------------------------  IN:    IV PiggyBack: 50 mL    Sodium Chloride 0.9% Bolus: 250 mL  Total IN: 300 mL    OUT:    Indwelling Catheter - Urethral (mL): 3150 mL  Total OUT: 3150 mL    Total NET: -2850 mL               Discussed with:     Cultures:	        Radiology

## 2022-03-17 NOTE — PROGRESS NOTE ADULT - SUBJECTIVE AND OBJECTIVE BOX
Patient is a 89y old  Female who presents with a chief complaint of Respiratory distress (17 Mar 2022 13:30)      BRIEF HOSPITAL COURSE:   Patient is a 89 yo female with a pmh of CHF, COPD, PPM, Gout, hypothyroidism HTN, STEMI and recent pelvic fracture who presented to [facility] ED on 03/11/22 for respiratory distress. Patient positive for RSV on admission requiring bipap therapy. Patient was admitted to [facility] with acute hypoxic/hypercapnic respiratory failure, RSV and pleural effusion.     Pt seen and examined at bedside, endorses fatigue       Events last 24 hours:   Start on diamoz for elevated serum bicarb       PAST MEDICAL & SURGICAL HISTORY:  Chronic obstructive pulmonary disease (COPD)    HTN (hypertension)    Cardiac pacemaker    Gout    Hypothyroidism    Insomnia    Chronic CHF    History of ST elevation myocardial infarction (STEMI)    No significant past surgical history      Allergies    codeine (Unknown)    Intolerances      FAMILY HISTORY:  No pertinent family history in first degree relatives    social hx:  denies tobacco and alc use     Review of Systems:  CONSTITUTIONAL: No fever, chills, + fatigue  EYES: No eye pain, visual disturbances, or discharge  ENMT:  No difficulty hearing, tinnitus, vertigo; No sinus or throat pain  NECK: No pain or stiffness  RESPIRATORY: No cough, wheezing, chills or hemoptysis; No shortness of breath  CARDIOVASCULAR: No chest pain, palpitations, dizziness, or leg swelling  GASTROINTESTINAL: No abdominal or epigastric pain. No nausea, vomiting, or hematemesis; No diarrhea or constipation. No melena or hematochezia.  GENITOURINARY: No dysuria, frequency, hematuria, or incontinence  NEUROLOGICAL: No headaches, memory loss, loss of strength, numbness, or tremors  SKIN: No itching, burning, rashes, or lesions   MUSCULOSKELETAL: No joint pain or swelling; No muscle, back, or extremity pain  PSYCHIATRIC: No depression, anxiety, mood swings, or difficulty sleeping      Medications:    acetaZOLAMIDE    Tablet 250 milliGRAM(s) Oral daily  digoxin     Tablet 125 MICROGram(s) Oral daily  furosemide    Tablet 60 milliGRAM(s) Oral daily  metoprolol succinate ER 25 milliGRAM(s) Oral daily    albuterol/ipratropium for Nebulization 3 milliLiter(s) Nebulizer every 8 hours  guaiFENesin Oral Liquid (Sugar-Free) 200 milliGRAM(s) Oral every 6 hours PRN    melatonin 5 milliGRAM(s) Oral at bedtime PRN      rivaroxaban 15 milliGRAM(s) Oral with dinner    pantoprazole    Tablet 40 milliGRAM(s) Oral before breakfast      allopurinol 100 milliGRAM(s) Oral daily  levothyroxine 125 MICROGram(s) Oral daily                  ICU Vital Signs Last 24 Hrs  T(C): 36.3 (17 Mar 2022 16:15), Max: 36.8 (17 Mar 2022 00:00)  T(F): 97.4 (17 Mar 2022 16:15), Max: 98.3 (17 Mar 2022 12:56)  HR: 69 (17 Mar 2022 18:00) (60 - 89)  BP: 95/54 (17 Mar 2022 18:00) (90/45 - 139/58)  BP(mean): 67 (17 Mar 2022 18:00) (59 - 126)  ABP: --  ABP(mean): --  RR: 17 (17 Mar 2022 18:00) (15 - 29)  SpO2: 76% (17 Mar 2022 18:00) (76% - 100%)    Vital Signs Last 24 Hrs  T(C): 36.3 (17 Mar 2022 16:15), Max: 36.8 (17 Mar 2022 00:00)  T(F): 97.4 (17 Mar 2022 16:15), Max: 98.3 (17 Mar 2022 12:56)  HR: 69 (17 Mar 2022 18:00) (60 - 89)  BP: 95/54 (17 Mar 2022 18:00) (90/45 - 139/58)  BP(mean): 67 (17 Mar 2022 18:00) (59 - 126)  RR: 17 (17 Mar 2022 18:00) (15 - 29)  SpO2: 76% (17 Mar 2022 18:00) (76% - 100%)    ABG - ( 17 Mar 2022 06:07 )  pH, Arterial: 7.44  pH, Blood: x     /  pCO2: 78    /  pO2: 78    / HCO3: 53    / Base Excess: 28.8  /  SaO2: 97.0                I&O's Detail    16 Mar 2022 07:01  -  17 Mar 2022 07:00  --------------------------------------------------------  IN:    IV PiggyBack: 50 mL    Sodium Chloride 0.9% Bolus: 250 mL  Total IN: 300 mL    OUT:    Indwelling Catheter - Urethral (mL): 3150 mL  Total OUT: 3150 mL    Total NET: -2850 mL      17 Mar 2022 07:01  -  17 Mar 2022 20:02  --------------------------------------------------------  IN:  Total IN: 0 mL    OUT:    Indwelling Catheter - Urethral (mL): 1700 mL  Total OUT: 1700 mL    Total NET: -1700 mL            LABS:                        10.7   5.48  )-----------( 150      ( 17 Mar 2022 06:49 )             35.2     03-17    147<H>  |  101  |  27<H>  ----------------------------<  108<H>  3.7   |  49<HH>  |  0.85    Ca    8.0<L>      17 Mar 2022 06:49  Phos  3.5     03-17  Mg     2.3     03-17    TPro  5.7<L>  /  Alb  2.7<L>  /  TBili  0.7  /  DBili  x   /  AST  27  /  ALT  23  /  AlkPhos  111  03-16          CAPILLARY BLOOD GLUCOSE            CULTURES:  Culture Results:   <10,000 CFU/mL Normal Urogenital Xiomara (03-11 @ 21:02)  Culture Results:   No Growth Final (03-11 @ 13:03)  Culture Results:   No Growth Final (03-11 @ 13:03)      Physical Examination:    General: Elderly female in bed, NAD    HEENT: Pupils equal, reactive to light.  Symmetric.    PULM: b/l air entry     CVS: +s1/s2    ABD: Soft, nondistended, nontender, normoactive bowel sounds, no masses    EXT: No edema, nontender    SKIN: Warm and well perfused    Neuro: awake and alert    RADIOLOGY:   < from: Xray Chest 1 View- PORTABLE-Routine (Xray Chest 1 View- PORTABLE-Routine .) (03.16.22 @ 10:29) >  INTERPRETATION:  Portable chest radiograph    CLINICAL INFORMATION: Cough    TECHNIQUE:  Portable  AP chest radiograph.    COMPARISON: 3/11/2022 chest .    FINDINGS:  CATHETERS AND TUBES: None    PULMONARY:  LEFT basilar airspace consolidation and/or moderate effusion   obscures LEFT diaphragm contour and LEFT aortic line.   Haziness overlying the RIGHT lung base indicates pleural effusion and/or   basilar airspace disease.   Upper zones clear.  No pneumothorax.    HEART/VASCULAR: The  heart is enlarged in transverse diameter. Cardiac   device wire lead is within right ventricle.  .    BONES: Visualized osseous structures are intact.    IMPRESSION: Moderate LEFT effusion and/or basilar airspace consolidation.  Mild RIGHT effusion and/or posterior basilar airspace disease.

## 2022-03-17 NOTE — CONSULT NOTE ADULT - SUBJECTIVE AND OBJECTIVE BOX
Altered in mental status,   For Altered in mental status and dizziness suspect most likely this is metabolic encephalopathy, which is multifactorial secondary to underlying resp issues chf hospital setting  monitor CO2 as needed   monitor renal function  check labs   spoke to son in detail   prognosis appears guarded

## 2022-03-17 NOTE — PROGRESS NOTE ADULT - SUBJECTIVE AND OBJECTIVE BOX
PATRICIA MORTON is a 89yFemale , patient examined and chart reviewed.     INTERVAL HPI/ OVERNIGHT EVENTS:   Patient was tachypenic with SOB. Afebrile.     PAST MEDICAL & SURGICAL HISTORY:  Chronic obstructive pulmonary disease (COPD)  HTN (hypertension)  Cardiac pacemaker  Gout  Hypothyroidism  Insomnia  Chronic CHF  History of ST elevation myocardial infarction (STEMI)  No significant past surgical history        For details regarding the patient's social history, family history, and other miscellaneous elements, please refer the initial infectious diseases consultation and/or the admitting history and physical examination for this admission.    ROS:  Unable to obtain due to : Poor historian    Allergies  codeine (Unknown)      Current inpatient medications :    ANTIBIOTICS/RELEVANT:    MEDICATIONS  (STANDING):  acetaZOLAMIDE    Tablet 250 milliGRAM(s) Oral daily  albuterol/ipratropium for Nebulization 3 milliLiter(s) Nebulizer every 8 hours  allopurinol 100 milliGRAM(s) Oral daily  digoxin     Tablet 125 MICROGram(s) Oral daily  furosemide    Tablet 60 milliGRAM(s) Oral daily  levothyroxine 125 MICROGram(s) Oral daily  metoprolol succinate ER 25 milliGRAM(s) Oral daily  pantoprazole    Tablet 40 milliGRAM(s) Oral before breakfast  rivaroxaban 15 milliGRAM(s) Oral with dinner    MEDICATIONS  (PRN):  guaiFENesin Oral Liquid (Sugar-Free) 200 milliGRAM(s) Oral every 6 hours PRN Cough  melatonin 5 milliGRAM(s) Oral at bedtime PRN Insomnia      Objective:  Vital Signs Last 24 Hrs  T(C): 36.7 (17 Mar 2022 08:10), Max: 36.8 (17 Mar 2022 00:00)  T(F): 98 (17 Mar 2022 08:10), Max: 98.2 (17 Mar 2022 00:00)  HR: 66 (17 Mar 2022 12:01) (60 - 86)  BP: 107/53 (17 Mar 2022 12:01) (107/53 - 139/58)  BP(mean): 69 (17 Mar 2022 12:01) (69 - 126)  RR: 22 (17 Mar 2022 12:01) (15 - 29)  SpO2: 99% (17 Mar 2022 12:01) (93% - 100%)      Physical Exam:  General: no acute distress Weak  Neck: supple, trachea midline  Lungs: Decreased, no wheeze/rhonchi  Cardiovascular: regular rate and rhythm, S1 S2  Abdomen: soft, nontender,  bowel sounds normal  Neurological: awake   Skin: no rash  Extremities:+ edema      LABS:                        10.7   5.48  )-----------( 150      ( 17 Mar 2022 06:49 )             35.2   03-17    147<H>  |  101  |  27<H>  ----------------------------<  108<H>  3.7   |  49<HH>  |  0.85    Ca    8.0<L>      17 Mar 2022 06:49  Phos  3.5     03-17  Mg     2.3     03-17    TPro  5.7<L>  /  Alb  2.7<L>  /  TBili  0.7  /  DBili  x   /  AST  27  /  ALT  23  /  AlkPhos  111  03-16        MICROBIOLOGY:    Culture - Urine (collected 11 Mar 2022 21:02)  Source: Clean Catch Clean Catch (Midstream)  Final Report (12 Mar 2022 16:27):    <10,000 CFU/mL Normal Urogenital Xiomara    Culture - Blood (collected 11 Mar 2022 13:03)  Source: .Blood Blood-Peripheral  Preliminary Report (12 Mar 2022 14:01):    No growth to date.    Culture - Blood (collected 11 Mar 2022 13:03)  Source: .Blood Blood-Peripheral  Preliminary Report (12 Mar 2022 14:01):    No growth to date.    FLU A B RSV Detection by PCR (03.11.22 @ 21:03)    [facility]: The Flu A B RSV assay is a Real-Time PCR test for the qualitative  detection and differentiation of Influenza A, Influenza B, and  Respiratory Syncytial Virus on nasopharyngeal swabs. The results should  be interpreted in the context of all clinical and laboratory findings.    Flu B Result: [facility]    RSV Result: Detected      RADIOLOGY & ADDITIONAL STUDIES:    ACC: [id number] EXAM:  CT CHEST                          PROCEDURE DATE:  03/11/2022          INTERPRETATION:  CLINICAL INFORMATION: Respiratory distress.    COMPARISON: Chest radiograph 3/11/2022.  CT scan chest 2/21/2022.    CONTRAST/COMPLICATIONS:  IV Contrast: NONE  Oral Contrast: NONE  Complications: None reported at time of study completion    PROCEDURE:  CT of the Chest was performed.  Sagittal and coronal reformats were performed.    FINDINGS:    LUNGS AND AIRWAYS:  PLEURA:  There are small bilateral pleural effusions with underlying compressive   atelectasis lung bases.  The central airways remain patent.    MEDIASTINUM AND CHICA: No lymphadenopathy.    VESSELS: Atherosclerotic changes thoracic aorta and coronary artery   calcifications.  Prominence of the main pulmonary arterial trunk, which may be associated   with pulmonary arterial hypertension.    HEART: Cardiomegaly.  Cardiac pacemaker leads.   No pericardial effusion.    CHEST WALL AND LOWER NECK:  Cardiac device left chestwall.    VISUALIZED UPPER ABDOMEN:  Limited by streak artifact.  Nodular contour liver.  Left hepatic cyst.    BONES: Degenerative changes.    IMPRESSION:    Small bilateral pleural effusions with underlying compressive   atelectasis, similar to prior exam.      Assessment :   89 y.o. female w/ hx of CHF, COPD, PPM, gout, hypothyroidism, HTN, and STEMI resident of [facility] admitted with Acute hypercapnia respiratory failure sec COPD/CHF exacerbation. Placed on BIPAP. CT chest with scott effusions with compressive atelectasis. BNP elevated. Sp Vanc Zosyn x 1 dose in ED.  Afebrile WBC WNL Procalcitonin 0.09  RSV + viral bronchitis   blood cx neg to date  urine legionella neg  strep neg  Hypercapnia and hypoxic respiratory failure    Plan :   Pulm following  Monitor off antibiotics  Trend temps and cbc  Strict Aspiration precautions  DNRDNI  Stable from ID standpoint    Continue with present regiment.  Appropriate use of antibiotics and adverse effects reviewed.      > 35 minutes were spent in direct patient care reviewing notes, medications ,labs data/ imaging , discussion with multidisciplinary team.    Thank you for allowing me to participate in care of your patient .    Karis Goodwin MD  Infectious Disease  [phone]

## 2022-03-17 NOTE — PROGRESS NOTE ADULT - SUBJECTIVE AND OBJECTIVE BOX
Chief Complaint: Shortness of breath    Interval Events: No events overnight.    Review of Systems:  General: No fevers, chills, weight gain  Skin: No rashes, color changes  Cardiovascular: No chest pain, orthopnea  Respiratory: No shortness of breath, cough  Gastrointestinal: No nausea, abdominal pain  Genitourinary: No incontinence, pain with urination  Musculoskeletal: No pain, swelling, decreased range of motion  Neurological: No headache, weakness  Psychiatric: No depression, anxiety  Endocrine: No weight gain, increased thirst  All other systems are comprehensively negative.    Physical Exam:  Vital Signs Last 24 Hrs  T(C): 36.7 (17 Mar 2022 08:10), Max: 36.8 (17 Mar 2022 00:00)  T(F): 98 (17 Mar 2022 08:10), Max: 98.2 (17 Mar 2022 00:00)  HR: 62 (17 Mar 2022 10:00) (60 - 86)  BP: 111/52 (17 Mar 2022 10:00) (111/52 - 139/58)  BP(mean): 70 (17 Mar 2022 10:00) (70 - 126)  RR: 21 (17 Mar 2022 10:00) (15 - 29)  SpO2: 97% (17 Mar 2022 10:00) (93% - 100%)  General: NAD  HEENT: MMM  Neck: No JVD, no carotid bruit  Lungs: CTAB  CV: RRR, nl S1/S2, no M/R/G  Abdomen: S/NT/ND, +BS  Extremities: No LE edema, no cyanosis  Neuro: AAOx3, non-focal  Skin: No rash    Labs:             03-17    147<H>  |  101  |  27<H>  ----------------------------<  108<H>  3.7   |  49<HH>  |  0.85    Ca    8.0<L>      17 Mar 2022 06:49  Phos  3.5     03-17  Mg     2.3     03-17    TPro  5.7<L>  /  Alb  2.7<L>  /  TBili  0.7  /  DBili  x   /  AST  27  /  ALT  23  /  AlkPhos  111  03-16                        10.7   5.48  )-----------( 150      ( 17 Mar 2022 06:49 )             35.2       Telemetry: AF, ventricular paced

## 2022-03-17 NOTE — CONSULT NOTE ADULT - SUBJECTIVE AND OBJECTIVE BOX
HPI:  Patient is a 89y old  Female with diastolic CM, RV dysfx, severe pulm HTN who was admitted on  with hypercapnic resp failure.   Being managed for HF and COPD exacerbation. Observed to have progressive rise in serum CO2. Hence renal evaluation requested.         PAST MEDICAL & SURGICAL HISTORY:  Chronic obstructive pulmonary disease (COPD)    HTN (hypertension)    Cardiac pacemaker    Gout    Hypothyroidism    Insomnia    Chronic CHF    History of ST elevation myocardial infarction (STEMI)    No significant past surgical history          FAMILY HISTORY:  No pertinent family history in first degree relatives        Allergies    codeine (Unknown)            MEDICATIONS  (STANDING):  acetaZOLAMIDE    Tablet 250 milliGRAM(s) Oral daily  albuterol/ipratropium for Nebulization 3 milliLiter(s) Nebulizer every 8 hours  allopurinol 100 milliGRAM(s) Oral daily  digoxin     Tablet 125 MICROGram(s) Oral daily  furosemide    Tablet 60 milliGRAM(s) Oral daily  levothyroxine 125 MICROGram(s) Oral daily  metoprolol succinate ER 25 milliGRAM(s) Oral daily  pantoprazole    Tablet 40 milliGRAM(s) Oral before breakfast  rivaroxaban 15 milliGRAM(s) Oral with dinner    MEDICATIONS  (PRN):  guaiFENesin Oral Liquid (Sugar-Free) 200 milliGRAM(s) Oral every 6 hours PRN Cough  melatonin 5 milliGRAM(s) Oral at bedtime PRN Insomnia      Daily     Daily Weight in k.1 (17 Mar 2022 06:00)    Drug Dosing Weight  Height (cm): 160 (11 Mar 2022 09:14)  Weight (kg): 59.4 (11 Mar 2022 09:14)  BMI (kg/m2): 23.2 (11 Mar 2022 09:14)  BSA (m2): 1.62 (11 Mar 2022 09:14)      REVIEW OF SYSTEMS:    Per HPI        ABG - ( 17 Mar 2022 06:07 )  pH, Arterial: 7.44  pH, Blood: x     /  pCO2: 78    /  pO2: 78    / HCO3: 53    / Base Excess: 28.8  /  SaO2: 97.0                I&O's Detail    16 Mar 2022 07:01  -  17 Mar 2022 07:00  --------------------------------------------------------  IN:    IV PiggyBack: 50 mL    Sodium Chloride 0.9% Bolus: 250 mL  Total IN: 300 mL    OUT:    Indwelling Catheter - Urethral (mL): 3150 mL  Total OUT: 3150 mL    Total NET: -2850 mL           @ 07:01  -   @ 07:00  --------------------------------------------------------  IN: 300 mL / OUT: 3150 mL / NET: -2850 mL        PHYSICAL EXAM:    GENERAL: HOF, mild dyspnea at rest  NECK: Supple. No increase in JVP  CHEST/LUNG: Clear to auscultation bilaterally  HEART: S1S2  ABDOMEN: Soft, Nontender, Nondistended. POS BS  EXTREMITIES:  min edema  LABS:  CBC Full  -  ( 17 Mar 2022 06:49 )  WBC Count : 5.48 K/uL  RBC Count : 3.15 M/uL  Hemoglobin : 10.7 g/dL  Hematocrit : 35.2 %  Platelet Count - Automated : 150 K/uL  Mean Cell Volume : 111.7 fl  Mean Cell Hemoglobin : 34.0 pg  Mean Cell Hemoglobin Concentration : 30.4 gm/dL  Auto Neutrophil # : x  Auto Lymphocyte # : x  Auto Monocyte # : x  Auto Eosinophil # : x  Auto Basophil # : x  Auto Neutrophil % : x  Auto Lymphocyte % : x  Auto Monocyte % : x  Auto Eosinophil % : x  Auto Basophil % : x        147<H>  |  101  |  27<H>  ----------------------------<  108<H>  3.7   |  49<HH>  |  0.85    Ca    8.0<L>      17 Mar 2022 06:49  Phos  3.5     -  Mg     2.3     -    TPro  5.7<L>  /  Alb  2.7<L>  /  TBili  0.7  /  DBili  x   /  AST  27  /  ALT  23  /  AlkPhos  111      CAPILLARY BLOOD GLUCOSE      Impression:  * Met alkalosis -- post hypercapnic + contraction  * HF + COPD exacerbation   * Diastolic CM, severe pulm HTN, RV Dysfx    Recommendations:  Agree with acetazolamide  Hold Lasix for few days.   Repeat ABG in 2-3 days.     Thank you!

## 2022-03-17 NOTE — PROGRESS NOTE ADULT - ASSESSMENT
The patient is a 89 year old female with a history of hypothyroidism, atrial fibrillation, pacemaker, COPD, CAD, chronic diastolic heart failure, severe TR, severe pulm HTN who presents with shortness of breath in the setting of acute on chronic diastolic heart failure, COPD, RSV.    Plan:  - ECG with known AF and intermittent ventricular pacing  - CXR with bilateral pleural effusions  - BNP 14779  - Echo 2/22 with normal LV systolic function, severe TR, severe pulm HTN  - RSV positive  - Continue furosemide 60 mg daily  - Significantly elevated bicarb - likely in part due to respiratory alkalosis  - Add acetazolamide 250 mg PO daily  - Should be on BIPAP at night  - Continue rivaroxaban 15 mg daily  - Continue digoxin 0.125 mg daily  - Continue metoprolol succinate 25 mg daily

## 2022-03-17 NOTE — PROGRESS NOTE ADULT - ASSESSMENT
Patient is a 89 yo female with a pmh of CHF, COPD, PPM, Gout, hypothyroidism HTN, STEMI and recent pelvic fracture who presented to [facility] ED on 03/11/22 for respiratory distress, found to have:    1. Acute hypoxic and hypercapnic resp failure   2. Pleural effusions  3. PNA  4. RSV  5, AMS    Plan  Neuro: AMS improving, appears to be at baseline   Cardio: BB, digoxin for rate control of afib. Holding further lasix doses given contraction alkalosis. Cont Diamox which will aid in serum bicarb excretion   . If pt need volume can give albumin  Pulm: Tolerating NC, still with b/l effusions. BIPAP HS if pt is compliant , nebs q6, if no improvement in effusions consider drainage   GI: diet as tolerated , PPI  Renal: diuresis as stated above, strict i/os, replace lytes with active diuresis   ID: supportive care fo RSV, cxs negative   Heme: Xarelto for afib, SCDs  Endo: cont synthroid

## 2022-03-18 LAB
ALBUMIN SERPL ELPH-MCNC: 2.7 G/DL — LOW (ref 3.3–5)
ALP SERPL-CCNC: 111 U/L — SIGNIFICANT CHANGE UP (ref 30–120)
ALT FLD-CCNC: 21 U/L DA — SIGNIFICANT CHANGE UP (ref 10–60)
ANION GAP SERPL CALC-SCNC: -2 MMOL/L — LOW (ref 5–17)
AST SERPL-CCNC: 24 U/L — SIGNIFICANT CHANGE UP (ref 10–40)
BASE EXCESS BLDA CALC-SCNC: 23.3 MMOL/L — HIGH (ref -2–3)
BASOPHILS # BLD AUTO: 0.02 K/UL — SIGNIFICANT CHANGE UP (ref 0–0.2)
BASOPHILS NFR BLD AUTO: 0.3 % — SIGNIFICANT CHANGE UP (ref 0–2)
BILIRUB SERPL-MCNC: 0.8 MG/DL — SIGNIFICANT CHANGE UP (ref 0.2–1.2)
BLOOD GAS COMMENTS ARTERIAL: SIGNIFICANT CHANGE UP
BLOOD GAS COMMENTS ARTERIAL: SIGNIFICANT CHANGE UP
BUN SERPL-MCNC: 29 MG/DL — HIGH (ref 7–23)
CALCIUM SERPL-MCNC: 8.1 MG/DL — LOW (ref 8.4–10.5)
CHLORIDE SERPL-SCNC: 103 MMOL/L — SIGNIFICANT CHANGE UP (ref 96–108)
CO2 SERPL-SCNC: 43 MMOL/L — HIGH (ref 22–31)
CREAT SERPL-MCNC: 0.95 MG/DL — SIGNIFICANT CHANGE UP (ref 0.5–1.3)
EGFR: 56 ML/MIN/1.73M2 — LOW
EOSINOPHIL # BLD AUTO: 0.17 K/UL — SIGNIFICANT CHANGE UP (ref 0–0.5)
EOSINOPHIL NFR BLD AUTO: 2.2 % — SIGNIFICANT CHANGE UP (ref 0–6)
GAS PNL BLDA: SIGNIFICANT CHANGE UP
GLUCOSE SERPL-MCNC: 113 MG/DL — HIGH (ref 70–99)
HCO3 BLDA-SCNC: 48 MMOL/L — CRITICAL HIGH (ref 21–28)
HCT VFR BLD CALC: 34.2 % — LOW (ref 34.5–45)
HGB BLD-MCNC: 10.4 G/DL — LOW (ref 11.5–15.5)
HOROWITZ INDEX BLDA+IHG-RTO: 32 — SIGNIFICANT CHANGE UP
IMM GRANULOCYTES NFR BLD AUTO: 0.3 % — SIGNIFICANT CHANGE UP (ref 0–1.5)
LYMPHOCYTES # BLD AUTO: 1.69 K/UL — SIGNIFICANT CHANGE UP (ref 1–3.3)
LYMPHOCYTES # BLD AUTO: 22.2 % — SIGNIFICANT CHANGE UP (ref 13–44)
MAGNESIUM SERPL-MCNC: 2.5 MG/DL — SIGNIFICANT CHANGE UP (ref 1.6–2.6)
MCHC RBC-ENTMCNC: 30.4 GM/DL — LOW (ref 32–36)
MCHC RBC-ENTMCNC: 33.3 PG — SIGNIFICANT CHANGE UP (ref 27–34)
MCV RBC AUTO: 109.6 FL — HIGH (ref 80–100)
MONOCYTES # BLD AUTO: 0.73 K/UL — SIGNIFICANT CHANGE UP (ref 0–0.9)
MONOCYTES NFR BLD AUTO: 9.6 % — SIGNIFICANT CHANGE UP (ref 2–14)
NEUTROPHILS # BLD AUTO: 4.99 K/UL — SIGNIFICANT CHANGE UP (ref 1.8–7.4)
NEUTROPHILS NFR BLD AUTO: 65.4 % — SIGNIFICANT CHANGE UP (ref 43–77)
NRBC # BLD: 0 /100 WBCS — SIGNIFICANT CHANGE UP (ref 0–0)
PCO2 BLDA: 70 MMHG — CRITICAL HIGH (ref 32–35)
PH BLDA: 7.44 — SIGNIFICANT CHANGE UP (ref 7.35–7.45)
PHOSPHATE SERPL-MCNC: 3.1 MG/DL — SIGNIFICANT CHANGE UP (ref 2.5–4.5)
PLATELET # BLD AUTO: 156 K/UL — SIGNIFICANT CHANGE UP (ref 150–400)
PO2 BLDA: 76 MMHG — LOW (ref 83–108)
POTASSIUM SERPL-MCNC: 3.3 MMOL/L — LOW (ref 3.5–5.3)
POTASSIUM SERPL-SCNC: 3.3 MMOL/L — LOW (ref 3.5–5.3)
PROT SERPL-MCNC: 5.7 G/DL — LOW (ref 6–8.3)
RBC # BLD: 3.12 M/UL — LOW (ref 3.8–5.2)
RBC # FLD: 14.6 % — HIGH (ref 10.3–14.5)
SAO2 % BLDA: 96.1 % — SIGNIFICANT CHANGE UP (ref 94–98)
SODIUM SERPL-SCNC: 144 MMOL/L — SIGNIFICANT CHANGE UP (ref 135–145)
TSH SERPL-MCNC: 0.27 UIU/ML — SIGNIFICANT CHANGE UP (ref 0.27–4.2)
WBC # BLD: 7.62 K/UL — SIGNIFICANT CHANGE UP (ref 3.8–10.5)
WBC # FLD AUTO: 7.62 K/UL — SIGNIFICANT CHANGE UP (ref 3.8–10.5)

## 2022-03-18 PROCEDURE — 99233 SBSQ HOSP IP/OBS HIGH 50: CPT

## 2022-03-18 PROCEDURE — 99223 1ST HOSP IP/OBS HIGH 75: CPT

## 2022-03-18 PROCEDURE — 71045 X-RAY EXAM CHEST 1 VIEW: CPT | Mod: 26

## 2022-03-18 RX ORDER — ACETAMINOPHEN 500 MG
650 TABLET ORAL EVERY 6 HOURS
Refills: 0 | Status: DISCONTINUED | OUTPATIENT
Start: 2022-03-18 | End: 2022-03-21

## 2022-03-18 RX ORDER — POTASSIUM CHLORIDE 20 MEQ
20 PACKET (EA) ORAL
Refills: 0 | Status: COMPLETED | OUTPATIENT
Start: 2022-03-18 | End: 2022-03-18

## 2022-03-18 RX ORDER — POTASSIUM CHLORIDE 20 MEQ
10 PACKET (EA) ORAL
Refills: 0 | Status: COMPLETED | OUTPATIENT
Start: 2022-03-18 | End: 2022-03-18

## 2022-03-18 RX ORDER — FUROSEMIDE 40 MG
40 TABLET ORAL DAILY
Refills: 0 | Status: DISCONTINUED | OUTPATIENT
Start: 2022-03-18 | End: 2022-03-21

## 2022-03-18 RX ADMIN — Medication 3 MILLILITER(S): at 14:45

## 2022-03-18 RX ADMIN — PANTOPRAZOLE SODIUM 40 MILLIGRAM(S): 20 TABLET, DELAYED RELEASE ORAL at 06:21

## 2022-03-18 RX ADMIN — Medication 200 MILLIGRAM(S): at 06:20

## 2022-03-18 RX ADMIN — Medication 100 MILLIEQUIVALENT(S): at 09:33

## 2022-03-18 RX ADMIN — Medication 100 MILLIGRAM(S): at 11:06

## 2022-03-18 RX ADMIN — Medication 20 MILLIEQUIVALENT(S): at 16:45

## 2022-03-18 RX ADMIN — Medication 3 MILLILITER(S): at 07:20

## 2022-03-18 RX ADMIN — RIVAROXABAN 15 MILLIGRAM(S): KIT at 16:45

## 2022-03-18 RX ADMIN — Medication 125 MICROGRAM(S): at 06:21

## 2022-03-18 RX ADMIN — Medication 650 MILLIGRAM(S): at 21:26

## 2022-03-18 RX ADMIN — Medication 200 MILLIGRAM(S): at 20:36

## 2022-03-18 RX ADMIN — Medication 3 MILLILITER(S): at 23:31

## 2022-03-18 RX ADMIN — Medication 20 MILLIEQUIVALENT(S): at 11:06

## 2022-03-18 RX ADMIN — ACETAZOLAMIDE 250 MILLIGRAM(S): 250 TABLET ORAL at 11:06

## 2022-03-18 NOTE — CONSULT NOTE ADULT - CONSULT REQUESTED DATE/TIME
17-Mar-2022 12:02
18-Mar-2022 16:14
11-Mar-2022 09:26
11-Mar-2022 10:30
11-Mar-2022 14:44
17-Mar-2022 13:31

## 2022-03-18 NOTE — PROGRESS NOTE ADULT - ASSESSMENT
89 y.o female with PMH of hypothyroidism, atrial fibrillation, pacemaker, COPD, CAD, chronic diastolic heart failure, severe TR, severe pulm HTN who presents with acute on chronic hypoxic/hypercapnic respiratory failure in the setting of acute on chronic diastolic heart failure, COPD and RSV bronchitis.    # Acute on chronic hypoxic/hypercarbic respiratory failure 2/2 CHF exacerbation and RSV bronchitis  - Present on admission  - Blood and urine C+S 3/11 NGTD  - Supportive care for RSV - antitussives PRN  - IV solumedrol taper completed  - Appreciate cardiology and pulmonary recs  - Continue Acetazolamide daily, will restart Lasix tomorrow  - CXR with mild-mod effusion 3/16  - Will need BIPAP at night  - ABG this morning noted and d/w pulmonary    # COPD  - Continue duoneb  - IV solumedrol taper completed  - Pulmonary recs appreciated    # Acute on chronic diastolic CHF  - BNP 11,290 on admission  - Can use BIPAP PRN  - On po lasix  - Strict I/Os  - Card recs appreciated    # Atrial fibrillation  - Remains rate controlled  - Continue Toprol XL  - Continue Digoxin  - Continue Xarelto for thromboprophylaxis    # Hypokalemia  - Likely secondary to diuresis  - Trend lytes    # Hypothyroid  - Continue Synthroid    # HTN  - Continue Toprol XL    # Gout  - Continue Allopurinol    # Insomnia  - Continue Melatonin    #DVT prophylaxis   - Continue Xarelto    # ACP  - DNR/DNI, MOLST in chart

## 2022-03-18 NOTE — PROGRESS NOTE ADULT - SUBJECTIVE AND OBJECTIVE BOX
Date/Time Patient Seen:  		  Referring MD:   Data Reviewed	       Patient is a 89y old  Female who presents with a chief complaint of Respiratory distress (17 Mar 2022 20:02)      Subjective/HPI     PAST MEDICAL & SURGICAL HISTORY:  Chronic obstructive pulmonary disease (COPD)    HTN (hypertension)    Cardiac pacemaker    Gout    Hypothyroidism    Insomnia    Chronic CHF    History of ST elevation myocardial infarction (STEMI)    No significant past surgical history          Medication list         MEDICATIONS  (STANDING):  acetaZOLAMIDE    Tablet 250 milliGRAM(s) Oral daily  albuterol/ipratropium for Nebulization 3 milliLiter(s) Nebulizer every 8 hours  allopurinol 100 milliGRAM(s) Oral daily  digoxin     Tablet 125 MICROGram(s) Oral daily  levothyroxine 125 MICROGram(s) Oral daily  metoprolol succinate ER 25 milliGRAM(s) Oral daily  pantoprazole    Tablet 40 milliGRAM(s) Oral before breakfast  rivaroxaban 15 milliGRAM(s) Oral with dinner    MEDICATIONS  (PRN):  guaiFENesin Oral Liquid (Sugar-Free) 200 milliGRAM(s) Oral every 6 hours PRN Cough  melatonin 5 milliGRAM(s) Oral at bedtime PRN Insomnia         Vitals log        ICU Vital Signs Last 24 Hrs  T(C): 36.4 (18 Mar 2022 04:58), Max: 36.9 (17 Mar 2022 21:19)  T(F): 97.6 (18 Mar 2022 04:58), Max: 98.4 (17 Mar 2022 21:19)  HR: 61 (18 Mar 2022 04:00) (60 - 89)  BP: 138/53 (18 Mar 2022 04:00) (90/45 - 138/53)  BP(mean): 77 (18 Mar 2022 04:00) (59 - 77)  ABP: --  ABP(mean): --  RR: 21 (18 Mar 2022 04:00) (16 - 35)  SpO2: 95% (18 Mar 2022 04:00) (76% - 100%)           Input and Output:  I&O's Detail    17 Mar 2022 07:01  -  18 Mar 2022 07:00  --------------------------------------------------------  IN:  Total IN: 0 mL    OUT:    Indwelling Catheter - Urethral (mL): 2200 mL  Total OUT: 2200 mL    Total NET: -2200 mL          Lab Data                        10.4   7.62  )-----------( 156      ( 18 Mar 2022 06:25 )             34.2     03-18    144  |  103  |  29<H>  ----------------------------<  113<H>  3.3<L>   |  43<H>  |  0.95    Ca    8.1<L>      18 Mar 2022 06:25  Phos  3.1     03-18  Mg     2.5     03-18    TPro  5.7<L>  /  Alb  2.7<L>  /  TBili  0.8  /  DBili  x   /  AST  24  /  ALT  21  /  AlkPhos  111  03-18    ABG - ( 18 Mar 2022 05:35 )  pH, Arterial: 7.44  pH, Blood: x     /  pCO2: 70    /  pO2: 76    / HCO3: 48    / Base Excess: 23.3  /  SaO2: 96.1                    Review of Systems	      Objective     Physical Examination    heart s1s2  lung dec BS  abd soft      Pertinent Lab findings & Imaging      Olga:  NO   Adequate UO     I&O's Detail    17 Mar 2022 07:01  -  18 Mar 2022 07:00  --------------------------------------------------------  IN:  Total IN: 0 mL    OUT:    Indwelling Catheter - Urethral (mL): 2200 mL  Total OUT: 2200 mL    Total NET: -2200 mL               Discussed with:     Cultures:	        Radiology

## 2022-03-18 NOTE — PROGRESS NOTE ADULT - ASSESSMENT
The patient is a 89 year old female with a history of hypothyroidism, atrial fibrillation, pacemaker, COPD, CAD, chronic diastolic heart failure, severe TR, severe pulm HTN who presents with shortness of breath in the setting of acute on chronic diastolic heart failure, COPD, RSV.    Plan:  - ECG with known AF and intermittent ventricular pacing  - CXR with bilateral pleural effusions  - BNP 04682  - Echo 2/22 with normal LV systolic function, severe TR, severe pulm HTN  - RSV positive  - Furosemide on hold - resume tomorrow  - Continue acetazolamide 250 mg PO daily  - Should be on BIPAP at night  - Continue rivaroxaban 15 mg daily  - Continue digoxin 0.125 mg daily  - Continue metoprolol succinate 25 mg daily

## 2022-03-18 NOTE — PROGRESS NOTE ADULT - ASSESSMENT
pt with extensive med hx -   CHF  Pulm HTN severe  valv heart disease  COPD  Resp Distress  PVD  OP  OA  Fall prone - risk  Ataxic gait  pelvic fx  AF  Dyspepsia  PPM    Neuro and Nephro eval noted  on o2 support  Am ABG noted - compensated  on Diamox -     RSV positive  CT chest noted  strep and legionella ag neg  ID f/u noted  cardio f/u noted  TTE shows severe Pulm HTN - known   completed Systemic Steroids course    ct chest reviewed  USE BIPAP PRN - for resp distress - hypercapnea - night time use as tolerated  ABG noted  Labs and imaging reviewed  lasix diuresis - i and o - monitor Na and renal indices -   Pulse ox monitoring - Card tele monitoring - CHF and COPD ex -   NEBS and s/p Steroids - for COPD  Diuresis as per Cardio  on AC for AF

## 2022-03-18 NOTE — CONSULT NOTE ADULT - CONVERSATION DETAILS
Reviewed patient's medical and social history as well as events leading to patient's hospitalization. Writer discussed patient's current diagnosis (.....), medical condition and management,  prognosis, and life expectancy.     Family showed insight into medical condition. All questions answered. Psychosocial support provided. Reviewed patient's medical and social history as well as events leading to patient's hospitalization. Writer discussed patient's current diagnosis (acute on chronic resp failure 2/2 CHF exacerbation, COPD, advance age, debility), medical condition and management,  prognosis, and life expectancy. Discussed what matters most to the patient. Family grieving severe rapid decline over the days prior to admission and no major recovery so far. Discussed pros/cons of BIPAP use. Family showed good insight. They wish to discuss together GOC. Writer recommended continuation of BIPAP as directed by pulm. If condition were to worsen or no significant improvement, then to consider inpatient hospice. Psychosocial support provided.    Due to patient's health status and restrictions on visitations during this Public health emergency, advanced care planning discussion was performed via telephone.

## 2022-03-18 NOTE — PROGRESS NOTE ADULT - SUBJECTIVE AND OBJECTIVE BOX
Neurology follow up note    PATRICIA [id number]      Interval History:    Patient feels occ SOB    Allergies    codeine (Unknown)    Intolerances        MEDICATIONS    acetaZOLAMIDE    Tablet 250 milliGRAM(s) Oral daily  albuterol/ipratropium for Nebulization 3 milliLiter(s) Nebulizer every 8 hours  allopurinol 100 milliGRAM(s) Oral daily  digoxin     Tablet 125 MICROGram(s) Oral daily  guaiFENesin Oral Liquid (Sugar-Free) 200 milliGRAM(s) Oral every 6 hours PRN  levothyroxine 125 MICROGram(s) Oral daily  melatonin 5 milliGRAM(s) Oral at bedtime PRN  metoprolol succinate ER 25 milliGRAM(s) Oral daily  pantoprazole    Tablet 40 milliGRAM(s) Oral before breakfast  potassium chloride    Tablet ER 20 milliEquivalent(s) Oral every 2 hours  rivaroxaban 15 milliGRAM(s) Oral with dinner              Vital Signs Last 24 Hrs  T(C): 36.7 (18 Mar 2022 08:17), Max: 36.9 (17 Mar 2022 21:19)  T(F): 98 (18 Mar 2022 08:17), Max: 98.4 (17 Mar 2022 21:19)  HR: 60 (18 Mar 2022 08:03) (60 - 96)  BP: 97/41 (18 Mar 2022 08:03) (86/41 - 138/53)  BP(mean): 57 (18 Mar 2022 08:03) (53 - 97)  RR: 23 (18 Mar 2022 08:03) (16 - 35)  SpO2: 98% (18 Mar 2022 08:03) (76% - 100%)    REVIEW OF SYSTEMS:  Constitutional:  The patient denies fever, chills, night sweats.  Head:  No headaches.  Eyes:  No double vision or blurry vision.  Ears:  No ringing in the ears.  Neck:  No neck pain.  Cardiovascular:  No chest pain.  Respiratory:  Occasional shortness of breath, was brought into the emergency room.  Abdomen:  No nausea, vomiting, or abdominal pain.  Extremities/Neurological:  No numbness or tingling.  Musculoskeletal:  Positive history of joint pain.  Does suffer from arthritis.    PHYSICAL EXAMINATION:    HEENT:  Head:  Normocephalic, atraumatic.  Eyes:  No scleral icterus.  Ears:  Hearing bilaterally appeared to be intact.  NECK:  Supple.  RESPIRATORY:  Decrease breath sounds bilaterally.  CARDIOVASCULAR:  S1, S2 heard.  ABDOMEN:  Soft, nontender.  EXTREMITIES:  No clubbing or cyanosis were noted.      NEUROLOGIC:  The patient awake and alert.  Location was [facility], year 2021, month was March.  Five nickels in a quarter.  Dog backward spells god.  Able to name simple objects.  Recall was 0/3 within 3 minutes, even with prompting.  Extraocular movements were intact.  Speech was fluent.  Smile symmetric.  Motor:  The patient has decreased range of motion of bilateral shoulders, does suffer from significant arthritis.  Rest of upper extremities 4/5, bilateral lower extremities slight flexion of the hip and knee, but does have a history of coccyx fracture.  Sensory:  Bilateral upper and lower intact to light touch.              LABS:  CBC Full  -  ( 18 Mar 2022 06:25 )  WBC Count : 7.62 K/uL  RBC Count : 3.12 M/uL  Hemoglobin : 10.4 g/dL  Hematocrit : 34.2 %  Platelet Count - Automated : 156 K/uL  Mean Cell Volume : 109.6 fl  Mean Cell Hemoglobin : 33.3 pg  Mean Cell Hemoglobin Concentration : 30.4 gm/dL  Auto Neutrophil # : 4.99 K/uL  Auto Lymphocyte # : 1.69 K/uL  Auto Monocyte # : 0.73 K/uL  Auto Eosinophil # : 0.17 K/uL  Auto Basophil # : 0.02 K/uL  Auto Neutrophil % : 65.4 %  Auto Lymphocyte % : 22.2 %  Auto Monocyte % : 9.6 %  Auto Eosinophil % : 2.2 %  Auto Basophil % : 0.3 %      03-18    144  |  103  |  29<H>  ----------------------------<  113<H>  3.3<L>   |  43<H>  |  0.95    Ca    8.1<L>      18 Mar 2022 06:25  Phos  3.1     03-18  Mg     2.5     03-18    TPro  5.7<L>  /  Alb  2.7<L>  /  TBili  0.8  /  DBili  x   /  AST  24  /  ALT  21  /  AlkPhos  111  03-18    Hemoglobin A1C:     LIVER FUNCTIONS - ( 18 Mar 2022 06:25 )  Alb: 2.7 g/dL / Pro: 5.7 g/dL / ALK PHOS: 111 U/L / ALT: 21 U/L DA / AST: 24 U/L / GGT: x           Vitamin B12         RADIOLOGY      ANALYSIS AND PLAN:  This is a 89-year-old with episode of lightheadedness and memory issues.  For episode of lightheaded and dizziness, suspect most likely metabolic in nature from underlying respiratory issues along with congestive heart failure.  For memory issues, possibly secondary to respiratory issues, carbon dioxide retention and congestive heart failure, suspect possibly underlying mild cognitive and also hospital setting, suspect less likely this is a primary CNS event.  We will recommend to rule out other causes of metabolic encephalopathy.  For history of atrial fibrillation, anticoagulation as needed.  For history of hypertension, monitor systolic blood pressure.  monitor respiratory status   For episode of falls, as per my conversation with family, this possibly appears to be mechanical in nature, which happened a few weeks ago.  For hypothyroidism, continue the patient on Synthroid.    Spoke with son, Tutu, at [phone] 3/18, he understands the reasoning and thought process.    Greater than 40 minutes time spent with the patient, planning care, reviewing data, speaking to multidisciplinary health care team with greater than 50% of time that in counseling and care coordination.

## 2022-03-18 NOTE — CONSULT NOTE ADULT - CONSULT REASON
Alkalosis
Acute on chronic diastolic heart failure
GOC
Acute hypoxic respiratory failure
pulm HTN  right heart failure  COPD  resp distress  hx of pelvic fx  OP  OA  GERD
.

## 2022-03-18 NOTE — PROGRESS NOTE ADULT - SUBJECTIVE AND OBJECTIVE BOX
PATRICIA MORTON is a 89yFemale , patient examined and chart reviewed.     INTERVAL HPI/ OVERNIGHT EVENTS:   No events. Afebrile.     PAST MEDICAL & SURGICAL HISTORY:  Chronic obstructive pulmonary disease (COPD)  HTN (hypertension)  Cardiac pacemaker  Gout  Hypothyroidism  Insomnia  Chronic CHF  History of ST elevation myocardial infarction (STEMI)  No significant past surgical history        For details regarding the patient's social history, family history, and other miscellaneous elements, please refer the initial infectious diseases consultation and/or the admitting history and physical examination for this admission.    ROS:  Unable to obtain due to : Poor historian    Allergies  codeine (Unknown)      Current inpatient medications :    ANTIBIOTICS/RELEVANT:    MEDICATIONS  (STANDING):  acetaZOLAMIDE    Tablet 250 milliGRAM(s) Oral daily  albuterol/ipratropium for Nebulization 3 milliLiter(s) Nebulizer every 8 hours  allopurinol 100 milliGRAM(s) Oral daily  digoxin     Tablet 125 MICROGram(s) Oral daily  furosemide    Tablet 40 milliGRAM(s) Oral daily  levothyroxine 125 MICROGram(s) Oral daily  metoprolol succinate ER 25 milliGRAM(s) Oral daily  pantoprazole    Tablet 40 milliGRAM(s) Oral before breakfast  rivaroxaban 15 milliGRAM(s) Oral with dinner    MEDICATIONS  (PRN):  guaiFENesin Oral Liquid (Sugar-Free) 200 milliGRAM(s) Oral every 6 hours PRN Cough  melatonin 5 milliGRAM(s) Oral at bedtime PRN Insomnia        Objective:  Vital Signs Last 24 Hrs  T(C): 36.1 (18 Mar 2022 15:53), Max: 36.9 (17 Mar 2022 21:19)  T(F): 97 (18 Mar 2022 15:53), Max: 98.4 (17 Mar 2022 21:19)  HR: 67 (18 Mar 2022 18:00) (60 - 96)  BP: 96/49 (18 Mar 2022 18:00) (86/41 - 138/53)  BP(mean): 62 (18 Mar 2022 18:00) (53 - 97)  RR: 19 (18 Mar 2022 18:00) (17 - 35)  SpO2: 98% (18 Mar 2022 18:00) (94% - 99%)      Physical Exam:  General: no acute distress Weak  Neck: supple, trachea midline  Lungs: Decreased, no wheeze/rhonchi  Cardiovascular: regular rate and rhythm, S1 S2  Abdomen: soft, nontender,  bowel sounds normal  Neurological: Lethargic  Skin: no rash  Extremities:+ edema      LABS:                        10.7   5.48  )-----------( 150      ( 17 Mar 2022 06:49 )             35.2   03-17    147<H>  |  101  |  27<H>  ----------------------------<  108<H>  3.7   |  49<HH>  |  0.85    Ca    8.0<L>      17 Mar 2022 06:49  Phos  3.5     03-17  Mg     2.3     03-17    TPro  5.7<L>  /  Alb  2.7<L>  /  TBili  0.7  /  DBili  x   /  AST  27  /  ALT  23  /  AlkPhos  111  03-16        MICROBIOLOGY:    Culture - Urine (collected 11 Mar 2022 21:02)  Source: Clean Catch Clean Catch (Midstream)  Final Report (12 Mar 2022 16:27):    <10,000 CFU/mL Normal Urogenital Xiomara    Culture - Blood (collected 11 Mar 2022 13:03)  Source: .Blood Blood-Peripheral  Preliminary Report (12 Mar 2022 14:01):    No growth to date.    Culture - Blood (collected 11 Mar 2022 13:03)  Source: .Blood Blood-Peripheral  Preliminary Report (12 Mar 2022 14:01):    No growth to date.    FLU A B RSV Detection by PCR (03.11.22 @ 21:03)    Flu A Result: [facility]: The Flu A B RSV assay is a Real-Time PCR test for the qualitative  detection and differentiation of Influenza A, Influenza B, and  Respiratory Syncytial Virus on nasopharyngeal swabs. The results should  be interpreted in the context of all clinical and laboratory findings.    Flu B Result: [facility]    RSV Result: Detected      RADIOLOGY & ADDITIONAL STUDIES:    ACC: [id number] EXAM:  CT CHEST                          PROCEDURE DATE:  03/11/2022          INTERPRETATION:  CLINICAL INFORMATION: Respiratory distress.    COMPARISON: Chest radiograph 3/11/2022.  CT scan chest 2/21/2022.    CONTRAST/COMPLICATIONS:  IV Contrast: NONE  Oral Contrast: NONE  Complications: None reported at time of study completion    PROCEDURE:  CT of the Chest was performed.  Sagittal and coronal reformats were performed.    FINDINGS:    LUNGS AND AIRWAYS:  PLEURA:  There are small bilateral pleural effusions with underlying compressive   atelectasis lung bases.  The central airways remain patent.    MEDIASTINUM AND CHICA: No lymphadenopathy.    VESSELS: Atherosclerotic changes thoracic aorta and coronary artery   calcifications.  Prominence of the main pulmonary arterial trunk, which may be associated   with pulmonary arterial hypertension.    HEART: Cardiomegaly.  Cardiac pacemaker leads.   No pericardial effusion.    CHEST WALL AND LOWER NECK:  Cardiac device left chestwall.    VISUALIZED UPPER ABDOMEN:  Limited by streak artifact.  Nodular contour liver.  Left hepatic cyst.    BONES: Degenerative changes.    IMPRESSION:    Small bilateral pleural effusions with underlying compressive   atelectasis, similar to prior exam.      ACC: [id number] EXAM:  XR CHEST PORTABLE ROUTINE 1V                          PROCEDURE DATE:  03/18/2022          INTERPRETATION:  Portable chest radiograph    CLINICAL INFORMATION: Pleural effusion. Follow-up    TECHNIQUE:  Portable  AP chest radiograph.    COMPARISON: 3/16/2022 chest radiograph .    FINDINGS:  CATHETERS AND TUBES: None    PULMONARY: Residual moderate LEFT effusion and/or basilar airspace   consolidation obscuring diaphragm contour. Mild RIGHT pleural effusion   causing hazinessoverlying RIGHT lung base. Upper zones clear.    .   No pneumothorax.    HEART/VASCULAR: The  heart is enlarged in transverse diameter. Cardiac   device wire lead is within right ventricle.  .    BONES: Visualized osseous structures are intact.    IMPRESSION:   Residual LEFT basilar airspace disease and/or effusion   obscuring LEFT diaphragm contour.  Cardiomegaly..  Confirmatory lateral radiograph recommended.        Assessment :   89 y.o. female w/ hx of CHF, COPD, PPM, gout, hypothyroidism, HTN, and STEMI resident of [facility] admitted with Acute hypercapnia respiratory failure sec COPD/CHF exacerbation. Placed on BIPAP. CT chest with scott effusions with compressive atelectasis. BNP elevated. Sp Vanc Zosyn x 1 dose in ED.  Afebrile WBC WNL Procalcitonin 0.09  RSV + viral bronchitis   blood cx neg to date  urine legionella neg  strep neg  Hypercapnia and hypoxic respiratory failure    Plan :   Pulm following  Monitor off antibiotics  Trend temps and cbc  Strict Aspiration precautions  DNRDNI  Stable from ID standpoint    Continue with present regiment.  Appropriate use of antibiotics and adverse effects reviewed.      > 35 minutes were spent in direct patient care reviewing notes, medications ,labs data/ imaging , discussion with multidisciplinary team.    Thank you for allowing me to participate in care of your patient .    Karis Goodwin MD  Infectious Disease  [phone]

## 2022-03-18 NOTE — CONSULT NOTE ADULT - ASSESSMENT
89 y.o female with PMH of hypothyroidism, atrial fibrillation, pacemaker, COPD, CAD, chronic diastolic heart failure, severe TR, severe pulm HTN who presents with acute on chronic hypoxic/hypercapnic respiratory failure in the setting of acute on chronic diastolic heart failure, COPD and RSV bronchitis.    # Acute on chronic hypoxic/hypercarbic respiratory failure 2/2 CHF exacerbation and RSV bronchitis  - Supportive care for RSV - antitussives PRN  - Appreciate cardiology and pulmonary recs  - Continue Acetazolamide daily, will restart Lasix tomorrow  - CXR with mild-mod effusion 3/16  - BIPAP at night  - ABG continues to show elevated pCO2    # COPD  - Continue duoneb  - s/p IV solumedrol taper   - Pulmonary recs appreciated    # Acute on chronic diastolic CHF  - BNP 11,290 on admission  - Can use BIPAP PRN  - On po lasix  - Strict I/Os  - Card recs appreciated    # Atrial fibrillation  - Remains rate controlled  - Continue Toprol XL, Digoxin, and Xarelto    #Goals of care/advance care planning  - Palliative performance scale score: 20% (poor)  - Prognosis: guarded (leaning towards poor if no improvement in ABG & mental status with BIPAP use)  - Code status: DNR/DNI (MOLST form in the chart)  - GOC: family to discuss together their decisions re GOC. Will follow.   - HCP: umm Cam  - Psychosocial support provided    Appreciate consult. Discussed with the primary team.    Carly Edwards MD, [facility]

## 2022-03-18 NOTE — PROGRESS NOTE ADULT - SUBJECTIVE AND OBJECTIVE BOX
Patient is a 89y old  Female who presents with a chief complaint of Respiratory distress (18 Mar 2022 17:16)      BRIEF HOSPITAL COURSE: Patient is a 89 yo female with a pmh of CHF, COPD, PPM, Gout, hypothyroidism HTN, STEMI and recent pelvic fracture who presented to [place] ED on 03/11/22 for respiratory distress. Patient positive for RSV on admission requiring bipap therapy. Patient was admitted to [facility] with acute hypoxic/hypercapnic respiratory failure, RSV and pleural effusion.     Events last 24 hours: Tolerating nasal canula, awake and alert, making urine. some episodes of hypotension but not consistent.     PAST MEDICAL & SURGICAL HISTORY:  Chronic obstructive pulmonary disease (COPD)    HTN (hypertension)    Cardiac pacemaker    Gout    Hypothyroidism    Insomnia    Chronic CHF    History of ST elevation myocardial infarction (STEMI)    No significant past surgical history        Review of Systems:  CONSTITUTIONAL: No fever, chills, or fatigue  EYES: No eye pain, visual disturbances, or discharge  ENMT:  No difficulty hearing, tinnitus, vertigo; No sinus or throat pain  NECK: No pain or stiffness  RESPIRATORY: No cough, wheezing, chills or hemoptysis; No shortness of breath  CARDIOVASCULAR: No chest pain, palpitations, dizziness, or leg swelling  GASTROINTESTINAL: No abdominal or epigastric pain. No nausea, vomiting, or hematemesis; No diarrhea or constipation. No melena or hematochezia.  GENITOURINARY: No dysuria, frequency, hematuria, or incontinence  NEUROLOGICAL: No headaches, memory loss, loss of strength, numbness, or tremors  SKIN: No itching, burning, rashes, or lesions   MUSCULOSKELETAL: No joint pain or swelling; No muscle, back, or extremity pain  PSYCHIATRIC: No depression, anxiety, mood swings, or difficulty sleeping      Medications:    acetaZOLAMIDE    Tablet 250 milliGRAM(s) Oral daily  digoxin     Tablet 125 MICROGram(s) Oral daily  furosemide    Tablet 40 milliGRAM(s) Oral daily  metoprolol succinate ER 25 milliGRAM(s) Oral daily    albuterol/ipratropium for Nebulization 3 milliLiter(s) Nebulizer every 8 hours  guaiFENesin Oral Liquid (Sugar-Free) 200 milliGRAM(s) Oral every 6 hours PRN    melatonin 5 milliGRAM(s) Oral at bedtime PRN      rivaroxaban 15 milliGRAM(s) Oral with dinner    pantoprazole    Tablet 40 milliGRAM(s) Oral before breakfast      allopurinol 100 milliGRAM(s) Oral daily  levothyroxine 125 MICROGram(s) Oral daily                  ICU Vital Signs Last 24 Hrs  T(C): 36.1 (18 Mar 2022 15:53), Max: 36.9 (17 Mar 2022 21:19)  T(F): 97 (18 Mar 2022 15:53), Max: 98.4 (17 Mar 2022 21:19)  HR: 62 (18 Mar 2022 20:00) (60 - 96)  BP: 115/88 (18 Mar 2022 20:00) (86/41 - 138/53)  BP(mean): 98 (18 Mar 2022 20:00) (53 - 98)  ABP: --  ABP(mean): --  RR: 25 (18 Mar 2022 20:00) (17 - 29)  SpO2: 96% (18 Mar 2022 20:00) (94% - 99%)      ABG - ( 18 Mar 2022 05:35 )  pH, Arterial: 7.44  pH, Blood: x     /  pCO2: 70    /  pO2: 76    / HCO3: 48    / Base Excess: 23.3  /  SaO2: 96.1                I&O's Detail    17 Mar 2022 07:01  -  18 Mar 2022 07:00  --------------------------------------------------------  IN:  Total IN: 0 mL    OUT:    Indwelling Catheter - Urethral (mL): 2200 mL  Total OUT: 2200 mL    Total NET: -2200 mL      18 Mar 2022 07:01  -  18 Mar 2022 20:24  --------------------------------------------------------  IN:  Total IN: 0 mL    OUT:    Indwelling Catheter - Urethral (mL): 600 mL  Total OUT: 600 mL    Total NET: -600 mL            LABS:                        10.4   7.62  )-----------( 156      ( 18 Mar 2022 06:25 )             34.2     03-18    144  |  103  |  29<H>  ----------------------------<  113<H>  3.3<L>   |  43<H>  |  0.95    Ca    8.1<L>      18 Mar 2022 06:25  Phos  3.1     03-18  Mg     2.5     03-18    TPro  5.7<L>  /  Alb  2.7<L>  /  TBili  0.8  /  DBili  x   /  AST  24  /  ALT  21  /  AlkPhos  111  03-18          CAPILLARY BLOOD GLUCOSE            CULTURES:  Culture Results:   <10,000 CFU/mL Normal Urogenital Xiomara (03-11-22 @ 21:02)      Physical Examination:    General: Alert, oriented, interactive, nonfocal, elderly    HEENT: Pupils equal, reactive to light.  Symmetric.    PULM: mild rales at the bases     CVS: Regular rate and rhythm, no murmurs, rubs, or gallops    ABD: Soft, nondistended, nontender, normoactive bowel sounds    EXT: No edema, nontender    SKIN: Warm and well perfused, no rashes noted.

## 2022-03-18 NOTE — PROGRESS NOTE ADULT - ASSESSMENT
Patient is a 89 yo female with a pmh of CHF, COPD, PPM, Gout, hypothyroidism HTN, STEMI and recent pelvic fracture who presented to [place] ED on 03/11/22 for respiratory distress, found to have:    1. Acute hypoxic and hypercapnic resp failure   2. Pleural effusions  3. PNA  4. RSV  5, AMS    Patient here for respiratory failure most likely due to RSV PNA and CHF. She has been weaned off BiPAP and now tolerating nasal canula.   She is -13L this admission, and with periodic episodes of hypotension she may be dry, if hypotension persists will bolus 500ml IVF and dose albumin.  Monitor lytes and replace as needed, check BMP, mag, phos in AM  Getting diamox for contraction alkalosis   Xarelto for a-fib history and DVT-P  synthroid for hypothyroidism  Discharge planning, PT eval   Tele patient

## 2022-03-18 NOTE — PROGRESS NOTE ADULT - SUBJECTIVE AND OBJECTIVE BOX
Patient is a 89y old  Female who presents with a chief complaint of Respiratory distress (18 Mar 2022 11:39)      INTERVAL HPI/OVERNIGHT EVENTS: Patient seen and examined at bedside. No overnight events. Unable to do MBS today due as patient was sleepy and did not participate.       MEDICATIONS  (STANDING):  acetaZOLAMIDE    Tablet 250 milliGRAM(s) Oral daily  albuterol/ipratropium for Nebulization 3 milliLiter(s) Nebulizer every 8 hours  allopurinol 100 milliGRAM(s) Oral daily  digoxin     Tablet 125 MICROGram(s) Oral daily  levothyroxine 125 MICROGram(s) Oral daily  metoprolol succinate ER 25 milliGRAM(s) Oral daily  pantoprazole    Tablet 40 milliGRAM(s) Oral before breakfast  potassium chloride    Tablet ER 20 milliEquivalent(s) Oral every 2 hours  rivaroxaban 15 milliGRAM(s) Oral with dinner    MEDICATIONS  (PRN):  guaiFENesin Oral Liquid (Sugar-Free) 200 milliGRAM(s) Oral every 6 hours PRN Cough  melatonin 5 milliGRAM(s) Oral at bedtime PRN Insomnia      Allergies    codeine (Unknown)    Intolerances        REVIEW OF SYSTEMS:  CONSTITUTIONAL: No fever or chills  HEENT:  No headache, no sore throat  RESPIRATORY: Admits cough, shortness of breath  CARDIOVASCULAR: No chest pain, palpitations, or leg swelling  GASTROINTESTINAL: No abd pain, nausea, vomiting, or diarrhea  GENITOURINARY: No dysuria, frequency, or hematuria  NEUROLOGICAL: no focal weakness or dizziness  MUSCULOSKELETAL: no myalgias      Vital Signs Last 24 Hrs  T(C): 36.5 (18 Mar 2022 12:19), Max: 36.9 (17 Mar 2022 21:19)  T(F): 97.7 (18 Mar 2022 12:19), Max: 98.4 (17 Mar 2022 21:19)  HR: 60 (18 Mar 2022 08:03) (60 - 96)  BP: 97/41 (18 Mar 2022 08:03) (86/41 - 138/53)  BP(mean): 57 (18 Mar 2022 08:03) (53 - 97)  RR: 23 (18 Mar 2022 08:03) (16 - 35)  SpO2: 98% (18 Mar 2022 08:03) (76% - 100%)      PHYSICAL EXAM:  GENERAL: NAD. elderly  HEENT:  EOMI, moist mucous membranes  CHEST/LUNG:  diminished bs at bases, poor insp effort  HEART:  RRR, S1, S2  ABDOMEN:  BS+, soft, nontender, nondistended  EXTREMITIES: no edema, cyanosis, or calf tenderness  NERVOUS SYSTEM: AA&Ox2-3, sleepy but arousable this morning     LABS:                        10.4   7.62  )-----------( 156      ( 18 Mar 2022 06:25 )             34.2     CBC Full  -  ( 18 Mar 2022 06:25 )  WBC Count : 7.62 K/uL  Hemoglobin : 10.4 g/dL  Hematocrit : 34.2 %  Platelet Count - Automated : 156 K/uL  Mean Cell Volume : 109.6 fl  Mean Cell Hemoglobin : 33.3 pg  Mean Cell Hemoglobin Concentration : 30.4 gm/dL  Auto Neutrophil # : 4.99 K/uL  Auto Lymphocyte # : 1.69 K/uL  Auto Monocyte # : 0.73 K/uL  Auto Eosinophil # : 0.17 K/uL  Auto Basophil # : 0.02 K/uL  Auto Neutrophil % : 65.4 %  Auto Lymphocyte % : 22.2 %  Auto Monocyte % : 9.6 %  Auto Eosinophil % : 2.2 %  Auto Basophil % : 0.3 %    18 Mar 2022 06:25    144    |  103    |  29     ----------------------------<  113    3.3     |  43     |  0.95     Ca    8.1        18 Mar 2022 06:25  Phos  3.1       18 Mar 2022 06:25  Mg     2.5       18 Mar 2022 06:25    TPro  5.7    /  Alb  2.7    /  TBili  0.8    /  DBili  x      /  AST  24     /  ALT  21     /  AlkPhos  111    18 Mar 2022 06:25        CAPILLARY BLOOD GLUCOSE            Culture - Urine (collected 03-11-22 @ 21:02)  Source: Clean Catch Clean Catch (Midstream)  Final Report (03-12-22 @ 16:27):    <10,000 CFU/mL Normal Urogenital Xiomara    Culture - Blood (collected 03-11-22 @ 13:03)  Source: .Blood Blood-Peripheral  Final Report (03-16-22 @ 14:00):    No Growth Final    Culture - Blood (collected 03-11-22 @ 13:03)  Source: .Blood Blood-Peripheral  Final Report (03-16-22 @ 14:00):    No Growth Final        RADIOLOGY & ADDITIONAL TESTS:     Consultant(s) Notes Reviewed:  [x] YES  [ ] NO    Care Discussed with [x] Consultants  [x] Patient  [ ] Family  [ ]      [ x] Other; RN  DVT ppx

## 2022-03-18 NOTE — CONSULT NOTE ADULT - SUBJECTIVE AND OBJECTIVE BOX
HPI:  89 y.o. female w/ hx of CHF, COPD, PPM, gout, hypothyroidism, HTN, and STEMI BIBEMS today from [facility] for evaluation of respiratory distress. Pt unable to give any history 2/2 respiratory status. Per EMS report pt. was hypoxic to 50% on 4L nasal cannula, NRB placed w/ improvement in O2 sats to 70s.      ED Course;  - Placed on bipap  - Broad spectrum coverage w/ Zosyn and Vanco  - Lasix 40mg  - Methylprednisone 40mg  - CBC, ABG, Coags, UA,blood and urine culture, lactate, procalcitonin, BNP  - CXR done  - Chest CT done  - EKG done    Seen by pulmonary and cardiology  [facility] monitoring was recommended (11 Mar 2022 11:20)    PERTINENT PM/SXH:   Chronic obstructive pulmonary disease (COPD)    HTN (hypertension)    Cardiac pacemaker    Gout    Hypothyroidism    Insomnia    Chronic CHF    History of ST elevation myocardial infarction (STEMI)      No significant past surgical history      FAMILY HISTORY:  No pertinent family history in first degree relatives      ITEMS NOT CHECKED ARE NOT PRESENT    SOCIAL HISTORY:   Significant other/partner[ ]  Children[ ]  Caodaism/Spirituality:  Substance hx:  [ ]   Tobacco hx:  [ ]   Alcohol hx: [ ]   Home Opioid hx:  [ ] I-Stop Reference No:  Living Situation: [ ]Home  [ ]Long term care  [ ]Rehab [ ]Other    ADVANCE DIRECTIVES:    DNR  MOLST  [ ]  Living Will  [ ]   DECISION MAKER(s):  [ ] Health Care Proxy(s)  [ ] Surrogate(s)  [ ] Guardian           Name(s): Phone Number(s):    BASELINE (I)ADL(s) (prior to admission):  [place]: [ ]Total  [ ] Moderate [ ]Dependent    Allergies    codeine (Unknown)    Intolerances    MEDICATIONS  (STANDING):  acetaZOLAMIDE    Tablet 250 milliGRAM(s) Oral daily  albuterol/ipratropium for Nebulization 3 milliLiter(s) Nebulizer every 8 hours  allopurinol 100 milliGRAM(s) Oral daily  digoxin     Tablet 125 MICROGram(s) Oral daily  levothyroxine 125 MICROGram(s) Oral daily  metoprolol succinate ER 25 milliGRAM(s) Oral daily  pantoprazole    Tablet 40 milliGRAM(s) Oral before breakfast  potassium chloride    Tablet ER 20 milliEquivalent(s) Oral every 2 hours  rivaroxaban 15 milliGRAM(s) Oral with dinner    MEDICATIONS  (PRN):  guaiFENesin Oral Liquid (Sugar-Free) 200 milliGRAM(s) Oral every 6 hours PRN Cough  melatonin 5 milliGRAM(s) Oral at bedtime PRN Insomnia    PRESENT SYMPTOMS: [ ]Unable to obtain due to poor mentation   Source if other than patient:  [ ]Family   [ ]Team     Pain: [ ]yes [ ]no  QOL impact -   Location -                    Aggravating factors -  Quality -  Radiation -  Timing-  Severity (0-10 scale):  Minimal acceptable level (0-10 scale):     CPOT:    https://www.[facility].org/getattachment/[email or web address]/Critical-Care-Pain-Observation-Tool-(CPOT)      PAIN AD Score:     http://geriatrictoolkit.[facility]/cog/painad.pdf (press ctrl +  left click to view)    Dyspnea:                           [ ]Mild [ ]Moderate [ ]Severe  Anxiety:                             [ ]Mild [ ]Moderate [ ]Severe  Fatigue:                             [ ]Mild [ ]Moderate [ ]Severe  Nausea:                             [ ]Mild [ ]Moderate [ ]Severe  Loss of appetite:              [ ]Mild [ ]Moderate [ ]Severe  Constipation:                    [ ]Mild [ ]Moderate [ ]Severe    Other Symptoms:  [ ]All other review of systems negative     Palliative Performance Status Version 2:         %    http://npcrc.org/files/news/palliative_performance_scale_ppsv2.pdf  PHYSICAL EXAM:  Vital Signs Last 24 Hrs  T(C): 36.1 (18 Mar 2022 15:53), Max: 36.9 (17 Mar 2022 21:19)  T(F): 97 (18 Mar 2022 15:53), Max: 98.4 (17 Mar 2022 21:19)  HR: 86 (18 Mar 2022 15:26) (60 - 96)  BP: 111/99 (18 Mar 2022 14:20) (86/41 - 138/53)  BP(mean): 63 (18 Mar 2022 12:00) (53 - 97)  RR: 20 (18 Mar 2022 12:00) (17 - 35)  SpO2: 98% (18 Mar 2022 15:26) (76% - 99%) I&O's Summary    17 Mar 2022 07:01  -  18 Mar 2022 07:00  --------------------------------------------------------  IN: 0 mL / OUT: 2200 mL / NET: -2200 mL      GENERAL:  [ ]Alert  [ ]Oriented x   [ ]Lethargic  [ ]Cachexia  [ ]Unarousable  [ ]Verbal  [ ]Non-Verbal  Behavioral:   [ ] Anxiety  [ ] Delirium [ ] Agitation [ ] Other  HEENT:  [ ]Normal   [ ]Dry mouth   [ ]ET Tube/Trach  [ ]Oral lesions  PULMONARY:   [ ]Clear [ ]Tachypnea  [ ]Audible excessive secretions   [ ]Rhonchi        [ ]Right [ ]Left [ ]Bilateral  [ ]Crackles        [ ]Right [ ]Left [ ]Bilateral  [ ]Wheezing     [ ]Right [ ]Left [ ]Bilateral  [ ]Diminished breath sounds [ ]right [ ]left [ ]bilateral  CARDIOVASCULAR:    [ ]Regular [ ]Irregular [ ]Tachy  [ ]Terrell [ ]Murmur [ ]Other  GASTROINTESTINAL:  [ ]Soft  [ ]Distended   [ ]+BS  [ ]Non tender [ ]Tender  [ ]PEG [ ]OGT/ NGT  Last BM:   GENITOURINARY:  [ ]Normal [ ] Incontinent   [ ]Oliguria/Anuria   [ ]Espinoza  MUSCULOSKELETAL:   [ ]Normal   [ ]Weakness  [ ]Bed/Wheelchair bound [ ]Edema  NEUROLOGIC:   [ ]No focal deficits  [ ]Cognitive impairment  [ ]Dysphagia [ ]Dysarthria [ ]Paresis [ ]Other   SKIN:   [ ]Normal    [ ]Rash  [ ]Pressure ulcer(s)       Present on admission [ ]y [ ]n    CRITICAL CARE:  [ ] Shock Present  [ ]Septic [ ]Cardiogenic [ ]Neurologic [ ]Hypovolemic  [ ]  Vasopressors [ ]  Inotropes   [ ]Respiratory failure present [ ]Mechanical ventilation [ ]Non-invasive ventilatory support [ ]High flow    [ ]Acute  [ ]Chronic [ ]Hypoxic  [ ]Hypercarbic [ ]Other  [ ]Other organ failure     LABS:                        10.4   7.62  )-----------( 156      ( 18 Mar 2022 06:25 )             34.2   03-18    144  |  103  |  29<H>  ----------------------------<  113<H>  3.3<L>   |  43<H>  |  0.95    Ca    8.1<L>      18 Mar 2022 06:25  Phos  3.1     03-18  Mg     2.5     03-18    TPro  5.7<L>  /  Alb  2.7<L>  /  TBili  0.8  /  DBili  x   /  AST  24  /  ALT  21  /  AlkPhos  111  03-18        RADIOLOGY & ADDITIONAL STUDIES: reviewed    PROTEIN CALORIE MALNUTRITION PRESENT: [ ]mild [ ]moderate [ ]severe [ ]underweight [ ]morbid obesity  https://www.andeal.org/vault/2440/web/files/ONC/Table_Clinical%20Characteristics%20to%20Document%20Malnutrition-White%20JV%20et%20al%202012.pdf    Height (cm): 160 (03-11-22 @ 09:14)  Weight (kg): 59.4 (03-11-22 @ 09:14)  BMI (kg/m2): 23.2 (03-11-22 @ 09:14)    [x ]PPSV2 < or = to 30% [ ]significant weight loss  [ ]poor nutritional intake  [ ]anasarca      [ ]Artificial Nutrition      REFERRALS:   [ ]Chaplaincy  [ ]Hospice  [ ]Child Life  [ ]Social Work  [ ]Case management [ ]Holistic Therapy     Goals of Care Document:  HPI:  89 y.o. female w/ hx of CHF, COPD, PPM, gout, hypothyroidism, HTN, and STEMI BIBEMS today from [facility] for evaluation of respiratory distress. Pt unable to give any history 2/2 respiratory status. Per EMS report pt. was hypoxic to 50% on 4L nasal cannula, NRB placed w/ improvement in O2 sats to 70s.      ED Course;  - Placed on bipap  - Broad spectrum coverage w/ Zosyn and Vanco  - Lasix 40mg  - Methylprednisone 40mg  - CBC, ABG, Coags, UA,blood and urine culture, lactate, procalcitonin, BNP  - CXR done  - Chest CT done  - EKG done    Seen by pulmonary and cardiology  [facility] monitoring was recommended (11 Mar 2022 11:20)    PERTINENT PM/SXH:   Chronic obstructive pulmonary disease (COPD)    HTN (hypertension)    Cardiac pacemaker    Gout    Hypothyroidism    Insomnia    Chronic CHF    History of ST elevation myocardial infarction (STEMI)      No significant past surgical history      FAMILY HISTORY:  No pertinent family history in first degree relatives      ITEMS NOT CHECKED ARE NOT PRESENT    SOCIAL HISTORY:   Significant other/partner[ ]  Children[x ]  Tenriism/Spirituality:  Substance hx:  [ ]   Tobacco hx:  [ ]   Alcohol hx: [ ]  (x) none  Home Opioid hx:  [ ] I-Stop Reference No:  Living Situation: [x ]Home  [ ]Long term care  [ ]Rehab [ ]Other    ADVANCE DIRECTIVES:    DNR  MOLST  [x ]  Living Will  [ ]   DECISION MAKER(s):  [x ] Health Care Proxy(s)  [ ] Surrogate(s)  [ ] Guardian           Name(s): umm Cam  Phone Number(s):    BASELINE (I)ADL(s) (prior to admission):  [place]: [ ]Total  [x ] Moderate [ ]Dependent    Allergies    codeine (Unknown)    Intolerances    MEDICATIONS  (STANDING):  acetaZOLAMIDE    Tablet 250 milliGRAM(s) Oral daily  albuterol/ipratropium for Nebulization 3 milliLiter(s) Nebulizer every 8 hours  allopurinol 100 milliGRAM(s) Oral daily  digoxin     Tablet 125 MICROGram(s) Oral daily  levothyroxine 125 MICROGram(s) Oral daily  metoprolol succinate ER 25 milliGRAM(s) Oral daily  pantoprazole    Tablet 40 milliGRAM(s) Oral before breakfast  potassium chloride    Tablet ER 20 milliEquivalent(s) Oral every 2 hours  rivaroxaban 15 milliGRAM(s) Oral with dinner    MEDICATIONS  (PRN):  guaiFENesin Oral Liquid (Sugar-Free) 200 milliGRAM(s) Oral every 6 hours PRN Cough  melatonin 5 milliGRAM(s) Oral at bedtime PRN Insomnia    PRESENT SYMPTOMS: [ ]Unable to obtain due to poor mentation   Source if other than patient:  [ ]Family   [ ]Team     Pain: [ ]yes [x ]no  QOL impact -   Location -                    Aggravating factors -  Quality -  Radiation -  Timing-  Severity (0-10 scale):  Minimal acceptable level (0-10 scale):     CPOT:    https://www.[facility].org/getattachment/[email or web address]/Critical-Care-Pain-Observation-Tool-(CPOT)      PAIN AD Score:     http://geriatrictoolkit.[facility]/cog/painad.pdf (press ctrl +  left click to view)    Dyspnea:                           [ ]Mild [ ]Moderate [ ]Severe  Anxiety:                             [ ]Mild [ ]Moderate [ ]Severe  Fatigue:                             [ ]Mild [ ]Moderate [ ]Severe  Nausea:                             [ ]Mild [ ]Moderate [ ]Severe  Loss of appetite:              [ ]Mild [ ]Moderate [ ]Severe  Constipation:                    [ ]Mild [ ]Moderate [ ]Severe    Other Symptoms:  [x ]All other review of systems negative     Palliative Performance Status Version 2:         %    http://npcrc.org/files/news/palliative_performance_scale_ppsv2.pdf  PHYSICAL EXAM:  Vital Signs Last 24 Hrs  T(C): 36.1 (18 Mar 2022 15:53), Max: 36.9 (17 Mar 2022 21:19)  T(F): 97 (18 Mar 2022 15:53), Max: 98.4 (17 Mar 2022 21:19)  HR: 86 (18 Mar 2022 15:26) (60 - 96)  BP: 111/99 (18 Mar 2022 14:20) (86/41 - 138/53)  BP(mean): 63 (18 Mar 2022 12:00) (53 - 97)  RR: 20 (18 Mar 2022 12:00) (17 - 35)  SpO2: 98% (18 Mar 2022 15:26) (76% - 99%) I&O's Summary    17 Mar 2022 07:01  -  18 Mar 2022 07:00  --------------------------------------------------------  IN: 0 mL / OUT: 2200 mL / NET: -2200 mL    GENERAL: NAD. elderly  HEENT:  EOMI, moist mucous membranes  CHEST/LUNG:  diminished bs at bases, poor insp effort  HEART:  RRR, S1, S2  ABDOMEN:  BS+, soft, nontender, nondistended  EXTREMITIES: no edema, cyanosis, or calf tenderness  NERVOUS SYSTEM: AA&Ox2, sleepy but arousable      LABS:                        10.4   7.62  )-----------( 156      ( 18 Mar 2022 06:25 )             34.2   03-18    144  |  103  |  29<H>  ----------------------------<  113<H>  3.3<L>   |  43<H>  |  0.95    Ca    8.1<L>      18 Mar 2022 06:25  Phos  3.1     03-18  Mg     2.5     03-18    TPro  5.7<L>  /  Alb  2.7<L>  /  TBili  0.8  /  DBili  x   /  AST  24  /  ALT  21  /  AlkPhos  111  03-18        RADIOLOGY & ADDITIONAL STUDIES: reviewed    PROTEIN CALORIE MALNUTRITION PRESENT: [ ]mild [ ]moderate [ ]severe [ ]underweight [ ]morbid obesity  https://www.andeal.org/vault/6960/web/files/ONC/Table_Clinical%20Characteristics%20to%20Document%20Malnutrition-White%20JV%20et%20al%202012.pdf    Height (cm): 160 (03-11-22 @ 09:14)  Weight (kg): 59.4 (03-11-22 @ 09:14)  BMI (kg/m2): 23.2 (03-11-22 @ 09:14)    [x ]PPSV2 < or = to 30% [ ]significant weight loss  [ ]poor nutritional intake  [ ]anasarca      [ ]Artificial Nutrition      REFERRALS:   [ ]Chaplaincy  [ ]Hospice  [ ]Child Life  [ ]Social Work  [ ]Case management [ ]Holistic Therapy     Goals of Care Document:

## 2022-03-18 NOTE — PROGRESS NOTE ADULT - SUBJECTIVE AND OBJECTIVE BOX
Seen in ICU, on NC O2, no distress    Vital Signs Last 24 Hrs  T(C): 36.1 (03-18-22 @ 15:53), Max: 36.9 (03-17-22 @ 21:19)  T(F): 97 (03-18-22 @ 15:53), Max: 98.4 (03-17-22 @ 21:19)  HR: 72 (03-18-22 @ 16:00) (60 - 96)  BP: 116/71 (03-18-22 @ 16:00) (86/41 - 138/53)  BP(mean): 85 (03-18-22 @ 16:00) (53 - 97)  RR: 20 (03-18-22 @ 16:00) (17 - 35)  SpO2: 96% (03-18-22 @ 16:00) (76% - 99%)    I&O's Detail    17 Mar 2022 07:01  -  18 Mar 2022 07:00  --------------------------------------------------------  OUT:    Indwelling Catheter - Urethral (mL): 2200 mL  Total OUT: 2200 mL    s1s2  b/l air entry  soft, ND  no edema                        10.4   7.62  )-----------( 156      ( 18 Mar 2022 06:25 )             34.2     18 Mar 2022 06:25    144    |  103    |  29     ----------------------------<  113    3.3     |  43     |  0.95     Ca    8.1        18 Mar 2022 06:25  Phos  3.1       18 Mar 2022 06:25  Mg     2.5       18 Mar 2022 06:25    TPro  5.7    /  Alb  2.7    /  TBili  0.8    /  DBili  x      /  AST  24     /  ALT  21     /  AlkPhos  111    18 Mar 2022 06:25    LIVER FUNCTIONS - ( 18 Mar 2022 06:25 )  Alb: 2.7 g/dL / Pro: 5.7 g/dL / ALK PHOS: 111 U/L / ALT: 21 U/L DA / AST: 24 U/L / GGT: x           acetaZOLAMIDE    Tablet 250 milliGRAM(s) Oral daily  albuterol/ipratropium for Nebulization 3 milliLiter(s) Nebulizer every 8 hours  allopurinol 100 milliGRAM(s) Oral daily  digoxin     Tablet 125 MICROGram(s) Oral daily  guaiFENesin Oral Liquid (Sugar-Free) 200 milliGRAM(s) Oral every 6 hours PRN  levothyroxine 125 MICROGram(s) Oral daily  melatonin 5 milliGRAM(s) Oral at bedtime PRN  metoprolol succinate ER 25 milliGRAM(s) Oral daily  pantoprazole    Tablet 40 milliGRAM(s) Oral before breakfast  rivaroxaban 15 milliGRAM(s) Oral with dinner    Impression/plan:    CO2 retention  Met alkalosis in setting of above, contraction  HF + COPD exacerbation   Diastolic CM, severe pulm HTN, RV Dysfx  Continue Acetazolamide, Lasix  Avoid nephrotoxins  F/u [facility]    [phone]

## 2022-03-18 NOTE — PROGRESS NOTE ADULT - SUBJECTIVE AND OBJECTIVE BOX
Chief Complaint: Shortness of breath    Interval Events: No events overnight.    Review of Systems:  General: No fevers, chills, weight gain  Skin: No rashes, color changes  Cardiovascular: No chest pain, orthopnea  Respiratory: No shortness of breath, cough  Gastrointestinal: No nausea, abdominal pain  Genitourinary: No incontinence, pain with urination  Musculoskeletal: No pain, swelling, decreased range of motion  Neurological: No headache, weakness  Psychiatric: No depression, anxiety  Endocrine: No weight gain, increased thirst  All other systems are comprehensively negative.    Physical Exam:  Vital Signs Last 24 Hrs  T(C): 36.7 (18 Mar 2022 08:17), Max: 36.9 (17 Mar 2022 21:19)  T(F): 98 (18 Mar 2022 08:17), Max: 98.4 (17 Mar 2022 21:19)  HR: 60 (18 Mar 2022 08:03) (60 - 96)  BP: 97/41 (18 Mar 2022 08:03) (86/41 - 138/53)  BP(mean): 57 (18 Mar 2022 08:03) (53 - 97)  RR: 23 (18 Mar 2022 08:03) (16 - 35)  SpO2: 98% (18 Mar 2022 08:03) (76% - 100%)  General: NAD  HEENT: MMM  Neck: No JVD, no carotid bruit  Lungs: CTAB  CV: RRR, nl S1/S2, no M/R/G  Abdomen: S/NT/ND, +BS  Extremities: No LE edema, no cyanosis  Neuro: AAOx3, non-focal  Skin: No rash    Labs:             03-18    144  |  103  |  29<H>  ----------------------------<  113<H>  3.3<L>   |  43<H>  |  0.95    Ca    8.1<L>      18 Mar 2022 06:25  Phos  3.1     03-18  Mg     2.5     03-18    TPro  5.7<L>  /  Alb  2.7<L>  /  TBili  0.8  /  DBili  x   /  AST  24  /  ALT  21  /  AlkPhos  111  03-18                        10.4   7.62  )-----------( 156      ( 18 Mar 2022 06:25 )             34.2       Telemetry: AF, ventricular paced

## 2022-03-19 LAB
ANION GAP SERPL CALC-SCNC: 3 MMOL/L — LOW (ref 5–17)
BASOPHILS # BLD AUTO: 0.03 K/UL — SIGNIFICANT CHANGE UP (ref 0–0.2)
BASOPHILS NFR BLD AUTO: 0.4 % — SIGNIFICANT CHANGE UP (ref 0–2)
BUN SERPL-MCNC: 27 MG/DL — HIGH (ref 7–23)
CALCIUM SERPL-MCNC: 8 MG/DL — LOW (ref 8.4–10.5)
CHLORIDE SERPL-SCNC: 102 MMOL/L — SIGNIFICANT CHANGE UP (ref 96–108)
CO2 SERPL-SCNC: 36 MMOL/L — HIGH (ref 22–31)
CREAT SERPL-MCNC: 0.88 MG/DL — SIGNIFICANT CHANGE UP (ref 0.5–1.3)
EGFR: 61 ML/MIN/1.73M2 — SIGNIFICANT CHANGE UP
EOSINOPHIL # BLD AUTO: 0.12 K/UL — SIGNIFICANT CHANGE UP (ref 0–0.5)
EOSINOPHIL NFR BLD AUTO: 1.8 % — SIGNIFICANT CHANGE UP (ref 0–6)
GLUCOSE SERPL-MCNC: 82 MG/DL — SIGNIFICANT CHANGE UP (ref 70–99)
HCT VFR BLD CALC: 32.9 % — LOW (ref 34.5–45)
HGB BLD-MCNC: 10.2 G/DL — LOW (ref 11.5–15.5)
IMM GRANULOCYTES NFR BLD AUTO: 0.1 % — SIGNIFICANT CHANGE UP (ref 0–1.5)
LYMPHOCYTES # BLD AUTO: 1.65 K/UL — SIGNIFICANT CHANGE UP (ref 1–3.3)
LYMPHOCYTES # BLD AUTO: 24.4 % — SIGNIFICANT CHANGE UP (ref 13–44)
MAGNESIUM SERPL-MCNC: 2.3 MG/DL — SIGNIFICANT CHANGE UP (ref 1.6–2.6)
MCHC RBC-ENTMCNC: 31 GM/DL — LOW (ref 32–36)
MCHC RBC-ENTMCNC: 33.8 PG — SIGNIFICANT CHANGE UP (ref 27–34)
MCV RBC AUTO: 108.9 FL — HIGH (ref 80–100)
MONOCYTES # BLD AUTO: 0.67 K/UL — SIGNIFICANT CHANGE UP (ref 0–0.9)
MONOCYTES NFR BLD AUTO: 9.9 % — SIGNIFICANT CHANGE UP (ref 2–14)
NEUTROPHILS # BLD AUTO: 4.29 K/UL — SIGNIFICANT CHANGE UP (ref 1.8–7.4)
NEUTROPHILS NFR BLD AUTO: 63.4 % — SIGNIFICANT CHANGE UP (ref 43–77)
NRBC # BLD: 0 /100 WBCS — SIGNIFICANT CHANGE UP (ref 0–0)
PHOSPHATE SERPL-MCNC: 3.4 MG/DL — SIGNIFICANT CHANGE UP (ref 2.5–4.5)
PLATELET # BLD AUTO: 155 K/UL — SIGNIFICANT CHANGE UP (ref 150–400)
POTASSIUM SERPL-MCNC: 3.9 MMOL/L — SIGNIFICANT CHANGE UP (ref 3.5–5.3)
POTASSIUM SERPL-SCNC: 3.9 MMOL/L — SIGNIFICANT CHANGE UP (ref 3.5–5.3)
RBC # BLD: 3.02 M/UL — LOW (ref 3.8–5.2)
RBC # FLD: 14.4 % — SIGNIFICANT CHANGE UP (ref 10.3–14.5)
SODIUM SERPL-SCNC: 141 MMOL/L — SIGNIFICANT CHANGE UP (ref 135–145)
WBC # BLD: 6.77 K/UL — SIGNIFICANT CHANGE UP (ref 3.8–10.5)
WBC # FLD AUTO: 6.77 K/UL — SIGNIFICANT CHANGE UP (ref 3.8–10.5)

## 2022-03-19 PROCEDURE — 99232 SBSQ HOSP IP/OBS MODERATE 35: CPT

## 2022-03-19 RX ORDER — SALIVA SUBSTITUTE COMB NO.11 351 MG
1 POWDER IN PACKET (EA) MUCOUS MEMBRANE
Refills: 0 | Status: DISCONTINUED | OUTPATIENT
Start: 2022-03-19 | End: 2022-03-21

## 2022-03-19 RX ORDER — SODIUM CHLORIDE 9 MG/ML
250 INJECTION INTRAMUSCULAR; INTRAVENOUS; SUBCUTANEOUS ONCE
Refills: 0 | Status: COMPLETED | OUTPATIENT
Start: 2022-03-19 | End: 2022-03-19

## 2022-03-19 RX ADMIN — Medication 40 MILLIGRAM(S): at 06:06

## 2022-03-19 RX ADMIN — PANTOPRAZOLE SODIUM 40 MILLIGRAM(S): 20 TABLET, DELAYED RELEASE ORAL at 06:05

## 2022-03-19 RX ADMIN — Medication 125 MICROGRAM(S): at 06:05

## 2022-03-19 RX ADMIN — Medication 3 MILLILITER(S): at 06:42

## 2022-03-19 RX ADMIN — SODIUM CHLORIDE 250 MILLILITER(S): 9 INJECTION INTRAMUSCULAR; INTRAVENOUS; SUBCUTANEOUS at 19:00

## 2022-03-19 RX ADMIN — RIVAROXABAN 15 MILLIGRAM(S): KIT at 17:49

## 2022-03-19 RX ADMIN — Medication 650 MILLIGRAM(S): at 11:33

## 2022-03-19 RX ADMIN — Medication 650 MILLIGRAM(S): at 12:27

## 2022-03-19 RX ADMIN — Medication 1 SPRAY(S): at 11:33

## 2022-03-19 RX ADMIN — Medication 3 MILLILITER(S): at 15:28

## 2022-03-19 RX ADMIN — Medication 100 MILLIGRAM(S): at 11:33

## 2022-03-19 RX ADMIN — Medication 3 MILLILITER(S): at 22:47

## 2022-03-19 RX ADMIN — Medication 25 MILLIGRAM(S): at 06:05

## 2022-03-19 NOTE — PROGRESS NOTE ADULT - SUBJECTIVE AND OBJECTIVE BOX
Patient is a 89y old  Female who presents with a chief complaint of Respiratory distress (19 Mar 2022 09:10)      INTERVAL HPI/OVERNIGHT EVENTS: Patient seen and examined at bedside. No overnight events. Sleepy this morning and c/o "not feeling well" with no further details when asked.       MEDICATIONS  (STANDING):  acetaZOLAMIDE    Tablet 250 milliGRAM(s) Oral daily  albuterol/ipratropium for Nebulization 3 milliLiter(s) Nebulizer every 8 hours  allopurinol 100 milliGRAM(s) Oral daily  digoxin     Tablet 125 MICROGram(s) Oral daily  furosemide    Tablet 40 milliGRAM(s) Oral daily  levothyroxine 125 MICROGram(s) Oral daily  metoprolol succinate ER 25 milliGRAM(s) Oral daily  pantoprazole    Tablet 40 milliGRAM(s) Oral before breakfast  rivaroxaban 15 milliGRAM(s) Oral with dinner    MEDICATIONS  (PRN):  acetaminophen     Tablet .. 650 milliGRAM(s) Oral every 6 hours PRN Mild Pain (1 - 3)  guaiFENesin Oral Liquid (Sugar-Free) 200 milliGRAM(s) Oral every 6 hours PRN Cough  melatonin 5 milliGRAM(s) Oral at bedtime PRN Insomnia      Allergies    codeine (Unknown)    Intolerances        REVIEW OF SYSTEMS:  CONSTITUTIONAL: No fever or chills  HEENT:  No headache, no sore throat  RESPIRATORY: Admits cough, shortness of breath  CARDIOVASCULAR: No chest pain, palpitations, or leg swelling  GASTROINTESTINAL: No abd pain, nausea, vomiting, or diarrhea  GENITOURINARY: No dysuria, frequency, or hematuria  NEUROLOGICAL: no focal weakness or dizziness  MUSCULOSKELETAL: no myalgias      Vital Signs Last 24 Hrs  T(C): 36.4 (19 Mar 2022 05:15), Max: 36.9 (18 Mar 2022 22:19)  T(F): 97.5 (19 Mar 2022 05:15), Max: 98.5 (18 Mar 2022 22:19)  HR: 62 (19 Mar 2022 06:45) (60 - 89)  BP: 116/54 (19 Mar 2022 05:15) (96/49 - 122/56)  BP(mean): 98 (18 Mar 2022 20:00) (62 - 98)  RR: 18 (19 Mar 2022 05:15) (18 - 25)  SpO2: 98% (19 Mar 2022 06:45) (92% - 100%)    PHYSICAL EXAM:  GENERAL: NAD. elderly  HEENT:  EOMI, moist mucous membranes  CHEST/LUNG:  diminished bs at bases, poor insp effort, on nasal cannula  HEART:  RRR, S1, S2  ABDOMEN:  BS+, soft, nontender, nondistended  EXTREMITIES: no edema, cyanosis, or calf tenderness, deconditioned extremities. Tenderness to gentle palpation of legs/ feet with no focal tenderness  NERVOUS SYSTEM: AA&Ox 3, sleepy but arousable this morning  SKIN: Multiple ecchymotic areas on arms and lower extremity       LABS:                        10.2   6.77  )-----------( 155      ( 19 Mar 2022 07:48 )             32.9     CBC Full  -  ( 19 Mar 2022 07:48 )  WBC Count : 6.77 K/uL  Hemoglobin : 10.2 g/dL  Hematocrit : 32.9 %  Platelet Count - Automated : 155 K/uL  Mean Cell Volume : 108.9 fl  Mean Cell Hemoglobin : 33.8 pg  Mean Cell Hemoglobin Concentration : 31.0 gm/dL  Auto Neutrophil # : 4.29 K/uL  Auto Lymphocyte # : 1.65 K/uL  Auto Monocyte # : 0.67 K/uL  Auto Eosinophil # : 0.12 K/uL  Auto Basophil # : 0.03 K/uL  Auto Neutrophil % : 63.4 %  Auto Lymphocyte % : 24.4 %  Auto Monocyte % : 9.9 %  Auto Eosinophil % : 1.8 %  Auto Basophil % : 0.4 %    19 Mar 2022 07:48    141    |  102    |  27     ----------------------------<  82     3.9     |  36     |  0.88     Ca    8.0        19 Mar 2022 07:48  Phos  3.4       19 Mar 2022 07:48  Mg     2.3       19 Mar 2022 07:48          CAPILLARY BLOOD GLUCOSE              RADIOLOGY & ADDITIONAL TESTS:    Consultant(s) Notes Reviewed:  [x] YES  [ ] NO    Care Discussed with [x] Consultants  [x] Patient  [ ] Family  [ ]      [ x] Other; RN  DVT ppx

## 2022-03-19 NOTE — PROGRESS NOTE ADULT - ASSESSMENT
89 y.o female with PMH of hypothyroidism, atrial fibrillation, pacemaker, COPD, CAD, chronic diastolic heart failure, severe TR, severe pulm HTN who presents with acute on chronic hypoxic/hypercapnic respiratory failure in the setting of acute on chronic diastolic heart failure, COPD and RSV bronchitis.    # Acute on chronic hypoxic/hypercarbic respiratory failure 2/2 CHF exacerbation and RSV bronchitis  - Present on admission  - Blood and urine C+S 3/11 NGTD  - Supportive care for RSV - antitussives PRN  - IV solumedrol taper completed  - Appreciate cardiology and pulmonary recs  - Continue Acetazolamide daily, Lasix restarted at 40mg qd for now, home dose is 60mg but may need dose adjustment on dc  - CXR with mild-mod effusion 3/16  - Will need BIPAP at night    #Urinary retention  - Failed TOV inpatient   - Can attempt TOV at [facility] once more ambulatory    # COPD  - Continue duoneb  - IV solumedrol taper completed  - Pulmonary recs appreciated    # Acute on chronic diastolic CHF  - BNP 11,290 on admission  - Can use BIPAP PRN  - On po lasix  - Strict I/Os  - Card recs appreciated    # Atrial fibrillation  - Remains rate controlled  - Continue Toprol XL  - Continue Digoxin  - Continue Xarelto for thromboprophylaxis    # Hypokalemia  - Likely secondary to diuresis  - Trend lytes    # Hypothyroid  - Continue Synthroid    # HTN  - Continue Toprol XL    # Gout  - Continue Allopurinol    # Insomnia  - Continue Melatonin    #DVT prophylaxis   - Continue Xarelto    # ACP  - DNR/DNI, MOLST in chart

## 2022-03-19 NOTE — PROGRESS NOTE ADULT - ASSESSMENT
The patient is a 89 year old female with a history of hypothyroidism, atrial fibrillation, pacemaker, COPD, CAD, chronic diastolic heart failure, severe TR, severe pulm HTN who presents with shortness of breath in the setting of acute on chronic diastolic heart failure, COPD, RSV.    Plan:  - ECG with known AF and intermittent ventricular pacing  - CXR with bilateral pleural effusions  - BNP 87924  - Echo 2/22 with normal LV systolic function, severe TR, severe pulm HTN  - RSV positive  - Continue furosemide 40 mg daily  - Continue acetazolamide 250 mg PO daily - discontinue in 1-2 days  - Should be on BIPAP at night  - Continue rivaroxaban 15 mg daily  - Continue digoxin 0.125 mg daily  - Continue metoprolol succinate 25 mg daily

## 2022-03-19 NOTE — PROGRESS NOTE ADULT - SUBJECTIVE AND OBJECTIVE BOX
Date/Time Patient Seen:  		  Referring MD:   Data Reviewed	       Patient is a 89y old  Female who presents with a chief complaint of Respiratory distress (18 Mar 2022 20:24)      Subjective/HPI     PAST MEDICAL & SURGICAL HISTORY:  Chronic obstructive pulmonary disease (COPD)    HTN (hypertension)    Cardiac pacemaker    Gout    Hypothyroidism    Insomnia    Chronic CHF    History of ST elevation myocardial infarction (STEMI)    No significant past surgical history          Medication list         MEDICATIONS  (STANDING):  acetaZOLAMIDE    Tablet 250 milliGRAM(s) Oral daily  albuterol/ipratropium for Nebulization 3 milliLiter(s) Nebulizer every 8 hours  allopurinol 100 milliGRAM(s) Oral daily  digoxin     Tablet 125 MICROGram(s) Oral daily  furosemide    Tablet 40 milliGRAM(s) Oral daily  levothyroxine 125 MICROGram(s) Oral daily  metoprolol succinate ER 25 milliGRAM(s) Oral daily  pantoprazole    Tablet 40 milliGRAM(s) Oral before breakfast  rivaroxaban 15 milliGRAM(s) Oral with dinner    MEDICATIONS  (PRN):  acetaminophen     Tablet .. 650 milliGRAM(s) Oral every 6 hours PRN Mild Pain (1 - 3)  guaiFENesin Oral Liquid (Sugar-Free) 200 milliGRAM(s) Oral every 6 hours PRN Cough  melatonin 5 milliGRAM(s) Oral at bedtime PRN Insomnia         Vitals log        ICU Vital Signs Last 24 Hrs  T(C): 36.4 (19 Mar 2022 05:15), Max: 36.9 (18 Mar 2022 22:19)  T(F): 97.5 (19 Mar 2022 05:15), Max: 98.5 (18 Mar 2022 22:19)  HR: 62 (19 Mar 2022 06:45) (60 - 89)  BP: 116/54 (19 Mar 2022 05:15) (96/49 - 122/56)  BP(mean): 98 (18 Mar 2022 20:00) (62 - 98)  ABP: --  ABP(mean): --  RR: 18 (19 Mar 2022 05:15) (18 - 25)  SpO2: 98% (19 Mar 2022 06:45) (92% - 100%)           Input and Output:  I&O's Detail    18 Mar 2022 07:01  -  19 Mar 2022 07:00  --------------------------------------------------------  IN:  Total IN: 0 mL    OUT:    Indwelling Catheter - Urethral (mL): 825 mL  Total OUT: 825 mL    Total NET: -825 mL          Lab Data                        10.2   6.77  )-----------( 155      ( 19 Mar 2022 07:48 )             32.9     03-19    141  |  102  |  27<H>  ----------------------------<  82  3.9   |  36<H>  |  0.88    Ca    8.0<L>      19 Mar 2022 07:48  Phos  3.4     03-19  Mg     2.3     03-19    TPro  5.7<L>  /  Alb  2.7<L>  /  TBili  0.8  /  DBili  x   /  AST  24  /  ALT  21  /  AlkPhos  111  03-18    ABG - ( 18 Mar 2022 05:35 )  pH, Arterial: 7.44  pH, Blood: x     /  pCO2: 70    /  pO2: 76    / HCO3: 48    / Base Excess: 23.3  /  SaO2: 96.1                    Review of Systems	      Objective     Physical Examination    heart s1s2  lung dec BS  abd soft      Pertinent Lab findings & Imaging      Olga:  NO   Adequate UO     I&O's Detail    18 Mar 2022 07:01  -  19 Mar 2022 07:00  --------------------------------------------------------  IN:  Total IN: 0 mL    OUT:    Indwelling Catheter - Urethral (mL): 825 mL  Total OUT: 825 mL    Total NET: -825 mL               Discussed with:     Cultures:	        Radiology

## 2022-03-19 NOTE — PROGRESS NOTE ADULT - ASSESSMENT
pt with extensive med hx -   CHF  Pulm HTN severe  valv heart disease  COPD  Resp Distress  PVD  OP  OA  Fall prone - risk  Ataxic gait  pelvic fx  AF  Dyspepsia  PPM    Neuro and Nephro eval noted  on o2 support  Am ABG noted - compensated  on Diamox -     RSV positive  CT chest noted  strep and legionella ag neg  ID f/u noted  cardio f/u noted  TTE shows severe Pulm HTN - known   completed Systemic Steroids course  on diamox - lasix - diuresis  GOC documented- DNR DNI    ct chest reviewed  USE BIPAP PRN - for resp distress - hypercapnea - night time use as tolerated  ABG noted  Labs and imaging reviewed   diuresis - i and o - monitor Na and renal indices -   Pulse ox monitoring - Card tele monitoring - CHF and COPD ex -   NEBS and s/p Steroids - for COPD  Diuresis as per Cardio  on AC for AF

## 2022-03-20 LAB
ALBUMIN SERPL ELPH-MCNC: 2.8 G/DL — LOW (ref 3.3–5)
ALP SERPL-CCNC: 116 U/L — SIGNIFICANT CHANGE UP (ref 30–120)
ALT FLD-CCNC: 18 U/L DA — SIGNIFICANT CHANGE UP (ref 10–60)
ANION GAP SERPL CALC-SCNC: 3 MMOL/L — LOW (ref 5–17)
AST SERPL-CCNC: 21 U/L — SIGNIFICANT CHANGE UP (ref 10–40)
BASOPHILS # BLD AUTO: 0.03 K/UL — SIGNIFICANT CHANGE UP (ref 0–0.2)
BASOPHILS NFR BLD AUTO: 0.4 % — SIGNIFICANT CHANGE UP (ref 0–2)
BILIRUB SERPL-MCNC: 0.7 MG/DL — SIGNIFICANT CHANGE UP (ref 0.2–1.2)
BUN SERPL-MCNC: 27 MG/DL — HIGH (ref 7–23)
CALCIUM SERPL-MCNC: 7.9 MG/DL — LOW (ref 8.4–10.5)
CHLORIDE SERPL-SCNC: 101 MMOL/L — SIGNIFICANT CHANGE UP (ref 96–108)
CO2 SERPL-SCNC: 36 MMOL/L — HIGH (ref 22–31)
CREAT SERPL-MCNC: 0.97 MG/DL — SIGNIFICANT CHANGE UP (ref 0.5–1.3)
EGFR: 54 ML/MIN/1.73M2 — LOW
EOSINOPHIL # BLD AUTO: 0.18 K/UL — SIGNIFICANT CHANGE UP (ref 0–0.5)
EOSINOPHIL NFR BLD AUTO: 2.3 % — SIGNIFICANT CHANGE UP (ref 0–6)
GLUCOSE SERPL-MCNC: 117 MG/DL — HIGH (ref 70–99)
HCT VFR BLD CALC: 32.2 % — LOW (ref 34.5–45)
HGB BLD-MCNC: 10.1 G/DL — LOW (ref 11.5–15.5)
IMM GRANULOCYTES NFR BLD AUTO: 0.1 % — SIGNIFICANT CHANGE UP (ref 0–1.5)
LYMPHOCYTES # BLD AUTO: 1.71 K/UL — SIGNIFICANT CHANGE UP (ref 1–3.3)
LYMPHOCYTES # BLD AUTO: 21.9 % — SIGNIFICANT CHANGE UP (ref 13–44)
MAGNESIUM SERPL-MCNC: 2.4 MG/DL — SIGNIFICANT CHANGE UP (ref 1.6–2.6)
MCHC RBC-ENTMCNC: 31.4 GM/DL — LOW (ref 32–36)
MCHC RBC-ENTMCNC: 33.7 PG — SIGNIFICANT CHANGE UP (ref 27–34)
MCV RBC AUTO: 107.3 FL — HIGH (ref 80–100)
MONOCYTES # BLD AUTO: 0.83 K/UL — SIGNIFICANT CHANGE UP (ref 0–0.9)
MONOCYTES NFR BLD AUTO: 10.6 % — SIGNIFICANT CHANGE UP (ref 2–14)
NEUTROPHILS # BLD AUTO: 5.06 K/UL — SIGNIFICANT CHANGE UP (ref 1.8–7.4)
NEUTROPHILS NFR BLD AUTO: 64.7 % — SIGNIFICANT CHANGE UP (ref 43–77)
NRBC # BLD: 0 /100 WBCS — SIGNIFICANT CHANGE UP (ref 0–0)
PHOSPHATE SERPL-MCNC: 3.6 MG/DL — SIGNIFICANT CHANGE UP (ref 2.5–4.5)
PLATELET # BLD AUTO: 171 K/UL — SIGNIFICANT CHANGE UP (ref 150–400)
POTASSIUM SERPL-MCNC: 3.7 MMOL/L — SIGNIFICANT CHANGE UP (ref 3.5–5.3)
POTASSIUM SERPL-SCNC: 3.7 MMOL/L — SIGNIFICANT CHANGE UP (ref 3.5–5.3)
PROT SERPL-MCNC: 5.2 G/DL — LOW (ref 6–8.3)
RBC # BLD: 3 M/UL — LOW (ref 3.8–5.2)
RBC # FLD: 14.4 % — SIGNIFICANT CHANGE UP (ref 10.3–14.5)
SODIUM SERPL-SCNC: 140 MMOL/L — SIGNIFICANT CHANGE UP (ref 135–145)
WBC # BLD: 7.82 K/UL — SIGNIFICANT CHANGE UP (ref 3.8–10.5)
WBC # FLD AUTO: 7.82 K/UL — SIGNIFICANT CHANGE UP (ref 3.8–10.5)

## 2022-03-20 PROCEDURE — 99232 SBSQ HOSP IP/OBS MODERATE 35: CPT

## 2022-03-20 RX ORDER — MIDODRINE HYDROCHLORIDE 2.5 MG/1
5 TABLET ORAL ONCE
Refills: 0 | Status: COMPLETED | OUTPATIENT
Start: 2022-03-20 | End: 2022-03-20

## 2022-03-20 RX ORDER — ALPRAZOLAM 0.25 MG
0.25 TABLET ORAL ONCE
Refills: 0 | Status: DISCONTINUED | OUTPATIENT
Start: 2022-03-20 | End: 2022-03-20

## 2022-03-20 RX ADMIN — Medication 25 MILLIGRAM(S): at 05:25

## 2022-03-20 RX ADMIN — Medication 40 MILLIGRAM(S): at 05:25

## 2022-03-20 RX ADMIN — Medication 650 MILLIGRAM(S): at 17:55

## 2022-03-20 RX ADMIN — Medication 650 MILLIGRAM(S): at 16:56

## 2022-03-20 RX ADMIN — ACETAZOLAMIDE 250 MILLIGRAM(S): 250 TABLET ORAL at 17:04

## 2022-03-20 RX ADMIN — Medication 0.25 MILLIGRAM(S): at 05:24

## 2022-03-20 RX ADMIN — Medication 125 MICROGRAM(S): at 05:25

## 2022-03-20 RX ADMIN — MIDODRINE HYDROCHLORIDE 5 MILLIGRAM(S): 2.5 TABLET ORAL at 17:04

## 2022-03-20 RX ADMIN — Medication 3 MILLILITER(S): at 06:53

## 2022-03-20 RX ADMIN — RIVAROXABAN 15 MILLIGRAM(S): KIT at 17:03

## 2022-03-20 RX ADMIN — PANTOPRAZOLE SODIUM 40 MILLIGRAM(S): 20 TABLET, DELAYED RELEASE ORAL at 05:25

## 2022-03-20 RX ADMIN — Medication 200 MILLIGRAM(S): at 11:08

## 2022-03-20 RX ADMIN — Medication 5 MILLIGRAM(S): at 22:25

## 2022-03-20 RX ADMIN — Medication 3 MILLILITER(S): at 22:37

## 2022-03-20 RX ADMIN — Medication 3 MILLILITER(S): at 14:35

## 2022-03-20 RX ADMIN — Medication 125 MICROGRAM(S): at 05:24

## 2022-03-20 RX ADMIN — Medication 100 MILLIGRAM(S): at 11:08

## 2022-03-20 RX ADMIN — Medication 200 MILLIGRAM(S): at 22:25

## 2022-03-20 NOTE — PROGRESS NOTE ADULT - SUBJECTIVE AND OBJECTIVE BOX
Neurology Follow up note    PATRICIA [id number]    HPI:  89 y.o. female w/ hx of CHF, COPD, PPM, gout, hypothyroidism, HTN, and STEMI BIBEMS today from [facility] for evaluation of respiratory distress. Pt unable to give any history 2/2 respiratory status. Per EMS report pt. was hypoxic to 50% on 4L nasal cannula, NRB placed w/ improvement in O2 sats to 70s.      ED Course;  - Placed on bipap  - Broad spectrum coverage w/ Zosyn and Vanco  - Lasix 40mg  - Methylprednisone 40mg  - CBC, ABG, Coags, UA,blood and urine culture, lactate, procalcitonin, BNP  - CXR done  - Chest CT done  - EKG done    Seen by pulmonary and cardiology  [facility] monitoring was recommended (11 Mar 2022 11:20)      Interval History -I am tired    Patient is seen, chart was reviewed and case was discussed with the treatment team.  Pt is not in any distress.   Lying on bed comfortably.   No events reported overnight.   No clinical seizure was reported.  .    Vital Signs Last 24 Hrs  T(C): 36.8 (20 Mar 2022 10:34), Max: 36.8 (19 Mar 2022 20:08)  T(F): 98.2 (20 Mar 2022 10:34), Max: 98.2 (19 Mar 2022 20:08)  HR: 61 (20 Mar 2022 10:34) (60 - 73)  BP: 97/47 (20 Mar 2022 10:34) (86/51 - 118/73)  BP(mean): 70 (19 Mar 2022 20:08) (70 - 70)  RR: 18 (20 Mar 2022 10:34) (17 - 18)  SpO2: 98% (20 Mar 2022 10:34) (91% - 99%)        REVIEW OF SYSTEMS:    Constitutional: No fever, weight loss or fatigue  Eyes: No eye pain, visual disturbances, or discharge  ENT:  No difficulty hearing, tinnitus, vertigo; No sinus or throat pain  Neck: No pain or stiffness  Respiratory: No wheezing, chills or hemoptysis  Cardiovascular: No chest pain, palpitations, shortness of breath, dizziness  Gastrointestinal: No abdominal or epigastric pain. No nausea, vomiting or hematemesis;  Genitourinary: No dysuria, frequency, hematuria or incontinence  Neurological: No headaches, memory loss, loss of strength, numbness  Psychiatric: No depression, anxiety, mood swings or difficulty sleeping  Musculoskeletal: No joint pain or swelling; No muscle, back or extremity pain  Skin: No itching, burning, rashes or lesions   Lymph Nodes: No enlarged glands  Endocrine: No heat or cold intolerance; No hair loss,   Allergy and Immunologic: No hives or eczema    On Neurological Examination:    Mental Status - Pt is alert, awake, oriented X3.  Follows commands well and able to answer questions appropriately.Mood and affect  normal    Speech -  Normal.     Cranial Nerves - Pupils 3 mm equal and reactive to light, extraocular eye movements intact. Pt has no visual field deficit.  Pt has no facial asymmetry. Facial sensation is intact.Tongue - is in midline.    Muscle tone - is normal    Motor Exam - 4/5   No drift. No shaking or tremors.    Sensory Exam -  Pt withdraws all extremities equally on stimulation. No asymmetry seen. No complaints of tingling, numbness.    coordination:    Finger to nose: normal    Deep tendon Reflexes - 2 plus all over.       Neck Supple -  Yes.     MEDICATIONS    acetaminophen     Tablet .. 650 milliGRAM(s) Oral every 6 hours PRN  acetaZOLAMIDE    Tablet 250 milliGRAM(s) Oral daily  albuterol/ipratropium for Nebulization 3 milliLiter(s) Nebulizer every 8 hours  allopurinol 100 milliGRAM(s) Oral daily  digoxin     Tablet 125 MICROGram(s) Oral daily  furosemide    Tablet 40 milliGRAM(s) Oral daily  guaiFENesin Oral Liquid (Sugar-Free) 200 milliGRAM(s) Oral every 6 hours PRN  levothyroxine 125 MICROGram(s) Oral daily  melatonin 5 milliGRAM(s) Oral at bedtime PRN  metoprolol succinate ER 25 milliGRAM(s) Oral daily  pantoprazole    Tablet 40 milliGRAM(s) Oral before breakfast  rivaroxaban 15 milliGRAM(s) Oral with dinner  saliva substitute (BIOTENE MOISTURIZING MOUTH SPRAY) 1 [place](s) Topical every 3 hours PRN      Allergies    codeine (Unknown)    Intolerances        LABS:  CBC Full  -  ( 20 Mar 2022 07:32 )  WBC Count : 7.82 K/uL  RBC Count : 3.00 M/uL  Hemoglobin : 10.1 g/dL  Hematocrit : 32.2 %  Platelet Count - Automated : 171 K/uL  Mean Cell Volume : 107.3 fl  Mean Cell Hemoglobin : 33.7 pg  Mean Cell Hemoglobin Concentration : 31.4 gm/dL  Auto Neutrophil # : 5.06 K/uL  Auto Lymphocyte # : 1.71 K/uL      03-20    140  |  101  |  27<H>  ----------------------------<  117<H>  3.7   |  36<H>  |  0.97    Ca    7.9<L>      20 Mar 2022 07:32  Phos  3.6     03-20  Mg     2.4     03-20    TPro  5.2<L>  /  Alb  2.8<L>  /  TBili  0.7  /  DBili  x   /  AST  21  /  ALT  18  /  AlkPhos  116  03-20    Hemoglobin A1C:     Vitamin B12     RADIOLOGY    ASSESSMENT AND PLAN:      seen for dizziness improved  respiratory insuff  hx of copd    neb/supplemental oxygen  Physical therapy evaluation.  OOB to chair/ambulation with assistance only.  Pain is accessed and addressed.  Would continue to follow.

## 2022-03-20 NOTE — PROGRESS NOTE ADULT - ASSESSMENT
The patient is a 89 year old female with a history of hypothyroidism, atrial fibrillation, pacemaker, COPD, CAD, chronic diastolic heart failure, severe TR, severe pulm HTN who presents with shortness of breath in the setting of acute on chronic diastolic heart failure, COPD, RSV.    Plan:  - ECG with known AF and intermittent ventricular pacing  - CXR with bilateral pleural effusions  - BNP 66000  - Echo 2/22 with normal LV systolic function, severe TR, severe pulm HTN  - RSV positive  - Continue furosemide 40 mg daily  - Continue acetazolamide 250 mg PO daily  - Should be on BIPAP at night  - Continue rivaroxaban 15 mg daily  - Continue digoxin 0.125 mg daily  - Continue metoprolol succinate 25 mg daily

## 2022-03-20 NOTE — PROGRESS NOTE ADULT - ASSESSMENT
89 y.o female with PMH of hypothyroidism, atrial fibrillation, pacemaker, COPD, CAD, chronic diastolic heart failure, severe TR, severe pulm HTN who presents with acute on chronic hypoxic/hypercapnic respiratory failure in the setting of acute on chronic diastolic heart failure, COPD and RSV bronchitis.    # Acute on chronic hypoxic/hypercarbic respiratory failure 2/2 CHF exacerbation and RSV bronchitis  - Blood and urine C+S 3/11 NGTD  - Supportive care for RSV - antitussives PRN  - IV solumedrol taper completed  - Appreciate cardiology and pulmonary recs  - Continue Acetazolamide daily, Lasix restarted at 40mg qd for now, home dose is 60mg but may need dose adjustment on dc  - Will need BIPAP at night, has been using per flow sheets    #Urinary retention  - Failed TOV inpatient   - Can attempt TOV at [facility] once more ambulatory    # COPD  - Continue duoneb  - IV solumedrol taper completed  - Pulmonary recs appreciated    # Acute on chronic diastolic CHF  - BNP 11,290 on admission  - Can use BIPAP PRN  - On po lasix, missed dose yesterday due to hypotension   - Strict I/Os  - Card recs appreciated    # Atrial fibrillation  - Remains rate controlled  - Continue Toprol XL  - Continue Digoxin  - Continue Xarelto for thromboprophylaxis    # Hypokalemia  - Likely secondary to diuresis  - Trend lytes    # Hypothyroid  - Continue Synthroid    # HTN  - Continue Toprol XL    # Gout  - Continue Allopurinol    # Insomnia  - Continue Melatonin    #DVT prophylaxis   - Continue Xarelto    # ACP  - DNR/DNI, MOLST in chart

## 2022-03-20 NOTE — PROGRESS NOTE ADULT - SUBJECTIVE AND OBJECTIVE BOX
Date/Time Patient Seen:  		  Referring MD:   Data Reviewed	       Patient is a 89y old  Female who presents with a chief complaint of Respiratory distress (19 Mar 2022 10:53)      Subjective/HPI     PAST MEDICAL & SURGICAL HISTORY:  Chronic obstructive pulmonary disease (COPD)    HTN (hypertension)    Cardiac pacemaker    Gout    Hypothyroidism    Insomnia    Chronic CHF    History of ST elevation myocardial infarction (STEMI)    No significant past surgical history          Medication list         MEDICATIONS  (STANDING):  acetaZOLAMIDE    Tablet 250 milliGRAM(s) Oral daily  albuterol/ipratropium for Nebulization 3 milliLiter(s) Nebulizer every 8 hours  allopurinol 100 milliGRAM(s) Oral daily  digoxin     Tablet 125 MICROGram(s) Oral daily  furosemide    Tablet 40 milliGRAM(s) Oral daily  levothyroxine 125 MICROGram(s) Oral daily  metoprolol succinate ER 25 milliGRAM(s) Oral daily  pantoprazole    Tablet 40 milliGRAM(s) Oral before breakfast  rivaroxaban 15 milliGRAM(s) Oral with dinner    MEDICATIONS  (PRN):  acetaminophen     Tablet .. 650 milliGRAM(s) Oral every 6 hours PRN Mild Pain (1 - 3)  guaiFENesin Oral Liquid (Sugar-Free) 200 milliGRAM(s) Oral every 6 hours PRN Cough  melatonin 5 milliGRAM(s) Oral at bedtime PRN Insomnia  saliva substitute (BIOTENE MOISTURIZING MOUTH SPRAY) 1 [place](s) Topical every 3 hours PRN Mouth Care         Vitals log        ICU Vital Signs Last 24 Hrs  T(C): 36.3 (20 Mar 2022 05:45), Max: 36.8 (19 Mar 2022 20:08)  T(F): 97.4 (20 Mar 2022 05:45), Max: 98.2 (19 Mar 2022 20:08)  HR: 66 (20 Mar 2022 05:45) (60 - 73)  BP: 118/73 (20 Mar 2022 05:45) (96/60 - 118/73)  BP(mean): 70 (19 Mar 2022 20:08) (70 - 70)  ABP: --  ABP(mean): --  RR: 17 (20 Mar 2022 05:45) (17 - 18)  SpO2: 97% (20 Mar 2022 05:45) (91% - 99%)           Input and Output:  I&O's Detail    18 Mar 2022 07:01  -  19 Mar 2022 07:00  --------------------------------------------------------  IN:  Total IN: 0 mL    OUT:    Indwelling Catheter - Urethral (mL): 825 mL  Total OUT: 825 mL    Total NET: -825 mL      19 Mar 2022 07:01  -  20 Mar 2022 06:28  --------------------------------------------------------  IN:    Oral Fluid: 250 mL    Sodium Chloride 0.9% Bolus: 250 mL  Total IN: 500 mL    OUT:    Indwelling Catheter - Urethral (mL): 900 mL  Total OUT: 900 mL    Total NET: -400 mL          Lab Data                        10.2   6.77  )-----------( 155      ( 19 Mar 2022 07:48 )             32.9     03-19    141  |  102  |  27<H>  ----------------------------<  82  3.9   |  36<H>  |  0.88    Ca    8.0<L>      19 Mar 2022 07:48  Phos  3.4     03-19  Mg     2.3     03-19              Review of Systems	      Objective     Physical Examination  heart s1s2  lung dec BS  abd soft  head nc        Pertinent Lab findings & Imaging      Olga:  NO   Adequate UO     I&O's Detail    18 Mar 2022 07:01  -  19 Mar 2022 07:00  --------------------------------------------------------  IN:  Total IN: 0 mL    OUT:    Indwelling Catheter - Urethral (mL): 825 mL  Total OUT: 825 mL    Total NET: -825 mL      19 Mar 2022 07:01  -  20 Mar 2022 06:28  --------------------------------------------------------  IN:    Oral Fluid: 250 mL    Sodium Chloride 0.9% Bolus: 250 mL  Total IN: 500 mL    OUT:    Indwelling Catheter - Urethral (mL): 900 mL  Total OUT: 900 mL    Total NET: -400 mL               Discussed with:     Cultures:	        Radiology

## 2022-03-20 NOTE — PROGRESS NOTE ADULT - ASSESSMENT
pt with extensive med hx -   CHF  Pulm HTN severe  valv heart disease  COPD  Resp Distress  PVD  OP  OA  Fall prone - risk  Ataxic gait  pelvic fx  AF  Dyspepsia  PPM    monitor mental status and HD -   diuresis adjustment - as tolerated by BP and hemodynamics -   BIPAP use at night time as tolerated and PRN  remains clinically labile - may need further Palliative input    RSV positive  CT chest noted  strep and legionella ag neg  ID f/u noted  cardio f/u noted  TTE shows severe Pulm HTN - known   completed Systemic Steroids course  on diamox - lasix - diuresis  GOC documented- DNR DNI    ct chest reviewed  USE BIPAP PRN - for resp distress - hypercapnea - night time use as tolerated  ABG noted  Labs and imaging reviewed   diuresis - i and o - monitor Na and renal indices -   Pulse ox monitoring - Card tele monitoring - CHF and COPD ex -   NEBS and s/p Steroids - for COPD  Diuresis as per Cardio  on AC for AF

## 2022-03-20 NOTE — CHART NOTE - NSCHARTNOTEFT_GEN_A_CORE
Assessment: Pt seen for nutrition follow-up.  Per H&P, pt is a "89 y.o. female w/ hx of CHF, COPD, PPM, gout, hypothyroidism, HTN, and STEMI BIBEMS today from [facility] for evaluation of respiratory distress. Pt unable to give any history 2/2 respiratory status. Per EMS report pt. was hypoxic to 50% on 4L nasal cannula, NRB placed w/ improvement in O2 sats to 70s."    3/15  Pt visited while sitting up in bed, lunch tray on bedside table in front of patient.  Pt admitted with acute on chronic hypoxic/hypercarbic respiratory failure 2/2 CHF exacerbation and RSV bronchitis. Remains on supplemental oxygen- 3L via NC.  Observed ~50% of egg salad, ~75% of pudding consumed from lunch tray, sipping on beverage; consumed 50-75% of breakfast today.  PO intake variable this admission, per pt, she is feeling unwell overall compared to baseline and reports of dissatisfaction with food provided (modified consistency).  Pt seen for multiple swallowing evaluations this admission (3/13, 3/14, 3/15)- recommending soft bite size consistency, which is currently active (+DASH/TLC restriction).  MBSS recommend to rule out aspiration vs coughing due to +RSV.  Discussed nutrition supplement options with patient - pt refuses Ensure and similar supplements, including magic cup fortified ice cream.  Encouraged po intake as tolerated, especially of protein dense foods; meal preferences obtained daily to optimize po intake and tolerance.  RD to follow-up and will continue to monitor pt's nutrition status.    Factors impacting intake: [ ] none [ ] nausea  [ ] vomiting [ ] diarrhea [ ] constipation  [ x]chewing problems [x ] swallowing issues  [x ] other: weakness, chf exacerbation/+RSV    Diet Prescription: soft-bite size consistency, DASH/TLC  Intake: variable, 50-75% per documentation    Current Weight: Weight (kg): 59.4 (03-11 @ 09:14)  % Weight Change  adm wt 130#  3/15 131.1# with 1+ BL leg edema    Pertinent Medications: MEDICATIONS  (STANDING):  albuterol/ipratropium for Nebulization 3 milliLiter(s) Nebulizer every 8 hours  allopurinol 100 milliGRAM(s) Oral daily  digoxin     Tablet 125 MICROGram(s) Oral daily  furosemide    Tablet 40 milliGRAM(s) Oral daily  levothyroxine 125 MICROGram(s) Oral daily  metoprolol succinate ER 25 milliGRAM(s) Oral daily  pantoprazole    Tablet 40 milliGRAM(s) Oral before breakfast  rivaroxaban 15 milliGRAM(s) Oral with dinner    MEDICATIONS  (PRN):  guaiFENesin Oral Liquid (Sugar-Free) 200 milliGRAM(s) Oral every 6 hours PRN Cough  melatonin 5 milliGRAM(s) Oral at bedtime PRN Insomnia    Pertinent Labs: 03-15 Na144 mmol/L Glu 117 mg/dL<H> K+ 3.4 mmol/L<L> Cr  0.96 mg/dL BUN 46 mg/dL<H> 03-15 Phos 3.5 mg/dL 03-15 Alb 2.8 g/dL<L>     CAPILLARY BLOOD GLUCOSE        Skin: no pressure ulcers    Estimated Needs:   [x ] no change since previous assessment  [ ] recalculated:     Previous Nutrition Diagnosis:   [ ] Inadequate Energy Intake [ x]Inadequate Oral Intake [ ] Excessive Energy Intake   [ ] Underweight [ ] Increased Nutrient Needs [ ] Overweight/Obesity   [ ] Altered GI Function [ ] Unintended Weight Loss [ ] Food & Nutrition Related Knowledge Deficit [ ] Malnutrition     Nutrition Diagnosis is [ x] ongoing  [ ] resolved [ ] not applicable     New Nutrition Diagnosis: [ ] not applicable       Interventions: diet consistency per SLP recs, no added salt diet, encouragement at meals, aspiration prec  Recommend  [ ] Change Diet To:  [ ] Nutrition Supplement  [ ] Nutrition Support  [ ] Other:     Monitoring and Evaluation:   [x ] PO intake [ x ] Tolerance to diet prescription [ x ] weights [ x ] labs[ x ] follow up per protocol  [ ] other:
Assessment: Pt seen for nutrition follow-up.  Per H&P, pt is a "89 y.o. female w/ hx of CHF, COPD, PPM, gout, hypothyroidism, HTN, and STEMI BIBEMS today from [facility] for evaluation of respiratory distress. Pt unable to give any history 2/2 respiratory status. Per EMS report pt. was hypoxic to 50% on 4L nasal cannula, NRB placed w/ improvement in O2 sats to 70s."    3/18  Pt visited while sitting up in bed, lunch tray on bedside table in front of patient.  Pt admitted with acute on chronic hypoxic/hypercarbic respiratory failure 2/2 CHF exacerbation and RSV bronchitis. Remains on supplemental oxygen- 3L via NC.  Observed pt consuming egg salad; per documentation, consumed <25% of breakfast tray.  PO intake variable this admission, per pt, she is feeling unwell overall compared to baseline and reports of dissatisfaction with food provided (modified consistency).  Pt seen for multiple swallowing evaluations this admission (3/13, 3/14, 3/15)- recommending soft bite size consistency, which is currently active (+DASH/TLC restriction).  MBSS recommend to rule out aspiration vs coughing due to +RSV - MBS attempted this morning 3/18, noted pt very lethargic and had difficulty maintaining arousal and test consequently cancelled.  Nutrition supplement options previously discussed with patient - pt continues refuses Ensure.  Will trial magic cup fortified ice cream and assess for acceptance (290kcal, 9g protein/4oz cup).  Encouraged po intake as tolerated, especially of protein dense foods; meal preferences obtained daily to optimize po intake and tolerance.  Last BM 3/16 per chart.  RD to follow-up and will continue to monitor pt's nutrition status.    Factors impacting intake: [ ] none [ ] nausea  [ ] vomiting [ ] diarrhea [ ] constipation  [x ]chewing problems [x ] swallowing issues  [ x] other: lethargy    Diet Prescription: soft bite size, DASH/TLC  Intake: variable    Current Weight:   % Weight Change  adm wt 130#  3/15 131.1#   3/17 125.8#   3/18 121.4# (possibly related fluid shift change, <edema)    Pertinent Medications: MEDICATIONS  (STANDING):  acetaZOLAMIDE    Tablet 250 milliGRAM(s) Oral daily  albuterol/ipratropium for Nebulization 3 milliLiter(s) Nebulizer every 8 hours  allopurinol 100 milliGRAM(s) Oral daily  digoxin     Tablet 125 MICROGram(s) Oral daily  levothyroxine 125 MICROGram(s) Oral daily  metoprolol succinate ER 25 milliGRAM(s) Oral daily  pantoprazole    Tablet 40 milliGRAM(s) Oral before breakfast  potassium chloride    Tablet ER 20 milliEquivalent(s) Oral every 2 hours  rivaroxaban 15 milliGRAM(s) Oral with dinner    MEDICATIONS  (PRN):  guaiFENesin Oral Liquid (Sugar-Free) 200 milliGRAM(s) Oral every 6 hours PRN Cough  melatonin 5 milliGRAM(s) Oral at bedtime PRN Insomnia    Pertinent Labs: 03-18 Na144 mmol/L Glu 113 mg/dL<H> K+ 3.3 mmol/L<L> Cr  0.95 mg/dL BUN 29 mg/dL<H> 03-18 Phos 3.1 mg/dL 03-18 Alb 2.7 g/dL<L>     CAPILLARY BLOOD GLUCOSE        Skin: stage I PI to BL heels    Estimated Needs:   [x ] no change since previous assessment  [ ] recalculated:     Previous Nutrition Diagnosis:   [ ] Inadequate Energy Intake [ x]Inadequate Oral Intake [ ] Excessive Energy Intake   [ ] Underweight [ ] Increased Nutrient Needs [ ] Overweight/Obesity   [ ] Altered GI Function [ ] Unintended Weight Loss [ ] Food & Nutrition Related Knowledge Deficit [ ] Malnutrition     Nutrition Diagnosis is [ x] ongoing  [ ] resolved [ ] not applicable     New Nutrition Diagnosis: [ ] not applicable       Interventions: diet consistency per SLP recs, no added salt diet, encouragement at meals, aspiration prec, honor food preferences  Recommend  [ ] Change Diet To:  [ ] Nutrition Supplement  [ ] Nutrition Support  [ ] Other:     Monitoring and Evaluation:   [x ] PO intake [ x ] Tolerance to diet prescription [ x ] weights [ x ] labs[ x ] follow up per protocol  [ ] other:
Called by RN as patient hypotensive 89/54 but asymptomatic. Will hold Metoprolol for now and give Midodrine 5mg x1. Consider restarting Metoprolol at lower dose if BP remains stable.
Discussed diet with S&S (Malika). Completed SLP eval and recommendations are to keep on soft and bite sized diet. Continue aspiration precautions. Diet adjusted.
Called by RN as patient hypotensive but asymptomatic. Will hold Lasix dose and give 250cc ns bolus

## 2022-03-20 NOTE — PROGRESS NOTE ADULT - SUBJECTIVE AND OBJECTIVE BOX
Patient is a 89y old  Female who presents with a chief complaint of Respiratory distress (20 Mar 2022 06:28)      INTERVAL HPI/OVERNIGHT EVENTS: Patient seen and examined at bedside. No overnight events. More congested with cough today.       MEDICATIONS  (STANDING):  acetaZOLAMIDE    Tablet 250 milliGRAM(s) Oral daily  albuterol/ipratropium for Nebulization 3 milliLiter(s) Nebulizer every 8 hours  allopurinol 100 milliGRAM(s) Oral daily  digoxin     Tablet 125 MICROGram(s) Oral daily  furosemide    Tablet 40 milliGRAM(s) Oral daily  levothyroxine 125 MICROGram(s) Oral daily  metoprolol succinate ER 25 milliGRAM(s) Oral daily  pantoprazole    Tablet 40 milliGRAM(s) Oral before breakfast  rivaroxaban 15 milliGRAM(s) Oral with dinner    MEDICATIONS  (PRN):  acetaminophen     Tablet .. 650 milliGRAM(s) Oral every 6 hours PRN Mild Pain (1 - 3)  guaiFENesin Oral Liquid (Sugar-Free) 200 milliGRAM(s) Oral every 6 hours PRN Cough  melatonin 5 milliGRAM(s) Oral at bedtime PRN Insomnia  saliva substitute (BIOTENE MOISTURIZING MOUTH SPRAY) 1 [place](s) Topical every 3 hours PRN Mouth Care      Allergies    codeine (Unknown)    Intolerances        REVIEW OF SYSTEMS:  CONSTITUTIONAL: No fever or chills  HEENT:  No headache, no sore throat  RESPIRATORY: No cough, wheezing, or shortness of breath  CARDIOVASCULAR: No chest pain, palpitations, or leg swelling  GASTROINTESTINAL: No abd pain, nausea, vomiting, or diarrhea  GENITOURINARY: No dysuria, frequency, or hematuria  NEUROLOGICAL: no focal weakness or dizziness  MUSCULOSKELETAL: no myalgias     Vital Signs Last 24 Hrs  T(C): 36.3 (20 Mar 2022 05:45), Max: 36.8 (19 Mar 2022 20:08)  T(F): 97.4 (20 Mar 2022 05:45), Max: 98.2 (19 Mar 2022 20:08)  HR: 60 (20 Mar 2022 06:58) (60 - 73)  BP: 118/73 (20 Mar 2022 05:45) (86/51 - 118/73)  BP(mean): 70 (19 Mar 2022 20:08) (70 - 70)  RR: 17 (20 Mar 2022 05:45) (17 - 18)  SpO2: 96% (20 Mar 2022 06:58) (91% - 99%)    PHYSICAL EXAM:  GENERAL: NAD. elderly  HEENT:  EOMI, moist mucous membranes  CHEST/LUNG:  diminished bs at bases, poor insp effort, on nasal cannula  HEART:  RRR, S1, S2  ABDOMEN:  BS+, soft, nontender, nondistended  EXTREMITIES: no edema, cyanosis, or calf tenderness, deconditioned extremities. Tenderness to gentle palpation of legs/ feet with no focal tenderness (chronic)  NERVOUS SYSTEM: AA&Ox 3, sleepy but arousable  SKIN: Multiple ecchymotic areas on arms and lower extremity     LABS:                        10.2   6.77  )-----------( 155      ( 19 Mar 2022 07:48 )             32.9     CBC Full  -  ( 19 Mar 2022 07:48 )  WBC Count : 6.77 K/uL  Hemoglobin : 10.2 g/dL  Hematocrit : 32.9 %  Platelet Count - Automated : 155 K/uL  Mean Cell Volume : 108.9 fl  Mean Cell Hemoglobin : 33.8 pg  Mean Cell Hemoglobin Concentration : 31.0 gm/dL  Auto Neutrophil # : 4.29 K/uL  Auto Lymphocyte # : 1.65 K/uL  Auto Monocyte # : 0.67 K/uL  Auto Eosinophil # : 0.12 K/uL  Auto Basophil # : 0.03 K/uL  Auto Neutrophil % : 63.4 %  Auto Lymphocyte % : 24.4 %  Auto Monocyte % : 9.9 %  Auto Eosinophil % : 1.8 %  Auto Basophil % : 0.4 %    19 Mar 2022 07:48    141    |  102    |  27     ----------------------------<  82     3.9     |  36     |  0.88     Ca    8.0        19 Mar 2022 07:48  Phos  3.4       19 Mar 2022 07:48  Mg     2.3       19 Mar 2022 07:48          CAPILLARY BLOOD GLUCOSE              RADIOLOGY & ADDITIONAL TESTS:     Consultant(s) Notes Reviewed:  [x] YES  [ ] NO    Care Discussed with [x] Consultants  [x] Patient  [ ] Family  [ ]      [ x] Other; RN  DVT ppx

## 2022-03-21 VITALS
HEART RATE: 66 BPM | TEMPERATURE: 97 F | OXYGEN SATURATION: 99 % | DIASTOLIC BLOOD PRESSURE: 51 MMHG | RESPIRATION RATE: 34 BRPM | SYSTOLIC BLOOD PRESSURE: 119 MMHG

## 2022-03-21 LAB
ALBUMIN SERPL ELPH-MCNC: 2.6 G/DL — LOW (ref 3.3–5)
ALP SERPL-CCNC: 119 U/L — SIGNIFICANT CHANGE UP (ref 30–120)
ALT FLD-CCNC: 16 U/L DA — SIGNIFICANT CHANGE UP (ref 10–60)
ANION GAP SERPL CALC-SCNC: 1 MMOL/L — LOW (ref 5–17)
AST SERPL-CCNC: 22 U/L — SIGNIFICANT CHANGE UP (ref 10–40)
BASOPHILS # BLD AUTO: 0.02 K/UL — SIGNIFICANT CHANGE UP (ref 0–0.2)
BASOPHILS NFR BLD AUTO: 0.3 % — SIGNIFICANT CHANGE UP (ref 0–2)
BILIRUB SERPL-MCNC: 0.4 MG/DL — SIGNIFICANT CHANGE UP (ref 0.2–1.2)
BUN SERPL-MCNC: 30 MG/DL — HIGH (ref 7–23)
CALCIUM SERPL-MCNC: 7.8 MG/DL — LOW (ref 8.4–10.5)
CHLORIDE SERPL-SCNC: 101 MMOL/L — SIGNIFICANT CHANGE UP (ref 96–108)
CO2 SERPL-SCNC: 37 MMOL/L — HIGH (ref 22–31)
CREAT SERPL-MCNC: 1.06 MG/DL — SIGNIFICANT CHANGE UP (ref 0.5–1.3)
EGFR: 49 ML/MIN/1.73M2 — LOW
EOSINOPHIL # BLD AUTO: 0.19 K/UL — SIGNIFICANT CHANGE UP (ref 0–0.5)
EOSINOPHIL NFR BLD AUTO: 2.8 % — SIGNIFICANT CHANGE UP (ref 0–6)
GLUCOSE SERPL-MCNC: 160 MG/DL — HIGH (ref 70–99)
HCT VFR BLD CALC: 33.5 % — LOW (ref 34.5–45)
HGB BLD-MCNC: 10.1 G/DL — LOW (ref 11.5–15.5)
IMM GRANULOCYTES NFR BLD AUTO: 0.3 % — SIGNIFICANT CHANGE UP (ref 0–1.5)
LYMPHOCYTES # BLD AUTO: 1.64 K/UL — SIGNIFICANT CHANGE UP (ref 1–3.3)
LYMPHOCYTES # BLD AUTO: 23.8 % — SIGNIFICANT CHANGE UP (ref 13–44)
MAGNESIUM SERPL-MCNC: 2.3 MG/DL — SIGNIFICANT CHANGE UP (ref 1.6–2.6)
MCHC RBC-ENTMCNC: 30.1 GM/DL — LOW (ref 32–36)
MCHC RBC-ENTMCNC: 33.7 PG — SIGNIFICANT CHANGE UP (ref 27–34)
MCV RBC AUTO: 111.7 FL — HIGH (ref 80–100)
MONOCYTES # BLD AUTO: 0.84 K/UL — SIGNIFICANT CHANGE UP (ref 0–0.9)
MONOCYTES NFR BLD AUTO: 12.2 % — SIGNIFICANT CHANGE UP (ref 2–14)
NEUTROPHILS # BLD AUTO: 4.17 K/UL — SIGNIFICANT CHANGE UP (ref 1.8–7.4)
NEUTROPHILS NFR BLD AUTO: 60.6 % — SIGNIFICANT CHANGE UP (ref 43–77)
NRBC # BLD: 0 /100 WBCS — SIGNIFICANT CHANGE UP (ref 0–0)
PHOSPHATE SERPL-MCNC: 4.1 MG/DL — SIGNIFICANT CHANGE UP (ref 2.5–4.5)
PLATELET # BLD AUTO: 156 K/UL — SIGNIFICANT CHANGE UP (ref 150–400)
POTASSIUM SERPL-MCNC: 3.4 MMOL/L — LOW (ref 3.5–5.3)
POTASSIUM SERPL-SCNC: 3.4 MMOL/L — LOW (ref 3.5–5.3)
PROT SERPL-MCNC: 5.6 G/DL — LOW (ref 6–8.3)
RBC # BLD: 3 M/UL — LOW (ref 3.8–5.2)
RBC # FLD: 14.3 % — SIGNIFICANT CHANGE UP (ref 10.3–14.5)
SARS-COV-2 RNA SPEC QL NAA+PROBE: SIGNIFICANT CHANGE UP
SODIUM SERPL-SCNC: 139 MMOL/L — SIGNIFICANT CHANGE UP (ref 135–145)
WBC # BLD: 6.88 K/UL — SIGNIFICANT CHANGE UP (ref 3.8–10.5)
WBC # FLD AUTO: 6.88 K/UL — SIGNIFICANT CHANGE UP (ref 3.8–10.5)

## 2022-03-21 PROCEDURE — 96366 THER/PROPH/DIAG IV INF ADDON: CPT

## 2022-03-21 PROCEDURE — 83605 ASSAY OF LACTIC ACID: CPT

## 2022-03-21 PROCEDURE — 92610 EVALUATE SWALLOWING FUNCTION: CPT

## 2022-03-21 PROCEDURE — 87635 SARS-COV-2 COVID-19 AMP PRB: CPT

## 2022-03-21 PROCEDURE — 87040 BLOOD CULTURE FOR BACTERIA: CPT

## 2022-03-21 PROCEDURE — 87899 AGENT NOS ASSAY W/OPTIC: CPT

## 2022-03-21 PROCEDURE — 84443 ASSAY THYROID STIM HORMONE: CPT

## 2022-03-21 PROCEDURE — 86140 C-REACTIVE PROTEIN: CPT

## 2022-03-21 PROCEDURE — 82803 BLOOD GASES ANY COMBINATION: CPT

## 2022-03-21 PROCEDURE — 83735 ASSAY OF MAGNESIUM: CPT

## 2022-03-21 PROCEDURE — 82746 ASSAY OF FOLIC ACID SERUM: CPT

## 2022-03-21 PROCEDURE — 80162 ASSAY OF DIGOXIN TOTAL: CPT

## 2022-03-21 PROCEDURE — 83540 ASSAY OF IRON: CPT

## 2022-03-21 PROCEDURE — 94640 AIRWAY INHALATION TREATMENT: CPT

## 2022-03-21 PROCEDURE — 93306 TTE W/DOPPLER COMPLETE: CPT

## 2022-03-21 PROCEDURE — 85730 THROMBOPLASTIN TIME PARTIAL: CPT

## 2022-03-21 PROCEDURE — 85610 PROTHROMBIN TIME: CPT

## 2022-03-21 PROCEDURE — 80048 BASIC METABOLIC PNL TOTAL CA: CPT

## 2022-03-21 PROCEDURE — 84145 PROCALCITONIN (PCT): CPT

## 2022-03-21 PROCEDURE — 93005 ELECTROCARDIOGRAM TRACING: CPT

## 2022-03-21 PROCEDURE — 36415 COLL VENOUS BLD VENIPUNCTURE: CPT

## 2022-03-21 PROCEDURE — 83880 ASSAY OF NATRIURETIC PEPTIDE: CPT

## 2022-03-21 PROCEDURE — 87631 RESP VIRUS 3-5 TARGETS: CPT

## 2022-03-21 PROCEDURE — 87086 URINE CULTURE/COLONY COUNT: CPT

## 2022-03-21 PROCEDURE — 87449 NOS EACH ORGANISM AG IA: CPT

## 2022-03-21 PROCEDURE — 82728 ASSAY OF FERRITIN: CPT

## 2022-03-21 PROCEDURE — 80053 COMPREHEN METABOLIC PANEL: CPT

## 2022-03-21 PROCEDURE — 97110 THERAPEUTIC EXERCISES: CPT

## 2022-03-21 PROCEDURE — 87641 MR-STAPH DNA AMP PROBE: CPT

## 2022-03-21 PROCEDURE — 71250 CT THORAX DX C-: CPT | Mod: MA

## 2022-03-21 PROCEDURE — 94760 N-INVAS EAR/PLS OXIMETRY 1: CPT

## 2022-03-21 PROCEDURE — 85027 COMPLETE CBC AUTOMATED: CPT

## 2022-03-21 PROCEDURE — 82607 VITAMIN B-12: CPT

## 2022-03-21 PROCEDURE — 97530 THERAPEUTIC ACTIVITIES: CPT

## 2022-03-21 PROCEDURE — 96367 TX/PROPH/DG ADDL SEQ IV INF: CPT

## 2022-03-21 PROCEDURE — 92526 ORAL FUNCTION THERAPY: CPT

## 2022-03-21 PROCEDURE — 84100 ASSAY OF PHOSPHORUS: CPT

## 2022-03-21 PROCEDURE — 71045 X-RAY EXAM CHEST 1 VIEW: CPT

## 2022-03-21 PROCEDURE — 94660 CPAP INITIATION&MGMT: CPT

## 2022-03-21 PROCEDURE — 97116 GAIT TRAINING THERAPY: CPT

## 2022-03-21 PROCEDURE — 87640 STAPH A DNA AMP PROBE: CPT

## 2022-03-21 PROCEDURE — 85025 COMPLETE CBC W/AUTO DIFF WBC: CPT

## 2022-03-21 PROCEDURE — 81001 URINALYSIS AUTO W/SCOPE: CPT

## 2022-03-21 PROCEDURE — 99239 HOSP IP/OBS DSCHRG MGMT >30: CPT

## 2022-03-21 PROCEDURE — 36600 WITHDRAWAL OF ARTERIAL BLOOD: CPT

## 2022-03-21 PROCEDURE — 96365 THER/PROPH/DIAG IV INF INIT: CPT

## 2022-03-21 PROCEDURE — 83550 IRON BINDING TEST: CPT

## 2022-03-21 PROCEDURE — 99285 EMERGENCY DEPT VISIT HI MDM: CPT

## 2022-03-21 PROCEDURE — 97161 PT EVAL LOW COMPLEX 20 MIN: CPT

## 2022-03-21 RX ORDER — POTASSIUM CHLORIDE 20 MEQ
40 PACKET (EA) ORAL ONCE
Refills: 0 | Status: COMPLETED | OUTPATIENT
Start: 2022-03-21 | End: 2022-03-21

## 2022-03-21 RX ADMIN — Medication 125 MICROGRAM(S): at 05:43

## 2022-03-21 RX ADMIN — ACETAZOLAMIDE 250 MILLIGRAM(S): 250 TABLET ORAL at 12:14

## 2022-03-21 RX ADMIN — PANTOPRAZOLE SODIUM 40 MILLIGRAM(S): 20 TABLET, DELAYED RELEASE ORAL at 05:44

## 2022-03-21 RX ADMIN — Medication 3 MILLILITER(S): at 07:27

## 2022-03-21 RX ADMIN — Medication 200 MILLIGRAM(S): at 05:44

## 2022-03-21 RX ADMIN — Medication 40 MILLIGRAM(S): at 05:44

## 2022-03-21 RX ADMIN — Medication 40 MILLIEQUIVALENT(S): at 11:49

## 2022-03-21 RX ADMIN — Medication 100 MILLIGRAM(S): at 11:47

## 2022-03-21 NOTE — SWALLOW BEDSIDE ASSESSMENT ADULT - PHARYNGEAL PHASE
Within functional limits
Within functional limits
Delayed pharyngeal swallow/Decreased laryngeal elevation

## 2022-03-21 NOTE — SWALLOW BEDSIDE ASSESSMENT ADULT - ADDITIONAL RECOMMENDATIONS
Speech to follow x1 to check PO tolerance
This department will continue to follow the patient for diet tolerance/advancement during this admission, as schedule permits

## 2022-03-21 NOTE — PROGRESS NOTE ADULT - REASON FOR ADMISSION
Respiratory distress

## 2022-03-21 NOTE — PROGRESS NOTE ADULT - SUBJECTIVE AND OBJECTIVE BOX
Neurology follow up note    PATRICIA [id number]      Interval History:    Patient feels ok no new complaints.    Allergies    codeine (Unknown)    Intolerances        MEDICATIONS    acetaminophen     Tablet .. 650 milliGRAM(s) Oral every 6 hours PRN  acetaZOLAMIDE    Tablet 250 milliGRAM(s) Oral daily  albuterol/ipratropium for Nebulization 3 milliLiter(s) Nebulizer every 8 hours  allopurinol 100 milliGRAM(s) Oral daily  digoxin     Tablet 125 MICROGram(s) Oral daily  furosemide    Tablet 40 milliGRAM(s) Oral daily  guaiFENesin Oral Liquid (Sugar-Free) 200 milliGRAM(s) Oral every 6 hours PRN  levothyroxine 125 MICROGram(s) Oral daily  melatonin 5 milliGRAM(s) Oral at bedtime PRN  pantoprazole    Tablet 40 milliGRAM(s) Oral before breakfast  rivaroxaban 15 milliGRAM(s) Oral with dinner  saliva substitute (BIOTENE MOISTURIZING MOUTH SPRAY) 1 [place](s) Topical every 3 hours PRN              Vital Signs Last 24 Hrs  T(C): 36.6 (21 Mar 2022 10:05), Max: 36.7 (20 Mar 2022 14:26)  T(F): 97.9 (21 Mar 2022 10:05), Max: 98.1 (21 Mar 2022 01:20)  HR: 60 (21 Mar 2022 10:05) (59 - 79)  BP: 103/48 (21 Mar 2022 10:05) (89/54 - 128/67)  BP(mean): --  RR: 35 (21 Mar 2022 10:05) (16 - 35)  SpO2: 95% (21 Mar 2022 11:08) (93% - 97%)      REVIEW OF SYSTEMS:  Constitutional:  The patient denies fever, chills, night sweats.  Head:  No headaches.  Eyes:  No double vision or blurry vision.  Ears:  No ringing in the ears.  Neck:  No neck pain.  Cardiovascular:  No chest pain.  Respiratory:  Occasional shortness of breath, was brought into the emergency room.  Abdomen:  No nausea, vomiting, or abdominal pain.  Extremities/Neurological:  No numbness or tingling.  Musculoskeletal:  Positive history of joint pain.  Does suffer from arthritis.    PHYSICAL EXAMINATION:    HEENT:  Head:  Normocephalic, atraumatic.  Eyes:  No scleral icterus.  Ears:  Hearing bilaterally appeared to be intact.  NECK:  Supple.  RESPIRATORY:  Decrease breath sounds bilaterally.  CARDIOVASCULAR:  S1, S2 heard.  ABDOMEN:  Soft, nontender.  EXTREMITIES:  No clubbing or cyanosis were noted.      NEUROLOGIC:  The patient awake and alert.  Location was hospital,  Able to name simple objects.    Extraocular movements were intact.  Speech was fluent.  Smile symmetric.  Motor:  The patient has decreased range of motion of bilateral shoulders, does suffer from significant arthritis.  Rest of upper extremities 4/5, bilateral lower extremities slight flexion of the hip and knee, but does have a history of coccyx fracture.  Sensory:  Bilateral upper and lower intact to light touch.              LABS:  CBC Full  -  ( 21 Mar 2022 07:43 )  WBC Count : 6.88 K/uL  RBC Count : 3.00 M/uL  Hemoglobin : 10.1 g/dL  Hematocrit : 33.5 %  Platelet Count - Automated : 156 K/uL  Mean Cell Volume : 111.7 fl  Mean Cell Hemoglobin : 33.7 pg  Mean Cell Hemoglobin Concentration : 30.1 gm/dL  Auto Neutrophil # : 4.17 K/uL  Auto Lymphocyte # : 1.64 K/uL  Auto Monocyte # : 0.84 K/uL  Auto Eosinophil # : 0.19 K/uL  Auto Basophil # : 0.02 K/uL  Auto Neutrophil % : 60.6 %  Auto Lymphocyte % : 23.8 %  Auto Monocyte % : 12.2 %  Auto Eosinophil % : 2.8 %  Auto Basophil % : 0.3 %      03-21    139  |  101  |  30<H>  ----------------------------<  160<H>  3.4<L>   |  37<H>  |  1.06    Ca    7.8<L>      21 Mar 2022 07:43  Phos  4.1     03-21  Mg     2.3     03-21    TPro  5.6<L>  /  Alb  2.6<L>  /  TBili  0.4  /  DBili  x   /  AST  22  /  ALT  16  /  AlkPhos  119  03-21    Hemoglobin A1C:     LIVER FUNCTIONS - ( 21 Mar 2022 07:43 )  Alb: 2.6 g/dL / Pro: 5.6 g/dL / ALK PHOS: 119 U/L / ALT: 16 U/L DA / AST: 22 U/L / GGT: x           Vitamin B12         RADIOLOGY    ANALYSIS AND PLAN:  This is a 89-year-old with episode of lightheadedness and memory issues.  For episode of lightheaded and dizziness, suspect most likely metabolic in nature from underlying respiratory issues along with congestive heart failure.  For memory issues, possibly secondary to respiratory issues, carbon dioxide retention and congestive heart failure, suspect possibly underlying mild cognitive and also hospital setting, suspect less likely this is a primary CNS event.  We will recommend to rule out other causes of metabolic encephalopathy.  For history of atrial fibrillation, anticoagulation as needed.  For history of hypertension, monitor systolic blood pressure.  monitor respiratory status   For episode of falls, as per my conversation with family, this possibly appears to be mechanical in nature, which happened a few weeks ago.  For hypothyroidism, continue the patient on Synthroid.    Spoke with son, Tutu, at [phone] 3/18, he understands the reasoning and thought process.    Greater than 40 minutes time spent with the patient, planning care, reviewing data, speaking to multidisciplinary health care team with greater than 50% of time that in counseling and care coordination.

## 2022-03-21 NOTE — SWALLOW BEDSIDE ASSESSMENT ADULT - SWALLOW EVAL: RECOMMENDED DIET
Continue Soft & bite sized with Thin liquids, as tolerated
soft and bite sized and thin liquids, aspiration precautions, as tolerated
Regular with Thin liquids, as tolerated

## 2022-03-21 NOTE — SWALLOW BEDSIDE ASSESSMENT ADULT - SPECIFY REASON(S)
Reassess swallow to determine candidacy for diet upgrade
to assess swallow function
Reassess swallow to assess diet tolerance and determine candidacy for diet advancement

## 2022-03-21 NOTE — SWALLOW BEDSIDE ASSESSMENT ADULT - SWALLOW EVAL: CURRENT DIET
Soft & bite sized with Thin liquids
Soft & bite-sized and thin, as per MD mulligan
Soft & bite sized with Thin liquids

## 2022-03-21 NOTE — SWALLOW BEDSIDE ASSESSMENT ADULT - SWALLOW EVAL: RECOMMENDED FEEDING/EATING TECHNIQUES
alternate food with liquid/maintain upright posture during/after eating for 30 mins/no straws/oral hygiene/position upright (90 degrees)/small sips/bites
Rest breaks during meals prn/allow for swallow between intakes/crush medication (when feasible)/maintain upright posture during/after eating for 30 mins/oral hygiene/position upright (90 degrees)/small sips/bites
Rest breaks during meals prn/allow for swallow between intakes/maintain upright posture during/after eating for 30 mins/oral hygiene/position upright (90 degrees)/small sips/bites

## 2022-03-21 NOTE — SWALLOW BEDSIDE ASSESSMENT ADULT - SLP GENERAL OBSERVATIONS
Pt received upright in bed A&A Ox2-3, +O2NC 3L SpO2 96% throughout assessment, pain scale 0/10 pre & post assessment
Pt received upright in bed A&A Ox2, suspect reduced cognition, +O2NC, +hoarse vocal quality, +dyspnea noted during conversation, pain scale 0/10 pre & post eval

## 2022-03-21 NOTE — PROGRESS NOTE ADULT - TIME BILLING
activities including direct patient care, counseling and/or coordinating care, reviewing notes/lab data/imaging, discussion with multidisciplinary team & discussion with patient
The total amount of time listed was spent reviewing the hospital notes, laboratory values, imaging findings, assessing/counseling the patient, discussing with consultant physicians, case management, social work, and nursing staff.
The total amount of time listed was spent reviewing the hospital notes, laboratory values, imaging findings, assessing/counseling the patient, discussing with consultant physicians, case management, social work, and nursing staff.
activities including direct patient care, counseling and/or coordinating care, reviewing notes/lab data/imaging, and discussion with multidisciplinary team
The total amount of time listed was spent reviewing the hospital notes, laboratory values, imaging findings, assessing/counseling the patient, discussing with consultant physicians, case management, social work, and nursing staff.

## 2022-03-21 NOTE — SWALLOW BEDSIDE ASSESSMENT ADULT - ASR SWALLOW DENTITION
partial dentures
+lower dentition/upper dentures
Upper partials applied for assessment, +lower dentition

## 2022-03-21 NOTE — PROGRESS NOTE ADULT - SUBJECTIVE AND OBJECTIVE BOX
Patient is a 89y old  Female who presents with a chief complaint of Respiratory distress.    INTERVAL HPI/OVERNIGHT EVENTS:  - Chart and consult notes reviewed  - No acute events overnight  - Pt. seen and examined, states she slept well.     MEDICATIONS  (STANDING):  acetaZOLAMIDE    Tablet 250 milliGRAM(s) Oral daily  albuterol/ipratropium for Nebulization 3 milliLiter(s) Nebulizer every 8 hours  allopurinol 100 milliGRAM(s) Oral daily  digoxin     Tablet 125 MICROGram(s) Oral daily  furosemide    Tablet 40 milliGRAM(s) Oral daily  levothyroxine 125 MICROGram(s) Oral daily  pantoprazole    Tablet 40 milliGRAM(s) Oral before breakfast  rivaroxaban 15 milliGRAM(s) Oral with dinner    MEDICATIONS  (PRN):  acetaminophen     Tablet .. 650 milliGRAM(s) Oral every 6 hours PRN Mild Pain (1 - 3)  guaiFENesin Oral Liquid (Sugar-Free) 200 milliGRAM(s) Oral every 6 hours PRN Cough  melatonin 5 milliGRAM(s) Oral at bedtime PRN Insomnia  saliva substitute (BIOTENE MOISTURIZING MOUTH SPRAY) 1 [place](s) Topical every 3 hours PRN Mouth Care      Allergies  codeine (Unknown)    REVIEW OF SYSTEMS:  CONSTITUTIONAL: No fever or chills  HEENT:  No headache, no sore throat  RESPIRATORY: No cough, wheezing, or shortness of breath  CARDIOVASCULAR: No chest pain, palpitations, or leg swelling  GASTROINTESTINAL: No abd pain, nausea, vomiting, or diarrhea  GENITOURINARY: No dysuria, frequency, or hematuria  NEUROLOGICAL: no focal weakness or dizziness  MUSCULOSKELETAL: no myalgias     PHYSICAL EXAM:  Vital Signs Last 24 Hrs  T(C): 36.4 (21 Mar 2022 05:25), Max: 36.8 (20 Mar 2022 10:34)  T(F): 97.6 (21 Mar 2022 05:25), Max: 98.2 (20 Mar 2022 10:34)  HR: 60 (21 Mar 2022 07:32) (59 - 79)  BP: 103/54 (21 Mar 2022 05:25) (89/54 - 128/67)  BP(mean): --  RR: 17 (21 Mar 2022 05:25) (16 - 18)  SpO2: 93% (21 Mar 2022 07:32) (93% - 98%)    GENERAL: NAD, well-groomed  HEAD:  Atraumatic, Normocephalic  EYES: PERRLA, conjunctiva and sclera clear  ENMT: Moist mucous membranes, no lesions  NECK: Supple, No JVD  NERVOUS SYSTEM: Quiet, withdrawn, A+Ox3   CHEST/LUNG: diminished breath sounds at bases, no wheezes  HEART: RRR, + S1/S2  ABDOMEN: Soft, nontender, nondistended, bowel sounds present  EXTREMITIES:  2+ peripheral pulses, no clubbing, cyanosis, or edema  LYMPH: No lymphadenopathy noted  SKIN: Multiple ecchymotic areas on arms and lower extremity     LABS:                        10.1   6.88  )-----------( 156      ( 21 Mar 2022 07:43 )             33.5       Ca    7.9        20 Mar 2022 07:32

## 2022-03-21 NOTE — PROGRESS NOTE ADULT - SUBJECTIVE AND OBJECTIVE BOX
Date/Time Patient Seen:  		  Referring MD:   Data Reviewed	       Patient is a 89y old  Female who presents with a chief complaint of Respiratory distress (20 Mar 2022 13:58)      Subjective/HPI     PAST MEDICAL & SURGICAL HISTORY:  Chronic obstructive pulmonary disease (COPD)    HTN (hypertension)    Cardiac pacemaker    Gout    Hypothyroidism    Insomnia    Chronic CHF    History of ST elevation myocardial infarction (STEMI)    No significant past surgical history          Medication list         MEDICATIONS  (STANDING):  acetaZOLAMIDE    Tablet 250 milliGRAM(s) Oral daily  albuterol/ipratropium for Nebulization 3 milliLiter(s) Nebulizer every 8 hours  allopurinol 100 milliGRAM(s) Oral daily  digoxin     Tablet 125 MICROGram(s) Oral daily  furosemide    Tablet 40 milliGRAM(s) Oral daily  levothyroxine 125 MICROGram(s) Oral daily  pantoprazole    Tablet 40 milliGRAM(s) Oral before breakfast  rivaroxaban 15 milliGRAM(s) Oral with dinner    MEDICATIONS  (PRN):  acetaminophen     Tablet .. 650 milliGRAM(s) Oral every 6 hours PRN Mild Pain (1 - 3)  guaiFENesin Oral Liquid (Sugar-Free) 200 milliGRAM(s) Oral every 6 hours PRN Cough  melatonin 5 milliGRAM(s) Oral at bedtime PRN Insomnia  saliva substitute (BIOTENE MOISTURIZING MOUTH SPRAY) 1 [place](s) Topical every 3 hours PRN Mouth Care         Vitals log        ICU Vital Signs Last 24 Hrs  T(C): 36.4 (21 Mar 2022 05:25), Max: 36.8 (20 Mar 2022 10:34)  T(F): 97.6 (21 Mar 2022 05:25), Max: 98.2 (20 Mar 2022 10:34)  HR: 60 (21 Mar 2022 05:25) (59 - 79)  BP: 103/54 (21 Mar 2022 05:25) (89/54 - 128/67)  BP(mean): --  ABP: --  ABP(mean): --  RR: 17 (21 Mar 2022 05:25) (16 - 18)  SpO2: 97% (21 Mar 2022 05:25) (94% - 98%)           Input and Output:  I&O's Detail    19 Mar 2022 07:01  -  20 Mar 2022 07:00  --------------------------------------------------------  IN:    Oral Fluid: 250 mL    Sodium Chloride 0.9% Bolus: 250 mL  Total IN: 500 mL    OUT:    Indwelling Catheter - Urethral (mL): 1400 mL  Total OUT: 1400 mL    Total NET: -900 mL      20 Mar 2022 07:01  -  21 Mar 2022 06:51  --------------------------------------------------------  IN:    Oral Fluid: 200 mL  Total IN: 200 mL    OUT:    Indwelling Catheter - Urethral (mL): 800 mL  Total OUT: 800 mL    Total NET: -600 mL          Lab Data                        10.1   7.82  )-----------( 171      ( 20 Mar 2022 07:32 )             32.2     03-20    140  |  101  |  27<H>  ----------------------------<  117<H>  3.7   |  36<H>  |  0.97    Ca    7.9<L>      20 Mar 2022 07:32  Phos  3.6     03-20  Mg     2.4     03-20    TPro  5.2<L>  /  Alb  2.8<L>  /  TBili  0.7  /  DBili  x   /  AST  21  /  ALT  18  /  AlkPhos  116  03-20            Review of Systems	      Objective     Physical Examination    heart s1s2  lung dec BS  abd soft      Pertinent Lab findings & Imaging      Olga:  NO   Adequate UO     I&O's Detail    19 Mar 2022 07:01  -  20 Mar 2022 07:00  --------------------------------------------------------  IN:    Oral Fluid: 250 mL    Sodium Chloride 0.9% Bolus: 250 mL  Total IN: 500 mL    OUT:    Indwelling Catheter - Urethral (mL): 1400 mL  Total OUT: 1400 mL    Total NET: -900 mL      20 Mar 2022 07:01  -  21 Mar 2022 06:51  --------------------------------------------------------  IN:    Oral Fluid: 200 mL  Total IN: 200 mL    OUT:    Indwelling Catheter - Urethral (mL): 800 mL  Total OUT: 800 mL    Total NET: -600 mL               Discussed with:     Cultures:	        Radiology

## 2022-03-21 NOTE — PROGRESS NOTE ADULT - ATTENDING COMMENTS
89 y.o female with PMH of hypothyroidism, atrial fibrillation, pacemaker, COPD, CAD, chronic diastolic heart failure, severe TR, severe pulm HTN who presents with acute on chronic hypoxic/hypercapnic respiratory failure in the setting of acute on chronic diastolic heart failure, COPD and RSV bronchitis.    # Acute on chronic hypoxic/hypercarbic respiratory failure 2/2 CHF exacerbation and RSV bronchitis  - Blood and urine C+S 3/11 NGTD  - Supportive care for RSV - antitussives PRN  - IV solumedrol taper completed  - Continue Acetazolamide daily, stop on discharge   - Continue Lasix 40mg daily for now (home dose is 60mg but may need dose adjustment on d/c)  - Continue bipap qHS  - Appreciate cardiology and pulmonary recs    Patient feels much better overall, had extensive discussion with daughter, son and PMD Sagar Naik. Patient has multiple comorbidities and at high risk for readmission given tenuous fluid balance. Patient was seen by palliative care but family would like to continue medical management.
Improved since friday  Net negative in I/Os, will speak to cardio for adjustment  Titrate O2 requirement as needed  spoke with speech therapy, will bite sized for now

## 2022-03-21 NOTE — SWALLOW BEDSIDE ASSESSMENT ADULT - SLP PERTINENT HISTORY OF CURRENT PROBLEM
r/o dysphagia
MBS attempted 3/18/22, however, cancelled due to lethargy.  Pt previously seen at bedside for multiple assessments, last assessed 3/15/22 Rx Soft & bite sized with Thin liquids.
Pt seen for swallow reassessment 3/14/22 Rx Soft & bite sized with Thin liquids

## 2022-03-21 NOTE — PROGRESS NOTE ADULT - NUTRITIONAL ASSESSMENT
Diet, Soft and Bite Sized:   DASH/TLC {Sodium & Cholesterol Restricted} (03-13-22 @ 08:58) [Active]

## 2022-03-21 NOTE — PROGRESS NOTE ADULT - ASSESSMENT
pt with extensive med hx -   CHF  Pulm HTN severe  valv heart disease  COPD  Resp Distress  PVD  OP  OA  Fall prone - risk  Ataxic gait  pelvic fx  AF  Dyspepsia  PPM    monitor mental status and HD -   diuresis adjustment - as tolerated by BP and hemodynamics -   BIPAP use at night time as tolerated and PRN  remains clinically labile - may need further Palliative input    RSV positive  CT chest noted  strep and legionella ag neg  ID f/u noted  cardio f/u noted  TTE shows severe Pulm HTN - known   completed Systemic Steroids course  on diamox - monitor BP - HD -   GOC documented- DNR DNI    ct chest reviewed  USE BIPAP PRN - for resp distress - hypercapnea - night time use as tolerated  ABG noted  Labs and imaging reviewed   diuresis - i and o - monitor Na and renal indices -   Pulse ox monitoring - Card tele monitoring - CHF and COPD ex -   NEBS and s/p Steroids - for COPD  Diuresis as per Cardio  on AC for AF

## 2022-03-21 NOTE — PROGRESS NOTE ADULT - ASSESSMENT
89 y.o female with PMH of hypothyroidism, atrial fibrillation, pacemaker, COPD, CAD, chronic diastolic heart failure, severe TR, severe pulm HTN who presents with acute on chronic hypoxic/hypercapnic respiratory failure in the setting of acute on chronic diastolic heart failure, COPD and RSV bronchitis.    # Acute on chronic hypoxic/hypercarbic respiratory failure 2/2 CHF exacerbation and RSV bronchitis  - Blood and urine C+S 3/11 NGTD  - Supportive care for RSV - antitussives PRN  - IV solumedrol taper completed  - Continue Acetazolamide daily  - Continue Lasix 40mg daily for now (home dose is 60mg but may need dose adjustment on d/c)  - Continue bipap qHS  - Appreciate cardiology and pulmonary recs    # Urinary retention  - Failed TOV inpatient   - Can attempt TOV at [facility] once more ambulatory    # COPD  - Continue duoneb  - IV solumedrol taper completed  - Pulmonary recs appreciated    # Acute on chronic diastolic CHF  - BNP 11,290 on admission  - BIPAP qHS and PRN  - Continue lasix 40mg daily  - Remains net negative 800  - Strict I/Os  - Card recs appreciated    # Atrial fibrillation  - Remains rate controlled  - ? Toprol XL on hold 2/2 hypotension - ? re-start at lower dose  - Continue Digoxin  - Continue Xarelto for thromboprophylaxis    # Hypokalemia  - Likely secondary to diuresis  - Trend lytes    # Hypothyroid  - Continue Synthroid    # HTN  - Continue Toprol XL    # Gout  - Continue Allopurinol    # Insomnia  - Continue Melatonin    #DVT prophylaxis   - Continue Xarelto    # ACP  - DNR/DNI, MOLST in chart 89 y.o female with PMH of hypothyroidism, atrial fibrillation, pacemaker, COPD, CAD, chronic diastolic heart failure, severe TR, severe pulm HTN who presents with acute on chronic hypoxic/hypercapnic respiratory failure in the setting of acute on chronic diastolic heart failure, COPD and RSV bronchitis.    # Acute on chronic hypoxic/hypercarbic respiratory failure 2/2 CHF exacerbation and RSV bronchitis  - Blood and urine C+S 3/11 NGTD  - Supportive care for RSV - antitussives PRN  - IV solumedrol taper completed  - Continue Acetazolamide daily  - Continue Lasix 40mg daily for now (home dose is 60mg but may need dose adjustment on d/c)  - Continue bipap qHS  - Appreciate cardiology and pulmonary recs    # Urinary retention  - Failed TOV inpatient   - Can attempt TOV at [facility] once more ambulatory    # COPD  - Continue duoneb  - IV solumedrol taper completed  - Pulmonary recs appreciated    # Acute on chronic diastolic CHF  - BNP 11,290 on admission  - BIPAP qHS and PRN  - Continue lasix 40mg daily  - Remains net negative 800  - Strict I/Os  - Card recs appreciated    # Atrial fibrillation  - Remains rate controlled  - ? Toprol XL on hold 2/2 hypotension - ? re-start at lower dose  - Continue Digoxin  - Continue Xarelto for thromboprophylaxis    # Hypokalemia  - Likely secondary to diuresis  - Trend lytes    # Hypothyroid  - Continue Synthroid    # HTN  - ? Re-start Toprol XL or lower dose 2/2 hypotension    # Gout  - Continue Allopurinol    # Insomnia  - Continue Melatonin    #DVT prophylaxis   - Continue Xarelto    # ACP  - DNR/DNI, MOLST in chart

## 2022-03-21 NOTE — PROGRESS NOTE ADULT - PROVIDER SPECIALTY LIST ADULT
Cardiology
Cardiology
Critical Care
Hospitalist
Infectious Disease
Infectious Disease
Neurology
Pulmonology
Cardiology
Cardiology
Critical Care
Infectious Disease
Neurology
Neurology
Pulmonology
Cardiology
Critical Care
Hospitalist
Infectious Disease
Nephrology
Pulmonology
Critical Care
Hospitalist
Pulmonology
Pulmonology
Hospitalist

## 2022-03-21 NOTE — SWALLOW BEDSIDE ASSESSMENT ADULT - COMMENTS
Chart reviewed order received for repeat swallow eval.  Pt received upright in bed A&A Ox2, suspect reduced cognition, +O2NC, +hoarse vocal quality, +dyspnea noted during conversation, pain scale 0/10 pre & post eval.  Swallow eval completed see below for details.  Pt left as received NAD CAM Gomez & Dr. England notified.  Discussed current presentation and question of plan to reattempt MBS to rule out silent aspiration with Dr. Tomás MD reported, MBS not warranted at this time.  Will follow x1 to check PO tolerance, as schedule permits.    As per charting, pt is a "89 y.o female with PMH of hypothyroidism, atrial fibrillation, pacemaker, COPD, CAD, chronic diastolic heart failure, severe TR, severe pulm HTN who presents with acute on chronic hypoxic/hypercapnic respiratory failure in the setting of acute on chronic diastolic heart failure, COPD and RSV bronchitis."    Chest xray 3/18/22: "IMPRESSION:   Residual LEFT basilar airspace disease and/or effusion   obscuring LEFT diaphragm contour.  Cardiomegaly..  Confirmatory lateral radiograph recommended."    WBC 3/21/22: "6.88" Chart reviewed order received for repeat swallow eval.  Pt received upright in bed A&A Ox2, suspect reduced cognition, +O2NC, +hoarse vocal quality, +dyspnea noted during conversation, pain scale 0/10 pre & post eval.  Swallow eval completed see below for details.  Pt educated on rx's, verbalized understanding, pt left as received NAD CAM Gomez & Dr. England notified.  Discussed current presentation and question of plan to reattempt MBS to rule out silent aspiration with Dr. Tomás MD reported, MBS not warranted at this time.  Will follow x1 to check PO tolerance, as schedule permits.    As per charting, pt is a "89 y.o female with PMH of hypothyroidism, atrial fibrillation, pacemaker, COPD, CAD, chronic diastolic heart failure, severe TR, severe pulm HTN who presents with acute on chronic hypoxic/hypercapnic respiratory failure in the setting of acute on chronic diastolic heart failure, COPD and RSV bronchitis."    Chest xray 3/18/22: "IMPRESSION:   Residual LEFT basilar airspace disease and/or effusion   obscuring LEFT diaphragm contour.  Cardiomegaly..  Confirmatory lateral radiograph recommended."    WBC 3/21/22: "6.88"

## 2022-03-21 NOTE — PROGRESS NOTE ADULT - SUBJECTIVE AND OBJECTIVE BOX
PATRICIA MORTON is a 89yFemale , patient examined and chart reviewed.     INTERVAL HPI/ OVERNIGHT EVENTS:   No events. Afebrile.     PAST MEDICAL & SURGICAL HISTORY:  Chronic obstructive pulmonary disease (COPD)  HTN (hypertension)  Cardiac pacemaker  Gout  Hypothyroidism  Insomnia  Chronic CHF  History of ST elevation myocardial infarction (STEMI)  No significant past surgical history        For details regarding the patient's social history, family history, and other miscellaneous elements, please refer the initial infectious diseases consultation and/or the admitting history and physical examination for this admission.    ROS:  Unable to obtain due to : Poor historian    Allergies  codeine (Unknown)      Current inpatient medications :    ANTIBIOTICS/RELEVANT:    MEDICATIONS  (STANDING):  acetaZOLAMIDE    Tablet 250 milliGRAM(s) Oral daily  albuterol/ipratropium for Nebulization 3 milliLiter(s) Nebulizer every 8 hours  allopurinol 100 milliGRAM(s) Oral daily  digoxin     Tablet 125 MICROGram(s) Oral daily  furosemide    Tablet 40 milliGRAM(s) Oral daily  levothyroxine 125 MICROGram(s) Oral daily  pantoprazole    Tablet 40 milliGRAM(s) Oral before breakfast  rivaroxaban 15 milliGRAM(s) Oral with dinner    MEDICATIONS  (PRN):  acetaminophen     Tablet .. 650 milliGRAM(s) Oral every 6 hours PRN Mild Pain (1 - 3)  guaiFENesin Oral Liquid (Sugar-Free) 200 milliGRAM(s) Oral every 6 hours PRN Cough  melatonin 5 milliGRAM(s) Oral at bedtime PRN Insomnia  saliva substitute (BIOTENE MOISTURIZING MOUTH SPRAY) 1 [place](s) Topical every 3 hours PRN Mouth Care        Objective:  Vital Signs Last 24 Hrs  T(C): 36.6 (21 Mar 2022 10:05), Max: 36.7 (20 Mar 2022 14:26)  T(F): 97.9 (21 Mar 2022 10:05), Max: 98.1 (21 Mar 2022 01:20)  HR: 60 (21 Mar 2022 10:05) (59 - 79)  BP: 103/48 (21 Mar 2022 10:05) (89/54 - 128/67)  RR: 35 (21 Mar 2022 10:05) (16 - 35)  SpO2: 95% (21 Mar 2022 11:08) (93% - 97%)      Physical Exam:  General: no acute distress   Neck: supple, trachea midline  Lungs: Decreased, no wheeze/rhonchi  Cardiovascular: regular rate and rhythm, S1 S2  Abdomen: soft, nontender,  bowel sounds normal  Neurological: Lethargic  Skin: no rash  Extremities:+ edema      LABS:             10.1   6.88  )-----------( 156      ( 21 Mar 2022 07:43 )             33.5   03-21    139  |  101  |  30<H>  ----------------------------<  160<H>  3.4<L>   |  37<H>  |  1.06    Ca    7.8<L>      21 Mar 2022 07:43  Phos  4.1     03-21  Mg     2.3     03-21    TPro  5.6<L>  /  Alb  2.6<L>  /  TBili  0.4  /  DBili  x   /  AST  22  /  ALT  16  /  AlkPhos  119  03-21    MICROBIOLOGY:    Culture - Urine (collected 11 Mar 2022 21:02)  Source: Clean Catch Clean Catch (Midstream)  Final Report (12 Mar 2022 16:27):    <10,000 CFU/mL Normal Urogenital [place]    Culture - Blood (collected 11 Mar 2022 13:03)  Source: .Blood Blood-Peripheral  Preliminary Report (12 Mar 2022 14:01):    No growth to date.    Culture - Blood (collected 11 Mar 2022 13:03)  Source: .Blood Blood-Peripheral  Preliminary Report (12 Mar 2022 14:01):    No growth to date.    FLU A B RSV Detection by PCR (03.11.22 @ 21:03)    Flu A Result: [facility]: The Flu A B RSV assay is a Real-Time PCR test for the qualitative  detection and differentiation of Influenza A, Influenza B, and  Respiratory Syncytial Virus on nasopharyngeal swabs. The results should  be interpreted in the context of all clinical and laboratory findings.    Flu B Result: [facility]    RSV Result: Detected      RADIOLOGY & ADDITIONAL STUDIES:    ACC: [id number] EXAM:  CT CHEST                          PROCEDURE DATE:  03/11/2022          INTERPRETATION:  CLINICAL INFORMATION: Respiratory distress.    COMPARISON: Chest radiograph 3/11/2022.  CT scan chest 2/21/2022.    CONTRAST/COMPLICATIONS:  IV Contrast: NONE  Oral Contrast: NONE  Complications: None reported at time of study completion    PROCEDURE:  CT of the Chest was performed.  Sagittal and coronal reformats were performed.    FINDINGS:    LUNGS AND AIRWAYS:  PLEURA:  There are small bilateral pleural effusions with underlying compressive   atelectasis lung bases.  The central airways remain patent.    MEDIASTINUM AND CHICA: No lymphadenopathy.    VESSELS: Atherosclerotic changes thoracic aorta and coronary artery   calcifications.  Prominence of the main pulmonary arterial trunk, which may be associated   with pulmonary arterial hypertension.    HEART: Cardiomegaly.  Cardiac pacemaker leads.   No pericardial effusion.    CHEST WALL AND LOWER NECK:  Cardiac device left chestwall.    VISUALIZED UPPER ABDOMEN:  Limited by streak artifact.  Nodular contour liver.  Left hepatic cyst.    BONES: Degenerative changes.    IMPRESSION:    Small bilateral pleural effusions with underlying compressive   atelectasis, similar to prior exam.      ACC: [id number] EXAM:  XR CHEST PORTABLE ROUTINE 1V                          PROCEDURE DATE:  03/18/2022          INTERPRETATION:  Portable chest radiograph    CLINICAL INFORMATION: Pleural effusion. Follow-up    TECHNIQUE:  Portable  AP chest radiograph.    COMPARISON: 3/16/2022 chest radiograph .    FINDINGS:  CATHETERS AND TUBES: None    PULMONARY: Residual moderate LEFT effusion and/or basilar airspace   consolidation obscuring diaphragm contour. Mild RIGHT pleural effusion   causing hazinessoverlying RIGHT lung base. Upper zones clear.    .   No pneumothorax.    HEART/VASCULAR: The  heart is enlarged in transverse diameter. Cardiac   device wire lead is within right ventricle.  .    BONES: Visualized osseous structures are intact.    IMPRESSION:   Residual LEFT basilar airspace disease and/or effusion   obscuring LEFT diaphragm contour.  Cardiomegaly..  Confirmatory lateral radiograph recommended.        Assessment :   89 y.o. female w/ hx of CHF, COPD, PPM, gout, hypothyroidism, HTN, and STEMI resident of [facility] admitted with Acute hypercapnia respiratory failure sec COPD/CHF exacerbation. Placed on BIPAP. CT chest with scott effusions with compressive atelectasis. BNP elevated. Sp Vanc Zosyn x 1 dose in ED.  Afebrile WBC WNL Procalcitonin 0.09  RSV + viral bronchitis   blood cx neg to date  urine legionella neg  strep neg  Hypercapnia and hypoxic respiratory failure resolved  Stable    Plan :   Pulm following  Monitor off antibiotics  Trend temps and cbc  Strict Aspiration precautions  DNRDNI  Stable from ID standpoint  Dc planning    Continue with present regiment.  Appropriate use of antibiotics and adverse effects reviewed.      > 35 minutes were spent in direct patient care reviewing notes, medications ,labs data/ imaging , discussion with multidisciplinary team.    Thank you for allowing me to participate in care of your patient .    Karis Goodwin MD  Infectious Disease  [phone]

## 2022-03-21 NOTE — PROGRESS NOTE ADULT - ASSESSMENT
The patient is a 89 year old female with a history of hypothyroidism, atrial fibrillation, pacemaker, COPD, CAD, chronic diastolic heart failure, severe TR, severe pulm HTN who presents with shortness of breath in the setting of acute on chronic diastolic heart failure, COPD, RSV.    Plan:  - ECG with known AF and intermittent ventricular pacing  - CXR with bilateral pleural effusions  - BNP 80613  - Echo 2/22 with normal LV systolic function, severe TR, severe pulm HTN  - RSV positive  - Continue furosemide 40 mg daily  - Continue acetazolamide 250 mg PO daily  - Continue rivaroxaban 15 mg daily  - Continue digoxin 0.125 mg daily  - Hold metoprolol for now due to BP on lower side The patient is a 89 year old female with a history of hypothyroidism, atrial fibrillation, pacemaker, COPD, CAD, chronic diastolic heart failure, severe TR, severe pulm HTN who presents with shortness of breath in the setting of acute on chronic diastolic heart failure, COPD, RSV.    Plan:  - ECG with known AF and intermittent ventricular pacing  - CXR with bilateral pleural effusions  - BNP 12241  - Echo 2/22 with normal LV systolic function, severe TR, severe pulm HTN  - RSV positive  - Continue furosemide 40 mg daily  - Continue acetazolamide 250 mg PO daily - discontinue on discharge  - Continue rivaroxaban 15 mg daily  - Continue digoxin 0.125 mg daily  - Hold metoprolol for now due to BP on lower side

## 2022-03-21 NOTE — PROGRESS NOTE ADULT - SUBJECTIVE AND OBJECTIVE BOX
Chief Complaint: Shortness of breath    Interval Events: No events overnight.    Review of Systems:  General: No fevers, chills, weight gain  Skin: No rashes, color changes  Cardiovascular: No chest pain, orthopnea  Respiratory: No shortness of breath, cough  Gastrointestinal: No nausea, abdominal pain  Genitourinary: No incontinence, pain with urination  Musculoskeletal: No pain, swelling, decreased range of motion  Neurological: No headache, weakness  Psychiatric: No depression, anxiety  Endocrine: No weight gain, increased thirst  All other systems are comprehensively negative.    Physical Exam:  Vital Signs Last 24 Hrs  T(C): 36.4 (21 Mar 2022 05:25), Max: 36.8 (20 Mar 2022 10:34)  T(F): 97.6 (21 Mar 2022 05:25), Max: 98.2 (20 Mar 2022 10:34)  HR: 60 (21 Mar 2022 07:32) (59 - 79)  BP: 103/54 (21 Mar 2022 05:25) (89/54 - 128/67)  BP(mean): --  RR: 17 (21 Mar 2022 05:25) (16 - 18)  SpO2: 93% (21 Mar 2022 07:32) (93% - 98%)  General: NAD  HEENT: MMM  Neck: No JVD, no carotid bruit  Lungs: CTAB  CV: RRR, nl S1/S2, no M/R/G  Abdomen: S/NT/ND, +BS  Extremities: No LE edema, no cyanosis  Neuro: AAOx3, non-focal  Skin: No rash    Labs:    03-21    139  |  101  |  30<H>  ----------------------------<  160<H>  3.4<L>   |  37<H>  |  1.06    Ca    7.8<L>      21 Mar 2022 07:43  Phos  4.1     03-21  Mg     2.3     03-21    TPro  5.6<L>  /  Alb  2.6<L>  /  TBili  0.4  /  DBili  x   /  AST  22  /  ALT  16  /  AlkPhos  119  03-21                        10.1   6.88  )-----------( 156      ( 21 Mar 2022 07:43 )             33.5         Telemetry: AF, ventricular paced

## 2022-03-21 NOTE — SWALLOW BEDSIDE ASSESSMENT ADULT - SWALLOW EVAL: DIAGNOSIS
Oral stage deemed functional for puree and soft & bite sized.  Mild oral dysphagia with thin liquids marked by suspected posterior loss.  Mild-moderate oral dysphagia further impacted by overall respiratory status, marked by reduced coordination between respiration and mastication with regular solids, requiring rest breaks.  Pharyngeal stage deemed WFL, no overt s/s aspiration noted during trials.  Pt noted with +coughing at conclusion of assessment, during education of diet rx's, however, did not appear related to PO.  SpO2 96% throughout assessment.  Will follow monitor PO tolerance and determine if MBS warranted to rule out aspiration vs coughing due to recent admission for RSV.
1. Patient demonstrates a functional oral phase for pureed, moderately thick, mildly thick, and thin liquids marked by timely collection, transfer, and transport. 2. Patient demonstrates a mild oral dysphagia for regular solids marked by prolonged mastication resulting in delayed collection, transfer, and transport with trace stasis observed post swallow, which reduced with liquid wash. 3. Patient demonstrates a mild pharyngeal dysphagia for pureed, regular solids, moderately thick, mildly thick, and thin liquids marked by suspected delayed pharyngeal swallow trigger and hyolaryngeal elevation noted by digital palpation without evidence of airway penetration/aspiration. 4. Recommend soft & bite-sized and thin liquids, with aspiration precautions, as tolerated. Facilitate upright position, slow pacing, single/small bites/sips, and alternate solids with liquids. Continue to monitor and notify SLP if changes occur.
Oral & pharyngeal stages deemed WFL, no overt s/s aspiration noted.  Pt noted with improved coordination between respiration and mastication and overall swallow function with comparison to prior assessments.  Discussed current presentation and question of plan to reattempt MBS to rule out silent aspiration with Dr. Tomás MD reported, MBS not warranted at this time.  Therefore, will follow to check PO tolerance.

## 2022-07-25 RX ORDER — FUROSEMIDE 40 MG
1 TABLET ORAL
Qty: 0 | Refills: 0 | DISCHARGE

## 2022-07-25 RX ORDER — DIGOXIN 250 MCG
1 TABLET ORAL
Qty: 0 | Refills: 0 | DISCHARGE

## 2022-07-25 RX ORDER — POTASSIUM CHLORIDE 20 MEQ
1 PACKET (EA) ORAL
Qty: 0 | Refills: 0 | DISCHARGE

## 2022-07-25 RX ORDER — METOPROLOL TARTRATE 50 MG
1 TABLET ORAL
Qty: 0 | Refills: 0 | DISCHARGE

## 2022-07-25 RX ORDER — LANOLIN ALCOHOL/MO/W.PET/CERES
1 CREAM (GRAM) TOPICAL
Qty: 0 | Refills: 0 | DISCHARGE

## 2022-07-25 RX ORDER — FONDAPARINUX SODIUM 2.5 MG/.5ML
1 INJECTION, SOLUTION SUBCUTANEOUS
Qty: 0 | Refills: 0 | DISCHARGE

## 2022-07-25 RX ORDER — ALLOPURINOL 300 MG
1 TABLET ORAL
Qty: 0 | Refills: 0 | DISCHARGE

## 2022-08-17 NOTE — ED STATDOCS - CARDIAC, MLM
Discharge Instructions  PRE PROCEDURE INSTRUCTIONS:    #3 Lumbar Epiudural Injection     TO BE DETERMINED    Stop all Non Steroidal Anti-inflammatory medication two days before your procedure. The list includes but is not limited to the following:    Ibuprofen (Advil, Motrin, Midol IB)   Aspirin, Aspirin containing products (Gibran, Ascriptin, Ecotrin, Bufferin),   Celecoxib (celebrex)  Naproxen (Aleve, Midol Extended Release, Naprosyn),   Ketorolac (Sprix)  Indomethacin (Indocin,)  Meloxicam (Mobic),   Diclofenac (Voltaren)    Stop ALL Aspirin and ALL NSAIDs two days before your procedure.    Nothing to eat or drink _4_ hours before your procedure.     NOTE: If you are diabetic, please do not take your insulin or diabetes medication as you will not be eating before your procedure.  IF YOUR BLOOD SUGAR CHANGES SIGNIFICANTLY FROM YOUR NORMAL LEVEL AFTER YOUR PROCEDURE CALL YOUR PRIMARY DOCTOR IMMEDIATELY.    Take your blood pressure/heart medication with a sip of water the morning of your procedure. Your procedure will be cancelled if your blood pressure is too high.    Shower with antibacterial soap (such as dial) the morning of your procedure.    Call to cancel if you are ill or on antibiotics for ANY reason. [phone]    You MUST have a  accompany you to drive you home.  You CANNOT drive after your procedure.    If your  is not with you when you arrive for your procedure, YOUR PROCEDURE WILL BE CANCELLED.    The pain clinic phone number is [phone].        You will always get our voice mail when you call, please leave a message.   Please leave your name, date of birth and phone number on the voicemail to ensure we can identify you.    The pain clinic will return your call within 2 business days.               normal rate, regular rhythm, and no murmur.

## 2022-08-23 PROBLEM — J44.9 CHRONIC OBSTRUCTIVE PULMONARY DISEASE, UNSPECIFIED: Chronic | Status: ACTIVE | Noted: 2022-03-11

## 2022-08-23 PROBLEM — Z95.0 PRESENCE OF CARDIAC PACEMAKER: Chronic | Status: ACTIVE | Noted: 2022-03-11

## 2022-08-23 PROBLEM — I50.9 HEART FAILURE, UNSPECIFIED: Chronic | Status: ACTIVE | Noted: 2022-03-11

## 2022-08-23 PROBLEM — I10 ESSENTIAL (PRIMARY) HYPERTENSION: Chronic | Status: ACTIVE | Noted: 2022-03-11

## 2022-08-23 PROBLEM — M10.9 GOUT, UNSPECIFIED: Chronic | Status: ACTIVE | Noted: 2022-03-11

## 2022-08-23 PROBLEM — G47.00 INSOMNIA, UNSPECIFIED: Chronic | Status: ACTIVE | Noted: 2022-03-11

## 2022-08-23 PROBLEM — E03.9 HYPOTHYROIDISM, UNSPECIFIED: Chronic | Status: ACTIVE | Noted: 2022-03-11

## 2022-08-23 PROBLEM — I25.2 OLD MYOCARDIAL INFARCTION: Chronic | Status: ACTIVE | Noted: 2022-03-11

## 2022-09-07 ENCOUNTER — APPOINTMENT (OUTPATIENT)
Dept: ORTHOPEDIC SURGERY | Facility: CLINIC | Age: 87
End: 2022-09-07

## 2022-09-07 VITALS — WEIGHT: 140 LBS | BODY MASS INDEX: 23.9 KG/M2 | HEIGHT: 64 IN

## 2022-09-07 PROCEDURE — 99203 OFFICE O/P NEW LOW 30 MIN: CPT | Mod: 25

## 2022-09-07 PROCEDURE — J3490M: CUSTOM

## 2022-09-07 PROCEDURE — 20610 DRAIN/INJ JOINT/BURSA W/O US: CPT | Mod: 50

## 2022-09-07 PROCEDURE — 73010 X-RAY EXAM OF SHOULDER BLADE: CPT | Mod: 50

## 2022-09-07 PROCEDURE — 73030 X-RAY EXAM OF SHOULDER: CPT | Mod: 50

## 2022-09-07 NOTE — PROCEDURE
[FreeTextEntry3] : Large joint injection was performed of the b/l shoulders . An injection of Lidocaine 3cc of 1% , Bupivacaine (Marcaine) 6cc of 0.5% , Triamcinolone (Kenalog) 2cc of 40 mg  was used. Patient was advised to call if redness, pain or fever occur and apply ice for 15 minutes out of every hour for the next 12-24 hours as tolerated. \par \par Patient has tried OTC's including aspirin, Ibuprofen, Aleve, etc or prescription NSAIDS, and/or exercises at home and/or physical therapy without satisfactory response, patient had decreased mobility in the joint and the risks benefits, and alternatives have been discussed, and verbal consent was obtained. \par The site was prepped with alcohol, betadine and ethyl chloride sprayed topically\par \par The risks, benefits and contents of the injection have been discussed.  Risks include but are not limited to allergic reaction, flare reaction, permanent white skin discoloration at the injection site and infection.  The patient understands the risks and agrees to having the injection.  All questions have been answered.\par

## 2022-09-07 NOTE — ASSESSMENT
[FreeTextEntry1] : 89f Severe BL GH DJD \par \par 1) CSI BL shoulders today - tolerated well \par 2) activity mod, rest, cryotherapy\par 3) physical therapy\par 4) rtc prn discussed gel inj

## 2022-09-15 NOTE — DISCHARGE NOTE PROVIDER - NS AS DC PROVIDER CONTACT Y/N MULTI
Home phone: [phone]     Deb Perez RN  09/15/22 9404
I have reviewed discharge instructions with the patient. The patient verbalized understanding. Patient left ED via Discharge Method: ambulatory to Home with wife to  ddi given  Opportunity for questions and clarification provided. Patient given 0 scripts. To continue your aftercare when you leave the hospital, you may receive an automated call from our care team to check in on how you are doing. This is a free service and part of our promise to provide the best care and service to meet your aftercare needs.  If you have questions, or wish to unsubscribe from this service please call [phone]. Thank you for Choosing our [facility].         Elbert Mahajan RN  09/15/22 5093
Yes

## 2022-09-22 ENCOUNTER — APPOINTMENT (OUTPATIENT)
Dept: CARDIOLOGY | Facility: CLINIC | Age: 87
End: 2022-09-22

## 2022-09-22 ENCOUNTER — NON-APPOINTMENT (OUTPATIENT)
Age: 87
End: 2022-09-22

## 2022-09-22 VITALS
HEIGHT: 61 IN | BODY MASS INDEX: 22.28 KG/M2 | WEIGHT: 118 LBS | HEART RATE: 85 BPM | DIASTOLIC BLOOD PRESSURE: 68 MMHG | SYSTOLIC BLOOD PRESSURE: 124 MMHG | OXYGEN SATURATION: 95 %

## 2022-09-22 DIAGNOSIS — Z00.00 ENCOUNTER FOR GENERAL ADULT MEDICAL EXAMINATION W/OUT ABNORMAL FINDINGS: ICD-10-CM

## 2022-09-22 PROCEDURE — 99214 OFFICE O/P EST MOD 30 MIN: CPT

## 2022-09-22 PROCEDURE — 99204 OFFICE O/P NEW MOD 45 MIN: CPT

## 2022-09-22 PROCEDURE — 93000 ELECTROCARDIOGRAM COMPLETE: CPT

## 2022-09-22 NOTE — DISCUSSION/SUMMARY
[FreeTextEntry1] : Pt will continue current medications. She will have Covid booster. She will see her PMD. She will observe a low salt diet. She will weigh herself daily and note fluctuations of greater or lesser 3 lbs and adjust Lasix dose. She will follow up in 3 months.  [EKG obtained to assist in diagnosis and management of assessed problem(s)] : EKG obtained to assist in diagnosis and management of assessed problem(s)

## 2022-09-22 NOTE — PHYSICAL EXAM
[Well Developed] : well developed [Well Nourished] : well nourished [No Acute Distress] : no acute distress [Normal Conjunctiva] : normal conjunctiva [Normal Venous Pressure] : normal venous pressure [No Carotid Bruit] : no carotid bruit [Normal S1, S2] : normal S1, S2 [No Rub] : no rub [No Gallop] : no gallop [Clear Lung Fields] : clear lung fields [Good Air Entry] : good air entry [No Respiratory Distress] : no respiratory distress  [Soft] : abdomen soft [Non Tender] : non-tender [No Masses/organomegaly] : no masses/organomegaly [Normal Bowel Sounds] : normal bowel sounds [Normal Gait] : normal gait [No Edema] : no edema [No Cyanosis] : no cyanosis [No Clubbing] : no clubbing [No Varicosities] : no varicosities [No Rash] : no rash [No Skin Lesions] : no skin lesions [Moves all extremities] : moves all extremities [No Focal Deficits] : no focal deficits [Normal Speech] : normal speech [Alert and Oriented] : alert and oriented [Normal memory] : normal memory [de-identified] : 1/6 REBECA

## 2022-09-22 NOTE — HISTORY OF PRESENT ILLNESS
[FreeTextEntry1] : 89 yo female presents for cardiac evaluation . HO history of atrial fibrillation and tricuspid regurgitation. Pt denies history of MI. She saw Dr. Meyer at [place]. She was a remote smoker and has ?COPD. Medications were reviewed. Dtr is present.

## 2022-09-28 ENCOUNTER — LABORATORY RESULT (OUTPATIENT)
Age: 87
End: 2022-09-28

## 2022-11-01 NOTE — DIETITIAN INITIAL EVALUATION ADULT. - ETIOLOGY
Left detailed voice message informing Tay's parents that we have to reschedule his appt on 11/14 due to the NPC who was going to see him, has a meeting/chemo writing.    Will await call back   related to current respiratory status

## 2022-11-03 ENCOUNTER — APPOINTMENT (OUTPATIENT)
Dept: INTERNAL MEDICINE | Facility: CLINIC | Age: 87
End: 2022-11-03

## 2022-11-03 VITALS
OXYGEN SATURATION: 97 % | HEART RATE: 85 BPM | RESPIRATION RATE: 16 BRPM | TEMPERATURE: 98.9 F | BODY MASS INDEX: 22.84 KG/M2 | SYSTOLIC BLOOD PRESSURE: 122 MMHG | HEIGHT: 61 IN | WEIGHT: 121 LBS | DIASTOLIC BLOOD PRESSURE: 68 MMHG

## 2022-11-03 DIAGNOSIS — Z87.891 PERSONAL HISTORY OF NICOTINE DEPENDENCE: ICD-10-CM

## 2022-11-03 DIAGNOSIS — Z86.2 PERSONAL HISTORY OF DISEASES OF THE BLOOD AND BLOOD-FORMING ORGANS AND CERTAIN DISORDERS INVOLVING THE IMMUNE MECHANISM: ICD-10-CM

## 2022-11-03 DIAGNOSIS — Z81.8 FAMILY HISTORY OF OTHER MENTAL AND BEHAVIORAL DISORDERS: ICD-10-CM

## 2022-11-03 DIAGNOSIS — Z60.2 PROBLEMS RELATED TO LIVING ALONE: ICD-10-CM

## 2022-11-03 DIAGNOSIS — Z00.00 ENCOUNTER FOR GENERAL ADULT MEDICAL EXAMINATION W/OUT ABNORMAL FINDINGS: ICD-10-CM

## 2022-11-03 DIAGNOSIS — Z23 ENCOUNTER FOR IMMUNIZATION: ICD-10-CM

## 2022-11-03 DIAGNOSIS — Z83.3 FAMILY HISTORY OF DIABETES MELLITUS: ICD-10-CM

## 2022-11-03 PROCEDURE — 99205 OFFICE O/P NEW HI 60 MIN: CPT | Mod: 25

## 2022-11-03 PROCEDURE — 36415 COLL VENOUS BLD VENIPUNCTURE: CPT

## 2022-11-03 RX ORDER — ALLOPURINOL 100 MG/1
100 TABLET ORAL DAILY
Refills: 0 | Status: DISCONTINUED | COMMUNITY
End: 2022-11-03

## 2022-11-03 RX ORDER — ENALAPRIL MALEATE 5 MG/1
5 TABLET ORAL DAILY
Qty: 30 | Refills: 5 | Status: DISCONTINUED | COMMUNITY
Start: 2018-08-23 | End: 2022-11-03

## 2022-11-03 SDOH — SOCIAL STABILITY - SOCIAL INSECURITY: PROBLEMS RELATED TO LIVING ALONE: Z60.2

## 2022-11-06 PROBLEM — Z60.2 LIVES ALONE: Status: ACTIVE | Noted: 2022-11-06

## 2022-11-06 PROBLEM — Z81.8 FAMILY HISTORY OF DEMENTIA: Status: ACTIVE | Noted: 2022-11-06

## 2022-11-06 PROBLEM — Z83.3 FAMILY HISTORY OF DIABETES MELLITUS: Status: ACTIVE | Noted: 2022-11-06

## 2022-11-06 RX ORDER — FUROSEMIDE 20 MG/1
20 TABLET ORAL
Qty: 5 | Refills: 1 | Status: DISCONTINUED | COMMUNITY
End: 2022-11-06

## 2022-11-06 NOTE — HISTORY OF PRESENT ILLNESS
[Postmenopausal] : the patient is postmenopausal [Lives Alone] : Patient lives alone [Occupation ___] : occupation: [unfilled] [Former Cigarette Smoker] : is a former cigarette smoker [Quit Cigarettes: ___] : quit smoking [unfilled] [Occasional Use] : occasional alcohol use [Never] : has never used illicit drugs [FreeTextEntry1] : \par Needs new PMD. [de-identified] : \par Accompanied by daughter, Nella.  Last PMD > 5 yrs ago.\par \par Here to establish care.\par Wants to review labs done by cardiology recently.\par \par Reports is socially distancing and using precautions for covid prevention.\par Denies sick or covid positive contacts.\par Denies fever, chills, cough or sob.\par -hx pfizer series, hx boosters x2 (last 10/22)\par \par Was inpt 2/22 - 3/22 at [facility] s/p fall with partial pelvic and coccyx bone fractures c/b CHF exacerbation- no surgery done, told only conservative tx needed.  States seen by ortho and cardiology, then d/c'd to rehab - was in [facility], then felt unwell- dx'd with CHF exacerbation and sent to [place], there x 2 weeks then d/c'd to new rehab ([place]) for PT/OT.  D/c'd home 6/13/22 with home care - had [facility] for short while.\par \par Lives alone.\par Has HHA (2 aides) 6 hrs per day (private)\par -takes own meds, reports is adherent\par -gets help with dressing, showering (mainly for observing) and meals\par -eats on own, toilets on own\par -using rolling walker with seat for ambulation, doing PT 2x/wk.  Recently evaluated for OT- to start sessions next week 2x/wk.  Denies falls since at home.\par -not currently driving, last > 1 yr ago\par \par c/o RLE discoloration and painful to touch at b/l anterior shin area x few months.  no skin breakdown or hx trauma.\par -hx b/l LE wounds in past at anterior legs s/p focal trauma- was followed by wound care doctor, had [facility] for wound care.  Mainly local care.  hx surgery fro LLE wound at [facility].  Reports last seen by MD > 1 yr ago.  Notes normal color to skin then and not painful.\par -denies hx vascular eval\par -no calf pain\par \par hx afib, cardiomyopathy, TR, HTN- hx chronic GARRIDO\par -followed by cardio, last seen 9/22 without changes made.  Labs done.  Advised wt monitoring to determine need for Lasix dose adjustment.  Has f/u 12/22.\par -on Xarelto, metoprolol 25 qd, Lasix 40 + 20 (taking this > 1 yr) and KCl 20\par -reports stable, chronic GARRIDO\par -hx LE edema- no recent issues\par -home wt: 119 - 121, has been stable\par -home BP: 112-117/60s-70s\par -followed by Dr. Rosendo cruz- last seen 2022, told doing well, new Rx for glasses given.  To f/u 1 yr.\par -has low salt, water intake\par -denies CP or palpitations\par \par COPD-\par -hx tobacco, quit 1978, hx 3-4/d x 30 yrs\par -hx followed by pulm (in [place])- states last seen in 2021 for eval of DEX, told not COPD, told no tx needed and advised cardiology evaluation\par \par hypothyroid, preDM-\par -on Synthroid 125, taking same dose x many yrs (states gets from cardiology- seeing current cardiologist only since recent hospital stay)\par \par hx OA shoulders- hx injections 9/22- improved some\par -followed by ortho, has f/u 12/22.\par -on Tylenol ES prn, avoids NSAIDs\par \par hx anemia- unsure of dx, denies hx clinical bleeding\par -hx followed by Dr. Nelson mendoza ([place])\par -hx blood transfusions x 3 in 2020 - states labs rechecked, told all fine.  Not seen since.  States no OTC iron supplements advised.\par -denies hx screening colonoscopy or hx GI eval\par \par ? osteopenia- noted in chart, pt denies being told of this history. Not interested to pursue.\par -on Ca/d\par \par hx gout- last attack in Spring 2022 in rehab (tx'd with "a shot")\par -on allopurinol 100 mg qd, taking for many yrs\par \par \par Denies  complaints\par  [Binge Drinking] : denies binge drinking [Patient Concern] : no personal concern about alcohol use [Family Concern] : no family concern about alcohol use [de-identified] :  [de-identified] : retired [de-identified] : 3-4/d x 30 yrs [de-identified] : hx flu shot 10/22

## 2022-11-06 NOTE — PHYSICAL EXAM
[Well-Appearing] : well-appearing [Normal Sclera/Conjunctiva] : normal sclera/conjunctiva [PERRL] : pupils equal round and reactive to light [EOMI] : extraocular movements intact [Normal Outer Ear/Nose] : the outer ears and nose were normal in appearance [Normal Oropharynx] : the oropharynx was normal [Normal TMs] : both tympanic membranes were normal [Normal Nasal Mucosa] : the nasal mucosa was normal [No Lymphadenopathy] : no lymphadenopathy [Supple] : supple [Thyroid Normal, No Nodules] : the thyroid was normal and there were no nodules present [No Respiratory Distress] : no respiratory distress  [Clear to Auscultation] : lungs were clear to auscultation bilaterally [Normal Rate] : normal rate  [Regular Rhythm] : with a regular rhythm [Normal S1, S2] : normal S1 and S2 [No Murmur] : no murmur heard [Pedal Pulses Present] : the pedal pulses are present [Soft] : abdomen soft [Non Tender] : non-tender [No HSM] : no HSM [Normal Supraclavicular Nodes] : no supraclavicular lymphadenopathy [Normal Posterior Cervical Nodes] : no posterior cervical lymphadenopathy [Normal Anterior Cervical Nodes] : no anterior cervical lymphadenopathy [No CVA Tenderness] : no CVA  tenderness [No Spinal Tenderness] : no spinal tenderness [No Joint Swelling] : no joint swelling [Grossly Normal Strength/Tone] : grossly normal strength/tone [No Focal Deficits] : no focal deficits [Normal Gait] : normal gait [Normal Affect] : the affect was normal [Alert and Oriented x3] : oriented to person, place, and time [de-identified] : + mild b/l varicose veins w/o inflammation, + generalized lower leg hyperpigmentation with dry skin diffusely, +diffuse anterior leg tenderness; trace b/l LE edema, no calf tenderness or palpable cord; negative Homans

## 2022-11-06 NOTE — ASSESSMENT
[FreeTextEntry1] : \par 89 yo F pmhx hearing loss, hypothyroid, preDM, COPD, HTN, afib, cardiomyopathy, TR, OA, gout, hx inpt stay 2/22 s/p mechanical fall with partial pelvic and coccyx bone fractures (no surgery needed) c/b CHF exacerbation here to establish care\par \par \par s/p mechanical fall with partial pelvic and coccyx bone fractures (no surgery needed)- improved\par -doing PT, OT\par -on Tylenol prn\par -has HHA\par \par anterior leg pain, hx chronic LE edema- b/l, no obvious pathology on exam, + chronic skin changes c/w venous insuff\par -vascular referral for eval\par -Tylenol prn\par -moisturizer use encouraged\par \par hx afib, cardiomyopathy, TR, HTN, CKD, chronic GARRIDO- BP wnl\par -followed by cardio, last seen 9/22 without changes made. Labs done. Advised wt monitoring to determine need for Lasix dose adjustment. Has f/u 12/22.\par -on Xarelto, metoprolol 25 qd, Lasix 40 + 20 (taking this > 1 yr) and KCl 20\par -followed by opthoDr. Robbins- last seen 2022, told doing well, new Rx for glasses given. To f/u 1 yr.\par -9/22 lipids, cmp wnl, GFR 45\par -low salt diet advised\par -cont home wt and BP monitoring\par -check screening UA/prt:crt\par \par COPD-\par -hx tobacco, quit 1978, hx 3-4/d x 30 yrs\par -hx followed by pulm (in [place])- states last seen in 2021 for eval of DEX, told not COPD, told no tx needed and advised cardiology evaluation\par -asked to forward records\par \par hypothyroid, preDM-\par -on Synthroid 125, taking same dose x many yrs\par -9/22 TSH wnl\par \par preDM- 9/22 A1c 6.2 (was 6.4)\par -ADA diet advised\par -check A1c in 3 mo\par \par hx anemia, hx CKD- unsure of dx, denies hx clinical bleeding\par -hx followed by Dr. Lakeisha mendozael ([place])\par -hx blood transfusions x 3 in 2020 - states labs rechecked, told all fine. Not seen since. States no OTC iron supplements advised.\par -9/22 Hg 11.1 (was 11.2 in 8/18)\par -check cbc, B12/folate/iron studies\par \par hx OA shoulders- hx injections 9/22- improved some\par -followed by ortho, has f/u 12/22.\par -on Tylenol ES prn, avoids NSAIDs\par \par hx gout- last attack in Spring 2022 in rehab (tx'd with "a shot")\par -on allopurinol 100 mg qd, taking for many yrs\par \par ? osteopenia- noted in chart, pt denies being told of this history. Not interested to pursue.  Risks/benefits discussed.\par -on Ca/d\par -check vit d\par \par \par MISC:  Continued social distancing and measure for covid19 prevention encouraged.  \par -hx pfizer series, hx boosters x2 (last 10/22)\par \par \par HCM\par -advised to f/u for CPE \par -check screening labs; declines STD/HIV screening, willing for hep C screening\par -hx flu shot 10/22\par -advised to designate HCP and provide copy of completed form for records\par \par Pt's best #, home: [phone]\par Pt states daughter, Nella may also be called re: her medical care as needed, cell: [phone]\par \par Labs drawn in office today.\par

## 2022-11-06 NOTE — HEALTH RISK ASSESSMENT
[With Patient/Caregiver] : , with patient/caregiver [ColonoscopyComments] : denies hx prior [AdvancecareDate] : 10/22

## 2022-11-09 ENCOUNTER — NON-APPOINTMENT (OUTPATIENT)
Age: 87
End: 2022-11-09

## 2022-11-18 ENCOUNTER — APPOINTMENT (OUTPATIENT)
Dept: INTERNAL MEDICINE | Facility: CLINIC | Age: 87
End: 2022-11-18

## 2022-11-18 PROCEDURE — 99441: CPT | Mod: CS,95

## 2022-11-20 ENCOUNTER — TRANSCRIPTION ENCOUNTER (OUTPATIENT)
Age: 87
End: 2022-11-20

## 2022-11-21 ENCOUNTER — NON-APPOINTMENT (OUTPATIENT)
Age: 87
End: 2022-11-21

## 2022-11-28 ENCOUNTER — APPOINTMENT (OUTPATIENT)
Dept: VASCULAR SURGERY | Facility: CLINIC | Age: 87
End: 2022-11-28

## 2022-12-01 ENCOUNTER — APPOINTMENT (OUTPATIENT)
Dept: VASCULAR SURGERY | Facility: CLINIC | Age: 87
End: 2022-12-01

## 2022-12-01 VITALS
HEIGHT: 61 IN | OXYGEN SATURATION: 97 % | SYSTOLIC BLOOD PRESSURE: 148 MMHG | DIASTOLIC BLOOD PRESSURE: 74 MMHG | BODY MASS INDEX: 22.47 KG/M2 | HEART RATE: 72 BPM | WEIGHT: 119 LBS

## 2022-12-01 DIAGNOSIS — M79.606 PAIN IN LEG, UNSPECIFIED: ICD-10-CM

## 2022-12-01 PROCEDURE — 99203 OFFICE O/P NEW LOW 30 MIN: CPT

## 2022-12-01 PROCEDURE — 93970 EXTREMITY STUDY: CPT

## 2022-12-08 ENCOUNTER — APPOINTMENT (OUTPATIENT)
Dept: INTERNAL MEDICINE | Facility: CLINIC | Age: 87
End: 2022-12-08

## 2022-12-08 VITALS
DIASTOLIC BLOOD PRESSURE: 66 MMHG | BODY MASS INDEX: 23.03 KG/M2 | RESPIRATION RATE: 16 BRPM | OXYGEN SATURATION: 99 % | HEIGHT: 61 IN | TEMPERATURE: 98.2 F | WEIGHT: 122 LBS | HEART RATE: 77 BPM | SYSTOLIC BLOOD PRESSURE: 124 MMHG

## 2022-12-08 DIAGNOSIS — H91.90 UNSPECIFIED HEARING LOSS, UNSPECIFIED EAR: ICD-10-CM

## 2022-12-08 DIAGNOSIS — Z87.898 PERSONAL HISTORY OF OTHER SPECIFIED CONDITIONS: ICD-10-CM

## 2022-12-08 DIAGNOSIS — Z95.0 PRESENCE OF CARDIAC PACEMAKER: ICD-10-CM

## 2022-12-08 DIAGNOSIS — U07.1 COVID-19: ICD-10-CM

## 2022-12-08 PROCEDURE — 90471 IMMUNIZATION ADMIN: CPT

## 2022-12-08 PROCEDURE — G0439: CPT

## 2022-12-08 PROCEDURE — 90715 TDAP VACCINE 7 YRS/> IM: CPT | Mod: GY

## 2022-12-08 PROCEDURE — G0444 DEPRESSION SCREEN ANNUAL: CPT | Mod: 59

## 2022-12-08 PROCEDURE — 36415 COLL VENOUS BLD VENIPUNCTURE: CPT

## 2022-12-09 PROBLEM — M79.606 LEG PAIN: Status: ACTIVE | Noted: 2022-11-03

## 2022-12-09 NOTE — HISTORY OF PRESENT ILLNESS
[FreeTextEntry1] : 89 yo F with history of HTN, hypothyroidism, arthritis, aortic regurgitation, mitral regurgitation, COPD, a.fib, cardiomyopathy, CKD,  CAD and pacemaker presenting with bilateral LE swelling, hyperpigmentation, and discomfort. No prior history of DVT, SVT or VI.  Denies claudication, rest pain, nonhealing wounds of lower extremities. electronic

## 2022-12-09 NOTE — HISTORY OF PRESENT ILLNESS
[FreeTextEntry1] : 89 yo F with history of HTN, hypothyroidism, arthritis, aortic regurgitation, mitral regurgitation, COPD, a.fib, cardiomyopathy, CKD,  CAD and pacemaker presenting with bilateral LE swelling, hyperpigmentation, and discomfort. No prior history of DVT, SVT or VI.  Denies claudication, rest pain, nonhealing wounds of lower extremities.

## 2022-12-09 NOTE — ASSESSMENT
[FreeTextEntry1] : 89 yo F with history of HTN, hypothyroidism, arthritis, aortic regurgitation, mitral regurgitation, COPD, a.fib, cardiomyopathy, CKD,  CAD and pacemaker presenting with bilateral LE swelling, hyperpigmentation, and discomfort. \par \par Venous duplex demonstrates no evidence of DVT, SVT or VI [Foot care/Footwear] : foot care/footwear

## 2022-12-09 NOTE — PHYSICAL EXAM
[2+] : left 2+ [Ankle Swelling (On Exam)] : present [Ankle Swelling Bilaterally] : bilaterally  [Varicose Veins Of Lower Extremities] : bilaterally [] : bilaterally [Ankle Swelling On The Left] : moderate [Abdomen Tenderness] : ~T ~M No abdominal tenderness [No Rash or Lesion] : No rash or lesion [Alert] : alert [Oriented to Person] : oriented to person [Oriented to Place] : oriented to place [Oriented to Time] : oriented to time [Calm] : calm [de-identified] : NAD [de-identified] : supple, no masses [de-identified] : unlabored breathing [de-identified] : FROM of all 4 extremities\par

## 2022-12-09 NOTE — PHYSICAL EXAM
[2+] : left 2+ [Ankle Swelling (On Exam)] : present [Ankle Swelling Bilaterally] : bilaterally  [Varicose Veins Of Lower Extremities] : bilaterally [] : bilaterally [Ankle Swelling On The Left] : moderate [Abdomen Tenderness] : ~T ~M No abdominal tenderness [No Rash or Lesion] : No rash or lesion [Alert] : alert [Oriented to Person] : oriented to person [Oriented to Place] : oriented to place [Oriented to Time] : oriented to time [Calm] : calm [de-identified] : NAD [de-identified] : supple, no masses [de-identified] : unlabored breathing [de-identified] : FROM of all 4 extremities\par

## 2022-12-11 PROBLEM — Z87.898 HISTORY OF FATIGUE: Status: RESOLVED | Noted: 2022-11-18 | Resolved: 2022-12-11

## 2022-12-11 PROBLEM — Z95.0 PACEMAKER: Status: ACTIVE | Noted: 2022-12-11

## 2022-12-11 PROBLEM — H91.90 HEARING LOSS: Status: ACTIVE | Noted: 2022-11-06

## 2022-12-11 PROBLEM — U07.1 COVID-19 VIRUS DETECTED: Status: RESOLVED | Noted: 2022-11-18 | Resolved: 2022-12-11

## 2022-12-11 NOTE — PHYSICAL EXAM
[Well-Appearing] : well-appearing [Normal Sclera/Conjunctiva] : normal sclera/conjunctiva [PERRL] : pupils equal round and reactive to light [EOMI] : extraocular movements intact [Normal Outer Ear/Nose] : the outer ears and nose were normal in appearance [Normal Oropharynx] : the oropharynx was normal [Normal TMs] : both tympanic membranes were normal [Normal Nasal Mucosa] : the nasal mucosa was normal [No Lymphadenopathy] : no lymphadenopathy [Supple] : supple [Thyroid Normal, No Nodules] : the thyroid was normal and there were no nodules present [No Respiratory Distress] : no respiratory distress  [Clear to Auscultation] : lungs were clear to auscultation bilaterally [Normal Rate] : normal rate  [Regular Rhythm] : with a regular rhythm [Normal S1, S2] : normal S1 and S2 [No Murmur] : no murmur heard [Pedal Pulses Present] : the pedal pulses are present [Normal Appearance] : normal in appearance [No Nipple Discharge] : no nipple discharge [No Axillary Lymphadenopathy] : no axillary lymphadenopathy [Soft] : abdomen soft [Non Tender] : non-tender [No HSM] : no HSM [Normal Supraclavicular Nodes] : no supraclavicular lymphadenopathy [Normal Posterior Cervical Nodes] : no posterior cervical lymphadenopathy [Normal Anterior Cervical Nodes] : no anterior cervical lymphadenopathy [No CVA Tenderness] : no CVA  tenderness [No Spinal Tenderness] : no spinal tenderness [No Joint Swelling] : no joint swelling [Grossly Normal Strength/Tone] : grossly normal strength/tone [No Focal Deficits] : no focal deficits [Normal Gait] : normal gait [Normal Affect] : the affect was normal [Alert and Oriented x3] : oriented to person, place, and time [No Acute Distress] : no acute distress [Non-distended] : non-distended [No Masses] : no abdominal mass palpated [de-identified] : +PPM in place, c/d/i [de-identified] : + mild b/l varicose veins w/o inflammation, + generalized lower leg hyperpigmentation with dry skin diffusely, +diffuse mild anterior leg tenderness; trace b/l LE edema, no calf tenderness or palpable cord; negative Homans [de-identified] : declined chaperone [de-identified] : scattered freckles

## 2022-12-11 NOTE — HEALTH RISK ASSESSMENT
[With Patient/Caregiver] : , with patient/caregiver [Patient declined bone density test] : Patient declined bone density test [Feels Safe at Home] : Feels safe at home [Carbon Monoxide Detector] : carbon monoxide detector [Seat Belt] :  uses seat belt [Sunscreen] : uses sunscreen [Designated Healthcare Proxy] : Designated healthcare proxy [Name: ___] : Health Care Proxy's Name: [unfilled]  [Relationship: ___] : Relationship: [unfilled] [One fall no injury in past year] : Patient reported one fall in the past year without injury [0] : 2) Feeling down, depressed, or hopeless: Not at all (0) [PHQ-2 Negative - No further assessment needed] : PHQ-2 Negative - No further assessment needed [Assistive Device] : Patient uses an assistive device [Patient declined colonoscopy] : Patient declined colonoscopy [HIV test declined] : HIV test declined [Independent] : managing medications [Some assistance needed] : preparing meals [Full assistance needed] : doing laundry [Smoke Detector] : smoke detector [de-identified] : 2/22 [de-identified] : hector [[id number]] : 0 [Guns at Home] : no guns at home [MammogramDate] : > 10 yrs ago [MammogramComments] :  Declines repeat.  Denies hx abnl.  [PapSmearDate] : > 10 yrs ago [PapSmearComments] :  Declines repeat.  Denies hx abnl.  [ColonoscopyComments] : denies hx prior; declines FIT [HepatitisCDate] : 11/22 [HepatitisCComments] : negative [AdvancecareDate] : 12/22

## 2022-12-11 NOTE — ASSESSMENT
[FreeTextEntry1] : \par 89 yo F pmhx hearing loss, hypothyroid, preDM, COPD, HTN, afib, cardiomyopathy, +PPM TR, OA, gout, hx inpt stay 2/22 s/p mechanical fall with partial pelvic and coccyx bone fractures (no surgery needed) c/b CHF exacerbation for AWV\par \par \par s/p mechanical fall with partial pelvic and coccyx bone fractures (no surgery needed)- improved\par -hx PT\par -doing OT\par -on Tylenol prn\par -has HHA\par \par anterior leg pain, hx chronic LE edema- b/l, no obvious pathology on exam, + chronic skin changes c/w venous insuff- better with compression stocking use per vascular\par -seen by vascular surgery 12/1/22 with US done (told only showed varicose veins) and compression stocking given\par -Tylenol prn\par -moisturizer use encouraged\par \par hx afib, cardiomyopathy, +PPM, TR, HTN, CKD, chronic GARRIDO- BP wnl\par -followed by cardio, last seen 9/22 without changes made. Labs done. Advised wt monitoring to determine need for Lasix dose adjustment. Has f/u 12/22.\par -on Xarelto, metoprolol 25 qd, Lasix 40 + 20 (taking this > 1 yr) and KCl 20\par -followed by optho, Dr. Robbins- last seen 2022, told doing well, new Rx for glasses given. To f/u 1 yr.\par -9/22 lipids, cmp wnl, GFR 45\par -11/22 bmp wnl, screening UA no prt\par -low salt diet advised\par -cont home wt and BP monitoring\par -check bmp\par \par COPD-\par -hx tobacco, quit 1978, hx 3-4/d x 30 yrs\par -hx followed by pulm (in [place])- states last seen in 2021 for eval of GARRIDO, told not COPD, told no tx needed and advised cardiology evaluation\par -asked to forward records\par \par hypothyroid- \par -on Synthroid 125, taking same dose x many yrs\par -9/22 TSH wnl, check repeat\par \par preDM- 9/22 A1c 6.2 (was 6.4)\par -ADA diet advised\par -check A1c\par \par hx anemia, hx CKD- unsure of dx, denies hx clinical bleeding\par -hx followed by Dr. Nelson mendoza ([place])\par -hx blood transfusions x 3 in 2020 - states labs rechecked, told all fine. Not seen since. States no OTC iron supplements advised.\par -9/22 Hg 11.1 (was 11.2 in 8/18)\par -11/22 cbc, B12/folate/iron studies wnl\par \par hx OA shoulders- hx injections 9/22- improved some\par -followed by ortho, has f/u 12/22.\par -on Tylenol ES prn, avoids NSAIDs\par \par hx gout- last attack in Spring 2022 in rehab (tx'd with "a shot")\par -on allopurinol 100 mg qd, taking for many yrs\par \par ? osteopenia- noted in chart, pt denies being told of this history. \par -Not interested to pursue DEXA.  Risks/benefits discussed.\par -on Ca/d\par -11/22 vit d wnl\par -fall precautions counseled\par \par hx hearing loss- using hearing aides regularly\par -followed by ENT\par \par \par MISC:  Continued social distancing and measure for covid19 prevention encouraged.  \par -hx pfizer series, hx boosters x2 (last 9/22)\par \par \par HCM\par -9/22 cmp wnl\par -11/22 cbc, B12/folate/iron studies/bmp wnl\par -11/22 hep C screening negative\par -hx flu shot 10/22\par -Tdap today\par -hx PVX 9/14\par -hx prevnar 8/18\par -advised to check on insurance coverage for shingrix and f/u if desires. \par -hx negative PAP by GYN . 10 yrs ago.  Declines repeat.  Denies hx abnl.  \par -hx negative mammo 10 yrs ago.  Declines repeat.  Denies hx abnl. \par -denies hx screening colonoscopy- declines, risks/benefits discussed.  Declines FIT.\par -followed by tre, Dr. Connelly, yearly screening advised.  Regular use of sun block for skin cancer prevention counseled.\par -yearly dental screening advised\par -cont'd smoking cessation encouraged\par -advised to designate HCP and provide copy of completed form for records\par \par Pt's best #, home: [phone]\par Pt states daughter, Nella may also be called re: her medical care as needed, cell: [phone]\par \par Labs drawn in office today.\par

## 2022-12-11 NOTE — HISTORY OF PRESENT ILLNESS
[___ Year(s) Ago] : [unfilled] year(s) ago [Lives Alone] : Patient lives alone [Occupation ___] : occupation: [unfilled] [Former Cigarette Smoker] : is a former cigarette smoker [Quit Cigarettes: ___] : quit smoking [unfilled] [Occasional Use] : occasional alcohol use [Never] : has never used illicit drugs [Postmenopausal] : the patient is postmenopausal [Reg. Dental Visits] : She has regular dental visits [Hearing Loss] : She has hearing loss [FreeTextEntry1] : \par pain better\par home wt 119 few days [Binge Drinking] : denies binge drinking [Patient Concern] : no personal concern about alcohol use [Family Concern] : no family concern about alcohol use [Vision Problems] : She denies vision problems [de-identified] : prior PMD [de-identified] :  [de-identified] : retired [de-identified] : 3-4/d x 30 yrs [de-identified] : hx flu shot 10/22, unsure hx Tdap, hx PVX 9/14, prevnar 8/18, hx shingles vaccine\par hx prevnar 8/18, hx shingles vaccine [de-identified] : \par Accompanied by daughter, Nella. \par \par 89 yo F pmhx hearing loss, hypothyroid, preDM, COPD, HTN, afib, cardiomyopathy, +PPM, TR, OA, gout, hx inpt stay 2/22 s/p mechanical fall with partial pelvic and coccyx bone fractures (no surgery needed) c/b CHF exacerbation for AWV\par \par Feeling well.\par Does not need med refills.\par \par Reports is socially distancing and using precautions for covid prevention.\par Denies sick or covid positive contacts.\par Denies fever, chills, cough or sob.\par -hx pfizer series, hx boosters x2 (last 9/22)\par -hx covid infection, tested + 11/18- has low appetite and cough, advised MAB- declined.  Reports resolved sx's, hx negative home test 2 weeks.\par \par Hx inpt 2/22 - 3/22 at [facility] s/p fall with partial pelvic and coccyx bone fractures c/b CHF exacerbation- no surgery done, told only conservative tx needed.  States seen by ortho and cardiology, then d/c'd to rehab - was in [facility], then felt unwell- dx'd with CHF exacerbation and sent to [place], there x 2 weeks then d/c'd to new rehab ([facility]) for PT/OT.  D/c'd home 6/13/22 with home care - had [facility] for short while.\par \par Lives alone.\par Has HHA (2 aides) 6 hrs per day (private)\par -takes own meds, reports is adherent\par -gets help with dressing, showering (mainly for observing) and meals\par -eats on own, toilets on own\par -using rolling walker with seat for ambulation, doing OT 2x/wk.  Recently finished PT \par -not currently driving, last > 1 yr ago\par \par hx hearing loss- using hearing aides regularly\par -followed by ENT\par \par hx RLE discoloration and painful to touch at b/l anterior shin area x few months-  reports better since using compression stockings per vascular\par no skin breakdown or hx trauma, denies calf pain.\par -hx b/l LE wounds in past at anterior legs s/p focal trauma- was followed by wound care doctor, had [facility] for wound care.  Mainly local care.  hx surgery fro LLE wound at [facility].  Reports last seen by MD > 1 yr ago.  Notes normal color to skin then and not painful.\par -seen by vascular surgery 12/1/22 with US done (told only showed varicose veins) and compression stocking given\par \par hx afib, cardiomyopathy, TR, HTN- hx chronic GARRIDO\par -followed by cardio, last seen 9/22 without changes made.  Labs done.  Advised wt monitoring to determine need for Lasix dose adjustment.  Has f/u 12/22.\par -on Xarelto, metoprolol 25 qd, Lasix 40 + 20 (taking this > 1 yr) and KCl 20\par -reports stable, chronic GARRIDO\par -hx LE edema- no recent issues\par -home wt: 119 - 121, has been stable\par -home BP: 112-117/60s-70s\par -followed by opthoDr. Robbins- last seen 2022, told doing well, new Rx for glasses given.  To f/u 1 yr.\par -has low salt and water intake\par -denies CP or palpitations\par \par COPD-\par -hx tobacco, quit 1978, hx 3-4/d x 30 yrs\par -hx followed by pulm (in [place])- states last seen in 2021 for eval of GARRIDO, told not COPD, told no tx needed and advised cardiology evaluation\par \par hypothyroid, preDM-\par -on Synthroid 125, taking same dose x many yrs (states gets from cardiology- seeing current cardiologist only since recent hospital stay)\par \par hx OA shoulders- hx injections 9/22- improved some\par -followed by ortho, has f/u 12/22.\par -on Tylenol ES prn, avoids NSAIDs\par \par hx anemia- unsure of dx, denies hx clinical bleeding\par -hx followed by Dr. Nelson mendoza ([place])\par -hx blood transfusions x 3 in 2020 - states labs rechecked, told all fine.  Not seen since.  States no OTC iron supplements advised.\par -denies hx screening colonoscopy or hx GI eval\par \par ? osteopenia- noted in chart, pt denies being told of this history. Not interested to pursue.\par -on Ca/d\par \par hx gout- last attack in Spring 2022 in rehab (tx'd with "a shot")\par -on allopurinol 100 mg qd, taking for many yrs\par \par \par Denies GI or  complaints.\par

## 2022-12-13 LAB
ANION GAP SERPL CALC-SCNC: 10 MMOL/L
BUN SERPL-MCNC: 33 MG/DL
CALCIUM SERPL-MCNC: 9.8 MG/DL
CHLORIDE SERPL-SCNC: 102 MMOL/L
CO2 SERPL-SCNC: 27 MMOL/L
CREAT SERPL-MCNC: 0.95 MG/DL
EGFR: 56 ML/MIN/1.73M2
ESTIMATED AVERAGE GLUCOSE: 137 MG/DL
GLUCOSE SERPL-MCNC: 62 MG/DL
HBA1C MFR BLD HPLC: 6.4 %
POTASSIUM SERPL-SCNC: 5.4 MMOL/L
SODIUM SERPL-SCNC: 140 MMOL/L
TSH SERPL-ACNC: 0.42 UIU/ML

## 2022-12-22 ENCOUNTER — APPOINTMENT (OUTPATIENT)
Dept: CARDIOLOGY | Facility: CLINIC | Age: 87
End: 2022-12-22

## 2022-12-22 ENCOUNTER — NON-APPOINTMENT (OUTPATIENT)
Age: 87
End: 2022-12-22

## 2022-12-22 VITALS
BODY MASS INDEX: 23.22 KG/M2 | OXYGEN SATURATION: 96 % | DIASTOLIC BLOOD PRESSURE: 60 MMHG | WEIGHT: 123 LBS | HEIGHT: 61 IN | HEART RATE: 74 BPM | SYSTOLIC BLOOD PRESSURE: 110 MMHG

## 2022-12-22 PROCEDURE — 99214 OFFICE O/P EST MOD 30 MIN: CPT

## 2022-12-22 PROCEDURE — 93000 ELECTROCARDIOGRAM COMPLETE: CPT

## 2022-12-22 RX ORDER — FUROSEMIDE 40 MG/1
40 TABLET ORAL DAILY
Qty: 5 | Refills: 0 | Status: DISCONTINUED | COMMUNITY
End: 2022-12-22

## 2022-12-22 NOTE — DISCUSSION/SUMMARY
[EKG obtained to assist in diagnosis and management of assessed problem(s)] : EKG obtained to assist in diagnosis and management of assessed problem(s) [FreeTextEntry1] : Pt will weigh herself daily. If she is >123 she will take an additional torsemide. She will see her PMD. She will observe a low salt diet. F/U in 2 months.

## 2022-12-22 NOTE — PHYSICAL EXAM
[Well Developed] : well developed [Well Nourished] : well nourished [No Acute Distress] : no acute distress [Normal Conjunctiva] : normal conjunctiva [Normal Venous Pressure] : normal venous pressure [No Carotid Bruit] : no carotid bruit [Normal S1, S2] : normal S1, S2 [No Rub] : no rub [No Gallop] : no gallop [Clear Lung Fields] : clear lung fields [Good Air Entry] : good air entry [No Respiratory Distress] : no respiratory distress  [Soft] : abdomen soft [Non Tender] : non-tender [No Masses/organomegaly] : no masses/organomegaly [Normal Bowel Sounds] : normal bowel sounds [Normal Gait] : normal gait [No Edema] : no edema [No Cyanosis] : no cyanosis [No Clubbing] : no clubbing [No Varicosities] : no varicosities [No Rash] : no rash [No Skin Lesions] : no skin lesions [Moves all extremities] : moves all extremities [No Focal Deficits] : no focal deficits [Normal Speech] : normal speech [Alert and Oriented] : alert and oriented [Normal memory] : normal memory [de-identified] : 1/6 REBECA

## 2022-12-22 NOTE — HISTORY OF PRESENT ILLNESS
[FreeTextEntry1] : 89 yo female presents for cardiac evaluation . HO history of atrial fibrillation and tricuspid regurgitation. Pt has been diagnosed with severe pulmonary hypertension. She is complaining of frequent urination. Pt denies history of MI. She saw Dr. Meyer at [place]. She was a remote smoker and has ?COPD. Medications were reviewed. Dtr is present.

## 2022-12-29 ENCOUNTER — APPOINTMENT (OUTPATIENT)
Dept: ORTHOPEDIC SURGERY | Facility: CLINIC | Age: 87
End: 2022-12-29
Payer: MEDICARE

## 2022-12-29 VITALS — HEIGHT: 61 IN | WEIGHT: 123 LBS | BODY MASS INDEX: 23.22 KG/M2

## 2022-12-29 PROCEDURE — 20611 DRAIN/INJ JOINT/BURSA W/US: CPT | Mod: 50

## 2022-12-29 PROCEDURE — 99213 OFFICE O/P EST LOW 20 MIN: CPT | Mod: 25

## 2022-12-29 NOTE — ASSESSMENT
[FreeTextEntry1] : 89f Severe BL GH DJD \par \par 1) CSI BL shoulders today - tolerated well \par 2) activity mod, rest, cryotherapy\par 3) physical therapy\par 4) rtc prn

## 2022-12-29 NOTE — HISTORY OF PRESENT ILLNESS
[10] : 10 [0] : 0 [de-identified] : 12/29/22: Here for f/u on bilateral shoulders; She reports B/L shoulder CSI given on 9/7/22 with one month of relief, worsening in past two weeks. She has been taking tylenol for pain with minimal to no relief, reports pain is severe, worse at night time, in addition reports limited ROM. She takes Xarelto.\par \par 9/7/22: 89F who presents today with B/L shoulder pain that has been occurring since February of 2022. Pain is deep and constant. Notes she is having pain with all ADLs at this point. Was seeing Dr. Freed in 2019 when she received two cortisone injections which help for at least 6 months. Has had gel inj and PT for shoulder in past.  Last injection was 2.5 years ago. No recent tx. \par \par H/o of heart failure  [] : no [FreeTextEntry1] : bilateral shoulders  [FreeTextEntry5] : PATRICIA MORTON  is a 89 year female who is here today to follow up on bilateral shoulders. States she was doing better after csi, pain has return about a month. Limited ROM. Would like csi shot.

## 2022-12-29 NOTE — PROCEDURE
[FreeTextEntry3] : Large joint injection was performed of the b/l shoulders . An injection of Lidocaine 3cc of 1% , Ropivacaine 4cc of 0.5% , Triamcinolone (Kenalog) 2cc of 40 mg  was used. Patient was advised to call if redness, pain or fever occur and apply ice for 15 minutes out of every hour for the next 12-24 hours as tolerated. \par \par Patient has tried OTC's including aspirin, Ibuprofen, Aleve, etc or prescription NSAIDS, and/or exercises at home and/or physical therapy without satisfactory response, patient had decreased mobility in the joint and the risks benefits, and alternatives have been discussed, and verbal consent was obtained. \par The site was prepped with alcohol, betadine and ethyl chloride sprayed topically\par \par The risks, benefits and contents of the injection have been discussed.  Risks include but are not limited to allergic reaction, flare reaction, permanent white skin discoloration at the injection site and infection.  The patient understands the risks and agrees to having the injection.  All questions have been answered.\par

## 2023-01-01 ENCOUNTER — APPOINTMENT (OUTPATIENT)
Dept: CARDIOLOGY | Facility: CLINIC | Age: 88
End: 2023-01-01

## 2023-01-01 ENCOUNTER — APPOINTMENT (OUTPATIENT)
Dept: INTERNAL MEDICINE | Facility: CLINIC | Age: 88
End: 2023-01-01

## 2023-01-01 ENCOUNTER — LABORATORY RESULT (OUTPATIENT)
Age: 88
End: 2023-01-01

## 2023-01-01 ENCOUNTER — APPOINTMENT (OUTPATIENT)
Dept: ORTHOPEDIC SURGERY | Facility: CLINIC | Age: 88
End: 2023-01-01
Payer: MEDICARE

## 2023-01-01 ENCOUNTER — APPOINTMENT (OUTPATIENT)
Dept: CARDIOLOGY | Facility: CLINIC | Age: 88
End: 2023-01-01
Payer: MEDICARE

## 2023-01-01 ENCOUNTER — RX RENEWAL (OUTPATIENT)
Age: 88
End: 2023-01-01

## 2023-01-01 ENCOUNTER — INPATIENT (INPATIENT)
Facility: HOSPITAL | Age: 88
LOS: 18 days | Discharge: NURSING, REHAB OR LONG-TERM CARE | DRG: 604 | End: 2023-09-13
Attending: HOSPITALIST | Admitting: SURGERY
Payer: MEDICARE

## 2023-01-01 ENCOUNTER — APPOINTMENT (OUTPATIENT)
Dept: VASCULAR SURGERY | Facility: CLINIC | Age: 88
End: 2023-01-01
Payer: MEDICARE

## 2023-01-01 ENCOUNTER — APPOINTMENT (OUTPATIENT)
Dept: INTERNAL MEDICINE | Facility: CLINIC | Age: 88
End: 2023-01-01
Payer: MEDICARE

## 2023-01-01 ENCOUNTER — APPOINTMENT (OUTPATIENT)
Dept: CARDIOTHORACIC SURGERY | Facility: CLINIC | Age: 88
End: 2023-01-01
Payer: MEDICARE

## 2023-01-01 ENCOUNTER — APPOINTMENT (OUTPATIENT)
Dept: ORTHOPEDIC SURGERY | Facility: CLINIC | Age: 88
End: 2023-01-01

## 2023-01-01 ENCOUNTER — TRANSCRIPTION ENCOUNTER (OUTPATIENT)
Age: 88
End: 2023-01-01

## 2023-01-01 ENCOUNTER — APPOINTMENT (OUTPATIENT)
Dept: CARDIOTHORACIC SURGERY | Facility: CLINIC | Age: 88
End: 2023-01-01

## 2023-01-01 ENCOUNTER — NON-APPOINTMENT (OUTPATIENT)
Age: 88
End: 2023-01-01

## 2023-01-01 VITALS — DIASTOLIC BLOOD PRESSURE: 77 MMHG | SYSTOLIC BLOOD PRESSURE: 128 MMHG

## 2023-01-01 VITALS — SYSTOLIC BLOOD PRESSURE: 113 MMHG | HEART RATE: 81 BPM | DIASTOLIC BLOOD PRESSURE: 69 MMHG

## 2023-01-01 VITALS
RESPIRATION RATE: 16 BRPM | SYSTOLIC BLOOD PRESSURE: 125 MMHG | DIASTOLIC BLOOD PRESSURE: 70 MMHG | BODY MASS INDEX: 23.28 KG/M2 | HEART RATE: 81 BPM | WEIGHT: 123.31 LBS | HEIGHT: 61 IN | TEMPERATURE: 98.1 F | OXYGEN SATURATION: 95 %

## 2023-01-01 VITALS — WEIGHT: 123.46 LBS

## 2023-01-01 VITALS
BODY MASS INDEX: 23.43 KG/M2 | WEIGHT: 124 LBS | DIASTOLIC BLOOD PRESSURE: 62 MMHG | HEART RATE: 76 BPM | OXYGEN SATURATION: 96 % | SYSTOLIC BLOOD PRESSURE: 118 MMHG

## 2023-01-01 DIAGNOSIS — G93.41 METABOLIC ENCEPHALOPATHY: ICD-10-CM

## 2023-01-01 DIAGNOSIS — E43 UNSPECIFIED SEVERE PROTEIN-CALORIE MALNUTRITION: ICD-10-CM

## 2023-01-01 DIAGNOSIS — T14.8XXA OTHER INJURY OF UNSPECIFIED BODY REGION, INITIAL ENCOUNTER: ICD-10-CM

## 2023-01-01 DIAGNOSIS — I35.0 NONRHEUMATIC AORTIC (VALVE) STENOSIS: ICD-10-CM

## 2023-01-01 DIAGNOSIS — I50.32 CHRONIC DIASTOLIC (CONGESTIVE) HEART FAILURE: ICD-10-CM

## 2023-01-01 DIAGNOSIS — M10.9 GOUT, UNSPECIFIED: ICD-10-CM

## 2023-01-01 DIAGNOSIS — Z66 DO NOT RESUSCITATE: ICD-10-CM

## 2023-01-01 DIAGNOSIS — Z79.890 HORMONE REPLACEMENT THERAPY: ICD-10-CM

## 2023-01-01 DIAGNOSIS — K59.00 CONSTIPATION, UNSPECIFIED: ICD-10-CM

## 2023-01-01 DIAGNOSIS — Z86.19 PERSONAL HISTORY OF OTHER INFECTIOUS AND PARASITIC DISEASES: ICD-10-CM

## 2023-01-01 DIAGNOSIS — Z91.81 HISTORY OF FALLING: ICD-10-CM

## 2023-01-01 DIAGNOSIS — I45.2 BIFASCICULAR BLOCK: ICD-10-CM

## 2023-01-01 DIAGNOSIS — M19.012 PRIMARY OSTEOARTHRITIS, LEFT SHOULDER: ICD-10-CM

## 2023-01-01 DIAGNOSIS — Z99.81 DEPENDENCE ON SUPPLEMENTAL OXYGEN: ICD-10-CM

## 2023-01-01 DIAGNOSIS — I48.20 CHRONIC ATRIAL FIBRILLATION, UNSPECIFIED: ICD-10-CM

## 2023-01-01 DIAGNOSIS — Z95.0 PRESENCE OF CARDIAC PACEMAKER: ICD-10-CM

## 2023-01-01 DIAGNOSIS — Z86.39 PERSONAL HISTORY OF OTHER ENDOCRINE, NUTRITIONAL AND METABOLIC DISEASE: ICD-10-CM

## 2023-01-01 DIAGNOSIS — M85.80 OTHER SPECIFIED DISORDERS OF BONE DENSITY AND STRUCTURE, UNSPECIFIED SITE: ICD-10-CM

## 2023-01-01 DIAGNOSIS — I50.23 ACUTE ON CHRONIC SYSTOLIC (CONGESTIVE) HEART FAILURE: ICD-10-CM

## 2023-01-01 DIAGNOSIS — I95.2 HYPOTENSION DUE TO DRUGS: ICD-10-CM

## 2023-01-01 DIAGNOSIS — R07.9 CHEST PAIN, UNSPECIFIED: ICD-10-CM

## 2023-01-01 DIAGNOSIS — E03.9 HYPOTHYROIDISM, UNSPECIFIED: ICD-10-CM

## 2023-01-01 DIAGNOSIS — I82.611 ACUTE EMBOLISM AND THROMBOSIS OF SUPERFICIAL VEINS OF RIGHT UPPER EXTREMITY: ICD-10-CM

## 2023-01-01 DIAGNOSIS — R53.81 OTHER MALAISE: ICD-10-CM

## 2023-01-01 DIAGNOSIS — T44.7X5A ADVERSE EFFECT OF BETA-ADRENORECEPTOR ANTAGONISTS, INITIAL ENCOUNTER: ICD-10-CM

## 2023-01-01 DIAGNOSIS — I07.1 RHEUMATIC TRICUSPID INSUFFICIENCY: ICD-10-CM

## 2023-01-01 DIAGNOSIS — I25.2 OLD MYOCARDIAL INFARCTION: ICD-10-CM

## 2023-01-01 DIAGNOSIS — J44.9 CHRONIC OBSTRUCTIVE PULMONARY DISEASE, UNSPECIFIED: ICD-10-CM

## 2023-01-01 DIAGNOSIS — D62 ACUTE POSTHEMORRHAGIC ANEMIA: ICD-10-CM

## 2023-01-01 DIAGNOSIS — E11.9 TYPE 2 DIABETES MELLITUS W/OUT COMPLICATIONS: ICD-10-CM

## 2023-01-01 DIAGNOSIS — I27.20 PULMONARY HYPERTENSION, UNSPECIFIED: ICD-10-CM

## 2023-01-01 DIAGNOSIS — R73.03 PREDIABETES.: ICD-10-CM

## 2023-01-01 DIAGNOSIS — I36.1 NONRHEUMATIC TRICUSPID (VALVE) INSUFFICIENCY: ICD-10-CM

## 2023-01-01 DIAGNOSIS — T82.868A THROMBOSIS DUE TO VASCULAR PROSTHETIC DEVICES, IMPLANTS AND GRAFTS, INITIAL ENCOUNTER: ICD-10-CM

## 2023-01-01 DIAGNOSIS — I25.10 ATHEROSCLEROTIC HEART DISEASE OF NATIVE CORONARY ARTERY WITHOUT ANGINA PECTORIS: ICD-10-CM

## 2023-01-01 DIAGNOSIS — S80.12XA CONTUSION OF LEFT LOWER LEG, INITIAL ENCOUNTER: ICD-10-CM
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
## 2023-01-01 DIAGNOSIS — I34.0 NONRHEUMATIC MITRAL (VALVE) INSUFFICIENCY: ICD-10-CM

## 2023-01-01 DIAGNOSIS — E87.70 FLUID OVERLOAD, UNSPECIFIED: ICD-10-CM

## 2023-01-01 DIAGNOSIS — I35.1 NONRHEUMATIC AORTIC (VALVE) INSUFFICIENCY: ICD-10-CM

## 2023-01-01 DIAGNOSIS — Z95.0 PRESENCE OF CARDIAC PACEMAKER: Chronic | ICD-10-CM

## 2023-01-01 DIAGNOSIS — Z96.659 PRESENCE OF UNSPECIFIED ARTIFICIAL KNEE JOINT: Chronic | ICD-10-CM

## 2023-01-01 DIAGNOSIS — S80.10XA CONTUSION OF UNSPECIFIED LOWER LEG, INITIAL ENCOUNTER: ICD-10-CM
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
## 2023-01-01 DIAGNOSIS — M19.90 UNSPECIFIED OSTEOARTHRITIS, UNSPECIFIED SITE: ICD-10-CM
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
## 2023-01-01 DIAGNOSIS — Z82.61 FAMILY HISTORY OF ARTHRITIS: ICD-10-CM

## 2023-01-01 DIAGNOSIS — I49.3 VENTRICULAR PREMATURE DEPOLARIZATION: ICD-10-CM

## 2023-01-01 DIAGNOSIS — R60.0 LOCALIZED EDEMA: ICD-10-CM

## 2023-01-01 DIAGNOSIS — J96.01 ACUTE RESPIRATORY FAILURE WITH HYPOXIA: ICD-10-CM

## 2023-01-01 DIAGNOSIS — I08.8 OTHER RHEUMATIC MULTIPLE VALVE DISEASES: ICD-10-CM

## 2023-01-01 DIAGNOSIS — R73.03 PREDIABETES: ICD-10-CM

## 2023-01-01 DIAGNOSIS — S30.1XXA CONTUSION OF ABDOMINAL WALL, INITIAL ENCOUNTER: ICD-10-CM

## 2023-01-01 DIAGNOSIS — Z88.5 ALLERGY STATUS TO NARCOTIC AGENT: ICD-10-CM

## 2023-01-01 DIAGNOSIS — I25.10 ATHEROSCLEROTIC HEART DISEASE OF NATIVE CORONARY ARTERY W/OUT ANGINA PECTORIS: ICD-10-CM

## 2023-01-01 DIAGNOSIS — Z86.2 PERSONAL HISTORY OF DISEASES OF THE BLOOD AND BLOOD-FORMING ORGANS AND CERTAIN DISORDERS INVOLVING THE IMMUNE MECHANISM: ICD-10-CM

## 2023-01-01 DIAGNOSIS — R33.9 RETENTION OF URINE, UNSPECIFIED: ICD-10-CM

## 2023-01-01 DIAGNOSIS — I87.2 VENOUS INSUFFICIENCY (CHRONIC) (PERIPHERAL): ICD-10-CM

## 2023-01-01 LAB
A1C WITH ESTIMATED AVERAGE GLUCOSE RESULT: 6.6 % — HIGH (ref 4–5.6)
ADD ON TEST-SPECIMEN IN LAB: SIGNIFICANT CHANGE UP
ADD ON TEST-SPECIMEN IN LAB: SIGNIFICANT CHANGE UP
ALBUMIN SERPL ELPH-MCNC: 3.3 G/DL — SIGNIFICANT CHANGE UP (ref 3.3–5)
ALP SERPL-CCNC: 91 U/L — SIGNIFICANT CHANGE UP (ref 40–120)
ALT FLD-CCNC: 20 U/L — SIGNIFICANT CHANGE UP (ref 12–78)
ANION GAP SERPL CALC-SCNC: 0 MMOL/L — LOW (ref 5–17)
ANION GAP SERPL CALC-SCNC: 1 MMOL/L — LOW (ref 5–17)
ANION GAP SERPL CALC-SCNC: 2 MMOL/L — LOW (ref 5–17)
ANION GAP SERPL CALC-SCNC: 2 MMOL/L — LOW (ref 5–17)
ANION GAP SERPL CALC-SCNC: 3 MMOL/L — LOW (ref 5–17)
ANION GAP SERPL CALC-SCNC: 4 MMOL/L — LOW (ref 5–17)
ANION GAP SERPL CALC-SCNC: 5 MMOL/L — SIGNIFICANT CHANGE UP (ref 5–17)
ANION GAP SERPL CALC-SCNC: 5 MMOL/L — SIGNIFICANT CHANGE UP (ref 5–17)
ANION GAP SERPL CALC-SCNC: 7 MMOL/L — SIGNIFICANT CHANGE UP (ref 5–17)
ANISOCYTOSIS BLD QL: SLIGHT — SIGNIFICANT CHANGE UP
APPEARANCE UR: CLEAR — SIGNIFICANT CHANGE UP
APTT BLD: 33.4 SEC — SIGNIFICANT CHANGE UP (ref 24.5–35.6)
APTT BLD: 36.2 SEC — HIGH (ref 24.5–35.6)
APTT BLD: 43 SEC — HIGH (ref 24.5–35.6)
APTT BLD: 45.1 SEC — HIGH (ref 24.5–35.6)
AST SERPL-CCNC: 24 U/L — SIGNIFICANT CHANGE UP (ref 15–37)
BASOPHILS # BLD AUTO: 0 K/UL — SIGNIFICANT CHANGE UP (ref 0–0.2)
BASOPHILS # BLD AUTO: 0.02 K/UL — SIGNIFICANT CHANGE UP (ref 0–0.2)
BASOPHILS # BLD AUTO: 0.03 K/UL — SIGNIFICANT CHANGE UP (ref 0–0.2)
BASOPHILS # BLD AUTO: 0.04 K/UL — SIGNIFICANT CHANGE UP (ref 0–0.2)
BASOPHILS # BLD AUTO: 0.05 K/UL — SIGNIFICANT CHANGE UP (ref 0–0.2)
BASOPHILS NFR BLD AUTO: 0 % — SIGNIFICANT CHANGE UP (ref 0–2)
BASOPHILS NFR BLD AUTO: 0.3 % — SIGNIFICANT CHANGE UP (ref 0–2)
BASOPHILS NFR BLD AUTO: 0.4 % — SIGNIFICANT CHANGE UP (ref 0–2)
BASOPHILS NFR BLD AUTO: 0.5 % — SIGNIFICANT CHANGE UP (ref 0–2)
BASOPHILS NFR BLD AUTO: 0.6 % — SIGNIFICANT CHANGE UP (ref 0–2)
BASOPHILS NFR BLD AUTO: 0.6 % — SIGNIFICANT CHANGE UP (ref 0–2)
BASOPHILS NFR BLD AUTO: 0.7 % — SIGNIFICANT CHANGE UP (ref 0–2)
BILIRUB SERPL-MCNC: 0.9 MG/DL — SIGNIFICANT CHANGE UP (ref 0.2–1.2)
BILIRUB UR-MCNC: NEGATIVE — SIGNIFICANT CHANGE UP
BLD GP AB SCN SERPL QL: SIGNIFICANT CHANGE UP
BUN SERPL-MCNC: 22 MG/DL — SIGNIFICANT CHANGE UP (ref 7–23)
BUN SERPL-MCNC: 24 MG/DL — HIGH (ref 7–23)
BUN SERPL-MCNC: 24 MG/DL — HIGH (ref 7–23)
BUN SERPL-MCNC: 25 MG/DL — HIGH (ref 7–23)
BUN SERPL-MCNC: 25 MG/DL — HIGH (ref 7–23)
BUN SERPL-MCNC: 26 MG/DL — HIGH (ref 7–23)
BUN SERPL-MCNC: 28 MG/DL — HIGH (ref 7–23)
BUN SERPL-MCNC: 29 MG/DL — HIGH (ref 7–23)
BUN SERPL-MCNC: 34 MG/DL — HIGH (ref 7–23)
BUN SERPL-MCNC: 34 MG/DL — HIGH (ref 7–23)
BUN SERPL-MCNC: 36 MG/DL — HIGH (ref 7–23)
BUN SERPL-MCNC: 39 MG/DL — HIGH (ref 7–23)
BUN SERPL-MCNC: 41 MG/DL — HIGH (ref 7–23)
BUN SERPL-MCNC: 44 MG/DL — HIGH (ref 7–23)
BUN SERPL-MCNC: 49 MG/DL — HIGH (ref 7–23)
BUN SERPL-MCNC: 50 MG/DL — HIGH (ref 7–23)
CALCIUM SERPL-MCNC: 7.9 MG/DL — LOW (ref 8.5–10.1)
CALCIUM SERPL-MCNC: 8.3 MG/DL — LOW (ref 8.5–10.1)
CALCIUM SERPL-MCNC: 8.3 MG/DL — LOW (ref 8.5–10.1)
CALCIUM SERPL-MCNC: 8.4 MG/DL — LOW (ref 8.5–10.1)
CALCIUM SERPL-MCNC: 8.5 MG/DL — SIGNIFICANT CHANGE UP (ref 8.5–10.1)
CALCIUM SERPL-MCNC: 8.6 MG/DL — SIGNIFICANT CHANGE UP (ref 8.5–10.1)
CALCIUM SERPL-MCNC: 8.7 MG/DL — SIGNIFICANT CHANGE UP (ref 8.5–10.1)
CALCIUM SERPL-MCNC: 8.8 MG/DL — SIGNIFICANT CHANGE UP (ref 8.5–10.1)
CALCIUM SERPL-MCNC: 8.9 MG/DL — SIGNIFICANT CHANGE UP (ref 8.5–10.1)
CALCIUM SERPL-MCNC: 9.2 MG/DL — SIGNIFICANT CHANGE UP (ref 8.5–10.1)
CHLORIDE SERPL-SCNC: 100 MMOL/L — SIGNIFICANT CHANGE UP (ref 96–108)
CHLORIDE SERPL-SCNC: 101 MMOL/L — SIGNIFICANT CHANGE UP (ref 96–108)
CHLORIDE SERPL-SCNC: 102 MMOL/L — SIGNIFICANT CHANGE UP (ref 96–108)
CHLORIDE SERPL-SCNC: 102 MMOL/L — SIGNIFICANT CHANGE UP (ref 96–108)
CHLORIDE SERPL-SCNC: 103 MMOL/L — SIGNIFICANT CHANGE UP (ref 96–108)
CHLORIDE SERPL-SCNC: 104 MMOL/L — SIGNIFICANT CHANGE UP (ref 96–108)
CHLORIDE SERPL-SCNC: 105 MMOL/L — SIGNIFICANT CHANGE UP (ref 96–108)
CHLORIDE SERPL-SCNC: 105 MMOL/L — SIGNIFICANT CHANGE UP (ref 96–108)
CHLORIDE SERPL-SCNC: 91 MMOL/L — LOW (ref 96–108)
CHLORIDE SERPL-SCNC: 91 MMOL/L — LOW (ref 96–108)
CHLORIDE SERPL-SCNC: 92 MMOL/L — LOW (ref 96–108)
CHLORIDE SERPL-SCNC: 92 MMOL/L — LOW (ref 96–108)
CHLORIDE SERPL-SCNC: 94 MMOL/L — LOW (ref 96–108)
CHLORIDE SERPL-SCNC: 96 MMOL/L — SIGNIFICANT CHANGE UP (ref 96–108)
CHLORIDE SERPL-SCNC: 96 MMOL/L — SIGNIFICANT CHANGE UP (ref 96–108)
CHLORIDE SERPL-SCNC: 98 MMOL/L — SIGNIFICANT CHANGE UP (ref 96–108)
CHLORIDE SERPL-SCNC: 99 MMOL/L — SIGNIFICANT CHANGE UP (ref 96–108)
CHLORIDE SERPL-SCNC: 99 MMOL/L — SIGNIFICANT CHANGE UP (ref 96–108)
CHOLEST SERPL-MCNC: 116 MG/DL — SIGNIFICANT CHANGE UP
CK SERPL-CCNC: 54 U/L — SIGNIFICANT CHANGE UP (ref 26–192)
CO2 SERPL-SCNC: 27 MMOL/L — SIGNIFICANT CHANGE UP (ref 22–31)
CO2 SERPL-SCNC: 28 MMOL/L — SIGNIFICANT CHANGE UP (ref 22–31)
CO2 SERPL-SCNC: 30 MMOL/L — SIGNIFICANT CHANGE UP (ref 22–31)
CO2 SERPL-SCNC: 32 MMOL/L — HIGH (ref 22–31)
CO2 SERPL-SCNC: 34 MMOL/L — HIGH (ref 22–31)
CO2 SERPL-SCNC: 34 MMOL/L — HIGH (ref 22–31)
CO2 SERPL-SCNC: 36 MMOL/L — HIGH (ref 22–31)
CO2 SERPL-SCNC: 36 MMOL/L — HIGH (ref 22–31)
CO2 SERPL-SCNC: 37 MMOL/L — HIGH (ref 22–31)
CO2 SERPL-SCNC: 38 MMOL/L — HIGH (ref 22–31)
CO2 SERPL-SCNC: 39 MMOL/L — HIGH (ref 22–31)
CO2 SERPL-SCNC: 39 MMOL/L — HIGH (ref 22–31)
CO2 SERPL-SCNC: 40 MMOL/L — HIGH (ref 22–31)
CO2 SERPL-SCNC: 41 MMOL/L — HIGH (ref 22–31)
CO2 SERPL-SCNC: 41 MMOL/L — HIGH (ref 22–31)
CO2 SERPL-SCNC: 42 MMOL/L — HIGH (ref 22–31)
COLOR SPEC: YELLOW — SIGNIFICANT CHANGE UP
CREAT SERPL-MCNC: 0.83 MG/DL — SIGNIFICANT CHANGE UP (ref 0.5–1.3)
CREAT SERPL-MCNC: 0.83 MG/DL — SIGNIFICANT CHANGE UP (ref 0.5–1.3)
CREAT SERPL-MCNC: 0.85 MG/DL — SIGNIFICANT CHANGE UP (ref 0.5–1.3)
CREAT SERPL-MCNC: 0.85 MG/DL — SIGNIFICANT CHANGE UP (ref 0.5–1.3)
CREAT SERPL-MCNC: 0.86 MG/DL — SIGNIFICANT CHANGE UP (ref 0.5–1.3)
CREAT SERPL-MCNC: 0.87 MG/DL — SIGNIFICANT CHANGE UP (ref 0.5–1.3)
CREAT SERPL-MCNC: 0.88 MG/DL — SIGNIFICANT CHANGE UP (ref 0.5–1.3)
CREAT SERPL-MCNC: 0.91 MG/DL — SIGNIFICANT CHANGE UP (ref 0.5–1.3)
CREAT SERPL-MCNC: 0.92 MG/DL — SIGNIFICANT CHANGE UP (ref 0.5–1.3)
CREAT SERPL-MCNC: 0.96 MG/DL — SIGNIFICANT CHANGE UP (ref 0.5–1.3)
CREAT SERPL-MCNC: 0.98 MG/DL — SIGNIFICANT CHANGE UP (ref 0.5–1.3)
CREAT SERPL-MCNC: 1.02 MG/DL — SIGNIFICANT CHANGE UP (ref 0.5–1.3)
CREAT SERPL-MCNC: 1.03 MG/DL — SIGNIFICANT CHANGE UP (ref 0.5–1.3)
CREAT SERPL-MCNC: 1.08 MG/DL — SIGNIFICANT CHANGE UP (ref 0.5–1.3)
CREAT SERPL-MCNC: 1.19 MG/DL — SIGNIFICANT CHANGE UP (ref 0.5–1.3)
CREAT SERPL-MCNC: 1.23 MG/DL — SIGNIFICANT CHANGE UP (ref 0.5–1.3)
CREAT SERPL-MCNC: 1.31 MG/DL — HIGH (ref 0.5–1.3)
CREAT SERPL-MCNC: 1.36 MG/DL — HIGH (ref 0.5–1.3)
DIFF PNL FLD: NEGATIVE — SIGNIFICANT CHANGE UP
EGFR: 36 ML/MIN/1.73M2 — LOW
EGFR: 38 ML/MIN/1.73M2 — LOW
EGFR: 40 ML/MIN/1.73M2 — LOW
EGFR: 42 ML/MIN/1.73M2 — LOW
EGFR: 47 ML/MIN/1.73M2 — LOW
EGFR: 50 ML/MIN/1.73M2 — LOW
EGFR: 51 ML/MIN/1.73M2 — LOW
EGFR: 53 ML/MIN/1.73M2 — LOW
EGFR: 54 ML/MIN/1.73M2 — LOW
EGFR: 57 ML/MIN/1.73M2 — LOW
EGFR: 58 ML/MIN/1.73M2 — LOW
EGFR: 60 ML/MIN/1.73M2 — SIGNIFICANT CHANGE UP
EGFR: 61 ML/MIN/1.73M2 — SIGNIFICANT CHANGE UP
EGFR: 62 ML/MIN/1.73M2 — SIGNIFICANT CHANGE UP
EGFR: 63 ML/MIN/1.73M2 — SIGNIFICANT CHANGE UP
EGFR: 63 ML/MIN/1.73M2 — SIGNIFICANT CHANGE UP
EGFR: 65 ML/MIN/1.73M2 — SIGNIFICANT CHANGE UP
EGFR: 65 ML/MIN/1.73M2 — SIGNIFICANT CHANGE UP
EOSINOPHIL # BLD AUTO: 0 K/UL — SIGNIFICANT CHANGE UP (ref 0–0.5)
EOSINOPHIL # BLD AUTO: 0.02 K/UL — SIGNIFICANT CHANGE UP (ref 0–0.5)
EOSINOPHIL # BLD AUTO: 0.04 K/UL — SIGNIFICANT CHANGE UP (ref 0–0.5)
EOSINOPHIL # BLD AUTO: 0.04 K/UL — SIGNIFICANT CHANGE UP (ref 0–0.5)
EOSINOPHIL # BLD AUTO: 0.06 K/UL — SIGNIFICANT CHANGE UP (ref 0–0.5)
EOSINOPHIL # BLD AUTO: 0.06 K/UL — SIGNIFICANT CHANGE UP (ref 0–0.5)
EOSINOPHIL # BLD AUTO: 0.09 K/UL — SIGNIFICANT CHANGE UP (ref 0–0.5)
EOSINOPHIL # BLD AUTO: 0.12 K/UL — SIGNIFICANT CHANGE UP (ref 0–0.5)
EOSINOPHIL # BLD AUTO: 0.12 K/UL — SIGNIFICANT CHANGE UP (ref 0–0.5)
EOSINOPHIL # BLD AUTO: 0.14 K/UL — SIGNIFICANT CHANGE UP (ref 0–0.5)
EOSINOPHIL NFR BLD AUTO: 0 % — SIGNIFICANT CHANGE UP (ref 0–6)
EOSINOPHIL NFR BLD AUTO: 0.2 % — SIGNIFICANT CHANGE UP (ref 0–6)
EOSINOPHIL NFR BLD AUTO: 0.6 % — SIGNIFICANT CHANGE UP (ref 0–6)
EOSINOPHIL NFR BLD AUTO: 0.7 % — SIGNIFICANT CHANGE UP (ref 0–6)
EOSINOPHIL NFR BLD AUTO: 1.1 % — SIGNIFICANT CHANGE UP (ref 0–6)
EOSINOPHIL NFR BLD AUTO: 1.4 % — SIGNIFICANT CHANGE UP (ref 0–6)
EOSINOPHIL NFR BLD AUTO: 1.6 % — SIGNIFICANT CHANGE UP (ref 0–6)
EOSINOPHIL NFR BLD AUTO: 1.6 % — SIGNIFICANT CHANGE UP (ref 0–6)
ESTIMATED AVERAGE GLUCOSE: 143 MG/DL — HIGH (ref 68–114)
GLUCOSE BLDC GLUCOMTR-MCNC: 102 MG/DL — HIGH (ref 70–99)
GLUCOSE BLDC GLUCOMTR-MCNC: 103 MG/DL — HIGH (ref 70–99)
GLUCOSE BLDC GLUCOMTR-MCNC: 103 MG/DL — HIGH (ref 70–99)
GLUCOSE BLDC GLUCOMTR-MCNC: 106 MG/DL — HIGH (ref 70–99)
GLUCOSE BLDC GLUCOMTR-MCNC: 111 MG/DL — HIGH (ref 70–99)
GLUCOSE BLDC GLUCOMTR-MCNC: 117 MG/DL — HIGH (ref 70–99)
GLUCOSE BLDC GLUCOMTR-MCNC: 119 MG/DL — HIGH (ref 70–99)
GLUCOSE BLDC GLUCOMTR-MCNC: 124 MG/DL — HIGH (ref 70–99)
GLUCOSE BLDC GLUCOMTR-MCNC: 129 MG/DL — HIGH (ref 70–99)
GLUCOSE BLDC GLUCOMTR-MCNC: 130 MG/DL — HIGH (ref 70–99)
GLUCOSE BLDC GLUCOMTR-MCNC: 132 MG/DL — HIGH (ref 70–99)
GLUCOSE BLDC GLUCOMTR-MCNC: 135 MG/DL — HIGH (ref 70–99)
GLUCOSE BLDC GLUCOMTR-MCNC: 139 MG/DL — HIGH (ref 70–99)
GLUCOSE BLDC GLUCOMTR-MCNC: 144 MG/DL — HIGH (ref 70–99)
GLUCOSE BLDC GLUCOMTR-MCNC: 157 MG/DL — HIGH (ref 70–99)
GLUCOSE BLDC GLUCOMTR-MCNC: 159 MG/DL — HIGH (ref 70–99)
GLUCOSE BLDC GLUCOMTR-MCNC: 166 MG/DL — HIGH (ref 70–99)
GLUCOSE BLDC GLUCOMTR-MCNC: 170 MG/DL — HIGH (ref 70–99)
GLUCOSE BLDC GLUCOMTR-MCNC: 179 MG/DL — HIGH (ref 70–99)
GLUCOSE BLDC GLUCOMTR-MCNC: 184 MG/DL — HIGH (ref 70–99)
GLUCOSE BLDC GLUCOMTR-MCNC: 188 MG/DL — HIGH (ref 70–99)
GLUCOSE BLDC GLUCOMTR-MCNC: 189 MG/DL — HIGH (ref 70–99)
GLUCOSE BLDC GLUCOMTR-MCNC: 206 MG/DL — HIGH (ref 70–99)
GLUCOSE BLDC GLUCOMTR-MCNC: 207 MG/DL — HIGH (ref 70–99)
GLUCOSE BLDC GLUCOMTR-MCNC: 244 MG/DL — HIGH (ref 70–99)
GLUCOSE BLDC GLUCOMTR-MCNC: 91 MG/DL — SIGNIFICANT CHANGE UP (ref 70–99)
GLUCOSE SERPL-MCNC: 103 MG/DL — HIGH (ref 70–99)
GLUCOSE SERPL-MCNC: 108 MG/DL — HIGH (ref 70–99)
GLUCOSE SERPL-MCNC: 109 MG/DL — HIGH (ref 70–99)
GLUCOSE SERPL-MCNC: 112 MG/DL — HIGH (ref 70–99)
GLUCOSE SERPL-MCNC: 113 MG/DL — HIGH (ref 70–99)
GLUCOSE SERPL-MCNC: 113 MG/DL — HIGH (ref 70–99)
GLUCOSE SERPL-MCNC: 114 MG/DL — HIGH (ref 70–99)
GLUCOSE SERPL-MCNC: 116 MG/DL — HIGH (ref 70–99)
GLUCOSE SERPL-MCNC: 117 MG/DL — HIGH (ref 70–99)
GLUCOSE SERPL-MCNC: 118 MG/DL — HIGH (ref 70–99)
GLUCOSE SERPL-MCNC: 120 MG/DL — HIGH (ref 70–99)
GLUCOSE SERPL-MCNC: 120 MG/DL — HIGH (ref 70–99)
GLUCOSE SERPL-MCNC: 121 MG/DL — HIGH (ref 70–99)
GLUCOSE SERPL-MCNC: 122 MG/DL — HIGH (ref 70–99)
GLUCOSE SERPL-MCNC: 126 MG/DL — HIGH (ref 70–99)
GLUCOSE SERPL-MCNC: 132 MG/DL — HIGH (ref 70–99)
GLUCOSE UR QL: NEGATIVE — SIGNIFICANT CHANGE UP
HBA1C MFR BLD HPLC: 6.7
HCT VFR BLD CALC: 30.7 % — LOW (ref 34.5–45)
HCT VFR BLD CALC: 30.9 % — LOW (ref 34.5–45)
HCT VFR BLD CALC: 31.1 % — LOW (ref 34.5–45)
HCT VFR BLD CALC: 31.3 % — LOW (ref 34.5–45)
HCT VFR BLD CALC: 31.6 % — LOW (ref 34.5–45)
HCT VFR BLD CALC: 31.9 % — LOW (ref 34.5–45)
HCT VFR BLD CALC: 32 % — LOW (ref 34.5–45)
HCT VFR BLD CALC: 32.3 % — LOW (ref 34.5–45)
HCT VFR BLD CALC: 32.4 % — LOW (ref 34.5–45)
HCT VFR BLD CALC: 32.5 % — LOW (ref 34.5–45)
HCT VFR BLD CALC: 32.6 % — LOW (ref 34.5–45)
HCT VFR BLD CALC: 32.7 % — LOW (ref 34.5–45)
HCT VFR BLD CALC: 32.7 % — LOW (ref 34.5–45)
HCT VFR BLD CALC: 32.9 % — LOW (ref 34.5–45)
HCT VFR BLD CALC: 32.9 % — LOW (ref 34.5–45)
HCT VFR BLD CALC: 33 % — LOW (ref 34.5–45)
HCT VFR BLD CALC: 33.8 % — LOW (ref 34.5–45)
HCT VFR BLD CALC: 33.9 % — LOW (ref 34.5–45)
HDLC SERPL-MCNC: 50 MG/DL — LOW
HGB BLD-MCNC: 10 G/DL — LOW (ref 11.5–15.5)
HGB BLD-MCNC: 10.1 G/DL — LOW (ref 11.5–15.5)
HGB BLD-MCNC: 10.2 G/DL — LOW (ref 11.5–15.5)
HGB BLD-MCNC: 10.4 G/DL — LOW (ref 11.5–15.5)
HGB BLD-MCNC: 10.5 G/DL — LOW (ref 11.5–15.5)
HGB BLD-MCNC: 10.5 G/DL — LOW (ref 11.5–15.5)
HGB BLD-MCNC: 10.6 G/DL — LOW (ref 11.5–15.5)
HGB BLD-MCNC: 10.6 G/DL — LOW (ref 11.5–15.5)
HGB BLD-MCNC: 10.7 G/DL — LOW (ref 11.5–15.5)
HGB BLD-MCNC: 10.8 G/DL — LOW (ref 11.5–15.5)
HGB BLD-MCNC: 10.8 G/DL — LOW (ref 11.5–15.5)
HGB BLD-MCNC: 11.3 G/DL — LOW (ref 11.5–15.5)
HGB BLD-MCNC: 9.9 G/DL — LOW (ref 11.5–15.5)
IMM GRANULOCYTES NFR BLD AUTO: 0.1 % — SIGNIFICANT CHANGE UP (ref 0–0.9)
IMM GRANULOCYTES NFR BLD AUTO: 0.3 % — SIGNIFICANT CHANGE UP (ref 0–0.9)
IMM GRANULOCYTES NFR BLD AUTO: 0.4 % — SIGNIFICANT CHANGE UP (ref 0–0.9)
IMM GRANULOCYTES NFR BLD AUTO: 0.4 % — SIGNIFICANT CHANGE UP (ref 0–0.9)
IMM GRANULOCYTES NFR BLD AUTO: 0.5 % — SIGNIFICANT CHANGE UP (ref 0–0.9)
IMM GRANULOCYTES NFR BLD AUTO: 0.5 % — SIGNIFICANT CHANGE UP (ref 0–0.9)
IMM GRANULOCYTES NFR BLD AUTO: 0.6 % — SIGNIFICANT CHANGE UP (ref 0–0.9)
INR BLD: 1.05 RATIO — SIGNIFICANT CHANGE UP (ref 0.85–1.18)
INR BLD: 1.18 RATIO — SIGNIFICANT CHANGE UP (ref 0.85–1.18)
INR BLD: 1.76 RATIO — HIGH (ref 0.85–1.18)
INR BLD: 2.28 RATIO — HIGH (ref 0.85–1.18)
KETONES UR-MCNC: NEGATIVE — SIGNIFICANT CHANGE UP
LACTATE SERPL-SCNC: 1.8 MMOL/L — SIGNIFICANT CHANGE UP (ref 0.7–2)
LEUKOCYTE ESTERASE UR-ACNC: ABNORMAL
LIPID PNL WITH DIRECT LDL SERPL: 53 MG/DL — SIGNIFICANT CHANGE UP
LYMPHOCYTES # BLD AUTO: 0.59 K/UL — LOW (ref 1–3.3)
LYMPHOCYTES # BLD AUTO: 0.73 K/UL — LOW (ref 1–3.3)
LYMPHOCYTES # BLD AUTO: 0.88 K/UL — LOW (ref 1–3.3)
LYMPHOCYTES # BLD AUTO: 1.05 K/UL — SIGNIFICANT CHANGE UP (ref 1–3.3)
LYMPHOCYTES # BLD AUTO: 1.19 K/UL — SIGNIFICANT CHANGE UP (ref 1–3.3)
LYMPHOCYTES # BLD AUTO: 1.22 K/UL — SIGNIFICANT CHANGE UP (ref 1–3.3)
LYMPHOCYTES # BLD AUTO: 1.25 K/UL — SIGNIFICANT CHANGE UP (ref 1–3.3)
LYMPHOCYTES # BLD AUTO: 1.27 K/UL — SIGNIFICANT CHANGE UP (ref 1–3.3)
LYMPHOCYTES # BLD AUTO: 1.28 K/UL — SIGNIFICANT CHANGE UP (ref 1–3.3)
LYMPHOCYTES # BLD AUTO: 1.36 K/UL — SIGNIFICANT CHANGE UP (ref 1–3.3)
LYMPHOCYTES # BLD AUTO: 10.5 % — LOW (ref 13–44)
LYMPHOCYTES # BLD AUTO: 11.3 % — LOW (ref 13–44)
LYMPHOCYTES # BLD AUTO: 14.8 % — SIGNIFICANT CHANGE UP (ref 13–44)
LYMPHOCYTES # BLD AUTO: 16.2 % — SIGNIFICANT CHANGE UP (ref 13–44)
LYMPHOCYTES # BLD AUTO: 16.7 % — SIGNIFICANT CHANGE UP (ref 13–44)
LYMPHOCYTES # BLD AUTO: 16.8 % — SIGNIFICANT CHANGE UP (ref 13–44)
LYMPHOCYTES # BLD AUTO: 17.1 % — SIGNIFICANT CHANGE UP (ref 13–44)
LYMPHOCYTES # BLD AUTO: 18.2 % — SIGNIFICANT CHANGE UP (ref 13–44)
LYMPHOCYTES # BLD AUTO: 6 % — LOW (ref 13–44)
LYMPHOCYTES # BLD AUTO: 8.4 % — LOW (ref 13–44)
MACROCYTES BLD QL: SLIGHT — SIGNIFICANT CHANGE UP
MAGNESIUM SERPL-MCNC: 1.8 MG/DL — SIGNIFICANT CHANGE UP (ref 1.6–2.6)
MAGNESIUM SERPL-MCNC: 2 MG/DL — SIGNIFICANT CHANGE UP (ref 1.6–2.6)
MAGNESIUM SERPL-MCNC: 2.1 MG/DL — SIGNIFICANT CHANGE UP (ref 1.6–2.6)
MAGNESIUM SERPL-MCNC: 2.1 MG/DL — SIGNIFICANT CHANGE UP (ref 1.6–2.6)
MAGNESIUM SERPL-MCNC: 2.2 MG/DL — SIGNIFICANT CHANGE UP (ref 1.6–2.6)
MAGNESIUM SERPL-MCNC: 2.3 MG/DL — SIGNIFICANT CHANGE UP (ref 1.6–2.6)
MAGNESIUM SERPL-MCNC: 2.3 MG/DL — SIGNIFICANT CHANGE UP (ref 1.6–2.6)
MAGNESIUM SERPL-MCNC: 2.4 MG/DL — SIGNIFICANT CHANGE UP (ref 1.6–2.6)
MAGNESIUM SERPL-MCNC: 2.4 MG/DL — SIGNIFICANT CHANGE UP (ref 1.6–2.6)
MAGNESIUM SERPL-MCNC: 2.5 MG/DL — SIGNIFICANT CHANGE UP (ref 1.6–2.6)
MAGNESIUM SERPL-MCNC: 2.5 MG/DL — SIGNIFICANT CHANGE UP (ref 1.6–2.6)
MANUAL SMEAR VERIFICATION: SIGNIFICANT CHANGE UP
MCHC RBC-ENTMCNC: 31.6 GM/DL — LOW (ref 32–36)
MCHC RBC-ENTMCNC: 31.6 GM/DL — LOW (ref 32–36)
MCHC RBC-ENTMCNC: 31.8 GM/DL — LOW (ref 32–36)
MCHC RBC-ENTMCNC: 31.8 GM/DL — LOW (ref 32–36)
MCHC RBC-ENTMCNC: 31.9 GM/DL — LOW (ref 32–36)
MCHC RBC-ENTMCNC: 31.9 GM/DL — LOW (ref 32–36)
MCHC RBC-ENTMCNC: 32 GM/DL — SIGNIFICANT CHANGE UP (ref 32–36)
MCHC RBC-ENTMCNC: 32.2 GM/DL — SIGNIFICANT CHANGE UP (ref 32–36)
MCHC RBC-ENTMCNC: 32.5 GM/DL — SIGNIFICANT CHANGE UP (ref 32–36)
MCHC RBC-ENTMCNC: 32.6 GM/DL — SIGNIFICANT CHANGE UP (ref 32–36)
MCHC RBC-ENTMCNC: 32.7 GM/DL — SIGNIFICANT CHANGE UP (ref 32–36)
MCHC RBC-ENTMCNC: 32.7 GM/DL — SIGNIFICANT CHANGE UP (ref 32–36)
MCHC RBC-ENTMCNC: 32.9 GM/DL — SIGNIFICANT CHANGE UP (ref 32–36)
MCHC RBC-ENTMCNC: 33 GM/DL — SIGNIFICANT CHANGE UP (ref 32–36)
MCHC RBC-ENTMCNC: 33.1 GM/DL — SIGNIFICANT CHANGE UP (ref 32–36)
MCHC RBC-ENTMCNC: 33.3 GM/DL — SIGNIFICANT CHANGE UP (ref 32–36)
MCHC RBC-ENTMCNC: 33.6 PG — SIGNIFICANT CHANGE UP (ref 27–34)
MCHC RBC-ENTMCNC: 33.8 PG — SIGNIFICANT CHANGE UP (ref 27–34)
MCHC RBC-ENTMCNC: 33.9 PG — SIGNIFICANT CHANGE UP (ref 27–34)
MCHC RBC-ENTMCNC: 34.1 PG — HIGH (ref 27–34)
MCHC RBC-ENTMCNC: 34.1 PG — HIGH (ref 27–34)
MCHC RBC-ENTMCNC: 34.2 PG — HIGH (ref 27–34)
MCHC RBC-ENTMCNC: 34.3 PG — HIGH (ref 27–34)
MCHC RBC-ENTMCNC: 34.4 PG — HIGH (ref 27–34)
MCHC RBC-ENTMCNC: 34.4 PG — HIGH (ref 27–34)
MCHC RBC-ENTMCNC: 34.5 PG — HIGH (ref 27–34)
MCHC RBC-ENTMCNC: 34.5 PG — HIGH (ref 27–34)
MCHC RBC-ENTMCNC: 34.7 PG — HIGH (ref 27–34)
MCHC RBC-ENTMCNC: 34.8 PG — HIGH (ref 27–34)
MCHC RBC-ENTMCNC: 35.1 PG — HIGH (ref 27–34)
MCHC RBC-ENTMCNC: 35.2 PG — HIGH (ref 27–34)
MCV RBC AUTO: 104.4 FL — HIGH (ref 80–100)
MCV RBC AUTO: 104.5 FL — HIGH (ref 80–100)
MCV RBC AUTO: 104.5 FL — HIGH (ref 80–100)
MCV RBC AUTO: 104.6 FL — HIGH (ref 80–100)
MCV RBC AUTO: 105.6 FL — HIGH (ref 80–100)
MCV RBC AUTO: 105.8 FL — HIGH (ref 80–100)
MCV RBC AUTO: 105.9 FL — HIGH (ref 80–100)
MCV RBC AUTO: 106 FL — HIGH (ref 80–100)
MCV RBC AUTO: 106.1 FL — HIGH (ref 80–100)
MCV RBC AUTO: 106.5 FL — HIGH (ref 80–100)
MCV RBC AUTO: 106.6 FL — HIGH (ref 80–100)
MCV RBC AUTO: 106.8 FL — HIGH (ref 80–100)
MCV RBC AUTO: 107 FL — HIGH (ref 80–100)
MCV RBC AUTO: 107.2 FL — HIGH (ref 80–100)
MCV RBC AUTO: 107.6 FL — HIGH (ref 80–100)
MCV RBC AUTO: 107.8 FL — HIGH (ref 80–100)
MCV RBC AUTO: 108 FL — HIGH (ref 80–100)
MCV RBC AUTO: 108.3 FL — HIGH (ref 80–100)
MONOCYTES # BLD AUTO: 0.59 K/UL — SIGNIFICANT CHANGE UP (ref 0–0.9)
MONOCYTES # BLD AUTO: 0.69 K/UL — SIGNIFICANT CHANGE UP (ref 0–0.9)
MONOCYTES # BLD AUTO: 0.69 K/UL — SIGNIFICANT CHANGE UP (ref 0–0.9)
MONOCYTES # BLD AUTO: 0.78 K/UL — SIGNIFICANT CHANGE UP (ref 0–0.9)
MONOCYTES # BLD AUTO: 0.82 K/UL — SIGNIFICANT CHANGE UP (ref 0–0.9)
MONOCYTES # BLD AUTO: 0.82 K/UL — SIGNIFICANT CHANGE UP (ref 0–0.9)
MONOCYTES # BLD AUTO: 0.99 K/UL — HIGH (ref 0–0.9)
MONOCYTES # BLD AUTO: 1.01 K/UL — HIGH (ref 0–0.9)
MONOCYTES # BLD AUTO: 1.03 K/UL — HIGH (ref 0–0.9)
MONOCYTES # BLD AUTO: 1.03 K/UL — HIGH (ref 0–0.9)
MONOCYTES NFR BLD AUTO: 11.1 % — SIGNIFICANT CHANGE UP (ref 2–14)
MONOCYTES NFR BLD AUTO: 11.1 % — SIGNIFICANT CHANGE UP (ref 2–14)
MONOCYTES NFR BLD AUTO: 11.3 % — SIGNIFICANT CHANGE UP (ref 2–14)
MONOCYTES NFR BLD AUTO: 11.9 % — SIGNIFICANT CHANGE UP (ref 2–14)
MONOCYTES NFR BLD AUTO: 12 % — SIGNIFICANT CHANGE UP (ref 2–14)
MONOCYTES NFR BLD AUTO: 12.3 % — SIGNIFICANT CHANGE UP (ref 2–14)
MONOCYTES NFR BLD AUTO: 6 % — SIGNIFICANT CHANGE UP (ref 2–14)
MONOCYTES NFR BLD AUTO: 8.8 % — SIGNIFICANT CHANGE UP (ref 2–14)
MONOCYTES NFR BLD AUTO: 9.6 % — SIGNIFICANT CHANGE UP (ref 2–14)
MONOCYTES NFR BLD AUTO: 9.9 % — SIGNIFICANT CHANGE UP (ref 2–14)
NEUTROPHILS # BLD AUTO: 4.56 K/UL — SIGNIFICANT CHANGE UP (ref 1.8–7.4)
NEUTROPHILS # BLD AUTO: 4.97 K/UL — SIGNIFICANT CHANGE UP (ref 1.8–7.4)
NEUTROPHILS # BLD AUTO: 5.17 K/UL — SIGNIFICANT CHANGE UP (ref 1.8–7.4)
NEUTROPHILS # BLD AUTO: 5.72 K/UL — SIGNIFICANT CHANGE UP (ref 1.8–7.4)
NEUTROPHILS # BLD AUTO: 5.84 K/UL — SIGNIFICANT CHANGE UP (ref 1.8–7.4)
NEUTROPHILS # BLD AUTO: 6.14 K/UL — SIGNIFICANT CHANGE UP (ref 1.8–7.4)
NEUTROPHILS # BLD AUTO: 6.33 K/UL — SIGNIFICANT CHANGE UP (ref 1.8–7.4)
NEUTROPHILS # BLD AUTO: 6.94 K/UL — SIGNIFICANT CHANGE UP (ref 1.8–7.4)
NEUTROPHILS # BLD AUTO: 7.1 K/UL — SIGNIFICANT CHANGE UP (ref 1.8–7.4)
NEUTROPHILS # BLD AUTO: 8.68 K/UL — HIGH (ref 1.8–7.4)
NEUTROPHILS NFR BLD AUTO: 68.2 % — SIGNIFICANT CHANGE UP (ref 43–77)
NEUTROPHILS NFR BLD AUTO: 69.7 % — SIGNIFICANT CHANGE UP (ref 43–77)
NEUTROPHILS NFR BLD AUTO: 70.9 % — SIGNIFICANT CHANGE UP (ref 43–77)
NEUTROPHILS NFR BLD AUTO: 71.6 % — SIGNIFICANT CHANGE UP (ref 43–77)
NEUTROPHILS NFR BLD AUTO: 71.8 % — SIGNIFICANT CHANGE UP (ref 43–77)
NEUTROPHILS NFR BLD AUTO: 72.8 % — SIGNIFICANT CHANGE UP (ref 43–77)
NEUTROPHILS NFR BLD AUTO: 75.3 % — SIGNIFICANT CHANGE UP (ref 43–77)
NEUTROPHILS NFR BLD AUTO: 76.4 % — SIGNIFICANT CHANGE UP (ref 43–77)
NEUTROPHILS NFR BLD AUTO: 79.5 % — HIGH (ref 43–77)
NEUTROPHILS NFR BLD AUTO: 84 % — HIGH (ref 43–77)
NITRITE UR-MCNC: NEGATIVE — SIGNIFICANT CHANGE UP
NON HDL CHOLESTEROL: 66 MG/DL — SIGNIFICANT CHANGE UP
NRBC # BLD: SIGNIFICANT CHANGE UP /100 WBCS (ref 0–0)
NT-PROBNP SERPL-SCNC: 4586 PG/ML — HIGH (ref 0–450)
NT-PROBNP SERPL-SCNC: 4904 PG/ML — HIGH (ref 0–450)
NT-PROBNP SERPL-SCNC: 7825 PG/ML — HIGH (ref 0–450)
NT-PROBNP SERPL-SCNC: 8126 PG/ML — HIGH (ref 0–450)
PH UR: 5 — SIGNIFICANT CHANGE UP (ref 5–8)
PHOSPHATE SERPL-MCNC: 2.6 MG/DL — SIGNIFICANT CHANGE UP (ref 2.5–4.5)
PHOSPHATE SERPL-MCNC: 2.8 MG/DL — SIGNIFICANT CHANGE UP (ref 2.5–4.5)
PHOSPHATE SERPL-MCNC: 3 MG/DL — SIGNIFICANT CHANGE UP (ref 2.5–4.5)
PHOSPHATE SERPL-MCNC: 3.4 MG/DL — SIGNIFICANT CHANGE UP (ref 2.5–4.5)
PHOSPHATE SERPL-MCNC: 3.5 MG/DL — SIGNIFICANT CHANGE UP (ref 2.5–4.5)
PHOSPHATE SERPL-MCNC: 4.1 MG/DL — SIGNIFICANT CHANGE UP (ref 2.5–4.5)
PHOSPHATE SERPL-MCNC: 4.1 MG/DL — SIGNIFICANT CHANGE UP (ref 2.5–4.5)
PHOSPHATE SERPL-MCNC: 4.4 MG/DL — SIGNIFICANT CHANGE UP (ref 2.5–4.5)
PLAT MORPH BLD: NORMAL — SIGNIFICANT CHANGE UP
PLATELET # BLD AUTO: 124 K/UL — LOW (ref 150–400)
PLATELET # BLD AUTO: 125 K/UL — LOW (ref 150–400)
PLATELET # BLD AUTO: 128 K/UL — LOW (ref 150–400)
PLATELET # BLD AUTO: 131 K/UL — LOW (ref 150–400)
PLATELET # BLD AUTO: 135 K/UL — LOW (ref 150–400)
PLATELET # BLD AUTO: 138 K/UL — LOW (ref 150–400)
PLATELET # BLD AUTO: 144 K/UL — LOW (ref 150–400)
PLATELET # BLD AUTO: 145 K/UL — LOW (ref 150–400)
PLATELET # BLD AUTO: 161 K/UL — SIGNIFICANT CHANGE UP (ref 150–400)
PLATELET # BLD AUTO: 167 K/UL — SIGNIFICANT CHANGE UP (ref 150–400)
PLATELET # BLD AUTO: 173 K/UL — SIGNIFICANT CHANGE UP (ref 150–400)
PLATELET # BLD AUTO: 177 K/UL — SIGNIFICANT CHANGE UP (ref 150–400)
PLATELET # BLD AUTO: 180 K/UL — SIGNIFICANT CHANGE UP (ref 150–400)
PLATELET # BLD AUTO: 184 K/UL — SIGNIFICANT CHANGE UP (ref 150–400)
PLATELET # BLD AUTO: 187 K/UL — SIGNIFICANT CHANGE UP (ref 150–400)
PLATELET # BLD AUTO: 192 K/UL — SIGNIFICANT CHANGE UP (ref 150–400)
PLATELET # BLD AUTO: 216 K/UL — SIGNIFICANT CHANGE UP (ref 150–400)
PLATELET # BLD AUTO: 217 K/UL — SIGNIFICANT CHANGE UP (ref 150–400)
POTASSIUM SERPL-MCNC: 3.6 MMOL/L — SIGNIFICANT CHANGE UP (ref 3.5–5.3)
POTASSIUM SERPL-MCNC: 3.6 MMOL/L — SIGNIFICANT CHANGE UP (ref 3.5–5.3)
POTASSIUM SERPL-MCNC: 3.7 MMOL/L — SIGNIFICANT CHANGE UP (ref 3.5–5.3)
POTASSIUM SERPL-MCNC: 3.8 MMOL/L — SIGNIFICANT CHANGE UP (ref 3.5–5.3)
POTASSIUM SERPL-MCNC: 3.9 MMOL/L — SIGNIFICANT CHANGE UP (ref 3.5–5.3)
POTASSIUM SERPL-MCNC: 3.9 MMOL/L — SIGNIFICANT CHANGE UP (ref 3.5–5.3)
POTASSIUM SERPL-MCNC: 4.1 MMOL/L — SIGNIFICANT CHANGE UP (ref 3.5–5.3)
POTASSIUM SERPL-MCNC: 4.3 MMOL/L — SIGNIFICANT CHANGE UP (ref 3.5–5.3)
POTASSIUM SERPL-MCNC: 4.3 MMOL/L — SIGNIFICANT CHANGE UP (ref 3.5–5.3)
POTASSIUM SERPL-MCNC: 4.6 MMOL/L — SIGNIFICANT CHANGE UP (ref 3.5–5.3)
POTASSIUM SERPL-MCNC: 4.6 MMOL/L — SIGNIFICANT CHANGE UP (ref 3.5–5.3)
POTASSIUM SERPL-MCNC: 4.7 MMOL/L — SIGNIFICANT CHANGE UP (ref 3.5–5.3)
POTASSIUM SERPL-MCNC: 4.8 MMOL/L — SIGNIFICANT CHANGE UP (ref 3.5–5.3)
POTASSIUM SERPL-MCNC: 4.9 MMOL/L — SIGNIFICANT CHANGE UP (ref 3.5–5.3)
POTASSIUM SERPL-SCNC: 3.6 MMOL/L — SIGNIFICANT CHANGE UP (ref 3.5–5.3)
POTASSIUM SERPL-SCNC: 3.6 MMOL/L — SIGNIFICANT CHANGE UP (ref 3.5–5.3)
POTASSIUM SERPL-SCNC: 3.7 MMOL/L — SIGNIFICANT CHANGE UP (ref 3.5–5.3)
POTASSIUM SERPL-SCNC: 3.8 MMOL/L — SIGNIFICANT CHANGE UP (ref 3.5–5.3)
POTASSIUM SERPL-SCNC: 3.9 MMOL/L — SIGNIFICANT CHANGE UP (ref 3.5–5.3)
POTASSIUM SERPL-SCNC: 3.9 MMOL/L — SIGNIFICANT CHANGE UP (ref 3.5–5.3)
POTASSIUM SERPL-SCNC: 4.1 MMOL/L — SIGNIFICANT CHANGE UP (ref 3.5–5.3)
POTASSIUM SERPL-SCNC: 4.3 MMOL/L — SIGNIFICANT CHANGE UP (ref 3.5–5.3)
POTASSIUM SERPL-SCNC: 4.3 MMOL/L — SIGNIFICANT CHANGE UP (ref 3.5–5.3)
POTASSIUM SERPL-SCNC: 4.6 MMOL/L — SIGNIFICANT CHANGE UP (ref 3.5–5.3)
POTASSIUM SERPL-SCNC: 4.6 MMOL/L — SIGNIFICANT CHANGE UP (ref 3.5–5.3)
POTASSIUM SERPL-SCNC: 4.7 MMOL/L — SIGNIFICANT CHANGE UP (ref 3.5–5.3)
POTASSIUM SERPL-SCNC: 4.8 MMOL/L — SIGNIFICANT CHANGE UP (ref 3.5–5.3)
POTASSIUM SERPL-SCNC: 4.9 MMOL/L — SIGNIFICANT CHANGE UP (ref 3.5–5.3)
PROT SERPL-MCNC: 6.4 GM/DL — SIGNIFICANT CHANGE UP (ref 6–8.3)
PROT UR-MCNC: SIGNIFICANT CHANGE UP MG/DL
PROTHROM AB SERPL-ACNC: 11.8 SEC — SIGNIFICANT CHANGE UP (ref 9.5–13)
PROTHROM AB SERPL-ACNC: 13.3 SEC — HIGH (ref 9.5–13)
PROTHROM AB SERPL-ACNC: 19.6 SEC — HIGH (ref 9.5–13)
PROTHROM AB SERPL-ACNC: 25.1 SEC — HIGH (ref 9.5–13)
RBC # BLD: 2.9 M/UL — LOW (ref 3.8–5.2)
RBC # BLD: 2.92 M/UL — LOW (ref 3.8–5.2)
RBC # BLD: 2.94 M/UL — LOW (ref 3.8–5.2)
RBC # BLD: 2.95 M/UL — LOW (ref 3.8–5.2)
RBC # BLD: 2.98 M/UL — LOW (ref 3.8–5.2)
RBC # BLD: 2.99 M/UL — LOW (ref 3.8–5.2)
RBC # BLD: 3 M/UL — LOW (ref 3.8–5.2)
RBC # BLD: 3.04 M/UL — LOW (ref 3.8–5.2)
RBC # BLD: 3.05 M/UL — LOW (ref 3.8–5.2)
RBC # BLD: 3.05 M/UL — LOW (ref 3.8–5.2)
RBC # BLD: 3.06 M/UL — LOW (ref 3.8–5.2)
RBC # BLD: 3.07 M/UL — LOW (ref 3.8–5.2)
RBC # BLD: 3.08 M/UL — LOW (ref 3.8–5.2)
RBC # BLD: 3.08 M/UL — LOW (ref 3.8–5.2)
RBC # BLD: 3.1 M/UL — LOW (ref 3.8–5.2)
RBC # BLD: 3.13 M/UL — LOW (ref 3.8–5.2)
RBC # BLD: 3.13 M/UL — LOW (ref 3.8–5.2)
RBC # BLD: 3.21 M/UL — LOW (ref 3.8–5.2)
RBC # FLD: 13.6 % — SIGNIFICANT CHANGE UP (ref 10.3–14.5)
RBC # FLD: 13.6 % — SIGNIFICANT CHANGE UP (ref 10.3–14.5)
RBC # FLD: 13.7 % — SIGNIFICANT CHANGE UP (ref 10.3–14.5)
RBC # FLD: 13.7 % — SIGNIFICANT CHANGE UP (ref 10.3–14.5)
RBC # FLD: 13.8 % — SIGNIFICANT CHANGE UP (ref 10.3–14.5)
RBC # FLD: 13.8 % — SIGNIFICANT CHANGE UP (ref 10.3–14.5)
RBC # FLD: 13.9 % — SIGNIFICANT CHANGE UP (ref 10.3–14.5)
RBC # FLD: 14 % — SIGNIFICANT CHANGE UP (ref 10.3–14.5)
RBC # FLD: 14.1 % — SIGNIFICANT CHANGE UP (ref 10.3–14.5)
RBC # FLD: 14.2 % — SIGNIFICANT CHANGE UP (ref 10.3–14.5)
RBC BLD AUTO: ABNORMAL
SAO2 % BLD: 52.3 % — LOW (ref 60–90)
SAO2 % BLDA: 92.4 % — LOW (ref 94–98)
SODIUM SERPL-SCNC: 133 MMOL/L — LOW (ref 135–145)
SODIUM SERPL-SCNC: 134 MMOL/L — LOW (ref 135–145)
SODIUM SERPL-SCNC: 135 MMOL/L — SIGNIFICANT CHANGE UP (ref 135–145)
SODIUM SERPL-SCNC: 135 MMOL/L — SIGNIFICANT CHANGE UP (ref 135–145)
SODIUM SERPL-SCNC: 136 MMOL/L — SIGNIFICANT CHANGE UP (ref 135–145)
SODIUM SERPL-SCNC: 137 MMOL/L — SIGNIFICANT CHANGE UP (ref 135–145)
SODIUM SERPL-SCNC: 138 MMOL/L — SIGNIFICANT CHANGE UP (ref 135–145)
SODIUM SERPL-SCNC: 138 MMOL/L — SIGNIFICANT CHANGE UP (ref 135–145)
SODIUM SERPL-SCNC: 139 MMOL/L — SIGNIFICANT CHANGE UP (ref 135–145)
SODIUM SERPL-SCNC: 139 MMOL/L — SIGNIFICANT CHANGE UP (ref 135–145)
SODIUM SERPL-SCNC: 140 MMOL/L — SIGNIFICANT CHANGE UP (ref 135–145)
SODIUM SERPL-SCNC: 140 MMOL/L — SIGNIFICANT CHANGE UP (ref 135–145)
SODIUM SERPL-SCNC: 141 MMOL/L — SIGNIFICANT CHANGE UP (ref 135–145)
SODIUM SERPL-SCNC: 141 MMOL/L — SIGNIFICANT CHANGE UP (ref 135–145)
SP GR SPEC: 1.01 — SIGNIFICANT CHANGE UP (ref 1.01–1.02)
TRIGL SERPL-MCNC: 57 MG/DL — SIGNIFICANT CHANGE UP
TROPONIN I, HIGH SENSITIVITY RESULT: 37.45 NG/L — SIGNIFICANT CHANGE UP
TSH SERPL-MCNC: 0.34 UU/ML — SIGNIFICANT CHANGE UP (ref 0.34–4.82)
UROBILINOGEN FLD QL: NEGATIVE — SIGNIFICANT CHANGE UP
WBC # BLD: 10.48 K/UL — SIGNIFICANT CHANGE UP (ref 3.8–10.5)
WBC # BLD: 10.93 K/UL — HIGH (ref 3.8–10.5)
WBC # BLD: 6.43 K/UL — SIGNIFICANT CHANGE UP (ref 3.8–10.5)
WBC # BLD: 6.69 K/UL — SIGNIFICANT CHANGE UP (ref 3.8–10.5)
WBC # BLD: 6.85 K/UL — SIGNIFICANT CHANGE UP (ref 3.8–10.5)
WBC # BLD: 6.95 K/UL — SIGNIFICANT CHANGE UP (ref 3.8–10.5)
WBC # BLD: 7.03 K/UL — SIGNIFICANT CHANGE UP (ref 3.8–10.5)
WBC # BLD: 7.42 K/UL — SIGNIFICANT CHANGE UP (ref 3.8–10.5)
WBC # BLD: 7.65 K/UL — SIGNIFICANT CHANGE UP (ref 3.8–10.5)
WBC # BLD: 7.86 K/UL — SIGNIFICANT CHANGE UP (ref 3.8–10.5)
WBC # BLD: 8.14 K/UL — SIGNIFICANT CHANGE UP (ref 3.8–10.5)
WBC # BLD: 8.4 K/UL — SIGNIFICANT CHANGE UP (ref 3.8–10.5)
WBC # BLD: 8.66 K/UL — SIGNIFICANT CHANGE UP (ref 3.8–10.5)
WBC # BLD: 8.67 K/UL — SIGNIFICANT CHANGE UP (ref 3.8–10.5)
WBC # BLD: 8.74 K/UL — SIGNIFICANT CHANGE UP (ref 3.8–10.5)
WBC # BLD: 8.94 K/UL — SIGNIFICANT CHANGE UP (ref 3.8–10.5)
WBC # BLD: 9.3 K/UL — SIGNIFICANT CHANGE UP (ref 3.8–10.5)
WBC # BLD: 9.86 K/UL — SIGNIFICANT CHANGE UP (ref 3.8–10.5)
WBC # FLD AUTO: 10.48 K/UL — SIGNIFICANT CHANGE UP (ref 3.8–10.5)
WBC # FLD AUTO: 10.93 K/UL — HIGH (ref 3.8–10.5)
WBC # FLD AUTO: 6.43 K/UL — SIGNIFICANT CHANGE UP (ref 3.8–10.5)
WBC # FLD AUTO: 6.69 K/UL — SIGNIFICANT CHANGE UP (ref 3.8–10.5)
WBC # FLD AUTO: 6.85 K/UL — SIGNIFICANT CHANGE UP (ref 3.8–10.5)
WBC # FLD AUTO: 6.95 K/UL — SIGNIFICANT CHANGE UP (ref 3.8–10.5)
WBC # FLD AUTO: 7.03 K/UL — SIGNIFICANT CHANGE UP (ref 3.8–10.5)
WBC # FLD AUTO: 7.42 K/UL — SIGNIFICANT CHANGE UP (ref 3.8–10.5)
WBC # FLD AUTO: 7.65 K/UL — SIGNIFICANT CHANGE UP (ref 3.8–10.5)
WBC # FLD AUTO: 7.86 K/UL — SIGNIFICANT CHANGE UP (ref 3.8–10.5)
WBC # FLD AUTO: 8.14 K/UL — SIGNIFICANT CHANGE UP (ref 3.8–10.5)
WBC # FLD AUTO: 8.4 K/UL — SIGNIFICANT CHANGE UP (ref 3.8–10.5)
WBC # FLD AUTO: 8.66 K/UL — SIGNIFICANT CHANGE UP (ref 3.8–10.5)
WBC # FLD AUTO: 8.67 K/UL — SIGNIFICANT CHANGE UP (ref 3.8–10.5)
WBC # FLD AUTO: 8.74 K/UL — SIGNIFICANT CHANGE UP (ref 3.8–10.5)
WBC # FLD AUTO: 8.94 K/UL — SIGNIFICANT CHANGE UP (ref 3.8–10.5)
WBC # FLD AUTO: 9.3 K/UL — SIGNIFICANT CHANGE UP (ref 3.8–10.5)
WBC # FLD AUTO: 9.86 K/UL — SIGNIFICANT CHANGE UP (ref 3.8–10.5)

## 2023-01-01 PROCEDURE — 80061 LIPID PANEL: CPT

## 2023-01-01 PROCEDURE — 97530 THERAPEUTIC ACTIVITIES: CPT | Mod: GP

## 2023-01-01 PROCEDURE — 99233 SBSQ HOSP IP/OBS HIGH 50: CPT

## 2023-01-01 PROCEDURE — 99232 SBSQ HOSP IP/OBS MODERATE 35: CPT

## 2023-01-01 PROCEDURE — 93308 TTE F-UP OR LMTD: CPT | Mod: 26

## 2023-01-01 PROCEDURE — 99221 1ST HOSP IP/OBS SF/LOW 40: CPT

## 2023-01-01 PROCEDURE — 93460 R&L HRT ART/VENTRICLE ANGIO: CPT

## 2023-01-01 PROCEDURE — 36415 COLL VENOUS BLD VENIPUNCTURE: CPT

## 2023-01-01 PROCEDURE — 84484 ASSAY OF TROPONIN QUANT: CPT

## 2023-01-01 PROCEDURE — 93010 ELECTROCARDIOGRAM REPORT: CPT

## 2023-01-01 PROCEDURE — 93460 R&L HRT ART/VENTRICLE ANGIO: CPT | Mod: 26

## 2023-01-01 PROCEDURE — 85025 COMPLETE CBC W/AUTO DIFF WBC: CPT

## 2023-01-01 PROCEDURE — 99204 OFFICE O/P NEW MOD 45 MIN: CPT

## 2023-01-01 PROCEDURE — 86900 BLOOD TYPING SEROLOGIC ABO: CPT

## 2023-01-01 PROCEDURE — 93971 EXTREMITY STUDY: CPT | Mod: 26,LT

## 2023-01-01 PROCEDURE — 71045 X-RAY EXAM CHEST 1 VIEW: CPT

## 2023-01-01 PROCEDURE — 99223 1ST HOSP IP/OBS HIGH 75: CPT | Mod: 95

## 2023-01-01 PROCEDURE — C1894: CPT

## 2023-01-01 PROCEDURE — 99442: CPT | Mod: 95

## 2023-01-01 PROCEDURE — 85610 PROTHROMBIN TIME: CPT

## 2023-01-01 PROCEDURE — 82803 BLOOD GASES ANY COMBINATION: CPT

## 2023-01-01 PROCEDURE — 73706 CT ANGIO LWR EXTR W/O&W/DYE: CPT | Mod: 26,LT

## 2023-01-01 PROCEDURE — 73200 CT UPPER EXTREMITY W/O DYE: CPT | Mod: 26,LT

## 2023-01-01 PROCEDURE — 83880 ASSAY OF NATRIURETIC PEPTIDE: CPT

## 2023-01-01 PROCEDURE — 83605 ASSAY OF LACTIC ACID: CPT

## 2023-01-01 PROCEDURE — 99214 OFFICE O/P EST MOD 30 MIN: CPT

## 2023-01-01 PROCEDURE — 93279 PRGRMG DEV EVAL PM/LDLS PM: CPT | Mod: 26

## 2023-01-01 PROCEDURE — 83735 ASSAY OF MAGNESIUM: CPT

## 2023-01-01 PROCEDURE — 80048 BASIC METABOLIC PNL TOTAL CA: CPT

## 2023-01-01 PROCEDURE — 93005 ELECTROCARDIOGRAM TRACING: CPT

## 2023-01-01 PROCEDURE — 82810 BLOOD GASES O2 SAT ONLY: CPT

## 2023-01-01 PROCEDURE — 70450 CT HEAD/BRAIN W/O DYE: CPT | Mod: 26

## 2023-01-01 PROCEDURE — 84100 ASSAY OF PHOSPHORUS: CPT

## 2023-01-01 PROCEDURE — 93971 EXTREMITY STUDY: CPT | Mod: 26,RT

## 2023-01-01 PROCEDURE — 99223 1ST HOSP IP/OBS HIGH 75: CPT

## 2023-01-01 PROCEDURE — 97163 PT EVAL HIGH COMPLEX 45 MIN: CPT | Mod: GP

## 2023-01-01 PROCEDURE — 82550 ASSAY OF CK (CPK): CPT

## 2023-01-01 PROCEDURE — 93308 TTE F-UP OR LMTD: CPT

## 2023-01-01 PROCEDURE — 99213 OFFICE O/P EST LOW 20 MIN: CPT | Mod: 25

## 2023-01-01 PROCEDURE — 93880 EXTRACRANIAL BILAT STUDY: CPT

## 2023-01-01 PROCEDURE — 99233 SBSQ HOSP IP/OBS HIGH 50: CPT | Mod: 95

## 2023-01-01 PROCEDURE — 81001 URINALYSIS AUTO W/SCOPE: CPT

## 2023-01-01 PROCEDURE — 70450 CT HEAD/BRAIN W/O DYE: CPT

## 2023-01-01 PROCEDURE — 99232 SBSQ HOSP IP/OBS MODERATE 35: CPT | Mod: GC

## 2023-01-01 PROCEDURE — 97535 SELF CARE MNGMENT TRAINING: CPT | Mod: GP

## 2023-01-01 PROCEDURE — 93880 EXTRACRANIAL BILAT STUDY: CPT | Mod: 26

## 2023-01-01 PROCEDURE — 71045 X-RAY EXAM CHEST 1 VIEW: CPT | Mod: 26

## 2023-01-01 PROCEDURE — C1887: CPT

## 2023-01-01 PROCEDURE — 84443 ASSAY THYROID STIM HORMONE: CPT

## 2023-01-01 PROCEDURE — 99239 HOSP IP/OBS DSCHRG MGMT >30: CPT

## 2023-01-01 PROCEDURE — 73590 X-RAY EXAM OF LOWER LEG: CPT | Mod: 26,LT

## 2023-01-01 PROCEDURE — 93971 EXTREMITY STUDY: CPT | Mod: RT

## 2023-01-01 PROCEDURE — 76376 3D RENDER W/INTRP POSTPROCES: CPT

## 2023-01-01 PROCEDURE — 86850 RBC ANTIBODY SCREEN: CPT

## 2023-01-01 PROCEDURE — 82962 GLUCOSE BLOOD TEST: CPT

## 2023-01-01 PROCEDURE — 73200 CT UPPER EXTREMITY W/O DYE: CPT | Mod: LT

## 2023-01-01 PROCEDURE — C1751: CPT

## 2023-01-01 PROCEDURE — 99443: CPT | Mod: 95

## 2023-01-01 PROCEDURE — 86901 BLOOD TYPING SEROLOGIC RH(D): CPT

## 2023-01-01 PROCEDURE — C1769: CPT

## 2023-01-01 PROCEDURE — 93306 TTE W/DOPPLER COMPLETE: CPT | Mod: 26

## 2023-01-01 PROCEDURE — 73706 CT ANGIO LWR EXTR W/O&W/DYE: CPT | Mod: MA,LT

## 2023-01-01 PROCEDURE — 76376 3D RENDER W/INTRP POSTPROCES: CPT | Mod: 26

## 2023-01-01 PROCEDURE — C1889: CPT

## 2023-01-01 PROCEDURE — 93970 EXTREMITY STUDY: CPT

## 2023-01-01 PROCEDURE — 97116 GAIT TRAINING THERAPY: CPT | Mod: GP

## 2023-01-01 PROCEDURE — 99285 EMERGENCY DEPT VISIT HI MDM: CPT

## 2023-01-01 PROCEDURE — 99213 OFFICE O/P EST LOW 20 MIN: CPT

## 2023-01-01 PROCEDURE — 83036 HEMOGLOBIN GLYCOSYLATED A1C: CPT

## 2023-01-01 PROCEDURE — 93000 ELECTROCARDIOGRAM COMPLETE: CPT

## 2023-01-01 PROCEDURE — 85027 COMPLETE CBC AUTOMATED: CPT

## 2023-01-01 PROCEDURE — 93306 TTE W/DOPPLER COMPLETE: CPT

## 2023-01-01 PROCEDURE — 85730 THROMBOPLASTIN TIME PARTIAL: CPT

## 2023-01-01 PROCEDURE — 20610 DRAIN/INJ JOINT/BURSA W/O US: CPT | Mod: 50

## 2023-01-01 RX ORDER — INSULIN LISPRO 100/ML
VIAL (ML) SUBCUTANEOUS AT BEDTIME
Refills: 0 | Status: DISCONTINUED | OUTPATIENT
Start: 2023-01-01 | End: 2023-01-01

## 2023-01-01 RX ORDER — LEVOTHYROXINE SODIUM 125 MCG
1 TABLET ORAL
Qty: 0 | Refills: 0 | DISCHARGE

## 2023-01-01 RX ORDER — ALLOPURINOL 300 MG
1 TABLET ORAL
Qty: 0 | Refills: 0 | DISCHARGE

## 2023-01-01 RX ORDER — SPIRONOLACTONE 25 MG/1
25 TABLET, FILM COATED ORAL AT BEDTIME
Refills: 0 | Status: DISCONTINUED | OUTPATIENT
Start: 2023-01-01 | End: 2023-01-01

## 2023-01-01 RX ORDER — LEVOTHYROXINE SODIUM 0.12 MG/1
125 TABLET ORAL DAILY
Refills: 0 | Status: ACTIVE | COMMUNITY
Start: 2023-01-01

## 2023-01-01 RX ORDER — FUROSEMIDE 40 MG
40 TABLET ORAL ONCE
Refills: 0 | Status: COMPLETED | OUTPATIENT
Start: 2023-01-01 | End: 2023-01-01

## 2023-01-01 RX ORDER — NYSTATIN CREAM 100000 [USP'U]/G
1 CREAM TOPICAL
Refills: 0 | DISCHARGE

## 2023-01-01 RX ORDER — ACETAMINOPHEN 500 MG
2 TABLET ORAL
Qty: 0 | Refills: 0 | DISCHARGE

## 2023-01-01 RX ORDER — FUROSEMIDE 40 MG
40 TABLET ORAL DAILY
Refills: 0 | Status: DISCONTINUED | OUTPATIENT
Start: 2023-01-01 | End: 2023-01-01

## 2023-01-01 RX ORDER — SPIRONOLACTONE 25 MG/1
25 TABLET, FILM COATED ORAL DAILY
Refills: 0 | Status: DISCONTINUED | OUTPATIENT
Start: 2023-01-01 | End: 2023-01-01

## 2023-01-01 RX ORDER — POTASSIUM CHLORIDE 20 MEQ
1 PACKET (EA) ORAL
Qty: 0 | Refills: 0 | DISCHARGE

## 2023-01-01 RX ORDER — SOD,AMMONIUM,POTASSIUM LACTATE
1 CREAM (GRAM) TOPICAL
Qty: 0 | Refills: 0 | DISCHARGE
Start: 2023-01-01

## 2023-01-01 RX ORDER — FUROSEMIDE 80 MG/1
TABLET ORAL
Refills: 0 | Status: DISCONTINUED | COMMUNITY

## 2023-01-01 RX ORDER — AMPICILLIN SODIUM AND SULBACTAM SODIUM 250; 125 MG/ML; MG/ML
INJECTION, POWDER, FOR SUSPENSION INTRAMUSCULAR; INTRAVENOUS
Refills: 0 | Status: DISCONTINUED | OUTPATIENT
Start: 2023-01-01 | End: 2023-01-01

## 2023-01-01 RX ORDER — SODIUM CHLORIDE 9 MG/ML
1000 INJECTION, SOLUTION INTRAVENOUS
Refills: 0 | Status: DISCONTINUED | OUTPATIENT
Start: 2023-01-01 | End: 2023-01-01

## 2023-01-01 RX ORDER — FUROSEMIDE 40 MG
20 TABLET ORAL ONCE
Refills: 0 | Status: COMPLETED | OUTPATIENT
Start: 2023-01-01 | End: 2023-01-01

## 2023-01-01 RX ORDER — DEXTROSE 50 % IN WATER 50 %
12.5 SYRINGE (ML) INTRAVENOUS ONCE
Refills: 0 | Status: DISCONTINUED | OUTPATIENT
Start: 2023-01-01 | End: 2023-01-01

## 2023-01-01 RX ORDER — FUROSEMIDE 40 MG
1 TABLET ORAL
Qty: 0 | Refills: 0 | DISCHARGE

## 2023-01-01 RX ORDER — DEXTROSE 50 % IN WATER 50 %
25 SYRINGE (ML) INTRAVENOUS ONCE
Refills: 0 | Status: DISCONTINUED | OUTPATIENT
Start: 2023-01-01 | End: 2023-01-01

## 2023-01-01 RX ORDER — ONDANSETRON 8 MG/1
4 TABLET, FILM COATED ORAL EVERY 6 HOURS
Refills: 0 | Status: DISCONTINUED | OUTPATIENT
Start: 2023-01-01 | End: 2023-01-01

## 2023-01-01 RX ORDER — FUROSEMIDE 40 MG
5 TABLET ORAL
Qty: 100 | Refills: 0 | Status: DISCONTINUED | OUTPATIENT
Start: 2023-01-01 | End: 2023-01-01

## 2023-01-01 RX ORDER — ALLOPURINOL 300 MG
100 TABLET ORAL DAILY
Refills: 0 | Status: DISCONTINUED | OUTPATIENT
Start: 2023-01-01 | End: 2023-01-01

## 2023-01-01 RX ORDER — POLYETHYLENE GLYCOL 3350 17 G/17G
17 POWDER, FOR SOLUTION ORAL DAILY
Refills: 0 | Status: DISCONTINUED | OUTPATIENT
Start: 2023-01-01 | End: 2023-01-01

## 2023-01-01 RX ORDER — SENNA PLUS 8.6 MG/1
2 TABLET ORAL
Qty: 0 | Refills: 0 | DISCHARGE
Start: 2023-01-01

## 2023-01-01 RX ORDER — POTASSIUM CHLORIDE 1500 MG/1
20 TABLET, EXTENDED RELEASE ORAL
Qty: 90 | Refills: 0 | Status: DISCONTINUED | COMMUNITY
Start: 1900-01-01 | End: 2023-01-01

## 2023-01-01 RX ORDER — OXYCODONE HYDROCHLORIDE 5 MG/1
2.5 TABLET ORAL ONCE
Refills: 0 | Status: DISCONTINUED | OUTPATIENT
Start: 2023-01-01 | End: 2023-01-01

## 2023-01-01 RX ORDER — SODIUM CHLORIDE 9 MG/ML
500 INJECTION, SOLUTION INTRAVENOUS ONCE
Refills: 0 | Status: COMPLETED | OUTPATIENT
Start: 2023-01-01 | End: 2023-01-01

## 2023-01-01 RX ORDER — MULTIVIT-MIN/FERROUS GLUCONATE 9 MG/15 ML
1 LIQUID (ML) ORAL DAILY
Refills: 0 | Status: DISCONTINUED | OUTPATIENT
Start: 2023-01-01 | End: 2023-01-01

## 2023-01-01 RX ORDER — SENNA PLUS 8.6 MG/1
2 TABLET ORAL AT BEDTIME
Refills: 0 | Status: DISCONTINUED | OUTPATIENT
Start: 2023-01-01 | End: 2023-01-01

## 2023-01-01 RX ORDER — FUROSEMIDE 40 MG
20 TABLET ORAL DAILY
Refills: 0 | Status: DISCONTINUED | OUTPATIENT
Start: 2023-01-01 | End: 2023-01-01

## 2023-01-01 RX ORDER — CALCIUM CARBONATE 500(1250)
3 TABLET ORAL
Qty: 0 | Refills: 0 | DISCHARGE

## 2023-01-01 RX ORDER — INSULIN LISPRO 100/ML
VIAL (ML) SUBCUTANEOUS
Refills: 0 | Status: DISCONTINUED | OUTPATIENT
Start: 2023-01-01 | End: 2023-01-01

## 2023-01-01 RX ORDER — SPIRONOLACTONE 25 MG/1
50 TABLET, FILM COATED ORAL DAILY
Refills: 0 | Status: DISCONTINUED | OUTPATIENT
Start: 2023-01-01 | End: 2023-01-01

## 2023-01-01 RX ORDER — LEVOTHYROXINE SODIUM 125 MCG
125 TABLET ORAL DAILY
Refills: 0 | Status: DISCONTINUED | OUTPATIENT
Start: 2023-01-01 | End: 2023-01-01

## 2023-01-01 RX ORDER — RIVAROXABAN 15 MG-20MG
1 KIT ORAL
Qty: 0 | Refills: 0 | DISCHARGE

## 2023-01-01 RX ORDER — AMPICILLIN SODIUM AND SULBACTAM SODIUM 250; 125 MG/ML; MG/ML
3 INJECTION, POWDER, FOR SUSPENSION INTRAMUSCULAR; INTRAVENOUS EVERY 6 HOURS
Refills: 0 | Status: DISCONTINUED | OUTPATIENT
Start: 2023-01-01 | End: 2023-01-01

## 2023-01-01 RX ORDER — METOPROLOL TARTRATE 50 MG
1 TABLET ORAL
Refills: 0 | DISCHARGE

## 2023-01-01 RX ORDER — THIAMINE MONONITRATE (VIT B1) 100 MG
1 TABLET ORAL
Qty: 0 | Refills: 0 | DISCHARGE
Start: 2023-01-01

## 2023-01-01 RX ORDER — POLYETHYLENE GLYCOL 3350 17 G/17G
17 POWDER, FOR SOLUTION ORAL
Qty: 0 | Refills: 0 | DISCHARGE
Start: 2023-01-01

## 2023-01-01 RX ORDER — METOPROLOL TARTRATE 50 MG
25 TABLET ORAL DAILY
Refills: 0 | Status: DISCONTINUED | OUTPATIENT
Start: 2023-01-01 | End: 2023-01-01

## 2023-01-01 RX ORDER — SOD,AMMONIUM,POTASSIUM LACTATE
1 CREAM (GRAM) TOPICAL
Refills: 0 | Status: DISCONTINUED | OUTPATIENT
Start: 2023-01-01 | End: 2023-01-01

## 2023-01-01 RX ORDER — MORPHINE SULFATE 50 MG/1
4 CAPSULE, EXTENDED RELEASE ORAL ONCE
Refills: 0 | Status: DISCONTINUED | OUTPATIENT
Start: 2023-01-01 | End: 2023-01-01

## 2023-01-01 RX ORDER — AMPICILLIN SODIUM AND SULBACTAM SODIUM 250; 125 MG/ML; MG/ML
3 INJECTION, POWDER, FOR SUSPENSION INTRAMUSCULAR; INTRAVENOUS EVERY 12 HOURS
Refills: 0 | Status: DISCONTINUED | OUTPATIENT
Start: 2023-01-01 | End: 2023-01-01

## 2023-01-01 RX ORDER — LANOLIN ALCOHOL/MO/W.PET/CERES
3 CREAM (GRAM) TOPICAL AT BEDTIME
Refills: 0 | Status: DISCONTINUED | OUTPATIENT
Start: 2023-01-01 | End: 2023-01-01

## 2023-01-01 RX ORDER — MORPHINE SULFATE 50 MG/1
2 CAPSULE, EXTENDED RELEASE ORAL EVERY 4 HOURS
Refills: 0 | Status: DISCONTINUED | OUTPATIENT
Start: 2023-01-01 | End: 2023-01-01

## 2023-01-01 RX ORDER — TETANUS TOXOID, REDUCED DIPHTHERIA TOXOID AND ACELLULAR PERTUSSIS VACCINE, ADSORBED 5; 2.5; 8; 8; 2.5 [IU]/.5ML; [IU]/.5ML; UG/.5ML; UG/.5ML; UG/.5ML
0.5 SUSPENSION INTRAMUSCULAR ONCE
Refills: 0 | Status: COMPLETED | OUTPATIENT
Start: 2023-01-01 | End: 2023-01-01

## 2023-01-01 RX ORDER — SPIRONOLACTONE 25 MG/1
25 TABLET, FILM COATED ORAL
Refills: 0 | Status: DISCONTINUED | OUTPATIENT
Start: 2023-01-01 | End: 2023-01-01

## 2023-01-01 RX ORDER — METOPROLOL TARTRATE 50 MG
1 TABLET ORAL
Qty: 0 | Refills: 0 | DISCHARGE

## 2023-01-01 RX ORDER — THIAMINE MONONITRATE (VIT B1) 100 MG
100 TABLET ORAL DAILY
Refills: 0 | Status: DISCONTINUED | OUTPATIENT
Start: 2023-01-01 | End: 2023-01-01

## 2023-01-01 RX ORDER — ALLOPURINOL 200 MG/1
TABLET ORAL
Refills: 0 | Status: COMPLETED | COMMUNITY

## 2023-01-01 RX ORDER — AMPICILLIN SODIUM AND SULBACTAM SODIUM 250; 125 MG/ML; MG/ML
3 INJECTION, POWDER, FOR SUSPENSION INTRAMUSCULAR; INTRAVENOUS ONCE
Refills: 0 | Status: COMPLETED | OUTPATIENT
Start: 2023-01-01 | End: 2023-01-01

## 2023-01-01 RX ORDER — ACETAMINOPHEN 500 MG
1000 TABLET ORAL ONCE
Refills: 0 | Status: DISCONTINUED | OUTPATIENT
Start: 2023-01-01 | End: 2023-01-01

## 2023-01-01 RX ORDER — POTASSIUM CHLORIDE 20 MEQ
40 PACKET (EA) ORAL EVERY 4 HOURS
Refills: 0 | Status: COMPLETED | OUTPATIENT
Start: 2023-01-01 | End: 2023-01-01

## 2023-01-01 RX ORDER — SODIUM CHLORIDE 9 MG/ML
1000 INJECTION, SOLUTION INTRAVENOUS ONCE
Refills: 0 | Status: COMPLETED | OUTPATIENT
Start: 2023-01-01 | End: 2023-01-01

## 2023-01-01 RX ORDER — SPIRONOLACTONE 25 MG/1
1 TABLET, FILM COATED ORAL
Qty: 0 | Refills: 0 | DISCHARGE
Start: 2023-01-01

## 2023-01-01 RX ORDER — METOPROLOL TARTRATE 50 MG
12.5 TABLET ORAL DAILY
Refills: 0 | Status: DISCONTINUED | OUTPATIENT
Start: 2023-01-01 | End: 2023-01-01

## 2023-01-01 RX ORDER — SPIRONOLACTONE 25 MG/1
50 TABLET, FILM COATED ORAL
Refills: 0 | Status: DISCONTINUED | OUTPATIENT
Start: 2023-01-01 | End: 2023-01-01

## 2023-01-01 RX ORDER — LEVOTHYROXINE SODIUM 125 UG/1
125 TABLET ORAL DAILY
Qty: 90 | Refills: 3 | Status: COMPLETED | COMMUNITY
End: 2023-01-01

## 2023-01-01 RX ORDER — FUROSEMIDE 40 MG
5 TABLET ORAL
Qty: 500 | Refills: 0 | Status: DISCONTINUED | OUTPATIENT
Start: 2023-01-01 | End: 2023-01-01

## 2023-01-01 RX ORDER — GLUCAGON INJECTION, SOLUTION 0.5 MG/.1ML
1 INJECTION, SOLUTION SUBCUTANEOUS ONCE
Refills: 0 | Status: DISCONTINUED | OUTPATIENT
Start: 2023-01-01 | End: 2023-01-01

## 2023-01-01 RX ORDER — FUROSEMIDE 40 MG
1 TABLET ORAL
Refills: 0 | DISCHARGE

## 2023-01-01 RX ORDER — SODIUM CHLORIDE 9 MG/ML
500 INJECTION INTRAMUSCULAR; INTRAVENOUS; SUBCUTANEOUS ONCE
Refills: 0 | Status: COMPLETED | OUTPATIENT
Start: 2023-01-01 | End: 2023-01-01

## 2023-01-01 RX ORDER — DEXTROSE 50 % IN WATER 50 %
15 SYRINGE (ML) INTRAVENOUS ONCE
Refills: 0 | Status: DISCONTINUED | OUTPATIENT
Start: 2023-01-01 | End: 2023-01-01

## 2023-01-01 RX ORDER — ACETAMINOPHEN 500 MG
2 TABLET ORAL
Qty: 0 | Refills: 0 | DISCHARGE
Start: 2023-01-01

## 2023-01-01 RX ORDER — METOPROLOL TARTRATE 50 MG
12.5 TABLET ORAL EVERY 12 HOURS
Refills: 0 | Status: DISCONTINUED | OUTPATIENT
Start: 2023-01-01 | End: 2023-01-01

## 2023-01-01 RX ORDER — ERGOCALCIFEROL 1.25 MG/1
1 CAPSULE ORAL
Qty: 0 | Refills: 0 | DISCHARGE

## 2023-01-01 RX ORDER — PROTHROMBIN COMPLEX CONCENTRATE (HUMAN) 25.5; 16.5; 24; 22; 22; 26 [IU]/ML; [IU]/ML; [IU]/ML; [IU]/ML; [IU]/ML; [IU]/ML
3000 POWDER, FOR SOLUTION INTRAVENOUS ONCE
Refills: 0 | Status: COMPLETED | OUTPATIENT
Start: 2023-01-01 | End: 2023-01-01

## 2023-01-01 RX ORDER — AMPICILLIN SODIUM AND SULBACTAM SODIUM 250; 125 MG/ML; MG/ML
3 INJECTION, POWDER, FOR SUSPENSION INTRAMUSCULAR; INTRAVENOUS ONCE
Refills: 0 | Status: DISCONTINUED | OUTPATIENT
Start: 2023-01-01 | End: 2023-01-01

## 2023-01-01 RX ORDER — ACETAMINOPHEN 500 MG
650 TABLET ORAL EVERY 6 HOURS
Refills: 0 | Status: DISCONTINUED | OUTPATIENT
Start: 2023-01-01 | End: 2023-01-01

## 2023-01-01 RX ADMIN — SODIUM CHLORIDE 60 MILLILITER(S): 9 INJECTION, SOLUTION INTRAVENOUS at 00:00

## 2023-01-01 RX ADMIN — SODIUM CHLORIDE 1000 MILLILITER(S): 9 INJECTION, SOLUTION INTRAVENOUS at 19:55

## 2023-01-01 RX ADMIN — SPIRONOLACTONE 25 MILLIGRAM(S): 25 TABLET, FILM COATED ORAL at 05:40

## 2023-01-01 RX ADMIN — Medication 12.5 MILLIGRAM(S): at 10:19

## 2023-01-01 RX ADMIN — Medication 650 MILLIGRAM(S): at 19:45

## 2023-01-01 RX ADMIN — Medication 125 MICROGRAM(S): at 05:26

## 2023-01-01 RX ADMIN — Medication 20 MILLIGRAM(S): at 06:40

## 2023-01-01 RX ADMIN — SPIRONOLACTONE 25 MILLIGRAM(S): 25 TABLET, FILM COATED ORAL at 10:38

## 2023-01-01 RX ADMIN — Medication 1 TABLET(S): at 10:38

## 2023-01-01 RX ADMIN — MORPHINE SULFATE 2 MILLIGRAM(S): 50 CAPSULE, EXTENDED RELEASE ORAL at 14:36

## 2023-01-01 RX ADMIN — Medication 10 MILLIGRAM(S): at 13:19

## 2023-01-01 RX ADMIN — Medication 1 APPLICATION(S): at 20:55

## 2023-01-01 RX ADMIN — AMPICILLIN SODIUM AND SULBACTAM SODIUM 200 GRAM(S): 250; 125 INJECTION, POWDER, FOR SUSPENSION INTRAMUSCULAR; INTRAVENOUS at 17:21

## 2023-01-01 RX ADMIN — Medication 100 MILLIGRAM(S): at 09:15

## 2023-01-01 RX ADMIN — Medication 25 MILLIGRAM(S): at 09:15

## 2023-01-01 RX ADMIN — Medication 3 MILLIGRAM(S): at 20:15

## 2023-01-01 RX ADMIN — AMPICILLIN SODIUM AND SULBACTAM SODIUM 200 GRAM(S): 250; 125 INJECTION, POWDER, FOR SUSPENSION INTRAMUSCULAR; INTRAVENOUS at 06:38

## 2023-01-01 RX ADMIN — SENNA PLUS 2 TABLET(S): 8.6 TABLET ORAL at 21:18

## 2023-01-01 RX ADMIN — AMPICILLIN SODIUM AND SULBACTAM SODIUM 200 GRAM(S): 250; 125 INJECTION, POWDER, FOR SUSPENSION INTRAMUSCULAR; INTRAVENOUS at 05:32

## 2023-01-01 RX ADMIN — MORPHINE SULFATE 2 MILLIGRAM(S): 50 CAPSULE, EXTENDED RELEASE ORAL at 22:08

## 2023-01-01 RX ADMIN — Medication 650 MILLIGRAM(S): at 06:17

## 2023-01-01 RX ADMIN — Medication 12.5 MILLIGRAM(S): at 11:24

## 2023-01-01 RX ADMIN — Medication 1 TABLET(S): at 09:14

## 2023-01-01 RX ADMIN — AMPICILLIN SODIUM AND SULBACTAM SODIUM 200 GRAM(S): 250; 125 INJECTION, POWDER, FOR SUSPENSION INTRAMUSCULAR; INTRAVENOUS at 05:40

## 2023-01-01 RX ADMIN — Medication 2: at 12:07

## 2023-01-01 RX ADMIN — Medication 650 MILLIGRAM(S): at 03:14

## 2023-01-01 RX ADMIN — Medication 1 TABLET(S): at 09:22

## 2023-01-01 RX ADMIN — Medication 12.5 MILLIGRAM(S): at 17:07

## 2023-01-01 RX ADMIN — Medication 100 MILLIGRAM(S): at 09:24

## 2023-01-01 RX ADMIN — Medication 125 MICROGRAM(S): at 06:46

## 2023-01-01 RX ADMIN — Medication 100 MILLIGRAM(S): at 09:16

## 2023-01-01 RX ADMIN — AMPICILLIN SODIUM AND SULBACTAM SODIUM 200 GRAM(S): 250; 125 INJECTION, POWDER, FOR SUSPENSION INTRAMUSCULAR; INTRAVENOUS at 05:26

## 2023-01-01 RX ADMIN — ONDANSETRON 4 MILLIGRAM(S): 8 TABLET, FILM COATED ORAL at 09:25

## 2023-01-01 RX ADMIN — Medication 1 TABLET(S): at 11:25

## 2023-01-01 RX ADMIN — SODIUM CHLORIDE 500 MILLILITER(S): 9 INJECTION, SOLUTION INTRAVENOUS at 03:28

## 2023-01-01 RX ADMIN — Medication 1 TABLET(S): at 11:20

## 2023-01-01 RX ADMIN — Medication 20 MILLIGRAM(S): at 13:54

## 2023-01-01 RX ADMIN — Medication 1 TABLET(S): at 14:56

## 2023-01-01 RX ADMIN — OXYCODONE HYDROCHLORIDE 2.5 MILLIGRAM(S): 5 TABLET ORAL at 08:45

## 2023-01-01 RX ADMIN — Medication 3 MILLIGRAM(S): at 21:21

## 2023-01-01 RX ADMIN — Medication 100 MILLIGRAM(S): at 10:57

## 2023-01-01 RX ADMIN — Medication 40 MILLIGRAM(S): at 12:45

## 2023-01-01 RX ADMIN — Medication 1 APPLICATION(S): at 22:43

## 2023-01-01 RX ADMIN — Medication 100 MILLIGRAM(S): at 10:43

## 2023-01-01 RX ADMIN — SODIUM CHLORIDE 60 MILLILITER(S): 9 INJECTION, SOLUTION INTRAVENOUS at 06:49

## 2023-01-01 RX ADMIN — Medication 12.5 MILLIGRAM(S): at 10:43

## 2023-01-01 RX ADMIN — SENNA PLUS 2 TABLET(S): 8.6 TABLET ORAL at 20:46

## 2023-01-01 RX ADMIN — SENNA PLUS 2 TABLET(S): 8.6 TABLET ORAL at 22:43

## 2023-01-01 RX ADMIN — SPIRONOLACTONE 25 MILLIGRAM(S): 25 TABLET, FILM COATED ORAL at 19:00

## 2023-01-01 RX ADMIN — Medication 20 MILLIGRAM(S): at 13:18

## 2023-01-01 RX ADMIN — Medication 1 APPLICATION(S): at 21:34

## 2023-01-01 RX ADMIN — POLYETHYLENE GLYCOL 3350 17 GRAM(S): 17 POWDER, FOR SOLUTION ORAL at 09:14

## 2023-01-01 RX ADMIN — Medication 1 TABLET(S): at 11:23

## 2023-01-01 RX ADMIN — Medication 12.5 MILLIGRAM(S): at 09:14

## 2023-01-01 RX ADMIN — Medication 650 MILLIGRAM(S): at 21:51

## 2023-01-01 RX ADMIN — Medication 1: at 11:29

## 2023-01-01 RX ADMIN — Medication 650 MILLIGRAM(S): at 15:50

## 2023-01-01 RX ADMIN — Medication 1 TABLET(S): at 09:24

## 2023-01-01 RX ADMIN — Medication 2.5 MG/HR: at 14:03

## 2023-01-01 RX ADMIN — SPIRONOLACTONE 25 MILLIGRAM(S): 25 TABLET, FILM COATED ORAL at 17:25

## 2023-01-01 RX ADMIN — Medication 100 MILLIGRAM(S): at 09:14

## 2023-01-01 RX ADMIN — Medication 125 MICROGRAM(S): at 05:08

## 2023-01-01 RX ADMIN — Medication 12.5 MILLIGRAM(S): at 09:15

## 2023-01-01 RX ADMIN — SPIRONOLACTONE 25 MILLIGRAM(S): 25 TABLET, FILM COATED ORAL at 11:24

## 2023-01-01 RX ADMIN — SPIRONOLACTONE 25 MILLIGRAM(S): 25 TABLET, FILM COATED ORAL at 21:51

## 2023-01-01 RX ADMIN — Medication 25 MILLIGRAM(S): at 10:56

## 2023-01-01 RX ADMIN — Medication 40 MILLIGRAM(S): at 10:57

## 2023-01-01 RX ADMIN — Medication 1 TABLET(S): at 09:15

## 2023-01-01 RX ADMIN — Medication 1: at 12:03

## 2023-01-01 RX ADMIN — Medication 1 TABLET(S): at 14:55

## 2023-01-01 RX ADMIN — Medication 100 MILLIGRAM(S): at 09:42

## 2023-01-01 RX ADMIN — Medication 650 MILLIGRAM(S): at 23:30

## 2023-01-01 RX ADMIN — Medication 1 TABLET(S): at 10:43

## 2023-01-01 RX ADMIN — Medication 20 MILLIGRAM(S): at 13:29

## 2023-01-01 RX ADMIN — Medication 100 MILLIGRAM(S): at 11:22

## 2023-01-01 RX ADMIN — Medication 1 TABLET(S): at 11:18

## 2023-01-01 RX ADMIN — SPIRONOLACTONE 25 MILLIGRAM(S): 25 TABLET, FILM COATED ORAL at 19:57

## 2023-01-01 RX ADMIN — Medication 12.5 MILLIGRAM(S): at 12:45

## 2023-01-01 RX ADMIN — Medication 12.5 MILLIGRAM(S): at 10:38

## 2023-01-01 RX ADMIN — Medication 100 MILLIGRAM(S): at 11:25

## 2023-01-01 RX ADMIN — SPIRONOLACTONE 25 MILLIGRAM(S): 25 TABLET, FILM COATED ORAL at 05:10

## 2023-01-01 RX ADMIN — Medication 1 TABLET(S): at 10:52

## 2023-01-01 RX ADMIN — Medication 20 MILLIGRAM(S): at 18:39

## 2023-01-01 RX ADMIN — SPIRONOLACTONE 25 MILLIGRAM(S): 25 TABLET, FILM COATED ORAL at 06:39

## 2023-01-01 RX ADMIN — SPIRONOLACTONE 25 MILLIGRAM(S): 25 TABLET, FILM COATED ORAL at 09:24

## 2023-01-01 RX ADMIN — Medication 1 TABLET(S): at 09:13

## 2023-01-01 RX ADMIN — Medication 20 MILLIGRAM(S): at 06:20

## 2023-01-01 RX ADMIN — SPIRONOLACTONE 25 MILLIGRAM(S): 25 TABLET, FILM COATED ORAL at 09:13

## 2023-01-01 RX ADMIN — Medication 1 TABLET(S): at 11:21

## 2023-01-01 RX ADMIN — SPIRONOLACTONE 25 MILLIGRAM(S): 25 TABLET, FILM COATED ORAL at 05:25

## 2023-01-01 RX ADMIN — AMPICILLIN SODIUM AND SULBACTAM SODIUM 200 GRAM(S): 250; 125 INJECTION, POWDER, FOR SUSPENSION INTRAMUSCULAR; INTRAVENOUS at 18:38

## 2023-01-01 RX ADMIN — Medication 1 TABLET(S): at 11:22

## 2023-01-01 RX ADMIN — Medication 125 MICROGRAM(S): at 05:28

## 2023-01-01 RX ADMIN — MORPHINE SULFATE 4 MILLIGRAM(S): 50 CAPSULE, EXTENDED RELEASE ORAL at 10:54

## 2023-01-01 RX ADMIN — Medication 125 MICROGRAM(S): at 06:38

## 2023-01-01 RX ADMIN — Medication 100 MILLIGRAM(S): at 10:18

## 2023-01-01 RX ADMIN — Medication 1 APPLICATION(S): at 20:31

## 2023-01-01 RX ADMIN — SODIUM CHLORIDE 500 MILLILITER(S): 9 INJECTION INTRAMUSCULAR; INTRAVENOUS; SUBCUTANEOUS at 18:55

## 2023-01-01 RX ADMIN — Medication 125 MICROGRAM(S): at 07:01

## 2023-01-01 RX ADMIN — MORPHINE SULFATE 2 MILLIGRAM(S): 50 CAPSULE, EXTENDED RELEASE ORAL at 21:38

## 2023-01-01 RX ADMIN — AMPICILLIN SODIUM AND SULBACTAM SODIUM 200 GRAM(S): 250; 125 INJECTION, POWDER, FOR SUSPENSION INTRAMUSCULAR; INTRAVENOUS at 18:07

## 2023-01-01 RX ADMIN — Medication 3 MILLIGRAM(S): at 22:22

## 2023-01-01 RX ADMIN — Medication 650 MILLIGRAM(S): at 05:42

## 2023-01-01 RX ADMIN — Medication 100 MILLIGRAM(S): at 11:21

## 2023-01-01 RX ADMIN — Medication 1 TABLET(S): at 10:57

## 2023-01-01 RX ADMIN — Medication 12.5 MILLIGRAM(S): at 20:15

## 2023-01-01 RX ADMIN — Medication 125 MICROGRAM(S): at 06:41

## 2023-01-01 RX ADMIN — Medication 650 MILLIGRAM(S): at 02:14

## 2023-01-01 RX ADMIN — Medication 20 MILLIGRAM(S): at 13:26

## 2023-01-01 RX ADMIN — Medication 40 MILLIGRAM(S): at 17:21

## 2023-01-01 RX ADMIN — Medication 1 TABLET(S): at 10:13

## 2023-01-01 RX ADMIN — Medication 100 MILLIGRAM(S): at 11:20

## 2023-01-01 RX ADMIN — Medication 3 MILLIGRAM(S): at 20:29

## 2023-01-01 RX ADMIN — Medication 100 MILLIGRAM(S): at 14:56

## 2023-01-01 RX ADMIN — SODIUM CHLORIDE 50 MILLILITER(S): 9 INJECTION, SOLUTION INTRAVENOUS at 23:30

## 2023-01-01 RX ADMIN — SENNA PLUS 2 TABLET(S): 8.6 TABLET ORAL at 19:57

## 2023-01-01 RX ADMIN — Medication 20 MILLIGRAM(S): at 06:33

## 2023-01-01 RX ADMIN — Medication 3 MILLIGRAM(S): at 19:57

## 2023-01-01 RX ADMIN — Medication 1 TABLET(S): at 12:46

## 2023-01-01 RX ADMIN — Medication 1 APPLICATION(S): at 09:24

## 2023-01-01 RX ADMIN — Medication 3 MILLIGRAM(S): at 02:12

## 2023-01-01 RX ADMIN — Medication 20 MILLIGRAM(S): at 10:43

## 2023-01-01 RX ADMIN — SENNA PLUS 2 TABLET(S): 8.6 TABLET ORAL at 20:15

## 2023-01-01 RX ADMIN — Medication 3 MILLIGRAM(S): at 20:54

## 2023-01-01 RX ADMIN — SENNA PLUS 2 TABLET(S): 8.6 TABLET ORAL at 21:23

## 2023-01-01 RX ADMIN — Medication 650 MILLIGRAM(S): at 02:12

## 2023-01-01 RX ADMIN — SPIRONOLACTONE 25 MILLIGRAM(S): 25 TABLET, FILM COATED ORAL at 18:07

## 2023-01-01 RX ADMIN — Medication 3 MILLIGRAM(S): at 21:19

## 2023-01-01 RX ADMIN — Medication 125 MICROGRAM(S): at 06:20

## 2023-01-01 RX ADMIN — AMPICILLIN SODIUM AND SULBACTAM SODIUM 200 GRAM(S): 250; 125 INJECTION, POWDER, FOR SUSPENSION INTRAMUSCULAR; INTRAVENOUS at 06:46

## 2023-01-01 RX ADMIN — TETANUS TOXOID, REDUCED DIPHTHERIA TOXOID AND ACELLULAR PERTUSSIS VACCINE, ADSORBED 0.5 MILLILITER(S): 5; 2.5; 8; 8; 2.5 SUSPENSION INTRAMUSCULAR at 08:46

## 2023-01-01 RX ADMIN — Medication 125 MICROGRAM(S): at 05:15

## 2023-01-01 RX ADMIN — Medication 1 TABLET(S): at 09:42

## 2023-01-01 RX ADMIN — SENNA PLUS 2 TABLET(S): 8.6 TABLET ORAL at 21:51

## 2023-01-01 RX ADMIN — AMPICILLIN SODIUM AND SULBACTAM SODIUM 200 GRAM(S): 250; 125 INJECTION, POWDER, FOR SUSPENSION INTRAMUSCULAR; INTRAVENOUS at 17:26

## 2023-01-01 RX ADMIN — Medication 3 MILLIGRAM(S): at 22:43

## 2023-01-01 RX ADMIN — Medication 100 MILLIGRAM(S): at 10:51

## 2023-01-01 RX ADMIN — Medication 1 APPLICATION(S): at 12:52

## 2023-01-01 RX ADMIN — Medication 12.5 MILLIGRAM(S): at 21:23

## 2023-01-01 RX ADMIN — Medication 40 MILLIEQUIVALENT(S): at 10:18

## 2023-01-01 RX ADMIN — Medication 125 MICROGRAM(S): at 06:34

## 2023-01-01 RX ADMIN — Medication 650 MILLIGRAM(S): at 22:22

## 2023-01-01 RX ADMIN — Medication 1: at 11:52

## 2023-01-01 RX ADMIN — Medication 125 MICROGRAM(S): at 06:49

## 2023-01-01 RX ADMIN — Medication 125 MICROGRAM(S): at 05:25

## 2023-01-01 RX ADMIN — SPIRONOLACTONE 25 MILLIGRAM(S): 25 TABLET, FILM COATED ORAL at 17:21

## 2023-01-01 RX ADMIN — Medication 12.5 MILLIGRAM(S): at 10:15

## 2023-01-01 RX ADMIN — SPIRONOLACTONE 25 MILLIGRAM(S): 25 TABLET, FILM COATED ORAL at 10:52

## 2023-01-01 RX ADMIN — Medication 100 MILLIGRAM(S): at 10:52

## 2023-01-01 RX ADMIN — Medication 1 TABLET(S): at 10:51

## 2023-01-01 RX ADMIN — Medication 1 TABLET(S): at 10:18

## 2023-01-01 RX ADMIN — Medication 1 APPLICATION(S): at 09:13

## 2023-01-01 RX ADMIN — Medication 20 MILLIGRAM(S): at 09:24

## 2023-01-01 RX ADMIN — SPIRONOLACTONE 25 MILLIGRAM(S): 25 TABLET, FILM COATED ORAL at 17:41

## 2023-01-01 RX ADMIN — POLYETHYLENE GLYCOL 3350 17 GRAM(S): 17 POWDER, FOR SOLUTION ORAL at 14:10

## 2023-01-01 RX ADMIN — SPIRONOLACTONE 25 MILLIGRAM(S): 25 TABLET, FILM COATED ORAL at 19:30

## 2023-01-01 RX ADMIN — Medication 1 TABLET(S): at 12:45

## 2023-01-01 RX ADMIN — Medication 3 MILLIGRAM(S): at 21:50

## 2023-01-01 RX ADMIN — AMPICILLIN SODIUM AND SULBACTAM SODIUM 200 GRAM(S): 250; 125 INJECTION, POWDER, FOR SUSPENSION INTRAMUSCULAR; INTRAVENOUS at 17:40

## 2023-01-01 RX ADMIN — Medication 3 MILLIGRAM(S): at 02:14

## 2023-01-01 RX ADMIN — Medication 100 MILLIGRAM(S): at 10:37

## 2023-01-01 RX ADMIN — MORPHINE SULFATE 2 MILLIGRAM(S): 50 CAPSULE, EXTENDED RELEASE ORAL at 12:42

## 2023-01-01 RX ADMIN — AMPICILLIN SODIUM AND SULBACTAM SODIUM 200 GRAM(S): 250; 125 INJECTION, POWDER, FOR SUSPENSION INTRAMUSCULAR; INTRAVENOUS at 17:05

## 2023-01-01 RX ADMIN — Medication 1 TABLET(S): at 10:56

## 2023-01-01 RX ADMIN — Medication 20 MILLIGRAM(S): at 05:28

## 2023-01-01 RX ADMIN — SODIUM CHLORIDE 60 MILLILITER(S): 9 INJECTION, SOLUTION INTRAVENOUS at 12:38

## 2023-01-01 RX ADMIN — Medication 12.5 MILLIGRAM(S): at 11:18

## 2023-01-01 RX ADMIN — Medication 1 TABLET(S): at 09:16

## 2023-01-01 RX ADMIN — Medication 40 MILLIEQUIVALENT(S): at 14:58

## 2023-01-01 RX ADMIN — Medication 20 MILLIGRAM(S): at 11:21

## 2023-01-01 RX ADMIN — Medication 650 MILLIGRAM(S): at 21:48

## 2023-01-01 RX ADMIN — Medication 1: at 16:39

## 2023-01-01 RX ADMIN — Medication 650 MILLIGRAM(S): at 22:49

## 2023-01-01 RX ADMIN — Medication 1 APPLICATION(S): at 11:25

## 2023-01-01 RX ADMIN — Medication 125 MICROGRAM(S): at 05:09

## 2023-01-01 RX ADMIN — Medication 20 MILLIGRAM(S): at 17:08

## 2023-01-01 RX ADMIN — Medication 100 MILLIGRAM(S): at 09:12

## 2023-01-01 RX ADMIN — POLYETHYLENE GLYCOL 3350 17 GRAM(S): 17 POWDER, FOR SOLUTION ORAL at 11:20

## 2023-01-01 RX ADMIN — SENNA PLUS 2 TABLET(S): 8.6 TABLET ORAL at 19:45

## 2023-01-01 RX ADMIN — Medication 650 MILLIGRAM(S): at 20:48

## 2023-01-01 RX ADMIN — Medication 125 MICROGRAM(S): at 05:41

## 2023-01-01 RX ADMIN — AMPICILLIN SODIUM AND SULBACTAM SODIUM 200 GRAM(S): 250; 125 INJECTION, POWDER, FOR SUSPENSION INTRAMUSCULAR; INTRAVENOUS at 07:01

## 2023-01-01 RX ADMIN — Medication 1: at 12:49

## 2023-01-01 RX ADMIN — Medication 20 MILLIGRAM(S): at 17:02

## 2023-01-01 RX ADMIN — Medication 650 MILLIGRAM(S): at 01:44

## 2023-01-01 RX ADMIN — Medication 20 MILLIGRAM(S): at 05:29

## 2023-01-01 RX ADMIN — ONDANSETRON 4 MILLIGRAM(S): 8 TABLET, FILM COATED ORAL at 14:02

## 2023-01-01 RX ADMIN — POLYETHYLENE GLYCOL 3350 17 GRAM(S): 17 POWDER, FOR SOLUTION ORAL at 10:18

## 2023-01-01 RX ADMIN — Medication 12.5 MILLIGRAM(S): at 19:57

## 2023-01-01 RX ADMIN — Medication 650 MILLIGRAM(S): at 08:38

## 2023-01-01 RX ADMIN — SPIRONOLACTONE 25 MILLIGRAM(S): 25 TABLET, FILM COATED ORAL at 06:46

## 2023-01-01 RX ADMIN — Medication 100 MILLIGRAM(S): at 12:45

## 2023-01-01 RX ADMIN — SENNA PLUS 2 TABLET(S): 8.6 TABLET ORAL at 20:54

## 2023-01-01 RX ADMIN — SPIRONOLACTONE 25 MILLIGRAM(S): 25 TABLET, FILM COATED ORAL at 22:43

## 2023-01-01 RX ADMIN — Medication 20 MILLIGRAM(S): at 11:20

## 2023-01-01 RX ADMIN — Medication 650 MILLIGRAM(S): at 12:51

## 2023-01-01 RX ADMIN — AMPICILLIN SODIUM AND SULBACTAM SODIUM 200 GRAM(S): 250; 125 INJECTION, POWDER, FOR SUSPENSION INTRAMUSCULAR; INTRAVENOUS at 12:21

## 2023-01-01 RX ADMIN — PROTHROMBIN COMPLEX CONCENTRATE (HUMAN) 400 INTERNATIONAL UNIT(S): 25.5; 16.5; 24; 22; 22; 26 POWDER, FOR SOLUTION INTRAVENOUS at 11:12

## 2023-01-03 ENCOUNTER — RX RENEWAL (OUTPATIENT)
Age: 88
End: 2023-01-03

## 2023-01-09 ENCOUNTER — LABORATORY RESULT (OUTPATIENT)
Age: 88
End: 2023-01-09

## 2023-01-18 ENCOUNTER — APPOINTMENT (OUTPATIENT)
Dept: ORTHOPEDIC SURGERY | Facility: CLINIC | Age: 88
End: 2023-01-18
Payer: MEDICARE

## 2023-01-18 VITALS — HEIGHT: 61 IN | WEIGHT: 123 LBS | BODY MASS INDEX: 23.22 KG/M2

## 2023-01-18 PROCEDURE — 99213 OFFICE O/P EST LOW 20 MIN: CPT | Mod: 25

## 2023-01-18 PROCEDURE — 20610 DRAIN/INJ JOINT/BURSA W/O US: CPT

## 2023-01-18 NOTE — PROCEDURE
[Large Joint Injection] : Large joint injection [Bilateral] : bilaterally of the [Glenohumeral Joint] : glenohumeral joint [Pain] : pain [Inflammation] : inflammation [X-ray evidence of Osteoarthritis on this or prior visit] : x-ray evidence of Osteoarthritis on this or prior visit [Alcohol] : alcohol [Betadine] : betadine [Ethyl Chloride sprayed topically] : ethyl chloride sprayed topically [Sterile technique used] : sterile technique used [Euflexxa(20mg)] : 20mg of Euflexxa [#1] : series #1 [] : Patient tolerated procedure well [Call if redness, pain or fever occur] : call if redness, pain or fever occur [Previous OTC use and PT nontherapeutic] : patient has tried OTC's including aspirin, Ibuprofen, Aleve, etc or prescription NSAIDS, and/or exercises at home and/or physical therapy without satisfactory response [Patient had decreased mobility in the joint] : patient had decreased mobility in the joint [Risks, benefits, alternatives discussed / Verbal consent obtained] : the risks benefits, and alternatives have been discussed, and verbal consent was obtained [FreeTextEntry3] : \par

## 2023-01-19 NOTE — HISTORY OF PRESENT ILLNESS
[10] : 10 [0] : 0 [de-identified] : 1/18/23: here for f/u on bilateral shoulders. Had relief from CSI until 1/13/23 when she started experiencing pain again. Taking tylenol for the pain with minimal relief. Here for bilateral visco injections in her shoulders\par 12/29/22: Here for f/u on bilateral shoulders; She reports B/L shoulder CSI given on 9/7/22 with one month of relief, worsening in past two weeks. She has been taking tylenol for pain with minimal to no relief, reports pain is severe, worse at night time, in addition reports limited ROM. She takes Xarelto.\par \par 9/7/22: 89F who presents today with B/L shoulder pain that has been occurring since February of 2022. Pain is deep and constant. Notes she is having pain with all ADLs at this point. Was seeing Dr. Freed in 2019 when she received two cortisone injections which help for at least 6 months. Has had gel inj and PT for shoulder in past.  Last injection was 2.5 years ago. No recent tx. \par \par H/o of heart failure  [] : no [FreeTextEntry1] : bilateral shoulders  [FreeTextEntry5] : PATRICIA MORTON  is a 89 year female who is here today to follow up on bilateral shoulders. States she was doing better after csi, pain has return about a month. Limited ROM. Would like csi shot.

## 2023-01-19 NOTE — DISCUSSION/SUMMARY
[de-identified] : 89f Severe BL GH DJD. Visco injections discussed and pt would like to proceed. Euflexxa #1 Injection tolerated well. Post injection instructions reviewed.\par 1) wbat, cryotherapy\par 2) rtc 1 week\par  \par

## 2023-01-26 ENCOUNTER — APPOINTMENT (OUTPATIENT)
Dept: ORTHOPEDIC SURGERY | Facility: CLINIC | Age: 88
End: 2023-01-26

## 2023-02-01 ENCOUNTER — APPOINTMENT (OUTPATIENT)
Dept: ORTHOPEDIC SURGERY | Facility: CLINIC | Age: 88
End: 2023-02-01
Payer: MEDICARE

## 2023-02-01 VITALS — WEIGHT: 123 LBS | HEIGHT: 61 IN | BODY MASS INDEX: 23.22 KG/M2

## 2023-02-01 PROCEDURE — 20610 DRAIN/INJ JOINT/BURSA W/O US: CPT | Mod: 50

## 2023-02-01 NOTE — PROCEDURE
[Large Joint Injection] : Large joint injection [Bilateral] : bilaterally of the [Glenohumeral Joint] : glenohumeral joint [Pain] : pain [Inflammation] : inflammation [X-ray evidence of Osteoarthritis on this or prior visit] : x-ray evidence of Osteoarthritis on this or prior visit [Alcohol] : alcohol [Betadine] : betadine [Ethyl Chloride sprayed topically] : ethyl chloride sprayed topically [Sterile technique used] : sterile technique used [Euflexxa(20mg)] : 20mg of Euflexxa [#2] : series #2 [] : Patient tolerated procedure well [Call if redness, pain or fever occur] : call if redness, pain or fever occur [Previous OTC use and PT nontherapeutic] : patient has tried OTC's including aspirin, Ibuprofen, Aleve, etc or prescription NSAIDS, and/or exercises at home and/or physical therapy without satisfactory response [Patient had decreased mobility in the joint] : patient had decreased mobility in the joint [Risks, benefits, alternatives discussed / Verbal consent obtained] : the risks benefits, and alternatives have been discussed, and verbal consent was obtained

## 2023-02-01 NOTE — PHYSICAL EXAM
[Bilateral] : shoulder bilaterally [Sitting] : sitting [Severe] : severe [] : motor and sensory intact distally

## 2023-02-01 NOTE — HISTORY OF PRESENT ILLNESS
[] : This patient has had an injection before: yes [2] : 2 [Euflexxa] : Euflexxa [de-identified] : 2/1/23: Here to f/up b/l shoulders and Euflexxa #2\par 1/18/23: here for f/u on bilateral shoulders. Had relief from CSI until 1/13/23 when she started experiencing pain again. Taking tylenol for the pain with minimal relief. Here for bilateral visco injections in her shoulders\par 12/29/22: Here for f/u on bilateral shoulders; She reports B/L shoulder CSI given on 9/7/22 with one month of relief, worsening in past two weeks. She has been taking tylenol for pain with minimal to no relief, reports pain is severe, worse at night time, in addition reports limited ROM. She takes Xarelto.\par \par 9/7/22: 89F who presents today with B/L shoulder pain that has been occurring since February of 2022. Pain is deep and constant. Notes she is having pain with all ADLs at this point. Was seeing Dr. Freed in 2019 when she received two cortisone injections which help for at least 6 months. Has had gel inj and PT for shoulder in past.  Last injection was 2.5 years ago. No recent tx. \par \par H/o of heart failure  [FreeTextEntry1] : bilateral knees  [de-identified] : 01/18/22

## 2023-02-01 NOTE — DISCUSSION/SUMMARY
[de-identified] : 89f Severe BL GH DJD.  Euflexxa #2 Injection tolerated well. Post injection instructions reviewed.\par 1) wbat, cryotherapy\par 2) rtc 1 week\par \par Entered by Rain Melendez acting as scribe.\par \par  \par

## 2023-02-15 ENCOUNTER — APPOINTMENT (OUTPATIENT)
Dept: ORTHOPEDIC SURGERY | Facility: CLINIC | Age: 88
End: 2023-02-15
Payer: MEDICARE

## 2023-02-15 VITALS — WEIGHT: 123 LBS | HEIGHT: 61 IN | BODY MASS INDEX: 23.22 KG/M2

## 2023-02-15 PROCEDURE — 20610 DRAIN/INJ JOINT/BURSA W/O US: CPT | Mod: 50

## 2023-02-15 NOTE — DISCUSSION/SUMMARY
[de-identified] : 89f Severe BL GH DJD.  Euflexxa #3 Injection tolerated well. Post injection instructions reviewed.\par 1) wbat, cryotherapy\par 2) rtc prn \par \par Entered by Rain Melendez acting as scribe.\par \par  \par

## 2023-02-15 NOTE — HISTORY OF PRESENT ILLNESS
[] : This patient has had an injection before: yes [3] : 3 [Euflexxa] : Euflexxa [de-identified] : 2/15/23: here to f/up b/l shoulders and Euflexxa #3\par 2/1/23: Here to f/up b/l shoulders and Euflexxa #2\par 1/18/23: here for f/u on bilateral shoulders. Had relief from CSI until 1/13/23 when she started experiencing pain again. Taking tylenol for the pain with minimal relief. Here for bilateral visco injections in her shoulders\par 12/29/22: Here for f/u on bilateral shoulders; She reports B/L shoulder CSI given on 9/7/22 with one month of relief, worsening in past two weeks. She has been taking tylenol for pain with minimal to no relief, reports pain is severe, worse at night time, in addition reports limited ROM. She takes Xarelto.\par \par 9/7/22: 89F who presents today with B/L shoulder pain that has been occurring since February of 2022. Pain is deep and constant. Notes she is having pain with all ADLs at this point. Was seeing Dr. Freed in 2019 when she received two cortisone injections which help for at least 6 months. Has had gel inj and PT for shoulder in past.  Last injection was 2.5 years ago. No recent tx. \par \par H/o of heart failure  [FreeTextEntry1] : bilateral knees  [de-identified] : 02/01/23

## 2023-02-15 NOTE — PROCEDURE
[Large Joint Injection] : Large joint injection [Bilateral] : bilaterally of the [Glenohumeral Joint] : glenohumeral joint [Pain] : pain [Inflammation] : inflammation [X-ray evidence of Osteoarthritis on this or prior visit] : x-ray evidence of Osteoarthritis on this or prior visit [Alcohol] : alcohol [Betadine] : betadine [Ethyl Chloride sprayed topically] : ethyl chloride sprayed topically [Sterile technique used] : sterile technique used [Euflexxa(20mg)] : 20mg of Euflexxa [#3] : series #3 [] : Patient tolerated procedure well [Call if redness, pain or fever occur] : call if redness, pain or fever occur [Previous OTC use and PT nontherapeutic] : patient has tried OTC's including aspirin, Ibuprofen, Aleve, etc or prescription NSAIDS, and/or exercises at home and/or physical therapy without satisfactory response [Patient had decreased mobility in the joint] : patient had decreased mobility in the joint [Risks, benefits, alternatives discussed / Verbal consent obtained] : the risks benefits, and alternatives have been discussed, and verbal consent was obtained

## 2023-02-23 ENCOUNTER — APPOINTMENT (OUTPATIENT)
Dept: CARDIOLOGY | Facility: CLINIC | Age: 88
End: 2023-02-23

## 2023-03-13 PROBLEM — Z86.39 HISTORY OF HYPERKALEMIA: Status: RESOLVED | Noted: 2022-12-13 | Resolved: 2023-01-01

## 2023-03-13 NOTE — HISTORY OF PRESENT ILLNESS
[Home] : at home, [unfilled] , at the time of the visit. [Medical Office: ([facility])___] : at the medical office located in  [Verbal consent obtained from patient] : the patient, [unfilled] [FreeTextEntry1] : \par f/u [de-identified] : \par As this is a telemedicine visit, no physical exam could be performed.  Diagnosis and treatment is based on symptoms provided.\par \par 89 yo F pmhx hearing loss, hypothyroid, preDM, COPD, HTN, afib, cardiomyopathy, +PPM, TR, OA, gout, hx inpt stay 2/22 s/p mechanical fall with partial pelvic and coccyx bone fractures (no surgery needed) c/b CHF exacerbation for f/u\par \par \par Last seen 12/8/22 for AWV.\par \par Feeling well.\par Does not need med refills.\par \par Reports is socially distancing and using precautions for covid prevention.\par Denies sick or covid positive contacts.\par Denies fever, chills, cough or sob.\par -hx pfizer series, hx boosters x2 (last 9/22)\par \par c/o mild "black and blue" on top of hands - b/l, fading, no skin breakdown, no oozing.  Does not recall trauma.  Denies pain or any clinical bleeding.\par is on Xarelto\par \par Lives alone.\par Has HHA (2 aides) 6 hrs per day (private)\par -takes own meds, reports is adherent\par -gets help with dressing, showering (mainly for observing) and meals\par -eats on own, toilets on own\par -using rolling walker with seat for ambulation, doing OT and PT.  Denies recent falls.\par \par hx afib, cardiomyopathy, TR, HTN- hx chronic GARRIDO\par -followed by cardio, last seen 12/22 without changes made. Labs done. Advised wt monitoring to determine need for extra Torsemide dose if wt > 123.  F/u pending- plans to f/u soon.\par -on Xarelto, metoprolol 25 qd, Lasix 40 + 20 (taking this > 1 yr) and KCl 20\par -reports stable, chronic GARRIDO\par -hx LE edema- no recent issues\par -home wt: 118 - 121, has been stable\par -followed by optDr. Rosendo landers- last seen 2022, told doing well, new Rx for glasses given.  To f/u 1 yr- plans to f/u soon\par -has low salt and water intake\par -denies CP or palpitations\par \par hypothyroid, preDM-\par -on Synthroid 125, adherent\par \par hx OA shoulders- hx injections x3, last 2/23- reports stable\par -followed by ortho\par -on Tylenol ES prn, avoids NSAIDs\par \par hx gout- last attack in Spring 2022 in rehab (tx'd with "a shot")\par -on allopurinol 100 mg qd, taking for many yrs\par \par \par Denies GI or  complaints.\par

## 2023-03-13 NOTE — ASSESSMENT
[FreeTextEntry1] : \par 89 yo F pmhx hearing loss, hypothyroid, preDM, COPD, HTN, afib, cardiomyopathy, +PPM TR, OA, gout, hx inpt stay 2/22 s/p mechanical fall with partial pelvic and coccyx bone fractures (no surgery needed) c/b CHF exacerbation for f/u\par \par \par s/p mechanical fall with partial pelvic and coccyx bone fractures (no surgery needed)- resolved\par -doing OT/PT\par -has HHA\par \par hx afib, cardiomyopathy, +PPM, TR, HTN, CKD, chronic GARRIDO- BP wnl.  REports mild bruising.\par -followed by cardio, last seen 12/22 without changes made. Labs done. Advised wt monitoring to determine need for extra Torsemide dose if wt > 123.  F/u pending- encouraged.\par -on Xarelto, metoprolol 25 qd, Lasix 40 + 20 (taking this > 1 yr) and KCl 20\par -followed by optDr. Rosendo landers- last seen 2022, told doing well, new Rx for glasses given. To f/u 1 yr.\par -9/22 lipids, cmp wnl, GFR 45\par -11/22 bmp wnl, screening UA no prt\par -low salt diet advised\par -cont home wt and BP monitoring\par -advised to monitor for bruising and for onset of clinical bleeding- to f/u if worsened and seek prompt medical eval if clinical bleeding onset\par -check bmp, cbc\par \par COPD-\par -hx tobacco, quit 1978, hx 3-4/d x 30 yrs\par -hx followed by pulm (in [place])- states last seen in 2021 for eval of GARRIDO, told not COPD, told no tx needed and advised cardiology evaluation\par -asked to forward records\par \par hypothyroid- \par -on Synthroid 125, taking same dose x many yrs\par -12/22 TSH wnl, check repeat\par \par preDM- 12/22 A1c 6.4 (was 6.2)\par -ADA diet advised\par -check A1c\par \par hx anemia, hx CKD- unsure of dx, denies hx clinical bleeding\par -hx followed by Dr. Nelson mendoza ([place])\par -hx blood transfusions x 3 in 2020 - states labs rechecked, told all fine. Not seen since. States no OTC iron supplements advised.\par -9/22 Hg 11.1 (was 11.2 in 8/18)\par -11/22 cbc, B12/folate/iron studies wnl\par -check cbc, bmp\par \par hx OA shoulders- hx injections, last 2/23\par -followed by ortho\par -on Tylenol ES prn, avoids NSAIDs\par \par hx gout- last attack in Spring 2022 in rehab (tx'd with "a shot")\par -on allopurinol 100 mg qd, taking for many yrs\par \par MISC:  Home lab drawn to be ordered for [place] per request.\par \par \par HCM\par -hx CPE 12/22\par -9/22 cmp wnl\par -11/22 cbc, B12/folate/iron studies/bmp wnl\par -11/22 hep C screening negative\par -hx flu shot 10/22\par \par Pt's best #, home: [phone]\par Pt states daughter, Nella may also be called re: her medical care as needed, cell: [phone]\par \par Pt has prior scheduled office f/u appt 6/8/23.\par

## 2023-03-31 PROBLEM — R73.03 PREDIABETES: Status: RESOLVED | Noted: 2022-11-03 | Resolved: 2023-01-01

## 2023-03-31 PROBLEM — E11.9 DIABETES MELLITUS, TYPE 2: Status: ACTIVE | Noted: 2023-01-01

## 2023-05-10 NOTE — HISTORY OF PRESENT ILLNESS
[de-identified] : 5/10/23: here to f/up b/l shoulders. Has been experiencing worsening pain since 5/1/23. Has home PT \par 2/15/23: here to f/up b/l shoulders and Euflexxa #3\par 2/1/23: Here to f/up b/l shoulders and Euflexxa #2\par 1/18/23: here for f/u on bilateral shoulders. Had relief from CSI until 1/13/23 when she started experiencing pain again. Taking tylenol for the pain with minimal relief. Here for bilateral visco injections in her shoulders\par 12/29/22: Here for f/u on bilateral shoulders; She reports B/L shoulder CSI given on 9/7/22 with one month of relief, worsening in past two weeks. She has been taking tylenol for pain with minimal to no relief, reports pain is severe, worse at night time, in addition reports limited ROM. She takes Xarelto.\par \par 9/7/22: 89F who presents today with B/L shoulder pain that has been occurring since February of 2022. Pain is deep and constant. Notes she is having pain with all ADLs at this point. Was seeing Dr. Freed in 2019 when she received two cortisone injections which help for at least 6 months. Has had gel inj and PT for shoulder in past.  Last injection was 2.5 years ago. No recent tx. \par \par H/o of heart failure

## 2023-05-10 NOTE — DISCUSSION/SUMMARY
[de-identified] : 89f Severe BL GH DJD. \par \par 1) csi b/l shoulders today - tolerated well\par 2) cryotherapy, rest and activity modification\par 3) hep, encouraged staying active\par 4) rtc 3 months \par \par Entered by Rain Melendez acting as scribe.\par \par  \par

## 2023-05-10 NOTE — PROCEDURE
[Large Joint Injection] : Large joint injection [Bilateral] : bilaterally of the [Shoulder] : shoulder [Pain] : pain [Inflammation] : inflammation [Alcohol] : alcohol [Betadine] : betadine [Ethyl Chloride sprayed topically] : ethyl chloride sprayed topically [Sterile technique used] : sterile technique used [___ cc    1%] : Lidocaine ~Vcc of 1%  [___ cc    40mg] : Triamcinolone (Kenalog) ~Vcc of 40 mg  [____] : [unfilled] [] : Patient tolerated procedure well [Previous OTC use and PT nontherapeutic] : patient has tried OTC's including aspirin, Ibuprofen, Aleve, etc or prescription NSAIDS, and/or exercises at home and/or physical therapy without satisfactory response [Patient had decreased mobility in the joint] : patient had decreased mobility in the joint [Risks, benefits, alternatives discussed / Verbal consent obtained] : the risks benefits, and alternatives have been discussed, and verbal consent was obtained

## 2023-08-03 NOTE — DISCUSSION/SUMMARY
[FreeTextEntry1] : Pt will weigh herself daily. If she is >123 she will take an additional torsemide. She will see her PMD. She will observe a low salt diet. F/U in 3 months.  [EKG obtained to assist in diagnosis and management of assessed problem(s)] : EKG obtained to assist in diagnosis and management of assessed problem(s)

## 2023-08-03 NOTE — PHYSICAL EXAM
[Well Developed] : well developed [Well Nourished] : well nourished [No Acute Distress] : no acute distress [Normal Conjunctiva] : normal conjunctiva [Normal Venous Pressure] : normal venous pressure [No Carotid Bruit] : no carotid bruit [Normal S1, S2] : normal S1, S2 [No Rub] : no rub [No Gallop] : no gallop [Clear Lung Fields] : clear lung fields [Good Air Entry] : good air entry [No Respiratory Distress] : no respiratory distress  [Soft] : abdomen soft [Non Tender] : non-tender [No Masses/organomegaly] : no masses/organomegaly [Normal Bowel Sounds] : normal bowel sounds [Normal Gait] : normal gait [No Edema] : no edema [No Cyanosis] : no cyanosis [No Clubbing] : no clubbing [No Varicosities] : no varicosities [No Rash] : no rash [No Skin Lesions] : no skin lesions [Moves all extremities] : moves all extremities [No Focal Deficits] : no focal deficits [Normal Speech] : normal speech [Alert and Oriented] : alert and oriented [Normal memory] : normal memory [de-identified] : 1/6 REBECA

## 2023-08-03 NOTE — HISTORY OF PRESENT ILLNESS
[FreeTextEntry1] : 89 yo female presents for cardiac evaluation . HO history of atrial fibrillation and tricuspid regurgitation. Pt has been diagnosed with severe pulmonary hypertension. She is complaining of frequent urination which has improved since last visit.. Pt denies history of MI. She saw Dr. Meyer at [place] in the past. She was a remote smoker and has ?COPD. Medications were reviewed. Dtr is present.

## 2023-08-14 PROBLEM — Z95.0 HISTORY OF CARDIAC PACEMAKER: Status: RESOLVED | Noted: 2023-01-01 | Resolved: 2023-01-01

## 2023-08-14 PROBLEM — Z82.61 FAMILY HISTORY OF ARTHRITIS: Status: ACTIVE | Noted: 2023-01-01

## 2023-08-14 PROBLEM — Z86.2 HISTORY OF BLOOD CLOTTING DISORDER: Status: RESOLVED | Noted: 2023-01-01 | Resolved: 2023-01-01

## 2023-08-14 PROBLEM — R60.0 BILATERAL LEG EDEMA: Status: ACTIVE | Noted: 2022-12-09

## 2023-08-14 PROBLEM — Z86.19 HISTORY OF HEPATITIS: Status: RESOLVED | Noted: 2023-01-01 | Resolved: 2023-01-01

## 2023-08-14 NOTE — ASSESSMENT
[FreeTextEntry1] : Ms. PATRICIA MORTON is a 89 year with persistent bilateral lower extremity edema. Patient has intact pulses in both legs without evidence of arterial insufficiency.  No venous pathology found.

## 2023-08-14 NOTE — HISTORY OF PRESENT ILLNESS
[FreeTextEntry1] : Ms. PATRICIA MORTON is a 89-year F who presents for follow-up evaluation of bilateral leg pain for the past 3 months.  Ms. MORTON leg discomfort is associated with leg swelling and skin darkening at the ankles. She did buy compression stockings but due to the pain they are too difficult for her to put them on.  She recently saw her cardiologist that increased furosemide to 40mg daily. No previous treatment, vein procedures and vein surgeries.  She denies history of lower extremity claudication, rest pain, or tissue loss.

## 2023-08-14 NOTE — PHYSICAL EXAM
[1+] : left 1+ [Ankle Swelling (On Exam)] : present [] : present [Ankle Swelling Bilaterally] : severe [Varicose Veins Of Lower Extremities] : not present [FreeTextEntry1] : 2+ Edema bilateral Skin changes include: dry, flaky skin, hyperpigmentation in the gator area, hyperkeratosis No Ulcer  Palpable DP pulses bilaterally  No

## 2023-08-25 NOTE — ED ADULT TRIAGE NOTE - CHIEF COMPLAINT QUOTE
Pt presents to ED c/o left leg pain after hitting her leg yesterday when going to answer the phone. "I did not fall. It was bleeding a bit. My aide helped stop the bleeding but now behind my leg I have a big lump that's bruised and it is extremely painful." Pt denies fall/ head injury. Pt is on xeralto.

## 2023-08-25 NOTE — ED PROVIDER NOTE - OBJECTIVE STATEMENT
89 yo female with a PMHx of chronic CHF, hypothyroidism, Gout, HTN, COPD, Afib on xarelto, anemia, CAD, and osteopenia presents to the ED c/o left leg pain after hitting her leg yesterday when going to answer the phone. Pt banged it on furniture and developed a large hematoma. Pt having difficulty ambulating, no other injuries. Last took Xarelto last night. No history of DVT/PE.

## 2023-08-25 NOTE — ED ADULT NURSE NOTE - NSFALLHARMRISKINTERV_ED_ALL_ED

## 2023-08-25 NOTE — CONSULT NOTE ADULT - PROBLEM SELECTOR RECOMMENDATION 9
Case reviewed with Dr. Wesley, bleed is superficial and INR is elevated. As per surgical resident, pt already receiving KCentra for reversal. Recommend tight compression of left calf to stop bleeding, serial H/H and hemodynamics monitoring. Otherwise angioembolization should be used only as last resort if other methods fail to achieve hemostasis.

## 2023-08-25 NOTE — H&P ADULT - ASSESSMENT
89 yo female with LLE hematoma  afib on carelto s/p reversal with Kcentra  Bilateral ace wraps applied   CTA showed hematoma with active bleeding  IR consulted- recommended no intervention       Plan ;    admit to SICU under Dr Rojo   Pain control PRN   CBC q6h , transfuse PRN  Physical therapy   Hold DVT ppx, hold xarelto  resume home meds  DASH diet    Plan discussed with Dr. Rojo

## 2023-08-25 NOTE — H&P ADULT - ATTENDING COMMENTS
A/P:  Left lower leg hematoma s/p isolated trauma to region 8/24/23  Hospitalist consult for medical mgmt  IR consulted, no intervention  Sreial h/h  Wrap lower ext in ace compressive dressing  Fall risk precuations  Reversal of xarelto anticoagulation with KCENTRA in ED  GI/DVT prophylaxis  Pain control  F/U labs  SICU consulted, no need for step down or ICU bed  Pt will be monitored for signs of evolution/resolution of pathology and surgical intervention as required and warranted  Pt aware of and agrees with all of the above    75 minuted of time spent on pt examination, review of relevant labs and radiologic studies, assured stabilization of pt, discussion with relevant services/providers for coordination of pt care and services

## 2023-08-25 NOTE — ED PROVIDER NOTE - ENMT, MLM
Airway patent, Nasal mucosa clear. Mouth with normal mucosa. Throat has no vesicles, no oropharyngeal exudates and uvula is midline. vision 20/50 bilaterally with reading glasses but not distance glasses.

## 2023-08-25 NOTE — ED PROVIDER NOTE - MUSCULOSKELETAL, MLM
left LE has skin tear of 1 cm as well as large 10cm hematoma, neurovascularly intact, no active bleeding

## 2023-08-25 NOTE — H&P ADULT - NSICDXPASTMEDICALHX_GEN_ALL_CORE_FT
PAST MEDICAL HISTORY:  Afib     Anemia     Anxiety     Aortic valve regurgitation     CAD (coronary artery disease)     Cardiac pacemaker     CHF (congestive heart failure)     Chronic CHF     Chronic obstructive pulmonary disease (COPD)     COPD (chronic obstructive pulmonary disease)     Gout     Gout     History of ST elevation myocardial infarction (STEMI)     HTN (hypertension)     HTN (hypertension)     Hypothyroid     Hypothyroidism     Insomnia     Mitral valve insufficiency     Osteopenia

## 2023-08-25 NOTE — ED ADULT NURSE REASSESSMENT NOTE - NS ED NURSE REASSESS COMMENT FT1
Pt. received from previous RN in stable condition, no s/sx of resp. distress/pain.  Able to make all needs known, family at bedside advocating for pt.  VSS, safety maintained, and emotional support provided. Bilat legs wrapped with ACE bandages. Call bell within reach, will continue to monitor.

## 2023-08-25 NOTE — H&P ADULT - NSHPPHYSICALEXAM_GEN_ALL_CORE
Primary Survey  A - airway intact  B - bilateral breath sounds and good chest rise  C - initially BP: 90/57 (08-25-23 @ 11:19), palpable pulses in all extremities  D - GCS 15 on arrival  Exposure obtained      Secondary survey  General: Well developed, in no acute distress.   HEENT: NC/AT, PERRLA EOMI  Chest: Lungs clear, normal resp effort  Heart: RRR  Abdomen: Soft, no tenderness, no masses  Back: Normal curvature, no tenderness.   Neuro: Physiological, no localizing findings.   Skin: Normal, no rashes, venous stasis dermatitis B/L LE with bluish black discoloration of legs  Extremities: Warm, well perfused, bilateral pitting edema, bilateral LE tender to touch, 16 x 8 cm superifical hematoma on the L leg Primary Survey  A - airway intact  B - bilateral breath sounds and good chest rise  C - initially BP: 90/57 (08-25-23 @ 11:19), palpable pulses in all extremities  D - GCS 15 on arrival  Exposure obtained      Secondary survey  General: Well developed, in no acute distress.   HEENT: NC/AT, PERRLA EOMI  Neck: no jvd or tracheal deviation  Chest: Lungs clear, normal resp effort  Heart: RRR  Abdomen: Soft, no tenderness, no masses  Back: Normal curvature, no tenderness.   Neuro: Physiological, no localizing findings.   PsychL normal affect  Skin: Normal, no rashes, venous stasis dermatitis B/L LE with bluish black discoloration of legs  Extremities: Warm, well perfused, bilateral pitting edema, bilateral LE tender to touch, 16 x 8 cm superifical hematoma on the Left lower leg, wrapped in ace compressive bandage

## 2023-08-25 NOTE — H&P ADULT - HISTORY OF PRESENT ILLNESS
Level 3 Trauma Activation    HPI:    Patient is a 89y year old female with PMHx chronic CHF, hypothyroidism, Gout, HTN, COPD, Afib on xarelto, anemia, CAD, and osteopenia presents to the ED c/o left leg pain after hitting her leg yesterday when going to answer the phone. Pt banged it on furniture and developed a large hematoma. Pt having difficulty ambulating, no other injuries. Last took Xarelto last night. No history of DVT/PE.   Level 3 Trauma Activation    HPI:    Patient is a 89y year old female with PMHx chronic CHF, hypothyroidism, Gout, HTN, COPD, Afib on xarelto, anemia, CAD, and osteopenia presents to the ED c/o left leg pain after hitting her leg yesterday 8/24/23, when going to answer the phone. Pt banged it on furniture and developed a large hematoma. Pt having difficulty ambulating, no other injuries. Last took Xarelto last night. No history of DVT/PE.    Pt denied head strike LOC or any traumatic injuries/complaints

## 2023-08-25 NOTE — CONSULT NOTE ADULT - SUBJECTIVE AND OBJECTIVE BOX
CC: LLE hematoma (25 Aug 2023 22:51)    HPI: Level 3 Trauma Activation  "Patient is a 89y year old female with PMHx chronic CHF, hypothyroidism, Gout, HTN, COPD, Afib on xarelto, anemia, CAD, and osteopenia presents to the ED c/o left leg pain after hitting her leg yesterday 23, when going to answer the phone. Pt banged it on furniture and developed a large hematoma. Pt having difficulty ambulating, no other injuries. Last took Xarelto last night. No history of DVT/PE. Pt denied head strike LOC or any traumatic injuries/complaints (25 Aug 2023 13:27)"    INTERVAL HPI/OVERNIGHT EVENTS: Medicine consulted for co-management of chronic medical conditions. On evaluation, pt lethargic s/p pain medication but easily arousable. Pt poor historian.     SOCIAL HX: no toxic habits, lives at facility    FAMILY HISTORY: Unable to obtain, pt poor historian    ALLERGIES: codeine (Unknown)    PAST MEDICAL & SURGICAL HISTORY:  Mitral valve insufficiency  Aortic valve regurgitation  Osteopenia  CAD (coronary artery disease)  Gout  Hypothyroid  CHF (congestive heart failure)  Anxiety  Anemia  HTN (hypertension)  Afib  COPD (chronic obstructive pulmonary disease)  Chronic obstructive pulmonary disease (COPD)  HTN (hypertension)  Cardiac pacemaker  Gout  Hypothyroidism  Insomnia  Chronic CHF  History of ST elevation myocardial infarction (STEMI)  Status cardiac pacemaker  S/P knee replacement                Vital Signs Last 24 Hrs  T(C): 37.1 (25 Aug 2023 20:56), Max: 37.1 (25 Aug 2023 20:56)  T(F): 98.7 (25 Aug 2023 20:56), Max: 98.7 (25 Aug 2023 20:56)  HR: 56 (25 Aug 2023 20:56) (56 - 99)  BP: 103/58 (25 Aug 2023 20:56) (90/57 - 147/69)  BP(mean): 68 (25 Aug 2023 11:19) (65 - 68)  RR: 18 (25 Aug 2023 20:56) (18 - 18)  SpO2: 100% (25 Aug 2023 20:56) (90% - 100%)    Parameters below as of 25 Aug 2023 20:56  Patient On (Oxygen Delivery Method): nasal cannula  O2 Flow (L/min): 3    I&O's Detail                      10.5   6.85  )-----------( 128      ( 25 Aug 2023 20:52 )             32.9     25 Aug 2023 08:21    141    |  103    |  44     ----------------------------<  121    3.8     |  34     |  1.23     Ca    8.7        25 Aug 2023 08:21    TPro  6.4    /  Alb  3.3    /  TBili  0.9    /  DBili  x      /  AST  24     /  ALT  20     /  AlkPhos  91     25 Aug 2023 08:21    PT/INR - ( 25 Aug 2023 12:07 )   PT: 19.6 sec;   INR: 1.76 ratio    PTT - ( 25 Aug 2023 12:07 )  PTT:43.0 sec  CAPILLARY BLOOD GLUCOSE  LIVER FUNCTIONS - ( 25 Aug 2023 08:21 )  Alb: 3.3 g/dL / Pro: 6.4 gm/dL / ALK PHOS: 91 U/L / ALT: 20 U/L / AST: 24 U/L / GGT: x         Urinalysis Basic - ( 25 Aug 2023 23:14 )  Color: Yellow / Appearance: Clear / S.010 / pH: x  Gluc: x / Ketone: Negative  / Bili: Negative / Urobili: Negative   Blood: x / Protein: Trace mg/dL / Nitrite: Negative   Leuk Esterase: Small / RBC: Negative /HPF / WBC 11-25 /HPF   Sq Epi: x / Non Sq Epi: x / Bacteria: Few    MEDICATIONS  (STANDING):  allopurinol 100 milliGRAM(s) Oral daily  calcium carbonate 1250 mG  + Vitamin D (OsCal 500 + D) 1 Tablet(s) Oral daily  furosemide    Tablet 40 milliGRAM(s) Oral daily  lactated ringers. 1000 milliLiter(s) (60 mL/Hr) IV Continuous <Continuous>  levothyroxine 125 MICROGram(s) Oral daily  metoprolol succinate ER 25 milliGRAM(s) Oral daily  multivitamin/minerals 1 Tablet(s) Oral daily    MEDICATIONS  (PRN):  acetaminophen   IVPB .. 1000 milliGRAM(s) IV Intermittent once PRN Temp greater or equal to 38C (100.4F), Mild Pain (1 - 3)  morphine  - Injectable 2 milliGRAM(s) IV Push every 4 hours PRN Moderate Pain (4 - 6)  ondansetron Injectable 4 milliGRAM(s) IV Push every 6 hours PRN Nausea and/or Vomiting   CC: LLE hematoma (25 Aug 2023 22:51)    HPI: Level 3 Trauma Activation  "Patient is a 89y year old female with PMHx chronic CHF, hypothyroidism, Gout, HTN, COPD, Afib on xarelto, anemia, CAD, and osteopenia presents to the ED c/o left leg pain after hitting her leg yesterday 23, when going to answer the phone. Pt banged it on furniture and developed a large hematoma. Pt having difficulty ambulating, no other injuries. Last took Xarelto last night. No history of DVT/PE. Pt denied head strike LOC or any traumatic injuries/complaints (25 Aug 2023 13:27)"    INTERVAL HPI/OVERNIGHT EVENTS: Medicine consulted for co-management of chronic medical conditions. On evaluation, pt lethargic s/p pain medication but easily arousable. Pt poor historian.     SOCIAL HX: no toxic habits, lives at facility    FAMILY HISTORY: Unable to obtain, pt poor historian    ALLERGIES: codeine (Unknown)    PAST MEDICAL & SURGICAL HISTORY:  Mitral valve insufficiency  Aortic valve regurgitation  Osteopenia  CAD (coronary artery disease)  Gout  Hypothyroid  CHF (congestive heart failure)  Anxiety  Anemia  HTN (hypertension)  Afib  COPD (chronic obstructive pulmonary disease)  Chronic obstructive pulmonary disease (COPD)  HTN (hypertension)  Cardiac pacemaker  Gout  Hypothyroidism  Insomnia  Chronic CHF  History of ST elevation myocardial infarction (STEMI)  Status cardiac pacemaker  S/P knee replacement    Vital Signs Last 24 Hrs  T(C): 37.1 (25 Aug 2023 20:56), Max: 37.1 (25 Aug 2023 20:56)  T(F): 98.7 (25 Aug 2023 20:56), Max: 98.7 (25 Aug 2023 20:56)  HR: 56 (25 Aug 2023 20:56) (56 - 99)  BP: 103/58 (25 Aug 2023 20:56) (90/57 - 147/69)  BP(mean): 68 (25 Aug 2023 11:19) (65 - 68)  RR: 18 (25 Aug 2023 20:56) (18 - 18)  SpO2: 100% (25 Aug 2023 20:56) (90% - 100%)    Parameters below as of 25 Aug 2023 20:56  Patient On (Oxygen Delivery Method): nasal cannula  O2 Flow (L/min): 3    I&O's Detail                      10.5   6.85  )-----------( 128      ( 25 Aug 2023 20:52 )             32.9     25 Aug 2023 08:21    141    |  103    |  44     ----------------------------<  121    3.8     |  34     |  1.23     Ca    8.7        25 Aug 2023 08:21    TPro  6.4    /  Alb  3.3    /  TBili  0.9    /  DBili  x      /  AST  24     /  ALT  20     /  AlkPhos  91     25 Aug 2023 08:21    PT/INR - ( 25 Aug 2023 12:07 )   PT: 19.6 sec;   INR: 1.76 ratio    PTT - ( 25 Aug 2023 12:07 )  PTT:43.0 sec  CAPILLARY BLOOD GLUCOSE  LIVER FUNCTIONS - ( 25 Aug 2023 08:21 )  Alb: 3.3 g/dL / Pro: 6.4 gm/dL / ALK PHOS: 91 U/L / ALT: 20 U/L / AST: 24 U/L / GGT: x         Urinalysis Basic - ( 25 Aug 2023 23:14 )  Color: Yellow / Appearance: Clear / S.010 / pH: x  Gluc: x / Ketone: Negative  / Bili: Negative / Urobili: Negative   Blood: x / Protein: Trace mg/dL / Nitrite: Negative   Leuk Esterase: Small / RBC: Negative /HPF / WBC 11-25 /HPF   Sq Epi: x / Non Sq Epi: x / Bacteria: Few    MEDICATIONS  (STANDING):  allopurinol 100 milliGRAM(s) Oral daily  calcium carbonate 1250 mG  + Vitamin D (OsCal 500 + D) 1 Tablet(s) Oral daily  furosemide    Tablet 40 milliGRAM(s) Oral daily  lactated ringers. 1000 milliLiter(s) (60 mL/Hr) IV Continuous <Continuous>  levothyroxine 125 MICROGram(s) Oral daily  metoprolol succinate ER 25 milliGRAM(s) Oral daily  multivitamin/minerals 1 Tablet(s) Oral daily    MEDICATIONS  (PRN):  acetaminophen   IVPB .. 1000 milliGRAM(s) IV Intermittent once PRN Temp greater or equal to 38C (100.4F), Mild Pain (1 - 3)  morphine  - Injectable 2 milliGRAM(s) IV Push every 4 hours PRN Moderate Pain (4 - 6)  ondansetron Injectable 4 milliGRAM(s) IV Push every 6 hours PRN Nausea and/or Vomiting

## 2023-08-25 NOTE — ED PROVIDER NOTE - CLINICAL SUMMARY MEDICAL DECISION MAKING FREE TEXT BOX
Will r/o fx and hematoma, on blood thinner, skin tear. Plan tdap, cbc, cmp, pain control, and wound care consult.

## 2023-08-25 NOTE — CONSULT NOTE ADULT - SUBJECTIVE AND OBJECTIVE BOX
Chief Complaint:  Patient is a 89y old  Female who presents with a chief complaint of left calf hematoma    HPI:  89yo F with left calf hematoma 2/2 fall.     Allergies  codeine (Unknown)    MEDICATIONS  (STANDING):  lactated ringers. 1000 milliLiter(s) (60 mL/Hr) IV Continuous <Continuous>    MEDICATIONS  (PRN):  acetaminophen   IVPB .. 1000 milliGRAM(s) IV Intermittent once PRN Temp greater or equal to 38C (100.4F), Mild Pain (1 - 3)  morphine  - Injectable 2 milliGRAM(s) IV Push every 4 hours PRN Moderate Pain (4 - 6)  ondansetron Injectable 4 milliGRAM(s) IV Push every 6 hours PRN Nausea and/or Vomiting      PAST MEDICAL & SURGICAL HISTORY:  Mitral valve insufficiency      Aortic valve regurgitation      Osteopenia      CAD (coronary artery disease)      Gout      Hypothyroid      CHF (congestive heart failure)      Anxiety      Anemia      HTN (hypertension)      Afib      COPD (chronic obstructive pulmonary disease)      Chronic obstructive pulmonary disease (COPD)      HTN (hypertension)      Cardiac pacemaker      Gout      Hypothyroidism      Insomnia      Chronic CHF      History of ST elevation myocardial infarction (STEMI)      Status cardiac pacemaker      S/P knee replacement      No significant past surgical history          FAMILY HISTORY:  No pertinent family history in first degree relatives    No pertinent family history in first degree relatives        Vital Signs Last 24 Hrs  T(C): 36.5 (25 Aug 2023 07:35), Max: 36.5 (25 Aug 2023 07:35)  T(F): 97.7 (25 Aug 2023 07:35), Max: 97.7 (25 Aug 2023 07:35)  HR: 70 (25 Aug 2023 11:19) (70 - 86)  BP: 90/57 (25 Aug 2023 11:19) (90/57 - 102/47)  BP(mean): 68 (25 Aug 2023 11:19) (65 - 68)  RR: 18 (25 Aug 2023 11:19) (18 - 18)  SpO2: 90% (25 Aug 2023 11:19) (90% - 93%)    Parameters below as of 25 Aug 2023 11:19  Patient On (Oxygen Delivery Method): room air      CBC                        11.3   6.95  )-----------( 144      ( 25 Aug 2023 08:21 )             33.9       Chemistry  08-25    141  |  103  |  44<H>  ----------------------------<  121<H>  3.8   |  34<H>  |  1.23    Ca    8.7      25 Aug 2023 08:21    TPro  6.4  /  Alb  3.3  /  TBili  0.9  /  DBili  x   /  AST  24  /  ALT  20  /  AlkPhos  91  08-25    Coags  PT/INR - ( 25 Aug 2023 08:21 )   PT: 25.1 sec;   INR: 2.28 ratio    PTT - ( 25 Aug 2023 08:21 )  PTT:45.1 sec    < from: CT Angio Lower Extremity w/ IV Cont, Left (08.25.23 @ 10:33) >  IMPRESSION:    Large superficial left calf hematoma with active bleeding.    < end of copied text >

## 2023-08-25 NOTE — CONSULT NOTE ADULT - ASSESSMENT
Pt is a 89 yo female admitted due to:    #Left calf hematoma  HOLD AC- on for Afib, pt high fall risk would not continue however please discuss with pt cardiologist   pt on AC, reversed in ED with Kcentra  serial cbc  bed rest  management per primary team    #Atrial fibrillation, rate controlled  c/w metoprolol  keep k>4, Mg >2    #CHF  caution with IVF, reassess in AM for continued need  c/w lasix with close monitoring of renal function  weight daily  strict i/o's    #Hypothyroid  c/w synthroid    #Gout   c/w allopurinol    #COPD  pulse ox q8 hrs  c/w supplemental O2  cough/deep breathing  incentive spirotmetry

## 2023-08-25 NOTE — H&P ADULT - NSICDXPASTSURGICALHX_GEN_ALL_CORE_FT
PAST SURGICAL HISTORY:  No significant past surgical history     S/P knee replacement     Status cardiac pacemaker

## 2023-08-25 NOTE — CONSULT NOTE ADULT - ASSESSMENT
89yo F with left calf hematoma referred to IR for evaluation  - H/H 11.3/33  - INR 2.28  - Pt on Xarelto, Rivaroxaban at home

## 2023-08-25 NOTE — PATIENT PROFILE ADULT - CAREGIVER ADDRESS
Therapeutic Exercise (timed):  increase ROM, strength, coordination, balance, and proprioception to improve patient's ability to progress to PLOF and address remaining functional goals. (see flow sheet as applicable)     Details if applicable:       10  32990 Neuromuscular Re-Education (timed):  improve balance, coordination, kinesthetic sense, posture, core stability and proprioception to improve patient's ability to develop conscious control of individual muscles and awareness of position of extremities in order to progress to PLOF and address remaining functional goals. (see flow sheet as applicable)     Details if applicable:  quad re-ed   15  07218 Therapeutic Activity (timed):  use of dynamic activities replicating functional movements to increase ROM, strength, coordination, balance, and proprioception in order to improve patient's ability to progress to PLOF and address remaining functional goals. (see flow sheet as applicable)     Details if applicable:  standing functional hip strength, squats   40  MC BC Totals Reminder: bill using total billable min of TIMED therapeutic procedures (example: do not include dry needle or estim unattended, both untimed codes, in totals to left)  8-22 min = 1 unit; 23-37 min = 2 units; 38-52 min = 3 units; 53-67 min = 4 units; 68-82 min = 5 units   Total Total     [x]  Patient Education billed concurrently with other procedures   [x] Review HEP    [] Progressed/Changed HEP, detail:    [] Other detail:       Objective Information/Functional Measures/Assessment    Patient appears to be making progress with knee extension ROM with improved TKE with gait, though demonstrates functional quad weakness with step ups. Knee flexion ROM remains limited  compared to right with DKTC stretch. Demonstrates good form with squats in \\ bars.     Patient will continue to benefit from skilled PT / OT services to modify and progress therapeutic interventions, analyze and address functional
n/a

## 2023-08-25 NOTE — H&P ADULT - NSHPLABSRESULTS_GEN_ALL_CORE
Labs:                        11.3   6.95  )-----------( 144      ( 25 Aug 2023 08:21 )             33.9     08-25    141  |  103  |  44<H>  ----------------------------<  121<H>  3.8   |  34<H>  |  1.23    Ca    8.7      25 Aug 2023 08:21    TPro  6.4  /  Alb  3.3  /  TBili  0.9  /  DBili  x   /  AST  24  /  ALT  20  /  AlkPhos  91  08-25    PT/INR - ( 25 Aug 2023 12:07 )   PT: 19.6 sec;   INR: 1.76 ratio         PTT - ( 25 Aug 2023 12:07 )  PTT:43.0 sec  Urinalysis Basic - ( 25 Aug 2023 08:21 )    Color: x / Appearance: x / SG: x / pH: x  Gluc: 121 mg/dL / Ketone: x  / Bili: x / Urobili: x   Blood: x / Protein: x / Nitrite: x   Leuk Esterase: x / RBC: x / WBC x   Sq Epi: x / Non Sq Epi: x / Bacteria: x Labs:                        11.3   6.95  )-----------( 144      ( 25 Aug 2023 08:21 )             33.9     08-25    141  |  103  |  44<H>  ----------------------------<  121<H>  3.8   |  34<H>  |  1.23    Ca    8.7      25 Aug 2023 08:21    TPro  6.4  /  Alb  3.3  /  TBili  0.9  /  DBili  x   /  AST  24  /  ALT  20  /  AlkPhos  91  08-25    PT/INR - ( 25 Aug 2023 12:07 )   PT: 19.6 sec;   INR: 1.76 ratio         PTT - ( 25 Aug 2023 12:07 )  PTT:43.0 sec  Urinalysis Basic - ( 25 Aug 2023 08:21 )    Color: x / Appearance: x / SG: x / pH: x  Gluc: 121 mg/dL / Ketone: x  / Bili: x / Urobili: x   Blood: x / Protein: x / Nitrite: x   Leuk Esterase: x / RBC: x / WBC x   Sq Epi: x / Non Sq Epi: x / Bacteria: x    Rad: < from: CT Angio Lower Extremity w/ IV Cont, Left (08.25.23 @ 10:33) >    ACC: [id number] EXAM:  CT ANGIO LWR EXT (W)AW IC LT   ORDERED BY: TANYA OBRIEN     PROCEDURE DATE:  08/25/2023          INTERPRETATION:  Clinical information: Lower leg hematoma      < end of copied text >  < from: CT Angio Lower Extremity w/ IV Cont, Left (08.25.23 @ 10:33) >    IMPRESSION:    Large superficial left calf hematoma with active bleeding.    Three-vessel runoff to the left foot.    Two-vessel runoff to the right foot via the anterior tibial/dorsalis   pedis and peroneal arteries.    Findings were discussed with Dr. Rojo by Dr. Morris on August 25, 2023, 11:20 AM.    --- End of Report ---    < end of copied text >

## 2023-08-25 NOTE — ED ADULT NURSE NOTE - OBJECTIVE STATEMENT
Pt is a 89 y/o female who presents tot the ED with c/o of left low leg pain 10/10. Pt states she hit her leg yesterday Pt is a 89 y/o female who presents tot the ED with c/o of left low leg pain 10/10. Pt states she hit her leg yesterday and he aid helped her clean her leg up. She states she went to bed and the leg became inflamed, bruised and very painful. Pt states she is on Xarelto. Pt denies chest pain or SOB. Pt's daughter is at bedside. Safety and comfort measures maintained.

## 2023-08-26 NOTE — PROGRESS NOTE ADULT - SUBJECTIVE AND OBJECTIVE BOX
CC:Patient is a 89y old  Female who presents with a chief complaint of LLE hematoma (25 Aug 2023 22:51)      Subjective:  Pt seen and examined at bedside. Pt is AAOx3, pt in no acute distress. Pt had jackson catheter placed overnight as she is on bed rest and did not want use bedpan. Pt denies  fever, chills, chest pain, SOB, abd pain, N/V/D, extremity pain or dysfunction, hemoptysis, hematemesis, hematuria, hematochezia headache, diplopia, vertigo, dizziness Pt tolerating diet, (+) void, (+) ambulation, (+) bowel function    ROS:  otherwise as abovementioned ROS    Vital Signs Last 24 Hrs  T(C): 37.1 (25 Aug 2023 20:56), Max: 37.1 (25 Aug 2023 20:56)  T(F): 98.7 (25 Aug 2023 20:56), Max: 98.7 (25 Aug 2023 20:56)  HR: 110 (26 Aug 2023 03:46) (56 - 110)  BP: 103/84 (26 Aug 2023 03:46) (90/57 - 147/69)  BP(mean): 68 (25 Aug 2023 11:19) (65 - 68)  RR: 18 (26 Aug 2023 03:46) (18 - 18)  SpO2: 100% (26 Aug 2023 03:46) (90% - 100%)    Parameters below as of 26 Aug 2023 03:46  Patient On (Oxygen Delivery Method): nasal cannula  O2 Flow (L/min): 6      Labs:                                10.4   6.43  )-----------( 131      ( 26 Aug 2023 02:27 )             32.5     CBC Full  -  ( 26 Aug 2023 02:27 )  WBC Count : 6.43 K/uL  RBC Count : 3.00 M/uL  Hemoglobin : 10.4 g/dL  Hematocrit : 32.5 %  Platelet Count - Automated : 131 K/uL  Mean Cell Volume : 108.3 fl  Mean Cell Hemoglobin : 34.7 pg  Mean Cell Hemoglobin Concentration : 32.0 gm/dL  Auto Neutrophil # : x  Auto Lymphocyte # : x  Auto Monocyte # : x  Auto Eosinophil # : x  Auto Basophil # : x  Auto Neutrophil % : x  Auto Lymphocyte % : x  Auto Monocyte % : x  Auto Eosinophil % : x  Auto Basophil % : x    08-25    141  |  103  |  44<H>  ----------------------------<  121<H>  3.8   |  34<H>  |  1.23    Ca    8.7      25 Aug 2023 08:21    TPro  6.4  /  Alb  3.3  /  TBili  0.9  /  DBili  x   /  AST  24  /  ALT  20  /  AlkPhos  91  08-25    LIVER FUNCTIONS - ( 25 Aug 2023 08:21 )  Alb: 3.3 g/dL / Pro: 6.4 gm/dL / ALK PHOS: 91 U/L / ALT: 20 U/L / AST: 24 U/L / GGT: x           PT/INR - ( 25 Aug 2023 12:07 )   PT: 19.6 sec;   INR: 1.76 ratio         PTT - ( 25 Aug 2023 12:07 )  PTT:43.0 sec      Meds:  acetaminophen   IVPB .. 1000 milliGRAM(s) IV Intermittent once PRN  allopurinol 100 milliGRAM(s) Oral daily  calcium carbonate 1250 mG  + Vitamin D (OsCal 500 + D) 1 Tablet(s) Oral daily  furosemide    Tablet 40 milliGRAM(s) Oral daily  lactated ringers. 1000 milliLiter(s) IV Continuous <Continuous>  levothyroxine 125 MICROGram(s) Oral daily  metoprolol succinate ER 25 milliGRAM(s) Oral daily  morphine  - Injectable 2 milliGRAM(s) IV Push every 4 hours PRN  multivitamin/minerals 1 Tablet(s) Oral daily  ondansetron Injectable 4 milliGRAM(s) IV Push every 6 hours PRN      Physical exam:  Pt is aaox3  Pt in no acute distress  Psych: normal affect  Resp: CTAB  CVS: S1S2(+)  ABD: bowel sounds (+), soft, non distended, no rebound, no guarding, no rigidity, no skin changes to exam. Incision site is clean, dry, intact, no cellulitis, no d/c, no purulence, no fluctuance.   EXT: no calf tenderness or edema to exam b/l, b/l LE ACE wrap in place, no sign of bleeding   : jackson catheter   Skin: no adverse skin changes to exam       CC:Patient is a 89y old  Female who presents with a chief complaint of LLE hematoma (25 Aug 2023 22:51)      Subjective:  Pt seen and examined at bedside. Pt is AAOx3, pt in no acute distress. Pt had jackson catheter placed overnight as she is on bed rest and did not want use bedpan. Pt denies  fever, chills, chest pain, SOB, abd pain, N/V/D, extremity dysfunction, hemoptysis, hematemesis, hematuria, hematochezia headache, diplopia, vertigo, dizziness Pt tolerating diet, (+) jackson, (+) oob w/ assist, (+) bowel function, + pain control to lower ext    ROS:  otherwise as abovementioned ROS    Vital Signs Last 24 Hrs  T(C): 37.1 (25 Aug 2023 20:56), Max: 37.1 (25 Aug 2023 20:56)  T(F): 98.7 (25 Aug 2023 20:56), Max: 98.7 (25 Aug 2023 20:56)  HR: 110 (26 Aug 2023 03:46) (56 - 110)  BP: 103/84 (26 Aug 2023 03:46) (90/57 - 147/69)  BP(mean): 68 (25 Aug 2023 11:19) (65 - 68)  RR: 18 (26 Aug 2023 03:46) (18 - 18)  SpO2: 100% (26 Aug 2023 03:46) (90% - 100%)    Parameters below as of 26 Aug 2023 03:46  Patient On (Oxygen Delivery Method): nasal cannula  O2 Flow (L/min): 6      Labs:                                10.4   6.43  )-----------( 131      ( 26 Aug 2023 02:27 )             32.5     CBC Full  -  ( 26 Aug 2023 02:27 )  WBC Count : 6.43 K/uL  RBC Count : 3.00 M/uL  Hemoglobin : 10.4 g/dL  Hematocrit : 32.5 %  Platelet Count - Automated : 131 K/uL  Mean Cell Volume : 108.3 fl  Mean Cell Hemoglobin : 34.7 pg  Mean Cell Hemoglobin Concentration : 32.0 gm/dL  Auto Neutrophil # : x  Auto Lymphocyte # : x  Auto Monocyte # : x  Auto Eosinophil # : x  Auto Basophil # : x  Auto Neutrophil % : x  Auto Lymphocyte % : x  Auto Monocyte % : x  Auto Eosinophil % : x  Auto Basophil % : x    08-25    141  |  103  |  44<H>  ----------------------------<  121<H>  3.8   |  34<H>  |  1.23    Ca    8.7      25 Aug 2023 08:21    TPro  6.4  /  Alb  3.3  /  TBili  0.9  /  DBili  x   /  AST  24  /  ALT  20  /  AlkPhos  91  08-25    LIVER FUNCTIONS - ( 25 Aug 2023 08:21 )  Alb: 3.3 g/dL / Pro: 6.4 gm/dL / ALK PHOS: 91 U/L / ALT: 20 U/L / AST: 24 U/L / GGT: x           PT/INR - ( 25 Aug 2023 12:07 )   PT: 19.6 sec;   INR: 1.76 ratio         PTT - ( 25 Aug 2023 12:07 )  PTT:43.0 sec      Meds:  acetaminophen   IVPB .. 1000 milliGRAM(s) IV Intermittent once PRN  allopurinol 100 milliGRAM(s) Oral daily  calcium carbonate 1250 mG  + Vitamin D (OsCal 500 + D) 1 Tablet(s) Oral daily  furosemide    Tablet 40 milliGRAM(s) Oral daily  lactated ringers. 1000 milliLiter(s) IV Continuous <Continuous>  levothyroxine 125 MICROGram(s) Oral daily  metoprolol succinate ER 25 milliGRAM(s) Oral daily  morphine  - Injectable 2 milliGRAM(s) IV Push every 4 hours PRN  multivitamin/minerals 1 Tablet(s) Oral daily  ondansetron Injectable 4 milliGRAM(s) IV Push every 6 hours PRN      Physical exam:  Pt is aaox3  Pt in no acute distress  Psych: normal affect  HEENT: NCAT, EOM wnl  Neck: no jvd or tracheal deviation  Resp: CTAB  CVS: S1S2(+)  ABD: bowel sounds (+), soft, non distended, no rebound, no guarding, no rigidity, no skin changes to exam. No tenderness  EXT: no calf tenderness or edema to exam b/l, b/l LE ACE wrap in place, no sign of bleeding or hemorrhage  : jackson catheter w/ clear urine output  Skin: no adverse skin changes to exam otherwise  Neuro: CNII-XII grossly intact

## 2023-08-26 NOTE — PROGRESS NOTE ADULT - ATTENDING COMMENTS
A/P:  Left lower leg hematoma s/p isolated trauma to region 8/24/23, H/H and HD stable  Hospitalist on consult for medical mgmt  IR consulted, no intervention  Serial h/h sable  Cont wrap lower ext in ace compressive dressing  Fall risk precautions  Reversal of xarelto anticoagulation with KCENTRA in ED  GI/DVT prophylaxis  Pain control, avoid narcotics as pt gets somnolent with morphine  F/U labs  SICU consulted, no need for step down or ICU bed  Cont current care and meds  Fall risk precautions  SS for d/c planning  PT  Pt aware of and agrees with all of the above    35 minuted of time spent on pt examination, review of relevant labs and radiologic studies, assured stabilization of pt, discussion with relevant services/providers for coordination of pt care and services .

## 2023-08-26 NOTE — PROGRESS NOTE ADULT - ASSESSMENT
A/P:  89 yo female with LLE hematoma  afib on carelto s/p reversal with Kcentra  Bilateral ace wraps applied   CTA showed hematoma with active bleeding  h/h stable    P:  trend h/h q12  IR consulted- recommended no intervention   Left lower leg hematoma s/p isolated trauma to region 8/24/23  Hospitalist consult for medical mgmt appreciated  cont ACE Wrap lower ext in for compressive dressing  Fall risk precautions  s/p Reversal of xarelto anticoagulation with KCENTRA in ED  GIppx,   hold DVT prophylaxis  Pain control  F/U labs am  cont jackson catheter  bed rest for now    plan d/w Dr. Rojo

## 2023-08-26 NOTE — PROGRESS NOTE ADULT - SUBJECTIVE AND OBJECTIVE BOX
HPI: Level 3 Trauma Activation  "Patient is a 89y year old female with PMHx chronic CHF, hypothyroidism, Gout, HTN, COPD, Afib on xarelto, anemia, CAD, and osteopenia presents to the ED c/o left leg pain after hitting her leg yesterday 8/24/23, when going to answer the phone. Pt banged it on furniture and developed a large hematoma. Pt having difficulty ambulating, no other injuries. Last took Xarelto last night. No history of DVT/PE. Pt denied head strike LOC or any traumatic injuries/complaints (25 Aug 2023 13:27)"    INTERVAL HPI/OVERNIGHT EVENTS: Medicine consulted for co-management of chronic medical conditions. On evaluation, pt lethargic s/p pain medication but easily arousable. Pt poor historian.     8/26 pt c/o pain but improves with morphine. daughter updated at bedside    ROS:   All 10 systems reviewed and found to be negative with the exception of what has been described above.    Vital Signs Last 24 Hrs  T(C): 36.5 (26 Aug 2023 15:36), Max: 37.6 (26 Aug 2023 08:42)  T(F): 97.7 (26 Aug 2023 15:36), Max: 99.7 (26 Aug 2023 08:42)  HR: 82 (26 Aug 2023 15:36) (56 - 131)  BP: 89/42 (26 Aug 2023 15:36) (89/42 - 147/69)  BP(mean): --  RR: 19 (26 Aug 2023 15:36) (18 - 19)  SpO2: 99% (26 Aug 2023 15:36) (92% - 100%)    Parameters below as of 26 Aug 2023 15:36  Patient On (Oxygen Delivery Method): nasal cannula    GEN: lying in bed, NAD  HEENT:   NC/AT, pupils equal and reactive, EOMI  CV:  +S1, +S2, RRR  RESP:   lungs clear to auscultation bilaterally, no wheeze, rales, rhonchi   BREAST:  not examined  GI:  abdomen soft, non-tender, non-distended, normoactive BS  RECTAL:  not examined  :  not examined  MSK:   normal muscle tone  EXT:  b/l LE bandaged  NEURO:  AAOX3, no focal neurological deficits  SKIN:  no rashes    Assessment and Recommendation:   · Assessment	  Pt is a 89 yo female admitted due to:    #Left calf hematoma  HOLD AC- on for Afib, pt high fall risk would not continue however please discuss with pt cardiologist   pt on AC, reversed in ED with Kcentra  serial cbc - hgb stable  bed rest  management per primary team    #Atrial fibrillation, rate controlled  c/w metoprolol  keep k>4, Mg >2  hold AC due to above    #CHF  caution with IVF, reassess in AM for continued need  c/w lasix with close monitoring of renal function  weight daily  strict i/o's    #Hypothyroid  c/w synthroid    #Gout   c/w allopurinol    #COPD  pulse ox q8 hrs  c/w supplemental O2  cough/deep breathing  incentive spirotmetry    MOLST prior present.  reviewed with daughter and pt is DNR/DNI

## 2023-08-26 NOTE — PROGRESS NOTE ADULT - SUBJECTIVE AND OBJECTIVE BOX
Asked by Trauma sx team to eval for Critical Care  admission    Chart reviewed patient examined.      89y year old female with PMHx chronic CHF, hypothyroidism, Gout, HTN, COPD, Afib on xarelto, anemia, CAD, and osteopenia       Banged her leg on a table at home.  Here with L leg hematoma  ABLA 1gram drop in hgb    Received Kcentra in the ED.        BP 90/54 by my manual reading.        L leg with hematoma and large blood fluid collection in sq     Hgb stable  Lactate normal  Warm well perfused in NAD    Hold next dose of Toprol XL     Can stay where she is for now.

## 2023-08-27 NOTE — PROGRESS NOTE ADULT - SUBJECTIVE AND OBJECTIVE BOX
CC:Patient is a 89y old  Female who presents with a chief complaint of LLE hematoma (25 Aug 2023 22:51)      Subjective:  Pt seen and examined at bedside. Pt is AAOx3, pt in no acute distress. Pt has jackson catheter. Advised to sit in chair yesterday. Overnight, pt has foudnt ot be hypotensive, received 1.5L. ICU consulted for upgrade but BP stabilized so patient kept on floor with telemetry Pt denies  fever, chills, chest pain, SOB, abd pain, N/V/D, extremity dysfunction, hematochezia headache, diplopia, vertigo, dizziness Pt tolerating diet, (+) jackson, (+) oob w/ assist, (+) bowel function, + pain control to lower ext    Vital Signs Last 24 Hrs  T(C): 37.3 (27 Aug 2023 01:05), Max: 37.6 (26 Aug 2023 08:42)  T(F): 99.1 (27 Aug 2023 01:05), Max: 99.7 (26 Aug 2023 08:42)  HR: 79 (27 Aug 2023 01:05) (79 - 131)  BP: 107/46 (27 Aug 2023 01:05) (89/42 - 107/46)  BP(mean): 63 (26 Aug 2023 21:01) (63 - 63)  RR: 17 (27 Aug 2023 01:05) (17 - 19)  SpO2: 96% (27 Aug 2023 01:05) (92% - 100%)    Parameters below as of 27 Aug 2023 01:05  Patient On (Oxygen Delivery Method): nasal cannula        Labs:                        10.4   7.65  )-----------( 124      ( 26 Aug 2023 20:43 )             32.7   08-26    139  |  101  |  34<H>  ----------------------------<  114<H>  4.3   |  36<H>  |  1.08    Ca    8.7      26 Aug 2023 08:19  Phos  4.1     08-26  Mg     2.2     08-26    TPro  6.4  /  Alb  3.3  /  TBili  0.9  /  DBili  x   /  AST  24  /  ALT  20  /  AlkPhos  91  08-25        Meds:  acetaminophen   IVPB .. 1000 milliGRAM(s) IV Intermittent once PRN  allopurinol 100 milliGRAM(s) Oral daily  calcium carbonate 1250 mG  + Vitamin D (OsCal 500 + D) 1 Tablet(s) Oral daily  furosemide    Tablet 40 milliGRAM(s) Oral daily  lactated ringers. 1000 milliLiter(s) IV Continuous <Continuous>  levothyroxine 125 MICROGram(s) Oral daily  metoprolol succinate ER 25 milliGRAM(s) Oral daily  morphine  - Injectable 2 milliGRAM(s) IV Push every 4 hours PRN  multivitamin/minerals 1 Tablet(s) Oral daily  ondansetron Injectable 4 milliGRAM(s) IV Push every 6 hours PRN      Physical exam:  Pt is aaox3  Pt in no acute distress  Psych: normal affect  HEENT: NCAT, EOM wnl  Neck: no jvd or tracheal deviation  Resp: CTAB  CVS: S1S2(+)  ABD: bowel sounds (+), soft, non distended, no rebound, no guarding, no rigidity, no skin changes to exam. No tenderness  EXT: no calf tenderness or edema to exam b/l, b/l LE ACE wrap in place, no sign of bleeding or hemorrhage  : jackson catheter w/ clear urine output  Skin: no adverse skin changes to exam otherwise  Neuro: CNII-XII grossly intact       CC:Patient is a 89y old  Female who presents with a chief complaint of LLE hematoma (25 Aug 2023 22:51)      Subjective:  Pt seen and examined at bedside. Pt is AAOx3, pt in no acute distress. Pt advised to sit in chair yesterday. Overnight, pt had episodes of hypotension, received 1.5L. ICU consulted for upgrade but BP stabilized so patient kept on floor with telemetry; medications reviewed and adjusted accordingly Pt denies  fever, chills, chest pain, SOB, abd pain, N/V/D, extremity dysfunction, hematochezia headache, diplopia, vertigo, dizziness Pt tolerating diet, (+) oob w/ assist, (+) bowel function, + pain control to lower ext    ROS:  as above otherwise negative     Vital Signs Last 24 Hrs  T(C): 37.3 (27 Aug 2023 01:05), Max: 37.6 (26 Aug 2023 08:42)  T(F): 99.1 (27 Aug 2023 01:05), Max: 99.7 (26 Aug 2023 08:42)  HR: 79 (27 Aug 2023 01:05) (79 - 131)  BP: 107/46 (27 Aug 2023 01:05) (89/42 - 107/46)  BP(mean): 63 (26 Aug 2023 21:01) (63 - 63)  RR: 17 (27 Aug 2023 01:05) (17 - 19)  SpO2: 96% (27 Aug 2023 01:05) (92% - 100%)    Parameters below as of 27 Aug 2023 01:05  Patient On (Oxygen Delivery Method): nasal cannula        Labs:                        10.4   7.65  )-----------( 124      ( 26 Aug 2023 20:43 )             32.7   08-26    139  |  101  |  34<H>  ----------------------------<  114<H>  4.3   |  36<H>  |  1.08    Ca    8.7      26 Aug 2023 08:19  Phos  4.1     08-26  Mg     2.2     08-26    TPro  6.4  /  Alb  3.3  /  TBili  0.9  /  DBili  x   /  AST  24  /  ALT  20  /  AlkPhos  91  08-25        Meds:  acetaminophen   IVPB .. 1000 milliGRAM(s) IV Intermittent once PRN  allopurinol 100 milliGRAM(s) Oral daily  calcium carbonate 1250 mG  + Vitamin D (OsCal 500 + D) 1 Tablet(s) Oral daily  furosemide    Tablet 40 milliGRAM(s) Oral daily  lactated ringers. 1000 milliLiter(s) IV Continuous <Continuous>  levothyroxine 125 MICROGram(s) Oral daily  metoprolol succinate ER 25 milliGRAM(s) Oral daily  morphine  - Injectable 2 milliGRAM(s) IV Push every 4 hours PRN  multivitamin/minerals 1 Tablet(s) Oral daily  ondansetron Injectable 4 milliGRAM(s) IV Push every 6 hours PRN      Physical exam:  Pt is aaox3  Pt in no acute distress  Psych: normal affect  HEENT: NCAT, EOM wnl  Neck: no jvd or tracheal deviation  Resp: CTAB  CVS: S1S2(+)  ABD: bowel sounds (+), soft, non distended, no rebound, no guarding, no rigidity, no skin changes to exam. No tenderness  EXT: no calf tenderness or edema to exam b/l, b/l LE ACE wrap in place, no sign of bleeding or hemorrhage  : jackson catheter w/ clear urine output  Skin: no adverse skin changes to exam otherwise  Neuro: CNII-XII grossly intact

## 2023-08-27 NOTE — PROGRESS NOTE ADULT - SUBJECTIVE AND OBJECTIVE BOX
HPI: Level 3 Trauma Activation  "Patient is a 89y year old female with PMHx chronic CHF, hypothyroidism, Gout, HTN, COPD, Afib on xarelto, anemia, CAD, and osteopenia presents to the ED c/o left leg pain after hitting her leg yesterday 8/24/23, when going to answer the phone. Pt banged it on furniture and developed a large hematoma. Pt having difficulty ambulating, no other injuries. Last took Xarelto last night. No history of DVT/PE. Pt denied head strike LOC or any traumatic injuries/complaints (25 Aug 2023 13:27)"    INTERVAL HPI/OVERNIGHT EVENTS: Medicine consulted for co-management of chronic medical conditions. On evaluation, pt lethargic s/p pain medication but easily arousable. Pt poor historian.     8/26 pt c/o pain but improves with morphine. daughter updated at bedside  8/27 - pt hypotensive overnight requiring fluid bolus.  some SOB today.  no fever or chills.     ROS:   All 10 systems reviewed and found to be negative with the exception of what has been described above.    Vital Signs Last 24 Hrs  T(C): 37 (27 Aug 2023 08:44), Max: 37.3 (27 Aug 2023 01:05)  T(F): 98.6 (27 Aug 2023 08:44), Max: 99.1 (27 Aug 2023 01:05)  HR: 90 (27 Aug 2023 08:44) (79 - 103)  BP: 110/90 (27 Aug 2023 08:44) (89/42 - 118/99)  BP(mean): 63 (26 Aug 2023 21:01) (63 - 63)  RR: 18 (27 Aug 2023 02:38) (17 - 19)  SpO2: 97% (27 Aug 2023 08:44) (92% - 99%)    Parameters below as of 27 Aug 2023 08:44  Patient On (Oxygen Delivery Method): nasal cannula      GEN: lying in bed, NAD  HEENT:   NC/AT, pupils equal and reactive, EOMI  CV:  +S1, +S2, RRR  RESP:   lungs clear to auscultation bilaterally, no wheeze, rales, rhonchi   BREAST:  not examined  GI:  abdomen soft, non-tender, non-distended, normoactive BS  RECTAL:  not examined  :  not examined  MSK:   normal muscle tone  EXT:  b/l LE bandaged  NEURO:  AAOX3, no focal neurological deficits  SKIN:  no rashes                              10.1   6.69  )-----------( 135      ( 27 Aug 2023 08:30 )             30.9     08-27    133<L>  |  100  |  39<H>  ----------------------------<  108<H>  4.7   |  28  |  1.31<H>    Ca    8.4<L>      27 Aug 2023 08:30  Phos  4.4     08-27  Mg     2.2     08-27            PT/INR - ( 26 Aug 2023 08:19 )   PT: 13.3 sec;   INR: 1.18 ratio         PTT - ( 26 Aug 2023 08:19 )  PTT:36.2 sec  Urinalysis Basic - ( 27 Aug 2023 08:30 )    Color: x / Appearance: x / SG: x / pH: x  Gluc: 108 mg/dL / Ketone: x  / Bili: x / Urobili: x   Blood: x / Protein: x / Nitrite: x   Leuk Esterase: x / RBC: x / WBC x   Sq Epi: x / Non Sq Epi: x / Bacteria: x        Lactate, Blood: 1.8 mmol/L (08-26 @ 20:43)            Assessment and Recommendation:   · Assessment	  Pt is a 89 yo female admitted due to:    #Left calf hematoma  HOLD AC- on for Afib, pt high fall risk would not continue however please discuss with pt cardiologist   pt on AC, reversed in ED with Kcentra  serial cbc - hgb stable  bed rest  management per primary team    #Atrial fibrillation, rate controlled  c/w metoprolol  keep k>4, Mg >2  hold AC due to above    #CHF  - s/p fludis bolus and given hypotension will decrease lasix to 20 mg   - cardio eval requested  weight daily  strict i/o's    #Hypothyroid  c/w synthroid    #Gout   c/w allopurinol    #COPD  pulse ox q8 hrs  c/w supplemental O2  cough/deep breathing  incentive spirometry    MOLST prior present.  reviewed with daughter and pt is DNR/DNI      HPI: Level 3 Trauma Activation  "Patient is a 89y year old female with PMHx chronic CHF, hypothyroidism, Gout, HTN, COPD, Afib on xarelto, anemia, CAD, and osteopenia presents to the ED c/o left leg pain after hitting her leg yesterday 8/24/23, when going to answer the phone. Pt banged it on furniture and developed a large hematoma. Pt having difficulty ambulating, no other injuries. Last took Xarelto last night. No history of DVT/PE. Pt denied head strike LOC or any traumatic injuries/complaints (25 Aug 2023 13:27)"    INTERVAL HPI/OVERNIGHT EVENTS: Medicine consulted for co-management of chronic medical conditions. On evaluation, pt lethargic s/p pain medication but easily arousable. Pt poor historian.     8/26 pt c/o pain but improves with morphine. daughter updated at bedside  8/27 - pt hypotensive overnight requiring fluid bolus.  some SOB today.  no fever or chills.     ROS:   All 10 systems reviewed and found to be negative with the exception of what has been described above.    Vital Signs Last 24 Hrs  T(C): 37 (27 Aug 2023 08:44), Max: 37.3 (27 Aug 2023 01:05)  T(F): 98.6 (27 Aug 2023 08:44), Max: 99.1 (27 Aug 2023 01:05)  HR: 90 (27 Aug 2023 08:44) (79 - 103)  BP: 110/90 (27 Aug 2023 08:44) (89/42 - 118/99)  BP(mean): 63 (26 Aug 2023 21:01) (63 - 63)  RR: 18 (27 Aug 2023 02:38) (17 - 19)  SpO2: 97% (27 Aug 2023 08:44) (92% - 99%)    Parameters below as of 27 Aug 2023 08:44  Patient On (Oxygen Delivery Method): nasal cannula      GEN: lying in bed, NAD  HEENT:   NC/AT, pupils equal and reactive, EOMI  CV:  +S1, +S2, RRR  RESP:   lungs clear to auscultation bilaterally, no wheeze, rales, rhonchi   BREAST:  not examined  GI:  abdomen soft, non-tender, non-distended, normoactive BS  RECTAL:  not examined  :  not examined  MSK:   normal muscle tone  EXT:  b/l LE bandaged  NEURO:  AAOX3, no focal neurological deficits  SKIN:  no rashes                              10.1   6.69  )-----------( 135      ( 27 Aug 2023 08:30 )             30.9     08-27    133<L>  |  100  |  39<H>  ----------------------------<  108<H>  4.7   |  28  |  1.31<H>    Ca    8.4<L>      27 Aug 2023 08:30  Phos  4.4     08-27  Mg     2.2     08-27            PT/INR - ( 26 Aug 2023 08:19 )   PT: 13.3 sec;   INR: 1.18 ratio         PTT - ( 26 Aug 2023 08:19 )  PTT:36.2 sec  Urinalysis Basic - ( 27 Aug 2023 08:30 )    Color: x / Appearance: x / SG: x / pH: x  Gluc: 108 mg/dL / Ketone: x  / Bili: x / Urobili: x   Blood: x / Protein: x / Nitrite: x   Leuk Esterase: x / RBC: x / WBC x   Sq Epi: x / Non Sq Epi: x / Bacteria: x        Lactate, Blood: 1.8 mmol/L (08-26 @ 20:43)            Assessment and Recommendation:   · Assessment	  Pt is a 89 yo female admitted due to:    #Left calf hematoma  HOLD AC- on for Afib, pt high fall risk would not continue however please discuss with pt cardiologist   pt on AC, reversed in ED with Kcentra  serial cbc - hgb stable  bed rest  management per primary team    #Atrial fibrillation, rate controlled  c/w metoprolol  keep k>4, Mg >2  hold AC due to above    #CHF  - s/p fludis bolus and given hypotension will decrease lasix to 20 mg   - cardio eval requested  weight daily  strict i/o's    # increased cr - monitor for worsening given hypotension episode  - check BMP in AM     #Hypothyroid  c/w synthroid    #Gout   c/w allopurinol    #COPD  pulse ox q8 hrs  c/w supplemental O2  cough/deep breathing  incentive spirometry    MOLST prior present.  reviewed with daughter and pt is DNR/DNI

## 2023-08-27 NOTE — PROGRESS NOTE ADULT - ATTENDING COMMENTS
A/P:  Left lower leg hematoma s/p isolated trauma to region 8/24/23, H/H and HD stable  Hospitalist on consult for medical mgmt  IR consulted, no intervention  Serial h/h stable  Cont wrap lower ext in ace compressive dressing  Fall risk precautions  Reversal of xarelto anticoagulation with KCENTRA in ED  GI/DVT prophylaxis  Pain control, avoid narcotics as pt gets somnolent with morphine  F/U labs  SICU consulted, no need for step down or ICU bed  Cont current care and meds  Fall risk precautions  SS for d/c planning  PT  Pt aware of and agrees with all of the above    35 minuted of time spent on pt examination, review of relevant labs and radiologic studies, assured stabilization of pt, discussion with relevant services/providers for coordination of pt care and services .

## 2023-08-27 NOTE — PROVIDER CONTACT NOTE (CHANGE IN STATUS NOTIFICATION) - SITUATION
Patients blood pressure has been running soft throughout shift. 90'w Metoprolol and lasix given in AM for afib rate control and CHF. x1 dose of morphine given around 2pm for pain. Pt blood pressure continues to run soft 84/50 on a manual bp.
Pt had jackson removed @ 11:30am. Pt has not voided since removal. Bladder scan shows 260ml.
Pt received 500ml bolus of NS. Bp is now 100/45 with a map of 55

## 2023-08-27 NOTE — PROGRESS NOTE ADULT - ASSESSMENT
A/P:  89 yo female with LLE hematoma  afib on carelto s/p reversal with Kcentra  Bilateral ace wraps applied   CTA showed hematoma with active bleeding  h/h stable    P:  trend h/h daily  IR consulted- recommended no intervention   Hospitalist consult for medical mgmt appreciated  cont ACE Wrap lower ext in for compressive dressing  Fall risk precautions but ambulation as tolerated  dc jackson, TOV  GIppx,   hold DVT prophylaxis  Pain control  F/U labs am  PT consult  ICu reccs holding AM dose of toprolol   gentle iv fluids    plan d/w Dr. Rojo

## 2023-08-28 NOTE — PHYSICAL THERAPY INITIAL EVALUATION ADULT - PERTINENT HX OF CURRENT PROBLEM, REHAB EVAL
Pt admitted to  following "banging" her left lower leg on furniture. Pt c/o pain and edema left lower leg following incident. CT LLE: larger- large superficial left calf hematoma. X-rays left tib/fib- no fx. H/H- 10.2/30.9.

## 2023-08-28 NOTE — PROGRESS NOTE ADULT - ASSESSMENT
89 year old female with left lower leg hematoma. Bilateral ace wraps applied. HH stable.    P:  trend h/h daily  Hospitalist consult for medical mgmt appreciated  cont ACE Wrap lower ext in for compressive dressing  Fall risk precautions but ambulation as tolerated  Failed TOV, jackson replaced, Urology consult  GI ppx  hold DVT prophylaxis  Pain control  F/U labs am  PT recs  ICu reccs holding AM dose of toprolol   gentle iv fluids  CM for dispo    To be discussed with Dr. Estevez.

## 2023-08-28 NOTE — PHYSICAL THERAPY INITIAL EVALUATION ADULT - GENERAL OBSERVATIONS, REHAB EVAL
Pt found supine in bed sleeping with ace bandage LLE, jackson, and bed alarm activated. Pt very lethargic and having difficulty keeping her eyes open verbal or tactile. PAINAD- 0/10.

## 2023-08-28 NOTE — PHYSICAL THERAPY INITIAL EVALUATION ADULT - LEVEL OF INDEPENDENCE: SUPINE/SIT, REHAB EVAL
Pt too lethargic to attempt OOB at this time. Will attempt to increase mobility later if possible, if not on 8/29/2023.

## 2023-08-28 NOTE — PROGRESS NOTE ADULT - SUBJECTIVE AND OBJECTIVE BOX
Pt. seen and examined at bedside this morning. Patient reports pain in the left leg about the same. Denies fever, chest pain, SOB, nausea, vomiting.     Physical exam:  Pt is aaox3  Pt in no acute distress  Psych: normal affect  HEENT: NCAT, EOM wnl  Neck: no jvd or tracheal deviation  Resp: normal effort, equal chest rise, nasal cannula in palce  CVS: S1S2(+)  ABD: soft, non distended, no rebound, no guarding, no rigidity, no skin changes to exam. No tenderness  EXT: no calf tenderness or edema to exam b/l, b/l LE ACE wrap in place, no sign of bleeding or hemorrhage  : jakcson catheter w/ clear urine output  Skin: no adverse skin changes to exam otherwise
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
## 2023-08-28 NOTE — CONSULT NOTE ADULT - SUBJECTIVE AND OBJECTIVE BOX
HPI:  Level 3 Trauma Activation    HPI:    Patient is a 89y year old female with PMHx chronic CHF, hypothyroidism, Gout, HTN, COPD, Afib on xarelto, anemia, CAD, and osteopenia presents to the ED c/o left leg pain after hitting her leg yesterday 8/24/23, when going to answer the phone. Pt banged it on furniture and developed a large hematoma. Pt having difficulty ambulating, no other injuries. Last took Xarelto last night. No history of DVT/PE.    Pt denied head strike LOC or any traumatic injuries/complaints   (25 Aug 2023 13:27)    89 yo female with PMH as above admitted for LLE hematoma. Pt is a poor historian. Urology consulted for pt with urinary retention requiring indwelling jackson and now a failed trial of void yesterday with jackson replacement. Pt with PVR of 260 mL on bladder scan, unclear of the volume upon jackson placement. Pt is laying comfortably in bed with jackson draining yellow urine.       PAST MEDICAL & SURGICAL HISTORY:  Mitral valve insufficiency      Aortic valve regurgitation      Osteopenia      CAD (coronary artery disease)      Gout      Hypothyroid      CHF (congestive heart failure)      Anxiety      Anemia      HTN (hypertension)      Afib      COPD (chronic obstructive pulmonary disease)      Chronic obstructive pulmonary disease (COPD)      HTN (hypertension)      Cardiac pacemaker      Gout      Hypothyroidism      Insomnia      Chronic CHF      History of ST elevation myocardial infarction (STEMI)      Status cardiac pacemaker      S/P knee replacement      No significant past surgical history          REVIEW OF SYSTEMS      Pt is a poor historian unable to assess    MEDICATIONS  (STANDING):  allopurinol 100 milliGRAM(s) Oral daily  calcium carbonate 1250 mG  + Vitamin D (OsCal 500 + D) 1 Tablet(s) Oral daily  furosemide    Tablet 20 milliGRAM(s) Oral daily  levothyroxine 125 MICROGram(s) Oral daily  metoprolol succinate ER 25 milliGRAM(s) Oral daily  multivitamin/minerals 1 Tablet(s) Oral daily    MEDICATIONS  (PRN):  acetaminophen     Tablet .. 650 milliGRAM(s) Oral every 6 hours PRN Moderate Pain (4 - 6)  acetaminophen   IVPB .. 1000 milliGRAM(s) IV Intermittent once PRN Temp greater or equal to 38C (100.4F), Mild Pain (1 - 3)  ondansetron Injectable 4 milliGRAM(s) IV Push every 6 hours PRN Nausea and/or Vomiting      Allergies    codeine (Unknown)    Intolerances        SOCIAL HISTORY:    FAMILY HISTORY:  No pertinent family history in first degree relatives    No pertinent family history in first degree relatives        Vital Signs Last 24 Hrs  T(C): 36.9 (28 Aug 2023 08:39), Max: 36.9 (28 Aug 2023 08:39)  T(F): 98.4 (28 Aug 2023 08:39), Max: 98.4 (28 Aug 2023 08:39)  HR: 118 (28 Aug 2023 08:39) (62 - 118)  BP: 104/55 (28 Aug 2023 08:39) (100/62 - 116/74)  BP(mean): --  RR: 18 (27 Aug 2023 23:33) (18 - 18)  SpO2: 94% (28 Aug 2023 08:39) (94% - 98%)    Parameters below as of 28 Aug 2023 08:39  Patient On (Oxygen Delivery Method): nasal cannula        PHYSICAL EXAM:       General: No distress, No anxiety  VITALS  T(C): 36.9 (08-28-23 @ 08:39), Max: 36.9 (08-28-23 @ 08:39)  HR: 118 (08-28-23 @ 08:39) (62 - 118)  BP: 104/55 (08-28-23 @ 08:39) (100/62 - 116/74)  RR: 18 (08-27-23 @ 23:33) (18 - 18)  SpO2: 94% (08-28-23 @ 08:39) (94% - 98%)               Lung    : No resp distress  Abdo:   : Soft, Non tender, No guarding, No distension   Back    : No CVAT b/l  Genitalia Female: Jackson with yellow urine           LABS:                        10.1   8.74  )-----------( 145      ( 28 Aug 2023 08:48 )             30.7     08-28    133<L>  |  99  |  49<H>  ----------------------------<  122<H>  4.9   |  27  |  1.36<H>    Ca    8.4<L>      28 Aug 2023 08:48  Phos  4.1     08-28  Mg     2.1     08-28        Urinalysis Basic - ( 28 Aug 2023 08:48 )    Color: x / Appearance: x / SG: x / pH: x  Gluc: 122 mg/dL / Ketone: x  / Bili: x / Urobili: x   Blood: x / Protein: x / Nitrite: x   Leuk Esterase: x / RBC: x / WBC x   Sq Epi: x / Non Sq Epi: x / Bacteria: x

## 2023-08-28 NOTE — CONSULT NOTE ADULT - SUBJECTIVE AND OBJECTIVE BOX
CHIEF COMPLAINT: Patient is a 89y old  Female who presents with a chief complaint of LLE hematoma (28 Aug 2023 10:28)      HPI:  Level 3 Trauma Activation    HPI:    Patient is a 89y year old female with PMHx chronic CHF, hypothyroidism, Gout, HTN, COPD, Afib on xarelto, anemia, CAD, and osteopenia presents to the ED c/o left leg pain after hitting her leg yesterday 8/24/23, when going to answer the phone. Pt banged it on furniture and developed a large hematoma. Pt having difficulty ambulating, no other injuries. Last took Xarelto last night. No history of DVT/PE.    Cardiology consulted to evaluate for CHF. This is a pt. of Dr. Harris, last seen in the office on 7/31/23. Pt. takes lasix and torsemide prn at home for HFpEF and xarelto and toprol xl 25 mg daily for Chronic Afib.   At present, admitted for leg hematoma, hypotensive, denies chest pain/SOB/palpitations.     Pt denied head strike LOC or any traumatic injuries/complaints   (25 Aug 2023 13:27)      PAST MEDICAL / SURGICAL HISTORY:  PAST MEDICAL & SURGICAL HISTORY:  Mitral valve insufficiency      Aortic valve regurgitation      Osteopenia      CAD (coronary artery disease)      Gout      Hypothyroid      CHF (congestive heart failure)      Anxiety      Anemia      HTN (hypertension)      Afib      COPD (chronic obstructive pulmonary disease)      Chronic obstructive pulmonary disease (COPD)      HTN (hypertension)      Cardiac pacemaker      Gout      Hypothyroidism      Insomnia      Chronic CHF      History of ST elevation myocardial infarction (STEMI)      Status cardiac pacemaker      S/P knee replacement      No significant past surgical history          SOCIAL HISTORY:   Alcohol: Denied  Smoking: Nonsmoker  Drug Use: Denied  Marital Status:     FAMILY HISTORY: FAMILY HISTORY:  No pertinent family history in first degree relatives    No pertinent family history in first degree relatives    Home Medications:  allopurinol 100 mg oral tablet: 1 tab(s) orally once a day (25 Aug 2023 12:41)  Caltrate 600 + D oral tablet: 1 tab(s) orally once a day (25 Aug 2023 12:43)  furosemide 20 mg oral tablet: 1 tab(s) orally once a day as needed for (25 Aug 2023 12:44)  furosemide 40 mg oral tablet: 1 tab(s) orally once a day (25 Aug 2023 12:41)  levothyroxine 125 mcg (0.125 mg) oral tablet: 1 tab(s) orally once a day (25 Aug 2023 12:15)  metoprolol succinate 25 mg oral tablet, extended release: 1 tab(s) orally once a day (25 Aug 2023 12:44)  nystatin 100,000 units/g topical cream: Apply topically to affected area 2 times a day to foot (25 Aug 2023 12:44)  potassium chloride 20 mEq oral tablet, extended release: 1 tab(s) orally once a day (25 Aug 2023 12:41)  PreserVision AREDS 2 oral capsule: 1 cap(s) orally once a day (25 Aug 2023 12:44)  rivaroxaban 15 mg oral tablet: 1 tab(s) orally once a day (before a meal) (25 Aug 2023 12:15)      MEDICATIONS  (STANDING):  allopurinol 100 milliGRAM(s) Oral daily  calcium carbonate 1250 mG  + Vitamin D (OsCal 500 + D) 1 Tablet(s) Oral daily  levothyroxine 125 MICROGram(s) Oral daily  multivitamin/minerals 1 Tablet(s) Oral daily    MEDICATIONS  (PRN):  acetaminophen     Tablet .. 650 milliGRAM(s) Oral every 6 hours PRN Moderate Pain (4 - 6)  acetaminophen   IVPB .. 1000 milliGRAM(s) IV Intermittent once PRN Temp greater or equal to 38C (100.4F), Mild Pain (1 - 3)  ondansetron Injectable 4 milliGRAM(s) IV Push every 6 hours PRN Nausea and/or Vomiting      Allergies    codeine (Unknown)    Intolerances        REVIEW OF SYSTEMS:  CONSTITUTIONAL: No weakness, fevers or chills  Eyes: No visual changes  NECK: No pain or stiffness  RESPIRATORY: No cough, wheezing, hemoptysis; No shortness of breath  CARDIOVASCULAR: No chest pain or palpitations  GASTROINTESTINAL: No abdominal pain. No nausea, vomiting, or hematemesis; No diarrhea or constipation. No melena or hematochezia.  GENITOURINARY: No dysuria, frequency or hematuria  NEUROLOGICAL: No numbness.  SKIN: No itching or rash  All other review of systems is negative unless indicated above    VITAL SIGNS:   Vital Signs Last 24 Hrs  T(C): 36.9 (28 Aug 2023 08:39), Max: 36.9 (28 Aug 2023 08:39)  T(F): 98.4 (28 Aug 2023 08:39), Max: 98.4 (28 Aug 2023 08:39)  HR: 110 (28 Aug 2023 11:24) (62 - 118)  BP: 92/56 (28 Aug 2023 11:24) (92/56 - 116/74)  BP(mean): --  RR: 18 (27 Aug 2023 23:33) (18 - 18)  SpO2: 94% (28 Aug 2023 08:39) (94% - 98%)    Parameters below as of 28 Aug 2023 08:39  Patient On (Oxygen Delivery Method): nasal cannula        I&O's Summary    27 Aug 2023 07:01  -  28 Aug 2023 07:00  --------------------------------------------------------  IN: 0 mL / OUT: 850 mL / NET: -850 mL        PHYSICAL EXAM:  Constitutional: NAD, awake and alert  HEENT:  EOMI,  Pupils round, No oral cyanosis.  Pulmonary: Non-labored, breath sounds are clear bilaterally, No wheezing, rales or rhonchi  Cardiovascular: S1 and S2, regular rate and rhythm, no Murmurs, gallops or rubs  Gastrointestinal: Bowel Sounds present, soft, nontender.   Lymph: No peripheral edema. No cervical lymphadenopathy.  Neurological: Alert, no focal deficits  Skin: No rashes.  Psych:  Mood & affect appropriate    LABS:                        10.1   8.74  )-----------( 145      ( 28 Aug 2023 08:48 )             30.7                         10.1   6.69  )-----------( 135      ( 27 Aug 2023 08:30 )             30.9                         10.4   7.65  )-----------( 124      ( 26 Aug 2023 20:43 )             32.7     28 Aug 2023 08:48    133    |  99     |  49     ----------------------------<  122    4.9     |  27     |  1.36   27 Aug 2023 08:30    133    |  100    |  39     ----------------------------<  108    4.7     |  28     |  1.31   26 Aug 2023 08:19    139    |  101    |  34     ----------------------------<  114    4.3     |  36     |  1.08     Ca    8.4        28 Aug 2023 08:48  Ca    8.4        27 Aug 2023 08:30  Ca    8.7        26 Aug 2023 08:19  Phos  4.1       28 Aug 2023 08:48  Phos  4.4       27 Aug 2023 08:30  Phos  4.1       26 Aug 2023 08:19  Mg     2.1       28 Aug 2023 08:48  Mg     2.2       27 Aug 2023 08:30  Mg     2.2       26 Aug 2023 08:19    Radiology/EKG/Echo: in progress       -----------------------------------------------------------------------------------------------------------  < from: Transthoracic Echocardiogram Follow Up (02.25.22 @ 09:04) >     Impression     Summary     Limited study to assess left ventricular function.   The left ventricle cavity is underdistended.   The interventricular septum appears flattened consistent with an RV   pressure/volume overload.   Estimated left ventricular ejection fraction is 70 %.   The right atrium appears severely dilated.   A device wire is seen in the RV and RA.   The right ventricle is mildly dilated.   The tricuspid valve leaflets are thin and pliable; valve motion is   normal.   Severe (4+) tricuspid valve regurgitation is present.   Severe pulmonary hypertension.   No evidence of a significant pericardial effusion.(Trace)   Pleural effusion - is present.     Signature     ----------------------------------------------------------------   Electronically signed by Edward David MD(Interpreting   physician) on 02/25/2022 01:46 PM    < end of copied text >                 CHIEF COMPLAINT: Patient is a 89y old  Female who presents with a chief complaint of LLE hematoma (28 Aug 2023 10:28)      HPI:  Level 3 Trauma Activation    HPI:    Patient is a 89y year old female with PMHx chronic CHF, hypothyroidism, Gout, HTN, COPD, Afib on xarelto, anemia, CAD, and osteopenia presents to the ED c/o left leg pain after hitting her leg yesterday 8/24/23, when going to answer the phone. Pt banged it on furniture and developed a large hematoma. Pt having difficulty ambulating, no other injuries. Last took Xarelto last night. No history of DVT/PE.    Cardiology consulted to evaluate for CHF. This is a pt. of Dr. Harris, last seen in the office on 7/31/23. Pt. takes lasix and torsemide prn at home for HFpEF and xarelto and toprol xl 25 mg daily for Chronic Afib.   At present, admitted for leg hematoma, hypotensive, denies chest pain/SOB/palpitations.     Pt denied head strike LOC or any traumatic injuries/complaints   (25 Aug 2023 13:27)      PAST MEDICAL / SURGICAL HISTORY:  PAST MEDICAL & SURGICAL HISTORY:  Mitral valve insufficiency      Aortic valve regurgitation      Osteopenia      CAD (coronary artery disease)      Gout      Hypothyroid      CHF (congestive heart failure)      Anxiety      Anemia      HTN (hypertension)      Afib      COPD (chronic obstructive pulmonary disease)      Chronic obstructive pulmonary disease (COPD)      HTN (hypertension)      Cardiac pacemaker      Gout      Hypothyroidism      Insomnia      Chronic CHF      History of ST elevation myocardial infarction (STEMI)      Status cardiac pacemaker      S/P knee replacement      No significant past surgical history          SOCIAL HISTORY:   Alcohol: Denied  Smoking: Nonsmoker  Drug Use: Denied  Marital Status:     FAMILY HISTORY: FAMILY HISTORY:  No pertinent family history in first degree relatives    No pertinent family history in first degree relatives    Home Medications:  allopurinol 100 mg oral tablet: 1 tab(s) orally once a day (25 Aug 2023 12:41)  Caltrate 600 + D oral tablet: 1 tab(s) orally once a day (25 Aug 2023 12:43)  furosemide 20 mg oral tablet: 1 tab(s) orally once a day as needed for (25 Aug 2023 12:44)  furosemide 40 mg oral tablet: 1 tab(s) orally once a day (25 Aug 2023 12:41)  levothyroxine 125 mcg (0.125 mg) oral tablet: 1 tab(s) orally once a day (25 Aug 2023 12:15)  metoprolol succinate 25 mg oral tablet, extended release: 1 tab(s) orally once a day (25 Aug 2023 12:44)  nystatin 100,000 units/g topical cream: Apply topically to affected area 2 times a day to foot (25 Aug 2023 12:44)  potassium chloride 20 mEq oral tablet, extended release: 1 tab(s) orally once a day (25 Aug 2023 12:41)  PreserVision AREDS 2 oral capsule: 1 cap(s) orally once a day (25 Aug 2023 12:44)  rivaroxaban 15 mg oral tablet: 1 tab(s) orally once a day (before a meal) (25 Aug 2023 12:15)      MEDICATIONS  (STANDING):  allopurinol 100 milliGRAM(s) Oral daily  calcium carbonate 1250 mG  + Vitamin D (OsCal 500 + D) 1 Tablet(s) Oral daily  levothyroxine 125 MICROGram(s) Oral daily  multivitamin/minerals 1 Tablet(s) Oral daily    MEDICATIONS  (PRN):  acetaminophen     Tablet .. 650 milliGRAM(s) Oral every 6 hours PRN Moderate Pain (4 - 6)  acetaminophen   IVPB .. 1000 milliGRAM(s) IV Intermittent once PRN Temp greater or equal to 38C (100.4F), Mild Pain (1 - 3)  ondansetron Injectable 4 milliGRAM(s) IV Push every 6 hours PRN Nausea and/or Vomiting      Allergies    codeine (Unknown)    Intolerances        REVIEW OF SYSTEMS:  CONSTITUTIONAL: No weakness, fevers or chills  Eyes: No visual changes  NECK: No pain or stiffness  RESPIRATORY: No cough, wheezing, hemoptysis; No shortness of breath  CARDIOVASCULAR: No chest pain or palpitations  GASTROINTESTINAL: No abdominal pain. No nausea, vomiting, or hematemesis; No diarrhea or constipation. No melena or hematochezia.  GENITOURINARY: No dysuria, frequency or hematuria  NEUROLOGICAL: No numbness.  SKIN: No itching or rash  All other review of systems is negative unless indicated above    VITAL SIGNS:   Vital Signs Last 24 Hrs  T(C): 36.9 (28 Aug 2023 08:39), Max: 36.9 (28 Aug 2023 08:39)  T(F): 98.4 (28 Aug 2023 08:39), Max: 98.4 (28 Aug 2023 08:39)  HR: 110 (28 Aug 2023 11:24) (62 - 118)  BP: 92/56 (28 Aug 2023 11:24) (92/56 - 116/74)  BP(mean): --  RR: 18 (27 Aug 2023 23:33) (18 - 18)  SpO2: 94% (28 Aug 2023 08:39) (94% - 98%)    Parameters below as of 28 Aug 2023 08:39  Patient On (Oxygen Delivery Method): nasal cannula        I&O's Summary    27 Aug 2023 07:01  -  28 Aug 2023 07:00  --------------------------------------------------------  IN: 0 mL / OUT: 850 mL / NET: -850 mL        PHYSICAL EXAM:  Constitutional: NAD, awake and alert  HEENT:  EOMI,  Pupils round, No oral cyanosis.  Pulmonary: Non-labored, breath sounds are clear bilaterally, No wheezing, rales or rhonchi  Cardiovascular: S1 and S2, irregularly irregular, 1/6 REBECA  Gastrointestinal: Bowel Sounds present, soft, nontender.   Lymph: No peripheral edema. No cervical lymphadenopathy.  Neurological: Alert, no focal deficits  Skin: No rashes.  Psych:  Mood & affect appropriate    LABS:                        10.1   8.74  )-----------( 145      ( 28 Aug 2023 08:48 )             30.7                         10.1   6.69  )-----------( 135      ( 27 Aug 2023 08:30 )             30.9                         10.4   7.65  )-----------( 124      ( 26 Aug 2023 20:43 )             32.7     28 Aug 2023 08:48    133    |  99     |  49     ----------------------------<  122    4.9     |  27     |  1.36   27 Aug 2023 08:30    133    |  100    |  39     ----------------------------<  108    4.7     |  28     |  1.31   26 Aug 2023 08:19    139    |  101    |  34     ----------------------------<  114    4.3     |  36     |  1.08     Ca    8.4        28 Aug 2023 08:48  Ca    8.4        27 Aug 2023 08:30  Ca    8.7        26 Aug 2023 08:19  Phos  4.1       28 Aug 2023 08:48  Phos  4.4       27 Aug 2023 08:30  Phos  4.1       26 Aug 2023 08:19  Mg     2.1       28 Aug 2023 08:48  Mg     2.2       27 Aug 2023 08:30  Mg     2.2       26 Aug 2023 08:19    Radiology/EKG/Echo: in progress       -----------------------------------------------------------------------------------------------------------  < from: Transthoracic Echocardiogram Follow Up (02.25.22 @ 09:04) >     Impression     Summary     Limited study to assess left ventricular function.   The left ventricle cavity is underdistended.   The interventricular septum appears flattened consistent with an RV   pressure/volume overload.   Estimated left ventricular ejection fraction is 70 %.   The right atrium appears severely dilated.   A device wire is seen in the RV and RA.   The right ventricle is mildly dilated.   The tricuspid valve leaflets are thin and pliable; valve motion is   normal.   Severe (4+) tricuspid valve regurgitation is present.   Severe pulmonary hypertension.   No evidence of a significant pericardial effusion.(Trace)   Pleural effusion - is present.     Signature     ----------------------------------------------------------------   Electronically signed by Edward David MD(Interpreting   physician) on 02/25/2022 01:46 PM    < end of copied text >

## 2023-08-28 NOTE — PROCEDURE NOTE - INTERROGATION NOTE: COMMENTS
Normal functioning single chamber PPM with battery longevity 5.9-6.1 years. Afib with V-pacing 20%. overall rate controlled, noted recent RVR episodes. No setting change made.

## 2023-08-28 NOTE — PROGRESS NOTE ADULT - ATTENDING COMMENTS
left lower leg hematoma; H/H stable  hx of CHF and Afib follow up cards recs  JOAQUÍN, gentle hydration  PT left lower leg hematoma; H/H stable  hx of CHF and Afib follow up cards recs  increased creatinine, c/t monitor  PT

## 2023-08-28 NOTE — PHYSICAL THERAPY INITIAL EVALUATION ADULT - PLANNED THERAPY INTERVENTIONS, PT EVAL
Increase mobility when possible. Eval. Pt left in bed following eval to eat with nursing assistant and all above observation section equipment/material intact. Bed alarm activated. PAINAD- 0 /10. Will cont to follow and attempt to increase as tolerated./bed mobility training/gait training/ROM/strengthening/transfer training

## 2023-08-28 NOTE — PHYSICAL THERAPY INITIAL EVALUATION ADULT - ACTIVE RANGE OF MOTION EXAMINATION, REHAB EVAL
AAROM/AROM bilateral shoulder flex ~ 90 degrees. Bilateral hip flex ~ 90 degrees and bilateral DF neutral./Left UE Active ROM was WFL (within functional limits)/Right UE Active ROM was WFL (within functional limits)/Left LE Active ROM was WFL (within functional limits)/Right LE Active ROM was WFL (within functional limits)

## 2023-08-28 NOTE — PROGRESS NOTE ADULT - SUBJECTIVE AND OBJECTIVE BOX
HPI: Level 3 Trauma Activation  "Patient is a 89y year old female with PMHx chronic CHF, hypothyroidism, Gout, HTN, COPD, Afib on xarelto, anemia, CAD, and osteopenia presents to the ED c/o left leg pain after hitting her leg yesterday 8/24/23, when going to answer the phone. Pt banged it on furniture and developed a large hematoma. Pt having difficulty ambulating, no other injuries. Last took Xarelto last night. No history of DVT/PE. Pt denied head strike LOC or any traumatic injuries/complaints (25 Aug 2023 13:27)"    INTERVAL HPI/OVERNIGHT EVENTS: Medicine consulted for co-management of chronic medical conditions. On evaluation, pt lethargic s/p pain medication but easily arousable. Pt poor historian.     8/26 pt c/o pain but improves with morphine. daughter updated at bedside  8/27 - pt hypotensive overnight requiring fluid bolus.  some SOB today.  no fever or chills.   8/28 - pt more confused today and noted luis carlos in rapid afib.  seen by cardio and hr improved after metoprolol but eith SBP 90s cardio recommends to hold metoprolola nd lasix for now.  daughter updated at bedside.      ROS:   All 10 systems reviewed and found to be negative with the exception of what has been described above.    Vital Signs Last 24 Hrs  T(C): 37 (27 Aug 2023 08:44), Max: 37.3 (27 Aug 2023 01:05)  T(F): 98.6 (27 Aug 2023 08:44), Max: 99.1 (27 Aug 2023 01:05)  HR: 90 (27 Aug 2023 08:44) (79 - 103)  BP: 110/90 (27 Aug 2023 08:44) (89/42 - 118/99)  BP(mean): 63 (26 Aug 2023 21:01) (63 - 63)  RR: 18 (27 Aug 2023 02:38) (17 - 19)  SpO2: 97% (27 Aug 2023 08:44) (92% - 99%)    Parameters below as of 27 Aug 2023 08:44  Patient On (Oxygen Delivery Method): nasal cannula      GEN: lying in bed, NAD  HEENT:   NC/AT, pupils equal and reactive, EOMI  CV:  +S1, +S2, RRR  RESP:   lungs clear to auscultation bilaterally, no wheeze, rales, rhonchi   BREAST:  not examined  GI:  abdomen soft, non-tender, non-distended, normoactive BS  RECTAL:  not examined  :  not examined  MSK:   normal muscle tone  EXT:  b/l LE bandaged  NEURO:  AAOX3, no focal neurological deficits  SKIN:  no rashes                              10.1   8.74  )-----------( 145      ( 28 Aug 2023 08:48 )             30.7     08-28    133<L>  |  99  |  49<H>  ----------------------------<  122<H>  4.9   |  27  |  1.36<H>    Ca    8.4<L>      28 Aug 2023 08:48  Phos  4.1     08-28  Mg     2.1     08-28      Urinalysis Basic - ( 28 Aug 2023 08:48 )    Color: x / Appearance: x / SG: x / pH: x  Gluc: 122 mg/dL / Ketone: x  / Bili: x / Urobili: x   Blood: x / Protein: x / Nitrite: x   Leuk Esterase: x / RBC: x / WBC x   Sq Epi: x / Non Sq Epi: x / Bacteria: x        Assessment and Recommendation:   · Assessment	  Pt is a 89 yo female admitted due to:    #Left calf hematoma  HOLD AC- on for Afib, pt high fall risk would not continue however please discuss with pt cardiologist   pt on AC, reversed in ED with K[employer]  serial cbc - hgb stable  bed rest  management per primary team    #Atrial fibrillation, rate controlled  - plan as per cardiology  - hold metoprolol for now   keep k>4, Mg >2  hold AC due to above    #AMS   - CTH unremarkable  - do not suspect fungal infection likely insippated secretions but can f/u with ENT as outpt    #acute on chronic diatolic CHF  - hold lasix as per cards due to hypotension  - plan as per cardio  weight daily  strict i/o's    # increased cr - if continues to worsen then likely JOAQUÍN due to ATN but does not meet criteria at this time  - monitor for worsening given hypotension episode  - check BMP in AM     #Hypothyroid  c/w synthroid    #Gout   c/w allopurinol    #COPD  pulse ox q8 hrs  c/w supplemental O2  cough/deep breathing  incentive spirometry    MOLST prior present.  reviewed with daughter and pt is DNR/DNI

## 2023-08-28 NOTE — CONSULT NOTE ADULT - ASSESSMENT
89 yo female with PMH as above admitted for LLE hematoma. Urology consulted for pt with urinary retention requiring indwelling jackson and now a failed trial of void yesterday with jackson replacement.   Recommend  - F/U urine c/s and treat accordingly   - Keep indwelling jackson in place  - Trial of void in 1 week outpatient  - Pt can follow up with Urologists in [place] (Dr. Presley/Steve/Mike) or Follow up with Dr. Robbi Valle (located in [facility] [phone]) for further management    Case discussed with Dr. Valle

## 2023-08-29 NOTE — PROGRESS NOTE ADULT - NS ATTEND AMEND GEN_ALL_CORE FT
Patient with advanced age , with severe AS , severe TR with pulmonary hypertension , RV volume overload , moderate MR ,  borderline BP , moderate ventricular response ,  would consider adding digoxin if heart rate remain high on low dose BB ,AC on hold due to hematoma     patient is very high risk for any procedure would wait until her heart rate stable

## 2023-08-29 NOTE — PROGRESS NOTE ADULT - ASSESSMENT
89 year old female with left lower leg hematoma. Bilateral ace wraps applied. HH stable.    P:  trend h/h daily  Hospitalist consult for medical mgmt appreciated  cont ACE Wrap lower ext in for compressive dressing  Fall risk precautions but ambulation as tolerated  Espinoza in place, urology recs appreciated: outpatient TOV in 1 week  Cardiology on board  GI ppx  Pain control  F/U labs am  PT recs  CM for dispo    To be discussed with Dr. Casey.   89 year old female with left lower leg hematoma. Bilateral ace wraps applied. HH stable.    P:  trend h/h daily  OR tomorrow for debridement of LLE hematoma  Start Unasyn  Hospitalist consult for medical mgmt appreciated  cont ACE Wrap lower ext in for compressive dressing  Fall risk precautions but ambulation as tolerated  Espinoza in place, urology recs appreciated: outpatient TOV in 1 week  Cardiology on board  GI ppx  Pain control  F/U labs am  PT recs  CM for dispo    To be discussed with Dr. Casey.

## 2023-08-29 NOTE — PROGRESS NOTE ADULT - SUBJECTIVE AND OBJECTIVE BOX
Pt. seen and examined at bedside this morning. Patient reports pain in the left leg about the same. Denies fever, chest pain, SOB, nausea, vomiting.     Physical exam:  Pt is aaox3  Pt in no acute distress  Psych: normal affect  HEENT: NCAT, EOM wnl  Neck: no jvd or tracheal deviation  Resp: normal effort, equal chest rise, nasal cannula in place  ABD: soft, non distended, no rebound, no guarding, no rigidity, no skin changes to exam. No tenderness  EXT: no calf tenderness or edema to exam b/l, b/l LE ACE wrap in place, no sign of bleeding or hemorrhage  : jackson catheter w/ clear urine output  Skin: no adverse skin changes to exam otherwise CC:Patient is a 89y old  Female who presents with a chief complaint of LLE hematoma (30 Aug 2023 14:44)      Subjective:  Pt. seen and examined at bedside this morning. Patient reports pain in the left leg about the same. Denies fever, chest pain, SOB, nausea, vomiting.       ROS:  otherwise as abovementioned ROS    Vital Signs Last 24 Hrs  VSSAF    Parameters below as of 30 Aug 2023 16:07  Patient On (Oxygen Delivery Method): nasal cannula, 3l        Labs:    Reviewed    Meds:  acetaminophen     Tablet .. 650 milliGRAM(s) Oral every 6 hours PRN  acetaminophen   IVPB .. 1000 milliGRAM(s) IV Intermittent once PRN  allopurinol 100 milliGRAM(s) Oral daily  ampicillin/sulbactam  IVPB      ampicillin/sulbactam  IVPB 3 Gram(s) IV Intermittent every 12 hours  calcium carbonate 1250 mG  + Vitamin D (OsCal 500 + D) 1 Tablet(s) Oral daily  furosemide   Injectable 20 milliGRAM(s) IV Push once  levothyroxine 125 MICROGram(s) Oral daily  metoprolol succinate ER 12.5 milliGRAM(s) Oral daily  multivitamin/minerals 1 Tablet(s) Oral daily  ondansetron Injectable 4 milliGRAM(s) IV Push every 6 hours PRN      Radiology:        Physical exam:  Pt is aaox3  Pt in no acute distress  Psych: normal affect  HEENT: NCAT, EOM wnl  Neck: no jvd or tracheal deviation  Resp: normal effort, equal chest rise, nasal cannula in place  ABD: soft, non distended, no rebound, no guarding, no rigidity, no skin changes to exam. No tenderness  EXT: no calf tenderness or edema to exam b/l, b/l LE ACE wrap in place, no sign of bleeding or hemorrhage  : jackson catheter w/ clear urine output  Skin: no adverse skin changes to exam otherwise

## 2023-08-29 NOTE — PROGRESS NOTE ADULT - SUBJECTIVE AND OBJECTIVE BOX
CHIEF COMPLAINT: Patient is a 89y old  Female who presents with a chief complaint of LLE hematoma (28 Aug 2023 10:28)      HPI:  Level 3 Trauma Activation    HPI:    Patient is a 89y year old female with PMHx chronic CHF, hypothyroidism, Gout, HTN, COPD, Afib on xarelto, anemia, CAD, and osteopenia presents to the ED c/o left leg pain after hitting her leg yesterday 8/24/23, when going to answer the phone. Pt banged it on furniture and developed a large hematoma. Pt having difficulty ambulating, no other injuries. Last took Xarelto last night. No history of DVT/PE.    Cardiology consulted to evaluate for CHF. This is a pt. of Dr. Harris, last seen in the office on 7/31/23. Pt. takes lasix and torsemide prn at home for HFpEF and xarelto and toprol xl 25 mg daily for Chronic Afib.   At present, admitted for leg hematoma, hypotensive, denies chest pain/SOB/palpitations.     Pt denied head strike LOC or any traumatic injuries/complaints   (25 Aug 2023 13:27)  8/29/23 Seated in bedside chair awake alert no complaints tele -120      PAST MEDICAL / SURGICAL HISTORY:  PAST MEDICAL & SURGICAL HISTORY:  Mitral valve insufficiency      Aortic valve regurgitation      Osteopenia      CAD (coronary artery disease)      Gout      Hypothyroid      CHF (congestive heart failure)      Anxiety      Anemia      HTN (hypertension)      Afib      COPD (chronic obstructive pulmonary disease)      Chronic obstructive pulmonary disease (COPD)      HTN (hypertension)      Cardiac pacemaker      Gout      Hypothyroidism      Insomnia      Chronic CHF      History of ST elevation myocardial infarction (STEMI)      Status cardiac pacemaker      S/P knee replacement      No significant past surgical history          SOCIAL HISTORY:   Alcohol: Denied  Smoking: Nonsmoker  Drug Use: Denied  Marital Status:     FAMILY HISTORY: FAMILY HISTORY:  No pertinent family history in first degree relatives    No pertinent family history in first degree relatives    MEDICATIONS  (STANDING):  allopurinol 100 milliGRAM(s) Oral daily  ampicillin/sulbactam  IVPB      ampicillin/sulbactam  IVPB 3 Gram(s) IV Intermittent every 12 hours  calcium carbonate 1250 mG  + Vitamin D (OsCal 500 + D) 1 Tablet(s) Oral daily  lactated ringers. 1000 milliLiter(s) (60 mL/Hr) IV Continuous <Continuous>  levothyroxine 125 MICROGram(s) Oral daily  metoprolol succinate ER 12.5 milliGRAM(s) Oral daily  multivitamin/minerals 1 Tablet(s) Oral daily    MEDICATIONS  (PRN):  acetaminophen     Tablet .. 650 milliGRAM(s) Oral every 6 hours PRN Moderate Pain (4 - 6)  acetaminophen   IVPB .. 1000 milliGRAM(s) IV Intermittent once PRN Temp greater or equal to 38C (100.4F), Mild Pain (1 - 3)  ondansetron Injectable 4 milliGRAM(s) IV Push every 6 hours PRN Nausea and/or Vomiting          Allergies    codeine (Unknown)    Intolerances      Vital Signs Last 24 Hrs  T(C): 36.5 (29 Aug 2023 07:53), Max: 36.6 (28 Aug 2023 20:05)  T(F): 97.7 (29 Aug 2023 07:53), Max: 97.8 (28 Aug 2023 20:05)  HR: 107 (29 Aug 2023 07:53) (102 - 120)  BP: 110/56 (29 Aug 2023 07:53) (99/67 - 112/79)  BP(mean): --  RR: 18 (29 Aug 2023 07:53) (17 - 18)  SpO2: 93% (29 Aug 2023 07:53) (93% - 97%)    Parameters below as of 29 Aug 2023 07:53  Patient On (Oxygen Delivery Method): nasal cannula        I&O's Summary    27 Aug 2023 07:01  -  28 Aug 2023 07:00  --------------------------------------------------------  IN: 0 mL / OUT: 850 mL / NET: -850 mL        PHYSICAL EXAM:  Constitutional: NAD, awake and alert  HEENT: No oral cyanosis.  Pulmonary: Non-labored, breath sounds left base crackles   Cardiovascular: S1 and S2, irregular, 1/6 REBECA  Gastrointestinal: Bowel Sounds present, soft, nontender.   Lymph: No peripheral edema. No cervical lymphadenopathy.  Neurological: Alert, no focal deficits  Skin: No rashes.  Psych:  Mood & affect appropriate    LABS:                        10.1   8.74  )-----------( 145      ( 28 Aug 2023 08:48 )             30.7                         10.1   6.69  )-----------( 135      ( 27 Aug 2023 08:30 )             30.9                         10.4   7.65  )-----------( 124      ( 26 Aug 2023 20:43 )             32.7     28 Aug 2023 08:48    133    |  99     |  49     ----------------------------<  122    4.9     |  27     |  1.36   27 Aug 2023 08:30    133    |  100    |  39     ----------------------------<  108    4.7     |  28     |  1.31   26 Aug 2023 08:19    139    |  101    |  34     ----------------------------<  114    4.3     |  36     |  1.08     Ca    8.4        28 Aug 2023 08:48  Ca    8.4        27 Aug 2023 08:30  Ca    8.7        26 Aug 2023 08:19  Phos  4.1       28 Aug 2023 08:48  Phos  4.4       27 Aug 2023 08:30  Phos  4.1       26 Aug 2023 08:19  Mg     2.1       28 Aug 2023 08:48  Mg     2.2       27 Aug 2023 08:30  Mg     2.2       26 Aug 2023 08:19    Radiology/EKG/Echo: in progress       -----------------------------------------------------------------------------------------------------------  < from: Transthoracic Echocardiogram Follow Up (02.25.22 @ 09:04) >     Impression     Summary     Limited study to assess left ventricular function.   The left ventricle cavity is underdistended.   The interventricular septum appears flattened consistent with an RV   pressure/volume overload.   Estimated left ventricular ejection fraction is 70 %.   The right atrium appears severely dilated.   A device wire is seen in the RV and RA.   The right ventricle is mildly dilated.   The tricuspid valve leaflets are thin and pliable; valve motion is   normal.   Severe (4+) tricuspid valve regurgitation is present.   Severe pulmonary hypertension.   No evidence of a significant pericardial effusion.(Trace)   Pleural effusion - is present.     Signature     ----------------------------------------------------------------   Electronically signed by Edward David MD(Interpreting   physician) on 02/25/2022 01:46 PM    < end of copied text >                 CHIEF COMPLAINT: Patient is a 89y old  Female who presents with a chief complaint of LLE hematoma (28 Aug 2023 10:28)      HPI:  Level 3 Trauma Activation    HPI:    Patient is a 89y year old female with PMHx chronic CHF, hypothyroidism, Gout, HTN, COPD, Afib on xarelto, anemia, CAD, and osteopenia presents to the ED c/o left leg pain after hitting her leg yesterday 8/24/23, when going to answer the phone. Pt banged it on furniture and developed a large hematoma. Pt having difficulty ambulating, no other injuries. Last took Xarelto last night. No history of DVT/PE.    Cardiology consulted to evaluate for CHF. This is a pt. of Dr. Harris, last seen in the office on 7/31/23. Pt. takes lasix and torsemide prn at home for HFpEF and xarelto and toprol xl 25 mg daily for Chronic Afib.   At present, admitted for leg hematoma, hypotensive, denies chest pain/SOB/palpitations.     Pt denied head strike LOC or any traumatic injuries/complaints   (25 Aug 2023 13:27)  8/29/23 Seated in bedside chair awake alert no complaints tele -120      PAST MEDICAL / SURGICAL HISTORY:  PAST MEDICAL & SURGICAL HISTORY:  Mitral valve insufficiency      Aortic valve regurgitation      Osteopenia      CAD (coronary artery disease)      Gout      Hypothyroid      CHF (congestive heart failure)      Anxiety      Anemia      HTN (hypertension)      Afib      COPD (chronic obstructive pulmonary disease)      Chronic obstructive pulmonary disease (COPD)      HTN (hypertension)      Cardiac pacemaker      Gout      Hypothyroidism      Insomnia      Chronic CHF      History of ST elevation myocardial infarction (STEMI)      Status cardiac pacemaker      S/P knee replacement      No significant past surgical history          SOCIAL HISTORY:   Alcohol: Denied  Smoking: Nonsmoker  Drug Use: Denied  Marital Status:     FAMILY HISTORY: FAMILY HISTORY:  No pertinent family history in first degree relatives    No pertinent family history in first degree relatives    MEDICATIONS  (STANDING):  allopurinol 100 milliGRAM(s) Oral daily  ampicillin/sulbactam  IVPB      ampicillin/sulbactam  IVPB 3 Gram(s) IV Intermittent every 12 hours  calcium carbonate 1250 mG  + Vitamin D (OsCal 500 + D) 1 Tablet(s) Oral daily  lactated ringers. 1000 milliLiter(s) (60 mL/Hr) IV Continuous <Continuous>  levothyroxine 125 MICROGram(s) Oral daily  metoprolol succinate ER 12.5 milliGRAM(s) Oral daily  multivitamin/minerals 1 Tablet(s) Oral daily    MEDICATIONS  (PRN):  acetaminophen     Tablet .. 650 milliGRAM(s) Oral every 6 hours PRN Moderate Pain (4 - 6)  acetaminophen   IVPB .. 1000 milliGRAM(s) IV Intermittent once PRN Temp greater or equal to 38C (100.4F), Mild Pain (1 - 3)  ondansetron Injectable 4 milliGRAM(s) IV Push every 6 hours PRN Nausea and/or Vomiting          Allergies    codeine (Unknown)    Intolerances      Vital Signs Last 24 Hrs  T(C): 36.5 (29 Aug 2023 07:53), Max: 36.6 (28 Aug 2023 20:05)  T(F): 97.7 (29 Aug 2023 07:53), Max: 97.8 (28 Aug 2023 20:05)  HR: 107 (29 Aug 2023 07:53) (102 - 120)  BP: 110/56 (29 Aug 2023 07:53) (99/67 - 112/79)  BP(mean): --  RR: 18 (29 Aug 2023 07:53) (17 - 18)  SpO2: 93% (29 Aug 2023 07:53) (93% - 97%)    Parameters below as of 29 Aug 2023 07:53  Patient On (Oxygen Delivery Method): nasal cannula        I&O's Summary    27 Aug 2023 07:01  -  28 Aug 2023 07:00  --------------------------------------------------------  IN: 0 mL / OUT: 850 mL / NET: -850 mL        PHYSICAL EXAM:  Constitutional: NAD, awake and alert  HEENT: No oral cyanosis.  Pulmonary: Non-labored, breath sounds left base crackles   Cardiovascular: S1 and S2, irregular, 1/6 REBECA  Gastrointestinal: Bowel Sounds present, soft, nontender.   Lymph: No peripheral edema. No cervical lymphadenopathy.  Neurological: Alert, no focal deficits  Skin: No rashes.  Psych:  Mood & affect appropriate    LABS:                        10.1   8.74  )-----------( 145      ( 28 Aug 2023 08:48 )             30.7                         10.1   6.69  )-----------( 135      ( 27 Aug 2023 08:30 )             30.9                         10.4   7.65  )-----------( 124      ( 26 Aug 2023 20:43 )             32.7     28 Aug 2023 08:48    133    |  99     |  49     ----------------------------<  122    4.9     |  27     |  1.36   27 Aug 2023 08:30    133    |  100    |  39     ----------------------------<  108    4.7     |  28     |  1.31   26 Aug 2023 08:19    139    |  101    |  34     ----------------------------<  114    4.3     |  36     |  1.08     Ca    8.4        28 Aug 2023 08:48  Ca    8.4        27 Aug 2023 08:30  Ca    8.7        26 Aug 2023 08:19  Phos  4.1       28 Aug 2023 08:48  Phos  4.4       27 Aug 2023 08:30  Phos  4.1       26 Aug 2023 08:19  Mg     2.1       28 Aug 2023 08:48  Mg     2.2       27 Aug 2023 08:30  Mg     2.2       26 Aug 2023 08:19    Radiology/EKG/Echo: in progress       -----------------------------------------------------------------------------------------------------------  < from: Transthoracic Echocardiogram Follow Up (02.25.22 @ 09:04) >     Impression     Summary     Limited study to assess left ventricular function.   The left ventricle cavity is underdistended.   The interventricular septum appears flattened consistent with an RV   pressure/volume overload.   Estimated left ventricular ejection fraction is 70 %.   The right atrium appears severely dilated.   A device wire is seen in the RV and RA.   The right ventricle is mildly dilated.   The tricuspid valve leaflets are thin and pliable; valve motion is   normal.   Severe (4+) tricuspid valve regurgitation is present.   Severe pulmonary hypertension.   No evidence of a significant pericardial effusion.(Trace)   Pleural effusion - is present.     Signature     ----------------------------------------------------------------   Electronically signed by Edward David MD(Interpreting   physician) on 02/25/2022 01:46 PM    < end of copied text >        < from: TTE Echo Complete w/o Contrast w/ Doppler (08.29.23 @ 08:45) >     The mitral valve leaflets appear thickened.   Mild mitral annular calcification is present.   Mild to Moderate mitral regurgitation with two jets is present.   Significant fibrocalcific changes noted to the aortic valve leaflets with   restriction in leaflet excursion.   Calculated MIKE is .91cm^2; this finding is consistent with severe aortic   stenosis.   The tricuspid valve leaflets appear mildly thickened and/or calcified,   but   open well.   Severe (4+) tricuspid valve regurgitation is present.   Severe pulmonary hypertension.   Pulmonic valve not well seen.   Mild to moderate pulmonic valvular regurgitation is present.   The left atrium is severely dilated.   Left ventricle systolic function appears mildly impaired; segmental wall   motion abnormalities noted.   Mild concentric left ventricular hypertrophy is present.   Estimated Ejection Fraction is 45 %.   The interventricular septum appears flattened consistent with an RV   pressure/volume overload.   The right atrium appears severely dilated.   A device wire is seen inthe RV and RA.   The right ventricle exhibits moderate dilation, diffuse hypokinesis, and   depression of contractility based on TAPSE.   A device wire is seen in the RV and RA.   Pleural effusion - is present.   IVC is dilated and not collapsing with inspiration.     Signature    < end of copied text >

## 2023-08-29 NOTE — PROGRESS NOTE ADULT - SUBJECTIVE AND OBJECTIVE BOX
Patient is a 89yo year old female with PMHx chronic CHF, hypothyroidism, Gout, HTN, COPD, Afib on xarelto, anemia, CAD, and osteopenia presents to the ED c/o left leg pain after hitting her leg yesterday 8/24/23, when going to answer the phone. Pt banged it on furniture and developed a large hematoma. Pt having difficulty ambulating, no other injuries. Last took Xarelto last night. No history of DVT/PE. Pt denied head strike LOC or any traumatic injuries/complaints (25 Aug 2023 13:27)"    INTERVAL HPI/OVERNIGHT EVENTS: Medicine consulted for co-management of chronic medical conditions. On evaluation, pt lethargic s/p pain medication but easily arousable. Pt poor historian.     Pt seen and examined bedside. Calm but confused. BP gradually improving      ROS:   All 10 systems reviewed and found to be negative with the exception of what has been described above.    Vital Signs Last 24 Hrs  Vital Signs Last 24 Hrs  T(C): 36.5 (08-29-23 @ 07:53), Max: 36.6 (08-28-23 @ 20:05)  T(F): 97.7 (08-29-23 @ 07:53), Max: 97.8 (08-28-23 @ 20:05)  HR: 107 (08-29-23 @ 07:53) (102 - 120)  BP: 110/56 (08-29-23 @ 07:53) (99/67 - 112/79)  BP(mean): --  RR: 18 (08-29-23 @ 07:53) (17 - 18)  SpO2: 93% (08-29-23 @ 07:53) (93% - 97%)    Parameters below as of 27 Aug 2023 08:44  Patient On (Oxygen Delivery Method): nasal cannula      GEN: lying in bed, NAD  HEENT:   NC/AT, pupils equal and reactive, EOMI  CV:  +S1, +S2, RRR  RESP:   lungs clear to auscultation bilaterally, no wheeze, rales, rhonchi   BREAST:  not examined  GI:  abdomen soft, non-tender, non-distended, normoactive BS  RECTAL:  not examined  :  not examined  MSK:   normal muscle tone  EXT:  b/l LE bandaged  NEURO:  AAOX1-2 , no focal neurological deficits  SKIN:  no rashes        08-29    136  |  102  |  50<H>  ----------------------------<  120<H>  4.8   |  30  |  1.19    Ca    8.5      29 Aug 2023 07:44  Phos  3.5     08-29  Mg     2.4     08-29                          10.4   9.30  )-----------( 167      ( 29 Aug 2023 07:44 )             31.9         Urinalysis Basic - ( 28 Aug 2023 08:48 )    Color: x / Appearance: x / SG: x / pH: x  Gluc: 122 mg/dL / Ketone: x  / Bili: x / Urobili: x   Blood: x / Protein: x / Nitrite: x   Leuk Esterase: x / RBC: x / WBC x   Sq Epi: x / Non Sq Epi: x / Bacteria: x

## 2023-08-29 NOTE — PROGRESS NOTE ADULT - ASSESSMENT
89yo year old female with PMHx chronic CHF, hypothyroidism, Gout, HTN, COPD, Afib on xarelto, anemia, CAD, and osteopenia presents to the ED c/o left leg pain after hitting her leg yesterday 8/24/23    #Left calf hematoma  HOLD AC- on for Afib, pt high fall risk would not continue however please discuss with pt cardiologist   pt on AC, reversed in ED with [facility]  serial cbc - hgb stable  bed rest  monitor Hgb, transfuse to keep Hgb >7  Debridement per surgery    #Atrial fibrillation, rate controlled  - plan as per cardiology  - hold metoprolol for now   keep k>4, Mg >2  hold AC due to above  HT and BP improving    #AMS   - CTH unremarkable  - do not suspect fungal infection likely insippated secretions but can f/u with ENT as outpt    #acute on chronic diatolic CHF  - hold lasix as per cards due to hypotension  - plan as per cardio  weight daily  strict i/o's    # increased cr - if continues to worsen then likely JOAQUÍN due to ATN but does not meet criteria at this time  - monitor for worsening given hypotension episode  - check BMP in AM     #Hypothyroid  c/w synthroid    #Gout   c/w allopurinol    #COPD  pulse ox q8 hrs  c/w supplemental O2  cough/deep breathing  incentive spirometry  89yo year old female with PMHx chronic CHF, hypothyroidism, Gout, HTN, COPD, Afib on xarelto, anemia, CAD, and osteopenia presents to the ED c/o left leg pain after hitting her leg yesterday 8/24/23    #Left calf hematoma  HOLD AC- on for Afib, pt high fall risk would not continue however please discuss with pt cardiologist   pt on AC, reversed in ED with [employer]ra  serial cbc - hgb stable  bed rest  monitor Hgb, transfuse to keep Hgb >7  Debridement per surgery, no medical contraindication to debridement as long as pt remains hemodynamically stable    #Atrial fibrillation, rate controlled  - plan as per cardiology  - hold metoprolol for now   keep k>4, Mg >2  hold AC due to above  HT and BP improving    #AMS   - CTH unremarkable  - do not suspect fungal infection likely insippated secretions but can f/u with ENT as outpt    #acute on chronic diatolic CHF  - hold lasix as per cards due to hypotension  - plan as per cardio  weight daily  strict i/o's    # increased cr - if continues to worsen then likely JOAQUÍN due to ATN but does not meet criteria at this time  - monitor for worsening given hypotension episode  - check BMP in AM     #Hypothyroid  c/w synthroid    #Gout   c/w allopurinol    #COPD  pulse ox q8 hrs  c/w supplemental O2  cough/deep breathing  incentive spirometry

## 2023-08-29 NOTE — PROGRESS NOTE ADULT - PROBLEM SELECTOR PLAN 2
HFpEF; appears euvolemic, Echo shows EF 45% perhaps 2/2 tachyarrhythmia ( AF -120 )  will resume Low dose BB to control HR and repeat Echo assess LV FX.  prior Echo on 2/25/23 reveals preserved LVEF 70%, severe AS/TR/ pHTN, Moderate MR  Continue to hold Lasix for now and reintroduce if BP remains stable HFpEF; appears euvolemic, Echo shows EF 45% perhaps 2/2 tachyarrhythmia ( AF -120 )  will resume Low dose BB to control HR and repeat Echo assess LV FX.  prior Echo on 2/25/23 reveals preserved LVEF 70%, severe AS/TR/ pHTN, Moderate MR  Continue to hold Lasix and use cautiously in setting of severe AS

## 2023-08-29 NOTE — PROGRESS NOTE ADULT - PROBLEM SELECTOR PLAN 3
Mildly tachycardiac; BB/diuretic previously held 2/2 low BP; SBP now >100 would resume low dose BB continue to monitor BP and can consider Digoxin if unable to tolerate BB  AC held due to large hematoma - resume when cleared by Trauma Sx Mildly tachycardiac; BB/diuretic previously held 2/2 low BP; SBP now >100 would resume low dose BB continue to monitor BP and can consider Digoxin if unable to tolerate BB  AC held due to large hematoma - resume when cleared by Trauma Sx    Patient with advanced age , with severe AS , severe TR with pulmonary hypertension , RV volume overload , moderate MR ,  borderline BP , moderate ventricular response ,  would consider adding digoxin if heart rate remain high on low dose BB ,AC on hold due to hematoma     patient is very high risk for any procedure would wait until her heart rate stable

## 2023-08-29 NOTE — PROGRESS NOTE ADULT - ATTENDING COMMENTS
A/P:  Left lower leg hematoma s/p isolated trauma to region 8/24/23, H/H and HD stable  Hospitalist on consult for medical mgmt  IR consulted, no intervention  Cardiology on consult, pt not optimized for surgery, pt has severe aortic stenosis and cardiac pathology at this time  Serial h/h stable  Cont local wound care  Urology on consult, cont jackson  Fall risk precautions  S/P reversal of xarelto anticoagulation with KCENTRA in ED  GI/DVT prophylaxis  Pain control, avoid narcotics as pt gets somnolent with morphine  IV antibiotics  F/U labs  Cont current care and meds  Fall risk precautions  SS for d/c planning  PT  Pt aware of and agrees with all of the above  Will defer surgical intervention for left lower ext debridement and hematoma evacuation until cleared/optimized by cardiology    35 minuted of time spent on pt examination, review of relevant labs and radiologic studies, assured stabilization of pt, discussion with relevant services/providers for coordination of pt care and services .

## 2023-08-30 NOTE — PROGRESS NOTE ADULT - PROBLEM SELECTOR PLAN 1
large left calf hematoma - management as per Trauma Sx - debridement of LLE hematoma postponed due to very high cardiology risk due to severe AS

## 2023-08-30 NOTE — PROGRESS NOTE ADULT - PROBLEM SELECTOR PLAN 4
S/P PPM interrogation; Normal functioning single chamber PPM with battery longevity 5.9-6.1 years None

## 2023-08-30 NOTE — PROGRESS NOTE ADULT - ATTENDING COMMENTS
A/P:  Left lower leg hematoma s/p isolated trauma to region 8/24/23, H/H and HD stable  Hospitalist on consult for medical mgmt  IR consulted, no intervention  Cardiology on consult, pt not optimized for surgery, pt has severe aortic stenosis  Serial h/h stable  Cont local wound care  Urology on consult, cont jackson  Fall risk precautions  S/P reversal of xarelto anticoagulation with KCENTRA in ED  GI/DVT prophylaxis  Pain control, avoid narcotics as pt gets somnolent with morphine  IV antibiotics  F/U labs  Cont current care and meds  Fall risk precautions  SS for d/c planning  PT  Pt aware of and agrees with all of the above  Will defer surgical intervention for left lower ext debridement and hematoma evacuation until cleared/optimized by cardiology    35 minuted of time spent on pt examination, review of relevant labs and radiologic studies, assured stabilization of pt, discussion with relevant services/providers for coordination of pt care and services . A/P:  Left lower leg hematoma s/p isolated trauma to region 8/24/23, H/H and HD stable  Hospitalist on consult for medical mgmt  IR consulted, no intervention  Cardiology on consult, Per direct discussion with Dr Walker pt not optimized for surgery, pt has severe aortic stenosis and cardiac pathology at this time  Serial h/h stable  Cont local wound care  Urology on consult, cont jackson  Fall risk precautions  S/P reversal of xarelto anticoagulation with KCENTRA in ED  GI/DVT prophylaxis  Pain control, avoid narcotics as pt gets somnolent with morphine  IV antibiotics  F/U labs  Cont current care and meds  Fall risk precautions  SS for d/c planning  PT  Pt aware of and agrees with all of the above  Will defer surgical intervention for left lower ext debridement and hematoma evacuation until cleared/optimized by cardiology    35 minuted of time spent on pt examination, review of relevant labs and radiologic studies, assured stabilization of pt, discussion with relevant services/providers for coordination of pt care and services .

## 2023-08-30 NOTE — PROGRESS NOTE ADULT - SUBJECTIVE AND OBJECTIVE BOX
CHIEF COMPLAINT: Patient is a 89y old  Female who presents with a chief complaint of LLE hematoma (28 Aug 2023 10:28)      HPI:  Level 3 Trauma Activation    HPI:    Patient is a 89y year old female with PMHx chronic CHF, hypothyroidism, Gout, HTN, COPD, Afib on xarelto, anemia, CAD, and osteopenia presents to the ED c/o left leg pain after hitting her leg yesterday 8/24/23, when going to answer the phone. Pt banged it on furniture and developed a large hematoma. Pt having difficulty ambulating, no other injuries. Last took Xarelto last night. No history of DVT/PE.    Cardiology consulted to evaluate for CHF. This is a pt. of Dr. Harris, last seen in the office on 7/31/23. Pt. takes lasix and torsemide prn at home for HFpEF and xarelto and toprol xl 25 mg daily for Chronic Afib.   At present, admitted for leg hematoma, hypotensive, denies chest pain/SOB/palpitations.     Pt denied head strike LOC or any traumatic injuries/complaints   (25 Aug 2023 13:27)    8/29/23 Seated in bedside chair awake alert no complaints tele -120  8/30/23; seen in bed, looks comfortable, no complaints, Tele: Afib 80s-90s    MEDICATIONS  (STANDING):  allopurinol 100 milliGRAM(s) Oral daily  ampicillin/sulbactam  IVPB      ampicillin/sulbactam  IVPB 3 Gram(s) IV Intermittent every 12 hours  calcium carbonate 1250 mG  + Vitamin D (OsCal 500 + D) 1 Tablet(s) Oral daily  lactated ringers. 1000 milliLiter(s) (60 mL/Hr) IV Continuous <Continuous>  levothyroxine 125 MICROGram(s) Oral daily  metoprolol succinate ER 12.5 milliGRAM(s) Oral daily  multivitamin/minerals 1 Tablet(s) Oral daily      MEDICATIONS  (PRN):  acetaminophen     Tablet .. 650 milliGRAM(s) Oral every 6 hours PRN Moderate Pain (4 - 6)  acetaminophen   IVPB .. 1000 milliGRAM(s) IV Intermittent once PRN Temp greater or equal to 38C (100.4F), Mild Pain (1 - 3)  ondansetron Injectable 4 milliGRAM(s) IV Push every 6 hours PRN Nausea and/or Vomiting    Vital Signs Last 24 Hrs  T(C): 36.6 (30 Aug 2023 07:45), Max: 36.6 (29 Aug 2023 15:14)  T(F): 97.8 (30 Aug 2023 07:45), Max: 97.8 (29 Aug 2023 15:14)  HR: 106 (30 Aug 2023 07:45) (64 - 106)  BP: 113/50 (30 Aug 2023 07:45) (102/62 - 113/50)  BP(mean): --  RR: 18 (30 Aug 2023 07:45) (17 - 18)  SpO2: 94% (30 Aug 2023 07:45) (94% - 96%)    Parameters below as of 30 Aug 2023 07:45  Patient On (Oxygen Delivery Method): nasal cannula    PHYSICAL EXAM:  Constitutional: NAD, awake and alert  HEENT: No oral cyanosis.  Pulmonary: Non-labored, breath sounds left base crackles   Cardiovascular: S1 and S2, irregular, 1/6 REBECA  Gastrointestinal: Bowel Sounds present, soft, nontender.   Lymph: No peripheral edema. No cervical lymphadenopathy.  Neurological: Alert, no focal deficits  Skin: No rashes.  Psych:  Mood & affect appropriate    LABS: reviewed                          10.6   10.48 )-----------( 187      ( 30 Aug 2023 05:05 )             32.4     08-30    138  |  104  |  41<H>  ----------------------------<  126<H>  4.6   |  30  |  0.98    Ca    8.7      30 Aug 2023 05:05  Phos  2.8     08-30  Mg     2.4     08-30      - TroponinI hsT:     Radiology/EKG/Echo: in progress       -----------------------------------------------------------------------------------------------------------  < from: Transthoracic Echocardiogram Follow Up (02.25.22 @ 09:04) >     Impression     Summary     Limited study to assess left ventricular function.   The left ventricle cavity is underdistended.   The interventricular septum appears flattened consistent with an RV   pressure/volume overload.   Estimated left ventricular ejection fraction is 70 %.   The right atrium appears severely dilated.   A device wire is seen in the RV and RA.   The right ventricle is mildly dilated.   The tricuspid valve leaflets are thin and pliable; valve motion is   normal.   Severe (4+) tricuspid valve regurgitation is present.   Severe pulmonary hypertension.   No evidence of a significant pericardial effusion.(Trace)   Pleural effusion - is present.     Signature     ----------------------------------------------------------------   Electronically signed by Edward David MD(Interpreting   physician) on 02/25/2022 01:46 PM    < end of copied text >        < from: TTE Echo Complete w/o Contrast w/ Doppler (08.29.23 @ 08:45) >     The mitral valve leaflets appear thickened.   Mild mitral annular calcification is present.   Mild to Moderate mitral regurgitation with two jets is present.   Significant fibrocalcific changes noted to the aortic valve leaflets with   restriction in leaflet excursion.   Calculated MIKE is .91cm^2; this finding is consistent with severe aortic   stenosis.   The tricuspid valve leaflets appear mildly thickened and/or calcified,   but   open well.   Severe (4+) tricuspid valve regurgitation is present.   Severe pulmonary hypertension.   Pulmonic valve not well seen.   Mild to moderate pulmonic valvular regurgitation is present.   The left atrium is severely dilated.   Left ventricle systolic function appears mildly impaired; segmental wall   motion abnormalities noted.   Mild concentric left ventricular hypertrophy is present.   Estimated Ejection Fraction is 45 %.   The interventricular septum appears flattened consistent with an RV   pressure/volume overload.   The right atrium appears severely dilated.   A device wire is seen inthe RV and RA.   The right ventricle exhibits moderate dilation, diffuse hypokinesis, and   depression of contractility based on TAPSE.   A device wire is seen in the RV and RA.   Pleural effusion - is present.   IVC is dilated and not collapsing with inspiration.     Signature    < end of copied text >           CHIEF COMPLAINT: Patient is a 89y old  Female who presents with a chief complaint of LLE hematoma (28 Aug 2023 10:28)      HPI:  Level 3 Trauma Activation    HPI:    Patient is a 89y year old female with PMHx chronic CHF, hypothyroidism, Gout, HTN, COPD, Afib on xarelto, anemia, CAD, and osteopenia presents to the ED c/o left leg pain after hitting her leg yesterday 8/24/23, when going to answer the phone. Pt banged it on furniture and developed a large hematoma. Pt having difficulty ambulating, no other injuries. Last took Xarelto last night. No history of DVT/PE.    Cardiology consulted to evaluate for CHF. This is a pt. of Dr. Harris, last seen in the office on 7/31/23. Pt. takes lasix and torsemide prn at home for HFpEF and xarelto and toprol xl 25 mg daily for Chronic Afib.   At present, admitted for leg hematoma, hypotensive, denies chest pain/SOB/palpitations.     Pt denied head strike LOC or any traumatic injuries/complaints   (25 Aug 2023 13:27)    8/29/23 Seated in bedside chair awake alert no complaints tele -120  8/30/23; seen in bed, looks comfortable, no complaints, Tele: Afib 80s-90s - reconsulted by hospitalist one hr after seeing pt. for chest pain  - seen pt. with Micki Harris - pt. denies chest pain - says she is SOB, did not have chest pain, trops and EKG ordered     MEDICATIONS  (STANDING):  allopurinol 100 milliGRAM(s) Oral daily  ampicillin/sulbactam  IVPB      ampicillin/sulbactam  IVPB 3 Gram(s) IV Intermittent every 12 hours  calcium carbonate 1250 mG  + Vitamin D (OsCal 500 + D) 1 Tablet(s) Oral daily  lactated ringers. 1000 milliLiter(s) (60 mL/Hr) IV Continuous <Continuous>  levothyroxine 125 MICROGram(s) Oral daily  metoprolol succinate ER 12.5 milliGRAM(s) Oral daily  multivitamin/minerals 1 Tablet(s) Oral daily      MEDICATIONS  (PRN):  acetaminophen     Tablet .. 650 milliGRAM(s) Oral every 6 hours PRN Moderate Pain (4 - 6)  acetaminophen   IVPB .. 1000 milliGRAM(s) IV Intermittent once PRN Temp greater or equal to 38C (100.4F), Mild Pain (1 - 3)  ondansetron Injectable 4 milliGRAM(s) IV Push every 6 hours PRN Nausea and/or Vomiting    Vital Signs Last 24 Hrs  T(C): 36.6 (30 Aug 2023 07:45), Max: 36.6 (29 Aug 2023 15:14)  T(F): 97.8 (30 Aug 2023 07:45), Max: 97.8 (29 Aug 2023 15:14)  HR: 106 (30 Aug 2023 07:45) (64 - 106)  BP: 113/50 (30 Aug 2023 07:45) (102/62 - 113/50)  BP(mean): --  RR: 18 (30 Aug 2023 07:45) (17 - 18)  SpO2: 94% (30 Aug 2023 07:45) (94% - 96%)    Parameters below as of 30 Aug 2023 07:45  Patient On (Oxygen Delivery Method): nasal cannula    PHYSICAL EXAM:  Constitutional: NAD, awake and alert  HEENT: No oral cyanosis.  Pulmonary: Non-labored, breath sounds left base crackles   Cardiovascular: S1 and S2, irregular, 1/6 REBECA  Gastrointestinal: Bowel Sounds present, soft, nontender.   Lymph: No peripheral edema. No cervical lymphadenopathy.  Neurological: Alert, no focal deficits  Skin: No rashes.  Psych:  Mood & affect appropriate    LABS: reviewed                          10.6   10.48 )-----------( 187      ( 30 Aug 2023 05:05 )             32.4     08-30    138  |  104  |  41<H>  ----------------------------<  126<H>  4.6   |  30  |  0.98    Ca    8.7      30 Aug 2023 05:05  Phos  2.8     08-30  Mg     2.4     08-30      - TroponinI hsT:     Radiology/EKG/Echo: in progress       -----------------------------------------------------------------------------------------------------------  < from: Transthoracic Echocardiogram Follow Up (02.25.22 @ 09:04) >     Impression     Summary     Limited study to assess left ventricular function.   The left ventricle cavity is underdistended.   The interventricular septum appears flattened consistent with an RV   pressure/volume overload.   Estimated left ventricular ejection fraction is 70 %.   The right atrium appears severely dilated.   A device wire is seen in the RV and RA.   The right ventricle is mildly dilated.   The tricuspid valve leaflets are thin and pliable; valve motion is   normal.   Severe (4+) tricuspid valve regurgitation is present.   Severe pulmonary hypertension.   No evidence of a significant pericardial effusion.(Trace)   Pleural effusion - is present.     Signature     ----------------------------------------------------------------   Electronically signed by Edward David MD(Interpreting   physician) on 02/25/2022 01:46 PM    < end of copied text >        < from: TTE Echo Complete w/o Contrast w/ Doppler (08.29.23 @ 08:45) >     The mitral valve leaflets appear thickened.   Mild mitral annular calcification is present.   Mild to Moderate mitral regurgitation with two jets is present.   Significant fibrocalcific changes noted to the aortic valve leaflets with   restriction in leaflet excursion.   Calculated MIKE is .91cm^2; this finding is consistent with severe aortic   stenosis.   The tricuspid valve leaflets appear mildly thickened and/or calcified,   but   open well.   Severe (4+) tricuspid valve regurgitation is present.   Severe pulmonary hypertension.   Pulmonic valve not well seen.   Mild to moderate pulmonic valvular regurgitation is present.   The left atrium is severely dilated.   Left ventricle systolic function appears mildly impaired; segmental wall   motion abnormalities noted.   Mild concentric left ventricular hypertrophy is present.   Estimated Ejection Fraction is 45 %.   The interventricular septum appears flattened consistent with an RV   pressure/volume overload.   The right atrium appears severely dilated.   A device wire is seen inthe RV and RA.   The right ventricle exhibits moderate dilation, diffuse hypokinesis, and   depression of contractility based on TAPSE.   A device wire is seen in the RV and RA.   Pleural effusion - is present.   IVC is dilated and not collapsing with inspiration.     Signature    < end of copied text >

## 2023-08-30 NOTE — PROGRESS NOTE ADULT - SUBJECTIVE AND OBJECTIVE BOX
Pt. seen and examined at bedside this morning. Patient reports pain in the left leg about the same. Denies fever, chest pain, SOB, nausea, vomiting.     Physical exam:  Pt is aaox3  Pt in no acute distress  Psych: normal affect  HEENT: NCAT, EOM wnl  Neck: no jvd or tracheal deviation  CVS: irregular rhythm  Resp: normal effort, equal chest rise, nasal cannula in place  ABD: soft, non distended, no rebound, no guarding, no rigidity, no skin changes to exam. No tenderness  EXT: no calf tenderness or edema to exam b/l, b/l LE ACE wrap in place, no sign of bleeding or hemorrhage  : jackson catheter w/ clear urine output  Skin: no adverse skin changes to exam otherwise CC:Patient is a 89y old  Female who presents with a chief complaint of LLE hematoma       Subjective:  Pt seen and examined at bedside with chaperone. Pt is AAOx3, pt in no acute distress. Pt denied c/o fever, chills, chest pain, SOB, abd pain, N/V/D, extremity dysfunction, hemoptysis, hematemesis, hematuria, hematochexia, headache, diplopia, vertigo, dizzyness. Left leg pain well controlled w/ meds    ROS:  otherwise as abovementioned ROS    Vital Signs Last 24 Hrs  T(C): 36.3 (30 Aug 2023 16:07), Max: 36.6 (30 Aug 2023 07:45)  T(F): 97.3 (30 Aug 2023 16:07), Max: 97.8 (30 Aug 2023 07:45)  HR: 98 (30 Aug 2023 16:07) (98 - 106)  BP: 125/73 (30 Aug 2023 16:07) (102/62 - 125/73)  BP(mean): --  RR: 19 (30 Aug 2023 16:07) (17 - 19)  SpO2: 99% (30 Aug 2023 16:07) (94% - 99%)    Parameters below as of 30 Aug 2023 16:07  Patient On (Oxygen Delivery Method): nasal cannula, 3l        Labs:      CARDIAC MARKERS ( 30 Aug 2023 16:07 )  x     / x     / 54 U/L / x     / x                                10.6   8.67  )-----------( 184      ( 30 Aug 2023 16:07 )             32.6     CBC Full  -  ( 30 Aug 2023 16:07 )  WBC Count : 8.67 K/uL  RBC Count : 3.08 M/uL  Hemoglobin : 10.6 g/dL  Hematocrit : 32.6 %  Platelet Count - Automated : 184 K/uL  Mean Cell Volume : 105.8 fl  Mean Cell Hemoglobin : 34.4 pg  Mean Cell Hemoglobin Concentration : 32.5 gm/dL  Auto Neutrophil # : x  Auto Lymphocyte # : x  Auto Monocyte # : x  Auto Eosinophil # : x  Auto Basophil # : x  Auto Neutrophil % : x  Auto Lymphocyte % : x  Auto Monocyte % : x  Auto Eosinophil % : x  Auto Basophil % : x    08-30    138  |  105  |  36<H>  ----------------------------<  118<H>  4.7   |  30  |  0.85    Ca    8.5      30 Aug 2023 16:07  Phos  3.0     08-30  Mg     2.5     08-30        PT/INR - ( 30 Aug 2023 05:05 )   PT: 11.8 sec;   INR: 1.05 ratio         PTT - ( 30 Aug 2023 05:05 )  PTT:33.4 sec      Meds:  acetaminophen     Tablet .. 650 milliGRAM(s) Oral every 6 hours PRN  acetaminophen   IVPB .. 1000 milliGRAM(s) IV Intermittent once PRN  allopurinol 100 milliGRAM(s) Oral daily  ampicillin/sulbactam  IVPB      ampicillin/sulbactam  IVPB 3 Gram(s) IV Intermittent every 12 hours  calcium carbonate 1250 mG  + Vitamin D (OsCal 500 + D) 1 Tablet(s) Oral daily  furosemide   Injectable 20 milliGRAM(s) IV Push once  levothyroxine 125 MICROGram(s) Oral daily  metoprolol succinate ER 12.5 milliGRAM(s) Oral daily  multivitamin/minerals 1 Tablet(s) Oral daily  ondansetron Injectable 4 milliGRAM(s) IV Push every 6 hours PRN      Radiology:      Physical exam:  GCS of 15  Pt is aaox3  Pt in no acute distress  Airway is patent  Breathing is symmetric and unlabored  Neuro: CN II-XII grossly intact  Psych: normal affect  HEENT: normocephalic, BART, EOM wnl, no gross craniofacial bony pathology to exam  Neck: No tracheal deviation, no JVD, no crepitus, no ecchymosis, no hematoma  Chest: No gross rib or sternal pathology or tenderness to exam, no crepitus, no ecchymosis, no hematoma  Resp: CTAB  CVS: S1S2(+)  ABD: bowel sounds (+), soft, nontender, non distended, no rebound, no guarding, no rigidity, no pelvic instability to exam  : jackson  EXT: + left lower leg dressing intact to known hematoma site, pt has good capillary refill in all digits. Sensoromotor function grossly intact to limited exam, on VTE prophylaxis

## 2023-08-30 NOTE — PROGRESS NOTE ADULT - PROBLEM SELECTOR PLAN 5
reconsulted by hospitalist one hr after seeing pt. for chest pain on 8/30  - seen pt. with Micki Harris - pt. denies chest pain - says she is SOB, did not have chest pain, trops, EKG and CT ordered

## 2023-08-30 NOTE — PROGRESS NOTE ADULT - PROBLEM SELECTOR PLAN 2
HFpEF; appears euvolemic, Echo shows EF 45% perhaps 2/2 tachyarrhythmia ( AF -120 )  will resume Low dose BB to control HR and repeat Echo assess LV FX.  prior Echo on 2/25/23 reveals preserved LVEF 70%, severe AS/TR/ pHTN, Moderate MR  Continue to hold Lasix and use cautiously in setting of severe AS HFpEF; appears euvolemic, Echo shows EF 45% perhaps 2/2 tachyarrhythmia ( AF -120 )  will resume Low dose BB to control HR and repeat Echo assess LV FX.  prior Echo on 2/25/23 reveals preserved LVEF 70%, severe AS/TR/ pHTN, Moderate MR  Lasix 20 mg today. Moderate IV fluids

## 2023-08-30 NOTE — PROGRESS NOTE ADULT - ASSESSMENT
89 year old female with left lower leg hematoma.    P:  trend h/h daily  Debridement of LLE hematoma postponed due to very high risk as per Cardiology  Unasyn  Hospitalist consult for medical mgmt appreciated  cont ACE Wrap lower ext in for compressive dressing  Wound care consult  Fall risk precautions but ambulation as tolerated  Espinoza in place, urology recs appreciated: outpatient TOV  Cardiology on board  Pain control  F/U labs am  PT  CM for dispo    To be discussed with Dr. Casey.

## 2023-08-30 NOTE — PROGRESS NOTE ADULT - ASSESSMENT
89yo year old female with PMHx chronic CHF, hypothyroidism, Gout, HTN, COPD, Afib on xarelto, anemia, CAD, and osteopenia presents to the ED c/o left leg pain after hitting her leg. admitted to trauma service for further care. Medicine consulted for medical management    Left calf hematoma  HOLD AC- on for Afib, pt high fall risk would not continue however please discuss with pt cardiologist   pt on AC, reversed in ED with K[employer]  serial cbc - hgb stable  bed rest  monitor Hgb, transfuse to keep Hgb >7  Debridement per surgery, no medical contraindication to debridement as long as pt remains hemodynamically stable    Atrial fibrillation, rate controlled  - plan as per cardiology  - hold metoprolol for now   keep k>4, Mg >2  hold AC due to above  HT and BP improving    AMS   - CTH unremarkable  - do not suspect fungal infection likely insippated secretions but can f/u with ENT as outpt    acute on chronic diatolic CHF  - hold lasix as per cards due to hypotension ::  -although noted BP  today is now 122  -plan as per cardio  weight daily  strict i/o's     increased cr - if continues to worsen then likely JOAQUÍN due to ATN but does not meet criteria at this time  - monitor for worsening given hypotension episode  -  #Hypothyroid  c/w synthroid    #Gout   c/w allopurinol    #COPD  pulse ox q8 hrs  c/w supplemental O2  cough/deep breathing  incentive spirometry

## 2023-08-30 NOTE — PROGRESS NOTE ADULT - SUBJECTIVE AND OBJECTIVE BOX
Patient seen and examined  reports "not feelin well"  on 02 about  1 liter  dressing noted on right leg    Review of Systems:  General:denies fever chills, headache, weakness  HEENT: denies blurry vision,diffculty swallowing, difficulty hearing, tinnitus  Cardiovascular: denies chest pain  ,palpitations  Pulmonary:denies shortness of breath, cough, wheezing, hemoptysis  Gastrointestinal: denies abdominal pain, constipation, diarrhea,nausea , vomiting, hematochezia  : denies hematuria, dysuria, or incontinence  Neurological: denies weakness, numbness , tingling, dizziness, tremors  MSK: denies muscle pain, difficulty ambulating, swelling, back pain  skin: denies skin rash, itching, burning, or  skin lesions  Psychiatrical: denies mood disturbances, anxierty, feeling depressed, depression , or difficulty sleeping    Objective:  Vitals  T(C): 36.6 (08-30-23 @ 07:45), Max: 36.6 (08-29-23 @ 15:14)  HR: 106 (08-30-23 @ 07:45) (64 - 106)  BP: 113/50 (08-30-23 @ 07:45) (102/62 - 113/50)  RR: 18 (08-30-23 @ 07:45) (17 - 18)  SpO2: 94% (08-30-23 @ 07:45) (94% - 96%)    Physical Exam:  General: comfortable, no acute distress  HEENT: Atraumatic, no LAD, trachea midline, PERRLA  Cardiovascular: normal s1s2, no murmurs, gallops or fricition rubs  Pulmonary: clear to ausculation Bilaterally, no wheezing , rhonchi  Gastrointestinal: soft non tender non distended, no masses felt, no organomegally  Muscloskeletal: no lower extremity edema, intact bilateral lower extremity pulses  Neurological: CN II-12 intact. No focal weakness  Psychiatrical: normal mood, cooperative  SKIN: no rash, lesions or ulcers    Labs:                          10.6   10.48 )-----------( 187      ( 30 Aug 2023 05:05 )             32.4     08-30    138  |  104  |  41<H>  ----------------------------<  126<H>  4.6   |  30  |  0.98    Ca    8.7      30 Aug 2023 05:05  Phos  2.8     08-30  Mg     2.4     08-30        PT/INR - ( 30 Aug 2023 05:05 )   PT: 11.8 sec;   INR: 1.05 ratio         PTT - ( 30 Aug 2023 05:05 )  PTT:33.4 sec      Active Medications  MEDICATIONS  (STANDING):  allopurinol 100 milliGRAM(s) Oral daily  ampicillin/sulbactam  IVPB      ampicillin/sulbactam  IVPB 3 Gram(s) IV Intermittent every 12 hours  calcium carbonate 1250 mG  + Vitamin D (OsCal 500 + D) 1 Tablet(s) Oral daily  lactated ringers. 1000 milliLiter(s) (60 mL/Hr) IV Continuous <Continuous>  levothyroxine 125 MICROGram(s) Oral daily  metoprolol succinate ER 12.5 milliGRAM(s) Oral daily  multivitamin/minerals 1 Tablet(s) Oral daily    MEDICATIONS  (PRN):  acetaminophen     Tablet .. 650 milliGRAM(s) Oral every 6 hours PRN Moderate Pain (4 - 6)  acetaminophen   IVPB .. 1000 milliGRAM(s) IV Intermittent once PRN Temp greater or equal to 38C (100.4F), Mild Pain (1 - 3)  ondansetron Injectable 4 milliGRAM(s) IV Push every 6 hours PRN Nausea and/or Vomiting

## 2023-08-30 NOTE — PROGRESS NOTE ADULT - PROBLEM SELECTOR PLAN 3
Mildly tachycardiac; BB/diuretic previously held 2/2 low BP; SBP now >100 resumed low dose BB continue to monitor BP, can consider Digoxin if unable to tolerate BB  AC held due to large hematoma - resume when cleared by Trauma Sx    Patient with advanced age , with severe AS , severe TR with pulmonary hypertension , RV volume overload , moderate MR ,  borderline BP , moderate ventricular response ,  would consider adding digoxin if heart rate remain high on low dose BB ,AC on hold due to hematoma     patient is very high risk for any procedure would wait until her heart rate stable

## 2023-08-31 NOTE — PROGRESS NOTE ADULT - ASSESSMENT
89 year old female with left lower leg hematoma.  Troponin normal    P:  Trend h/h daily  Debridement of LLE hematoma postponed due to very high risk as per Cardiology  Unasyn  Hospitalist consult for medical mgmt appreciated  cont ACE Wrap lower ext in for compressive dressing  Wound care recs  Fall risk precautions but ambulation as tolerated  Espinoza in place, urology recs appreciated: outpatient TOV  Cardiology on board  Pain control  F/U labs am  PT  CM for dispo    To be discussed with Dr. Casey.

## 2023-08-31 NOTE — PROGRESS NOTE ADULT - PROBLEM SELECTOR PLAN 5
Echo shows EF 45% perhaps 2/2 tachyarrhythmia ( AF -120 ), resumed Low dose BB to control HR, repeat Echo reveals reduced LVEF 45%, severe AS/TR, severe pHTN  Recommendation: pt. with fluid overload likely due to IV fluids, now with elevated JVD, elevated pro BNP=8K, needs diuretics - give lasix 40 mg ivp x 1 dose now and then daily starting tomorrow, recheck pro BNP in 48 hrs, HF consult - d/w PA Jose Melgar

## 2023-08-31 NOTE — PROGRESS NOTE ADULT - PROBLEM SELECTOR PLAN 4
reconsulted by hospitalist one hr after seeing pt. for chest pain on 8/30  - seen pt. with Micki Harris - pt. denies chest pain - says she is SOB, did not have chest pain, trop negative,

## 2023-08-31 NOTE — PROGRESS NOTE ADULT - PROBLEM SELECTOR PLAN 6
Repeat Echo shows severe AS/TR/pHTN - will have Structural Heart Team evaluate pt. for TAVR - d/w OSITO Melgar

## 2023-08-31 NOTE — PROGRESS NOTE ADULT - ASSESSMENT
89yo year old female with PMHx chronic CHF, hypothyroidism, Gout, HTN, COPD, Afib on xarelto, anemia, CAD, and osteopenia presents to the ED c/o left leg pain after hitting her leg. admitted to trauma service for further care. Medicine consulted for medical management    Left calf hematoma  HOLD AC- on for Afib, pt high fall risk would not continue however please discuss with pt cardiologist   pt on AC, reversed in ED with Kcentra  serial cbc - hgb stable  bed rest  monitor Hgb, transfuse to keep Hgb >7  Debridement per surgery, no medical contraindication to debridement as long as pt remains hemodynamically stable    Atrial fibrillation, rate controlled  - plan as per cardiology  - hold metoprolol for now   keep k>4, Mg >2  hold AC due to above  HT and BP improving    AMS   - CTH unremarkable  - do not suspect fungal infection likely insippated secretions but can f/u with ENT as outpt    acute on chronic diatolic CHF  - hold lasix as per cards due to hypotension ::  -although noted BP  today is now 122  -Patient DENIES CHEST pain  -trial of lasix today    Right shoulder pain: severe arthrosis:  plan:  pain control     increased cr - if continues to worsen then likely JOAQUÍN due to ATN but does not meet criteria at this time  - monitor for worsening given hypotension episode  -  #Hypothyroid  c/w synthroid    #Gout   c/w allopurinol    #COPD  pulse ox q8 hrs  c/w supplemental O2  cough/deep breathing  incentive spirometry

## 2023-08-31 NOTE — PROGRESS NOTE ADULT - PROBLEM SELECTOR PLAN 2
Mildly tachycardiac; BB/diuretic previously held 2/2 low BP; SBP now >100 resumed low dose BB continue to monitor BP, can consider Digoxin if unable to tolerate BB  AC held due to large hematoma - resume when cleared by Trauma Sx    Patient with advanced age , with severe AS , severe TR with pulmonary hypertension , RV volume overload , moderate MR ,  borderline BP , moderate ventricular response ,  would consider adding digoxin if heart rate remain high on low dose BB ,AC on hold due to hematoma     patient is very high risk for any procedure

## 2023-08-31 NOTE — PROGRESS NOTE ADULT - SUBJECTIVE AND OBJECTIVE BOX
CHIEF COMPLAINT: Patient is a 89y old  Female who presents with a chief complaint of LLE hematoma (28 Aug 2023 10:28)      HPI:  Level 3 Trauma Activation    HPI:    Patient is a 89y year old female with PMHx chronic CHF, hypothyroidism, Gout, HTN, COPD, Afib on xarelto, anemia, CAD, and osteopenia presents to the ED c/o left leg pain after hitting her leg yesterday 8/24/23, when going to answer the phone. Pt banged it on furniture and developed a large hematoma. Pt having difficulty ambulating, no other injuries. Last took Xarelto last night. No history of DVT/PE.    Cardiology consulted to evaluate for CHF. This is a pt. of Dr. Harris, last seen in the office on 7/31/23. Pt. takes lasix and torsemide prn at home for HFpEF and xarelto and toprol xl 25 mg daily for Chronic Afib.   At present, admitted for leg hematoma, hypotensive, denies chest pain/SOB/palpitations.     Pt denied head strike LOC or any traumatic injuries/complaints   (25 Aug 2023 13:27)    8/29/23 Seated in bedside chair awake alert no complaints tele -120  8/30/23; seen in bed, looks comfortable, no complaints, Tele: Afib 80s-90s - reconsulted by hospitalist one hr after seeing pt. for chest pain  - seen pt. with Micki Harris - pt. denies chest pain - says she is SOB, did not have chest pain, trops and EKG ordered  8/31/23: pt. with sign and symptoms of fluid overload - elevated JVD,, started on iv lasix. Tele: Afib      MEDICATIONS  (STANDING):  allopurinol 100 milliGRAM(s) Oral daily  ampicillin/sulbactam  IVPB 3 Gram(s) IV Intermittent every 12 hours  ampicillin/sulbactam  IVPB      calcium carbonate 1250 mG  + Vitamin D (OsCal 500 + D) 1 Tablet(s) Oral daily  furosemide   Injectable 40 milliGRAM(s) IV Push once  levothyroxine 125 MICROGram(s) Oral daily  metoprolol succinate ER 12.5 milliGRAM(s) Oral daily  multivitamin/minerals 1 Tablet(s) Oral daily    MEDICATIONS  (PRN):  acetaminophen     Tablet .. 650 milliGRAM(s) Oral every 6 hours PRN Moderate Pain (4 - 6)  acetaminophen   IVPB .. 1000 milliGRAM(s) IV Intermittent once PRN Temp greater or equal to 38C (100.4F), Mild Pain (1 - 3)  ondansetron Injectable 4 milliGRAM(s) IV Push every 6 hours PRN Nausea and/or Vomiting    Vital Signs Last 24 Hrs  T(C): 36.7 (31 Aug 2023 07:29), Max: 36.7 (31 Aug 2023 07:29)  T(F): 98 (31 Aug 2023 07:29), Max: 98 (31 Aug 2023 07:29)  HR: 91 (31 Aug 2023 11:21) (77 - 105)  BP: 98/52 (31 Aug 2023 11:21) (98/52 - 127/59)  BP(mean): --  RR: 18 (31 Aug 2023 07:29) (18 - 19)  SpO2: 96% (31 Aug 2023 07:29) (96% - 99%)    Parameters below as of 31 Aug 2023 07:29  Patient On (Oxygen Delivery Method): nasal cannula      PHYSICAL EXAM:  Constitutional: NAD, awake and alert  HEENT: No oral cyanosis.  Pulmonary: Non-labored, breath sounds left base crackles   Cardiovascular: S1 and S2, irregular, 1/6 REBECA elevated JVD   Gastrointestinal: Bowel Sounds present, soft, nontender.   Lymph: No peripheral edema. No cervical lymphadenopathy.  Neurological: Alert, no focal deficits  Skin: No rashes.  Psych:  Mood & affect appropriate    LABS: reviewed               CARDIAC MARKERS ( 30 Aug 2023 16:07 )  x     / x     / 54 U/L / x     / x                             10.2   8.40  )-----------( 173      ( 31 Aug 2023 07:49 )             32.0     08-31    140  |  105  |  36<H>  ----------------------------<  113<H>  4.1   |  32<H>  |  0.86    Ca    8.3<L>      31 Aug 2023 07:49  Phos  3.0     08-31  Mg     2.3     08-31      - TroponinI hsT: <-37.45    Radiology/EKG/Echo: in progress       -----------------------------------------------------------------------------------------------------------  < from: Transthoracic Echocardiogram Follow Up (02.25.22 @ 09:04) >     Impression     Summary     Limited study to assess left ventricular function.   The left ventricle cavity is underdistended.   The interventricular septum appears flattened consistent with an RV   pressure/volume overload.   Estimated left ventricular ejection fraction is 70 %.   The right atrium appears severely dilated.   A device wire is seen in the RV and RA.   The right ventricle is mildly dilated.   The tricuspid valve leaflets are thin and pliable; valve motion is   normal.   Severe (4+) tricuspid valve regurgitation is present.   Severe pulmonary hypertension.   No evidence of a significant pericardial effusion.(Trace)   Pleural effusion - is present.     Signature     ----------------------------------------------------------------   Electronically signed by Edward David MD(Interpreting   physician) on 02/25/2022 01:46 PM    < end of copied text >        < from: TTE Echo Complete w/o Contrast w/ Doppler (08.29.23 @ 08:45) >     The mitral valve leaflets appear thickened.   Mild mitral annular calcification is present.   Mild to Moderate mitral regurgitation with two jets is present.   Significant fibrocalcific changes noted to the aortic valve leaflets with   restriction in leaflet excursion.   Calculated MIKE is .91cm^2; this finding is consistent with severe aortic   stenosis.   The tricuspid valve leaflets appear mildly thickened and/or calcified,   but   open well.   Severe (4+) tricuspid valve regurgitation is present.   Severe pulmonary hypertension.   Pulmonic valve not well seen.   Mild to moderate pulmonic valvular regurgitation is present.   The left atrium is severely dilated.   Left ventricle systolic function appears mildly impaired; segmental wall   motion abnormalities noted.   Mild concentric left ventricular hypertrophy is present.   Estimated Ejection Fraction is 45 %.   The interventricular septum appears flattened consistent with an RV   pressure/volume overload.   The right atrium appears severely dilated.   A device wire is seen inthe RV and RA.   The right ventricle exhibits moderate dilation, diffuse hypokinesis, and   depression of contractility based on TAPSE.   A device wire is seen in the RV and RA.   Pleural effusion - is present.   IVC is dilated and not collapsing with inspiration.     Signature    < end of copied text >

## 2023-08-31 NOTE — PROGRESS NOTE ADULT - SUBJECTIVE AND OBJECTIVE BOX
Pt seen and examined at bedside with chaperone. Pt is AAOx3, pt in no acute distress. Pt denied c/o fever, chills, chest pain, SOB, abd pain, N/V/D, extremity dysfunction, hemoptysis, hematemesis, hematuria, hematochexia, headache, diplopia, vertigo, dizzyness. Left leg pain well controlled w/ meds.    ROS:  otherwise as abovementioned ROS            Physical exam:  GCS of 15  Pt is aaox3  Pt in no acute distress  Airway is patent  Breathing is symmetric and unlaboredt  Psych: normal affect  HEENT: normocephalic, BART, EOM wnl, no gross craniofacial bony pathology to exam  Neck: No tracheal deviation, no JVD, no crepitus, no ecchymosis, no hematoma  Chest: No gross rib or sternal pathology or tenderness to exam, no crepitus, no ecchymosis, no hematoma  Resp: CTAB  CVS: S1S2(+), irregular  ABD: bowel sounds (+), soft, nontender, non distended, no rebound, no guarding, no rigidity, no pelvic instability to exam  : jackson  EXT: + left lower leg dressing intact to known hematoma site, pt has good capillary refill in all digits. Sensorimotor function grossly intact to limited exam, on VTE prophylaxis

## 2023-09-01 NOTE — CONSULT NOTE ADULT - PROBLEM SELECTOR RECOMMENDATION 9
Significantly volume overloaded on exam  - Give lasix 40 mg IV now,  - Start Lasix IV drip 5 mg/hr   - start spironolactone 25 mg po BID   - BID electrolytes, keep K > 2 < 5   - Further GDMT as patient progresses  - Strict I & Os  - Daily standing weights

## 2023-09-01 NOTE — CONSULT NOTE ADULT - TIME BILLING
A minimum of 60 min was spent completing this admission. Patient seen and examined while in unit on for medical management, plan and coordination of care discussed including serial cbc, insertion of jackson in setting of need for strict i/o's in setting of h/o CHF and unable to ambulate, AM labs, continuation of meds.
Time spent via telehealth interviewing and performing limited visual only exam, reviewing the chart, and coordinating care with the primary team and onsite-HF PA.  I counselled patient on the heart failure disease process and the ongoing medical therapy.

## 2023-09-01 NOTE — CONSULT NOTE ADULT - SUBJECTIVE AND OBJECTIVE BOX
HPI:  Per Notes: 89yo year old female with PMHx chronic CHF, hypothyroidism, Gout, HTN, COPD, Afib on xarelto, anemia, CAD, and osteopenia presents to the ED c/o left leg pain after hitting her leg.     due to her cardiac condition(s) she is too high risk for any corrective procedure.  she is now being evaluated by CHF for possible intervention.     One Content Reviewed: no documents    seen at bedside, pt is awake and alert.  she is conversive and has no complaints when speak to me at rest.  She is aware of her disease, but maintains a good quality of life at this time.  she wishes that she could drive again, but also accepts that that time might be over.  she does admit to feeling SOB with really minimal exertion.  tells me that her son and dtr are equal HCPs.  she has a preexisting DNR/DNI and does nto want ot chane that. i was told by CHF team that she may be considered for a TAVR or other valve procedure.  Pt is willnig to consider any treatment that might improve her quality of life, assuming the risks associated with it were not too great.           PAIN: ( )Yes   (x)No  Level:  Location:  Intensity:    /10  Quality:  Aggravating Factors:  Alleviating Factors:  Radiation:  Duration/Timing:  Impact on ADLs:    DYSPNEA: ( ) Yes  ( ) No  Level:    PAST MEDICAL & SURGICAL HISTORY:  Mitral valve insufficiency      Aortic valve regurgitation      Osteopenia      CAD (coronary artery disease)      Gout      Hypothyroid      CHF (congestive heart failure)      Anxiety      Anemia      HTN (hypertension)      Afib      COPD (chronic obstructive pulmonary disease)      Chronic obstructive pulmonary disease (COPD)      HTN (hypertension)      Cardiac pacemaker      Gout      Hypothyroidism      Insomnia      Chronic CHF      History of ST elevation myocardial infarction (STEMI)      Status cardiac pacemaker      S/P knee replacement      No significant past surgical history          SOCIAL HX:    Hx opiate tolerance ( )YES  (x)NO    Baseline ADLs  (Prior to Admission)  ( ) Independent   ( )Dependent     (x) With Assistance    FAMILY HISTORY:  No pertinent family history in first degree relatives    No pertinent family history in first degree relatives        Review of Systems:    - CONSTITUTIONAL: Denies appetite change, weight loss, fever and chills. Denies weakness and fatigue   - HEENT: Denies changes in vision and hearing.   - RESPIRATORY: Denies SOB at rest.  + GARRIDO.  denies cough and/or respiratory secretions   - CV: Denies palpitations and CP. Denies edema   - GI: Denies abdominal pain, constipation, nausea, vomiting and diarrhea.   - : Denies dysuria and urinary frequency.   - MSK: Denies back pain, myalgia and joint pain.   - SKIN: Denies rash and pruritus.   - NEUROLOGICAL: Denies headache and syncope.   - PSYCHIATRIC: Denies confusion, recent changes in mood.     All other systems reviewed and negative      PHYSICAL EXAM:    Vital Signs Last 24 Hrs  T(C): 36.7 (01 Sep 2023 13:23), Max: 36.7 (01 Sep 2023 13:)  T(F): 98 (01 Sep 2023 13:), Max: 98 (01 Sep 2023 13:)  HR: 84 (01 Sep 2023 13:) (84 - 92)  BP: 104/53 (01 Sep 2023 13:) (95/52 - 114/70)  BP(mean): 62 (01 Sep 2023 06:58) (62 - 65)  RR: 18 (01 Sep 2023 13:) (18 - 19)  SpO2: 100% (01 Sep 2023 13:) (95% - 100%)    Parameters below as of 01 Sep 2023 13:23  Patient On (Oxygen Delivery Method): nasal cannula  O2 Flow (L/min): 3    Daily     Daily Weight in k.9 (01 Sep 2023 06:10)    PPSV2:   %  FAST:    - GENERAL: Alert and oriented x 3. No acute distress.   - EYES: EOMI. Anicteric.   - HENT: Moist mucous membranes.   - LUNGS: Clear to auscultation bilaterally. No accessory muscle use. Speaking in complete sentences.   - CARDIOVASCULAR: Regular rate and rhythm. No murmur. No JVD. No edema.   - ABDOMEN: Soft, non-tender and non-distended. No palpable masses. Normal bowel sounds   - EXTREMITIES: No edema. Non-tender.    - SKIN: Warm, no rashes or lesions easily visible.   - NEUROLOGIC: No focal neurological deficits.    - PSYCHIATRIC: Cooperative. Appropriate mood and affect.      LABS:                        10.5   8.66  )-----------( 180      ( 01 Sep 2023 08:04 )             33.0     09-    141  |  102  |  36<H>  ----------------------------<  132<H>  3.9   |  34<H>  |  1.02    Ca    8.5      01 Sep 2023 08:04  Phos  3.4     -  Mg     2.2             Albumin:     Allergies    codeine (Unknown)    Intolerances      MEDICATIONS  (STANDING):  allopurinol 100 milliGRAM(s) Oral daily  ampicillin/sulbactam  IVPB 3 Gram(s) IV Intermittent every 12 hours  ampicillin/sulbactam  IVPB      calcium carbonate 1250 mG  + Vitamin D (OsCal 500 + D) 1 Tablet(s) Oral daily  furosemide Infusion 5 mG/Hr (2.5 mL/Hr) IV Continuous <Continuous>  levothyroxine 125 MICROGram(s) Oral daily  metoprolol succinate ER 12.5 milliGRAM(s) Oral daily  multivitamin/minerals 1 Tablet(s) Oral daily  spironolactone 25 milliGRAM(s) Oral two times a day    MEDICATIONS  (PRN):  acetaminophen     Tablet .. 650 milliGRAM(s) Oral every 6 hours PRN Moderate Pain (4 - 6)  acetaminophen   IVPB .. 1000 milliGRAM(s) IV Intermittent once PRN Temp greater or equal to 38C (100.4F), Mild Pain (1 - 3)  melatonin 3 milliGRAM(s) Oral at bedtime PRN Insomnia  ondansetron Injectable 4 milliGRAM(s) IV Push every 6 hours PRN Nausea and/or Vomiting      RADIOLOGY/ADDITIONAL STUDIES:

## 2023-09-01 NOTE — PROGRESS NOTE ADULT - SUBJECTIVE AND OBJECTIVE BOX
Patient seen and examined  today,.looks better  more alert  less anxious  sitting in chair, comfortable  was given diuresis yesterday  CHF team consulted    Review of Systems:  General:denies fever chills, headache, weakness  HEENT: denies blurry vision,diffculty swallowing, difficulty hearing, tinnitus  Cardiovascular: denies chest pain  ,palpitations  Pulmonary:denies shortness of breath, cough, wheezing, hemoptysis  Gastrointestinal: denies abdominal pain, constipation, diarrhea,nausea , vomiting, hematochezia  : denies hematuria, dysuria, or incontinence  Neurological: denies weakness, numbness , tingling, dizziness, tremors  MSK: denies muscle pain, difficulty ambulating, swelling, back pain  skin: denies skin rash, itching, burning, or  skin lesions  Psychiatrical: denies mood disturbances, anxierty, feeling depressed, depression , or difficulty sleeping    Objective:  Vitals  T(C): 36.7 (09-01-23 @ 13:23), Max: 36.7 (09-01-23 @ 13:23)  HR: 84 (09-01-23 @ 13:23) (84 - 95)  BP: 104/53 (09-01-23 @ 13:23) (95/52 - 114/70)  RR: 18 (09-01-23 @ 13:23) (18 - 19)  SpO2: 100% (09-01-23 @ 13:23) (95% - 100%)    Physical Exam:  General: comfortable, no acute distress  HEENT: Atraumatic, no LAD, trachea midline, PERRLA  Cardiovascular: normal s1s2, no murmurs, gallops or fricition rubs  Pulmonary: clear to ausculation Bilaterally, no wheezing , rhonchi  Gastrointestinal: soft non tender non distended, no masses felt, no organomegally  Muscloskeletal: no lower extremity edema, intact bilateral lower extremity pulses  Neurological: CN II-12 intact. No focal weakness  Psychiatrical: normal mood, cooperative  SKIN: no rash, lesions or ulcers    Labs:                          10.5   8.66  )-----------( 180      ( 01 Sep 2023 08:04 )             33.0     09-01    141  |  102  |  36<H>  ----------------------------<  132<H>  3.9   |  34<H>  |  1.02    Ca    8.5      01 Sep 2023 08:04  Phos  3.4     09-01  Mg     2.2     09-01              Active Medications  MEDICATIONS  (STANDING):  allopurinol 100 milliGRAM(s) Oral daily  ampicillin/sulbactam  IVPB 3 Gram(s) IV Intermittent every 12 hours  ampicillin/sulbactam  IVPB      calcium carbonate 1250 mG  + Vitamin D (OsCal 500 + D) 1 Tablet(s) Oral daily  furosemide Infusion 5 mG/Hr (2.5 mL/Hr) IV Continuous <Continuous>  levothyroxine 125 MICROGram(s) Oral daily  metoprolol succinate ER 12.5 milliGRAM(s) Oral daily  multivitamin/minerals 1 Tablet(s) Oral daily  spironolactone 25 milliGRAM(s) Oral two times a day    MEDICATIONS  (PRN):  acetaminophen     Tablet .. 650 milliGRAM(s) Oral every 6 hours PRN Moderate Pain (4 - 6)  acetaminophen   IVPB .. 1000 milliGRAM(s) IV Intermittent once PRN Temp greater or equal to 38C (100.4F), Mild Pain (1 - 3)  melatonin 3 milliGRAM(s) Oral at bedtime PRN Insomnia  ondansetron Injectable 4 milliGRAM(s) IV Push every 6 hours PRN Nausea and/or Vomiting

## 2023-09-01 NOTE — CONSULT NOTE ADULT - CONVERSATION DETAILS
pt reports her children are her HCPs  she has DNR/DNI from prior hospitalization  hopes to return home from hospital and if she can be considered for a procedure that might improve her quality of life, she would be willing to consider it.

## 2023-09-01 NOTE — PROGRESS NOTE ADULT - NSPROGADDITIONALINFOA_GEN_ALL_CORE
D/w with nursing  patients BP was low yesterday  received bolus  also noted no urine outpt  +jackson  urine outpt adequate  some crackles note  holding lasix "for now"  monitoring volume status

## 2023-09-01 NOTE — PROGRESS NOTE ADULT - ASSESSMENT
89yo year old female with PMHx chronic CHF, hypothyroidism, Gout, HTN, COPD, Afib on xarelto, anemia, CAD, and osteopenia presents to the ED c/o left leg pain after hitting her leg. admitted to trauma service for further care. Medicine consulted for medical management    Left calf hematoma  HOLD AC- on for Afib, pt high fall risk would not continue however please discuss with pt cardiologist   pt on AC, reversed in ED with Kcentra  serial cbc - hgb stable  bed rest  monitor Hgb, transfuse to keep Hgb >7  Debridement per surgery, no medical contraindication to debridement as long as pt remains hemodynamically stable    Atrial fibrillation, rate controlled  - plan as per cardiology  - hold metoprolol for now   keep k>4, Mg >2  hold AC due to above  HT and BP improving    AMS now resolved  - CTH unremarkable  - do not suspect fungal infection likely insippated secretions but can f/u with ENT as outpt    Acute hypoxemic respirartory failure secondary acute on chronic diastolic CHF  Severe Aortic Stenosis  -trial of lasix neeed as patient received  fluids due to hypotension. patient later with worsening shortness of breath  -CHF team consulted: patient transferred to tele  plan;  lasix drip  CHF team to followup on IF TAVR is warranted  no beta blockers due to tendancy to go hypotensive    Right shoulder pain: severe arthrosis:  plan:  pain control     increased cr - if continues to worsen then likely JOAQUÍN due to ATN but does not meet criteria at this time  - monitor for worsening given hypotension episode  -  #Hypothyroid  c/w synthroid    #Gout   c/w allopurinol    #COPD  pulse ox q8 hrs  c/w supplemental O2  cough/deep breathing  incentive spirometry

## 2023-09-01 NOTE — PROGRESS NOTE ADULT - PROBLEM SELECTOR PLAN 5
Echo shows EF 45% perhaps 2/2 tachyarrhythmia ( AF -120 ), resumed Low dose BB to control HR, repeat Echo reveals reduced LVEF 45%, severe AS/TR, severe pHTN  Recommendation: pt. with fluid overload likely due to IV fluids, now with elevated JVD, elevated pro BNP=8K, needs diuretics - given lasix 40 mg ivp x 1 dose yesterday with improvement in SOB, now on lasix gtt 5 mg/hr and spironolactone as per HF recs,  recheck pro BNP in 48 hrs, HF consult - d/w OSITO Melgar

## 2023-09-01 NOTE — CONSULT NOTE ADULT - SUBJECTIVE AND OBJECTIVE BOX
Patient is a 89 year old female with a history of HFpEF, aortic stenosis hypothyroidism, Gout, HTN, COPD, Afib on xarelto, anemia, PPM, and osteopenia who presented to  ED on  c/o leg pain after hittinge her leg, found to have significant hematoma.  She has been managed medically but became dyspneic after fluid bolus and is now being evaluated by heart fialure team     The patient reports progressively worsening dyspnea over the past year.        PAST MEDICAL & SURGICAL HISTORY:  Mitral valve insufficiency  Aortic valve regurgitation  Osteopenia  CAD (coronary artery disease)  Gout  Hypothyroid  CHF (congestive heart failure)  Anxiety  Anemia  HTN (hypertension)  Afib  COPD (chronic obstructive pulmonary disease)  Chronic obstructive pulmonary disease (COPD)  HTN (hypertension)  Cardiac pacemaker  Gout  Hypothyroidism  Insomnia  Chronic CHF  History of ST elevation myocardial infarction (STEMI)    Status cardiac pacemaker  S/P knee replacement  No significant past surgical history        REVIEW OF SYSTEMS:  14 point ROS negative in detail apart from as documented in HPI.    MEDICATIONS  (STANDING):  allopurinol 100 milliGRAM(s) Oral daily  ampicillin/sulbactam  IVPB      ampicillin/sulbactam  IVPB 3 Gram(s) IV Intermittent every 12 hours  calcium carbonate 1250 mG  + Vitamin D (OsCal 500 + D) 1 Tablet(s) Oral daily  furosemide   Injectable 40 milliGRAM(s) IV Push daily  levothyroxine 125 MICROGram(s) Oral daily  metoprolol succinate ER 12.5 milliGRAM(s) Oral daily  multivitamin/minerals 1 Tablet(s) Oral daily    MEDICATIONS  (PRN):  acetaminophen     Tablet .. 650 milliGRAM(s) Oral every 6 hours PRN Moderate Pain (4 - 6)  acetaminophen   IVPB .. 1000 milliGRAM(s) IV Intermittent once PRN Temp greater or equal to 38C (100.4F), Mild Pain (1 - 3)  melatonin 3 milliGRAM(s) Oral at bedtime PRN Insomnia  ondansetron Injectable 4 milliGRAM(s) IV Push every 6 hours PRN Nausea and/or Vomiting    Home Medications:  allopurinol 100 mg oral tablet: 1 tab(s) orally once a day (25 Aug 2023 12:41)  Caltrate 600 + D oral tablet: 1 tab(s) orally once a day (25 Aug 2023 12:43)  furosemide 20 mg oral tablet: 1 tab(s) orally once a day as needed for (25 Aug 2023 12:44)  furosemide 40 mg oral tablet: 1 tab(s) orally once a day (25 Aug 2023 12:41)  levothyroxine 125 mcg (0.125 mg) oral tablet: 1 tab(s) orally once a day (25 Aug 2023 12:15)  metoprolol succinate 25 mg oral tablet, extended release: 1 tab(s) orally once a day (25 Aug 2023 12:44)  nystatin 100,000 units/g topical cream: Apply topically to affected area 2 times a day to foot (25 Aug 2023 12:44)  potassium chloride 20 mEq oral tablet, extended release: 1 tab(s) orally once a day (25 Aug 2023 12:41)  PreserVision AREDS 2 oral capsule: 1 cap(s) orally once a day (25 Aug 2023 12:44)  rivaroxaban 15 mg oral tablet: 1 tab(s) orally once a day (before a meal) (25 Aug 2023 12:15)    Allergies    codeine (Unknown)    Intolerances      Social History:    FAMILY HISTORY:  No pertinent family history in first degree relatives    No pertinent family history in first degree relatives        ICU Vital Signs Last 24 Hrs  T(C): 36.4, Max: 36.4 ( @ 06:58)  HR: 92 (84 - 95)  BP: 108/70 (95/52 - 114/70)  BP(mean): 62 (62 - 65)  ABP: --  ABP(mean): --  RR: 18 (18 - 19)  SpO2: 98% (95% - 98%)    Weight in k.9 (23)  Weight in k.5 (23)      I&O's Summary Last 24 Hrs    IN: 0 mL / OUT: 850 mL / NET: -850 mL      Tele:    Physical Exam:    General: No distress. Comfortable.  HEENT: EOM intact.  Neck: Neck supple. JVP not elevated. No masses  Chest: Clear to auscultation bilaterally  CV: Normal S1 and S2. No murmurs, rub, or gallops. Radial pulses normal.  Abdomen: Soft, non-distended, non-tender  Skin: No rashes or skin breakdown  Neurology: Alert and oriented times three. Sensation intact  Psych: Affect normal    Labs:    ( 23 @ 08:04 )               10.5   8.66  )--------( 180                  33.0     ( 23 @ 08:04 )     141  |  102  |  36  ---------------------<  132  3.9  |  34  |  1.02    Ca 8.5  Phos 3.4  Mg 2.2        ( 23 @ 16:07 )  TropHS x     / CK 54    / CKMB x                      RADIOLOGY & ADDITIONAL STUDIES: Patient is a 89 year old female with a history of HFmrEF, aortic stenosis hypothyroidism, Gout, HTN, COPD, Afib on xarelto, anemia, PPM, and osteopenia who presented to  ED on  c/o leg pain after hittinge her leg, found to have significant hematoma.  She has been managed medically but became dyspneic after fluid bolus and is now being evaluated by heart failure team     The patient reports progressively worsening dyspnea over the past year.  She was admitted for her dyspnea about a year ago and since had taken diuretic PRN.  She lives at home with  occasional help from aides. She reports being short of breath when walking as little as 10 feet.  She also reports consistent LE edema for more than 1 year.  She denies syncope, lightheadedness, dizziness, chest pain, palpitations, PND, orthopnea, nausea, vomiting.        PAST MEDICAL & SURGICAL HISTORY:  Mitral valve insufficiency  Aortic valve regurgitation  Osteopenia  CAD (coronary artery disease)  Gout  Hypothyroid  CHF (congestive heart failure)  Anxiety  Anemia  HTN (hypertension)  Afib  COPD (chronic obstructive pulmonary disease)  Chronic obstructive pulmonary disease (COPD)  HTN (hypertension)  Cardiac pacemaker  Gout  Hypothyroidism  Insomnia  Chronic CHF  History of ST elevation myocardial infarction (STEMI)    Status cardiac pacemaker  S/P knee replacement  No significant past surgical history        REVIEW OF SYSTEMS:  14 point ROS negative in detail apart from as documented in HPI.    MEDICATIONS  (STANDING):  allopurinol 100 milliGRAM(s) Oral daily  ampicillin/sulbactam  IVPB      ampicillin/sulbactam  IVPB 3 Gram(s) IV Intermittent every 12 hours  calcium carbonate 1250 mG  + Vitamin D (OsCal 500 + D) 1 Tablet(s) Oral daily  furosemide   Injectable 40 milliGRAM(s) IV Push daily  levothyroxine 125 MICROGram(s) Oral daily  metoprolol succinate ER 12.5 milliGRAM(s) Oral daily  multivitamin/minerals 1 Tablet(s) Oral daily    MEDICATIONS  (PRN):  acetaminophen     Tablet .. 650 milliGRAM(s) Oral every 6 hours PRN Moderate Pain (4 - 6)  acetaminophen   IVPB .. 1000 milliGRAM(s) IV Intermittent once PRN Temp greater or equal to 38C (100.4F), Mild Pain (1 - 3)  melatonin 3 milliGRAM(s) Oral at bedtime PRN Insomnia  ondansetron Injectable 4 milliGRAM(s) IV Push every 6 hours PRN Nausea and/or Vomiting    Home Medications:  allopurinol 100 mg oral tablet: 1 tab(s) orally once a day (25 Aug 2023 12:41)  Caltrate 600 + D oral tablet: 1 tab(s) orally once a day (25 Aug 2023 12:43)  furosemide 20 mg oral tablet: 1 tab(s) orally once a day as needed for (25 Aug 2023 12:44)  furosemide 40 mg oral tablet: 1 tab(s) orally once a day (25 Aug 2023 12:41)  levothyroxine 125 mcg (0.125 mg) oral tablet: 1 tab(s) orally once a day (25 Aug 2023 12:15)  metoprolol succinate 25 mg oral tablet, extended release: 1 tab(s) orally once a day (25 Aug 2023 12:44)  nystatin 100,000 units/g topical cream: Apply topically to affected area 2 times a day to foot (25 Aug 2023 12:44)  potassium chloride 20 mEq oral tablet, extended release: 1 tab(s) orally once a day (25 Aug 2023 12:41)  PreserVision AREDS 2 oral capsule: 1 cap(s) orally once a day (25 Aug 2023 12:44)  rivaroxaban 15 mg oral tablet: 1 tab(s) orally once a day (before a meal) (25 Aug 2023 12:15)    Allergies    codeine (Unknown)    Intolerances      Social History:    FAMILY HISTORY:  No pertinent family history in first degree relatives    No pertinent family history in first degree relatives        ICU Vital Signs Last 24 Hrs  T(C): 36.4, Max: 36.4 ( @ 06:58)  HR: 92 (84 - 95)  BP: 108/70 (95/52 - 114/70)  BP(mean): 62 (62 - 65)  ABP: --  ABP(mean): --  RR: 18 (18 - 19)  SpO2: 98% (95% - 98%)    Weight in k.9 (23)  Weight in k.5 (23)      I&O's Summary Last 24 Hrs    IN: 0 mL / OUT: 850 mL / NET: -850 mL      Tele: Afib 80s-110s    Physical Exam:    General: No distress. Comfortable.  HEENT: EOM intact.  Neck: Neck supple. JVP elevated 18 cm. No masses  Chest: rales present  CV: Normal S1 and S2. Systolic murmur present rub, or gallops. Radial pulses normal.  Abdomen: Soft, non-distended, non-tender  Skin: No rashes or skin breakdown  Extremities:  Warm, 2+ LE Edema   Neurology: Alert and oriented times three. Sensation intact  Psych: Affect normal    Labs:    ( 23 @ 08:04 )               10.5   8.66  )--------( 180                  33.0     ( 23 @ 08:04 )     141  |  102  |  36  ---------------------<  132  3.9  |  34  |  1.02    Ca 8.5  Phos 3.4  Mg 2.2        ( 23 @ 16:07 )  TropHS x     / CK 54    / CKMB x                      RADIOLOGY & ADDITIONAL STUDIES:

## 2023-09-01 NOTE — CONSULT NOTE ADULT - ASSESSMENT
Patient is a 89 year old female with a history of HFmrEF, aortic stenosis hypothyroidism, Gout, HTN, COPD, Afib on xarelto, anemia, PPM, and osteopenia who presented to  ED on 8/25 c/o leg pain after hittinge her leg, found to have significant hematoma.  She has been managed medically but became dyspneic after fluid bolus and is now being evaluated by heart failure team    TTE 8/29:  LVEF 45% LVEDd 3.65 IVS 1.23 PWd 1.19 LA Severely Dilated, RA Severely Dilated,  RV moderately dilated with diffuse hypokinesis, Mild to Mod MR, Severe AS MIKE 0.8 PVel 3.16 PG/MG 39/25 Severe TR, RVSP 61    Patient is a 89 year old female with a history of HFmrEF, aortic stenosis hypothyroidism, Gout, HTN, COPD, Afib on xarelto, anemia, PPM, and osteopenia who presented to  ED on 8/25 c/o leg pain after hitting her leg, found to have significant hematoma.  She has been managed medically but became dyspneic after fluid bolus and is now being evaluated by heart failure team    TTE 8/29:  LVEF 45% LVEDd 3.65 IVS 1.23 PWd 1.19 LA Severely Dilated, RA Severely Dilated,  RV moderately dilated with diffuse hypokinesis, Mild to Mod MR, Severe AS MIKE 0.8 PVel 3.16 PG/MG 39/25 Severe TR, RVSP 61

## 2023-09-01 NOTE — PROGRESS NOTE ADULT - SUBJECTIVE AND OBJECTIVE BOX
CHIEF COMPLAINT: Patient is a 89y old  Female who presents with a chief complaint of LLE hematoma (28 Aug 2023 10:28)      HPI:  Level 3 Trauma Activation    HPI:    Patient is a 89y year old female with PMHx chronic CHF, hypothyroidism, Gout, HTN, COPD, Afib on xarelto, anemia, CAD, and osteopenia presents to the ED c/o left leg pain after hitting her leg yesterday 8/24/23, when going to answer the phone. Pt banged it on furniture and developed a large hematoma. Pt having difficulty ambulating, no other injuries. Last took Xarelto last night. No history of DVT/PE.    Cardiology consulted to evaluate for CHF. This is a pt. of Dr. Harris, last seen in the office on 7/31/23. Pt. takes lasix and torsemide prn at home for HFpEF and xarelto and toprol xl 25 mg daily for Chronic Afib.   At present, admitted for leg hematoma, hypotensive, denies chest pain/SOB/palpitations.     Pt denied head strike LOC or any traumatic injuries/complaints   (25 Aug 2023 13:27)    8/29/23 Seated in bedside chair awake alert no complaints tele -120  8/30/23; seen in bed, looks comfortable, no complaints, Tele: Afib 80s-90s - reconsulted by hospitalist one hr after seeing pt. for chest pain  - seen pt. with Micki Harris - pt. denies chest pain - says she is SOB, did not have chest pain, trops and EKG ordered  8/31/23: pt. with sign and symptoms of fluid overload - elevated JVD,, started on ivp lasix. Tele: Afib    9/1/23: feels better, less SOB, diuresis changed to lasix gtt by HF, Tele: Afib , pt. is being Tx to 3E    MEDICATIONS  (STANDING):  allopurinol 100 milliGRAM(s) Oral daily  ampicillin/sulbactam  IVPB 3 Gram(s) IV Intermittent every 12 hours  ampicillin/sulbactam  IVPB      calcium carbonate 1250 mG  + Vitamin D (OsCal 500 + D) 1 Tablet(s) Oral daily  furosemide Infusion 5 mG/Hr (2.5 mL/Hr) IV Continuous <Continuous>  levothyroxine 125 MICROGram(s) Oral daily  metoprolol succinate ER 12.5 milliGRAM(s) Oral daily  multivitamin/minerals 1 Tablet(s) Oral daily  spironolactone 25 milliGRAM(s) Oral two times a day      MEDICATIONS  (PRN):  acetaminophen     Tablet .. 650 milliGRAM(s) Oral every 6 hours PRN Moderate Pain (4 - 6)  acetaminophen   IVPB .. 1000 milliGRAM(s) IV Intermittent once PRN Temp greater or equal to 38C (100.4F), Mild Pain (1 - 3)  ondansetron Injectable 4 milliGRAM(s) IV Push every 6 hours PRN Nausea and/or Vomiting    Vital Signs Last 24 Hrs  T(C): 36.4 (01 Sep 2023 08:05), Max: 36.4 (01 Sep 2023 06:58)  T(F): 97.5 (01 Sep 2023 08:05), Max: 97.5 (01 Sep 2023 06:58)  HR: 92 (01 Sep 2023 08:05) (84 - 95)  BP: 108/70 (01 Sep 2023 08:05) (95/52 - 114/70)  BP(mean): 62 (01 Sep 2023 06:58) (62 - 65)  RR: 18 (01 Sep 2023 08:05) (18 - 19)  SpO2: 98% (01 Sep 2023 08:05) (95% - 98%)    Parameters below as of 01 Sep 2023 08:05  Patient On (Oxygen Delivery Method): nasal cannula      PHYSICAL EXAM:  Constitutional: NAD, awake and alert  HEENT: No oral cyanosis.  Pulmonary: Non-labored, breath sounds left base crackles   Cardiovascular: S1 and S2, irregular, 1/6 REBECA elevated JVD   Gastrointestinal: Bowel Sounds present, soft, nontender.   Lymph: No peripheral edema. No cervical lymphadenopathy.  Neurological: Alert, no focal deficits  Skin: No rashes.  Psych:  Mood & affect appropriate    LABS: reviewed     CARDIAC MARKERS ( 30 Aug 2023 16:07 )  x     / x     / 54 U/L / x     / x                                 10.5   8.66  )-----------( 180      ( 01 Sep 2023 08:04 )             33.0     09-01    141  |  102  |  36<H>  ----------------------------<  132<H>  3.9   |  34<H>  |  1.02    Ca    8.5      01 Sep 2023 08:04  Phos  3.4     09-01  Mg     2.2     09-01      - TroponinI hsT: <-37.45  - TroponinI hsT: <-37.45    Radiology/EKG/Echo: in progress       -----------------------------------------------------------------------------------------------------------  < from: Transthoracic Echocardiogram Follow Up (02.25.22 @ 09:04) >     Impression     Summary     Limited study to assess left ventricular function.   The left ventricle cavity is underdistended.   The interventricular septum appears flattened consistent with an RV   pressure/volume overload.   Estimated left ventricular ejection fraction is 70 %.   The right atrium appears severely dilated.   A device wire is seen in the RV and RA.   The right ventricle is mildly dilated.   The tricuspid valve leaflets are thin and pliable; valve motion is   normal.   Severe (4+) tricuspid valve regurgitation is present.   Severe pulmonary hypertension.   No evidence of a significant pericardial effusion.(Trace)   Pleural effusion - is present.     Signature     ----------------------------------------------------------------   Electronically signed by Edward David MD(Interpreting   physician) on 02/25/2022 01:46 PM    < end of copied text >        < from: TTE Echo Complete w/o Contrast w/ Doppler (08.29.23 @ 08:45) >     The mitral valve leaflets appear thickened.   Mild mitral annular calcification is present.   Mild to Moderate mitral regurgitation with two jets is present.   Significant fibrocalcific changes noted to the aortic valve leaflets with   restriction in leaflet excursion.   Calculated MIKE is .91cm^2; this finding is consistent with severe aortic   stenosis.   The tricuspid valve leaflets appear mildly thickened and/or calcified,   but   open well.   Severe (4+) tricuspid valve regurgitation is present.   Severe pulmonary hypertension.   Pulmonic valve not well seen.   Mild to moderate pulmonic valvular regurgitation is present.   The left atrium is severely dilated.   Left ventricle systolic function appears mildly impaired; segmental wall   motion abnormalities noted.   Mild concentric left ventricular hypertrophy is present.   Estimated Ejection Fraction is 45 %.   The interventricular septum appears flattened consistent with an RV   pressure/volume overload.   The right atrium appears severely dilated.   A device wire is seen inthe RV and RA.   The right ventricle exhibits moderate dilation, diffuse hypokinesis, and   depression of contractility based on TAPSE.   A device wire is seen in the RV and RA.   Pleural effusion - is present.   IVC is dilated and not collapsing with inspiration.     Signature    < end of copied text >

## 2023-09-01 NOTE — CONSULT NOTE ADULT - ASSESSMENT
Assessment:         Process of Care  --Reviewed dx/treatment problems and alignment with Goals of Care    Physical Aspects of Care  --Pain - Patient denies at this time. c/w current management  --Bowel Regimen - Denies constipation. Risk for constipation d/t immobility. Suggest daily dulcolax as needed  --Dyspnea - No SOB at this time. further diuresis per primary/cardilogy.  CHF team following. she is off AC  --Debility/Weakness - PT as tolerated, OOB to chair, fall precautions     Psychological and Psychiatric Aspects of Care:   --Grief/Bereavement: emotional support provided  --Hx of psychiatric dx: none  -Pastoral Care Available PRN     Social Aspects of Care  -Palliative SW are available as needed    Cultural Aspects  -Primary Language: English    Goals of Care: see above    Ethical and Legal Aspects: NA  Capacity- pt maintains capacity.    HCP/Surrogate: children    Code Status- DNR/DNI   MOLST- in chart  Dispo Plan- tbd    Discussed With: Dr. Lowery

## 2023-09-02 NOTE — PROGRESS NOTE ADULT - PROBLEM SELECTOR PLAN 2
Rate overall controlled with intermittent mild tachycardia ( ( ) CW low dose BB and monitor BP   can consider Digoxin if necessary   AC held due to large hematoma Continue to hold for now  pt high fall risk would consider not resuming AC EP consulted for possible watchman Repeat Echo shows severe AS/TR/pHTN Structural Heart Team consulted

## 2023-09-02 NOTE — PROGRESS NOTE ADULT - PROBLEM SELECTOR PLAN 5
Echo shows EF 45% perhaps 2/2 tachyarrhythmia she has resumed low dose BB and will add Dig ( Low BP ) if rates not controlled, HF team on board, maintained on Lasix drip would use cautiously with severe AS  , S/P PPM interrogation; Normal functioning single chamber PPM with battery longevity 5.9-6.1 years

## 2023-09-02 NOTE — CONSULT NOTE ADULT - SUBJECTIVE AND OBJECTIVE BOX
Chief Complaint: LLE pain and swelling, difficulty with ambulation    Interval Hx: No new complaints. Clinically stable. Resting comfortably. No chest pain or tightness. No dyspnea at rest. Remains on supplemental O2 but can possibly be weaned as SPO 97% on 2L. Hemodynamics WNL.  Hgb stable.  Afib V paced on tele.     ROS: Multi system review is comprehensively negative x 10 systems except as above    Vitals:  T(F): 98 (02 Sep 2023 08:00), Max: 98 (02 Sep 2023 08:00)  HR: 88 (02 Sep 2023 08:00) (82 - 96)  BP: 107/58 (02 Sep 2023 08:00) (91/50 - 107/58)  RR: 18 (02 Sep 2023 08:00) (18 - 18)  SpO2: 97% (02 Sep 2023 08:00) (97% - 100%) on O2 2L/min via NC    Exam:  Constitutional: NAD, awake and alert  HEENT: No oral cyanosis.  Pulmonary: Non-labored, breath sounds left base crackles   CVS: S1 and S2, irregular, 2/6 REBECA   Abd: +BS, soft NT ND   Ext: LLE in ACE wrap, neurovascularly intact  Skin: No rashes.  Psych: Mood & affect appropriate  Neurological: Alert, no focal deficits    Labs:    (9/2)                      10.0   7.42  )----------( 177             31.1       137  |  99  |  34  ---------------------< 116  3.7   |  36  |  1.03    Ca 7.9   Phos 3.4   Mg 2.0    (8/30)    ProBNP 8126   Troponin negative    (8/26)    Lactate 1.8    (8/25)    UA yellow, clear, small LE, N-, 11-25 WBC, no RBC, few bact, mod sq epi    Imaging:  US venous duplex LUE 8/30: No evidence of left upper extremity deep venous thrombosis.    CXR 8/30: Heart enlargement and left-sided pacemaker. There is a persistent mild left base effusion and a persistent mild right base pleural pulmonary process. Chest is similar to March 18, 2022. Quite advanced bilateral shoulder degeneration    CT L shoulder WO 8/30: Severe left glenohumeral arthrosis with chondrocalcinosis and chronic remodeling of the bones. Severe supraspinatus and mild to moderate infraspinatus and subscapularis muscle atrophy. Marked subcoracoid bursitis. Marked biceps tenosynovitis. Small left pleural effusion.    CT head WO 8/28: Mild parenchymal atrophy with patchy periventricular hypoattenuation; a  nonspecific finding which statistically reflects chronic microvascular   ischemic change. Calcified plaque [place]. Coarse calcifications falx cerebri. No evidence of extra-axial collection, intracranial hemorrhage, mass or   mass effect. Gray-white matter differentiation appears preserved. Complete opacification of the visualized left maxillary sinus, demonstrating cortical thickening with heterogeneous internal density, including areas of hyperdensity. Bilateral mastoid air cells and middle ear regions well-aerated. No aggressive calvarial lesion or acute calvarial fracture visualized. Hyperostosis frontalis. Bilateral cataract surgery.    CTA LLE W/ 8/25: Large superficial left calf hematoma with active bleeding. Three-vessel runoff to the left foot. Two-vessel runoff to the right foot via the anterior tibial/dorsalis pedis and peroneal arteries.    XR L tib fib 8/25: The tibia and fibula are intact. No fracture or radiographic osseous lesion. Posteromedial mid calf 16.5 x 4.3 cm soft tissue hematoma.    Cardiac Testing:  EKG 8/28: Rate 105. Afib RVR    EKG 8/25: Rate 75. Afib. RBBB    Meds:  MEDICATIONS  (STANDING):  allopurinol 100 milliGRAM(s) Oral daily  ampicillin/sulbactam  IVPB 3 Gram(s) IV Intermittent every 12 hours  calcium carbonate 1250 mG  + Vitamin D (OsCal 500 + D) 1 Tablet(s) Oral daily  furosemide Infusion 5 mG/Hr (2.5 mL/Hr) IV Continuous <Continuous>  levothyroxine 125 MICROGram(s) Oral daily  metoprolol succinate ER 12.5 milliGRAM(s) Oral daily  multivitamin/minerals 1 Tablet(s) Oral daily  polyethylene glycol 3350 17 Gram(s) Oral daily  senna 2 Tablet(s) Oral at bedtime  spironolactone 25 milliGRAM(s) Oral two times a day    MEDICATIONS  (PRN):  acetaminophen     Tablet .. 650 milliGRAM(s) Oral every 6 hours PRN Moderate Pain (4 - 6)  acetaminophen   IVPB .. 1000 milliGRAM(s) IV Intermittent once PRN Temp greater or equal to 38C (100.4F), Mild Pain (1 - 3)  melatonin 3 milliGRAM(s) Oral at bedtime PRN Insomnia  ondansetron Injectable 4 milliGRAM(s) IV Push every 6 hours PRN Nausea and/or Vomiting

## 2023-09-02 NOTE — CONSULT NOTE ADULT - ASSESSMENT
89 yo woman with HTN, CAD, chronic diastolic CHF, chronic atrial fibrillation on Xarelto, hx of PPM placement, COPD, hypothyroidism, gout, presented 8/25 with LLE pain and swelling, difficulty with ambulation, started after striking LLE on piece of furniture the day prior. Patient was found to have hypotension, rapid afib, large superficial L calf hematoma with active bleed. Admitted to Trauma Surgery.     Large, traumatic L calf hematoma  Patient with advanced age, moderate MR, severe AS, severe TR with pulmonary hypertension, RV volume overload, borderline BP, afib with RVR (at the time), deemed very high risk for procedure, deferred intervention, managed supportively. Now appears stable. Hemodynamics stable. Hgb stable ~10.   - Continue management as per Surgery    Acute metabolic encephalopathy  Likely multi factorial due to hematoma, congestive heart failure, hypoxia, acute on chronic R shoulder pain    Acute on chronic systolic heart failure  Echo showed EF 45% perhaps 2/2 tachyarrhythmia, may also be related to multiple heart valve disease. Appreciate input from Cardiology.    - Continue management as per Cardiology, metoprolol XL, furosemide, spironolactone (cautious diuresis in setting of severe AS)    Severe aortic stenosis  As noted on echo  - Structural Heart Team consulted.    Chronic atrial fibrillation  Rapid rates upon admission in setting of pain, large LLE hematoma. Cardiology following.   - Continue rate control with metoprolol, can consider addition of digoxin if rapid rates  - AC held due to large hematoma, Cardiology recommends referral to for Watchman     Hx of pacemaker placement  S/P PPM interrogation; Normal functioning single chamber PPM with battery longevity 5.9-6.1 years    Hypothyroidism  Stable. Continue levothyroxine    Hx of gout  Stable. On Allopurinol

## 2023-09-02 NOTE — PROGRESS NOTE ADULT - PROBLEM SELECTOR PLAN 4
Currently denies CP   Troponin negative Rate overall controlled with intermittent mild tachycardia ( ( ) CW low dose BB and monitor BP   can consider Digoxin if necessary   AC held due to large hematoma Continue to hold for now  pt high fall risk would consider not resuming AC EP consulted for possible watchman

## 2023-09-02 NOTE — PROGRESS NOTE ADULT - PROBLEM SELECTOR PLAN 3
S/P PPM interrogation; Normal functioning single chamber PPM with battery longevity 5.9-6.1 years large left calf hematoma - management as per Trauma Sx  Patient with advanced age , with severe AS , severe TR with pulmonary hypertension , RV volume overload , moderate MR ,  borderline BP , moderate ventricular response  patient is very high risk for any procedure

## 2023-09-02 NOTE — PROGRESS NOTE ADULT - SUBJECTIVE AND OBJECTIVE BOX
CHIEF COMPLAINT: Patient is a 89y old  Female who presents with a chief complaint of LLE hematoma (28 Aug 2023 10:28)      HPI:  Level 3 Trauma Activation    HPI:    Patient is a 89y year old female with PMHx chronic CHF, hypothyroidism, Gout, HTN, COPD, Afib on xarelto, anemia, CAD, and osteopenia presents to the ED c/o left leg pain after hitting her leg yesterday 8/24/23, when going to answer the phone. Pt banged it on furniture and developed a large hematoma. Pt having difficulty ambulating, no other injuries. Last took Xarelto last night. No history of DVT/PE.    Cardiology consulted to evaluate for CHF. This is a pt. of Dr. Harris, last seen in the office on 7/31/23. Pt. takes lasix and torsemide prn at home for HFpEF and xarelto and toprol xl 25 mg daily for Chronic Afib.   At present, admitted for leg hematoma, hypotensive, denies chest pain/SOB/palpitations.     Pt denied head strike LOC or any traumatic injuries/complaints   (25 Aug 2023 13:27)    8/29/23 Seated in bedside chair awake alert no complaints tele -120  8/30/23; seen in bed, looks comfortable, no complaints, Tele: Afib 80s-90s - reconsulted by hospitalist one hr after seeing pt. for chest pain  - seen pt. with Micki Harris - pt. denies chest pain - says she is SOB, did not have chest pain, trops and EKG ordered  8/31/23: pt. with sign and symptoms of fluid overload - elevated JVD,, started on ivp lasix. Tele: Afib    9/1/23: feels better, less SOB, diuresis changed to lasix gtt by HF, Tele: Afib , pt. is being Tx to 3E  9/2/23: Awake alert Tele AF V paced Breathing comfortably SOB improved No CP    MEDICATIONS  (STANDING):  allopurinol 100 milliGRAM(s) Oral daily  ampicillin/sulbactam  IVPB      ampicillin/sulbactam  IVPB 3 Gram(s) IV Intermittent every 12 hours  calcium carbonate 1250 mG  + Vitamin D (OsCal 500 + D) 1 Tablet(s) Oral daily  furosemide Infusion 5 mG/Hr (2.5 mL/Hr) IV Continuous <Continuous>  levothyroxine 125 MICROGram(s) Oral daily  metoprolol succinate ER 12.5 milliGRAM(s) Oral daily  multivitamin/minerals 1 Tablet(s) Oral daily  spironolactone 25 milliGRAM(s) Oral two times a day    MEDICATIONS  (PRN):  acetaminophen     Tablet .. 650 milliGRAM(s) Oral every 6 hours PRN Moderate Pain (4 - 6)  acetaminophen   IVPB .. 1000 milliGRAM(s) IV Intermittent once PRN Temp greater or equal to 38C (100.4F), Mild Pain (1 - 3)  melatonin 3 milliGRAM(s) Oral at bedtime PRN Insomnia  ondansetron Injectable 4 milliGRAM(s) IV Push every 6 hours PRN Nausea and/or Vomiting    Vital Signs Last 24 Hrs  T(C): 36.7 (02 Sep 2023 08:00), Max: 36.7 (01 Sep 2023 13:23)  T(F): 98 (02 Sep 2023 08:00), Max: 98 (01 Sep 2023 13:23)  HR: 88 (02 Sep 2023 08:00) (82 - 96)  BP: 107/58 (02 Sep 2023 08:00) (86/46 - 107/58)  BP(mean): --  RR: 18 (02 Sep 2023 08:00) (18 - 18)  SpO2: 97% (02 Sep 2023 08:00) (97% - 100%)    Parameters below as of 02 Sep 2023 08:00  Patient On (Oxygen Delivery Method): nasal cannula  O2 Flow (L/min): 2        PHYSICAL EXAM:  Constitutional: NAD, awake and alert  HEENT: No oral cyanosis.  Pulmonary: Non-labored, breath sounds left base crackles   Cardiovascular: S1 and S2, irregular, 2/6 REBECA   Gastrointestinal: Bowel Sounds present, soft, nontender.   Lymph: No LE Edema LLE ACE wrap in place   Neurological: Alert, no focal deficits  Skin: No rashes.  Psych:  Mood & affect appropriate    LABS: reviewed     CARDIAC MARKERS ( 30 Aug 2023 16:07 )  x     / x     / 54 U/L / x     / x                                 10.5   8.66  )-----------( 180      ( 01 Sep 2023 08:04 )             33.0     09-01    141  |  102  |  36<H>  ----------------------------<  132<H>  3.9   |  34<H>  |  1.02    Ca    8.5      01 Sep 2023 08:04  Phos  3.4     09-01  Mg     2.2     09-01      - TroponinI hsT: <-37.45  - TroponinI hsT: <-37.45    Radiology/EKG/Echo: in progress       -----------------------------------------------------------------------------------------------------------  < from: Transthoracic Echocardiogram Follow Up (02.25.22 @ 09:04) >     Impression     Summary     Limited study to assess left ventricular function.   The left ventricle cavity is underdistended.   The interventricular septum appears flattened consistent with an RV   pressure/volume overload.   Estimated left ventricular ejection fraction is 70 %.   The right atrium appears severely dilated.   A device wire is seen in the RV and RA.   The right ventricle is mildly dilated.   The tricuspid valve leaflets are thin and pliable; valve motion is   normal.   Severe (4+) tricuspid valve regurgitation is present.   Severe pulmonary hypertension.   No evidence of a significant pericardial effusion.(Trace)   Pleural effusion - is present.     Signature     ----------------------------------------------------------------   Electronically signed by Edward David MD(Interpreting   physician) on 02/25/2022 01:46 PM    < end of copied text >        < from: TTE Echo Complete w/o Contrast w/ Doppler (08.29.23 @ 08:45) >     The mitral valve leaflets appear thickened.   Mild mitral annular calcification is present.   Mild to Moderate mitral regurgitation with two jets is present.   Significant fibrocalcific changes noted to the aortic valve leaflets with   restriction in leaflet excursion.   Calculated MIKE is .91cm^2; this finding is consistent with severe aortic   stenosis.   The tricuspid valve leaflets appear mildly thickened and/or calcified,   but   open well.   Severe (4+) tricuspid valve regurgitation is present.   Severe pulmonary hypertension.   Pulmonic valve not well seen.   Mild to moderate pulmonic valvular regurgitation is present.   The left atrium is severely dilated.   Left ventricle systolic function appears mildly impaired; segmental wall   motion abnormalities noted.   Mild concentric left ventricular hypertrophy is present.   Estimated Ejection Fraction is 45 %.   The interventricular septum appears flattened consistent with an RV   pressure/volume overload.   The right atrium appears severely dilated.   A device wire is seen inthe RV and RA.   The right ventricle exhibits moderate dilation, diffuse hypokinesis, and   depression of contractility based on TAPSE.   A device wire is seen in the RV and RA.   Pleural effusion - is present.   IVC is dilated and not collapsing with inspiration.     Signature    < end of copied text >           CHIEF COMPLAINT: Patient is a 89y old  Female who presents with a chief complaint of LLE hematoma (28 Aug 2023 10:28)      HPI:  Level 3 Trauma Activation    HPI:    Patient is a 89y year old female with PMHx chronic CHF, hypothyroidism, Gout, HTN, COPD, Afib on xarelto, anemia, CAD, and osteopenia presents to the ED c/o left leg pain after hitting her leg yesterday 8/24/23, when going to answer the phone. Pt banged it on furniture and developed a large hematoma. Pt having difficulty ambulating, no other injuries. Last took Xarelto last night. No history of DVT/PE.    Cardiology consulted to evaluate for CHF. This is a pt. of Dr. Harris, last seen in the office on 7/31/23. Pt. takes lasix and torsemide prn at home for HFpEF and xarelto and toprol xl 25 mg daily for Chronic Afib.   At present, admitted for leg hematoma, hypotensive, denies chest pain/SOB/palpitations.     Pt denied head strike LOC or any traumatic injuries/complaints   (25 Aug 2023 13:27)    8/29/23 Seated in bedside chair awake alert no complaints tele -120  8/30/23; seen in bed, looks comfortable, no complaints, Tele: Afib 80s-90s - reconsulted by hospitalist one hr after seeing pt. for chest pain  - seen pt. with Micki Harris - pt. denies chest pain - says she is SOB, did not have chest pain, trops and EKG ordered  8/31/23: pt. with sign and symptoms of fluid overload - elevated JVD,, started on ivp lasix. Tele: Afib    9/1/23: feels better, less SOB, diuresis changed to lasix gtt by HF, Tele: Afib , pt. is being Tx to 3E  9/2/23: Awake alert Tele AF V paced Breathing comfortably SOB improved No CP    MEDICATIONS  (STANDING):  allopurinol 100 milliGRAM(s) Oral daily  ampicillin/sulbactam  IVPB      ampicillin/sulbactam  IVPB 3 Gram(s) IV Intermittent every 12 hours  calcium carbonate 1250 mG  + Vitamin D (OsCal 500 + D) 1 Tablet(s) Oral daily  furosemide Infusion 5 mG/Hr (2.5 mL/Hr) IV Continuous <Continuous>  levothyroxine 125 MICROGram(s) Oral daily  metoprolol succinate ER 12.5 milliGRAM(s) Oral daily  multivitamin/minerals 1 Tablet(s) Oral daily  spironolactone 25 milliGRAM(s) Oral two times a day    MEDICATIONS  (PRN):  acetaminophen     Tablet .. 650 milliGRAM(s) Oral every 6 hours PRN Moderate Pain (4 - 6)  acetaminophen   IVPB .. 1000 milliGRAM(s) IV Intermittent once PRN Temp greater or equal to 38C (100.4F), Mild Pain (1 - 3)  melatonin 3 milliGRAM(s) Oral at bedtime PRN Insomnia  ondansetron Injectable 4 milliGRAM(s) IV Push every 6 hours PRN Nausea and/or Vomiting    Vital Signs Last 24 Hrs  T(C): 36.7 (02 Sep 2023 08:00), Max: 36.7 (01 Sep 2023 13:23)  T(F): 98 (02 Sep 2023 08:00), Max: 98 (01 Sep 2023 13:23)  HR: 88 (02 Sep 2023 08:00) (82 - 96)  BP: 107/58 (02 Sep 2023 08:00) (86/46 - 107/58)  BP(mean): --  RR: 18 (02 Sep 2023 08:00) (18 - 18)  SpO2: 97% (02 Sep 2023 08:00) (97% - 100%)    Parameters below as of 02 Sep 2023 08:00  Patient On (Oxygen Delivery Method): nasal cannula  O2 Flow (L/min): 2          PHYSICAL EXAM:  Constitutional: NAD, awake and alert  HEENT: No oral cyanosis.  Pulmonary: Non-labored, breath sounds left base crackles   Cardiovascular: S1 and S2, irregular, 2/6 REBECA   Gastrointestinal: Bowel Sounds present, soft, nontender.   Lymph: No LE Edema LLE ACE wrap in place   Neurological: Alert, no focal deficits  Skin: No rashes.  Psych:  Mood & affect appropriate    LABS: reviewed                           10.0   7.42  )-----------( 177      ( 02 Sep 2023 06:07 )             31.1     09-02    137  |  99  |  34<H>  ----------------------------<  116<H>  3.7   |  36<H>  |  1.03    Ca    7.9<L>      02 Sep 2023 06:07  Phos  3.4     09-01  Mg     2.0     09-02              Urinalysis Basic - ( 02 Sep 2023 06:07 )    Color: x / Appearance: x / SG: x / pH: x  Gluc: 116 mg/dL / Ketone: x  / Bili: x / Urobili: x   Blood: x / Protein: x / Nitrite: x   Leuk Esterase: x / RBC: x / WBC x   Sq Epi: x / Non Sq Epi: x / Bacteria: x          Mg     2.2     09-01      - TroponinI hsT: <-37.45  - TroponinI hsT: <-37.45    Radiology/EKG/Echo: in progress       -----------------------------------------------------------------------------------------------------------  < from: Transthoracic Echocardiogram Follow Up (02.25.22 @ 09:04) >     Impression     Summary     Limited study to assess left ventricular function.   The left ventricle cavity is underdistended.   The interventricular septum appears flattened consistent with an RV   pressure/volume overload.   Estimated left ventricular ejection fraction is 70 %.   The right atrium appears severely dilated.   A device wire is seen in the RV and RA.   The right ventricle is mildly dilated.   The tricuspid valve leaflets are thin and pliable; valve motion is   normal.   Severe (4+) tricuspid valve regurgitation is present.   Severe pulmonary hypertension.   No evidence of a significant pericardial effusion.(Trace)   Pleural effusion - is present.     Signature     ----------------------------------------------------------------   Electronically signed by Edward David MD(Interpreting   physician) on 02/25/2022 01:46 PM    < end of copied text >        < from: TTE Echo Complete w/o Contrast w/ Doppler (08.29.23 @ 08:45) >     The mitral valve leaflets appear thickened.   Mild mitral annular calcification is present.   Mild to Moderate mitral regurgitation with two jets is present.   Significant fibrocalcific changes noted to the aortic valve leaflets with   restriction in leaflet excursion.   Calculated MIKE is .91cm^2; this finding is consistent with severe aortic   stenosis.   The tricuspid valve leaflets appear mildly thickened and/or calcified,   but   open well.   Severe (4+) tricuspid valve regurgitation is present.   Severe pulmonary hypertension.   Pulmonic valve not well seen.   Mild to moderate pulmonic valvular regurgitation is present.   The left atrium is severely dilated.   Left ventricle systolic function appears mildly impaired; segmental wall   motion abnormalities noted.   Mild concentric left ventricular hypertrophy is present.   Estimated Ejection Fraction is 45 %.   The interventricular septum appears flattened consistent with an RV   pressure/volume overload.   The right atrium appears severely dilated.   A device wire is seen inthe RV and RA.   The right ventricle exhibits moderate dilation, diffuse hypokinesis, and   depression of contractility based on TAPSE.   A device wire is seen in the RV and RA.   Pleural effusion - is present.   IVC is dilated and not collapsing with inspiration.     Signature    < end of copied text >

## 2023-09-02 NOTE — PROGRESS NOTE ADULT - PROBLEM SELECTOR PLAN 1
large left calf hematoma - management as per Trauma Sx  Patient with advanced age , with severe AS , severe TR with pulmonary hypertension , RV volume overload , moderate MR ,  borderline BP , moderate ventricular response  patient is very high risk for any procedure Echo shows EF 45% perhaps 2/2 tachyarrhythmia she has resumed low dose BB and will add Dig ( Low BP ) if rates not controlled, HF team on board, maintained on Lasix drip would use cautiously with severe AS  ,

## 2023-09-02 NOTE — PROGRESS NOTE ADULT - PROBLEM SELECTOR PLAN 6
Repeat Echo shows severe AS/TR/pHTN Structural Heart Team consulted Currently denies CP   Troponin negative

## 2023-09-03 NOTE — PROGRESS NOTE ADULT - PROBLEM SELECTOR PLAN 3
large left calf hematoma - management as per Trauma Sx  Patient with advanced age , with severe AS , severe TR with pulmonary hypertension , RV volume overload , moderate MR ,  borderline BP , moderate ventricular response  patient is very high risk for any procedure

## 2023-09-03 NOTE — PROGRESS NOTE ADULT - NS ATTEND AMEND GEN_ALL_CORE FT
Patient with above hx , was hypotensive , lasix drip was discontinue d, will give lasix 20 mg daily, EP

## 2023-09-03 NOTE — CONSULT NOTE ADULT - SUBJECTIVE AND OBJECTIVE BOX
ELECTROPHYSIOLOGY CONSULTATION NOTE                                                                                               Consult requested by:  Dr Palla Cardiology   Reason for Consultation: evaluate for possible Watchman( LAAC) procedure/ device  History obtained by: Patient and medical record   obtained: No    Chief complaint:    Patient is a 89y old  Female who presents with a chief complaint of LLE hematoma (03 Sep 2023 09:28)    HPI:  Level 3 Trauma Activation    HPI:    Patient is a 89y year old female with PMHx chronic CHF, hypothyroidism, Gout, HTN, COPD, Afib on xarelto, anemia, CAD, and osteopenia presents to the ED c/o left leg pain after hitting her leg yesterday 8/24/23, when going to answer the phone. Pt banged it on furniture and developed a large hematoma. Pt having difficulty ambulating, no other injuries. Last took Xarelto last night. No history of DVT/PE.    Pt denied head strike LOC or any traumatic injuries/complaints   (25 Aug 2023 13:27)    09/03/23-EP asked to evaluate pt for possible Watchman ( LAAC) due to chronic AF, and recent LLE fall sustaining large hematoma while on xarelto. Pt states she was on xarelto without any problems for 10 years,  as of recently she sustained the trauma to her LLE with hematoma formation. Pt states she would be in agreement with anything that improves her quality of life. DIscussed the Watchman with pt as a non-pharmocologic alternative to OAC for stroke prevention-she will discuss with her daughters.   Cardiology is recommending not to restart OAC in light of fall risk & LLE hematoma.     REVIEW OF SYMPTOMS:     General: No fatigue, no fevers/chills  Respiratory: No dyspnea, no cough, no wheeze  CV: No chest pain, no palpitations  Abd: No nausea  Neuro: No headache, no dizziness  ALL OTHER REVIEW OF SYSTEMS ARE NEGATIVE.      PREVIOUS DIAGNOSTIC TESTING  ECHO FINDINGS:  < from: TTE Echo Complete w/o Contrast w/ Doppler (08.29.23 @ 08:45) >  PROCEDURE DATE:  08/29/2023       Height                62.99 in            Weight        125.66 pounds    Type of Study:     TTE procedure: ECHO TTE WO CON COMP W DOP     BP: 99/67 mmHg     Study Location: 5ETechnical Quality: Technically Difficullt    Indications   1) R07.9 - Chest pain    M-Mode Measurements (cm)     LVEDd: 3.65 cm            LVESd: 2.64 cm   IVSEd: 1.23 cm   LVPWd: 1.19 cm            AO Root Dimension: 3.6 cm                             ACS: 1.3 cm                             LA: 3.9 cm                             LVOT: 2.3 cm    Doppler Measurements:     AV Velocity:316 cm/s   AV Peak Gradient: 39.94 mmHg   AV Mean Gradient: 25 mmHg   AV Area (Continuity):0.8 cm^2   TR Velocity:359 cm/s   TR Gradient:51.5524 mmHg   Estimated RAP:10 mmHg   RVSP:61 mmHg     Findings     Mitral Valve   The mitral valve leaflets appear thickened.   Mild mitral annular calcification is present.   Mild to Moderate mitral regurgitation with two jets is present.     Aortic Valve   Significant fibrocalcific changes noted to the aortic valve leaflets with   restriction in leaflet excursion.   Calculated MIKE is .91cm^2; this finding is consistent with severe aortic   stenosis.     Tricuspid Valve   The tricuspid valve leaflets appear mildly thickened and/or calcified,   but   open well.   Severe (4+) tricuspid valve regurgitation is present.   Severe pulmonary hypertension.     Pulmonic Valve   Pulmonic valve not well seen.   Mild to moderate pulmonic valvular regurgitation is present.     Left Atrium   The left atrium is severely dilated.     Left Ventricle   Left ventricle systolic function appears mildly impaired; segmental wall   motion abnormalities noted.   Mild concentric left ventricular hypertrophy is present.   Estimated Ejection Fraction is 45 %.   The interventricular septum appears flattened consistent with an RV   pressure/volume overload.     Right Atrium   The right atrium appears severely dilated.   A device wire is seen in the RV and RA.     Right Ventricle   The right ventricle exhibits moderate dilation, diffuse hypokinesis, and   depression of contractility based on TAPSE.   A device wire is seen in the RV and RA.     Pericardial Effusion   No evidence of pericardial effusion.     Pleural Effusion   Pleural effusion - is present.     Miscellaneous   IVC is dilated and not collapsing with inspiration.     Impression     Summary     The mitral valve leaflets appear thickened.   Mild mitral annular calcification is present.   Mild to Moderate mitral regurgitation with two jets is present.   Significant fibrocalcific changes noted to the aortic valve leaflets with   restriction in leaflet excursion.   Calculated MIKE is .91cm^2; this finding is consistent with severe aortic   stenosis.   The tricuspid valve leaflets appear mildly thickened and/or calcified,   but   open well.   Severe (4+) tricuspid valve regurgitation is present.   Severe pulmonary hypertension.   Pulmonic valve not well seen.   Mild to moderate pulmonic valvular regurgitation is present.   The left atrium is severely dilated.   Left ventricle systolic function appears mildly impaired; segmental wall   motion abnormalities noted.   Mild concentric left ventricular hypertrophy is present.   Estimated Ejection Fraction is 45 %.   The interventricular septum appears flattened consistent with an RV   pressure/volume overload.   The right atrium appears severely dilated.   A device wire is seen inthe RV and RA.   The right ventricle exhibits moderate dilation, diffuse hypokinesis, and   depression of contractility based on TAPSE.   A device wire is seen in the RV and RA.   Pleural effusion - is present.   IVC is dilated and not collapsing with inspiration.        INTERPRETATION:  Transthoracic Echocardiography Report (TTE)  EKG (8/29)  Ventricular Rate 96 BPM    Atrial Rate 90 BPM    QRS Duration 148 ms    Q-T Interval 388 ms    QTC Calculation(Bazett) 490 ms    R Axis -61 degrees    T Axis 65 degrees    Diagnosis Line Atrial fibrillation  Left axis deviation  Right bundle branchblock  Possible Lateral infarct (cited on or before 22-FEB-2022)  Inferior infarct (cited on or before 20-FEB-2022)  Abnormal ECG  Confirmed by HANANE DALY MD ([id number]) on 8/31/2023 7:49:07 AM      ALLERGIES: Allergies    codeine (Unknown)          PAST MEDICAL HISTORY  Mitral valve insufficiency    Aortic valve regurgitation    Osteopenia    CAD (coronary artery disease)    Gout    Hypothyroid    CHF (congestive heart failure)    Anxiety    Anemia    HTN (hypertension)    Afib    COPD (chronic obstructive pulmonary disease)    Chronic obstructive pulmonary disease (COPD)    HTN (hypertension)    Cardiac pacemaker    Gout    Hypothyroidism    Insomnia    Chronic CHF    History of ST elevation myocardial infarction (STEMI)        PAST SURGICAL HISTORY  Status cardiac pacemaker    S/P knee replacement    No significant past surgical history        FAMILY HISTORY:  No pertinent family history in first degree relatives    No pertinent family history in first degree relatives        SOCIAL HISTORY:  Denies smoking/alcohol/drugs  Lives alone-daughter lives nearby      CURRENT MEDICATIONS:  furosemide Infusion 5 mG/Hr IV Continuous <Continuous>  metoprolol succinate ER 12.5 milliGRAM(s) Oral daily  spironolactone 25 milliGRAM(s) Oral two times a day     polyethylene glycol 3350  senna  allopurinol  ampicillin/sulbactam  IVPB  ampicillin/sulbactam  IVPB  calcium carbonate 1250 mG  + Vitamin D (OsCal 500 + D)  levothyroxine  multivitamin/minerals      Vital Signs Last 24 Hrs  T(C): 36.4 (03 Sep 2023 08:02), Max: 36.5 (02 Sep 2023 20:23)  T(F): 97.5 (03 Sep 2023 08:02), Max: 97.7 (02 Sep 2023 20:23)  HR: 108 (03 Sep 2023 08:02) (73 - 108)  BP: 102/63 (03 Sep 2023 08:02) (86/50 - 106/59)  BP(mean): --  RR: 18 (03 Sep 2023 08:02) (18 - 18)  SpO2: 99% (03 Sep 2023 08:02) (96% - 99%)    Parameters below as of 03 Sep 2023 08:02  Patient On (Oxygen Delivery Method): nasal cannula  O2 Flow (L/min): 2        PHYSICAL EXAM:  Constitutional: Comfortable . No acute distress.   HEENT: Atraumatic and normocephalic , neck is supple . no JVD. No carotid bruit. PEERL   CNS: A&Ox3. No focal deficits. EOMI. Cranial nerves II-IX are intact.   Lymph Nodes: Cervical : Not palpable.  Respiratory: CTAB  Cardiovascular: S1S2 RRR. No murmur/rubs or gallop.  Gastrointestinal: Soft non-tender and non distended . +Bowel sounds. negative Correa's sign.  Extremities: No edema.   Psychiatric: Calm . no agitation.  Skin: No skin rash/ulcers visualized to face, hands or feet.    Intake and output:   09-02 @ 07:01  -  09-03 @ 07:00  --------------------------------------------------------  IN: 0 mL / OUT: 1300 mL / NET: -1300 mL        LABS:                        10.0   7.42  )-----------( 177      ( 02 Sep 2023 06:07 )             31.1     09-02    137  |  99  |  34<H>  ----------------------------<  116<H>  3.7   |  36<H>  |  1.03    Ca    7.9<L>      02 Sep 2023 06:07  Mg     2.0     09-02        ;p-BNP=    Urinalysis Basic - ( 02 Sep 2023 06:07 )    Color: x / Appearance: x / SG: x / pH: x  Gluc: 116 mg/dL / Ketone: x  / Bili: x / Urobili: x   Blood: x / Protein: x / Nitrite: x   Leuk Esterase: x / RBC: x / WBC x   Sq Epi: x / Non Sq Epi: x / Bacteria: x        INTERPRETATION OF TELEMETRY: Reviewed by me. AF with RBBB occ v-pacing PVCs, 70s  ECG: Reviewed by me. AF with RBBB    PROCEDURE DATE:  08/25/2023          INTERPRETATION:  Clinical information: Lower leg hematoma    TECHNIQUE: CT angiography of the left lower extremity was performed   utilizing noncontrast, arterial phase and venous imaging.    Findings:    There is a large acute hematoma in the superficial tissues of the medial   left calf which measures 16.2 x 3.1 x 8.2 cm (CC, TR, AP). There is a   focus of contrast extravasation in the anterior aspect of the hematoma   (4; 80), compatible with active bleeding.    Left lower extremity angiogram: The popliteal artery is widely patent.   There is calcification of the tibioperoneal trunk without significant   stenosis. There is ahigh-grade stenosis at the origin of the posterior   tibial artery, however the artery is patent into the foot. The anterior   tibial/dorsalis pedis and peroneal arteries are also patent into the foot.    Right lower extremity angiogram: The popliteal artery is patent. There is   moderate atherosclerotic disease of the tibioperoneal trunk. The anterior   tibial/dorsalis pedis arteries are patent to the foot. The peroneal   artery is patent to the ankle. There is no flow in the mid and distal   posterior tibial artery.    IMPRESSION:    Large superficial left calf hematoma with active bleeding.    Three-vessel runoff to the left foot.    Two-vessel runoff to the right foot via the anterior tibial/dorsalis   pedis and peroneal arteries.    Findings were discussed with Dr. Rojo by Dr. Morris on August 25, 2023, 11:20 AM.

## 2023-09-03 NOTE — CONSULT NOTE ADULT - ASSESSMENT
89 yo woman with HTN, CAD, chronic diastolic CHF, chronic atrial fibrillation on Xarelto, hx of PPM placement, COPD, hypothyroidism, gout, presented 8/25 with LLE pain and swelling, difficulty with ambulation, started after striking LLE on piece of furniture the day prior. Patient was found to have hypotension, rapid afib, large superficial L calf hematoma with active bleed. Admitted to Trauma Surgery.     Large, traumatic L calf hematoma  Patient with advanced age, moderate MR, severe AS, severe TR with pulmonary hypertension, RV volume overload, borderline BP, afib with RVR (at the time), deemed very high risk for procedure, deferred intervention, managed supportively. Overnight, patient with relative hypotension that has since improved, now back at 100s/60s, HR 90s, stable. Hgb stable ~10.   - Continue management as per Surgery    Acute metabolic encephalopathy  Likely multi factorial due to hematoma, congestive heart failure, hypoxia, acute on chronic R shoulder pain  - Continue to monitor, treat underlying causes.     Acute on chronic systolic heart failure  Echo showed EF 45% perhaps 2/2 tachyarrhythmia, may also be related to multiple heart valve disease. Appreciate input from Cardiology. Currently on Iv Lasix drip.  Cardiology is considering stopping the IV Lasix drip and switching over to PO Lasix pending repeat BMP and repeat CXR.   - Continue management as per Cardiology, re current regimen of metoprolol XL, furosemide, spironolactone (recommend cautious diuresis in setting of severe AS)    Severe aortic stenosis  As noted on echo  - Structural Heart Team consulted.    Chronic atrial fibrillation  Rapid rates upon admission in setting of pain, large LLE hematoma. Cardiology following.   - Continue rate control with metoprolol, can consider addition of digoxin if rapid rates  - AC held due to large hematoma  - Referred to Cardiac EP re possible Watchman LAAC procedure / device to provide a non-pharmacologic alternative for non-valvular afib related stroke risk-reduction, input pending    Hx of pacemaker placement  S/P PPM interrogation; Normal functioning single chamber PPM with battery longevity 5.9-6.1 years  - Continue to monitor    Hypothyroidism  Stable.   - Continue levothyroxine    Hx of gout  Stable.   - Continue allopurinol        Code Status: DNR/DNI

## 2023-09-03 NOTE — CONSULT NOTE ADULT - ASSESSMENT
A  89y year old female with PMHx chronic CHF, hypothyroidism, Gout, HTN, COPD, chronic Afib (on xarelto x 10 years), anemia, CAD, and osteopenia presents to the ED c/o left leg pain/ now s/p large LLE hematoma Off OAC.  1) Continue surgical management of LLE hematoma  2)  Will continue to discuss Watchman LAAC procedure/ device with pt/ family  3)  Watchman (LAAC ) is a possible viable non-pharmacologic alternative for NVAF stroke prevention-pt is suitable for short-term AC with an appropriate reason for an alternative to OAC in light of her increasing fall risk, and current LLE hematoma injury & stroke risk.  4) Will D/W EP attending all of above.

## 2023-09-03 NOTE — PROGRESS NOTE ADULT - PROBLEM SELECTOR PLAN 2
Repeat Echo shows severe AS, SevereTR, severe pHTN Structural Heart Team consulted await recommendations Repeat Echo shows severe AS, Severe TR, severe pHTN Structural Heart Team consulted await recommendations

## 2023-09-03 NOTE — PROGRESS NOTE ADULT - PROBLEM SELECTOR PLAN 1
Echo shows EF 45% perhaps 2/2 tachyarrhythmia contributed by valvular heart disease, HF team on board,   Currently maintained on Lasix drip, pressure soft today/severe AS would advise to d/c drip and maintain daily IVP, trend renal values ( BUN/Creat 34/1.03 )    Advise strict I&O COLEEN RN   , Echo shows EF 45% perhaps 2/2 tachyarrhythmia contributed by valvular heart disease, HF team on board,   Currently maintained on Lasix drip, pressure soft today/severe AS d/c drip and maintain daily Spirolactone,  trend renal values ( BUN/Creat 34/1.03 )   Aguilar weights    Advise strict I&O COLEEN RN   ,

## 2023-09-03 NOTE — PROGRESS NOTE ADULT - PROBLEM SELECTOR PLAN 4
Rate overall controlled with intermittent mild tachycardia ( ( ) CW low dose BB and monitor BP   can consider Digoxin if necessary   AC held due to large hematoma Continue to hold for now  pt high fall risk would consider not resuming AC EP consulted for possible watchman

## 2023-09-03 NOTE — PROGRESS NOTE ADULT - ASSESSMENT
{\rtf1\kuwdzv27068\ansi\byqphzj2036\ftnbj\uc1\deff0  {\fonttbl{\f0 \fnil Segoe UI;}{\f1 \fnil \fcharset0 Segoe UI;}{\f2 \fnil Times New Jose;}}  {\colortbl ;\aqr496\rnwlt168\xrpn019 ;\red0\green0\blue0 ;\red0\green0\txiv678 ;\red0\green0\blue0 ;}  {\stylesheet{\f0\fs20 Normal;}{\cs1 Default Paragraph Font;}{\cs2\f0\fs16 Line Number;}{\cs3\f2\fs24\ul\cf3 Hyperlink;}}  {\*\revtbl{Unknown;}}  \kwauma01893\pgxgrt78072\oizmr7932\vnafg7186\wmbwe9702\hiksv9172\gzzlkbz714\muzrjbf909\nogrowautofit\ubyutp410\formshade\nofeaturethrottle1\dntblnsbdb\fet4\aendnotes\aftnnrlc\pgbrdrhead\pgbrdrfoot  \sectd\odzjjw93826\qmutcw94444\guttersxn0\yvwpxgpy1780\umtuizak5081\elbluoca3684\tqncrayf0681\ujjvogl348\tdecjkq470\sbkpage\pgncont\pgndec  \plain\plain\f0\fs24\ql\plain\f0\fs24{\*\bkmkstart [id number]}{\*\bkmkend [id number]}\plain\f0\fs20\gfun4707\hich\f0\dbch\f0\loch\f0\fs20 A/P:\par  Left lower leg hematoma s/p isolated trauma to region 8/24/23, H/H and HD stable\par  Hospitalist on consult for medical mgmt\par  IR consulted, no intervention\par  Cardiology on consult, Per direct discussion with Dr Walker pt not optimized for surgery, pt has severe aortic stenosis and cardiac pathology at this time\par  Awaiting specialist cardiology evaluation for possible further cardiac optimization and intervention\par  Serial h/h stable\par  Cont local wound care\par  Fall risk precautions\par  S/P reversal of xarelto anticoagulation with KCENTRA in ED\par  GI/DVT prophylaxis\par  Pain control, avoid narcotics as pt gets somnolent with morphine\par  Cont current care and meds\par  Fall risk precautions\par  SS for d/c planning\par  PT\par  Pt aware of and agrees with all of the above\par  Will defer surgical intervention for left lower ext debridement and hematoma evacuation until cleared/optimized by cardiology, medical/cardiac mgmt per consulted specialists/medical team\par  \par  35 minuted of time spent on pt examination, review of relevant labs and radiologic studies, assured stabilization of pt, discussion with relevant services/providers for coordination of pt care and services\plain\f1\fs16\trze2022\hich\f1\dbch\f1\loch\f1\cf2\fs16     \plain\f1\fs16\jusu3171\hich\f1\dbch\f1\loch\f1\cf2\fs16\strike\plain\f1\fs16\dbhv1821\hich\f1\dbch\f1\loch\f1\cf2\fs16\plain\f0\fs20\zdxr8836\hich\f0\dbch\f0\loch\f0\fs20\par  }

## 2023-09-03 NOTE — CONSULT NOTE ADULT - SUBJECTIVE AND OBJECTIVE BOX
Chief Complaint: LLE pain and swelling, difficulty with ambulation    Interval Hx: Patient noted to have some relative hypotension last night, 86/50. BP has since improved to 100s/50s-60s. Volume status and respiratory status is overall improved though, as patient's SPO2 is 99% on 2L, breathing comfortably. Can weak O2 supplementation off. Cardiology is considering stopping the IV Lasix drip and switching over to PO Lasix pending repeat BMP and repeat CXR. Patient is otherwise clinically stable. Resting comfortably. No chest pain or tightness. LLE stable, neurovascularly intact. Hgb stable. On tele, afib with RBBB, occ v-pacing, PVCs, rate 70s.    ROS: Multi system review is comprehensively negative x 10 systems except as above    Vitals:  T(F): 97.5 (03 Sep 2023 08:02), Max: 97.7 (02 Sep 2023 20:23)  HR: 108 (03 Sep 2023 08:02) (73 - 108)  BP: 102/63 (03 Sep 2023 08:02) (86/50 - 106/59)  RR: 18 (03 Sep 2023 08:02) (18 - 18)  SpO2: 99% (03 Sep 2023 08:02) (96% - 99%) 2L/min    Exam:  Constitutional: No acute distress  HEENT: NCAT PERRL EOMI MMM clear oropharynx  Neck: Supple, no JVD  CVS: S1 and S2, irregular, rate ~90, 2/6 REBECA   Chest: Normal resp effort at rest, lungs clear  Abd: +BS, soft NT ND   Ext: LLE in ACE wrap, neurovascularly intact  Skin: No rashes.  Psych: Mood & affect appropriate  Neurological: Alert, no focal deficits    Labs:    (9/2)                      10.0   7.42  )----------( 177             31.1       137  |  99  |  34  ---------------------< 116  3.7   |  36  |  1.03    Ca 7.9   Phos 3.4   Mg 2.0    (8/30)    ProBNP 8126   Troponin negative    (8/26)    Lactate 1.8    (8/25)    UA yellow, clear, small LE, N-, 11-25 WBC, no RBC, few bact, mod sq epi    Imaging:  US venous duplex LUE 8/30: No evidence of left upper extremity deep venous thrombosis.    CXR 8/30: Heart enlargement and left-sided pacemaker. There is a persistent mild left base effusion and a persistent mild right base pleural pulmonary process. Chest is similar to March 18, 2022. Quite advanced bilateral shoulder degeneration    CT L shoulder WO 8/30: Severe left glenohumeral arthrosis with chondrocalcinosis and chronic remodeling of the bones. Severe supraspinatus and mild to moderate infraspinatus and subscapularis muscle atrophy. Marked subcoracoid bursitis. Marked biceps tenosynovitis. Small left pleural effusion.    CT head WO 8/28: Mild parenchymal atrophy with patchy periventricular hypoattenuation; a  nonspecific finding which statistically reflects chronic microvascular   ischemic change. Calcified plaque [place]. Coarse calcifications falx cerebri. No evidence of extra-axial collection, intracranial hemorrhage, mass or   mass effect. Gray-white matter differentiation appears preserved. Complete opacification of the visualized left maxillary sinus, demonstrating cortical thickening with heterogeneous internal density, including areas of hyperdensity. Bilateral mastoid air cells and middle ear regions well-aerated. No aggressive calvarial lesion or acute calvarial fracture visualized. Hyperostosis frontalis. Bilateral cataract surgery.    CTA LLE W/ 8/25: Large superficial left calf hematoma with active bleeding. Three-vessel runoff to the left foot. Two-vessel runoff to the right foot via the anterior tibial/dorsalis pedis and peroneal arteries.    XR L tib fib 8/25: The tibia and fibula are intact. No fracture or radiographic osseous lesion. Posteromedial mid calf 16.5 x 4.3 cm soft tissue hematoma.    Cardiac Testing:  EKG 8/28: Rate 105. Afib RVR    EKG 8/25: Rate 75. Afib. RBBB    Meds:  MEDICATIONS  (STANDING):  allopurinol 100 milliGRAM(s) Oral daily  ampicillin/sulbactam  IVPB 3 Gram(s) IV Intermittent every 12 hours  calcium carbonate 1250 mG  + Vitamin D (OsCal 500 + D) 1 Tablet(s) Oral daily  furosemide Infusion 5 mG/Hr (2.5 mL/Hr) IV Continuous <Continuous>  levothyroxine 125 MICROGram(s) Oral daily  metoprolol succinate ER 12.5 milliGRAM(s) Oral daily  multivitamin/minerals 1 Tablet(s) Oral daily  polyethylene glycol 3350 17 Gram(s) Oral daily  senna 2 Tablet(s) Oral at bedtime  spironolactone 25 milliGRAM(s) Oral two times a day    MEDICATIONS  (PRN):  acetaminophen     Tablet .. 650 milliGRAM(s) Oral every 6 hours PRN Moderate Pain (4 - 6)  acetaminophen   IVPB .. 1000 milliGRAM(s) IV Intermittent once PRN Temp greater or equal to 38C (100.4F), Mild Pain (1 - 3)  melatonin 3 milliGRAM(s) Oral at bedtime PRN Insomnia  ondansetron Injectable 4 milliGRAM(s) IV Push every 6 hours PRN Nausea and/or Vomiting

## 2023-09-03 NOTE — PROGRESS NOTE ADULT - SUBJECTIVE AND OBJECTIVE BOX
CHIEF COMPLAINT: Patient is a 89y old  Female who presents with a chief complaint of LLE hematoma (28 Aug 2023 10:28)      HPI:  Level 3 Trauma Activation    HPI:    Patient is a 89y year old female with PMHx chronic CHF, hypothyroidism, Gout, HTN, COPD, Afib on xarelto, anemia, CAD, and osteopenia presents to the ED c/o left leg pain after hitting her leg yesterday 8/24/23, when going to answer the phone. Pt banged it on furniture and developed a large hematoma. Pt having difficulty ambulating, no other injuries. Last took Xarelto last night. No history of DVT/PE.    Cardiology consulted to evaluate for CHF. This is a pt. of Dr. Harris, last seen in the office on 7/31/23. Pt. takes lasix and torsemide prn at home for HFpEF and xarelto and toprol xl 25 mg daily for Chronic Afib.   At present, admitted for leg hematoma, hypotensive, denies chest pain/SOB/palpitations.     Pt denied head strike LOC or any traumatic injuries/complaints   (25 Aug 2023 13:27)    8/29/23 Seated in bedside chair awake alert no complaints tele -120  8/30/23; seen in bed, looks comfortable, no complaints, Tele: Afib 80s-90s - reconsulted by hospitalist one hr after seeing pt. for chest pain  - seen pt. with Micki Harris - pt. denies chest pain - says she is SOB, did not have chest pain, trops and EKG ordered  8/31/23: pt. with sign and symptoms of fluid overload - elevated JVD,, started on ivp lasix. Tele: Afib    9/1/23: feels better, less SOB, diuresis changed to lasix gtt by HF, Tele: Afib , pt. is being Tx to 3E  9/2/23: Awake alert Tele AF V paced Breathing comfortably SOB improved No CP  9/3/23: seated in bedside chair, SOB improved/No JVD , Tele AF     MEDICATIONS  (STANDING):  allopurinol 100 milliGRAM(s) Oral daily  ampicillin/sulbactam  IVPB      ampicillin/sulbactam  IVPB 3 Gram(s) IV Intermittent every 12 hours  calcium carbonate 1250 mG  + Vitamin D (OsCal 500 + D) 1 Tablet(s) Oral daily  furosemide Infusion 5 mG/Hr (2.5 mL/Hr) IV Continuous <Continuous>  levothyroxine 125 MICROGram(s) Oral daily  metoprolol succinate ER 12.5 milliGRAM(s) Oral daily  multivitamin/minerals 1 Tablet(s) Oral daily  polyethylene glycol 3350 17 Gram(s) Oral daily  senna 2 Tablet(s) Oral at bedtime  spironolactone 25 milliGRAM(s) Oral two times a day    MEDICATIONS  (PRN):  acetaminophen     Tablet .. 650 milliGRAM(s) Oral every 6 hours PRN Moderate Pain (4 - 6)  acetaminophen   IVPB .. 1000 milliGRAM(s) IV Intermittent once PRN Temp greater or equal to 38C (100.4F), Mild Pain (1 - 3)  melatonin 3 milliGRAM(s) Oral at bedtime PRN Insomnia  ondansetron Injectable 4 milliGRAM(s) IV Push every 6 hours PRN Nausea and/or Vomiting          Vital Signs Last 24 Hrs  T(C): 36.4 (03 Sep 2023 08:02), Max: 36.5 (02 Sep 2023 20:23)  T(F): 97.5 (03 Sep 2023 08:02), Max: 97.7 (02 Sep 2023 20:23)  HR: 108 (03 Sep 2023 08:02) (73 - 108)  BP: 102/63 (03 Sep 2023 08:02) (86/50 - 106/59)  BP(mean): --  RR: 18 (03 Sep 2023 08:02) (18 - 18)  SpO2: 99% (03 Sep 2023 08:02) (96% - 99%)    Parameters below as of 03 Sep 2023 08:02  Patient On (Oxygen Delivery Method): nasal cannula  O2 Flow (L/min): 2            PHYSICAL EXAM:  Constitutional: NAD, awake and alert  HEENT: No oral cyanosis.  Pulmonary: Non-labored, breath sounds base crackles   Cardiovascular: S1 and S2, irregular, 2/6 REBECA   Gastrointestinal: Bowel Sounds present, soft, nontender.   Lymph: No LE Edema LLE ACE wrap in place   Neurological: Alert, no focal deficits  Skin: No rashes.  Psych:  Mood & affect appropriate    LABS: reviewed                           10.0   7.42  )-----------( 177      ( 02 Sep 2023 06:07 )             31.1     09-02    137  |  99  |  34<H>  ----------------------------<  116<H>  3.7   |  36<H>  |  1.03    Ca    7.9<L>      02 Sep 2023 06:07  Phos  3.4     09-01  Mg     2.0     09-02              Urinalysis Basic - ( 02 Sep 2023 06:07 )    Color: x / Appearance: x / SG: x / pH: x  Gluc: 116 mg/dL / Ketone: x  / Bili: x / Urobili: x   Blood: x / Protein: x / Nitrite: x   Leuk Esterase: x / RBC: x / WBC x   Sq Epi: x / Non Sq Epi: x / Bacteria: x          Mg     2.2     09-01      - TroponinI hsT: <-37.45  - TroponinI hsT: <-37.45    Radiology/EKG/Echo: in progress       -----------------------------------------------------------------------------------------------------------  < from: Transthoracic Echocardiogram Follow Up (02.25.22 @ 09:04) >     Impression     Summary     Limited study to assess left ventricular function.   The left ventricle cavity is underdistended.   The interventricular septum appears flattened consistent with an RV   pressure/volume overload.   Estimated left ventricular ejection fraction is 70 %.   The right atrium appears severely dilated.   A device wire is seen in the RV and RA.   The right ventricle is mildly dilated.   The tricuspid valve leaflets are thin and pliable; valve motion is   normal.   Severe (4+) tricuspid valve regurgitation is present.   Severe pulmonary hypertension.   No evidence of a significant pericardial effusion.(Trace)   Pleural effusion - is present.     Signature     ----------------------------------------------------------------   Electronically signed by Edward David MD(Interpreting   physician) on 02/25/2022 01:46 PM    < end of copied text >        < from: TTE Echo Complete w/o Contrast w/ Doppler (08.29.23 @ 08:45) >     The mitral valve leaflets appear thickened.   Mild mitral annular calcification is present.   Mild to Moderate mitral regurgitation with two jets is present.   Significant fibrocalcific changes noted to the aortic valve leaflets with   restriction in leaflet excursion.   Calculated MIKE is .91cm^2; this finding is consistent with severe aortic   stenosis.   The tricuspid valve leaflets appear mildly thickened and/or calcified,   but   open well.   Severe (4+) tricuspid valve regurgitation is present.   Severe pulmonary hypertension.   Pulmonic valve not well seen.   Mild to moderate pulmonic valvular regurgitation is present.   The left atrium is severely dilated.   Left ventricle systolic function appears mildly impaired; segmental wall   motion abnormalities noted.   Mild concentric left ventricular hypertrophy is present.   Estimated Ejection Fraction is 45 %.   The interventricular septum appears flattened consistent with an RV   pressure/volume overload.   The right atrium appears severely dilated.   A device wire is seen inthe RV and RA.   The right ventricle exhibits moderate dilation, diffuse hypokinesis, and   depression of contractility based on TAPSE.   A device wire is seen in the RV and RA.   Pleural effusion - is present.   IVC is dilated and not collapsing with inspiration.     Signature    < end of copied text >           CHIEF COMPLAINT: Patient is a 89y old  Female who presents with a chief complaint of LLE hematoma (28 Aug 2023 10:28)      HPI:  Level 3 Trauma Activation    HPI:    Patient is a 89y year old female with PMHx chronic CHF, hypothyroidism, Gout, HTN, COPD, Afib on xarelto, anemia, CAD, and osteopenia presents to the ED c/o left leg pain after hitting her leg yesterday 8/24/23, when going to answer the phone. Pt banged it on furniture and developed a large hematoma. Pt having difficulty ambulating, no other injuries. Last took Xarelto last night. No history of DVT/PE.    Cardiology consulted to evaluate for CHF. This is a pt. of Dr. Harris, last seen in the office on 7/31/23. Pt. takes lasix and torsemide prn at home for HFpEF and xarelto and toprol xl 25 mg daily for Chronic Afib.   At present, admitted for leg hematoma, hypotensive, denies chest pain/SOB/palpitations.     Pt denied head strike LOC or any traumatic injuries/complaints   (25 Aug 2023 13:27)    8/29/23 Seated in bedside chair awake alert no complaints tele -120  8/30/23; seen in bed, looks comfortable, no complaints, Tele: Afib 80s-90s - reconsulted by hospitalist one hr after seeing pt. for chest pain  - seen pt. with Micki Harris - pt. denies chest pain - says she is SOB, did not have chest pain, trops and EKG ordered  8/31/23: pt. with sign and symptoms of fluid overload - elevated JVD,, started on ivp lasix. Tele: Afib    9/1/23: feels better, less SOB, diuresis changed to lasix gtt by HF, Tele: Afib , pt. is being Tx to 3E  9/2/23: Awake alert Tele AF V paced Breathing comfortably SOB improved No CP  9/3/23: seated in bedside chair, SOB improved/No JVD , Tele AF     MEDICATIONS  (STANDING):  allopurinol 100 milliGRAM(s) Oral daily  ampicillin/sulbactam  IVPB      ampicillin/sulbactam  IVPB 3 Gram(s) IV Intermittent every 12 hours  calcium carbonate 1250 mG  + Vitamin D (OsCal 500 + D) 1 Tablet(s) Oral daily  furosemide Infusion 5 mG/Hr (2.5 mL/Hr) IV Continuous <Continuous>  levothyroxine 125 MICROGram(s) Oral daily  metoprolol succinate ER 12.5 milliGRAM(s) Oral daily  multivitamin/minerals 1 Tablet(s) Oral daily  polyethylene glycol 3350 17 Gram(s) Oral daily  senna 2 Tablet(s) Oral at bedtime  spironolactone 25 milliGRAM(s) Oral two times a day    MEDICATIONS  (PRN):  acetaminophen     Tablet .. 650 milliGRAM(s) Oral every 6 hours PRN Moderate Pain (4 - 6)  acetaminophen   IVPB .. 1000 milliGRAM(s) IV Intermittent once PRN Temp greater or equal to 38C (100.4F), Mild Pain (1 - 3)  melatonin 3 milliGRAM(s) Oral at bedtime PRN Insomnia  ondansetron Injectable 4 milliGRAM(s) IV Push every 6 hours PRN Nausea and/or Vomiting          Vital Signs Last 24 Hrs  T(C): 36.4 (03 Sep 2023 08:02), Max: 36.5 (02 Sep 2023 20:23)  T(F): 97.5 (03 Sep 2023 08:02), Max: 97.7 (02 Sep 2023 20:23)  HR: 108 (03 Sep 2023 08:02) (73 - 108)  BP: 102/63 (03 Sep 2023 08:02) (86/50 - 106/59)  BP(mean): --  RR: 18 (03 Sep 2023 08:02) (18 - 18)  SpO2: 99% (03 Sep 2023 08:02) (96% - 99%)    Parameters below as of 03 Sep 2023 08:02  Patient On (Oxygen Delivery Method): nasal cannula  O2 Flow (L/min): 2                PHYSICAL EXAM:  Constitutional: NAD, awake and alert  HEENT: No oral cyanosis.  Pulmonary: Non-labored, breath sounds base crackles   Cardiovascular: S1 and S2, irregular, 2/6 REBECA   Gastrointestinal: Bowel Sounds present, soft, nontender.   Lymph: No LE Edema LLE ACE wrap in place   Neurological: Alert, no focal deficits  Skin: No rashes.  Psych:  Mood & affect appropriate    LABS: reviewed                           10.0   7.42  )-----------( 177      ( 02 Sep 2023 06:07 )             31.1     09-02    137  |  99  |  34<H>  ----------------------------<  116<H>  3.7   |  36<H>  |  1.03    Ca    7.9<L>      02 Sep 2023 06:07  Phos  3.4     09-01  Mg     2.0     09-02              Urinalysis Basic - ( 02 Sep 2023 06:07 )    Color: x / Appearance: x / SG: x / pH: x  Gluc: 116 mg/dL / Ketone: x  / Bili: x / Urobili: x   Blood: x / Protein: x / Nitrite: x   Leuk Esterase: x / RBC: x / WBC x   Sq Epi: x / Non Sq Epi: x / Bacteria: x          Mg     2.2     09-01      - TroponinI hsT: <-37.45  - TroponinI hsT: <-37.45    Radiology/EKG/Echo: in progress       -----------------------------------------------------------------------------------------------------------  < from: Transthoracic Echocardiogram Follow Up (02.25.22 @ 09:04) >     Impression     Summary     Limited study to assess left ventricular function.   The left ventricle cavity is underdistended.   The interventricular septum appears flattened consistent with an RV   pressure/volume overload.   Estimated left ventricular ejection fraction is 70 %.   The right atrium appears severely dilated.   A device wire is seen in the RV and RA.   The right ventricle is mildly dilated.   The tricuspid valve leaflets are thin and pliable; valve motion is   normal.   Severe (4+) tricuspid valve regurgitation is present.   Severe pulmonary hypertension.   No evidence of a significant pericardial effusion.(Trace)   Pleural effusion - is present.     Signature     ----------------------------------------------------------------   Electronically signed by Edward David MD(Interpreting   physician) on 02/25/2022 01:46 PM    < end of copied text >        < from: TTE Echo Complete w/o Contrast w/ Doppler (08.29.23 @ 08:45) >     The mitral valve leaflets appear thickened.   Mild mitral annular calcification is present.   Mild to Moderate mitral regurgitation with two jets is present.   Significant fibrocalcific changes noted to the aortic valve leaflets with   restriction in leaflet excursion.   Calculated MIKE is .91cm^2; this finding is consistent with severe aortic   stenosis.   The tricuspid valve leaflets appear mildly thickened and/or calcified,   but   open well.   Severe (4+) tricuspid valve regurgitation is present.   Severe pulmonary hypertension.   Pulmonic valve not well seen.   Mild to moderate pulmonic valvular regurgitation is present.   The left atrium is severely dilated.   Left ventricle systolic function appears mildly impaired; segmental wall   motion abnormalities noted.   Mild concentric left ventricular hypertrophy is present.   Estimated Ejection Fraction is 45 %.   The interventricular septum appears flattened consistent with an RV   pressure/volume overload.   The right atrium appears severely dilated.   A device wire is seen inthe RV and RA.   The right ventricle exhibits moderate dilation, diffuse hypokinesis, and   depression of contractility based on TAPSE.   A device wire is seen in the RV and RA.   Pleural effusion - is present.   IVC is dilated and not collapsing with inspiration.     Signature    < end of copied text >

## 2023-09-04 NOTE — PROGRESS NOTE ADULT - PROBLEM SELECTOR PROBLEM 4
Chronic atrial fibrillation Medical Necessity Clause: This procedure was medically necessary because the lesions that were treated were:

## 2023-09-04 NOTE — PROGRESS NOTE ADULT - SUBJECTIVE AND OBJECTIVE BOX
Chief Complaint: LLE pain and swelling, difficulty with ambulation    Interval Hx: Patient seen and examined. Remains stable. Ambulated to bathroom with rolling walker. No acute complaints other than generalized weakness and deconditioning. No chest pain or tightness. No dyspnea at rest. SPO2 96% on 2L/min via NC. Awaiting input from Structural Heart Team re patient's significant heart valve disease.     ROS: Multi system review is comprehensively negative x 10 systems except as above    Vitals:  T(F): 97.3 (04 Sep 2023 08:49), Max: 97.3 (04 Sep 2023 08:49)  HR: 85 (04 Sep 2023 08:49) (73 - 85)  BP: 119/67 (04 Sep 2023 08:49) (103/52 - 119/67)  RR: 18 (04 Sep 2023 08:49) (18 - 18)  SpO2: 94% (04 Sep 2023 08:49) (94% - 96%) on NC    Exam:  Constitutional: No acute distress  HEENT: NCAT PERRL EOMI MMM clear oropharynx  Neck: Supple, no JVD  CVS: S1 and S2, irregular, rate ~90, 2/6 REBECA   Chest: Normal resp effort at rest, lungs clear  Abd: +BS, soft NT ND   Ext: LLE in ACE wrap, neurovascularly intact  Skin: No rashes.  Psych: Mood & affect appropriate  Neurological: Alert, no focal deficits    Labs:    (9/2)                      10.0   7.42  )----------( 177             31.1       137  |  99  |  34  ---------------------< 116  3.7   |  36  |  1.03    Ca 7.9   Phos 3.4   Mg 2.0    (8/30)    ProBNP 8126   Troponin negative    (8/26)    Lactate 1.8    (8/25)    UA yellow, clear, small LE, N-, 11-25 WBC, no RBC, few bact, mod sq epi    Imaging:  US venous duplex LUE 8/30: No evidence of left upper extremity deep venous thrombosis.    CXR 8/30: Heart enlargement and left-sided pacemaker. There is a persistent mild left base effusion and a persistent mild right base pleural pulmonary process. Chest is similar to March 18, 2022. Quite advanced bilateral shoulder degeneration    CT L shoulder WO 8/30: Severe left glenohumeral arthrosis with chondrocalcinosis and chronic remodeling of the bones. Severe supraspinatus and mild to moderate infraspinatus and subscapularis muscle atrophy. Marked subcoracoid bursitis. Marked biceps tenosynovitis. Small left pleural effusion.    CT head WO 8/28: Mild parenchymal atrophy with patchy periventricular hypoattenuation; a  nonspecific finding which statistically reflects chronic microvascular   ischemic change. Calcified plaque Solomon of [place]. Coarse calcifications falx cerebri. No evidence of extra-axial collection, intracranial hemorrhage, mass or   mass effect. Gray-white matter differentiation appears preserved. Complete opacification of the visualized left maxillary sinus, demonstrating cortical thickening with heterogeneous internal density, including areas of hyperdensity. Bilateral mastoid air cells and middle ear regions well-aerated. No aggressive calvarial lesion or acute calvarial fracture visualized. Hyperostosis frontalis. Bilateral cataract surgery.    CTA LLE W/ 8/25: Large superficial left calf hematoma with active bleeding. Three-vessel runoff to the left foot. Two-vessel runoff to the right foot via the anterior tibial/dorsalis pedis and peroneal arteries.    XR L tib fib 8/25: The tibia and fibula are intact. No fracture or radiographic osseous lesion. Posteromedial mid calf 16.5 x 4.3 cm soft tissue hematoma.    Cardiac Testing:  TTE 8/29: Mild to mod MR. Severe AS. Severe TR. Mild to mod WI. Severe pHTN. Severely dilated LA. LV systolic function mildly impaired; segmental wall motion abnormalities noted. Mild concentric LVH. LVEF 45%. The interventricular septum appears flattened consistent with an RV pressure/volume overload. RA severely dilated. RV with moderate dilation, diffuse hypokinesis, and depression of contractility based on TAPSE. A device wire is seen in the RV and RA. IVC is dilated and not collapsing with inspiration.    EKG 8/28: Rate 105. Afib RVR    EKG 8/25: Rate 75. Afib. RBBB    Meds:  MEDICATIONS  (STANDING):  allopurinol 100 milliGRAM(s) Oral daily  ampicillin/sulbactam  IVPB 3 Gram(s) IV Intermittent every 12 hours  calcium carbonate 1250 mG  + Vitamin D (OsCal 500 + D) 1 Tablet(s) Oral daily  furosemide    Tablet 20 milliGRAM(s) Oral daily  levothyroxine 125 MICROGram(s) Oral daily  metoprolol tartrate 12.5 milliGRAM(s) Oral every 12 hours  multivitamin/minerals 1 Tablet(s) Oral daily  polyethylene glycol 3350 17 Gram(s) Oral daily  senna 2 Tablet(s) Oral at bedtime  spironolactone 25 milliGRAM(s) Oral two times a day    MEDICATIONS  (PRN):  acetaminophen     Tablet .. 650 milliGRAM(s) Oral every 6 hours PRN Moderate Pain (4 - 6)  acetaminophen   IVPB .. 1000 milliGRAM(s) IV Intermittent once PRN Temp greater or equal to 38C (100.4F), Mild Pain (1 - 3)  bisacodyl Suppository 10 milliGRAM(s) Rectal daily PRN Constipation  melatonin 3 milliGRAM(s) Oral at bedtime PRN Insomnia  ondansetron Injectable 4 milliGRAM(s) IV Push every 6 hours PRN Nausea and/or Vomiting

## 2023-09-04 NOTE — PROGRESS NOTE ADULT - PROBLEM SELECTOR PLAN 3
large left calf hematoma - management as per Trauma Sx  Patient with advanced age , with severe AS , severe TR with pulmonary hypertension , RV volume overload , moderate MR , BP better ,   patient is very high risk for any procedure

## 2023-09-04 NOTE — PROGRESS NOTE ADULT - PROBLEM SELECTOR PLAN 2
Repeat Echo shows severe AS, Severe TR, severe pHTN Structural Heart Team consulted await recommendations

## 2023-09-04 NOTE — PROGRESS NOTE ADULT - ASSESSMENT
89 yo woman with HTN, CAD, chronic diastolic CHF, chronic atrial fibrillation on Xarelto, hx of PPM placement, COPD, hypothyroidism, gout, presented 8/25 with LLE pain and swelling, difficulty with ambulation, started after striking LLE on piece of furniture the day prior. Patient was found to have hypotension, rapid afib, large superficial L calf hematoma with active bleed. Admitted to Trauma Surgery.     Large, traumatic L calf hematoma  Patient with advanced age, moderate MR, severe AS, severe TR with pulmonary hypertension, RV volume overload, borderline BP, afib with RVR (at the time), deemed very high risk for procedure, deferred intervention, managed supportively. Trauma surgery has also advised conservative management at this point. Reassuringly, patient remains stable, hemodynamically. Hgb stable, ~10.  - Continue to monitor    Acute metabolic encephalopathy  Likely multi factorial due to hematoma, hypoxia, congestive heart failure  - Continue to monitor, treat underlying causes.     Acute respiratory failure with hypoxia  Likely multi factorial due to CHF, severe heart valve disease, severe pHTN  - Continue to monitor, treat underlying causes.   - Wean O2 as tolerated, goal SPO2 92% or greater on room air    Acute on chronic systolic heart failure  Echo showed EF 45% perhaps 2/2 tachyarrhythmia, may also be related to multiple heart valve disease. Appreciate input from Cardiology. S/P IV Lasix drip, no on PO Lasix. Metoprolol XL, spironolactone (recommend cautious diuresis in setting of severe AS)  - Continue management as per Cardiology  - Is and Os, daily wt    Significant heart valve disease.   Echo 8/29 w/ mild to mod MR, severe AS, severe TR, mild to mod CA, severe pHTN, severely dilated LA, LV systolic function mildly impaired; segmental wall motion abnormalities noted, mild concentric LVH,  LVEF 45%, RV pressure/volume overload, RA severely dilated, RV with moderate dilation, diffuse hypokinesis, and depression of contractility based on TAPSE. Structural Heart Team consulted.   - F/u Structural Heart Team eval    Chronic atrial fibrillation  Rapid rates upon admission in setting of pain, large LLE hematoma. Cardiology following.   - Continue rate control with metoprolol, can consider addition of digoxin if rapid rates  - AC held due to large hematoma  - Referred to Cardiac EP re possible Watchman LAAC procedure / device to provide a non-pharmacologic alternative for non-valvular afib related stroke risk-reduction, input pending    Hx of pacemaker placement  S/P PPM interrogation; Normal functioning single chamber PPM with battery longevity 5.9-6.1 years  - Continue to monitor    Hypothyroidism  Stable.   - Continue levothyroxine    Hx of gout  Stable.   - Continue allopurinol    Physical deconditioning and debility  PT eval consulted  - F/u PT eval  - OOB to chair daily      DVT px: Off pharmacologic DVT px / AC given hematoma  Code Status: DNR/DNI  Dispo: TBD pending Structural Heart eval, Physical Therapy eval   89 yo woman with HTN, CAD, chronic diastolic CHF, chronic atrial fibrillation on Xarelto, hx of PPM placement, COPD, hypothyroidism, gout, presented 8/25 with LLE pain and swelling, difficulty with ambulation, started after striking LLE on piece of furniture the day prior. Patient was found to have hypotension, rapid afib, large superficial L calf hematoma with active bleed. Admitted to Trauma Surgery.     Large, traumatic L calf hematoma  Patient with advanced age, moderate MR, severe AS, severe TR with pulmonary hypertension, RV volume overload, borderline BP, afib with RVR (at the time), deemed very high risk for procedure, deferred intervention, managed supportively. Trauma surgery has also advised conservative management at this point. Reassuringly, patient remains stable, hemodynamically. Hgb stable, ~10.  - Continue to monitor  - Complete 7 day course of empiric Unasyn today, as was previously ordered by Surgery    Acute metabolic encephalopathy  Likely multi factorial due to hematoma, hypoxia, congestive heart failure  - Continue to monitor, treat underlying causes.     Acute respiratory failure with hypoxia  Likely multi factorial due to CHF, severe heart valve disease, severe pHTN  - Continue to monitor, treat underlying causes.   - Wean O2 as tolerated, goal SPO2 92% or greater on room air    Acute on chronic systolic heart failure  Echo showed EF 45% perhaps 2/2 tachyarrhythmia, may also be related to multiple heart valve disease. Appreciate input from Cardiology. S/P IV Lasix drip, no on PO Lasix. Metoprolol XL, spironolactone (recommend cautious diuresis in setting of severe AS)  - Continue management as per Cardiology  - Is and Os, daily wt    Significant heart valve disease.   Echo 8/29 w/ mild to mod MR, severe AS, severe TR, mild to mod LA, severe pHTN, severely dilated LA, LV systolic function mildly impaired; segmental wall motion abnormalities noted, mild concentric LVH,  LVEF 45%, RV pressure/volume overload, RA severely dilated, RV with moderate dilation, diffuse hypokinesis, and depression of contractility based on TAPSE. Structural Heart Team consulted.   - F/u Structural Heart Team eval    Chronic atrial fibrillation  Rapid rates upon admission in setting of pain, large LLE hematoma. Cardiology following.   - Continue rate control with metoprolol, can consider addition of digoxin if rapid rates  - AC held due to large hematoma  - Referred to Cardiac EP re possible Watchman LAAC procedure / device to provide a non-pharmacologic alternative for non-valvular afib related stroke risk-reduction, input pending    Hx of pacemaker placement  S/P PPM interrogation; Normal functioning single chamber PPM with battery longevity 5.9-6.1 years  - Continue to monitor    Hypothyroidism  Stable.   - Continue levothyroxine    Hx of gout  Stable.   - Continue allopurinol    Physical deconditioning and debility  PT eval consulted  - F/u PT eval  - OOB to chair daily      DVT px: Off pharmacologic DVT px / AC given hematoma  Code Status: DNR/DNI  Dispo: TBD pending Structural Heart eval, Physical Therapy eval

## 2023-09-04 NOTE — PROGRESS NOTE ADULT - SUBJECTIVE AND OBJECTIVE BOX
CHIEF COMPLAINT: Patient is a 89y old  Female who presents with a chief complaint of LLE hematoma (28 Aug 2023 10:28)      HPI:  Level 3 Trauma Activation    HPI:    Patient is a 89y year old female with PMHx chronic CHF, hypothyroidism, Gout, HTN, COPD, Afib on xarelto, anemia, CAD, and osteopenia presents to the ED c/o left leg pain after hitting her leg yesterday 8/24/23, when going to answer the phone. Pt banged it on furniture and developed a large hematoma. Pt having difficulty ambulating, no other injuries. Last took Xarelto last night. No history of DVT/PE.    Cardiology consulted to evaluate for CHF. This is a pt. of Dr. Harris, last seen in the office on 7/31/23. Pt. takes lasix and torsemide prn at home for HFpEF and xarelto and toprol xl 25 mg daily for Chronic Afib.   At present, admitted for leg hematoma, hypotensive, denies chest pain/SOB/palpitations.     Pt denied head strike LOC or any traumatic injuries/complaints   (25 Aug 2023 13:27)    8/29/23 Seated in bedside chair awake alert no complaints tele -120  8/30/23; seen in bed, looks comfortable, no complaints, Tele: Afib 80s-90s - reconsulted by hospitalist one hr after seeing pt. for chest pain  - seen pt. with Micki Harris - pt. denies chest pain - says she is SOB, did not have chest pain, trops and EKG ordered  8/31/23: pt. with sign and symptoms of fluid overload - elevated JVD,, started on ivp lasix. Tele: Afib    9/1/23: feels better, less SOB, diuresis changed to lasix gtt by HF, Tele: Afib , pt. is being Tx to 3E  9/2/23: Awake alert Tele AF V paced Breathing comfortably SOB improved No CP  9/3/23: seated in bedside chair, SOB improved/No JVD , Tele AF   9/4/23 Patient is in chair , gets very tachycardic with activity , sob    IV lasix drip was discontinued to low BP day before         MEDICATIONS  (STANDING):  allopurinol 100 milliGRAM(s) Oral daily  ampicillin/sulbactam  IVPB 3 Gram(s) IV Intermittent every 12 hours  ampicillin/sulbactam  IVPB      calcium carbonate 1250 mG  + Vitamin D (OsCal 500 + D) 1 Tablet(s) Oral daily  furosemide    Tablet 20 milliGRAM(s) Oral daily  levothyroxine 125 MICROGram(s) Oral daily  metoprolol succinate ER 12.5 milliGRAM(s) Oral daily  multivitamin/minerals 1 Tablet(s) Oral daily  polyethylene glycol 3350 17 Gram(s) Oral daily  senna 2 Tablet(s) Oral at bedtime  spironolactone 25 milliGRAM(s) Oral two times a day    MEDICATIONS  (PRN):  acetaminophen     Tablet .. 650 milliGRAM(s) Oral every 6 hours PRN Moderate Pain (4 - 6)  acetaminophen   IVPB .. 1000 milliGRAM(s) IV Intermittent once PRN Temp greater or equal to 38C (100.4F), Mild Pain (1 - 3)  bisacodyl Suppository 10 milliGRAM(s) Rectal daily PRN Constipation  melatonin 3 milliGRAM(s) Oral at bedtime PRN Insomnia  ondansetron Injectable 4 milliGRAM(s) IV Push every 6 hours PRN Nausea and/or Vomiting    Vital Signs Last 24 Hrs  T(C): 36.3 (04 Sep 2023 08:49), Max: 36.3 (04 Sep 2023 08:49)  T(F): 97.3 (04 Sep 2023 08:49), Max: 97.3 (04 Sep 2023 08:49)  HR: 85 (04 Sep 2023 08:49) (73 - 85)  BP: 119/67 (04 Sep 2023 08:49) (103/52 - 119/67)  BP(mean): --  RR: 18 (04 Sep 2023 08:49) (18 - 18)  SpO2: 94% (04 Sep 2023 08:49) (94% - 96%)    Parameters below as of 04 Sep 2023 08:49  Patient On (Oxygen Delivery Method): nasal cannula        I&O's Summary    03 Sep 2023 07:01  -  04 Sep 2023 07:00  --------------------------------------------------------  IN: 0 mL / OUT: 800 mL / NET: -800 mL        PHYSICAL EXAM:  Constitutional: NAD, awake and alert  HEENT: No oral cyanosis.  Pulmonary: Non-labored, breath sounds base crackles   Cardiovascular: S1 and S2, irregular, 2/6 REBECA   Gastrointestinal: Bowel Sounds present, soft, nontender.   Lymph: No LE Edema LLE ACE wrap in place   Neurological: Alert, no focal deficits  Skin: No rashes.  Psych:  Mood & affect appropriate    LABS: reviewed             - TroponinI hsT: <-37.45  - TroponinI hsT: <-37.45    Radiology/EKG/Echo: in progress       -----------------------------------------------------------------------------------------------------------  < from: Transthoracic Echocardiogram Follow Up (02.25.22 @ 09:04) >     Impression     Summary     Limited study to assess left ventricular function.   The left ventricle cavity is underdistended.   The interventricular septum appears flattened consistent with an RV   pressure/volume overload.   Estimated left ventricular ejection fraction is 70 %.   The right atrium appears severely dilated.   A device wire is seen in the RV and RA.   The right ventricle is mildly dilated.   The tricuspid valve leaflets are thin and pliable; valve motion is   normal.   Severe (4+) tricuspid valve regurgitation is present.   Severe pulmonary hypertension.   No evidence of a significant pericardial effusion.(Trace)   Pleural effusion - is present.     Signature     ----------------------------------------------------------------   Electronically signed by Edward David MD(Interpreting   physician) on 02/25/2022 01:46 PM    < end of copied text >        < from: TTE Echo Complete w/o Contrast w/ Doppler (08.29.23 @ 08:45) >     The mitral valve leaflets appear thickened.   Mild mitral annular calcification is present.   Mild to Moderate mitral regurgitation with two jets is present.   Significant fibrocalcific changes noted to the aortic valve leaflets with   restriction in leaflet excursion.   Calculated MIKE is .91cm^2; this finding is consistent with severe aortic   stenosis.   The tricuspid valve leaflets appear mildly thickened and/or calcified,   but   open well.   Severe (4+) tricuspid valve regurgitation is present.   Severe pulmonary hypertension.   Pulmonic valve not well seen.   Mild to moderate pulmonic valvular regurgitation is present.   The left atrium is severely dilated.   Left ventricle systolic function appears mildly impaired; segmental wall   motion abnormalities noted.   Mild concentric left ventricular hypertrophy is present.   Estimated Ejection Fraction is 45 %.   The interventricular septum appears flattened consistent with an RV   pressure/volume overload.   The right atrium appears severely dilated.   A device wire is seen inthe RV and RA.   The right ventricle exhibits moderate dilation, diffuse hypokinesis, and   depression of contractility based on TAPSE.   A device wire is seen in the RV and RA.   Pleural effusion - is present.   IVC is dilated and not collapsing with inspiration.     Signature    < end of copied text >      Monitor afib with episodes of rapid rate with activity

## 2023-09-04 NOTE — PROGRESS NOTE ADULT - PROBLEM SELECTOR PLAN 1
Echo shows EF 45% perhaps 2/2 tachyarrhythmia contributed by valvular heart disease, HF team on board,   , pressure soft today/severe AS d/c drip and maintain daily Spirolactone,  trend renal values ( BUN/Creat 34/1.03 )   Aguilar weights   increase lopressor 12.5 mg PO BID , add digoxin    Advise strict I&O COLEEN RN   ,

## 2023-09-04 NOTE — PROGRESS NOTE ADULT - PROBLEM SELECTOR PLAN 4
Rate overall controlled with intermittent episodes of tachycardia , increase BB dose ,   can consider Digoxin if necessary   AC held due to large hematoma Continue to hold for now  pt high fall risk would consider not resuming AC EP consulted for possible watchman, resume AC

## 2023-09-05 NOTE — PROGRESS NOTE ADULT - PROBLEM SELECTOR PLAN 1
Significantly volume overloaded on exam  - Resume Lasix IV drip 5 mg/hr   - continue spironolactone 25 mg po BID   - BID electrolytes, keep K > 2 < 5   - Further GDMT as patient progresses  - for RHC/LHC tomorrow 9/6/23 with Dr. Kitchen   - Strict I & Os  - Daily standing weights.

## 2023-09-05 NOTE — PROGRESS NOTE ADULT - SUBJECTIVE AND OBJECTIVE BOX
Subjective:    Continued to complain of fatigue, dyspnea unchanged  Lasix gtt stopped over the weekend     Medications:  acetaminophen     Tablet .. 650 milliGRAM(s) Oral every 6 hours PRN  acetaminophen   IVPB .. 1000 milliGRAM(s) IV Intermittent once PRN  allopurinol 100 milliGRAM(s) Oral daily  bisacodyl Suppository 10 milliGRAM(s) Rectal daily PRN  calcium carbonate 1250 mG  + Vitamin D (OsCal 500 + D) 1 Tablet(s) Oral daily  furosemide Infusion 5 mG/Hr IV Continuous <Continuous>  levothyroxine 125 MICROGram(s) Oral daily  melatonin 3 milliGRAM(s) Oral at bedtime PRN  metoprolol tartrate 12.5 milliGRAM(s) Oral every 12 hours  multivitamin/minerals 1 Tablet(s) Oral daily  ondansetron Injectable 4 milliGRAM(s) IV Push every 6 hours PRN  polyethylene glycol 3350 17 Gram(s) Oral daily  senna 2 Tablet(s) Oral at bedtime  spironolactone 25 milliGRAM(s) Oral two times a day      Physical Exam:    Vitals:  Vital Signs Last 24 Hours  T(C): 36.3 (23 @ 08:01), Max: 36.7 (23 @ 05:04)  HR: 80 (23 @ 11:48) (76 - 83)  BP: 144/53 (23 @ 11:48) (100/56 - 144/53)  RR: 18 (23 @ 08:01) (16 - 18)  SpO2: 97% (23 @ 08:01) (96% - 97%)    Weight in k.1 ( @ 05:04)    I&O's Summary    04 Sep 2023 07:01  -  05 Sep 2023 07:00  --------------------------------------------------------  IN: 979 mL / OUT: 500 mL / NET: 479 mL        Tele: Sinus 70s-80s    General: No distress. Comfortable.  HEENT: EOM intact.  Neck: Neck supple. JVP severely elevated. No masses  Chest: Clear to auscultation bilaterally  CV: Normal S1 and S2. Systolic murmur present, No rub, or gallops. Radial pulses normal.  Abdomen: Soft, non-distended, non-tender  Skin: No rashes or skin breakdown  Extremities:  Warm, 1-2+ LE edema   Neurology: Alert and oriented times three. Sensation intact  Psych: Affect normal    Labs:                        9.9    7.86  )-----------( 192      ( 05 Sep 2023 06:46 )             31.3         140  |  98  |  26<H>  ----------------------------<  120<H>  3.6   |  38<H>  |  0.83    Ca    8.7      05 Sep 2023 06:46  Phos  2.6       Mg     2.3

## 2023-09-05 NOTE — PROGRESS NOTE ADULT - PROBLEM SELECTOR PLAN 2
Repeat Echo shows severe AS, Severe TR, severe pHTN Structural Heart Team consulted await recommendations.  May need RHC and LHC

## 2023-09-05 NOTE — PROGRESS NOTE ADULT - SUBJECTIVE AND OBJECTIVE BOX
CHIEF COMPLAINT: Patient is a 89y old  Female who presents with a chief complaint of LLE hematoma (28 Aug 2023 10:28)      HPI:  Level 3 Trauma Activation    HPI:    Patient is a 89y year old female with PMHx chronic CHF, hypothyroidism, Gout, HTN, COPD, Afib on xarelto, anemia, CAD, and osteopenia presents to the ED c/o left leg pain after hitting her leg yesterday 8/24/23, when going to answer the phone. Pt banged it on furniture and developed a large hematoma. Pt having difficulty ambulating, no other injuries. Last took Xarelto last night. No history of DVT/PE.    Cardiology consulted to evaluate for CHF. This is a pt. of Dr. Harris, last seen in the office on 7/31/23. Pt. takes lasix and torsemide prn at home for HFpEF and xarelto and toprol xl 25 mg daily for Chronic Afib.   At present, admitted for leg hematoma, hypotensive, denies chest pain/SOB/palpitations.     Pt denied head strike LOC or any traumatic injuries/complaints   (25 Aug 2023 13:27)    8/29/23 Seated in bedside chair awake alert no complaints tele -120  8/30/23; seen in bed, looks comfortable, no complaints, Tele: Afib 80s-90s - reconsulted by hospitalist one hr after seeing pt. for chest pain  - seen pt. with Micki Harris - pt. denies chest pain - says she is SOB, did not have chest pain, trops and EKG ordered  8/31/23: pt. with sign and symptoms of fluid overload - elevated JVD,, started on ivp lasix. Tele: Afib    9/1/23: feels better, less SOB, diuresis changed to lasix gtt by HF, Tele: Afib , pt. is being Tx to 3E  9/2/23: Awake alert Tele AF V paced Breathing comfortably SOB improved No CP  9/3/23: seated in bedside chair, SOB improved/No JVD , Tele AF   9/4/23 Patient is in chair , gets very tachycardic with activity , sob    IV lasix drip was discontinued to low BP day before   9/5/23: Pt sitting up in chair. Denies cp.  Still with SOB.  Tele: Afib, V pacing    MEDICATIONS  (STANDING):  allopurinol 100 milliGRAM(s) Oral daily  calcium carbonate 1250 mG  + Vitamin D (OsCal 500 + D) 1 Tablet(s) Oral daily  furosemide    Tablet 20 milliGRAM(s) Oral daily  levothyroxine 125 MICROGram(s) Oral daily  metoprolol tartrate 12.5 milliGRAM(s) Oral every 12 hours  multivitamin/minerals 1 Tablet(s) Oral daily  polyethylene glycol 3350 17 Gram(s) Oral daily  senna 2 Tablet(s) Oral at bedtime  spironolactone 25 milliGRAM(s) Oral two times a day    MEDICATIONS  (PRN):  acetaminophen     Tablet .. 650 milliGRAM(s) Oral every 6 hours PRN Moderate Pain (4 - 6)  acetaminophen   IVPB .. 1000 milliGRAM(s) IV Intermittent once PRN Temp greater or equal to 38C (100.4F), Mild Pain (1 - 3)  bisacodyl Suppository 10 milliGRAM(s) Rectal daily PRN Constipation  melatonin 3 milliGRAM(s) Oral at bedtime PRN Insomnia  ondansetron Injectable 4 milliGRAM(s) IV Push every 6 hours PRN Nausea and/or Vomiting    Vital Signs Last 24 Hrs  T(C): 36.3 (05 Sep 2023 08:01), Max: 36.7 (05 Sep 2023 05:04)  T(F): 97.4 (05 Sep 2023 08:01), Max: 98.1 (05 Sep 2023 05:04)  HR: 77 (05 Sep 2023 08:01) (76 - 85)  BP: 107/51 (05 Sep 2023 08:01) (96/58 - 108/53)  BP(mean): --  RR: 18 (05 Sep 2023 08:01) (16 - 18)  SpO2: 97% (05 Sep 2023 08:01) (96% - 97%)    Parameters below as of 05 Sep 2023 08:01  Patient On (Oxygen Delivery Method): nasal cannula  O2 Flow (L/min): 2      I&O's Summary    03 Sep 2023 07:01  -  04 Sep 2023 07:00  --------------------------------------------------------  IN: 0 mL / OUT: 800 mL / NET: -800 mL        PHYSICAL EXAM:  Constitutional: NAD, awake and alert  HEENT: No oral cyanosis.  Pulmonary: Non-labored, breath sounds diminished   Cardiovascular: S1 and S2, irregular, 2/6 REBECA   Gastrointestinal: Bowel Sounds present, soft, nontender.   Lymph: No LE Edema LLE ACE wrap in place   Neurological: Alert, no focal deficits  Skin: No rashes.  Psych:  Mood & affect appropriate    LABS:                          9.9    7.86  )-----------( 192      ( 05 Sep 2023 06:46 )             31.3     09-05    140  |  98  |  26<H>  ----------------------------<  120<H>  3.6   |  38<H>  |  0.83    Ca    8.7      05 Sep 2023 06:46  Phos  2.6     09-05  Mg     2.3     09-05              - TroponinI hsT: <-37.45  - TroponinI hsT: <-37.45    Radiology/EKG/Echo: in progress       -----------------------------------------------------------------------------------------------------------    < from: TTE Echo Complete w/o Contrast w/ Doppler (08.29.23 @ 08:45) >   Impression     Summary     The mitral valve leaflets appear thickened.   Mild mitral annular calcification is present.   Mild to Moderate mitral regurgitation with two jets is present.   Significant fibrocalcific changes noted to the aortic valve leaflets with   restriction in leaflet excursion.   Calculated MIKE is .91cm^2; this finding is consistent with severe aortic   stenosis.   The tricuspid valve leaflets appear mildly thickened and/or calcified,   but   open well.   Severe (4+) tricuspid valve regurgitation is present.   Severe pulmonary hypertension.   Pulmonic valve not well seen.   Mild to moderate pulmonic valvular regurgitation is present.   The left atrium is severely dilated.   Left ventricle systolic function appears mildly impaired; segmental wall   motion abnormalities noted.   Mild concentric left ventricular hypertrophy is present.   Estimated Ejection Fraction is 45 %.   The interventricular septum appears flattened consistent with an RV   pressure/volume overload.   The right atrium appears severely dilated.   A device wire is seen inthe RV and RA.   The right ventricle exhibits moderate dilation, diffuse hypokinesis, and   depression of contractility based on TAPSE.   A device wire is seen in the RV and RA.   Pleural effusion - is present.   IVC is dilated and not collapsing with inspiration.      < end of copied text >    < from: Transthoracic Echocardiogram Follow Up (02.25.22 @ 09:04) >     Impression     Summary     Limited study to assess left ventricular function.   The left ventricle cavity is underdistended.   The interventricular septum appears flattened consistent with an RV   pressure/volume overload.   Estimated left ventricular ejection fraction is 70 %.   The right atrium appears severely dilated.   A device wire is seen in the RV and RA.   The right ventricle is mildly dilated.   The tricuspid valve leaflets are thin and pliable; valve motion is   normal.   Severe (4+) tricuspid valve regurgitation is present.   Severe pulmonary hypertension.   No evidence of a significant pericardial effusion.(Trace)   Pleural effusion - is present.     Signature     ----------------------------------------------------------------   Electronically signed by Edward David MD(Interpreting   physician) on 02/25/2022 01:46 PM    < end of copied text >        < from: TTE Echo Complete w/o Contrast w/ Doppler (08.29.23 @ 08:45) >     The mitral valve leaflets appear thickened.   Mild mitral annular calcification is present.   Mild to Moderate mitral regurgitation with two jets is present.   Significant fibrocalcific changes noted to the aortic valve leaflets with   restriction in leaflet excursion.   Calculated MIKE is .91cm^2; this finding is consistent with severe aortic   stenosis.   The tricuspid valve leaflets appear mildly thickened and/or calcified,   but   open well.   Severe (4+) tricuspid valve regurgitation is present.   Severe pulmonary hypertension.   Pulmonic valve not well seen.   Mild to moderate pulmonic valvular regurgitation is present.   The left atrium is severely dilated.   Left ventricle systolic function appears mildly impaired; segmental wall   motion abnormalities noted.   Mild concentric left ventricular hypertrophy is present.   Estimated Ejection Fraction is 45 %.   The interventricular septum appears flattened consistent with an RV   pressure/volume overload.   The right atrium appears severely dilated.   A device wire is seen inthe RV and RA.   The right ventricle exhibits moderate dilation, diffuse hypokinesis, and   depression of contractility based on TAPSE.   A device wire is seen in the RV and RA.   Pleural effusion - is present.   IVC is dilated and not collapsing with inspiration.     Signature    < end of copied text >      Monitor afib with episodes of rapid rate with activity      CHIEF COMPLAINT: Patient is a 89y old  Female who presents with a chief complaint of LLE hematoma (28 Aug 2023 10:28)      HPI:  Level 3 Trauma Activation    HPI:    Patient is a 89y year old female with PMHx chronic CHF, hypothyroidism, Gout, HTN, COPD, Afib on xarelto, anemia, CAD, and osteopenia presents to the ED c/o left leg pain after hitting her leg yesterday 8/24/23, when going to answer the phone. Pt banged it on furniture and developed a large hematoma. Pt having difficulty ambulating, no other injuries. Last took Xarelto last night. No history of DVT/PE.    Cardiology consulted to evaluate for CHF. This is a pt. of Dr. Harris, last seen in the office on 7/31/23. Pt. takes lasix and torsemide prn at home for HFpEF and xarelto and toprol xl 25 mg daily for Chronic Afib.   At present, admitted for leg hematoma, hypotensive, denies chest pain/SOB/palpitations.     Pt denied head strike LOC or any traumatic injuries/complaints   (25 Aug 2023 13:27)    8/29/23 Seated in bedside chair awake alert no complaints tele -120  8/30/23; seen in bed, looks comfortable, no complaints, Tele: Afib 80s-90s - reconsulted by hospitalist one hr after seeing pt. for chest pain  - seen pt. with Micki Harris - pt. denies chest pain - says she is SOB, did not have chest pain, trops and EKG ordered  8/31/23: pt. with sign and symptoms of fluid overload - elevated JVD,, started on ivp lasix. Tele: Afib    9/1/23: feels better, less SOB, diuresis changed to lasix gtt by HF, Tele: Afib , pt. is being Tx to 3E  9/2/23: Awake alert Tele AF V paced Breathing comfortably SOB improved No CP  9/3/23: seated in bedside chair, SOB improved/No JVD , Tele AF   9/4/23 Patient is in chair , gets very tachycardic with activity , sob    IV lasix drip was discontinued to low BP day before   9/5/23: Pt sitting up in chair. Denies cp.  Still with SOB.  Tele: Afib, V pacing    MEDICATIONS  (STANDING):  allopurinol 100 milliGRAM(s) Oral daily  calcium carbonate 1250 mG  + Vitamin D (OsCal 500 + D) 1 Tablet(s) Oral daily  furosemide    Tablet 20 milliGRAM(s) Oral daily  levothyroxine 125 MICROGram(s) Oral daily  metoprolol tartrate 12.5 milliGRAM(s) Oral every 12 hours  multivitamin/minerals 1 Tablet(s) Oral daily  polyethylene glycol 3350 17 Gram(s) Oral daily  senna 2 Tablet(s) Oral at bedtime  spironolactone 25 milliGRAM(s) Oral two times a day    MEDICATIONS  (PRN):  acetaminophen     Tablet .. 650 milliGRAM(s) Oral every 6 hours PRN Moderate Pain (4 - 6)  acetaminophen   IVPB .. 1000 milliGRAM(s) IV Intermittent once PRN Temp greater or equal to 38C (100.4F), Mild Pain (1 - 3)  bisacodyl Suppository 10 milliGRAM(s) Rectal daily PRN Constipation  melatonin 3 milliGRAM(s) Oral at bedtime PRN Insomnia  ondansetron Injectable 4 milliGRAM(s) IV Push every 6 hours PRN Nausea and/or Vomiting    Vital Signs Last 24 Hrs  T(C): 36.3 (05 Sep 2023 08:01), Max: 36.7 (05 Sep 2023 05:04)  T(F): 97.4 (05 Sep 2023 08:01), Max: 98.1 (05 Sep 2023 05:04)  HR: 77 (05 Sep 2023 08:01) (76 - 85)  BP: 107/51 (05 Sep 2023 08:01) (96/58 - 108/53)  BP(mean): --  RR: 18 (05 Sep 2023 08:01) (16 - 18)  SpO2: 97% (05 Sep 2023 08:01) (96% - 97%)    Parameters below as of 05 Sep 2023 08:01  Patient On (Oxygen Delivery Method): nasal cannula  O2 Flow (L/min): 2      I&O's Summary    03 Sep 2023 07:01  -  04 Sep 2023 07:00  --------------------------------------------------------  IN: 0 mL / OUT: 800 mL / NET: -800 mL        PHYSICAL EXAM:  Constitutional: NAD, awake and alert  HEENT: No oral cyanosis.  Pulmonary: Non-labored, breath sounds diminished   Cardiovascular: S1 and S2, irregular, 2/6 REBECA   Gastrointestinal: Bowel Sounds present, soft, nontender.   Lymph: Bilateral LE edema.  LLE ACE wrap in place   Neurological: Alert, no focal deficits  Skin: No rashes.  Psych:  Mood & affect appropriate    LABS:                          9.9    7.86  )-----------( 192      ( 05 Sep 2023 06:46 )             31.3     09-05    140  |  98  |  26<H>  ----------------------------<  120<H>  3.6   |  38<H>  |  0.83    Ca    8.7      05 Sep 2023 06:46  Phos  2.6     09-05  Mg     2.3     09-05              - TroponinI hsT: <-37.45  - TroponinI hsT: <-37.45    Radiology/EKG/Echo: in progress       -----------------------------------------------------------------------------------------------------------    < from: TTE Echo Complete w/o Contrast w/ Doppler (08.29.23 @ 08:45) >   Impression     Summary     The mitral valve leaflets appear thickened.   Mild mitral annular calcification is present.   Mild to Moderate mitral regurgitation with two jets is present.   Significant fibrocalcific changes noted to the aortic valve leaflets with   restriction in leaflet excursion.   Calculated MIKE is .91cm^2; this finding is consistent with severe aortic   stenosis.   The tricuspid valve leaflets appear mildly thickened and/or calcified,   but   open well.   Severe (4+) tricuspid valve regurgitation is present.   Severe pulmonary hypertension.   Pulmonic valve not well seen.   Mild to moderate pulmonic valvular regurgitation is present.   The left atrium is severely dilated.   Left ventricle systolic function appears mildly impaired; segmental wall   motion abnormalities noted.   Mild concentric left ventricular hypertrophy is present.   Estimated Ejection Fraction is 45 %.   The interventricular septum appears flattened consistent with an RV   pressure/volume overload.   The right atrium appears severely dilated.   A device wire is seen inthe RV and RA.   The right ventricle exhibits moderate dilation, diffuse hypokinesis, and   depression of contractility based on TAPSE.   A device wire is seen in the RV and RA.   Pleural effusion - is present.   IVC is dilated and not collapsing with inspiration.      < end of copied text >    < from: Transthoracic Echocardiogram Follow Up (02.25.22 @ 09:04) >     Impression     Summary     Limited study to assess left ventricular function.   The left ventricle cavity is underdistended.   The interventricular septum appears flattened consistent with an RV   pressure/volume overload.   Estimated left ventricular ejection fraction is 70 %.   The right atrium appears severely dilated.   A device wire is seen in the RV and RA.   The right ventricle is mildly dilated.   The tricuspid valve leaflets are thin and pliable; valve motion is   normal.   Severe (4+) tricuspid valve regurgitation is present.   Severe pulmonary hypertension.   No evidence of a significant pericardial effusion.(Trace)   Pleural effusion - is present.     Signature     ----------------------------------------------------------------   Electronically signed by Edward David MD(Interpreting   physician) on 02/25/2022 01:46 PM    < end of copied text >        < from: TTE Echo Complete w/o Contrast w/ Doppler (08.29.23 @ 08:45) >     The mitral valve leaflets appear thickened.   Mild mitral annular calcification is present.   Mild to Moderate mitral regurgitation with two jets is present.   Significant fibrocalcific changes noted to the aortic valve leaflets with   restriction in leaflet excursion.   Calculated MIKE is .91cm^2; this finding is consistent with severe aortic   stenosis.   The tricuspid valve leaflets appear mildly thickened and/or calcified,   but   open well.   Severe (4+) tricuspid valve regurgitation is present.   Severe pulmonary hypertension.   Pulmonic valve not well seen.   Mild to moderate pulmonic valvular regurgitation is present.   The left atrium is severely dilated.   Left ventricle systolic function appears mildly impaired; segmental wall   motion abnormalities noted.   Mild concentric left ventricular hypertrophy is present.   Estimated Ejection Fraction is 45 %.   The interventricular septum appears flattened consistent with an RV   pressure/volume overload.   The right atrium appears severely dilated.   A device wire is seen inthe RV and RA.   The right ventricle exhibits moderate dilation, diffuse hypokinesis, and   depression of contractility based on TAPSE.   A device wire is seen in the RV and RA.   Pleural effusion - is present.   IVC is dilated and not collapsing with inspiration.     Signature    < end of copied text >      Monitor afib with episodes of rapid rate with activity      CHIEF COMPLAINT: Patient is a 89y old  Female who presents with a chief complaint of LLE hematoma (28 Aug 2023 10:28)      HPI:  Level 3 Trauma Activation    HPI:    Patient is a 89y year old female with PMHx chronic CHF, hypothyroidism, Gout, HTN, COPD, Afib on xarelto, anemia, CAD, and osteopenia presents to the ED c/o left leg pain after hitting her leg yesterday 8/24/23, when going to answer the phone. Pt banged it on furniture and developed a large hematoma. Pt having difficulty ambulating, no other injuries. Last took Xarelto last night. No history of DVT/PE.    Cardiology consulted to evaluate for CHF. This is a pt. of Dr. Harris, last seen in the office on 7/31/23. Pt. takes lasix and torsemide prn at home for HFpEF and xarelto and toprol xl 25 mg daily for Chronic Afib.   At present, admitted for leg hematoma, hypotensive, denies chest pain/SOB/palpitations.     Pt denied head strike LOC or any traumatic injuries/complaints   (25 Aug 2023 13:27)    8/29/23 Seated in bedside chair awake alert no complaints tele -120  8/30/23; seen in bed, looks comfortable, no complaints, Tele: Afib 80s-90s - reconsulted by hospitalist one hr after seeing pt. for chest pain  - seen pt. with Micki Harris - pt. denies chest pain - says she is SOB, did not have chest pain, trops and EKG ordered  8/31/23: pt. with sign and symptoms of fluid overload - elevated JVD,, started on ivp lasix. Tele: Afib    9/1/23: feels better, less SOB, diuresis changed to lasix gtt by HF, Tele: Afib , pt. is being Tx to 3E  9/2/23: Awake alert Tele AF V paced Breathing comfortably SOB improved No CP  9/3/23: seated in bedside chair, SOB improved/No JVD , Tele AF   9/4/23 Patient is in chair , gets very tachycardic with activity , sob    IV lasix drip was discontinued to low BP day before   9/5/23: Pt sitting up in chair. Denies cp.  Still with SOB.  Tele: Afib, V pacing    MEDICATIONS  (STANDING):  allopurinol 100 milliGRAM(s) Oral daily  calcium carbonate 1250 mG  + Vitamin D (OsCal 500 + D) 1 Tablet(s) Oral daily  furosemide    Tablet 20 milliGRAM(s) Oral daily  levothyroxine 125 MICROGram(s) Oral daily  metoprolol tartrate 12.5 milliGRAM(s) Oral every 12 hours  multivitamin/minerals 1 Tablet(s) Oral daily  polyethylene glycol 3350 17 Gram(s) Oral daily  senna 2 Tablet(s) Oral at bedtime  spironolactone 25 milliGRAM(s) Oral two times a day    MEDICATIONS  (PRN):  acetaminophen     Tablet .. 650 milliGRAM(s) Oral every 6 hours PRN Moderate Pain (4 - 6)  acetaminophen   IVPB .. 1000 milliGRAM(s) IV Intermittent once PRN Temp greater or equal to 38C (100.4F), Mild Pain (1 - 3)  bisacodyl Suppository 10 milliGRAM(s) Rectal daily PRN Constipation  melatonin 3 milliGRAM(s) Oral at bedtime PRN Insomnia  ondansetron Injectable 4 milliGRAM(s) IV Push every 6 hours PRN Nausea and/or Vomiting    Vital Signs Last 24 Hrs  T(C): 36.3 (05 Sep 2023 08:01), Max: 36.7 (05 Sep 2023 05:04)  T(F): 97.4 (05 Sep 2023 08:01), Max: 98.1 (05 Sep 2023 05:04)  HR: 77 (05 Sep 2023 08:01) (76 - 85)  BP: 107/51 (05 Sep 2023 08:01) (96/58 - 108/53)  BP(mean): --  RR: 18 (05 Sep 2023 08:01) (16 - 18)  SpO2: 97% (05 Sep 2023 08:01) (96% - 97%)    Parameters below as of 05 Sep 2023 08:01  Patient On (Oxygen Delivery Method): nasal cannula  O2 Flow (L/min): 2      I&O's Summary    03 Sep 2023 07:01  -  04 Sep 2023 07:00  --------------------------------------------------------  IN: 0 mL / OUT: 800 mL / NET: -800 mL        PHYSICAL EXAM:  Constitutional: NAD, awake and alert  HEENT: No oral cyanosis.  Pulmonary: Non-labored, breath sounds diminished   Cardiovascular: S1 and S2, irregular, 2/6 REBECA   Gastrointestinal: Bowel Sounds present, soft, nontender.   Lymph: Bilateral LE edema.  LLE ACE wrap in place   Neurological: Alert, no focal deficits  Skin: No rashes.  Psych:  Mood & affect appropriate    LABS:                          9.9    7.86  )-----------( 192      ( 05 Sep 2023 06:46 )             31.3     09-05    140  |  98  |  26<H>  ----------------------------<  120<H>  3.6   |  38<H>  |  0.83    Ca    8.7      05 Sep 2023 06:46  Phos  2.6     09-05  Mg     2.3     09-05              - TroponinI hsT: <-37.45  - TroponinI hsT: <-37.45    Radiology/EKG/Echo: in progress       -----------------------------------------------------------------------------------------------------------    < from: TTE Echo Complete w/o Contrast w/ Doppler (08.29.23 @ 08:45) >   Impression     Summary     The mitral valve leaflets appear thickened.   Mild mitral annular calcification is present.   Mild to Moderate mitral regurgitation with two jets is present.   Significant fibrocalcific changes noted to the aortic valve leaflets with   restriction in leaflet excursion.   Calculated MIKE is .91cm^2; this finding is consistent with severe aortic   stenosis.   The tricuspid valve leaflets appear mildly thickened and/or calcified,   but   open well.   Severe (4+) tricuspid valve regurgitation is present.   Severe pulmonary hypertension.   Pulmonic valve not well seen.   Mild to moderate pulmonic valvular regurgitation is present.   The left atrium is severely dilated.   Left ventricle systolic function appears mildly impaired; segmental wall   motion abnormalities noted.   Mild concentric left ventricular hypertrophy is present.   Estimated Ejection Fraction is 45 %.   The interventricular septum appears flattened consistent with an RV   pressure/volume overload.   The right atrium appears severely dilated.   A device wire is seen inthe RV and RA.   The right ventricle exhibits moderate dilation, diffuse hypokinesis, and   depression of contractility based on TAPSE.   A device wire is seen in the RV and RA.   Pleural effusion - is present.   IVC is dilated and not collapsing with inspiration.      < end of copied text >    < from: Transthoracic Echocardiogram Follow Up (02.25.22 @ 09:04) >     Impression     Summary     Limited study to assess left ventricular function.   The left ventricle cavity is underdistended.   The interventricular septum appears flattened consistent with an RV   pressure/volume overload.   Estimated left ventricular ejection fraction is 70 %.   The right atrium appears severely dilated.   A device wire is seen in the RV and RA.   The right ventricle is mildly dilated.   The tricuspid valve leaflets are thin and pliable; valve motion is   normal.   Severe (4+) tricuspid valve regurgitation is present.   Severe pulmonary hypertension.   No evidence of a significant pericardial effusion.(Trace)   Pleural effusion - is present.     Signature     ----------------------------------------------------------------   Electronically signed by Edward David MD(Interpreting   physician) on 02/25/2022 01:46 PM    < end of copied text >        < from: TTE Echo Complete w/o Contrast w/ Doppler (08.29.23 @ 08:45) >     The mitral valve leaflets appear thickened.   Mild mitral annular calcification is present.   Mild to Moderate mitral regurgitation with two jets is present.   Significant fibrocalcific changes noted to the aortic valve leaflets with   restriction in leaflet excursion.   Calculated MIKE is .91cm^2; this finding is consistent with severe aortic   stenosis.   The tricuspid valve leaflets appear mildly thickened and/or calcified,   but   open well.   Severe (4+) tricuspid valve regurgitation is present.   Severe pulmonary hypertension.   Pulmonic valve not well seen.   Mild to moderate pulmonic valvular regurgitation is present.   The left atrium is severely dilated.   Left ventricle systolic function appears mildly impaired; segmental wall   motion abnormalities noted.   Mild concentric left ventricular hypertrophy is present.   Estimated Ejection Fraction is 45 %.   The interventricular septum appears flattened consistent with an RV   pressure/volume overload.   The right atrium appears severely dilated.   A device wire is seen inthe RV and RA.   The right ventricle exhibits moderate dilation, diffuse hypokinesis, and   depression of contractility based on TAPSE.   A device wire is seen in the RV and RA.   Pleural effusion - is present.   IVC is dilated and not collapsing with inspiration.     Signature    < end of copied text >      Monitor afib

## 2023-09-05 NOTE — PROGRESS NOTE ADULT - ASSESSMENT
Patient is a 89 year old female with a history of HFmrEF, aortic stenosis hypothyroidism, Gout, HTN, COPD, Afib on xarelto, anemia, PPM, and osteopenia who presented to  ED on 8/25 c/o leg pain after hitting her leg, found to have significant hematoma.  She has been managed medically but became dyspneic after fluid bolus and is now being evaluated by heart failure team    TTE 8/29:  LVEF 45% LVEDd 3.65 IVS 1.23 PWd 1.19 LA Severely Dilated, RA Severely Dilated,  RV moderately dilated with diffuse hypokinesis, Mild to Mod MR, Severe AS MIKE 0.8 PVel 3.16 PG/MG 39/25 Severe TR, RVSP 61

## 2023-09-05 NOTE — PROGRESS NOTE ADULT - SUBJECTIVE AND OBJECTIVE BOX
Chief Complaint: LLE pain and swelling, difficulty with ambulation    Interval Hx: Patient seen and examined. Remains stable. Ambulated to bathroom with rolling walker. No acute complaints other than generalized weakness and deconditioning. No chest pain or tightness. No dyspnea at rest. SPO2 96% on 2L/min via NC. Awaiting input from Structural Heart Team re patient's significant heart valve disease.     ROS: Multi system review is comprehensively negative x 10 systems except as above    Vitals:  T(F): 97.3 (04 Sep 2023 08:49), Max: 97.3 (04 Sep 2023 08:49)  HR: 85 (04 Sep 2023 08:49) (73 - 85)  BP: 119/67 (04 Sep 2023 08:49) (103/52 - 119/67)  RR: 18 (04 Sep 2023 08:49) (18 - 18)  SpO2: 94% (04 Sep 2023 08:49) (94% - 96%) on NC    Exam:  Constitutional: No acute distress  HEENT: NCAT PERRL EOMI MMM clear oropharynx  Neck: Supple, no JVD  CVS: S1 and S2, irregular, rate ~90, 2/6 REBECA   Chest: Normal resp effort at rest, lungs clear  Abd: +BS, soft NT ND   Ext: LLE in ACE wrap, neurovascularly intact  Skin: No rashes.  Psych: Mood & affect appropriate  Neurological: Alert, no focal deficits    Labs:    (9/2)                      10.0   7.42  )----------( 177             31.1       137  |  99  |  34  ---------------------< 116  3.7   |  36  |  1.03    Ca 7.9   Phos 3.4   Mg 2.0    (8/30)    ProBNP 8126   Troponin negative    (8/26)    Lactate 1.8    (8/25)    UA yellow, clear, small LE, N-, 11-25 WBC, no RBC, few bact, mod sq epi    Imaging:  US venous duplex LUE 8/30: No evidence of left upper extremity deep venous thrombosis.    CXR 8/30: Heart enlargement and left-sided pacemaker. There is a persistent mild left base effusion and a persistent mild right base pleural pulmonary process. Chest is similar to March 18, 2022. Quite advanced bilateral shoulder degeneration    CT L shoulder WO 8/30: Severe left glenohumeral arthrosis with chondrocalcinosis and chronic remodeling of the bones. Severe supraspinatus and mild to moderate infraspinatus and subscapularis muscle atrophy. Marked subcoracoid bursitis. Marked biceps tenosynovitis. Small left pleural effusion.    CT head WO 8/28: Mild parenchymal atrophy with patchy periventricular hypoattenuation; a  nonspecific finding which statistically reflects chronic microvascular   ischemic change. Calcified plaque [place]. Coarse calcifications falx cerebri. No evidence of extra-axial collection, intracranial hemorrhage, mass or   mass effect. Gray-white matter differentiation appears preserved. Complete opacification of the visualized left maxillary sinus, demonstrating cortical thickening with heterogeneous internal density, including areas of hyperdensity. Bilateral mastoid air cells and middle ear regions well-aerated. No aggressive calvarial lesion or acute calvarial fracture visualized. Hyperostosis frontalis. Bilateral cataract surgery.    CTA LLE W/ 8/25: Large superficial left calf hematoma with active bleeding. Three-vessel runoff to the left foot. Two-vessel runoff to the right foot via the anterior tibial/dorsalis pedis and peroneal arteries.    XR L tib fib 8/25: The tibia and fibula are intact. No fracture or radiographic osseous lesion. Posteromedial mid calf 16.5 x 4.3 cm soft tissue hematoma.    Cardiac Testing:  TTE 8/29: Mild to mod MR. Severe AS. Severe TR. Mild to mod IA. Severe pHTN. Severely dilated LA. LV systolic function mildly impaired; segmental wall motion abnormalities noted. Mild concentric LVH. LVEF 45%. The interventricular septum appears flattened consistent with an RV pressure/volume overload. RA severely dilated. RV with moderate dilation, diffuse hypokinesis, and depression of contractility based on TAPSE. A device wire is seen in the RV and RA. IVC is dilated and not collapsing with inspiration.    EKG 8/28: Rate 105. Afib RVR    EKG 8/25: Rate 75. Afib. RBBB    Meds:  MEDICATIONS  (STANDING):  allopurinol 100 milliGRAM(s) Oral daily  ampicillin/sulbactam  IVPB 3 Gram(s) IV Intermittent every 12 hours  calcium carbonate 1250 mG  + Vitamin D (OsCal 500 + D) 1 Tablet(s) Oral daily  furosemide    Tablet 20 milliGRAM(s) Oral daily  levothyroxine 125 MICROGram(s) Oral daily  metoprolol tartrate 12.5 milliGRAM(s) Oral every 12 hours  multivitamin/minerals 1 Tablet(s) Oral daily  polyethylene glycol 3350 17 Gram(s) Oral daily  senna 2 Tablet(s) Oral at bedtime  spironolactone 25 milliGRAM(s) Oral two times a day    MEDICATIONS  (PRN):  acetaminophen     Tablet .. 650 milliGRAM(s) Oral every 6 hours PRN Moderate Pain (4 - 6)  acetaminophen   IVPB .. 1000 milliGRAM(s) IV Intermittent once PRN Temp greater or equal to 38C (100.4F), Mild Pain (1 - 3)  bisacodyl Suppository 10 milliGRAM(s) Rectal daily PRN Constipation  melatonin 3 milliGRAM(s) Oral at bedtime PRN Insomnia  ondansetron Injectable 4 milliGRAM(s) IV Push every 6 hours PRN Nausea and/or Vomiting   Duplicate entry.

## 2023-09-05 NOTE — PROGRESS NOTE ADULT - ASSESSMENT
89 yo woman with HTN, CAD, chronic diastolic CHF, chronic atrial fibrillation on Xarelto, hx of PPM placement, COPD, hypothyroidism, gout, presented 8/25 with LLE pain and swelling, difficulty with ambulation, started after striking LLE on piece of furniture the day prior. Patient was found to have hypotension, rapid afib, large superficial L calf hematoma with active bleed. Admitted to Trauma Surgery.     Large, traumatic L calf hematoma  Patient with advanced age, moderate MR, severe AS, severe TR with pulmonary hypertension, RV volume overload, borderline BP, afib with RVR (at the time), deemed very high risk for procedure, deferred intervention, managed supportively. Trauma surgery has also advised conservative management at this point. Reassuringly, patient remains stable, hemodynamically. Hgb stable, ~10.  - Continue to monitor  - Complete 7 day course of empiric Unasyn today, as was previously ordered by Surgery    Acute metabolic encephalopathy  Likely multi factorial due to hematoma, hypoxia, congestive heart failure  - Continue to monitor, treat underlying causes.     Acute respiratory failure with hypoxia  Likely multi factorial due to CHF, severe heart valve disease, severe pHTN  - Continue to monitor, treat underlying causes.   - Wean O2 as tolerated, goal SPO2 92% or greater on room air    Acute on chronic systolic heart failure  Echo showed EF 45% perhaps 2/2 tachyarrhythmia, may also be related to multiple heart valve disease. Appreciate input from Cardiology. S/P IV Lasix drip, no on PO Lasix. Metoprolol XL, spironolactone (recommend cautious diuresis in setting of severe AS)  - Continue management as per Cardiology  - Is and Os, daily wt    Significant heart valve disease.   Echo 8/29 w/ mild to mod MR, severe AS, severe TR, mild to mod [place], severe pHTN, severely dilated LA, LV systolic function mildly impaired; segmental wall motion abnormalities noted, mild concentric LVH,  LVEF 45%, RV pressure/volume overload, RA severely dilated, RV with moderate dilation, diffuse hypokinesis, and depression of contractility based on TAPSE. Structural Heart Team consulted.   - F/u Structural Heart Team eval    Chronic atrial fibrillation  Rapid rates upon admission in setting of pain, large LLE hematoma. Cardiology following.   - Continue rate control with metoprolol, can consider addition of digoxin if rapid rates  - AC held due to large hematoma  - Referred to Cardiac EP re possible Watchman LAAC procedure / device to provide a non-pharmacologic alternative for non-valvular afib related stroke risk-reduction, input pending    Hx of pacemaker placement  S/P PPM interrogation; Normal functioning single chamber PPM with battery longevity 5.9-6.1 years  - Continue to monitor    Hypothyroidism  Stable.   - Continue levothyroxine    Hx of gout  Stable.   - Continue allopurinol    Physical deconditioning and debility  PT eval consulted  - F/u PT eval  - OOB to chair daily      DVT px: Off pharmacologic DVT px / AC given hematoma  Code Status: DNR/DNI  Dispo: TBD pending Structural Heart eval, Physical Therapy eval   89 year-old woman with HTN, CAD, chronic diastolic CHF, chronic atrial fibrillation on Xarelto, hx of PPM placement, COPD, hypothyroidism, gout, presented 8/25 with LLE pain and swelling, difficulty with ambulation, started after striking LLE on piece of furniture the day prior. Patient was found to have hypotension, rapid afib, large superficial L calf hematoma with active bleed. Admitted to Trauma Surgery, since transferred to Medicine as patient's hematoma is being managed conservatively.     Large, traumatic L calf hematoma  Patient with advanced age, moderate MR, severe AS, severe TR with pulmonary hypertension, RV volume overload, borderline BP, afib with RVR (at the time). Appreciate input from Interventional Radiology, deemed very high risk for procedure, deferred intervention, managed supportively. Trauma Surgery also advised conservative management. Reassuringly, patient remains stable, hemodynamically. Hgb stable, ~10. Completed 7-day empiric course of Unasyn as was previously ordered by Surgery  - Xarelto remains held since admission, likely to be held indefinitely, pending further discussion with Trauma Surgery, Cardiology, patient/family. Being evaluated for Watchman, see below  - Continue to monitor    Acute metabolic encephalopathy  Likely multi factorial due to hematoma, hypoxia, congestive heart failure. Underlying causes were treated/managed, and mental status is now improved.   - Continue to monitor, re-orient frequently    Acute respiratory failure with hypoxia  Likely multi factorial due to CHF, severe heart valve disease, severe pHTN. Current SPO2 97% on 2L/min via NC.   - Wean O2 as tolerated, goal SPO2 92% or greater on room air  - Continue to monitor, treat underlying causes  - Encourage incentive spirometry    Acute on chronic systolic heart failure  Echo showed EF 45% perhaps 2/2 tachyarrhythmia, may also be related to multiple heart valve disease. Appreciate input from Cardiology. S/P IV Lasix drip, now on PO Lasix but appears to be having increase in volume status. Consulted CHF Team.   - Continue Lasix, Metoprolol XL, spironolactone (cautious diuresis in setting of severe AS)  - Is and Os, daily wt  - Low Na diet, fluid restriction  - F/u CHF Team, Cardiology, Structural Heart Team    Significant heart valve disease.   Echo 8/29 w/ mild to mod MR, severe AS, severe TR, mild to mod LA, severe pHTN, severely dilated LA, LV systolic function mildly impaired; segmental wall motion abnormalities noted, mild concentric LVH,  LVEF 45%, RV pressure/volume overload, RA severely dilated, RV with moderate dilation, diffuse hypokinesis, and depression of contractility based on TAPSE. Structural Heart Team consulted.   - F/u Structural Heart Team eval    Chronic atrial fibrillation  Rapid rates upon admission in setting of pain, large LLE hematoma. Cardiology following. HR now improved as hematoma appears to have stabilized. Xarelto on hold due to the hematoma.  - Continue rate control with metoprolol, can consider addition of digoxin if rapid rates  - Continue to HOLD Xarelto given large hematoma  - Referred to Cardiac EP re possible Watchman LAAC procedure / device to provide a non-pharmacologic alternative for non-valvular afib related stroke risk-reduction    Hx of pacemaker placement  S/P PPM interrogation; Normal functioning single chamber PPM with battery longevity 5.9-6.1 years  - Continue to monitor    Hypothyroidism  Stable.   - Continue levothyroxine    Hx of gout  Stable.   - Continue allopurinol    Constipation  Stable.  - Continue bowel regimen, monitor for BM    Physical deconditioning and debility  PT eval consulted  - F/u PT eval  - OOB to chair daily      DVT px: Off pharmacologic DVT px / AC given hematoma  Code Status: DNR/DNI  Dispo: TBD pending CHF, Structural Heart eval, Physical Therapy eval

## 2023-09-05 NOTE — PROGRESS NOTE ADULT - ASSESSMENT
89y year old female that EP was called to assess.  Pt states she was on xarelto without any problems for 10 years,  as of recently she sustained the trauma to her LLE with hematoma formation.   PMHx chronic CHF, hypothyroidism, Gout, HTN, COPD, chronic Afib (on Xarelto x 10 years), anemia, CAD, and osteopenia presents to the ED c/o left leg pain/ now s/p large LLE hematoma Off OAC. with 4+ TR, EF estimated at 45%  1) Continue surgical management of LLE hematoma  2)  Will continue to discuss Watchman LAAC procedure/ device with pt/ family  3)  Watchman (LAAC ) is a possible viable non-pharmacologic alternative for NVAF stroke prevention for increasing fall risk.  4) Will D/W EP attending all of above. 89y year old female that EP was called to assess for possible watchman procedure.  Pt states she was on xarelto without any problems for 10 years,  as of recently she sustained the trauma to her LLE with hematoma formation.   She  presents to the ED c/o left leg pain/ now s/p large LLE hematoma Off OAC presently     She is a high fall risk. Her heart rate  is controlled with occasional high ventriclar rates  (Beta Blocker was increased yesterday to Metoprolol 12.5 mgs q 12 hours). OAC is being held to to hematoma,  PMHx chronic CHF, hypothyroidism, Gout, HTN, COPD, chronic Afib (on Xarelto x 10 years), anemia,  S/P PPM, CAD, and osteopeniaC. with 4+ TR, EF estimated at 45% and severely dilated  atrium.  She is being followed by Dr. Palla presently for general cardiology    1) Continue surgical management of LLE hematoma  2)  Will continue to discuss Watchman LAAC procedure/ device with pt   89y year old female that EP was called to assess for possible watchman procedure.  Pt states she was on xarelto without any problems for 10 years,  as of recently she sustained the trauma to her LLE with hematoma formation.   She  presents to the ED c/o left leg pain/ now s/p large LLE hematoma Off OAC presently     She is a high fall risk. Her heart rate  is controlled with occasional high ventriclar rates  (Beta Blocker was increased yesterday to Metoprolol 12.5 mgs q 12 hours). OAC is being held to to hematoma,  PMHx chronic CHF, hypothyroidism, Gout, HTN, COPD, chronic Afib (on Xarelto x 10 years), anemia,  S/P PPM, CAD, and osteopenia with 4+ TR, EF estimated at 45% and severely dilated  atrium.  She is being followed by Dr. Palla presently for general cardiology    1) Continue surgical management of LLE hematoma  2)  Will continue to discuss Watchman LAAC procedure/ device with pt  3.) Pt is scheduled to have cardiac cath in am to further assess valvular  status

## 2023-09-05 NOTE — PROGRESS NOTE ADULT - PROBLEM SELECTOR PLAN 1
Echo shows EF 45% possibly 2/2 tachyarrhythmia contributed by valvular heart disease, HF team on board,   , pressure soft, severe AS, IV lasix DC'd. Now on PO lasix.  Continue BB, Spirolactone,  Closely monitor renal function   Lauren carpio    Advise strict I&O   ,

## 2023-09-05 NOTE — PROGRESS NOTE ADULT - NS ATTEND AMEND GEN_ALL_CORE FT
Patient  still sob , now recommended to started on IV lasix drip ,recommended right and left heart cath   discussed with heart failure PA

## 2023-09-05 NOTE — PROGRESS NOTE ADULT - NS ATTEND AMEND GEN_ALL_CORE FT
elderly female with cad chf, sev TR, PPM, AF and recent fall with LLE hematoma, she is elevated chadsvasc high risk for cva and not suitable for longterm oac due to high risk for fall; likely benefits from LAAO Watchman implant. Will follow with pt as outpatient as well  resume oac when pt is stable from bleeding risk and hematoma

## 2023-09-05 NOTE — PROGRESS NOTE ADULT - SUBJECTIVE AND OBJECTIVE BOX
89y year old female with PMHx chronic CHF, hypothyroidism, Gout, HTN, COPD, Afib on xarelto, anemia, CAD, and osteopenia presents to the ED c/o left leg pain after hitting her leg  8/24/23, when going to answer the phone. Pt banged it on furniture and developed a large hematoma. Pt having difficulty ambulating, no other injuries. Last took Xarelto last night. No history of DVT/PE.  She denies any LOC, dizziness       TELE:  AT Fib 70-80 BPM    MEDICATIONS  (STANDING):  allopurinol 100 milliGRAM(s) Oral daily  calcium carbonate 1250 mG  + Vitamin D (OsCal 500 + D) 1 Tablet(s) Oral daily  furosemide    Tablet 20 milliGRAM(s) Oral daily  levothyroxine 125 MICROGram(s) Oral daily  metoprolol tartrate 12.5 milliGRAM(s) Oral every 12 hours  multivitamin/minerals 1 Tablet(s) Oral daily  polyethylene glycol 3350 17 Gram(s) Oral daily  senna 2 Tablet(s) Oral at bedtime  spironolactone 25 milliGRAM(s) Oral two times a day    MEDICATIONS  (PRN):  acetaminophen     Tablet .. 650 milliGRAM(s) Oral every 6 hours PRN Moderate Pain (4 - 6)  acetaminophen   IVPB .. 1000 milliGRAM(s) IV Intermittent once PRN Temp greater or equal to 38C (100.4F), Mild Pain (1 - 3)  bisacodyl Suppository 10 milliGRAM(s) Rectal daily PRN Constipation  melatonin 3 milliGRAM(s) Oral at bedtime PRN Insomnia  ondansetron Injectable 4 milliGRAM(s) IV Push every 6 hours PRN Nausea and/or Vomiting      Allergies    codeine (Unknown)    Intolerances        Vital Signs Last 24 Hrs  T(C): 36.3 (05 Sep 2023 08:01), Max: 36.7 (05 Sep 2023 05:04)  T(F): 97.4 (05 Sep 2023 08:01), Max: 98.1 (05 Sep 2023 05:04)  HR: 80 (05 Sep 2023 11:48) (76 - 85)  BP: 144/53 (05 Sep 2023 11:48) (96/58 - 144/53)  BP(mean): --  RR: 18 (05 Sep 2023 08:01) (16 - 18)  SpO2: 97% (05 Sep 2023 08:01) (96% - 97%)    Parameters below as of 05 Sep 2023 08:01  Patient On (Oxygen Delivery Method): nasal cannula  O2 Flow (L/min): 2      PHYSICAL EXAMINATION:    GENERAL APPEARANCE:  Pt. is not currently dyspneic, in no distress. Pt. is alert, oriented, and pleasant.  HEENT:  Pupils are normal and react normally. No icterus. Mucous membranes well colored.  NECK:  Supple. No lymphadenopathy. Jugular venous pressure not elevated. Carotids equal.   HEART:   The cardiac impulse has a normal quality. There are no murmurs, rubs or gallops noted  CHEST:  Chest is clear to auscultation. Normal respiratory effort.  ABDOMEN:  Soft and nontender.   EXTREMITIES:  LLE wrapped in compression wrap, Right leg is edematous an d left forearm is edmatous  SKIN:  No rash or significant lesions are noted.    LABS:                        9.9    7.86  )-----------( 192      ( 05 Sep 2023 06:46 )             31.3     09-05    140  |  98  |  26<H>  ----------------------------<  120<H>  3.6   |  38<H>  |  0.83    Ca    8.7      05 Sep 2023 06:46  Phos  2.6     09-05  Mg     2.3     09-05        Urinalysis Basic - ( 05 Sep 2023 06:46 )    Color: x / Appearance: x / SG: x / pH: x  Gluc: 120 mg/dL / Ketone: x  / Bili: x / Urobili: x   Blood: x / Protein: x / Nitrite: x   Leuk Esterase: x / RBC: x / WBC x   Sq Epi: x / Non Sq Epi: x / Bacteria: x            RADIOLOGY & ADDITIONAL TESTS:     89y year old female with PMHx chronic CHF, hypothyroidism, Gout, HTN, COPD, Afib on xarelto, anemia, CAD, and osteopenia presents to the ED c/o left leg pain after hitting her leg  8/24/23, when going to answer the phone. Pt banged it on furniture and developed a large hematoma. Pt having difficulty ambulating, no other injuries. Last took Xarelto last night. No history of DVT/PE.  She denies any LOC, dizziness       TELE:  AT Fib 70-80 BPM    MEDICATIONS  (STANDING):  allopurinol 100 milliGRAM(s) Oral daily  calcium carbonate 1250 mG  + Vitamin D (OsCal 500 + D) 1 Tablet(s) Oral daily  furosemide    Tablet 20 milliGRAM(s) Oral daily  levothyroxine 125 MICROGram(s) Oral daily  metoprolol tartrate 12.5 milliGRAM(s) Oral every 12 hours  multivitamin/minerals 1 Tablet(s) Oral daily  polyethylene glycol 3350 17 Gram(s) Oral daily  senna 2 Tablet(s) Oral at bedtime  spironolactone 25 milliGRAM(s) Oral two times a day    MEDICATIONS  (PRN):  acetaminophen     Tablet .. 650 milliGRAM(s) Oral every 6 hours PRN Moderate Pain (4 - 6)  acetaminophen   IVPB .. 1000 milliGRAM(s) IV Intermittent once PRN Temp greater or equal to 38C (100.4F), Mild Pain (1 - 3)  bisacodyl Suppository 10 milliGRAM(s) Rectal daily PRN Constipation  melatonin 3 milliGRAM(s) Oral at bedtime PRN Insomnia  ondansetron Injectable 4 milliGRAM(s) IV Push every 6 hours PRN Nausea and/or Vomiting      Allergies    codeine (Unknown)    Intolerances        Vital Signs Last 24 Hrs  T(C): 36.3 (05 Sep 2023 08:01), Max: 36.7 (05 Sep 2023 05:04)  T(F): 97.4 (05 Sep 2023 08:01), Max: 98.1 (05 Sep 2023 05:04)  HR: 80 (05 Sep 2023 11:48) (76 - 85)  BP: 144/53 (05 Sep 2023 11:48) (96/58 - 144/53)  BP(mean): --  RR: 18 (05 Sep 2023 08:01) (16 - 18)  SpO2: 97% (05 Sep 2023 08:01) (96% - 97%)    Parameters below as of 05 Sep 2023 08:01  Patient On (Oxygen Delivery Method): nasal cannula  O2 Flow (L/min): 2      PHYSICAL EXAMINATION:    GENERAL APPEARANCE:  Pt. is not currently dyspneic, in no distress. Pt. is alert, oriented, and pleasant.  HEENT:  Pupils are normal and react normally. No icterus. Mucous membranes well colored.  NECK:  Supple. No lymphadenopathy. Jugular venous pressure not elevated. Carotids equal.   HEART:   The cardiac impulse has a normal quality. There are no murmurs, rubs or gallops noted  CHEST:  Chest is clear to auscultation. Normal respiratory effort.  ABDOMEN:  Soft and nontender.   EXTREMITIES:  LLE wrapped in compression wrap, Right leg is edematous an d left forearm is edmatous  SKIN:  No rash or significant lesions are noted.    LABS:                        9.9    7.86  )-----------( 192      ( 05 Sep 2023 06:46 )             31.3     09-05    140  |  98  |  26<H>  ----------------------------<  120<H>  3.6   |  38<H>  |  0.83    Ca    8.7      05 Sep 2023 06:46  Phos  2.6     09-05  Mg     2.3     09-05        Urinalysis Basic - ( 05 Sep 2023 06:46 )    Color: x / Appearance: x / SG: x / pH: x  Gluc: 120 mg/dL / Ketone: x  / Bili: x / Urobili: x   Blood: x / Protein: x / Nitrite: x   Leuk Esterase: x / RBC: x / WBC x   Sq Epi: x / Non Sq Epi: x / Bacteria: x            RADIOLOGY & ADDITIONAL TESTS:  < from: TTE Echo Complete w/o Contrast w/ Doppler (08.29.23 @ 08:45) >    ACC: [id number] EXAM:  ECHO TTE WO CON COMP W DOPP   ORDERED BY: RAMYA GRAHAM     PROCEDURE DATE:  08/29/2023          INTERPRETATION:  Transthoracic Echocardiography Report (TTE)     Demographics     Patient name          SELENE GOMEZ        Age           89 year(s)     Med Rec #             [id number]           Gender        Female     Account #             [id number]        Date of Birth 06/26/1928     Interpreting          Gary Aguirre MD    Room Number   0532   Physician     Referring Physician   RAMYA GRAHAM   Sonographer   Sandhya Tari     Date of study         08/29/2023 08:17 AM     Height                62.99 in            Weight        125.66 pounds    Type of Study:     TTE procedure: ECHO TTE WO CON COMP W DOP     BP: 99/67 mmHg     Study Location: 5ETechnical Quality: Technically Difficullt    Indications   1) R07.9 - Chest pain    M-Mode Measurements (cm)     LVEDd: 3.65 cm            LVESd: 2.64 cm   IVSEd: 1.23 cm   LVPWd: 1.19 cm            AO Root Dimension: 3.6 cm                             ACS: 1.3 cm                             LA: 3.9 cm                             LVOT: 2.3 cm    Doppler Measurements:     AV Velocity:316 cm/s   AV Peak Gradient: 39.94 mmHg   AV Mean Gradient: 25 mmHg   AV Area (Continuity):0.8 cm^2   TR Velocity:359 cm/s   TR Gradient:51.5524 mmHg   Estimated RAP:10 mmHg   RVSP:61 mmHg     Findings     Mitral Valve   The mitral valve leaflets appear thickened.   Mild mitral annular calcification is present.   Mild to Moderate mitral regurgitation with two jets is present.     Aortic Valve   Significant fibrocalcific changes noted to the aortic valve leaflets with   restriction in leaflet excursion.   Calculated MIKE is .91cm^2; this finding is consistent with severe aortic   stenosis.     Tricuspid Valve   The tricuspid valve leaflets appear mildly thickened and/or calcified,   but   open well.   Severe (4+) tricuspid valve regurgitation is present.   Severe pulmonary hypertension.     Pulmonic Valve   Pulmonic valve not well seen.   Mild to moderate pulmonic valvular regurgitation is present.     Left Atrium   The left atrium is severely dilated.     Left Ventricle   Left ventricle systolic function appears mildly impaired; segmental wall   motion abnormalities noted.   Mild concentric left ventricular hypertrophy is present.   Estimated Ejection Fraction is 45 %.   The interventricular septum appears flattened consistent with an RV   pressure/volume overload.     Right Atrium   The right atrium appears severely dilated.   A device wire is seen in the RV and RA.     Right Ventricle   The right ventricle exhibits moderate dilation, diffuse hypokinesis, and   depression of contractility based on TAPSE.   A device wire is seen in the RV and RA.     Pericardial Effusion   No evidence of pericardial effusion.     Pleural Effusion   Pleural effusion - is present.     Miscellaneous   IVC is dilated and not collapsing with inspiration.     Impression     Summary     The mitral valve leaflets appear thickened.   Mild mitral annular calcification is present.   Mild to Moderate mitral regurgitation with two jets is present.   Significant fibrocalcific changes noted to the aortic valve leaflets with   restriction in leaflet excursion.   Calculated MIKE is .91cm^2; this finding is consistent with severe aortic   stenosis.   The tricuspid valve leaflets appear mildly thickened and/or calcified,   but   open well.   Severe (4+) tricuspid valve regurgitation is present.   Severe pulmonary hypertension.   Pulmonic valve not well seen.   Mild to moderate pulmonic valvular regurgitation is present.   The left atrium is severely dilated.   Left ventricle systolic function appears mildly impaired; segmental wall   motion abnormalities noted.   Mild concentric left ventricular hypertrophy is present.   Estimated Ejection Fraction is 45 %.   The interventricular septum appears flattened consistent with an RV   pressure/volume overload.   The right atrium appears severely dilated.   A device wire is seen inthe RV and RA.   The right ventricle exhibits moderate dilation, diffuse hypokinesis, and   depression of contractility based on TAPSE.   A device wire is seen in the RV and RA.   Pleural effusion - is present.   IVC is dilated and not collapsing with inspiration.    < end of copied text >

## 2023-09-05 NOTE — PROGRESS NOTE ADULT - PROBLEM SELECTOR PLAN 4
Rate overall controlled with intermittent episodes of tachycardia , BB dose increased yesterday.  Consider adding digoxin if necessary as BP runs low   AC held due to large hematoma Continue to hold for now    pt high fall risk would consider not resuming AC   EP consulted for possible watchman, resume AC

## 2023-09-05 NOTE — PROGRESS NOTE ADULT - ASSESSMENT
89 yo woman with HTN, CAD, chronic diastolic CHF, chronic atrial fibrillation on Xarelto, hx of PPM placement, COPD, hypothyroidism, gout, presented 8/25 with LLE pain and swelling, difficulty with ambulation, started after striking LLE on piece of furniture the day prior. Patient was found to have hypotension, rapid afib, large superficial L calf hematoma with active bleed. Admitted to Trauma Surgery.     Large, traumatic L calf hematoma  Patient with advanced age, moderate MR, severe AS, severe TR with pulmonary hypertension, RV volume overload, borderline BP, afib with RVR (at the time), deemed very high risk for procedure, deferred intervention, managed supportively. Trauma surgery has also advised conservative management at this point. Reassuringly, patient remains stable, hemodynamically. Hgb stable, ~10.  - Continue to monitor  - Complete 7 day course of empiric Unasyn today, as was previously ordered by Surgery    Acute metabolic encephalopathy  Likely multi factorial due to hematoma, hypoxia, congestive heart failure  - Continue to monitor, treat underlying causes.     Acute respiratory failure with hypoxia  Likely multi factorial due to CHF, severe heart valve disease, severe pHTN  - Continue to monitor, treat underlying causes.   - Wean O2 as tolerated, goal SPO2 92% or greater on room air    Acute on chronic systolic heart failure  Echo showed EF 45% perhaps 2/2 tachyarrhythmia, may also be related to multiple heart valve disease. Appreciate input from Cardiology. S/P IV Lasix drip, no on PO Lasix. Metoprolol XL, spironolactone (recommend cautious diuresis in setting of severe AS)  - Continue management as per Cardiology  - Is and Os, daily wt    Significant heart valve disease.   Echo 8/29 w/ mild to mod MR, severe AS, severe TR, mild to mod IN, severe pHTN, severely dilated LA, LV systolic function mildly impaired; segmental wall motion abnormalities noted, mild concentric LVH,  LVEF 45%, RV pressure/volume overload, RA severely dilated, RV with moderate dilation, diffuse hypokinesis, and depression of contractility based on TAPSE. Structural Heart Team consulted.   - F/u Structural Heart Team eval    Chronic atrial fibrillation  Rapid rates upon admission in setting of pain, large LLE hematoma. Cardiology following.   - Continue rate control with metoprolol, can consider addition of digoxin if rapid rates  - AC held due to large hematoma  - Referred to Cardiac EP re possible Watchman LAAC procedure / device to provide a non-pharmacologic alternative for non-valvular afib related stroke risk-reduction, input pending    Hx of pacemaker placement  S/P PPM interrogation; Normal functioning single chamber PPM with battery longevity 5.9-6.1 years  - Continue to monitor    Hypothyroidism  Stable.   - Continue levothyroxine    Hx of gout  Stable.   - Continue allopurinol    Physical deconditioning and debility  PT eval consulted  - F/u PT eval  - OOB to chair daily      DVT px: Off pharmacologic DVT px / AC given hematoma  Code Status: DNR/DNI  Dispo: TBD pending Structural Heart eval, Physical Therapy eval   Duplicate entry.

## 2023-09-05 NOTE — PROGRESS NOTE ADULT - SUBJECTIVE AND OBJECTIVE BOX
Chief Complaint: LLE pain and swelling, difficulty with ambulation    Interval Hx: Patient seen and examined. Remains stable. Ambulates with rolling walker. She has some mild dyspnea on exertion. No dyspnea at rest. SPO2 97% on 2L/min via NC. Generalized weakness and deconditioning. Ongoing extremity edema. No chest pain or tightness. No other complaints. Awaiting input from CHF Team and Structural Heart Team.     ROS: Multi system review is comprehensively negative x 10 systems except as above    Vitals:  T(F): 97.4 (05 Sep 2023 08:01), Max: 98.1 (05 Sep 2023 05:04)  HR: 80 (05 Sep 2023 11:48) (76 - 85)  BP: 144/53 (05 Sep 2023 11:48) (96/58 - 144/53)  RR: 18 (05 Sep 2023 08:01) (16 - 18)  SpO2: 97% (05 Sep 2023 08:01) (96% - 97%) on 2L/min    Exam:  Constitutional: No acute distress  HEENT: NCAT PERRL EOMI MMM clear oropharynx  Neck: Supple, no JVD  CVS: S1 and S2, irregular, rate ~80, 2/6 REBECA   Chest: Normal resp effort at rest, lungs clear  Abd: +BS, soft NT ND   Ext: LLE in ACE wrap and is neurovascularly intact, also with RLE edema and actually some RUE edema  Skin: No rashes.  Psych: Mood & affect appropriate  Neurological: Alert, no focal deficits    Labs:    (9/5)                       9.9   7.86  )----------( 177             31.3       137  |  99  |  34  ---------------------< 116  3.7   |  36  |  1.03    Ca 7.9   Phos 3.4   Mg 2.0    (8/30)    ProBNP 8126   Troponin negative    (8/26)    Lactate 1.8    (8/25)    UA yellow, clear, small LE, N-, 11-25 WBC, no RBC, few bact, mod sq epi    Imaging:  US venous duplex LUE 8/30: No evidence of left upper extremity deep venous thrombosis.    CXR 8/30: Heart enlargement and left-sided pacemaker. There is a persistent mild left base effusion and a persistent mild right base pleural pulmonary process. Chest is similar to March 18, 2022. Quite advanced bilateral shoulder degeneration    CT L shoulder WO 8/30: Severe left glenohumeral arthrosis with chondrocalcinosis and chronic remodeling of the bones. Severe supraspinatus and mild to moderate infraspinatus and subscapularis muscle atrophy. Marked subcoracoid bursitis. Marked biceps tenosynovitis. Small left pleural effusion.    CT head WO 8/28: Mild parenchymal atrophy with patchy periventricular hypoattenuation; a  nonspecific finding which statistically reflects chronic microvascular   ischemic change. Calcified plaque [place]. Coarse calcifications falx cerebri. No evidence of extra-axial collection, intracranial hemorrhage, mass or   mass effect. Gray-white matter differentiation appears preserved. Complete opacification of the visualized left maxillary sinus, demonstrating cortical thickening with heterogeneous internal density, including areas of hyperdensity. Bilateral mastoid air cells and middle ear regions well-aerated. No aggressive calvarial lesion or acute calvarial fracture visualized. Hyperostosis frontalis. Bilateral cataract surgery.    CTA LLE W/ 8/25: Large superficial left calf hematoma with active bleeding. Three-vessel runoff to the left foot. Two-vessel runoff to the right foot via the anterior tibial/dorsalis pedis and peroneal arteries.    XR L tib fib 8/25: The tibia and fibula are intact. No fracture or radiographic osseous lesion. Posteromedial mid calf 16.5 x 4.3 cm soft tissue hematoma.    Cardiac Testing:  TTE 8/29: Mild to mod MR. Severe AS. Severe TR. Mild to mod [place]. Severe pHTN. Severely dilated LA. LV systolic function mildly impaired; segmental wall motion abnormalities noted. Mild concentric LVH. LVEF 45%. The interventricular septum appears flattened consistent with an RV pressure/volume overload. RA severely dilated. RV with moderate dilation, diffuse hypokinesis, and depression of contractility based on TAPSE. A device wire is seen in the RV and RA. IVC is dilated and not collapsing with inspiration.    EKG 8/28: Rate 105. Afib RVR    EKG 8/25: Rate 75. Afib. RBBB    Meds:  MEDICATIONS  (STANDING):  allopurinol 100 milliGRAM(s) Oral daily  ampicillin/sulbactam  IVPB 3 Gram(s) IV Intermittent every 12 hours  calcium carbonate 1250 mG  + Vitamin D (OsCal 500 + D) 1 Tablet(s) Oral daily  furosemide    Tablet 20 milliGRAM(s) Oral daily  levothyroxine 125 MICROGram(s) Oral daily  metoprolol tartrate 12.5 milliGRAM(s) Oral every 12 hours  multivitamin/minerals 1 Tablet(s) Oral daily  polyethylene glycol 3350 17 Gram(s) Oral daily  senna 2 Tablet(s) Oral at bedtime  spironolactone 25 milliGRAM(s) Oral two times a day    MEDICATIONS  (PRN):  acetaminophen     Tablet .. 650 milliGRAM(s) Oral every 6 hours PRN Moderate Pain (4 - 6)  acetaminophen   IVPB .. 1000 milliGRAM(s) IV Intermittent once PRN Temp greater or equal to 38C (100.4F), Mild Pain (1 - 3)  bisacodyl Suppository 10 milliGRAM(s) Rectal daily PRN Constipation  melatonin 3 milliGRAM(s) Oral at bedtime PRN Insomnia  ondansetron Injectable 4 milliGRAM(s) IV Push every 6 hours PRN Nausea and/or Vomiting   Chief Complaint: LLE pain and swelling, difficulty with ambulation    Interval Hx: Patient seen and examined. Remains stable. Ambulates with rolling walker. She has some mild dyspnea on exertion. No dyspnea at rest. SPO2 97% on 2L/min via NC. Generalized weakness and deconditioning. Ongoing extremity edema. No chest pain or tightness. No other complaints. Awaiting input from CHF Team and Structural Heart Team.     ROS: Multi system review is comprehensively negative x 10 systems except as above    Vitals:  T(F): 97.4 (05 Sep 2023 08:01), Max: 98.1 (05 Sep 2023 05:04)  HR: 80 (05 Sep 2023 11:48) (76 - 85)  BP: 144/53 (05 Sep 2023 11:48) (96/58 - 144/53)  RR: 18 (05 Sep 2023 08:01) (16 - 18)  SpO2: 97% (05 Sep 2023 08:01) (96% - 97%) on 2L/min    Exam:  Constitutional: No acute distress  HEENT: NCAT PERRL EOMI MMM clear oropharynx  Neck: Supple, no JVD  CVS: S1 and S2, irregular, rate ~80, 2/6 REBECA   Chest: Normal resp effort at rest, lungs clear  Abd: +BS, soft NT ND   Ext: LLE in ACE wrap and is neurovascularly intact, also with RLE edema and actually some RUE edema  Skin: No rashes.  Psych: Mood & affect appropriate  Neurological: Alert, no focal deficits    Labs:    (9/5)                       9.9   7.86  )----------( 192             31.3       140  |  98  |  26  ---------------------< 120  3.6   |  38  |  0.83    Ca 8.7   Phos 2.6   Mg 2.3    (8/30)    ProBNP 8126   Troponin negative    (8/26)    Lactate 1.8    (8/25)    UA yellow, clear, small LE, N-, 11-25 WBC, no RBC, few bact, mod sq epi    Imaging:  US venous duplex RUE and RLE requested for swelling, study pending    US venous duplex LUE 8/30: No evidence of left upper extremity deep venous thrombosis.    CXR 8/30: Heart enlargement and left-sided pacemaker. There is a persistent mild left base effusion and a persistent mild right base pleural pulmonary process. Chest is similar to March 18, 2022. Quite advanced bilateral shoulder degeneration    CT L shoulder WO 8/30: Severe left glenohumeral arthrosis with chondrocalcinosis and chronic remodeling of the bones. Severe supraspinatus and mild to moderate infraspinatus and subscapularis muscle atrophy. Marked subcoracoid bursitis. Marked biceps tenosynovitis. Small left pleural effusion.    CT head WO 8/28: Mild parenchymal atrophy with patchy periventricular hypoattenuation; a  nonspecific finding which statistically reflects chronic microvascular   ischemic change. Calcified plaque [place]. Coarse calcifications falx cerebri. No evidence of extra-axial collection, intracranial hemorrhage, mass or   mass effect. Gray-white matter differentiation appears preserved. Complete opacification of the visualized left maxillary sinus, demonstrating cortical thickening with heterogeneous internal density, including areas of hyperdensity. Bilateral mastoid air cells and middle ear regions well-aerated. No aggressive calvarial lesion or acute calvarial fracture visualized. Hyperostosis frontalis. Bilateral cataract surgery.    CTA LLE W/ 8/25: Large superficial left calf hematoma with active bleeding. Three-vessel runoff to the left foot. Two-vessel runoff to the right foot via the anterior tibial/dorsalis pedis and peroneal arteries.    XR L tib fib 8/25: The tibia and fibula are intact. No fracture or radiographic osseous lesion. Posteromedial mid calf 16.5 x 4.3 cm soft tissue hematoma.    Cardiac Testing:  TTE 8/29: Mild to mod MR. Severe AS. Severe TR. Mild to mod [place]. Severe pHTN. Severely dilated LA. LV systolic function mildly impaired; segmental wall motion abnormalities noted. Mild concentric LVH. LVEF 45%. The interventricular septum appears flattened consistent with an RV pressure/volume overload. RA severely dilated. RV with moderate dilation, diffuse hypokinesis, and depression of contractility based on TAPSE. A device wire is seen in the RV and RA. IVC is dilated and not collapsing with inspiration.    EKG 8/28: Rate 105. Afib RVR    EKG 8/25: Rate 75. Afib. RBBB    Meds:  MEDICATIONS  (STANDING):  allopurinol 100 milliGRAM(s) Oral daily  calcium carbonate 1250 mG  + Vitamin D (OsCal 500 + D) 1 Tablet(s) Oral daily  furosemide    Tablet 20 milliGRAM(s) Oral daily  levothyroxine 125 MICROGram(s) Oral daily  metoprolol tartrate 12.5 milliGRAM(s) Oral every 12 hours  multivitamin/minerals 1 Tablet(s) Oral daily  polyethylene glycol 3350 17 Gram(s) Oral daily  senna 2 Tablet(s) Oral at bedtime  spironolactone 25 milliGRAM(s) Oral two times a day    MEDICATIONS  (PRN):  acetaminophen     Tablet .. 650 milliGRAM(s) Oral every 6 hours PRN Moderate Pain (4 - 6)  acetaminophen   IVPB .. 1000 milliGRAM(s) IV Intermittent once PRN Temp greater or equal to 38C (100.4F), Mild Pain (1 - 3)  bisacodyl Suppository 10 milliGRAM(s) Rectal daily PRN Constipation  melatonin 3 milliGRAM(s) Oral at bedtime PRN Insomnia  ondansetron Injectable 4 milliGRAM(s) IV Push every 6 hours PRN Nausea and/or Vomiting

## 2023-09-05 NOTE — PROGRESS NOTE ADULT - NS ATTEND AMEND GEN_ALL_CORE FT
Patient was seen and examined at bedside with the advanced care provider.  I agree with the above with the following exceptions:    Had been on a lasix drip at 5mg/hr from 9/1 at 2pm to 9/3 at 3pm.  Drip was stopped due to asymptomatic hypotension (lowest SBP I see is 86 at 11pm at night).  Daily bed weights show rise in weights from 134 to 143lbs today.  Lowest weight was 133.4lbs.  I am concerned that she has been re-accumulating fluid.  JVP today unchanged elevated to mandible with 1-2+ pitting edema.  Would favor resuming lasix drip at 5mg/hr.  Target net -2L fluid balance daily.  HOLD lasix drip if SBP < 90 and symptomatic. Continue on spironolactone 25mg BID.  If she continues to struggle with hypotension will need RHC to assess cardiac status.  Structural team to follow up.

## 2023-09-06 NOTE — CONSULT NOTE ADULT - CONSULT REASON
Medical evaluation
aortic stenosis
Medical management
Urinary retention
89yo F with left calf hematoma referred to IR for evaluation
Medical evaluation
Watchman evaluation
CHF
Aortic Stenosis, Tricuspid Regurgitation
HFpEF, Aortic Stenosis, Dyspnea

## 2023-09-06 NOTE — DIETITIAN INITIAL EVALUATION ADULT - ADD RECOMMEND
Liberalize diet to Regular to optimize po/nutrient intake.   Record PO intake in EMR after each meal (nursing.)   MVI w/ minerals daily to ensure 100% RDA met  Consider adding thiamine 100 mg daily 2/2 poor PO intake/ malnutrition  Monitor bowel movements, if no BM for >3 days, consider implementing bowel regimen.  Confirm Goals of Care regarding nutrition support   Monitor PO intake, tolerance, labs and weight.

## 2023-09-06 NOTE — CONSULT NOTE ADULT - PROBLEM SELECTOR RECOMMENDATION 2
- Severe TR,   - Will repeat echo once more euvolemic  - Possible intervention in the future after possible TAVR
pt. with HFpEF - last Echo on 2/25/23 reveals preserved LVEF 70%, severe TR, sever pHTN, now s/o fluid bolus in ER, seems euvolemic  Recommendation: check EKG, TTE - ordered, pt. hypotensive at present SBP 92 with poor po intake, will hold diuretics and BB for now. daily weight, resume lasix at lower dose 20 mg po daily (takes 40 mg at home and torsemide prn) when hypotension resolves, avoid IV fluids
- Severe AS  - Structural team consulted  - echo to be reviewed

## 2023-09-06 NOTE — PROGRESS NOTE ADULT - SUBJECTIVE AND OBJECTIVE BOX
HOSPITALIST ATTENDING PROGRESS NOTE    Chart and meds reviewed.  Patient seen and examined.    CC: LLE hematoma    Subjective: Now on lasix drip again, remains overloaded.     All other systems reviewed and found to be negative with the exception of what has been described above.    MEDICATIONS  (STANDING):  allopurinol 100 milliGRAM(s) Oral daily  calcium carbonate 1250 mG  + Vitamin D (OsCal 500 + D) 1 Tablet(s) Oral daily  furosemide Infusion 5 mG/Hr (2.5 mL/Hr) IV Continuous <Continuous>  levothyroxine 125 MICROGram(s) Oral daily  metoprolol tartrate 12.5 milliGRAM(s) Oral every 12 hours  multivitamin/minerals 1 Tablet(s) Oral daily  polyethylene glycol 3350 17 Gram(s) Oral daily  potassium chloride    Tablet ER 40 milliEquivalent(s) Oral every 4 hours  senna 2 Tablet(s) Oral at bedtime  spironolactone 25 milliGRAM(s) Oral two times a day    MEDICATIONS  (PRN):  acetaminophen     Tablet .. 650 milliGRAM(s) Oral every 6 hours PRN Moderate Pain (4 - 6)  bisacodyl Suppository 10 milliGRAM(s) Rectal daily PRN Constipation  melatonin 3 milliGRAM(s) Oral at bedtime PRN Insomnia  ondansetron Injectable 4 milliGRAM(s) IV Push every 6 hours PRN Nausea and/or Vomiting      VITALS:  T(F): 97.9 (09-06-23 @ 10:53), Max: 98.1 (09-06-23 @ 04:00)  HR: 85 (09-06-23 @ 13:50) (73 - 87)  BP: 98/75 (09-06-23 @ 13:50) (83/51 - 114/62)  RR: 16 (09-06-23 @ 13:50) (16 - 18)  SpO2: 92% (09-06-23 @ 13:50) (91% - 96%)  Wt(kg): --    I&O's Summary    05 Sep 2023 07:01  -  06 Sep 2023 07:00  --------------------------------------------------------  IN: 4.6 mL / OUT: 1600 mL / NET: -1595.4 mL        CAPILLARY BLOOD GLUCOSE          PHYSICAL EXAM:  Gen: NAD  HEENT:  pupils equal and reactive, EOMI, no oropharyngeal lesions, erythema, exudates, oral thrush  NECK:   supple, no carotid bruits, no palpable lymph nodes, no thyromegaly  CV:  +S1, +S2, regular, no murmurs or rubs  RESP:   lungs clear to auscultation bilaterally, no wheezing, rales, rhonchi, good air entry bilaterally  BREAST:  not examined  GI:  abdomen soft, non-tender, non-distended, normal BS, no bruits, no abdominal masses, no palpable masses  RECTAL:  not examined  :  not examined  MSK:   normal muscle tone, no atrophy, no rigidity, no contractions  EXT:  no clubbing, no cyanosis, +LE edema, no calf pain, swelling or erythema  VASCULAR:  pulses equal and symmetric in the upper and lower extremities  NEURO:  AAOX3, no focal neurological deficits, follows all commands, able to move extremities spontaneously  SKIN:  no ulcers, lesions or rashes    LABS:                            10.1   8.14  )-----------( 216      ( 06 Sep 2023 07:12 )             31.6     09-06    139  |  96  |  25<H>  ----------------------------<  117<H>  3.6   |  39<H>  |  0.83    Ca    8.3<L>      06 Sep 2023 07:12  Phos  3.0     09-06  Mg     2.0     09-06              Urinalysis Basic - ( 06 Sep 2023 07:12 )    Color: x / Appearance: x / SG: x / pH: x  Gluc: 117 mg/dL / Ketone: x  / Bili: x / Urobili: x   Blood: x / Protein: x / Nitrite: x   Leuk Esterase: x / RBC: x / WBC x   Sq Epi: x / Non Sq Epi: x / Bacteria: x      ABG - ( 06 Sep 2023 12:05 )  pH, Arterial: x     pH, Blood: x     /  pCO2: x     /  pO2: x     / HCO3: x     / Base Excess: x     /  SaO2: 92.4      CULTURES:  no new    Additional results/Imaging, I have personally reviewed:  US venous duplex RUE and RLE requested for swelling, study pending    US venous duplex LUE 8/30: No evidence of left upper extremity deep venous thrombosis.    CXR 8/30: Heart enlargement and left-sided pacemaker. There is a persistent mild left base effusion and a persistent mild right base pleural pulmonary process. Chest is similar to March 18, 2022. Quite advanced bilateral shoulder degeneration    CT L shoulder WO 8/30: Severe left glenohumeral arthrosis with chondrocalcinosis and chronic remodeling of the bones. Severe supraspinatus and mild to moderate infraspinatus and subscapularis muscle atrophy. Marked subcoracoid bursitis. Marked biceps tenosynovitis. Small left pleural effusion.    CT head WO 8/28: Mild parenchymal atrophy with patchy periventricular hypoattenuation; a  nonspecific finding which statistically reflects chronic microvascular   ischemic change. Calcified plaque [place]. Coarse calcifications falx cerebri. No evidence of extra-axial collection, intracranial hemorrhage, mass or   mass effect. Gray-white matter differentiation appears preserved. Complete opacification of the visualized left maxillary sinus, demonstrating cortical thickening with heterogeneous internal density, including areas of hyperdensity. Bilateral mastoid air cells and middle ear regions well-aerated. No aggressive calvarial lesion or acute calvarial fracture visualized. Hyperostosis frontalis. Bilateral cataract surgery.    CTA LLE W/ 8/25: Large superficial left calf hematoma with active bleeding. Three-vessel runoff to the left foot. Two-vessel runoff to the right foot via the anterior tibial/dorsalis pedis and peroneal arteries.    XR L tib fib 8/25: The tibia and fibula are intact. No fracture or radiographic osseous lesion. Posteromedial mid calf 16.5 x 4.3 cm soft tissue hematoma.    Cardiac Testing:  LHC/RHC 9/6 report pending    TTE 8/29: Mild to mod MR. Severe AS. Severe TR. Mild to mod [place]. Severe pHTN. Severely dilated LA. LV systolic function mildly impaired; segmental wall motion abnormalities noted. Mild concentric LVH. LVEF 45%. The interventricular septum appears flattened consistent with an RV pressure/volume overload. RA severely dilated. RV with moderate dilation, diffuse hypokinesis, and depression of contractility based on TAPSE. A device wire is seen in the RV and RA. IVC is dilated and not collapsing with inspiration.    EKG 8/28: Rate 105. Afib RVR    EKG 8/25: Rate 75. Afib. RBBB    Telemetry, personally reviewed:  9/6/23: afib, Vpaced 70-80s, PVCs, couplets

## 2023-09-06 NOTE — CONSULT NOTE ADULT - CONSULT REQUESTED DATE/TIME
02-Sep-2023 16:01
25-Aug-2023 11:17
26-Aug-2023 00:23
28-Aug-2023
01-Sep-2023 10:34
01-Sep-2023 16:55
02-Sep-2023 16:01
03-Sep-2023 10:50
28-Aug-2023 13:51
06-Sep-2023 16:27

## 2023-09-06 NOTE — CONSULT NOTE ADULT - PROBLEM SELECTOR RECOMMENDATION 9
- Severe AS on echo by MIKE, low gradients  - will plan on outpatient TAVR CTA and further assessment afterwards   - Carotid doppler ordered - Severe AS on echo by MIKE, low gradients  - STS score for AVR - 21%   - will plan on outpatient TAVR CTA and further assessment afterwards   - Carotid doppler ordered

## 2023-09-06 NOTE — CONSULT NOTE ADULT - CONSULT REQUESTED BY NAME
Dr Hollis
Shaovn Rojo
[place]
Dr. Rojo
Primary team
Dr Hollis
Dr Palla
Dr. Walker
Hospitalist
Dr. Walker

## 2023-09-06 NOTE — CONSULT NOTE ADULT - NS ATTEND AMEND GEN_ALL_CORE FT
89 year old female with a history of HFmrEF, aortic stenosis hypothyroidism, Gout, HTN, COPD, Afib on xarelto, anemia, PPM, and osteopenia who presented to  ED on 8/25 c/o leg pain after hitting her leg, found to have significant hematoma.  She has been managed medically but became dyspneic after fluid bolus, currently on lasix drip    TTE 8/29:  LVEF 45% LVEDd 3.65 IVS 1.23 PWd 1.19 LA Severely Dilated, RA Severely Dilated,  RV moderately dilated with diffuse hypokinesis, Mild to Mod MR, Severe AS MIKE 0.8 PVel 3.16 PG/MG 39/25 Severe TR, RVSP 61     HC/RHC 9/6/23: LCx branch lesion  RA 17 RV 36/5 PA 54/17 (30) PCW 17 PA Sat 52.3  CO 3.68 CI 2.32    After she is optimized from heart failure, I am in agreement regarding her evaluation as a TAVR candidate. I discussed with family that eventually, if TAVR is pursued based on above work up, she will need TAVR CTA for assessment of vascular access and valve sizing, carotid ultrasound, and PFTs, which will likely be completed at tertiary care center. Once completed, all imaging will be reviewed by the Heart Team and an optimal treatment strategy recommended. I have discussed the potential risks and benefits of TAVR with her and family in detail, including but not limited to death, stroke, pacemaker, and vascular complications (for which she is at markedly higher risk for given body habitus). Plan discussed in depth with patient, family as well as cardiologist Dr. Carlos.     Thank you for allowing me to participate in the care of your patient. Feel free to contact me if any clinical issues arise.
agree w/ above.
Patient was seen and examined at bedside with the advanced care provider.  I agree with the above with the following exceptions:    Briefly, Ms. Brennan is a 89-year-old female with a history of HFpEF 2/2 to afib and aortic stenosis who initially presented to  on 8/25 with left leg hematoma 2/2 accidental injury.  Her hospital course was complicated by hypotension requiring fluid bolus and afib with HR up to 120s.  She is now being referred for heart failure consult for acute heart failure resuscitation.  Echocardiogram this admission revealed drop in LV function from 70% in 2/25/23 to now EF 45% with severe AS by MIKE 0.8-0.91cm2 with PV 3.1 and MG 25mmHg and severe TR. On exam she appears volume up with 2+ pitting edema to knees and JVP elevation.  She is on NC.  She has a jackson in place with only 300CC urine output this AM. Her last dose of diuretics was to lasix 20mg IV at 6AM on 8/31.  Higher lasix bolus have been held by floor team due to concern for asymptomatic BP of .  Will escalate her diuretics to lasix drip at 5mg/hr to help provide a steady state diuresis.  K low at 3.9 and will likely drop further with the drip.  Start on spironolactone 25mg BID.  Goal will be for her to be net -2L daily.  Monitor strict I and Os and check daily standing weights.  Would not hold diuretics unless SBP falls below 90 with symptoms.  Structural team to consult about possible TAVR eligibility.  Will likely need RHC/LHC prior given questionable history of CAD/ACS event in the past.

## 2023-09-06 NOTE — PROGRESS NOTE ADULT - PROBLEM SELECTOR PLAN 1
Echo shows EF 45% possibly 2/2 tachyarrhythmia contributed by valvular heart disease, HF team on board,   , pressure soft, severe AS, lasic gtt resumed as per HF,  Continue BB, Spirolactone,  Closely monitor renal function   Lauren carpio    Advise strict I&O

## 2023-09-06 NOTE — PROGRESS NOTE ADULT - PROBLEM SELECTOR PLAN 4
Rate overall controlled with intermittent episodes of tachycardia , BB dose increased yesterday.  Consider adding digoxin if necessary as BP runs low   AC held due to large hematoma Continue to hold for now    pt high fall risk would consider not resuming AC   EP consulted for possible Watchman, resume AC when hematoma resolves

## 2023-09-06 NOTE — PROGRESS NOTE ADULT - SUBJECTIVE AND OBJECTIVE BOX
Nurse Practitioner Progress note:     HPI:  Level 3 Trauma Activation    Patient is a 89y year old female with PMHx chronic CHF, hypothyroidism, Gout, HTN, COPD, Afib on xarelto, anemia, CAD, and osteopenia presents to the ED c/o left leg pain after hitting her leg yesterday 8/24/23, when going to answer the phone. Pt banged it on furniture and developed a large hematoma. Pt having difficulty ambulating, no other injuries. Last took Xarelto last night. No history of DVT/PE.  Pt.  with signs and symptoms of fluid overload - elevated JVD,, started on ivp lasix. EF 45%, severe AS   Pt. referred for RHC/LHC for further evaluation       T(C): 36.6 (09-06-23 @ 10:53), Max: 36.7 (09-06-23 @ 04:00)  HR: 84 (09-06-23 @ 12:40) (73 - 87)  BP: 99/51 (09-06-23 @ 12:40) (83/51 - 112/41)  RR: 18 (09-06-23 @ 12:40) (17 - 18)  SpO2: 93% (09-06-23 @ 12:40) (93% - 96%)  Wt(kg): --    PHYSICAL EXAM:  Neurologic: Non-focal, AxOx3.  No neuro deficits  Vascular: Peripheral pulses palpable 2+ bilaterally  Procedure Site: RT.. femoral arterial sheath pulled manual pressure applied x20 minutes site benign soft no bleeding no hematoma Rt. femoral venous oj pulled manual pressure applied x15 minutes site remains benign     PROCEDURE RESULTS:  S/P LHC non-obstructive CAD S/P RHC     ASSESSMENT/PLAN: 	  Patient is a 89y year old female with PMHx chronic CHF, hypothyroidism, Gout, HTN, COPD, Afib on xarelto, anemia, CAD, and osteopenia presents to the ED c/o left leg pain after hitting her leg yesterday 8/24/23, when going to answer the phone. Pt banged it on furniture and developed a large hematoma. Pt having difficulty ambulating, no other injuries. Last took Xarelto last night. No history of DVT/PE.  Pt.  with signs and symptoms of fluid overload - elevated JVD,, started on ivp lasix. EF 45%, severe AS   S/P RHC/LHC         -VS, labs, diet, activity as per post cath orders  -No IV  hydration  -Encourage PO fluids  -Sedation instructions reviewed with patient   -Continue current medications  -Plan of care D/W pt. and MD  -Post cath instructions reviewed with pt., pt. verbalizes and understands instructions  -Follow-up with attending/cardiologist

## 2023-09-06 NOTE — CONSULT NOTE ADULT - ASSESSMENT
Patient is a 89 year old female with a history of HFmrEF, aortic stenosis hypothyroidism, Gout, HTN, COPD, Afib on xarelto, anemia, PPM, and osteopenia who presented to  ED on 8/25 c/o leg pain after hitting her leg, found to have significant hematoma.  She has been managed medically but became dyspneic after fluid bolus, currently on lasix drip    TTE 8/29:  LVEF 45% LVEDd 3.65 IVS 1.23 PWd 1.19 LA Severely Dilated, RA Severely Dilated,  RV moderately dilated with diffuse hypokinesis, Mild to Mod MR, Severe AS MIKE 0.8 PVel 3.16 PG/MG 39/25 Severe TR, RVSP 61     HC/RHC 9/6/23: LCx branch lesion  RA 17 RV 36/5 PA 54/17 (30) PCW 17 PA Sat 52.3  CO 3.68 CI 2.32

## 2023-09-06 NOTE — PROGRESS NOTE ADULT - SUBJECTIVE AND OBJECTIVE BOX
CHIEF COMPLAINT: Patient is a 89y old  Female who presents with a chief complaint of LLE hematoma (28 Aug 2023 10:28)      HPI:  Level 3 Trauma Activation    HPI:    Patient is a 89y year old female with PMHx chronic CHF, hypothyroidism, Gout, HTN, COPD, Afib on xarelto, anemia, CAD, and osteopenia presents to the ED c/o left leg pain after hitting her leg yesterday 8/24/23, when going to answer the phone. Pt banged it on furniture and developed a large hematoma. Pt having difficulty ambulating, no other injuries. Last took Xarelto last night. No history of DVT/PE.    Cardiology consulted to evaluate for CHF. This is a pt. of Dr. Harris, last seen in the office on 7/31/23. Pt. takes lasix and torsemide prn at home for HFpEF and xarelto and toprol xl 25 mg daily for Chronic Afib.   At present, admitted for leg hematoma, hypotensive, denies chest pain/SOB/palpitations.     Pt denied head strike LOC or any traumatic injuries/complaints   (25 Aug 2023 13:27)    8/29/23 Seated in bedside chair awake alert no complaints tele -120  8/30/23; seen in bed, looks comfortable, no complaints, Tele: Afib 80s-90s - reconsulted by hospitalist one hr after seeing pt. for chest pain  - seen pt. with Micki Harris - pt. denies chest pain - says she is SOB, did not have chest pain, trops and EKG ordered  8/31/23: pt. with sign and symptoms of fluid overload - elevated JVD,, started on ivp lasix. Tele: Afib    9/1/23: feels better, less SOB, diuresis changed to lasix gtt by HF, Tele: Afib , pt. is being Tx to 3E  9/2/23: Awake alert Tele AF V paced Breathing comfortably SOB improved No CP  9/3/23: seated in bedside chair, SOB improved/No JVD , Tele AF   9/4/23 Patient is in chair , gets very tachycardic with activity , sob    IV lasix drip was discontinued to low BP day before   9/5/23: Pt sitting up in chair. Denies cp.  Still with SOB.  Tele: Afib, V pacing  9/6/23; seen pt. in bed, went for RHC./LHC, tele: Afib V paciing 70-80    MEDICATIONS  (STANDING):  allopurinol 100 milliGRAM(s) Oral daily  calcium carbonate 1250 mG  + Vitamin D (OsCal 500 + D) 1 Tablet(s) Oral daily  furosemide Infusion 5 mG/Hr (2.5 mL/Hr) IV Continuous <Continuous>  levothyroxine 125 MICROGram(s) Oral daily  metoprolol tartrate 12.5 milliGRAM(s) Oral every 12 hours  multivitamin/minerals 1 Tablet(s) Oral daily  polyethylene glycol 3350 17 Gram(s) Oral daily  potassium chloride    Tablet ER 40 milliEquivalent(s) Oral every 4 hours  senna 2 Tablet(s) Oral at bedtime  spironolactone 25 milliGRAM(s) Oral two times a day      MEDICATIONS  (PRN):  acetaminophen     Tablet .. 650 milliGRAM(s) Oral every 6 hours PRN Moderate Pain (4 - 6)  acetaminophen   IVPB .. 1000 milliGRAM(s) IV Intermittent once PRN Temp greater or equal to 38C (100.4F), Mild Pain (1 - 3)  bisacodyl Suppository 10 milliGRAM(s) Rectal daily PRN Constipation  melatonin 3 milliGRAM(s) Oral at bedtime PRN Insomnia  ondansetron Injectable 4 milliGRAM(s) IV Push every 6 hours PRN Nausea and/or Vomiting    Vital Signs Last 24 Hrs  T(C): 36.6 (06 Sep 2023 10:53), Max: 36.7 (06 Sep 2023 04:00)  T(F): 97.9 (06 Sep 2023 10:53), Max: 98.1 (06 Sep 2023 04:00)  HR: 77 (06 Sep 2023 13:10) (73 - 87)  BP: 114/62 (06 Sep 2023 13:10) (83/51 - 114/62)  BP(mean): --  RR: 16 (06 Sep 2023 13:25) (16 - 18)  SpO2: 91% (06 Sep 2023 12:55) (91% - 96%)    Parameters below as of 06 Sep 2023 13:25    O2 Flow (L/min): 4      PHYSICAL EXAM:  Constitutional: NAD, awake and alert  HEENT: No oral cyanosis.  Pulmonary: Non-labored, breath sounds diminished   Cardiovascular: S1 and S2, irregular, 2/6 REBECA   Gastrointestinal: Bowel Sounds present, soft, nontender.   Lymph: Bilateral LE edema.  LLE ACE wrap in place   Neurological: Alert, no focal deficits  Skin: No rashes.  Psych:  Mood & affect appropriate    LABS:                          9.9    7.86  )-----------( 192      ( 05 Sep 2023 06:46 )             31.3     09-05    140  |  98  |  26<H>  ----------------------------<  120<H>  3.6   |  38<H>  |  0.83    Ca    8.7      05 Sep 2023 06:46  Phos  2.6     09-05  Mg     2.3     09-05              - TroponinI hsT: <-37.45  - TroponinI hsT: <-37.45    Radiology/EKG/Echo: in progress       -----------------------------------------------------------------------------------------------------------    < from: TTE Echo Complete w/o Contrast w/ Doppler (08.29.23 @ 08:45) >   Impression     Summary     The mitral valve leaflets appear thickened.   Mild mitral annular calcification is present.   Mild to Moderate mitral regurgitation with two jets is present.   Significant fibrocalcific changes noted to the aortic valve leaflets with   restriction in leaflet excursion.   Calculated MIKE is .91cm^2; this finding is consistent with severe aortic   stenosis.   The tricuspid valve leaflets appear mildly thickened and/or calcified,   but   open well.   Severe (4+) tricuspid valve regurgitation is present.   Severe pulmonary hypertension.   Pulmonic valve not well seen.   Mild to moderate pulmonic valvular regurgitation is present.   The left atrium is severely dilated.   Left ventricle systolic function appears mildly impaired; segmental wall   motion abnormalities noted.   Mild concentric left ventricular hypertrophy is present.   Estimated Ejection Fraction is 45 %.   The interventricular septum appears flattened consistent with an RV   pressure/volume overload.   The right atrium appears severely dilated.   A device wire is seen inthe RV and RA.   The right ventricle exhibits moderate dilation, diffuse hypokinesis, and   depression of contractility based on TAPSE.   A device wire is seen in the RV and RA.   Pleural effusion - is present.   IVC is dilated and not collapsing with inspiration.      < end of copied text >    < from: Transthoracic Echocardiogram Follow Up (02.25.22 @ 09:04) >     Impression     Summary     Limited study to assess left ventricular function.   The left ventricle cavity is underdistended.   The interventricular septum appears flattened consistent with an RV   pressure/volume overload.   Estimated left ventricular ejection fraction is 70 %.   The right atrium appears severely dilated.   A device wire is seen in the RV and RA.   The right ventricle is mildly dilated.   The tricuspid valve leaflets are thin and pliable; valve motion is   normal.   Severe (4+) tricuspid valve regurgitation is present.   Severe pulmonary hypertension.   No evidence of a significant pericardial effusion.(Trace)   Pleural effusion - is present.     Signature     ----------------------------------------------------------------   Electronically signed by Edward David MD(Interpreting   physician) on 02/25/2022 01:46 PM    < end of copied text >        < from: TTE Echo Complete w/o Contrast w/ Doppler (08.29.23 @ 08:45) >     The mitral valve leaflets appear thickened.   Mild mitral annular calcification is present.   Mild to Moderate mitral regurgitation with two jets is present.   Significant fibrocalcific changes noted to the aortic valve leaflets with   restriction in leaflet excursion.   Calculated MIKE is .91cm^2; this finding is consistent with severe aortic   stenosis.   The tricuspid valve leaflets appear mildly thickened and/or calcified,   but   open well.   Severe (4+) tricuspid valve regurgitation is present.   Severe pulmonary hypertension.   Pulmonic valve not well seen.   Mild to moderate pulmonic valvular regurgitation is present.   The left atrium is severely dilated.   Left ventricle systolic function appears mildly impaired; segmental wall   motion abnormalities noted.   Mild concentric left ventricular hypertrophy is present.   Estimated Ejection Fraction is 45 %.   The interventricular septum appears flattened consistent with an RV   pressure/volume overload.   The right atrium appears severely dilated.   A device wire is seen inthe RV and RA.   The right ventricle exhibits moderate dilation, diffuse hypokinesis, and   depression of contractility based on TAPSE.   A device wire is seen in the RV and RA.   Pleural effusion - is present.   IVC is dilated and not collapsing with inspiration.     Signature    < end of copied text >      Monitor afib

## 2023-09-06 NOTE — PROGRESS NOTE ADULT - SUBJECTIVE AND OBJECTIVE BOX
Subjective:  Breathing better than yesterday, fatigued after cath today     Medications:  acetaminophen     Tablet .. 650 milliGRAM(s) Oral every 6 hours PRN  allopurinol 100 milliGRAM(s) Oral daily  bisacodyl Suppository 10 milliGRAM(s) Rectal daily PRN  calcium carbonate 1250 mG  + Vitamin D (OsCal 500 + D) 1 Tablet(s) Oral daily  furosemide Infusion 5 mG/Hr IV Continuous <Continuous>  levothyroxine 125 MICROGram(s) Oral daily  melatonin 3 milliGRAM(s) Oral at bedtime PRN  metoprolol tartrate 12.5 milliGRAM(s) Oral every 12 hours  multivitamin/minerals 1 Tablet(s) Oral daily  ondansetron Injectable 4 milliGRAM(s) IV Push every 6 hours PRN  polyethylene glycol 3350 17 Gram(s) Oral daily  senna 2 Tablet(s) Oral at bedtime  spironolactone 25 milliGRAM(s) Oral two times a day      Physical Exam:    Vitals:  Vital Signs Last 24 Hours  T(C): 36.6 (23 @ 10:53), Max: 36.7 (23 @ 04:00)  HR: 85 (23 @ 14:49) (73 - 87)  BP: 107/57 (23 @ 14:49) (83/51 - 114/62)  RR: 16 (23 @ 13:50) (16 - 18)  SpO2: 100% (23 @ 14:49) (91% - 100%)    Weight in k ( @ 05:58)    I&O's Summary    05 Sep 2023 07:01  -  06 Sep 2023 07:00  --------------------------------------------------------  IN: 4.6 mL / OUT: 1600 mL / NET: -1595.4 mL        Tele: Afib 70s-80s     General: No distress. Comfortable.  HEENT: EOM intact.  Neck: Neck supple. JVP elevated with v-waves. No masses  Chest: Clear to auscultation bilaterally  CV: Normal S1 and S2. Systolic murmur noted, No rub, or gallops. Radial pulses normal.  Abdomen: Soft, non-distended, non-tender  Skin: No rashes or skin breakdown  Extremities:  Warm 1+ LE edema   Neurology: Alert and oriented times three. Sensation intact  Psych: Affect normal    Labs:                        10.1   8.14  )-----------( 216      ( 06 Sep 2023 07:12 )             31.6         139  |  96  |  25<H>  ----------------------------<  117<H>  3.6   |  39<H>  |  0.83    Ca    8.3<L>      06 Sep 2023 07:12  Phos  3.0       Mg     2.0

## 2023-09-06 NOTE — DIETITIAN INITIAL EVALUATION ADULT - OTHER INFO
Patient is a 89y year old female with PMHx chronic CHF, hypothyroidism, Gout, HTN, COPD, Afib on xarelto, anemia, CAD, and osteopenia presents to the ED c/o left leg pain after hitting her leg yesterday 8/24/23, when going to answer the phone. Pt banged it on furniture and developed a large hematoma. Pt having difficulty ambulating, no other injuries. Last took Xarelto last night. No history of DVT/PE.     Patient is a 89y year old female with PMHx chronic CHF, hypothyroidism, Gout, HTN, COPD, Afib on xarelto, anemia, CAD, and osteopenia presents to the ED c/o left leg pain after hitting her leg yesterday 8/24/23, when going to answer the phone. Pt banged it on furniture and developed a large hematoma. Pt having difficulty ambulating, no other injuries. Last took Xarelto last night. No history of DVT/PE.    Admit dx is injury of unspecified body region  Unable to get bedscale wt  EMR wt is 57 kg   125#  NFPE reveals muscle wasting, fat wasting   PO intake estimated < 75% ENN > one month   PBNP is elevated  BGs elevated, suggest check HgbA1c  suggest Confirm Goals of Care regarding nutrition support  Recommendations to follow in Plan/Intervention

## 2023-09-06 NOTE — DIETITIAN INITIAL EVALUATION ADULT - PERTINENT MEDS FT
MEDICATIONS  (STANDING):  allopurinol 100 milliGRAM(s) Oral daily  calcium carbonate 1250 mG  + Vitamin D (OsCal 500 + D) 1 Tablet(s) Oral daily  furosemide Infusion 5 mG/Hr (2.5 mL/Hr) IV Continuous <Continuous>  levothyroxine 125 MICROGram(s) Oral daily  metoprolol tartrate 12.5 milliGRAM(s) Oral every 12 hours  multivitamin/minerals 1 Tablet(s) Oral daily  polyethylene glycol 3350 17 Gram(s) Oral daily  potassium chloride    Tablet ER 40 milliEquivalent(s) Oral every 4 hours  senna 2 Tablet(s) Oral at bedtime  spironolactone 25 milliGRAM(s) Oral two times a day    MEDICATIONS  (PRN):  acetaminophen     Tablet .. 650 milliGRAM(s) Oral every 6 hours PRN Moderate Pain (4 - 6)  bisacodyl Suppository 10 milliGRAM(s) Rectal daily PRN Constipation  melatonin 3 milliGRAM(s) Oral at bedtime PRN Insomnia  ondansetron Injectable 4 milliGRAM(s) IV Push every 6 hours PRN Nausea and/or Vomiting

## 2023-09-06 NOTE — PACU DISCHARGE NOTE - COMMENTS
Report given to Ellie LEVY @ [facility]- No acute distress, bleeding or Hematoma at this time- Cardiac monitor in place- Safety maintained

## 2023-09-06 NOTE — BRIEF OPERATIVE NOTE - NSICDXBRIEFPOSTOP_GEN_ALL_CORE_FT
POST-OP DIAGNOSIS:  SOB (shortness of breath) 06-Sep-2023 16:00:26  Marina Ferrer  Non-occlusive coronary artery disease 06-Sep-2023 16:00:49  Marina Ferrer

## 2023-09-06 NOTE — DIETITIAN INITIAL EVALUATION ADULT - ORAL INTAKE PTA/DIET HISTORY
Pt lives with aide.  She eats three meals a day.  Based on pt's dietary recall, PO intake estimated < 75% ENN > one month.

## 2023-09-06 NOTE — PROGRESS NOTE ADULT - PROBLEM SELECTOR PLAN 1
Significantly volume overloaded on exam  -Continue Lasix IV drip 5 mg/hr   - Increase spironolactone to spironolactone 50 mg in AM and 25 mg in PM   - BID electrolytes, keep K > 2 < 5   - Further GDMT as patient progresses   - repeat echo prior to DC   - Strict I & Os  - Daily standing weights.

## 2023-09-06 NOTE — PROGRESS NOTE ADULT - ASSESSMENT
Patient is a 89 year old female with a history of HFmrEF, aortic stenosis hypothyroidism, Gout, HTN, COPD, Afib on xarelto, anemia, PPM, and osteopenia who presented to  ED on 8/25 c/o leg pain after hitting her leg, found to have significant hematoma.  She has been managed medically but became dyspneic after fluid bolus and is now being evaluated by heart failure team    TTE 8/29:  LVEF 45% LVEDd 3.65 IVS 1.23 PWd 1.19 LA Severely Dilated, RA Severely Dilated,  RV moderately dilated with diffuse hypokinesis, Mild to Mod MR, Severe AS MIKE 0.8 PVel 3.16 PG/MG 39/25 Severe TR, RVSP 61     LHC/RHC 9/6/23: LCx branch lesion  RA 17 RV 36/5 PA 54/17 (30) PCW 17 PA Sat 52.3  CO 3.68 CI 2.32

## 2023-09-06 NOTE — DIETITIAN INITIAL EVALUATION ADULT - NS FNS DIET ORDER
Diet, DASH/TLC:   Sodium & Cholesterol Restricted  1500mL Fluid Restriction ([id number]) (09-05-23 @ 15:39)

## 2023-09-06 NOTE — CONSULT NOTE ADULT - PROVIDER SPECIALTY LIST ADULT
Hospitalist
Electrophysiology
Hospitalist
Hospitalist
Urology
Heart Failure
Intervent Radiology
Palliative Care
Cardiology
Structural Heart

## 2023-09-06 NOTE — PROGRESS NOTE ADULT - PROBLEM SELECTOR PLAN 6
Detail Level: Detailed Currently denies CP   Troponin negative Initiate Treatment: OTC Mediplast tape to lesions on palmar surface on hands BL - QD until resolved Plan: Warts on Rt dorsal hand only tx'd once with LN2

## 2023-09-06 NOTE — CONSULT NOTE ADULT - REASON FOR ADMISSION
LLE hematoma

## 2023-09-06 NOTE — PROGRESS NOTE ADULT - PROBLEM SELECTOR PLAN 2
Repeat Echo shows severe AS, Severe TR, severe pHTN Structural Heart Team consulted appreciated   pt. went for  RHC and LHC today 9/6

## 2023-09-06 NOTE — DIETITIAN NUTRITION RISK NOTIFICATION - TREATMENT: THE FOLLOWING DIET HAS BEEN RECOMMENDED
Diet, DASH/TLC:   Sodium & Cholesterol Restricted  1500mL Fluid Restriction ([id number]) (09-05-23 @ 15:39) [Active]

## 2023-09-06 NOTE — DIETITIAN INITIAL EVALUATION ADULT - PERTINENT LABORATORY DATA
09-06    139  |  96  |  25<H>  ----------------------------<  117<H>  3.6   |  39<H>  |  0.83    Ca    8.3<L>      06 Sep 2023 07:12  Phos  3.0     09-06  Mg     2.0     09-06    POCT Blood Glucose.: 206 mg/dL (09-05-23 @ 10:21)   Interpolation Flap Text: A decision was made to reconstruct the defect utilizing an interpolation axial flap and a staged reconstruction.  A telfa template was made of the defect.  This telfa template was then used to outline the interpolation flap.  The donor area for the pedicle flap was then injected with anesthesia.  The flap was excised through the skin and subcutaneous tissue down to the layer of the underlying musculature.  The interpolation flap was carefully excised within this deep plane to maintain its blood supply.  The edges of the donor site were undermined.   The donor site was closed in a primary fashion.  The pedicle was then rotated into position and sutured.  Once the tube was sutured into place, adequate blood supply was confirmed with blanching and refill.  The pedicle was then wrapped with xeroform gauze and dressed appropriately with a telfa and gauze bandage to ensure continued blood supply and protect the attached pedicle.

## 2023-09-06 NOTE — CONSULT NOTE ADULT - PROBLEM SELECTOR RECOMMENDATION 3
on lasix gtt  - plan per HF team
rate controlled, hold AC due to large hematoma - resume when cleared by Trauma Sx, hold BB due to hypotension - resume when SBP>100
- on toprol 12.5 mg po BID  - AC on hold due to hematoma  - Consider EP eval for watchman

## 2023-09-06 NOTE — CONSULT NOTE ADULT - SUBJECTIVE AND OBJECTIVE BOX
HPI:    Patient is a 89 year old female with a history of HFmrEF, aortic stenosis hypothyroidism, Gout, HTN, COPD, Afib on xarelto, anemia, PPM, and osteopenia who presented to  ED on  c/o leg pain after hittinge her leg, found to have significant hematoma.    The patient reports progressively worsening dyspnea over the past year.  She was admitted for her dyspnea about a year ago and since had taken diuretic PRN.  She lives at home with  occasional help from aides. She reports being short of breath when walking as little as 10 feet.  She also reports consistent LE edema for more than 1 year.  She denies syncope, lightheadedness, dizziness, chest pain, palpitations, PND, orthopnea, nausea, vomiting.        PAST MEDICAL & SURGICAL HISTORY:  Mitral valve insufficiency  Aortic valve regurgitation  Osteopenia  CAD (coronary artery disease)  Gout  Hypothyroid  CHF (congestive heart failure)  Anxiety  Anemia  HTN (hypertension)  Afib  COPD (chronic obstructive pulmonary disease)  Chronic obstructive pulmonary disease (COPD)  HTN (hypertension)  Cardiac pacemaker  Gout  Hypothyroidism  Insomnia  Chronic CHF    Status cardiac pacemaker  S/P knee replacement        REVIEW OF SYSTEMS:  14 point ROS negative in detail apart from as documented in HPI.    MEDICATIONS  (STANDING):  allopurinol 100 milliGRAM(s) Oral daily  calcium carbonate 1250 mG  + Vitamin D (OsCal 500 + D) 1 Tablet(s) Oral daily  furosemide Infusion 5 mG/Hr (2.5 mL/Hr) IV Continuous <Continuous>  levothyroxine 125 MICROGram(s) Oral daily  metoprolol tartrate 12.5 milliGRAM(s) Oral every 12 hours  multivitamin/minerals 1 Tablet(s) Oral daily  polyethylene glycol 3350 17 Gram(s) Oral daily  senna 2 Tablet(s) Oral at bedtime  spironolactone 25 milliGRAM(s) Oral two times a day    MEDICATIONS  (PRN):  acetaminophen     Tablet .. 650 milliGRAM(s) Oral every 6 hours PRN Moderate Pain (4 - 6)  bisacodyl Suppository 10 milliGRAM(s) Rectal daily PRN Constipation  melatonin 3 milliGRAM(s) Oral at bedtime PRN Insomnia  ondansetron Injectable 4 milliGRAM(s) IV Push every 6 hours PRN Nausea and/or Vomiting    Home Medications:  allopurinol 100 mg oral tablet: 1 tab(s) orally once a day (25 Aug 2023 12:41)  Caltrate 600 + D oral tablet: 1 tab(s) orally once a day (25 Aug 2023 12:43)  furosemide 20 mg oral tablet: 1 tab(s) orally once a day as needed for (25 Aug 2023 12:44)  furosemide 40 mg oral tablet: 1 tab(s) orally once a day (25 Aug 2023 12:41)  levothyroxine 125 mcg (0.125 mg) oral tablet: 1 tab(s) orally once a day (25 Aug 2023 12:15)  metoprolol succinate 25 mg oral tablet, extended release: 1 tab(s) orally once a day (25 Aug 2023 12:44)  nystatin 100,000 units/g topical cream: Apply topically to affected area 2 times a day to foot (25 Aug 2023 12:44)  potassium chloride 20 mEq oral tablet, extended release: 1 tab(s) orally once a day (25 Aug 2023 12:41)  PreserVision AREDS 2 oral capsule: 1 cap(s) orally once a day (25 Aug 2023 12:44)  rivaroxaban 15 mg oral tablet: 1 tab(s) orally once a day (before a meal) (25 Aug 2023 12:15)    Allergies    codeine (Unknown)    Intolerances      Social History:    FAMILY HISTORY:  No pertinent family history in first degree relatives    No pertinent family history in first degree relatives        ICU Vital Signs Last 24 Hrs  T(C): 36.6, Max: 36.7 ( @ 04:00)  HR: 85 (73 - 87)  BP: 107/57 (83/51 - 114/62)  BP(mean): --  ABP: --  ABP(mean): --  RR: 16 (16 - 18)  SpO2: 100% (91% - 100%)    Weight in k (23)  Weight in k.1 (23)      I&O's Summary Last 24 Hrs    IN: 4.6 mL / OUT: 1600 mL / NET: -1595.4 mL      Tele: Afib 70s-80s     Physical Exam:     General: No distress. Comfortable.  HEENT: EOM intact.  Neck: Neck supple. JVP elevated with v-waves. No masses  Chest: Clear to auscultation bilaterally  CV: Normal S1 and S2. Systolic murmur noted, No rub, or gallops. Radial pulses normal.  Abdomen: Soft, non-distended, non-tender  Skin: No rashes or skin breakdown  Extremities:  Warm 1+ LE edema   Neurology: Alert and oriented times three. Sensation intact  Psych: Affect normal      Labs:    ( 23 @ 07:12 )               10.1   8.14  )--------( 216                  31.6     ( 23 @ 07:12 )     139  |  96  |  25  ---------------------<  117  3.6  |  39  |  0.83    Ca 8.3  Phos 3.0  Mg 2.0        ( 23 @ 16:07 )  TropHS x     / CK 54    / CKMB x              ABG - ( 23 @ 12:05 )  pH: x     / pCO2: x     / pO2: x     / HCO3: x     / Base Excess: x     / SaO2: 92.4           RADIOLOGY & ADDITIONAL STUDIES:

## 2023-09-06 NOTE — PROGRESS NOTE ADULT - NS ATTEND AMEND GEN_ALL_CORE FT
Patient was seen and examined at bedside with the advanced care provider.  I agree with the above with the following exceptions:    s/p RHC/ LHC today.  Non-obstructive CAD present.  Elevated right and left sided filling pressures with CI 2.3.  Will continue with lasix drip at 5mg/hr.  Increase spironolactone to 50mg in AM and 25mg in PM to help with K supplementation.  If hypotension, would hold metoprolol for now.  Continue with lasix drip. Per structural team, no plans for TAVR this admission.  Will continue outpatient work up.

## 2023-09-06 NOTE — PROGRESS NOTE ADULT - ASSESSMENT
89 year-old woman with HTN, CAD, chronic diastolic CHF, chronic atrial fibrillation on Xarelto, hx of PPM placement, COPD, hypothyroidism, gout, presented 8/25 with LLE pain and swelling, difficulty with ambulation, started after striking LLE on piece of furniture the day prior. Patient was found to have hypotension, rapid afib, large superficial L calf hematoma with active bleed. Admitted to Trauma Surgery, since transferred to Medicine as patient's hematoma is being managed conservatively.     Large, traumatic L calf hematoma  Patient with advanced age, moderate MR, severe AS, severe TR with pulmonary hypertension, RV volume overload, borderline BP, afib with RVR (at the time). Appreciate input from Interventional Radiology, deemed very high risk for procedure, deferred intervention, managed supportively. Trauma Surgery also advised conservative management. Reassuringly, patient remains stable, hemodynamically. Hgb stable, ~10. Completed 7-day empiric course of Unasyn as was previously ordered by Surgery  - Xarelto remains held since admission, likely to be held indefinitely, pending further discussion with Trauma Surgery, Cardiology, patient/family. Being evaluated for Watchman, see below  - Continue to monitor    Acute metabolic encephalopathy  Likely multi factorial due to hematoma, hypoxia, congestive heart failure. Underlying causes were treated/managed, and mental status is now improved.   - Continue to monitor, re-orient frequently    Acute respiratory failure with hypoxia  Likely multi factorial due to CHF, severe heart valve disease, severe pHTN. Current SPO2 97% on 2L/min via NC.   - Wean O2 as tolerated, goal SPO2 92% or greater on room air  - Continue to monitor, treat underlying causes  - Encourage incentive spirometry    Acute on chronic systolic heart failure  Echo showed EF 45% perhaps 2/2 tachyarrhythmia, may also be related to multiple heart valve disease  -[facility], C 9/6 report pending  - Continue Lasix (currently drip), Metoprolol XL, spironolactone (increasing) (cautious diuresis in setting of severe AS)  - Is and Os, daily wt  - Low Na diet, fluid restriction  - F/u CHF Team, Cardiology, Structural Heart Team    Significant heart valve disease.   Echo 8/29 w/ mild to mod MR, severe AS, severe TR, mild to mod SD, severe pHTN, severely dilated LA, LV systolic function mildly impaired; segmental wall motion abnormalities noted, mild concentric LVH,  LVEF 45%, RV pressure/volume overload, RA severely dilated, RV with moderate dilation, diffuse hypokinesis, and depression of contractility based on TAPSE. Structural Heart Team consulted.   - F/u Structural Heart Team eval    Chronic atrial fibrillation  Rapid rates upon admission in setting of pain, large LLE hematoma. Cardiology following. HR now improved as hematoma appears to have stabilized. Xarelto on hold due to the hematoma.  - Continue rate control with metoprolol, can consider addition of digoxin if rapid rates  - Continue to HOLD Xarelto given large hematoma  - Referred to Cardiac EP re possible Watchman LAAC procedure / device to provide a non-pharmacologic alternative for non-valvular afib related stroke risk-reduction- outpt f/u    Hx of pacemaker placement  S/P PPM interrogation; Normal functioning single chamber PPM with battery longevity 5.9-6.1 years  - Continue to monitor    Hypothyroidism  Stable.   - Continue levothyroxine    Hx of gout  Stable.   - Continue allopurinol    Constipation  Stable.  - Continue bowel regimen, monitor for BM    Urinary retention  -jackson in place, maintain jackson for now while on lasix drip    Physical deconditioning and debility  PT eval consulted  - F/u PT eval  - OOB to chair daily      DVT px: Off pharmacologic DVT px / AC given hematoma, SCDs  Code Status: DNR/DNI  Dispo: Remains on lasix drip, medically active

## 2023-09-06 NOTE — DIETITIAN INITIAL EVALUATION ADULT - NAME AND PHONE
Ellie Lerma RDN, CDN, [facility]      [phone]   [email or web address]

## 2023-09-06 NOTE — PROGRESS NOTE ADULT - PROBLEM SELECTOR PLAN 2
- Structural team consulted  - cath tomorrow 9/6  - echo to be reviewed - Structural team consulted  - cath today with non obstructive diease.   - echo to be reviewed

## 2023-09-07 NOTE — PROGRESS NOTE ADULT - ASSESSMENT
89y year old female that EP was called to assess for possible watchman procedure.  Pt states she was on xarelto without any problems for 10 years,  as of recently she sustained the trauma to her LLE with large hematoma formation  She  presents to the ED c/o left leg pain/ now s/p large LLE hematoma Off OAC presently     She is a high fall risk. Afib rate  is controlled con't Metoprolol 12.5 mgs q 12 hours. OAC is being held to to hematoma,  PMHx chronic CHF, hypothyroidism, Gout, HTN, COPD, chronic Afib (on Xarelto x 10 years), anemia,  S/P PPM, CAD, and osteopenia, recent TTE shows 4+ TR, mild-mod MR, severe AS, severe PAH, LVEF estimated at 45% and severely dilated atrium. LHC 9.6.23 shows non obstructive cad, distal clx small vessel 70% lesion, severe AS.   -resume xarelto when hematoma resolves  -follow with structural heart team for TAVR  -elevated chadsvasc, remains high risk for cva and not suitable for longterm oac due to high risk for serious bleeding events, thus LAAO Watchman implant is likely beneficial at this time. Would postpone watchman implant after TAVR. Discussed i/r/b/a of watchman implant with daughter and pt and she will follow with EP outpatient

## 2023-09-07 NOTE — PROGRESS NOTE ADULT - PROBLEM SELECTOR PLAN 1
Echo shows EF 45% possibly 2/2 tachyarrhythmia contributed by valvular heart disease, HF team on board,   , pressure soft, severe AS, lasix gtt resumed as per HF,  Continue BB, Spirolactone,  Closely monitor renal function   Lauren carpio    Advise strict I&O

## 2023-09-07 NOTE — PROGRESS NOTE ADULT - PROBLEM SELECTOR PLAN 2
Repeat Echo shows severe AS, Severe TR, severe pHTN Structural Heart Team consulted appreciated   s/p  RHC and LHC on 9/6 revealing non obstructive CAD and elevated right sided pressures, aggressive medical management, ongoing evaluation for TAVR by Structural Heart

## 2023-09-07 NOTE — PROGRESS NOTE ADULT - SUBJECTIVE AND OBJECTIVE BOX
Subjective:  pt seen and examined,     8.30 ECG Afib VR 90 bpm RBBB LAHB  TELE: Afib rate controlled     < from: TTE Echo Complete w/o Contrast w/ Doppler (08.29.23 @ 08:45) >   Impression     Summary     The mitral valve leaflets appear thickened.   Mild mitral annular calcification is present.   Mild to Moderate mitral regurgitation with two jets is present.   Significant fibrocalcific changes noted to the aortic valve leaflets with   restriction in leaflet excursion.   Calculated MIKE is .91cm^2; this finding is consistent with severe aortic   stenosis.   The tricuspid valve leaflets appear mildly thickened and/or calcified,   but   open well.   Severe (4+) tricuspid valve regurgitation is present.   Severe pulmonary hypertension.   Pulmonic valve not well seen.   Mild to moderate pulmonic valvular regurgitation is present.   The left atrium is severely dilated.   Left ventricle systolic function appears mildly impaired; segmental wall   motion abnormalities noted.   Mild concentric left ventricular hypertrophy is present.   Estimated Ejection Fraction is 45 %.   The interventricular septum appears flattened consistent with an RV   pressure/volume overload.   The right atrium appears severely dilated.   A device wire is seen inthe RV and RA.   The right ventricle exhibits moderate dilation, diffuse hypokinesis, and   depression of contractility based on TAPSE.   A device wire is seen in the RV and RA.   Pleural effusion - is present.   IVC is dilated and not collapsing with inspiration.     Signature     ----------------------------------------------------------------   Electronically signed by Gary Aguirre MD(Interpreting   physician) on 08/29/2023 09:28 AM    < end of copied text >      MEDICATIONS  (STANDING):  allopurinol 100 milliGRAM(s) Oral daily  calcium carbonate 1250 mG  + Vitamin D (OsCal 500 + D) 1 Tablet(s) Oral daily  dextrose 5%. 1000 milliLiter(s) (100 mL/Hr) IV Continuous <Continuous>  dextrose 5%. 1000 milliLiter(s) (50 mL/Hr) IV Continuous <Continuous>  dextrose 50% Injectable 12.5 Gram(s) IV Push once  dextrose 50% Injectable 25 Gram(s) IV Push once  dextrose 50% Injectable 25 Gram(s) IV Push once  furosemide Infusion 5 mG/Hr (2.5 mL/Hr) IV Continuous <Continuous>  glucagon  Injectable 1 milliGRAM(s) IntraMuscular once  insulin lispro (ADMELOG) corrective regimen sliding scale   SubCutaneous three times a day before meals  insulin lispro (ADMELOG) corrective regimen sliding scale   SubCutaneous at bedtime  levothyroxine 125 MICROGram(s) Oral daily  metoprolol tartrate 12.5 milliGRAM(s) Oral every 12 hours  multivitamin/minerals 1 Tablet(s) Oral daily  polyethylene glycol 3350 17 Gram(s) Oral daily  senna 2 Tablet(s) Oral at bedtime  spironolactone 50 milliGRAM(s) Oral daily  spironolactone 25 milliGRAM(s) Oral at bedtime    MEDICATIONS  (PRN):  acetaminophen     Tablet .. 650 milliGRAM(s) Oral every 6 hours PRN Moderate Pain (4 - 6)  bisacodyl Suppository 10 milliGRAM(s) Rectal daily PRN Constipation  dextrose Oral Gel 15 Gram(s) Oral once PRN Blood Glucose LESS THAN 70 milliGRAM(s)/deciliter  melatonin 3 milliGRAM(s) Oral at bedtime PRN Insomnia  ondansetron Injectable 4 milliGRAM(s) IV Push every 6 hours PRN Nausea and/or Vomiting      Vital Signs Last 24 Hrs  T(C): 36.5 (07 Sep 2023 07:25), Max: 36.7 (06 Sep 2023 20:12)  T(F): 97.7 (07 Sep 2023 07:25), Max: 98.1 (06 Sep 2023 20:12)  HR: 85 (07 Sep 2023 07:25) (77 - 98)  BP: 108/59 (07 Sep 2023 12:08) (98/75 - 120/66)  BP(mean): --  RR: 18 (07 Sep 2023 12:08) (16 - 18)  SpO2: 100% (07 Sep 2023 12:08) (92% - 100%)    Parameters below as of 07 Sep 2023 12:08  Patient On (Oxygen Delivery Method): nasal cannula  O2 Flow (L/min): 2      PHYSICAL EXAMINATION:    GENERAL APPEARANCE:  Pt. is not currently dyspneic, in no distress. Pt. is alert, oriented, and pleasant.  HEENT:  Pupils are normal and react normally. No icterus. Mucous membranes well colored.  NECK:  Supple. No lymphadenopathy. Jugular venous pressure not elevated. Carotids equal.   HEART: Afib. There are no murmurs, rubs or gallops noted  CHEST:  Chest is clear to auscultation. Normal respiratory effort.  ABDOMEN:  Soft and nontender.   EXTREMITIES:  There is no edema. warm/dry.    LABS:                        10.4   8.94  )-----------( 217      ( 07 Sep 2023 08:55 )             32.9     09-07    137  |  96  |  22  ----------------------------<  113<H>  4.6   |  37<H>  |  0.85    Ca    8.4<L>      07 Sep 2023 07:25  Phos  3.0     09-06  Mg     2.0     09-07        Urinalysis Basic - ( 07 Sep 2023 07:25 )    Color: x / Appearance: x / SG: x / pH: x  Gluc: 113 mg/dL / Ketone: x  / Bili: x / Urobili: x   Blood: x / Protein: x / Nitrite: x   Leuk Esterase: x / RBC: x / WBC x   Sq Epi: x / Non Sq Epi: x / Bacteria: x

## 2023-09-07 NOTE — PROGRESS NOTE ADULT - ASSESSMENT
89 year-old woman with HTN, CAD, chronic diastolic CHF, chronic atrial fibrillation on Xarelto, hx of PPM placement, COPD, hypothyroidism, gout, presented 8/25 with LLE pain and swelling, difficulty with ambulation, started after striking LLE on piece of furniture the day prior. Patient was found to have hypotension, rapid afib, large superficial L calf hematoma with active bleed. Admitted to Trauma Surgery, since transferred to Medicine as patient's hematoma is being managed conservatively.     Large, traumatic L calf hematoma  Patient with advanced age, moderate MR, severe AS, severe TR with pulmonary hypertension, RV volume overload, borderline BP, afib with RVR (at the time). Appreciate input from Interventional Radiology, deemed very high risk for procedure, deferred intervention, managed supportively. Trauma Surgery also advised conservative management. Reassuringly, patient remains stable, hemodynamically. Hgb stable, ~10. Completed 7-day empiric course of Unasyn as was previously ordered by Surgery  - Xarelto remains held since admission, likely to be held indefinitely, pending further discussion with Trauma Surgery, Cardiology, patient/family. Being evaluated for Watchman, see below  - Continue to monitor    Acute metabolic encephalopathy  Likely multi factorial due to hematoma, hypoxia, congestive heart failure. Underlying causes were treated/managed, and mental status is now improved.   - Continue to monitor, re-orient frequently    Acute respiratory failure with hypoxia  Likely multi factorial due to CHF, severe heart valve disease, severe pHTN. Current SPO2 97% on 2L/min via NC.   - Wean O2 as tolerated, goal SPO2 92% or greater on room air  - Continue to monitor, treat underlying causes  - Encourage incentive spirometry    Acute on chronic systolic heart failure  Echo showed EF 45% perhaps 2/2 tachyarrhythmia, may also be related to multiple heart valve disease  -LHC, RHC 9/6 w elevated R heart pressures, nonobstructive CAD  - Continue Lasix (currently drip), Metoprolol XL, spironolactone (increasing) (cautious diuresis in setting of severe AS)  - will need repeat echo at some point prior to d/c, clarify timing w CHF/cards  - F/u CHF Team, Cardiology, Structural Heart Team    Significant heart valve disease.   Echo 8/29 w/ mild to mod MR, severe AS, severe TR, mild to mod AR, severe pHTN, severely dilated LA, LV systolic function mildly impaired; segmental wall motion abnormalities noted, mild concentric LVH,  LVEF 45%, RV pressure/volume overload, RA severely dilated, RV with moderate dilation, diffuse hypokinesis, and depression of contractility based on TAPSE. Structural Heart Team consulted.   -carotid doppler per structural team  - F/u Structural Heart Team eval    Chronic atrial fibrillation  Rapid rates upon admission in setting of pain, large LLE hematoma. Cardiology following. HR now improved as hematoma appears to have stabilized. Xarelto on hold due to the hematoma.  - Continue rate control with metoprolol, can consider addition of digoxin if rapid rates  - Continue to HOLD Xarelto given large hematoma  - Referred to Cardiac EP re possible Watchman LAAC procedure / device to provide a non-pharmacologic alternative for non-valvular afib related stroke risk-reduction- outpt f/u    Hx of pacemaker placement  S/P PPM interrogation; Normal functioning single chamber PPM with battery longevity 5.9-6.1 years  - Continue to monitor    Hypothyroidism  Stable.   - Continue levothyroxine    Hx of gout  Stable.   - Continue allopurinol    Constipation  Stable.  - Continue bowel regimen, monitor for BM    #DM  -a1c 6.6,  new dx here  -SSI, monitor BG    Urinary retention  -jackson in place, maintain jackson for now while on lasix drip    Physical deconditioning and debility  PT eval consulted  - F/u PT eval  - OOB to chair daily      DVT px: Off pharmacologic DVT px / AC given hematoma, SCDs  Code Status: DNR/DNI  Dispo: Remains on lasix drip, medically active

## 2023-09-07 NOTE — PROGRESS NOTE ADULT - SUBJECTIVE AND OBJECTIVE BOX
HOSPITALIST ATTENDING PROGRESS NOTE    Chart and meds reviewed.  Patient seen and examined.    CC: LLE hematoma    Subjective: Remains on lasix drip.    All other systems reviewed and found to be negative with the exception of what has been described above.    MEDICATIONS  (STANDING):  allopurinol 100 milliGRAM(s) Oral daily  calcium carbonate 1250 mG  + Vitamin D (OsCal 500 + D) 1 Tablet(s) Oral daily  dextrose 5%. 1000 milliLiter(s) (100 mL/Hr) IV Continuous <Continuous>  dextrose 5%. 1000 milliLiter(s) (50 mL/Hr) IV Continuous <Continuous>  dextrose 50% Injectable 12.5 Gram(s) IV Push once  dextrose 50% Injectable 25 Gram(s) IV Push once  dextrose 50% Injectable 25 Gram(s) IV Push once  furosemide Infusion 5 mG/Hr (2.5 mL/Hr) IV Continuous <Continuous>  glucagon  Injectable 1 milliGRAM(s) IntraMuscular once  insulin lispro (ADMELOG) corrective regimen sliding scale   SubCutaneous three times a day before meals  insulin lispro (ADMELOG) corrective regimen sliding scale   SubCutaneous at bedtime  levothyroxine 125 MICROGram(s) Oral daily  metoprolol tartrate 12.5 milliGRAM(s) Oral every 12 hours  multivitamin/minerals 1 Tablet(s) Oral daily  polyethylene glycol 3350 17 Gram(s) Oral daily  senna 2 Tablet(s) Oral at bedtime  spironolactone 25 milliGRAM(s) Oral at bedtime  spironolactone 50 milliGRAM(s) Oral daily    MEDICATIONS  (PRN):  acetaminophen     Tablet .. 650 milliGRAM(s) Oral every 6 hours PRN Moderate Pain (4 - 6)  bisacodyl Suppository 10 milliGRAM(s) Rectal daily PRN Constipation  dextrose Oral Gel 15 Gram(s) Oral once PRN Blood Glucose LESS THAN 70 milliGRAM(s)/deciliter  melatonin 3 milliGRAM(s) Oral at bedtime PRN Insomnia  ondansetron Injectable 4 milliGRAM(s) IV Push every 6 hours PRN Nausea and/or Vomiting      VITALS:  T(F): 97.7 (09-07-23 @ 07:25), Max: 98.1 (09-06-23 @ 20:12)  HR: 85 (09-07-23 @ 07:25) (84 - 98)  BP: 108/59 (09-07-23 @ 12:08) (107/57 - 120/66)  RR: 18 (09-07-23 @ 12:08) (18 - 18)  SpO2: 100% (09-07-23 @ 12:08) (98% - 100%)  Wt(kg): --    I&O's Summary    06 Sep 2023 07:01  -  07 Sep 2023 07:00  --------------------------------------------------------  IN: 25 mL / OUT: 300 mL / NET: -275 mL    07 Sep 2023 07:01  -  07 Sep 2023 14:07  --------------------------------------------------------  IN: 200 mL / OUT: 1650 mL / NET: -1450 mL        CAPILLARY BLOOD GLUCOSE      POCT Blood Glucose.: 117 mg/dL (07 Sep 2023 11:59)      PHYSICAL EXAM:  Gen: NAD  HEENT:  pupils equal and reactive, EOMI, no oropharyngeal lesions, erythema, exudates, oral thrush  NECK:   supple, no carotid bruits, no palpable lymph nodes, no thyromegaly  CV:  +S1, +S2, regular, no murmurs or rubs  RESP:   lungs clear to auscultation bilaterally, no wheezing, rales, rhonchi, good air entry bilaterally  BREAST:  not examined  GI:  abdomen soft, non-tender, non-distended, normal BS, no bruits, no abdominal masses, no palpable masses  RECTAL:  not examined  :  not examined  MSK:   normal muscle tone, no atrophy, no rigidity, no contractions  EXT:  no clubbing, no cyanosis, + edema, no calf pain, swelling or erythema, LLE wrapped.  VASCULAR:  pulses equal and symmetric in the upper and lower extremities  NEURO:  AAOX3, no focal neurological deficits, follows all commands, able to move extremities spontaneously  SKIN:  no ulcers, lesions or rashes    LABS:                            10.4   8.94  )-----------( 217      ( 07 Sep 2023 08:55 )             32.9     09-07    137  |  96  |  22  ----------------------------<  113<H>  4.6   |  37<H>  |  0.85    Ca    8.4<L>      07 Sep 2023 07:25  Phos  3.0     09-06  Mg     2.0     09-07              Urinalysis Basic - ( 07 Sep 2023 07:25 )    Color: x / Appearance: x / SG: x / pH: x  Gluc: 113 mg/dL / Ketone: x  / Bili: x / Urobili: x   Blood: x / Protein: x / Nitrite: x   Leuk Esterase: x / RBC: x / WBC x   Sq Epi: x / Non Sq Epi: x / Bacteria: x      ABG - ( 06 Sep 2023 12:05 )  pH, Arterial: x     pH, Blood: x     /  pCO2: x     /  pO2: x     / HCO3: x     / Base Excess: x     /  SaO2: 92.4      CULTURES:    no new    Additional results/Imaging, I have personally reviewed:  US venous duplex RUE and RLE requested for swelling, study pending    US venous duplex LUE 8/30: No evidence of left upper extremity deep venous thrombosis.    CXR 8/30: Heart enlargement and left-sided pacemaker. There is a persistent mild left base effusion and a persistent mild right base pleural pulmonary process. Chest is similar to March 18, 2022. Quite advanced bilateral shoulder degeneration    CT L shoulder WO 8/30: Severe left glenohumeral arthrosis with chondrocalcinosis and chronic remodeling of the bones. Severe supraspinatus and mild to moderate infraspinatus and subscapularis muscle atrophy. Marked subcoracoid bursitis. Marked biceps tenosynovitis. Small left pleural effusion.    CT head WO 8/28: Mild parenchymal atrophy with patchy periventricular hypoattenuation; a  nonspecific finding which statistically reflects chronic microvascular   ischemic change. Calcified plaque [place] of [place]. Coarse calcifications falx cerebri. No evidence of extra-axial collection, intracranial hemorrhage, mass or   mass effect. Gray-white matter differentiation appears preserved. Complete opacification of the visualized left maxillary sinus, demonstrating cortical thickening with heterogeneous internal density, including areas of hyperdensity. Bilateral mastoid air cells and middle ear regions well-aerated. No aggressive calvarial lesion or acute calvarial fracture visualized. Hyperostosis frontalis. Bilateral cataract surgery.    CTA LLE W/ 8/25: Large superficial left calf hematoma with active bleeding. Three-vessel runoff to the left foot. Two-vessel runoff to the right foot via the anterior tibial/dorsalis pedis and peroneal arteries.    XR L tib fib 8/25: The tibia and fibula are intact. No fracture or radiographic osseous lesion. Posteromedial mid calf 16.5 x 4.3 cm soft tissue hematoma.    Cardiac Testing:  LHC/RHC 9/6: elevated R heart pressures, nonobstructive CAD    TTE 8/29: Mild to mod MR. Severe AS. Severe TR. Mild to mod DC. Severe pHTN. Severely dilated LA. LV systolic function mildly impaired; segmental wall motion abnormalities noted. Mild concentric LVH. LVEF 45%. The interventricular septum appears flattened consistent with an RV pressure/volume overload. RA severely dilated. RV with moderate dilation, diffuse hypokinesis, and depression of contractility based on TAPSE. A device wire is seen in the RV and RA. IVC is dilated and not collapsing with inspiration.    EKG 8/28: Rate 105. Afib RVR    EKG 8/25: Rate 75. Afib. RBBB    Telemetry, personally reviewed:  9/6/23: afib, Vpaced 70-80s, PVCs, couplets  9/7/23:  afib, vpaced

## 2023-09-07 NOTE — PROGRESS NOTE ADULT - NS ATTEND AMEND GEN_ALL_CORE FT
Case discussed with CHERY Lazo and CHF Service (OSITO Rodriguez); continue IV diuresis while monitoring BP and renal function.

## 2023-09-07 NOTE — PROGRESS NOTE ADULT - SUBJECTIVE AND OBJECTIVE BOX
CHIEF COMPLAINT: Patient is a 89y old  Female who presents with a chief complaint of LLE hematoma (28 Aug 2023 10:28)      HPI:  Level 3 Trauma Activation    HPI:    Patient is a 89y year old female with PMHx chronic CHF, hypothyroidism, Gout, HTN, COPD, Afib on xarelto, anemia, CAD, and osteopenia presents to the ED c/o left leg pain after hitting her leg yesterday 8/24/23, when going to answer the phone. Pt banged it on furniture and developed a large hematoma. Pt having difficulty ambulating, no other injuries. Last took Xarelto last night. No history of DVT/PE.    Cardiology consulted to evaluate for CHF. This is a pt. of Dr. Harris, last seen in the office on 7/31/23. Pt. takes lasix and torsemide prn at home for HFpEF and xarelto and toprol xl 25 mg daily for Chronic Afib.   At present, admitted for leg hematoma, hypotensive, denies chest pain/SOB/palpitations.     Pt denied head strike LOC or any traumatic injuries/complaints   (25 Aug 2023 13:27)    8/29/23 Seated in bedside chair awake alert no complaints tele -120  8/30/23; seen in bed, looks comfortable, no complaints, Tele: Afib 80s-90s - reconsulted by hospitalist one hr after seeing pt. for chest pain  - seen pt. with Micki Harris - pt. denies chest pain - says she is SOB, did not have chest pain, trops and EKG ordered  8/31/23: pt. with sign and symptoms of fluid overload - elevated JVD,, started on ivp lasix. Tele: Afib    9/1/23: feels better, less SOB, diuresis changed to lasix gtt by HF, Tele: Afib , pt. is being Tx to 3E  9/2/23: Awake alert Tele AF V paced Breathing comfortably SOB improved No CP  9/3/23: seated in bedside chair, SOB improved/No JVD , Tele AF   9/4/23 Patient is in chair , gets very tachycardic with activity , sob    IV lasix drip was discontinued to low BP day before   9/5/23: Pt sitting up in chair. Denies cp.  Still with SOB.  Tele: Afib, V pacing  9/6/23; seen pt. in bed, went for RHC/LHC, tele: Afib V pacing 70-80  9/7/23: feels tired, no cardiac complaints, s/p LHC/RHC: non obstructive CAD, elevated right sided pressures, tele: Afib 90     MEDICATIONS  (STANDING):  allopurinol 100 milliGRAM(s) Oral daily  calcium carbonate 1250 mG  + Vitamin D (OsCal 500 + D) 1 Tablet(s) Oral daily  dextrose 5%. 1000 milliLiter(s) (100 mL/Hr) IV Continuous <Continuous>  dextrose 5%. 1000 milliLiter(s) (50 mL/Hr) IV Continuous <Continuous>  dextrose 50% Injectable 12.5 Gram(s) IV Push once  dextrose 50% Injectable 25 Gram(s) IV Push once  dextrose 50% Injectable 25 Gram(s) IV Push once  furosemide Infusion 5 mG/Hr (2.5 mL/Hr) IV Continuous <Continuous>  glucagon  Injectable 1 milliGRAM(s) IntraMuscular once  insulin lispro (ADMELOG) corrective regimen sliding scale   SubCutaneous three times a day before meals  insulin lispro (ADMELOG) corrective regimen sliding scale   SubCutaneous at bedtime  levothyroxine 125 MICROGram(s) Oral daily  metoprolol tartrate 12.5 milliGRAM(s) Oral every 12 hours  multivitamin/minerals 1 Tablet(s) Oral daily  polyethylene glycol 3350 17 Gram(s) Oral daily  senna 2 Tablet(s) Oral at bedtime  spironolactone 50 milliGRAM(s) Oral daily  spironolactone 25 milliGRAM(s) Oral at bedtime      MEDICATIONS  (PRN):  acetaminophen     Tablet .. 650 milliGRAM(s) Oral every 6 hours PRN Moderate Pain (4 - 6)  acetaminophen   IVPB .. 1000 milliGRAM(s) IV Intermittent once PRN Temp greater or equal to 38C (100.4F), Mild Pain (1 - 3)  bisacodyl Suppository 10 milliGRAM(s) Rectal daily PRN Constipation  melatonin 3 milliGRAM(s) Oral at bedtime PRN Insomnia  ondansetron Injectable 4 milliGRAM(s) IV Push every 6 hours PRN Nausea and/or Vomiting    Vital Signs Last 24 Hrs  T(C): 36.5 (07 Sep 2023 07:25), Max: 36.7 (06 Sep 2023 20:12)  T(F): 97.7 (07 Sep 2023 07:25), Max: 98.1 (06 Sep 2023 20:12)  HR: 85 (07 Sep 2023 07:25) (77 - 98)  BP: 120/66 (07 Sep 2023 07:25) (98/75 - 120/66)  BP(mean): --  RR: 18 (07 Sep 2023 07:25) (16 - 18)  SpO2: 100% (07 Sep 2023 07:25) (91% - 100%)    Parameters below as of 07 Sep 2023 07:25  Patient On (Oxygen Delivery Method): nasal cannula      PHYSICAL EXAM:  Constitutional: NAD, awake and alert  HEENT: No oral cyanosis.  Pulmonary: Non-labored, breath sounds diminished   Cardiovascular: S1 and S2, irregular, 2/6 REBECA   Gastrointestinal: Bowel Sounds present, soft, nontender.   Lymph: Bilateral LE edema.  LLE ACE wrap in place   Neurological: Alert, no focal deficits  Skin: No rashes.  Psych:  Mood & affect appropriate    LABS: reviewed                          10.4   8.94  )-----------( 217      ( 07 Sep 2023 08:55 )             32.9     09-07    137  |  96  |  22  ----------------------------<  113<H>  4.6   |  37<H>  |  0.85    Ca    8.4<L>      07 Sep 2023 07:25  Phos  3.0     09-06  Mg     2.0     09-07      - TroponinI hsT:             - TroponinI hsT: <-37.45  - TroponinI hsT: <-37.45    Radiology/EKG/Echo: in progress     < from: Cardiac Catheterization (09.06.23 @ 11:59) >  Diagnostic Conclusions:     1. Mild diffuse atherosclerosis with moderate severe disease of small  caliber Cx in a nondominant territory.  2. Elevated right sided filling pressures in setting of severe TR with  normal left sided filling pressures. Adequate perfusion.  Recommendations:     1. Recommend aggressive medical management for CAD as per ACC/AHA  guidelines  2. Structural heart follow up at  for ongoing JAYNA evaluation.      < end of copied text >    -----------------------------------------------------------------------------------------------------------    < from: TTE Echo Complete w/o Contrast w/ Doppler (08.29.23 @ 08:45) >   Impression     Summary     The mitral valve leaflets appear thickened.   Mild mitral annular calcification is present.   Mild to Moderate mitral regurgitation with two jets is present.   Significant fibrocalcific changes noted to the aortic valve leaflets with   restriction in leaflet excursion.   Calculated MIKE is .91cm^2; this finding is consistent with severe aortic   stenosis.   The tricuspid valve leaflets appear mildly thickened and/or calcified,   but   open well.   Severe (4+) tricuspid valve regurgitation is present.   Severe pulmonary hypertension.   Pulmonic valve not well seen.   Mild to moderate pulmonic valvular regurgitation is present.   The left atrium is severely dilated.   Left ventricle systolic function appears mildly impaired; segmental wall   motion abnormalities noted.   Mild concentric left ventricular hypertrophy is present.   Estimated Ejection Fraction is 45 %.   The interventricular septum appears flattened consistent with an RV   pressure/volume overload.   The right atrium appears severely dilated.   A device wire is seen inthe RV and RA.   The right ventricle exhibits moderate dilation, diffuse hypokinesis, and   depression of contractility based on TAPSE.   A device wire is seen in the RV and RA.   Pleural effusion - is present.   IVC is dilated and not collapsing with inspiration.      < end of copied text >    < from: Transthoracic Echocardiogram Follow Up (02.25.22 @ 09:04) >     Impression     Summary     Limited study to assess left ventricular function.   The left ventricle cavity is underdistended.   The interventricular septum appears flattened consistent with an RV   pressure/volume overload.   Estimated left ventricular ejection fraction is 70 %.   The right atrium appears severely dilated.   A device wire is seen in the RV and RA.   The right ventricle is mildly dilated.   The tricuspid valve leaflets are thin and pliable; valve motion is   normal.   Severe (4+) tricuspid valve regurgitation is present.   Severe pulmonary hypertension.   No evidence of a significant pericardial effusion.(Trace)   Pleural effusion - is present.     Signature     ----------------------------------------------------------------   Electronically signed by Edward David MD(Interpreting   physician) on 02/25/2022 01:46 PM    < end of copied text >        < from: TTE Echo Complete w/o Contrast w/ Doppler (08.29.23 @ 08:45) >     The mitral valve leaflets appear thickened.   Mild mitral annular calcification is present.   Mild to Moderate mitral regurgitation with two jets is present.   Significant fibrocalcific changes noted to the aortic valve leaflets with   restriction in leaflet excursion.   Calculated MIKE is .91cm^2; this finding is consistent with severe aortic   stenosis.   The tricuspid valve leaflets appear mildly thickened and/or calcified,   but   open well.   Severe (4+) tricuspid valve regurgitation is present.   Severe pulmonary hypertension.   Pulmonic valve not well seen.   Mild to moderate pulmonic valvular regurgitation is present.   The left atrium is severely dilated.   Left ventricle systolic function appears mildly impaired; segmental wall   motion abnormalities noted.   Mild concentric left ventricular hypertrophy is present.   Estimated Ejection Fraction is 45 %.   The interventricular septum appears flattened consistent with an RV   pressure/volume overload.   The right atrium appears severely dilated.   A device wire is seen inthe RV and RA.   The right ventricle exhibits moderate dilation, diffuse hypokinesis, and   depression of contractility based on TAPSE.   A device wire is seen in the RV and RA.   Pleural effusion - is present.   IVC is dilated and not collapsing with inspiration.     Signature    < end of copied text >      Monitor afib       REASON FOR VISIT: CHF    HPI:  89 year old woman with PMHx chronic CHF, hypothyroidism, Gout, HTN, COPD, Afib on xarelto, anemia, CAD, and osteopenia presents to the ED c/o left leg pain after hitting her leg yesterday 8/24/23, when going to answer the phone. Pt banged it on furniture and developed a large hematoma. Pt having difficulty ambulating, no other injuries. Last took Xarelto last night. No history of DVT/PE.    Cardiology consulted to evaluate for CHF. This is a pt. of Dr. Harris, last seen in the office on 7/31/23. Pt. takes lasix and torsemide prn at home for HFpEF and xarelto and toprol xl 25 mg daily for Chronic Afib.   At present, admitted for leg hematoma, hypotensive, denies chest pain/SOB/palpitations.     Pt denied head strike LOC or any traumatic injuries/complaints   (25 Aug 2023 13:27)    8/29/23 Seated in bedside chair awake alert no complaints tele -120  8/30/23; seen in bed, looks comfortable, no complaints, Tele: Afib 80s-90s - reconsulted by hospitalist one hr after seeing pt. for chest pain  - seen pt. with Micki Harris - pt. denies chest pain - says she is SOB, did not have chest pain, trops and EKG ordered  8/31/23: pt. with sign and symptoms of fluid overload - elevated JVD,, started on ivp lasix. Tele: Afib    9/1/23: feels better, less SOB, diuresis changed to lasix gtt by HF, Tele: Afib , pt. is being Tx to 3E  9/2/23: Awake alert Tele AF V paced Breathing comfortably SOB improved No CP  9/3/23: seated in bedside chair, SOB improved/No JVD , Tele AF   9/4/23 Patient is in chair , gets very tachycardic with activity , sob    IV lasix drip was discontinued to low BP day before   9/5/23: Pt sitting up in chair. Denies cp.  Still with SOB.  Tele: Afib, V pacing  9/6/23; seen pt. in bed, went for RHC/LHC, tele: Afib V pacing 70-80  9/7/23: feels tired, no cardiac complaints, s/p LHC/RHC: non obstructive CAD, elevated right sided pressures, tele: Afib 90     MEDICATIONS  (STANDING):  allopurinol 100 milliGRAM(s) Oral daily  calcium carbonate 1250 mG  + Vitamin D (OsCal 500 + D) 1 Tablet(s) Oral daily  dextrose 5%. 1000 milliLiter(s) (100 mL/Hr) IV Continuous <Continuous>  dextrose 5%. 1000 milliLiter(s) (50 mL/Hr) IV Continuous <Continuous>  dextrose 50% Injectable 12.5 Gram(s) IV Push once  dextrose 50% Injectable 25 Gram(s) IV Push once  dextrose 50% Injectable 25 Gram(s) IV Push once  furosemide Infusion 5 mG/Hr (2.5 mL/Hr) IV Continuous <Continuous>  glucagon  Injectable 1 milliGRAM(s) IntraMuscular once  insulin lispro (ADMELOG) corrective regimen sliding scale   SubCutaneous three times a day before meals  insulin lispro (ADMELOG) corrective regimen sliding scale   SubCutaneous at bedtime  levothyroxine 125 MICROGram(s) Oral daily  metoprolol tartrate 12.5 milliGRAM(s) Oral every 12 hours  multivitamin/minerals 1 Tablet(s) Oral daily  polyethylene glycol 3350 17 Gram(s) Oral daily  senna 2 Tablet(s) Oral at bedtime  spironolactone 50 milliGRAM(s) Oral daily  spironolactone 25 milliGRAM(s) Oral at bedtime      MEDICATIONS  (PRN):  acetaminophen     Tablet .. 650 milliGRAM(s) Oral every 6 hours PRN Moderate Pain (4 - 6)  acetaminophen   IVPB .. 1000 milliGRAM(s) IV Intermittent once PRN Temp greater or equal to 38C (100.4F), Mild Pain (1 - 3)  bisacodyl Suppository 10 milliGRAM(s) Rectal daily PRN Constipation  melatonin 3 milliGRAM(s) Oral at bedtime PRN Insomnia  ondansetron Injectable 4 milliGRAM(s) IV Push every 6 hours PRN Nausea and/or Vomiting    Vital Signs Last 24 Hrs  T(C): 36.5 (07 Sep 2023 07:25), Max: 36.7 (06 Sep 2023 20:12)  T(F): 97.7 (07 Sep 2023 07:25), Max: 98.1 (06 Sep 2023 20:12)  HR: 85 (07 Sep 2023 07:25) (77 - 98)  BP: 120/66 (07 Sep 2023 07:25) (98/75 - 120/66)  RR: 18 (07 Sep 2023 07:25) (16 - 18)  SpO2: 100% (07 Sep 2023 07:25) (91% - 100%)  Patient On (Oxygen Delivery Method): nasal cannula    PHYSICAL EXAM:  Constitutional: NAD, awake and alert  HEENT: No oral cyanosis.  Pulmonary: Non-labored, breath sounds diminished   Cardiovascular: S1 and S2, irregular, 2/6 REBECA   Gastrointestinal: Bowel Sounds present, soft, nontender.   Lymph: Bilateral LE edema.  LLE ACE wrap in place   Neurological: Alert, no focal deficits  Skin: No rashes.  Psych:  Mood & affect appropriate    LABS:                      10.4   8.94  )-----------( 217      ( 07 Sep 2023 08:55 )             32.9     09-07    137  |  96  |  22  ----------------------------<  113<H>  4.6   |  37<H>  |  0.85    Ca    8.4<L>      07 Sep 2023 07:25  Phos  3.0     09-06  Mg     2.0     09-07    TroponinI hsT: <-37.45    Cardiac Catheterization (09.06.23 @ 11:59):  Diagnostic Conclusions:   1. Mild diffuse atherosclerosis with moderate severe disease of small caliber Cx in a nondominant territory.  2. Elevated right sided filling pressures in setting of severe TR with normal left sided filling pressures. Adequate perfusion.  Recommendations:   1. Recommend aggressive medical management for CAD as per ACC/AHAguidelines  2. Structural heart follow up at  for ongoing JAYNA evaluation.      TTE Echo Complete w/o Contrast w/ Doppler (08.29.23 @ 08:45):   The mitral valve leaflets appear thickened. Mild mitral annular calcification is present. Mild to Moderate mitral regurgitation with two jets is present.   Significant fibrocalcific changes noted to the aortic valve leaflets with restriction in leaflet excursion. Calculated MIKE is .91cm^2; this finding is consistent with severe aortic stenosis.   The tricuspid valve leaflets appear mildly thickened and/or calcified, but open well. Severe (4+) tricuspid valve regurgitation is present.   Severe pulmonary hypertension.   Pulmonic valve not well seen. Mild to moderate pulmonic valvular regurgitation is present.   The left atrium is severely dilated.   Left ventricle systolic function appears mildly impaired; segmental wall motion abnormalities noted. Mild concentric left ventricular hypertrophy is present. Estimated Ejection Fraction is 45 %. The interventricular septum appears flattened consistent with an RV pressure/volume overload.   The right atrium appears severely dilated.   A device wire is seen inthe RV and RA.   The right ventricle exhibits moderate dilation, diffuse hypokinesis, and depression of contractility based on TAPSE.   A device wire is seen in the RV and RA.   Pleural effusion - is present.   IVC is dilated and not collapsing with inspiration.

## 2023-09-08 NOTE — PROGRESS NOTE ADULT - PROBLEM SELECTOR PLAN 4
Rate overall controlled with intermittent episodes of tachycardia , BB dose increased yesterday.  Consider adding digoxin if necessary as BP runs low   AC held due to large hematoma Continue to hold for now    pt high fall risk would consider not resuming AC   EP consult appreciated - Watchman implant is likely beneficial at this time. Would postpone watchman implant after TAVR, will followup with EP outpatient

## 2023-09-08 NOTE — PROGRESS NOTE ADULT - PROBLEM SELECTOR PLAN 1
Improved volume status  - Stop lasix drip now  - Start Torsemide 20 mg po BID, first dose this evening  - Change spironolactone to 25 mg po BID   - Further GDMT as patient progresses   - repeat echo monday to assess LVEF, IVC, and TR    - Strict I & Os  - Daily standing weights.

## 2023-09-08 NOTE — PROGRESS NOTE ADULT - NS ATTEND AMEND GEN_ALL_CORE FT
Patient was seen and examined at bedside with the advanced care provider.  I agree with the above with the following exceptions:    Feels "great" today.  JVP finally appears improved around 10cm with no lower extremity edema.  We can stop lasix drip.  Transition to torsemide 20mg BID.  Decrease spironolactone to 25mg BID now that she is coming off the drip.  Will plan for repeat echo on Monday to reassess TR severity as well as IVC.  Please work on mobilizing patient. Ideally she will need to be able to track of her standing weights at home in order to help keep her euvolemic while undergoing TAVR work up.

## 2023-09-08 NOTE — PROGRESS NOTE ADULT - ASSESSMENT
Patient is a 89 year old female with a history of HFmrEF, aortic stenosis hypothyroidism, Gout, HTN, COPD, Afib on xarelto, anemia, PPM, and osteopenia who presented to  ED on 8/25 c/o leg pain after hitting her leg, found to have significant hematoma.  She has been managed medically but became dyspneic after fluid bolus and is now being evaluated by heart failure team.  Improving after Lasix drip,     TTE 8/29:  LVEF 45% LVEDd 3.65 IVS 1.23 PWd 1.19 LA Severely Dilated, RA Severely Dilated,  RV moderately dilated with diffuse hypokinesis, Mild to Mod MR, Severe AS MIKE 0.8 PVel 3.16 PG/MG 39/25 Severe TR, RVSP 61     LHC/RHC 9/6/23: LCx branch lesion  RA 17 RV 36/5 PA 54/17 (30) PCW 17 PA Sat 52.3  CO 3.68 CI 2.32

## 2023-09-08 NOTE — PROGRESS NOTE ADULT - ASSESSMENT
89 year-old woman with HTN, CAD, chronic diastolic CHF, chronic atrial fibrillation on Xarelto, hx of PPM placement, COPD, hypothyroidism, gout, presented 8/25 with LLE pain and swelling, difficulty with ambulation, started after striking LLE on piece of furniture the day prior. Patient was found to have hypotension, rapid afib, large superficial L calf hematoma with active bleed. Admitted to Trauma Surgery, since transferred to Medicine as patient's hematoma is being managed conservatively.     Large, traumatic L calf hematoma  Patient with advanced age, moderate MR, severe AS, severe TR with pulmonary hypertension, RV volume overload, borderline BP, afib with RVR (at the time). Appreciate input from Interventional Radiology, deemed very high risk for procedure, deferred intervention, managed supportively. Trauma Surgery also advised conservative management. Reassuringly, patient remains stable, hemodynamically. Hgb stable, ~10. Completed 7-day empiric course of Unasyn as was previously ordered by Surgery  - Xarelto remains held since admission, likely to be held indefinitely, pending further discussion with Trauma Surgery, Cardiology, patient/family. Being evaluated for Watchman, see below  - Continue to monitor    Acute metabolic encephalopathy  Likely multi factorial due to hematoma, hypoxia, congestive heart failure. Underlying causes were treated/managed, and mental status is now improved.   - Continue to monitor, re-orient frequently    Acute respiratory failure with hypoxia  Likely multi factorial due to CHF, severe heart valve disease, severe pHTN. Current SPO2 97% on 2L/min via NC.   - Wean O2 as tolerated, goal SPO2 92% or greater on room air  - Continue to monitor, treat underlying causes  - Encourage incentive spirometry    Acute on chronic systolic heart failure  Echo showed EF 45% perhaps 2/2 tachyarrhythmia, may also be related to multiple heart valve disease  -LHC, RHC 9/6 w elevated R heart pressures, nonobstructive CAD  -now s/p lasix drip, to start torsemide  -metoprolol  -aldactone (reduced slightly)  -cautious diuresis in setting of severe AS  -plan for repeat echo Monday  - F/u CHF Team, Cardiology, Structural Heart Team    Significant heart valve disease.   Echo 8/29 w/ mild to mod MR, severe AS, severe TR, mild to mod MD, severe pHTN, severely dilated LA, LV systolic function mildly impaired; segmental wall motion abnormalities noted, mild concentric LVH,  LVEF 45%, RV pressure/volume overload, RA severely dilated, RV with moderate dilation, diffuse hypokinesis, and depression of contractility based on TAPSE. Structural Heart Team consulted.   -carotid doppler done  - F/u Structural Heart Team eval    Chronic atrial fibrillation  Rapid rates upon admission in setting of pain, large LLE hematoma. Cardiology following. HR now improved as hematoma appears to have stabilized. Xarelto on hold due to the hematoma.  - Continue rate control with metoprolol, can consider addition of digoxin if rapid rates  - Continue to HOLD Xarelto given large hematoma  - Referred to Cardiac EP re possible Watchman LAAC procedure / device to provide a non-pharmacologic alternative for non-valvular afib related stroke risk-reduction- outpt f/u    Hx of pacemaker placement  S/P PPM interrogation; Normal functioning single chamber PPM with battery longevity 5.9-6.1 years  - Continue to monitor    Hypothyroidism  Stable.   - Continue levothyroxine    Hx of gout  Stable.   - Continue allopurinol    Constipation  Stable.  - Continue bowel regimen, monitor for BM    #DM  -a1c 6.6,  new dx here  -SSI, monitor BG    Urinary retention  -jackson in place, possibly d/c over weekend    Physical deconditioning and debility  PT eval consulted  - F/u PT eval  - OOB to chair daily      DVT px: Off pharmacologic DVT px / AC given hematoma, SCDs  Code Status: DNR/DNI  Dispo: Medically active, possibly medically ready middle of next week     89 year-old woman with HTN, CAD, chronic diastolic CHF, chronic atrial fibrillation on Xarelto, hx of PPM placement, COPD, hypothyroidism, gout, presented 8/25 with LLE pain and swelling, difficulty with ambulation, started after striking LLE on piece of furniture the day prior. Patient was found to have hypotension, rapid afib, large superficial L calf hematoma with active bleed. Admitted to Trauma Surgery, since transferred to Medicine as patient's hematoma is being managed conservatively.     Large, traumatic L calf hematoma  Patient with advanced age, moderate MR, severe AS, severe TR with pulmonary hypertension, RV volume overload, borderline BP, afib with RVR (at the time). Appreciate input from Interventional Radiology, deemed very high risk for procedure, deferred intervention, managed supportively. Trauma Surgery also advised conservative management. Reassuringly, patient remains stable, hemodynamically. Hgb stable, ~10. Completed 7-day empiric course of Unasyn as was previously ordered by Surgery  - Xarelto remains held since admission, likely to be held indefinitely, pending further discussion with Trauma Surgery, Cardiology, patient/family. Being evaluated for Watchman, see below  - Continue to monitor    Acute metabolic encephalopathy  Likely multi factorial due to hematoma, hypoxia, congestive heart failure. Underlying causes were treated/managed, and mental status is now improved.   - Continue to monitor, re-orient frequently    Acute respiratory failure with hypoxia  Likely multi factorial due to CHF, severe heart valve disease, severe pHTN. Current SPO2 97% on 2L/min via NC.   - Wean O2 as tolerated, goal SPO2 92% or greater on room air  - Continue to monitor, treat underlying causes  - Encourage incentive spirometry    Acute on chronic systolic heart failure  Echo showed EF 45% perhaps 2/2 tachyarrhythmia, may also be related to multiple heart valve disease  -LHC, RHC 9/6 w elevated R heart pressures, nonobstructive CAD  -now s/p lasix drip, to start torsemide  -metoprolol  -aldactone (reduced slightly)  -cautious diuresis in setting of severe AS  -plan for repeat echo Monday  - F/u CHF Team, Cardiology, Structural Heart Team    Significant heart valve disease.   Echo 8/29 w/ mild to mod MR, severe AS, severe TR, mild to mod NJ, severe pHTN, severely dilated LA, LV systolic function mildly impaired; segmental wall motion abnormalities noted, mild concentric LVH,  LVEF 45%, RV pressure/volume overload, RA severely dilated, RV with moderate dilation, diffuse hypokinesis, and depression of contractility based on TAPSE. Structural Heart Team consulted.   -carotid doppler done  - F/u Structural Heart Team eval    Chronic atrial fibrillation  Rapid rates upon admission in setting of pain, large LLE hematoma. Cardiology following. HR now improved as hematoma appears to have stabilized. Xarelto on hold due to the hematoma.  - Continue rate control with metoprolol, can consider addition of digoxin if rapid rates  - Continue to HOLD Xarelto given large hematoma  - Referred to Cardiac EP re possible Watchman LAAC procedure / device to provide a non-pharmacologic alternative for non-valvular afib related stroke risk-reduction- outpt f/u    Hx of pacemaker placement  S/P PPM interrogation; Normal functioning single chamber PPM with battery longevity 5.9-6.1 years  - Continue to monitor    Hypothyroidism  Stable.   - Continue levothyroxine    Hx of gout  Stable.   - Continue allopurinol    Constipation  Stable.  - Continue bowel regimen, monitor for BM    #DM  -a1c 6.6,  new dx here  -SSI, monitor BG    Urinary retention  -jackson in place, possibly d/c over weekend    Physical deconditioning and debility  PT eval consulted  - F/u PT eval  - OOB to chair daily      DVT px: Off pharmacologic DVT px / AC given hematoma, SCDs  Code Status: DNR/DNI  Dispo: Medically active, possibly medically ready for [place] middle of next week

## 2023-09-08 NOTE — PROGRESS NOTE ADULT - NS ATTEND AMEND GEN_ALL_CORE FT
Patient seen and examined; case discussed with CHERY Lazo, Dr Braxton; agree with decreasing diuretic regimen given onset of hypotension.  She remains interested in pursuing TAVR work-up.

## 2023-09-08 NOTE — PROGRESS NOTE ADULT - SUBJECTIVE AND OBJECTIVE BOX
Subjective:    Reports breathing is "great", c/o fatigue    Medications:  acetaminophen     Tablet .. 650 milliGRAM(s) Oral every 6 hours PRN  allopurinol 100 milliGRAM(s) Oral daily  bisacodyl Suppository 10 milliGRAM(s) Rectal daily PRN  calcium carbonate 1250 mG  + Vitamin D (OsCal 500 + D) 1 Tablet(s) Oral daily  dextrose 5%. 1000 milliLiter(s) IV Continuous <Continuous>  dextrose 5%. 1000 milliLiter(s) IV Continuous <Continuous>  dextrose 50% Injectable 12.5 Gram(s) IV Push once  dextrose 50% Injectable 25 Gram(s) IV Push once  dextrose 50% Injectable 25 Gram(s) IV Push once  dextrose Oral Gel 15 Gram(s) Oral once PRN  glucagon  Injectable 1 milliGRAM(s) IntraMuscular once  insulin lispro (ADMELOG) corrective regimen sliding scale   SubCutaneous three times a day before meals  insulin lispro (ADMELOG) corrective regimen sliding scale   SubCutaneous at bedtime  levothyroxine 125 MICROGram(s) Oral daily  melatonin 3 milliGRAM(s) Oral at bedtime PRN  metoprolol tartrate 12.5 milliGRAM(s) Oral every 12 hours  multivitamin/minerals 1 Tablet(s) Oral daily  ondansetron Injectable 4 milliGRAM(s) IV Push every 6 hours PRN  polyethylene glycol 3350 17 Gram(s) Oral daily  senna 2 Tablet(s) Oral at bedtime  spironolactone 25 milliGRAM(s) Oral two times a day  torsemide 20 milliGRAM(s) Oral two times a day      Physical Exam:    Vitals:  Vital Signs Last 24 Hours  T(C): 36.3 (23 @ 12:55), Max: 36.7 (23 @ 19:47)  HR: 84 (23 @ 12:55) (74 - 99)  BP: 101/52 (23 @ 12:55) (93/43 - 111/58)  RR: 18 (23 @ 12:55) (17 - 18)  SpO2: 93% (23 @ 12:55) (93% - 100%)    Weight in k.1 ( @ 06:45)    I&O's Summary    07 Sep 2023 07:01  -  08 Sep 2023 07:00  --------------------------------------------------------  IN: 200 mL / OUT: 2100 mL / NET: -1900 mL    08 Sep 2023 07:01  -  08 Sep 2023 13:38  --------------------------------------------------------  IN: 405 mL / OUT: 0 mL / NET: 405 mL        Tele:    General: No distress. Comfortable.  HEENT: EOM intact.  Neck: Neck supple. JVP moderately elevated with V-Waves . No masses  Chest: Clear to auscultation bilaterally  CV: Normal S1 and S2. Systolic Murmur noted, No rub or gallops. Radial pulses normal.  Abdomen: Soft, non-distended, non-tender  Skin: No rashes or skin breakdown  Neurology: Alert and oriented times three. Sensation intact  Psych: Affect normal    Labs:                        10.4   8.94  )-----------( 217      ( 07 Sep 2023 08:55 )             32.9         137  |  94<L>  |  25<H>  ----------------------------<  109<H>  4.1   |  40<H>  |  0.92    Ca    8.6      08 Sep 2023 06:53  Mg     1.8

## 2023-09-08 NOTE — CHART NOTE - NSCHARTNOTEFT_GEN_A_CORE
As per Cardiology: patient is very high risk for any procedure would wait until her heart rate stable. Will therefore hold off on surgery at this time until HR more stable and cleared by Cardiology. Continue local wound care for now    Discussed with Dr. Rojo, trauma surgery attending.
Consent obtained for and signed for cardiac cath with coronary angiogram and possible stent placement with possible sedation and analgesia. [facility] risks, benefits and alternatives discussed with patient. Risk discussed included, but not limited to MI, stroke, mortality, major bleeding, arrythmia, or infection, educational material provided. Pt. verbalizes and understands pre-procedural instructions.   ASA:  Bleeding Risk Score:  Creatinine:  GFR:    Rafael Score:  Pt. was not pre-hydrated with sodium chloride 0.9% 250cc bolus IV x1 due to severe pulmonary hypertension EF 45%
RUE midline w associated superficial clot. Midline removed by this provider, pressure held, no bleeding noted. Pt left in NAD. All ?s answered.
89 yo F who presented with a LLE hematoma. Patient was seen at the bedside this afternoon sleeping comfortably in bed. she denies any major complaints.  LLE hematoma has is currently self draining serosanguinous fluid, leg is soft.     Continue dressing with xeroform over draining site, apply kerlix and ace wrap.   Recommend wound care consult for further management of the wound.
Consent obtained for and signed for cardiac cath with coronary angiogram and possible stent placement with possible sedation and analgesia. [facility] risks, benefits and alternatives discussed with patient. Risk discussed included, but not limited to MI, stroke, mortality, major bleeding, arrythmia, or infection, educational material provided. Pt. verbalizes and understands pre-procedural instructions.   ASA: II  Bleeding Risk Score: 7.4  Creatinine: 0.83  GFR:  65  Rafael Score: 13 points   Pt. was not pre-hydrated with sodium chloride 09.% due to CHF on lasix gtt.
HPI: As per medical record,   Patient is a 89y year old female with PMHx chronic CHF, hypothyroidism, Gout, HTN, COPD, Afib on xarelto, anemia, CAD, and osteopenia presents to the ED c/o left leg pain after hitting her leg yesterday 8/24/23, when going to answer the phone. Pt banged it on furniture and developed a large hematoma. Pt having difficulty ambulating, no other injuries. Last took Xarelto last night. No history of DVT/PE. Pt denied head strike LOC or any traumatic injuries/complaints. (25 Aug 2023 13:27)      PERTINENT PMH REVIEWED:  [ x ] YES [ ] NO           Primary Contact: Citlalli Aponte ([phone])    HCP [  ] Surrogate [ x  ] Guardian [   ]    Mental Status: [ x ] Alert  [x  ] Oriented [  ] Confused [  ] Lethargic   Concerns of Depression [  ] pt denies  Anxiety [   ] pt denies   Baseline ADLs (prior to admission):  Independent [ x ] moderately [ ] fully   Dependent   [ ] moderately [ ]fully    Family Meeting attendees: Pt with capacity to make decisions. Participated in discussion with Dr. Dalton 9/1.    Anticipated Grief: Patient[  ] Family [  ]    Caregiver [place] Assessed: Yes [ x ] No [  ]    Hindu: Not identified.     Spiritual Concerns: None reported.  available PRN.     Goals of Care: Pursue treatment if it will improve her quality of life.     Previous Services: Private hire aide, jodi w/ [facility] and [facility], DME    ADVANCE DIRECTIVES:  [ X ] YES [  ] NO    - MOLST reflecting DNR/DNI wishes    Anticipated D/C Plan: TBD                     Summary: SW met with patient to offer support. Palliative SW role explained. Pt appears alert, oriented and able to make needs known. She denies any pain, depression, and/or anxiety. She is disappointed because her procedure was postponed. She offers no questions/concerns at this time. Pt requested assistance from aide to use the bathroom at which time visit ended. Emotional support provided. Our team will continue to follow.
Spoke to Dr Ruiz today. Pt has been transferred to Medical service on 08/31. Trauma surgery signed off patient.   No acute intervention needed. Ace dressing over hematoma.

## 2023-09-08 NOTE — PROGRESS NOTE ADULT - SUBJECTIVE AND OBJECTIVE BOX
REASON FOR VISIT: CHF    HPI:  89 year old woman with PMHx chronic CHF, hypothyroidism, Gout, HTN, COPD, Afib on xarelto, anemia, CAD, and osteopenia presents to the ED c/o left leg pain after hitting her leg yesterday 8/24/23, when going to answer the phone. Pt banged it on furniture and developed a large hematoma. Pt having difficulty ambulating, no other injuries. Last took Xarelto last night. No history of DVT/PE.    Cardiology consulted to evaluate for CHF. This is a pt. of Dr. Harris, last seen in the office on 7/31/23. Pt. takes lasix and torsemide prn at home for HFpEF and xarelto and toprol xl 25 mg daily for Chronic Afib.   At present, admitted for leg hematoma, hypotensive, denies chest pain/SOB/palpitations.     Pt denied head strike LOC or any traumatic injuries/complaints   (25 Aug 2023 13:27)    8/29/23 Seated in bedside chair awake alert no complaints tele -120  8/30/23; seen in bed, looks comfortable, no complaints, Tele: Afib 80s-90s - reconsulted by hospitalist one hr after seeing pt. for chest pain  - seen pt. with jono Harris - pt. denies chest pain - says she is SOB, did not have chest pain, trops and EKG ordered  8/31/23: pt. with sign and symptoms of fluid overload - elevated JVD,, started on ivp lasix. Tele: Afib    9/1/23: feels better, less SOB, diuresis changed to lasix gtt by HF, Tele: Afib , pt. is being Tx to 3E  9/2/23: Awake alert Tele AF V paced Breathing comfortably SOB improved No CP  9/3/23: seated in bedside chair, SOB improved/No JVD , Tele AF   9/4/23 Patient is in chair , gets very tachycardic with activity , sob    IV lasix drip was discontinued to low BP day before   9/5/23: Pt sitting up in chair. Denies cp.  Still with SOB.  Tele: Afib, V pacing  9/6/23; seen pt. in bed, went for RHC/LHC, tele: Afib V pacing 70-80  9/7/23: feels tired, no cardiac complaints, s/p LHC/RHC: non obstructive CAD, elevated right sided pressures, tele: Afib 90   9/8/23: seen in bed, c/o weakness, no chest pain/SOB, on lasix gtt, tele: Afib 70-80     MEDICATIONS  (STANDING):  allopurinol 100 milliGRAM(s) Oral daily  calcium carbonate 1250 mG  + Vitamin D (OsCal 500 + D) 1 Tablet(s) Oral daily  dextrose 5%. 1000 milliLiter(s) (100 mL/Hr) IV Continuous <Continuous>  dextrose 5%. 1000 milliLiter(s) (50 mL/Hr) IV Continuous <Continuous>  dextrose 50% Injectable 12.5 Gram(s) IV Push once  dextrose 50% Injectable 25 Gram(s) IV Push once  dextrose 50% Injectable 25 Gram(s) IV Push once  furosemide Infusion 5 mG/Hr (2.5 mL/Hr) IV Continuous <Continuous>  glucagon  Injectable 1 milliGRAM(s) IntraMuscular once  insulin lispro (ADMELOG) corrective regimen sliding scale   SubCutaneous three times a day before meals  insulin lispro (ADMELOG) corrective regimen sliding scale   SubCutaneous at bedtime  levothyroxine 125 MICROGram(s) Oral daily  metoprolol tartrate 12.5 milliGRAM(s) Oral every 12 hours  multivitamin/minerals 1 Tablet(s) Oral daily  polyethylene glycol 3350 17 Gram(s) Oral daily  senna 2 Tablet(s) Oral at bedtime  spironolactone 25 milliGRAM(s) Oral at bedtime  spironolactone 50 milliGRAM(s) Oral daily      MEDICATIONS  (PRN):  acetaminophen     Tablet .. 650 milliGRAM(s) Oral every 6 hours PRN Moderate Pain (4 - 6)  acetaminophen   IVPB .. 1000 milliGRAM(s) IV Intermittent once PRN Temp greater or equal to 38C (100.4F), Mild Pain (1 - 3)  bisacodyl Suppository 10 milliGRAM(s) Rectal daily PRN Constipation  melatonin 3 milliGRAM(s) Oral at bedtime PRN Insomnia  ondansetron Injectable 4 milliGRAM(s) IV Push every 6 hours PRN Nausea and/or Vomiting    Vital Signs Last 24 Hrs  T(C): 36.4 (08 Sep 2023 08:55), Max: 36.7 (07 Sep 2023 19:47)  T(F): 97.5 (08 Sep 2023 08:55), Max: 98.1 (07 Sep 2023 19:47)  HR: 74 (08 Sep 2023 08:55) (74 - 99)  BP: 93/43 (08 Sep 2023 08:55) (93/43 - 111/58)  BP(mean): --  RR: 18 (08 Sep 2023 08:55) (17 - 18)  SpO2: 95% (08 Sep 2023 08:55) (95% - 100%)    Parameters below as of 08 Sep 2023 08:55  Patient On (Oxygen Delivery Method): nasal cannula  O2 Flow (L/min): 2      PHYSICAL EXAM:  Constitutional: NAD, awake and alert  HEENT: No oral cyanosis.  Pulmonary: Non-labored, breath sounds diminished   Cardiovascular: S1 and S2, irregular, 2/6 REBECA   Gastrointestinal: Bowel Sounds present, soft, nontender.   Lymph: Bilateral LE edema.  LLE ACE wrap in place   Neurological: Alert, no focal deficits  Skin: No rashes.  Psych:  Mood & affect appropriate    LABS:                      10.4   8.94  )-----------( 217      ( 07 Sep 2023 08:55 )             32.9     09-07    137  |  96  |  22  ----------------------------<  113<H>  4.6   |  37<H>  |  0.85    Ca    8.4<L>      07 Sep 2023 07:25  Phos  3.0     09-06  Mg     2.0     09-07    TroponinI hsT: <-37.45    Cardiac Catheterization (09.06.23 @ 11:59):  Diagnostic Conclusions:   1. Mild diffuse atherosclerosis with moderate severe disease of small caliber Cx in a nondominant territory.  2. Elevated right sided filling pressures in setting of severe TR with normal left sided filling pressures. Adequate perfusion.  Recommendations:   1. Recommend aggressive medical management for CAD as per ACC/AHAguidelines  2. Structural heart follow up at  for ongoing JAYNA evaluation.      TTE Echo Complete w/o Contrast w/ Doppler (08.29.23 @ 08:45):   The mitral valve leaflets appear thickened. Mild mitral annular calcification is present. Mild to Moderate mitral regurgitation with two jets is present.   Significant fibrocalcific changes noted to the aortic valve leaflets with restriction in leaflet excursion. Calculated MIKE is .91cm^2; this finding is consistent with severe aortic stenosis.   The tricuspid valve leaflets appear mildly thickened and/or calcified, but open well. Severe (4+) tricuspid valve regurgitation is present.   Severe pulmonary hypertension.   Pulmonic valve not well seen. Mild to moderate pulmonic valvular regurgitation is present.   The left atrium is severely dilated.   Left ventricle systolic function appears mildly impaired; segmental wall motion abnormalities noted. Mild concentric left ventricular hypertrophy is present. Estimated Ejection Fraction is 45 %. The interventricular septum appears flattened consistent with an RV pressure/volume overload.   The right atrium appears severely dilated.   A device wire is seen inthe RV and RA.   The right ventricle exhibits moderate dilation, diffuse hypokinesis, and depression of contractility based on TAPSE.   A device wire is seen in the RV and RA.   Pleural effusion - is present.   IVC is dilated and not collapsing with inspiration.

## 2023-09-08 NOTE — PROGRESS NOTE ADULT - PROBLEM SELECTOR PLAN 1
Echo shows EF 45% possibly 2/2 tachyarrhythmia contributed by valvular heart disease, HF team on board, pressure soft, severe AS, cont. lasix gtt, Spirolactone,  Closely monitor renal function   Aguilar weights    Advise strict I&O Echo shows EF 45% possibly 2/2 tachyarrhythmia contributed by valvular heart disease, HF team on board, pressure soft, severe AS, cont. lasix gtt, Spirolactone - dose needs to be reduced  (it was increased to 75 po daily yesterday by HF) as pt. is hypotensive and feels weak,  Closely monitor renal function   Lauren carpio    Advise strict I&O

## 2023-09-08 NOTE — PROGRESS NOTE ADULT - SUBJECTIVE AND OBJECTIVE BOX
HOSPITALIST ATTENDING PROGRESS NOTE    Chart and meds reviewed.  Patient seen and examined.    CC: LLE hematoma    Subjective: Volume status much improved. Now off lasix drip.     All other systems reviewed and found to be negative with the exception of what has been described above.    MEDICATIONS  (STANDING):  allopurinol 100 milliGRAM(s) Oral daily  calcium carbonate 1250 mG  + Vitamin D (OsCal 500 + D) 1 Tablet(s) Oral daily  dextrose 5%. 1000 milliLiter(s) (100 mL/Hr) IV Continuous <Continuous>  dextrose 5%. 1000 milliLiter(s) (50 mL/Hr) IV Continuous <Continuous>  dextrose 50% Injectable 12.5 Gram(s) IV Push once  dextrose 50% Injectable 25 Gram(s) IV Push once  dextrose 50% Injectable 25 Gram(s) IV Push once  glucagon  Injectable 1 milliGRAM(s) IntraMuscular once  insulin lispro (ADMELOG) corrective regimen sliding scale   SubCutaneous three times a day before meals  insulin lispro (ADMELOG) corrective regimen sliding scale   SubCutaneous at bedtime  levothyroxine 125 MICROGram(s) Oral daily  metoprolol tartrate 12.5 milliGRAM(s) Oral every 12 hours  multivitamin/minerals 1 Tablet(s) Oral daily  polyethylene glycol 3350 17 Gram(s) Oral daily  senna 2 Tablet(s) Oral at bedtime  spironolactone 25 milliGRAM(s) Oral two times a day  torsemide 20 milliGRAM(s) Oral two times a day    MEDICATIONS  (PRN):  acetaminophen     Tablet .. 650 milliGRAM(s) Oral every 6 hours PRN Moderate Pain (4 - 6)  bisacodyl Suppository 10 milliGRAM(s) Rectal daily PRN Constipation  dextrose Oral Gel 15 Gram(s) Oral once PRN Blood Glucose LESS THAN 70 milliGRAM(s)/deciliter  melatonin 3 milliGRAM(s) Oral at bedtime PRN Insomnia  ondansetron Injectable 4 milliGRAM(s) IV Push every 6 hours PRN Nausea and/or Vomiting      VITALS:  T(F): 97.4 (09-08-23 @ 12:55), Max: 98.1 (09-07-23 @ 19:47)  HR: 84 (09-08-23 @ 12:55) (74 - 99)  BP: 101/52 (09-08-23 @ 12:55) (93/43 - 108/58)  RR: 18 (09-08-23 @ 12:55) (17 - 18)  SpO2: 93% (09-08-23 @ 12:55) (93% - 99%)  Wt(kg): --    I&O's Summary    07 Sep 2023 07:01  -  08 Sep 2023 07:00  --------------------------------------------------------  IN: 200 mL / OUT: 2100 mL / NET: -1900 mL    08 Sep 2023 07:01  -  08 Sep 2023 15:50  --------------------------------------------------------  IN: 405 mL / OUT: 0 mL / NET: 405 mL        CAPILLARY BLOOD GLUCOSE      POCT Blood Glucose.: 166 mg/dL (08 Sep 2023 11:44)  POCT Blood Glucose.: 111 mg/dL (08 Sep 2023 07:55)  POCT Blood Glucose.: 184 mg/dL (07 Sep 2023 19:46)  POCT Blood Glucose.: 139 mg/dL (07 Sep 2023 16:55)      PHYSICAL EXAM:  Gen: NAD  HEENT:  pupils equal and reactive, EOMI, no oropharyngeal lesions, erythema, exudates, oral thrush  NECK:   supple, no carotid bruits, no palpable lymph nodes, no thyromegaly  CV:  +S1, +S2, regular, no murmurs or rubs  RESP:   lungs clear to auscultation bilaterally, no wheezing, rales, rhonchi, good air entry bilaterally  BREAST:  not examined  GI:  abdomen soft, non-tender, non-distended, normal BS, no bruits, no abdominal masses, no palpable masses  RECTAL:  not examined  :  not examined  MSK:   normal muscle tone, no atrophy, no rigidity, no contractions  EXT:  no clubbing, no cyanosis, no edema, no calf pain, swelling or erythema, LLE wrapped  VASCULAR:  pulses equal and symmetric in the upper and lower extremities  NEURO:  AAOX3, no focal neurological deficits, follows all commands, able to move extremities spontaneously  SKIN:  no ulcers, lesions or rashes    LABS:                            10.4   8.94  )-----------( 217      ( 07 Sep 2023 08:55 )             32.9     09-08    137  |  94<L>  |  25<H>  ----------------------------<  109<H>  4.1   |  40<H>  |  0.92    Ca    8.6      08 Sep 2023 06:53  Mg     1.8     09-08    Urinalysis Basic - ( 08 Sep 2023 06:53 )    Color: x / Appearance: x / SG: x / pH: x  Gluc: 109 mg/dL / Ketone: x  / Bili: x / Urobili: x   Blood: x / Protein: x / Nitrite: x   Leuk Esterase: x / RBC: x / WBC x   Sq Epi: x / Non Sq Epi: x / Bacteria: x    CULTURES:  no new    Additional results/Imaging, I have personally reviewed:  US venous duplex RUE and RLE requested for swelling, study pending    US venous duplex LUE 8/30: No evidence of left upper extremity deep venous thrombosis.    CXR 8/30: Heart enlargement and left-sided pacemaker. There is a persistent mild left base effusion and a persistent mild right base pleural pulmonary process. Chest is similar to March 18, 2022. Quite advanced bilateral shoulder degeneration    CT L shoulder WO 8/30: Severe left glenohumeral arthrosis with chondrocalcinosis and chronic remodeling of the bones. Severe supraspinatus and mild to moderate infraspinatus and subscapularis muscle atrophy. Marked subcoracoid bursitis. Marked biceps tenosynovitis. Small left pleural effusion.    CT head WO 8/28: Mild parenchymal atrophy with patchy periventricular hypoattenuation; a  nonspecific finding which statistically reflects chronic microvascular   ischemic change. Calcified plaque [employer] [place]. Coarse calcifications falx cerebri. No evidence of extra-axial collection, intracranial hemorrhage, mass or   mass effect. Gray-white matter differentiation appears preserved. Complete opacification of the visualized left maxillary sinus, demonstrating cortical thickening with heterogeneous internal density, including areas of hyperdensity. Bilateral mastoid air cells and middle ear regions well-aerated. No aggressive calvarial lesion or acute calvarial fracture visualized. Hyperostosis frontalis. Bilateral cataract surgery.    CTA LLE W/ 8/25: Large superficial left calf hematoma with active bleeding. Three-vessel runoff to the left foot. Two-vessel runoff to the right foot via the anterior tibial/dorsalis pedis and peroneal arteries.    XR L tib fib 8/25: The tibia and fibula are intact. No fracture or radiographic osseous lesion. Posteromedial mid calf 16.5 x 4.3 cm soft tissue hematoma.    Cardiac Testing:  LHC/RHC 9/6: elevated R heart pressures, nonobstructive CAD    TTE 8/29: Mild to mod MR. Severe AS. Severe TR. Mild to mod MN. Severe pHTN. Severely dilated LA. LV systolic function mildly impaired; segmental wall motion abnormalities noted. Mild concentric LVH. LVEF 45%. The interventricular septum appears flattened consistent with an RV pressure/volume overload. RA severely dilated. RV with moderate dilation, diffuse hypokinesis, and depression of contractility based on TAPSE. A device wire is seen in the RV and RA. IVC is dilated and not collapsing with inspiration.    EKG 8/28: Rate 105. Afib RVR    EKG 8/25: Rate 75. Afib. RBBB    Telemetry, personally reviewed:  9/6/23: afib, Vpaced 70-80s, PVCs, couplets  9/7/23:  afib, vpaced  9/8/23: Afib 80s

## 2023-09-09 NOTE — PROGRESS NOTE ADULT - NS ATTEND AMEND GEN_ALL_CORE FT
Case discussed with CHERY Pak; BP better with titration of diuretics; ultimate plan is work-up for TAVR.

## 2023-09-09 NOTE — PROGRESS NOTE ADULT - SUBJECTIVE AND OBJECTIVE BOX
REASON FOR VISIT: CHF    HPI:  89 year old woman with PMHx chronic CHF, hypothyroidism, Gout, HTN, COPD, Afib on xarelto, anemia, CAD, and osteopenia presents to the ED c/o left leg pain after hitting her leg yesterday 8/24/23, when going to answer the phone. Pt banged it on furniture and developed a large hematoma. Pt having difficulty ambulating, no other injuries. Last took Xarelto last night. No history of DVT/PE.    Cardiology consulted to evaluate for CHF. This is a pt. of Dr. Harris, last seen in the office on 7/31/23. Pt. takes lasix and torsemide prn at home for HFpEF and xarelto and toprol xl 25 mg daily for Chronic Afib.   At present, admitted for leg hematoma, hypotensive, denies chest pain/SOB/palpitations.     Pt denied head strike LOC or any traumatic injuries/complaints   (25 Aug 2023 13:27)    8/29/23 Seated in bedside chair awake alert no complaints tele -120  8/30/23; seen in bed, looks comfortable, no complaints, Tele: Afib 80s-90s - reconsulted by hospitalist one hr after seeing pt. for chest pain  - seen pt. with jono Harris - pt. denies chest pain - says she is SOB, did not have chest pain, trops and EKG ordered  8/31/23: pt. with sign and symptoms of fluid overload - elevated JVD,, started on ivp lasix. Tele: Afib    9/1/23: feels better, less SOB, diuresis changed to lasix gtt by HF, Tele: Afib , pt. is being Tx to 3E  9/2/23: Awake alert Tele AF V paced Breathing comfortably SOB improved No CP  9/3/23: seated in bedside chair, SOB improved/No JVD , Tele AF   9/4/23 Patient is in chair , gets very tachycardic with activity , sob    IV lasix drip was discontinued to low BP day before   9/5/23: Pt sitting up in chair. Denies cp.  Still with SOB.  Tele: Afib, V pacing  9/6/23; seen pt. in bed, went for RHC/LHC, tele: Afib V pacing 70-80  9/7/23: feels tired, no cardiac complaints, s/p LHC/RHC: non obstructive CAD, elevated right sided pressures, tele: Afib 90   9/8/23: seen in bed, c/o weakness, no chest pain/SOB, on lasix gtt, tele: Afib 70-80   9/9/23: Awake in bed. Breathing improved but feels weak.  Off lasix gtt.  Tele: afib with V pacing    MEDICATIONS  (STANDING):  allopurinol 100 milliGRAM(s) Oral daily  calcium carbonate 1250 mG  + Vitamin D (OsCal 500 + D) 1 Tablet(s) Oral daily  dextrose 5%. 1000 milliLiter(s) (100 mL/Hr) IV Continuous <Continuous>  dextrose 5%. 1000 milliLiter(s) (50 mL/Hr) IV Continuous <Continuous>  dextrose 50% Injectable 12.5 Gram(s) IV Push once  dextrose 50% Injectable 25 Gram(s) IV Push once  dextrose 50% Injectable 25 Gram(s) IV Push once  glucagon  Injectable 1 milliGRAM(s) IntraMuscular once  insulin lispro (ADMELOG) corrective regimen sliding scale   SubCutaneous three times a day before meals  insulin lispro (ADMELOG) corrective regimen sliding scale   SubCutaneous at bedtime  levothyroxine 125 MICROGram(s) Oral daily  metoprolol tartrate 12.5 milliGRAM(s) Oral every 12 hours  multivitamin/minerals 1 Tablet(s) Oral daily  polyethylene glycol 3350 17 Gram(s) Oral daily  senna 2 Tablet(s) Oral at bedtime  spironolactone 25 milliGRAM(s) Oral two times a day  torsemide 20 milliGRAM(s) Oral two times a day    MEDICATIONS  (PRN):  acetaminophen     Tablet .. 650 milliGRAM(s) Oral every 6 hours PRN Moderate Pain (4 - 6)  bisacodyl Suppository 10 milliGRAM(s) Rectal daily PRN Constipation  dextrose Oral Gel 15 Gram(s) Oral once PRN Blood Glucose LESS THAN 70 milliGRAM(s)/deciliter  melatonin 3 milliGRAM(s) Oral at bedtime PRN Insomnia  ondansetron Injectable 4 milliGRAM(s) IV Push every 6 hours PRN Nausea and/or Vomiting      Vital Signs Last 24 Hrs  T(C): 36.3 (08 Sep 2023 20:49), Max: 36.4 (08 Sep 2023 08:55)  T(F): 97.4 (08 Sep 2023 20:49), Max: 97.5 (08 Sep 2023 08:55)  HR: 96 (09 Sep 2023 06:12) (74 - 96)  BP: 112/69 (09 Sep 2023 06:12) (93/43 - 112/69)  BP(mean): --  RR: 18 (08 Sep 2023 20:49) (18 - 18)  SpO2: 94% (08 Sep 2023 20:49) (93% - 100%)    Parameters below as of 08 Sep 2023 20:49  Patient On (Oxygen Delivery Method): nasal cannula  O2 Flow (L/min): 2    PHYSICAL EXAM:  Constitutional: NAD, awake and alert  HEENT: No oral cyanosis.  Pulmonary: Non-labored, breath sounds diminished   Cardiovascular: S1 and S2, irregular, 2/6 REBECA   Gastrointestinal: Bowel Sounds present, soft, nontender.   Lymph: 1+ LE edema.  LLE ACE wrap in place   Neurological: Alert, no focal deficits  Skin: No rashes.  Psych:  Mood & affect appropriate    LABS:                                   10.4   8.94  )-----------( 217      ( 07 Sep 2023 08:55 )             32.9   09-08    137  |  94<L>  |  25<H>  ----------------------------<  109<H>  4.1   |  40<H>  |  0.92    Ca    8.6      08 Sep 2023 06:53  Mg     1.8     09-08        09-07    137  |  96  |  22  ----------------------------<  113<H>  4.6   |  37<H>  |  0.85    Ca    8.4<L>      07 Sep 2023 07:25  Phos  3.0     09-06  Mg     2.0     09-07    TroponinI hsT: <-37.45    Cardiac Catheterization (09.06.23 @ 11:59):  Diagnostic Conclusions:   1. Mild diffuse atherosclerosis with moderate severe disease of small caliber Cx in a nondominant territory.  2. Elevated right sided filling pressures in setting of severe TR with normal left sided filling pressures. Adequate perfusion.  Recommendations:   1. Recommend aggressive medical management for CAD as per ACC/AHAguidelines  2. Structural heart follow up at  for ongoing [facility] evaluation.      TTE Echo Complete w/o Contrast w/ Doppler (08.29.23 @ 08:45):   The mitral valve leaflets appear thickened. Mild mitral annular calcification is present. Mild to Moderate mitral regurgitation with two jets is present.   Significant fibrocalcific changes noted to the aortic valve leaflets with restriction in leaflet excursion. Calculated MIKE is .91cm^2; this finding is consistent with severe aortic stenosis.   The tricuspid valve leaflets appear mildly thickened and/or calcified, but open well. Severe (4+) tricuspid valve regurgitation is present.   Severe pulmonary hypertension.   Pulmonic valve not well seen. Mild to moderate pulmonic valvular regurgitation is present.   The left atrium is severely dilated.   Left ventricle systolic function appears mildly impaired; segmental wall motion abnormalities noted. Mild concentric left ventricular hypertrophy is present. Estimated Ejection Fraction is 45 %. The interventricular septum appears flattened consistent with an RV pressure/volume overload.   The right atrium appears severely dilated.   A device wire is seen inthe RV and RA.   The right ventricle exhibits moderate dilation, diffuse hypokinesis, and depression of contractility based on TAPSE.   A device wire is seen in the RV and RA.   Pleural effusion - is present.   IVC is dilated and not collapsing with inspiration.       REASON FOR VISIT: CHF    HPI:  89 year old woman with PMHx chronic CHF, hypothyroidism, Gout, HTN, COPD, Afib on xarelto, anemia, CAD, and osteopenia presents to the ED c/o left leg pain after hitting her leg yesterday 8/24/23, when going to answer the phone. Pt banged it on furniture and developed a large hematoma. Pt having difficulty ambulating, no other injuries. Last took Xarelto last night. No history of DVT/PE.    Cardiology consulted to evaluate for CHF. This is a pt. of Dr. Harris, last seen in the office on 7/31/23. Pt. takes lasix and torsemide prn at home for HFpEF and xarelto and toprol xl 25 mg daily for Chronic Afib.   At present, admitted for leg hematoma, hypotensive, denies chest pain/SOB/palpitations.     Pt denied head strike LOC or any traumatic injuries/complaints   (25 Aug 2023 13:27)    8/29/23 Seated in bedside chair awake alert no complaints tele -120  8/30/23; seen in bed, looks comfortable, no complaints, Tele: Afib 80s-90s - reconsulted by hospitalist one hr after seeing pt. for chest pain  - seen pt. with jono Harris - pt. denies chest pain - says she is SOB, did not have chest pain, trops and EKG ordered  8/31/23: pt. with sign and symptoms of fluid overload - elevated JVD,, started on ivp lasix. Tele: Afib    9/1/23: feels better, less SOB, diuresis changed to lasix gtt by HF, Tele: Afib , pt. is being Tx to 3E  9/2/23: Awake alert Tele AF V paced Breathing comfortably SOB improved No CP  9/3/23: seated in bedside chair, SOB improved/No JVD , Tele AF   9/4/23 Patient is in chair , gets very tachycardic with activity , sob    IV lasix drip was discontinued to low BP day before   9/5/23: Pt sitting up in chair. Denies cp.  Still with SOB.  Tele: Afib, V pacing  9/6/23; seen pt. in bed, went for RHC/LHC, tele: Afib V pacing 70-80  9/7/23: feels tired, no cardiac complaints, s/p LHC/RHC: non obstructive CAD, elevated right sided pressures, tele: Afib 90   9/8/23: seen in bed, c/o weakness, no chest pain/SOB, on lasix gtt, tele: Afib 70-80   9/9/23: Awake in bed. Breathing improved but feels weak.  Off lasix gtt.  Tele: afib with V pacing    MEDICATIONS  (STANDING):  allopurinol 100 milliGRAM(s) Oral daily  calcium carbonate 1250 mG  + Vitamin D (OsCal 500 + D) 1 Tablet(s) Oral daily  dextrose 5%. 1000 milliLiter(s) (100 mL/Hr) IV Continuous <Continuous>  dextrose 5%. 1000 milliLiter(s) (50 mL/Hr) IV Continuous <Continuous>  dextrose 50% Injectable 12.5 Gram(s) IV Push once  dextrose 50% Injectable 25 Gram(s) IV Push once  dextrose 50% Injectable 25 Gram(s) IV Push once  glucagon  Injectable 1 milliGRAM(s) IntraMuscular once  insulin lispro (ADMELOG) corrective regimen sliding scale   SubCutaneous three times a day before meals  insulin lispro (ADMELOG) corrective regimen sliding scale   SubCutaneous at bedtime  levothyroxine 125 MICROGram(s) Oral daily  metoprolol tartrate 12.5 milliGRAM(s) Oral every 12 hours  multivitamin/minerals 1 Tablet(s) Oral daily  polyethylene glycol 3350 17 Gram(s) Oral daily  senna 2 Tablet(s) Oral at bedtime  spironolactone 25 milliGRAM(s) Oral two times a day  torsemide 20 milliGRAM(s) Oral two times a day    MEDICATIONS  (PRN):  acetaminophen     Tablet .. 650 milliGRAM(s) Oral every 6 hours PRN Moderate Pain (4 - 6)  bisacodyl Suppository 10 milliGRAM(s) Rectal daily PRN Constipation  dextrose Oral Gel 15 Gram(s) Oral once PRN Blood Glucose LESS THAN 70 milliGRAM(s)/deciliter  melatonin 3 milliGRAM(s) Oral at bedtime PRN Insomnia  ondansetron Injectable 4 milliGRAM(s) IV Push every 6 hours PRN Nausea and/or Vomiting    Vital Signs Last 24 Hrs  T(C): 36.3 (08 Sep 2023 20:49), Max: 36.4 (08 Sep 2023 08:55)  T(F): 97.4 (08 Sep 2023 20:49), Max: 97.5 (08 Sep 2023 08:55)  HR: 96 (09 Sep 2023 06:12) (74 - 96)  BP: 112/69 (09 Sep 2023 06:12) (93/43 - 112/69)  RR: 18 (08 Sep 2023 20:49) (18 - 18)  SpO2: 94% (08 Sep 2023 20:49) (93% - 100%)  Patient On (Oxygen Delivery Method): nasal cannula  O2 Flow (L/min): 2    PHYSICAL EXAM:  Constitutional: NAD, awake and alert  HEENT: No oral cyanosis.  Pulmonary: Non-labored, breath sounds diminished   Cardiovascular: S1 and S2, irregular, 2/6 REBECA   Gastrointestinal: Bowel Sounds present, soft, nontender.   Lymph: 1+ LE edema.  LLE ACE wrap in place   Neurological: Alert, no focal deficits  Skin: No rashes.  Psych:  Mood & affect appropriate    LABS:                                   10.4   8.94  )-----------( 217      ( 07 Sep 2023 08:55 )             32.9   09-08    137  |  94<L>  |  25<H>  ----------------------------<  109<H>  4.1   |  40<H>  |  0.92    Ca    8.6      08 Sep 2023 06:53  Mg     1.8     09-08        09-07    137  |  96  |  22  ----------------------------<  113<H>  4.6   |  37<H>  |  0.85    Ca    8.4<L>      07 Sep 2023 07:25  Phos  3.0     09-06  Mg     2.0     09-07    TroponinI hsT: <-37.45    Cardiac Catheterization (09.06.23 @ 11:59):  Diagnostic Conclusions:   1. Mild diffuse atherosclerosis with moderate severe disease of small caliber Cx in a nondominant territory.  2. Elevated right sided filling pressures in setting of severe TR with normal left sided filling pressures. Adequate perfusion.  Recommendations:   1. Recommend aggressive medical management for CAD as per ACC/AHAguidelines  2. Structural heart follow up at  for ongoing [facility] evaluation.      TTE Echo Complete w/o Contrast w/ Doppler (08.29.23 @ 08:45):   The mitral valve leaflets appear thickened. Mild mitral annular calcification is present. Mild to Moderate mitral regurgitation with two jets is present.   Significant fibrocalcific changes noted to the aortic valve leaflets with restriction in leaflet excursion. Calculated MIKE is .91cm^2; this finding is consistent with severe aortic stenosis.   The tricuspid valve leaflets appear mildly thickened and/or calcified, but open well. Severe (4+) tricuspid valve regurgitation is present.   Severe pulmonary hypertension.   Pulmonic valve not well seen. Mild to moderate pulmonic valvular regurgitation is present.   The left atrium is severely dilated.   Left ventricle systolic function appears mildly impaired; segmental wall motion abnormalities noted. Mild concentric left ventricular hypertrophy is present. Estimated Ejection Fraction is 45 %. The interventricular septum appears flattened consistent with an RV pressure/volume overload.   The right atrium appears severely dilated.   A device wire is seen inthe RV and RA.   The right ventricle exhibits moderate dilation, diffuse hypokinesis, and depression of contractility based on TAPSE.   A device wire is seen in the RV and RA.   Pleural effusion - is present.   IVC is dilated and not collapsing with inspiration.

## 2023-09-09 NOTE — PROGRESS NOTE ADULT - PROBLEM SELECTOR PLAN 1
Echo shows EF 45% possibly 2/2 tachyarrhythmia contributed by valvular heart disease, severe AS, HF team on board, now off lasix gtt, on torsemide 20mg BID,  Spirolactone 25mg BID - (dose reduced by HF for soft BP).  Daily weights.  Closely monitor renal function   Advise strict I&O

## 2023-09-09 NOTE — PROGRESS NOTE ADULT - SUBJECTIVE AND OBJECTIVE BOX
HOSPITALIST ATTENDING PROGRESS NOTE    Chart and meds reviewed.  Patient seen and examined.    CC: LLE hematoma    Subjective: Espinoza removed, voiding immediately but RN monitoring bladder scan.     All other systems reviewed and found to be negative with the exception of what has been described above.    MEDICATIONS  (STANDING):  allopurinol 100 milliGRAM(s) Oral daily  ammonium lactate 12% Lotion 1 Application(s) Topical two times a day  calcium carbonate 1250 mG  + Vitamin D (OsCal 500 + D) 1 Tablet(s) Oral daily  dextrose 5%. 1000 milliLiter(s) (100 mL/Hr) IV Continuous <Continuous>  dextrose 5%. 1000 milliLiter(s) (50 mL/Hr) IV Continuous <Continuous>  dextrose 50% Injectable 12.5 Gram(s) IV Push once  dextrose 50% Injectable 25 Gram(s) IV Push once  dextrose 50% Injectable 25 Gram(s) IV Push once  glucagon  Injectable 1 milliGRAM(s) IntraMuscular once  insulin lispro (ADMELOG) corrective regimen sliding scale   SubCutaneous three times a day before meals  insulin lispro (ADMELOG) corrective regimen sliding scale   SubCutaneous at bedtime  levothyroxine 125 MICROGram(s) Oral daily  metoprolol tartrate 12.5 milliGRAM(s) Oral every 12 hours  multivitamin/minerals 1 Tablet(s) Oral daily  polyethylene glycol 3350 17 Gram(s) Oral daily  senna 2 Tablet(s) Oral at bedtime  spironolactone 25 milliGRAM(s) Oral two times a day  torsemide 20 milliGRAM(s) Oral two times a day    MEDICATIONS  (PRN):  acetaminophen     Tablet .. 650 milliGRAM(s) Oral every 6 hours PRN Moderate Pain (4 - 6)  bisacodyl Suppository 10 milliGRAM(s) Rectal daily PRN Constipation  dextrose Oral Gel 15 Gram(s) Oral once PRN Blood Glucose LESS THAN 70 milliGRAM(s)/deciliter  melatonin 3 milliGRAM(s) Oral at bedtime PRN Insomnia  ondansetron Injectable 4 milliGRAM(s) IV Push every 6 hours PRN Nausea and/or Vomiting      VITALS:  T(F): 97.5 (09-09-23 @ 07:43), Max: 97.5 (09-09-23 @ 07:43)  HR: 78 (09-09-23 @ 13:51) (78 - 96)  BP: 108/57 (09-09-23 @ 13:51) (100/59 - 122/63)  RR: 18 (09-09-23 @ 07:43) (18 - 18)  SpO2: 96% (09-09-23 @ 09:56) (92% - 100%)  Wt(kg): --    I&O's Summary    08 Sep 2023 07:01  -  09 Sep 2023 07:00  --------------------------------------------------------  IN: 405 mL / OUT: 2100 mL / NET: -1695 mL    09 Sep 2023 07:01  -  09 Sep 2023 14:32  --------------------------------------------------------  IN: 240 mL / OUT: 550 mL / NET: -310 mL        CAPILLARY BLOOD GLUCOSE      POCT Blood Glucose.: 170 mg/dL (09 Sep 2023 11:53)  POCT Blood Glucose.: 103 mg/dL (09 Sep 2023 08:06)  POCT Blood Glucose.: 157 mg/dL (08 Sep 2023 21:48)  POCT Blood Glucose.: 129 mg/dL (08 Sep 2023 17:00)      PHYSICAL EXAM:  Gen: NAD  HEENT:  pupils equal and reactive, EOMI, no oropharyngeal lesions, erythema, exudates, oral thrush  NECK:   supple, no carotid bruits, no palpable lymph nodes, no thyromegaly  CV:  +S1, +S2, regular, no murmurs or rubs  RESP:   lungs clear to auscultation bilaterally, no wheezing, rales, rhonchi, good air entry bilaterally  BREAST:  not examined  GI:  abdomen soft, non-tender, non-distended, normal BS, no bruits, no abdominal masses, no palpable masses  RECTAL:  not examined  :  not examined  MSK:   normal muscle tone, no atrophy, no rigidity, no contractions  EXT:  no clubbing, no cyanosis, no edema, no calf pain, swelling or erythema  VASCULAR:  pulses equal and symmetric in the upper and lower extremities  NEURO:  AAOX3, no focal neurological deficits, follows all commands, able to move extremities spontaneously  SKIN:  no ulcers, lesions or rashes    LABS:        09-09    135  |  91<L>  |  24<H>  ----------------------------<  112<H>  4.1   |  41<H>  |  0.88    Ca    8.8      09 Sep 2023 07:38  Mg     2.1     09-09              Urinalysis Basic - ( 09 Sep 2023 07:38 )    Color: x / Appearance: x / SG: x / pH: x  Gluc: 112 mg/dL / Ketone: x  / Bili: x / Urobili: x   Blood: x / Protein: x / Nitrite: x   Leuk Esterase: x / RBC: x / WBC x   Sq Epi: x / Non Sq Epi: x / Bacteria: x    CULTURES:  no new    Additional results/Imaging, I have personally reviewed:  US venous duplex RUE and RLE requested for swelling, study pending    US venous duplex LUE 8/30: No evidence of left upper extremity deep venous thrombosis.    CXR 8/30: Heart enlargement and left-sided pacemaker. There is a persistent mild left base effusion and a persistent mild right base pleural pulmonary process. Chest is similar to March 18, 2022. Quite advanced bilateral shoulder degeneration    CT L shoulder WO 8/30: Severe left glenohumeral arthrosis with chondrocalcinosis and chronic remodeling of the bones. Severe supraspinatus and mild to moderate infraspinatus and subscapularis muscle atrophy. Marked subcoracoid bursitis. Marked biceps tenosynovitis. Small left pleural effusion.    CT head WO 8/28: Mild parenchymal atrophy with patchy periventricular hypoattenuation; a  nonspecific finding which statistically reflects chronic microvascular   ischemic change. Calcified plaque [place]. Coarse calcifications falx cerebri. No evidence of extra-axial collection, intracranial hemorrhage, mass or   mass effect. Gray-white matter differentiation appears preserved. Complete opacification of the visualized left maxillary sinus, demonstrating cortical thickening with heterogeneous internal density, including areas of hyperdensity. Bilateral mastoid air cells and middle ear regions well-aerated. No aggressive calvarial lesion or acute calvarial fracture visualized. Hyperostosis frontalis. Bilateral cataract surgery.    CTA LLE W/ 8/25: Large superficial left calf hematoma with active bleeding. Three-vessel runoff to the left foot. Two-vessel runoff to the right foot via the anterior tibial/dorsalis pedis and peroneal arteries.    XR L tib fib 8/25: The tibia and fibula are intact. No fracture or radiographic osseous lesion. Posteromedial mid calf 16.5 x 4.3 cm soft tissue hematoma.    Cardiac Testing:  LHC/RHC 9/6: elevated R heart pressures, nonobstructive CAD    TTE 8/29: Mild to mod MR. Severe AS. Severe TR. Mild to mod [place]. Severe pHTN. Severely dilated LA. LV systolic function mildly impaired; segmental wall motion abnormalities noted. Mild concentric LVH. LVEF 45%. The interventricular septum appears flattened consistent with an RV pressure/volume overload. RA severely dilated. RV with moderate dilation, diffuse hypokinesis, and depression of contractility based on TAPSE. A device wire is seen in the RV and RA. IVC is dilated and not collapsing with inspiration.    EKG 8/28: Rate 105. Afib RVR    EKG 8/25: Rate 75. Afib. RBBB    Telemetry, personally reviewed:  9/6/23: afib, Vpaced 70-80s, PVCs, couplets  9/7/23:  afib, vpaced  9/8/23: Afib 80s

## 2023-09-09 NOTE — PROGRESS NOTE ADULT - PROBLEM SELECTOR PLAN 4
Rate overall controlled.  Continue current dose of BB.  Consider adding digoxin if necessary as BP runs low   AC held due to large hematoma Continue to hold for now    pt high fall risk would consider not resuming AC   EP consult appreciated - Watchman implant is likely beneficial at this time. Would postpone watchman implant after TAVR, will followup with EP outpatient

## 2023-09-09 NOTE — PROGRESS NOTE ADULT - ASSESSMENT
89 year-old woman with HTN, CAD, chronic diastolic CHF, chronic atrial fibrillation on Xarelto, hx of PPM placement, COPD, hypothyroidism, gout, presented 8/25 with LLE pain and swelling, difficulty with ambulation, started after striking LLE on piece of furniture the day prior. Patient was found to have hypotension, rapid afib, large superficial L calf hematoma with active bleed. Admitted to Trauma Surgery, since transferred to Medicine as patient's hematoma is being managed conservatively.     Large, traumatic L calf hematoma  Patient with advanced age, moderate MR, severe AS, severe TR with pulmonary hypertension, RV volume overload, borderline BP, afib with RVR (at the time). Appreciate input from Interventional Radiology, deemed very high risk for procedure, deferred intervention, managed supportively. Trauma Surgery also advised conservative management. Reassuringly, patient remains stable, hemodynamically. Hgb stable, ~10. Completed 7-day empiric course of Unasyn as was previously ordered by Surgery  - Xarelto remains held since admission, likely to be held indefinitely, pending further discussion with Trauma Surgery, Cardiology, patient/family. Being evaluated for Watchman, see below  - Continue to monitor    Acute metabolic encephalopathy  Likely multi factorial due to hematoma, hypoxia, congestive heart failure. Underlying causes were treated/managed, and mental status is now improved.   - Continue to monitor, re-orient frequently    Acute respiratory failure with hypoxia  Likely multi factorial due to CHF, severe heart valve disease, severe pHTN. Current SPO2 97% on 2L/min via NC.   - Wean O2 as tolerated, goal SPO2 92% or greater on room air  - Continue to monitor, treat underlying causes  - Encourage incentive spirometry    Acute on chronic systolic heart failure  Echo showed EF 45% perhaps 2/2 tachyarrhythmia, may also be related to multiple heart valve disease  -LHC, RHC 9/6 w elevated R heart pressures, nonobstructive CAD  -now s/p lasix drip, on torsemide  -metoprolol  -aldactone (reduced slightly)  -cautious diuresis in setting of severe AS  -plan for repeat echo Monday  - F/u CHF Team, Cardiology, Structural Heart Team    Significant heart valve disease.   Echo 8/29 w/ mild to mod MR, severe AS, severe TR, mild to mod MI, severe pHTN, severely dilated LA, LV systolic function mildly impaired; segmental wall motion abnormalities noted, mild concentric LVH,  LVEF 45%, RV pressure/volume overload, RA severely dilated, RV with moderate dilation, diffuse hypokinesis, and depression of contractility based on TAPSE. Structural Heart Team consulted.   -carotid doppler done  - F/u Structural Heart Team eval    Chronic atrial fibrillation  Rapid rates upon admission in setting of pain, large LLE hematoma. Cardiology following. HR now improved as hematoma appears to have stabilized. Xarelto on hold due to the hematoma.  - Continue rate control with metoprolol, can consider addition of digoxin if rapid rates  - Continue to HOLD Xarelto given large hematoma  - Referred to Cardiac EP re possible Watchman LAAC procedure / device to provide a non-pharmacologic alternative for non-valvular afib related stroke risk-reduction- outpt f/u    Hx of pacemaker placement  S/P PPM interrogation; Normal functioning single chamber PPM with battery longevity 5.9-6.1 years  - Continue to monitor    Hypothyroidism  Stable.   - Continue levothyroxine    Hx of gout  Stable.   - Continue allopurinol    Constipation  Stable.  - Continue bowel regimen, monitor for BM    #DM  -a1c 6.6,  new dx here  -SSI, monitor BG    Urinary retention  -jackson removed, voided immediately after    Physical deconditioning and debility  PT eval consulted  - F/u PT eval  - OOB to chair daily      DVT px: Off pharmacologic DVT px / AC given hematoma, SCDs  Code Status: DNR/DNI  Dispo: Medically active, possibly medically ready for [facility] middle of next week

## 2023-09-10 NOTE — PROGRESS NOTE ADULT - PROBLEM SELECTOR PLAN 1
Echo shows EF 45% possibly 2/2 tachyarrhythmia contributed by valvular heart disease, severe AS, HF team on board, now off lasix gtt, on torsemide 20mg BID,  Spirolactone 25mg BID - (dose reduced by HF for soft BP).  Daily weights.  continue to monitor weight and renal function

## 2023-09-10 NOTE — PROGRESS NOTE ADULT - SUBJECTIVE AND OBJECTIVE BOX
HOSPITALIST ATTENDING PROGRESS NOTE    Chart and meds reviewed.  Patient seen and examined.    CC: LLE hematoma    Subjective: Hypotensive this afternoon, dizzy. Metoprolol d/c'd spironolactone reduced from 25 BID to 25qd.     All other systems reviewed and found to be negative with the exception of what has been described above.    MEDICATIONS  (STANDING):  allopurinol 100 milliGRAM(s) Oral daily  ammonium lactate 12% Lotion 1 Application(s) Topical two times a day  calcium carbonate 1250 mG  + Vitamin D (OsCal 500 + D) 1 Tablet(s) Oral daily  dextrose 5%. 1000 milliLiter(s) (100 mL/Hr) IV Continuous <Continuous>  dextrose 5%. 1000 milliLiter(s) (50 mL/Hr) IV Continuous <Continuous>  dextrose 50% Injectable 12.5 Gram(s) IV Push once  dextrose 50% Injectable 25 Gram(s) IV Push once  dextrose 50% Injectable 25 Gram(s) IV Push once  glucagon  Injectable 1 milliGRAM(s) IntraMuscular once  insulin lispro (ADMELOG) corrective regimen sliding scale   SubCutaneous three times a day before meals  insulin lispro (ADMELOG) corrective regimen sliding scale   SubCutaneous at bedtime  levothyroxine 125 MICROGram(s) Oral daily  multivitamin/minerals 1 Tablet(s) Oral daily  polyethylene glycol 3350 17 Gram(s) Oral daily  senna 2 Tablet(s) Oral at bedtime  torsemide 20 milliGRAM(s) Oral two times a day    MEDICATIONS  (PRN):  acetaminophen     Tablet .. 650 milliGRAM(s) Oral every 6 hours PRN Moderate Pain (4 - 6)  bisacodyl Suppository 10 milliGRAM(s) Rectal daily PRN Constipation  dextrose Oral Gel 15 Gram(s) Oral once PRN Blood Glucose LESS THAN 70 milliGRAM(s)/deciliter  melatonin 3 milliGRAM(s) Oral at bedtime PRN Insomnia  ondansetron Injectable 4 milliGRAM(s) IV Push every 6 hours PRN Nausea and/or Vomiting      VITALS:  T(F): 97.5 (09-10-23 @ 08:20), Max: 97.9 (09-09-23 @ 20:16)  HR: 88 (09-10-23 @ 11:27) (70 - 93)  BP: 112/55 (09-10-23 @ 11:27) (95/50 - 146/75)  RR: 18 (09-10-23 @ 08:20) (18 - 18)  SpO2: 93% (09-10-23 @ 08:20) (93% - 95%)  Wt(kg): --    I&O's Summary    09 Sep 2023 07:01  -  10 Sep 2023 07:00  --------------------------------------------------------  IN: 240 mL / OUT: 1525 mL / NET: -1285 mL        CAPILLARY BLOOD GLUCOSE      POCT Blood Glucose.: 179 mg/dL (10 Sep 2023 12:32)  POCT Blood Glucose.: 102 mg/dL (10 Sep 2023 08:22)  POCT Blood Glucose.: 119 mg/dL (09 Sep 2023 22:31)  POCT Blood Glucose.: 91 mg/dL (09 Sep 2023 16:44)      PHYSICAL EXAM:  Gen: NAD  HEENT:  pupils equal and reactive, EOMI, no oropharyngeal lesions, erythema, exudates, oral thrush  NECK:   supple, no carotid bruits, no palpable lymph nodes, no thyromegaly  CV:  +S1, +S2, regular, no murmurs or rubs  RESP:   lungs clear to auscultation bilaterally, no wheezing, rales, rhonchi, good air entry bilaterally  BREAST:  not examined  GI:  abdomen soft, non-tender, non-distended, normal BS, no bruits, no abdominal masses, no palpable masses  RECTAL:  not examined  :  not examined  MSK:   normal muscle tone, no atrophy, no rigidity, no contractions  EXT:  no clubbing, no cyanosis, no edema, no calf pain, swelling or erythema  VASCULAR:  pulses equal and symmetric in the upper and lower extremities  NEURO:  AAOX3, no focal neurological deficits, follows all commands, able to move extremities spontaneously  SKIN:  no ulcers, lesions or rashes    LABS:        09-10    134<L>  |  91<L>  |  29<H>  ----------------------------<  103<H>  4.3   |  39<H>  |  0.87    Ca    8.9      10 Sep 2023 06:53  Mg     2.2     09-10              Urinalysis Basic - ( 10 Sep 2023 06:53 )    Color: x / Appearance: x / SG: x / pH: x  Gluc: 103 mg/dL / Ketone: x  / Bili: x / Urobili: x   Blood: x / Protein: x / Nitrite: x   Leuk Esterase: x / RBC: x / WBC x   Sq Epi: x / Non Sq Epi: x / Bacteria: x    CULTURES:  no new    Additional results/Imaging, I have personally reviewed:  US venous duplex RUE and RLE requested for swelling, study pending    US venous duplex LUE 8/30: No evidence of left upper extremity deep venous thrombosis.    CXR 8/30: Heart enlargement and left-sided pacemaker. There is a persistent mild left base effusion and a persistent mild right base pleural pulmonary process. Chest is similar to March 18, 2022. Quite advanced bilateral shoulder degeneration    CT L shoulder WO 8/30: Severe left glenohumeral arthrosis with chondrocalcinosis and chronic remodeling of the bones. Severe supraspinatus and mild to moderate infraspinatus and subscapularis muscle atrophy. Marked subcoracoid bursitis. Marked biceps tenosynovitis. Small left pleural effusion.    CT head WO 8/28: Mild parenchymal atrophy with patchy periventricular hypoattenuation; a  nonspecific finding which statistically reflects chronic microvascular   ischemic change. Calcified plaque [place] of [place]. Coarse calcifications falx cerebri. No evidence of extra-axial collection, intracranial hemorrhage, mass or   mass effect. Gray-white matter differentiation appears preserved. Complete opacification of the visualized left maxillary sinus, demonstrating cortical thickening with heterogeneous internal density, including areas of hyperdensity. Bilateral mastoid air cells and middle ear regions well-aerated. No aggressive calvarial lesion or acute calvarial fracture visualized. Hyperostosis frontalis. Bilateral cataract surgery.    CTA LLE W/ 8/25: Large superficial left calf hematoma with active bleeding. Three-vessel runoff to the left foot. Two-vessel runoff to the right foot via the anterior tibial/dorsalis pedis and peroneal arteries.    XR L tib fib 8/25: The tibia and fibula are intact. No fracture or radiographic osseous lesion. Posteromedial mid calf 16.5 x 4.3 cm soft tissue hematoma.    Cardiac Testing:  LHC/RHC 9/6: elevated R heart pressures, nonobstructive CAD    TTE 8/29: Mild to mod MR. Severe AS. Severe TR. Mild to mod [place]. Severe pHTN. Severely dilated LA. LV systolic function mildly impaired; segmental wall motion abnormalities noted. Mild concentric LVH. LVEF 45%. The interventricular septum appears flattened consistent with an RV pressure/volume overload. RA severely dilated. RV with moderate dilation, diffuse hypokinesis, and depression of contractility based on TAPSE. A device wire is seen in the RV and RA. IVC is dilated and not collapsing with inspiration.    EKG 8/28: Rate 105. Afib RVR    EKG 8/25: Rate 75. Afib. RBBB    Telemetry, personally reviewed:  9/6/23: afib, Vpaced 70-80s, PVCs, couplets  9/7/23:  afib, vpaced  9/8/23: Afib 80s, d/c tele

## 2023-09-10 NOTE — PROGRESS NOTE ADULT - ASSESSMENT
89 year-old woman with HTN, CAD, chronic diastolic CHF, chronic atrial fibrillation on Xarelto, hx of PPM placement, COPD, hypothyroidism, gout, presented 8/25 with LLE pain and swelling, difficulty with ambulation, started after striking LLE on piece of furniture the day prior. Patient was found to have hypotension, rapid afib, large superficial L calf hematoma with active bleed. Admitted to Trauma Surgery, since transferred to Medicine as patient's hematoma is being managed conservatively.     Large, traumatic L calf hematoma  Patient with advanced age, moderate MR, severe AS, severe TR with pulmonary hypertension, RV volume overload, borderline BP, afib with RVR (at the time). Appreciate input from Interventional Radiology, deemed very high risk for procedure, deferred intervention, managed supportively. Trauma Surgery also advised conservative management. Reassuringly, patient remains stable, hemodynamically. Hgb stable, ~10. Completed 7-day empiric course of Unasyn as was previously ordered by Surgery  - Xarelto remains held since admission, likely to be held indefinitely, pending further discussion with Trauma Surgery, Cardiology, patient/family. Being evaluated for Watchman, see below  - Continue to monitor    Acute metabolic encephalopathy  Likely multi factorial due to hematoma, hypoxia, congestive heart failure. Underlying causes were treated/managed, and mental status is now improved.   - Continue to monitor, re-orient frequently    Acute respiratory failure with hypoxia  Likely multi factorial due to CHF, severe heart valve disease, severe pHTN. Current SPO2 97% on 2L/min via NC.   - Wean O2 as tolerated, goal SPO2 92% or greater on room air  - Continue to monitor, treat underlying causes  - Encourage incentive spirometry    Acute on chronic systolic heart failure  Echo showed EF 45% perhaps 2/2 tachyarrhythmia, may also be related to multiple heart valve disease  -LHC, RHC 9/6 w elevated R heart pressures, nonobstructive CAD  -now s/p lasix drip, on torsemide  -metoprolol stopped due to hypotension w dizziness today  -aldactone (reduced today due to hypotension w dizziness today)  -cautious diuresis in setting of severe AS  -plan for repeat echo Monday  - F/u CHF Team, Cardiology, Structural Heart Team    Significant heart valve disease.   Echo 8/29 w/ mild to mod MR, severe AS, severe TR, mild to mod [place], severe pHTN, severely dilated LA, LV systolic function mildly impaired; segmental wall motion abnormalities noted, mild concentric LVH,  LVEF 45%, RV pressure/volume overload, RA severely dilated, RV with moderate dilation, diffuse hypokinesis, and depression of contractility based on TAPSE. Structural Heart Team consulted.   -carotid doppler done  - F/u Structural Heart Team eval    Chronic atrial fibrillation  Rapid rates upon admission in setting of pain, large LLE hematoma. Cardiology following. HR now improved as hematoma appears to have stabilized. Xarelto on hold due to the hematoma.  - Continue rate control with metoprolol, can consider addition of digoxin if rapid rates  - Continue to HOLD Xarelto given large hematoma  - Referred to Cardiac EP re possible Watchman LAAC procedure / device to provide a non-pharmacologic alternative for non-valvular afib related stroke risk-reduction- outpt f/u    Hx of pacemaker placement  S/P PPM interrogation; Normal functioning single chamber PPM with battery longevity 5.9-6.1 years  - Continue to monitor    Hypothyroidism  Stable.   - Continue levothyroxine    Hx of gout  Stable.   - Continue allopurinol    Constipation  Stable.  - Continue bowel regimen, monitor for BM    #DM  -a1c 6.6,  new dx here  -SSI, monitor BG    Urinary retention  -jackson removed, voided immediately after    Physical deconditioning and debility  PT eval consulted  - F/u PT eval  - OOB to chair daily      DVT px: Off pharmacologic DVT px / AC given hematoma, SCDs  Code Status: DNR/DNI  Dispo: Medically active, possibly medically ready for [facility] middle of next week

## 2023-09-10 NOTE — PROGRESS NOTE ADULT - SUBJECTIVE AND OBJECTIVE BOX
REASON FOR VISIT: CHF    HPI:  89 year old woman with PMHx chronic CHF, hypothyroidism, Gout, HTN, COPD, Afib on xarelto, anemia, CAD, and osteopenia presents to the ED c/o left leg pain after hitting her leg yesterday 8/24/23, when going to answer the phone. Pt banged it on furniture and developed a large hematoma. Pt having difficulty ambulating, no other injuries. Last took Xarelto last night. No history of DVT/PE.    Cardiology consulted to evaluate for CHF. This is a pt. of Dr. Harris, last seen in the office on 7/31/23. Pt. takes lasix and torsemide prn at home for HFpEF and xarelto and toprol xl 25 mg daily for Chronic Afib.   At present, admitted for leg hematoma, hypotensive, denies chest pain/SOB/palpitations.     Pt denied head strike LOC or any traumatic injuries/complaints   (25 Aug 2023 13:27)    8/29/23 Seated in bedside chair awake alert no complaints tele -120  8/30/23; seen in bed, looks comfortable, no complaints, Tele: Afib 80s-90s - reconsulted by hospitalist one hr after seeing pt. for chest pain  - seen pt. with jono Harris - pt. denies chest pain - says she is SOB, did not have chest pain, trops and EKG ordered  8/31/23: pt. with sign and symptoms of fluid overload - elevated JVD,, started on ivp lasix. Tele: Afib    9/1/23: feels better, less SOB, diuresis changed to lasix gtt by HF, Tele: Afib , pt. is being Tx to 3E  9/2/23: Awake alert Tele AF V paced Breathing comfortably SOB improved No CP  9/3/23: seated in bedside chair, SOB improved/No JVD , Tele AF   9/4/23 Patient is in chair , gets very tachycardic with activity , sob    IV lasix drip was discontinued to low BP day before   9/5/23: Pt sitting up in chair. Denies cp.  Still with SOB.  Tele: Afib, V pacing  9/6/23; seen pt. in bed, went for RHC/LHC, tele: Afib V pacing 70-80  9/7/23: feels tired, no cardiac complaints, s/p LHC/RHC: non obstructive CAD, elevated right sided pressures, tele: Afib 90   9/8/23: seen in bed, c/o weakness, no chest pain/SOB, on lasix gtt, tele: Afib 70-80   9/9/23: Awake in bed. Breathing improved but feels weak.  Off lasix gtt.  Tele: afib with V pacing  9/10/23 OOB to chair. Breathing improved c/o weakness and loss of appetite, denies CP/SOB at this time. Pt off of tele, no adverse events overnight. Pt would like to be considered for TAVR directly from , she doesn't want to go to [facility] as she has aides at home.     MEDICATIONS  (STANDING):  allopurinol 100 milliGRAM(s) Oral daily  ammonium lactate 12% Lotion 1 Application(s) Topical two times a day  calcium carbonate 1250 mG  + Vitamin D (OsCal 500 + D) 1 Tablet(s) Oral daily  dextrose 5%. 1000 milliLiter(s) (100 mL/Hr) IV Continuous <Continuous>  dextrose 5%. 1000 milliLiter(s) (50 mL/Hr) IV Continuous <Continuous>  dextrose 50% Injectable 12.5 Gram(s) IV Push once  dextrose 50% Injectable 25 Gram(s) IV Push once  dextrose 50% Injectable 25 Gram(s) IV Push once  glucagon  Injectable 1 milliGRAM(s) IntraMuscular once  insulin lispro (ADMELOG) corrective regimen sliding scale   SubCutaneous three times a day before meals  insulin lispro (ADMELOG) corrective regimen sliding scale   SubCutaneous at bedtime  levothyroxine 125 MICROGram(s) Oral daily  metoprolol tartrate 12.5 milliGRAM(s) Oral every 12 hours  multivitamin/minerals 1 Tablet(s) Oral daily  polyethylene glycol 3350 17 Gram(s) Oral daily  senna 2 Tablet(s) Oral at bedtime  spironolactone 25 milliGRAM(s) Oral two times a day  torsemide 20 milliGRAM(s) Oral two times a day    MEDICATIONS  (PRN):  acetaminophen     Tablet .. 650 milliGRAM(s) Oral every 6 hours PRN Moderate Pain (4 - 6)  bisacodyl Suppository 10 milliGRAM(s) Rectal daily PRN Constipation  dextrose Oral Gel 15 Gram(s) Oral once PRN Blood Glucose LESS THAN 70 milliGRAM(s)/deciliter  melatonin 3 milliGRAM(s) Oral at bedtime PRN Insomnia  ondansetron Injectable 4 milliGRAM(s) IV Push every 6 hours PRN Nausea and/or Vomiting    Vital Signs Last 24 Hrs  T(C): 36.6 (09 Sep 2023 20:16), Max: 36.6 (09 Sep 2023 20:16)  T(F): 97.9 (09 Sep 2023 20:16), Max: 97.9 (09 Sep 2023 20:16)  HR: 70 (10 Sep 2023 06:30) (70 - 89)  BP: 107/55 (10 Sep 2023 06:30) (95/50 - 146/75)  BP(mean): --  RR: 18 (09 Sep 2023 20:16) (18 - 18)  SpO2: 95% (09 Sep 2023 20:16) (95% - 96%)    Parameters below as of 09 Sep 2023 20:16  Patient On (Oxygen Delivery Method): nasal cannula  O2 Flow (L/min): 2    PHYSICAL EXAM:  Constitutional: NAD, awake and alert  HEENT: No oral cyanosis.  Pulmonary: Non-labored, breath sounds diminished   Cardiovascular: S1 and S2, irregular, 2/6 REBECA   Gastrointestinal: Bowel Sounds present, soft, nontender.   Lymph: 1+ LE edema.  LLE ACE wrap in place   Neurological: Alert, no focal deficits  Skin: + sacral wound, bandaged   Psych:  Mood & affect appropriate      I&O's Summary    09 Sep 2023 07:01  -  10 Sep 2023 07:00  --------------------------------------------------------  IN: 240 mL / OUT: 1525 mL / NET: -1285 mL      LABS: All Labs Reviewed:    09-10    134<L>  |  91<L>  |  29<H>  ----------------------------<  103<H>  4.3   |  39<H>  |  0.87    Ca    8.9      10 Sep 2023 06:53  Mg     2.2     09-10                               10.4   8.94  )-----------( 217      ( 07 Sep 2023 08:55 )             32.9   09-08    137  |  94<L>  |  25<H>  ----------------------------<  109<H>  4.1   |  40<H>  |  0.92    Ca    8.6      08 Sep 2023 06:53  Mg     1.8     09-08      09-07    137  |  96  |  22  ----------------------------<  113<H>  4.6   |  37<H>  |  0.85    Ca    8.4<L>      07 Sep 2023 07:25  Phos  3.0     09-06  Mg     2.0     09-07    TroponinI hsT: <-37.45        Cardiac Catheterization (09.06.23 @ 11:59):  Diagnostic Conclusions:   1. Mild diffuse atherosclerosis with moderate severe disease of small caliber Cx in a nondominant territory.  2. Elevated right sided filling pressures in setting of severe TR with normal left sided filling pressures. Adequate perfusion.  Recommendations:   1. Recommend aggressive medical management for CAD as per ACC/AHAguidelines  2. Structural heart follow up at  for ongoing [facility]AYNA evaluation.      TTE Echo Complete w/o Contrast w/ Doppler (08.29.23 @ 08:45):   The mitral valve leaflets appear thickened. Mild mitral annular calcification is present. Mild to Moderate mitral regurgitation with two jets is present.   Significant fibrocalcific changes noted to the aortic valve leaflets with restriction in leaflet excursion. Calculated MIKE is .91cm^2; this finding is consistent with severe aortic stenosis.   The tricuspid valve leaflets appear mildly thickened and/or calcified, but open well. Severe (4+) tricuspid valve regurgitation is present.   Severe pulmonary hypertension.   Pulmonic valve not well seen. Mild to moderate pulmonic valvular regurgitation is present.   The left atrium is severely dilated.   Left ventricle systolic function appears mildly impaired; segmental wall motion abnormalities noted. Mild concentric left ventricular hypertrophy is present. Estimated Ejection Fraction is 45 %. The interventricular septum appears flattened consistent with an RV pressure/volume overload.   The right atrium appears severely dilated.   A device wire is seen inthe RV and RA.   The right ventricle exhibits moderate dilation, diffuse hypokinesis, and depression of contractility based on TAPSE.   A device wire is seen in the RV and RA.   Pleural effusion - is present.   IVC is dilated and not collapsing with inspiration.

## 2023-09-10 NOTE — PROGRESS NOTE ADULT - NS ATTEND AMEND GEN_ALL_CORE FT
Case discussed with NP; management as described above -- will need to discuss timing of TAVR with interventional cardiology.

## 2023-09-11 NOTE — PROGRESS NOTE ADULT - SUBJECTIVE AND OBJECTIVE BOX
Chief Complaint: LLE pain and swelling, difficulty with ambulation    Interval Hx: Patient seen and examined. Remains stable. Ambulates with rolling walker. She has some mild dyspnea on exertion. No dyspnea at rest. SPO2 97% on 2L/min via NC. Generalized weakness and deconditioning. Ongoing extremity edema. No chest pain or tightness. No other complaints. Awaiting input from CHF Team and Structural Heart Team.     ROS: Multi system review is comprehensively negative x 10 systems except as above    Vitals:  T(F): 97.4 (05 Sep 2023 08:01), Max: 98.1 (05 Sep 2023 05:04)  HR: 80 (05 Sep 2023 11:48) (76 - 85)  BP: 144/53 (05 Sep 2023 11:48) (96/58 - 144/53)  RR: 18 (05 Sep 2023 08:01) (16 - 18)  SpO2: 97% (05 Sep 2023 08:01) (96% - 97%) on 2L/min    Exam:  Constitutional: No acute distress  HEENT: NCAT PERRL EOMI MMM clear oropharynx  Neck: Supple, no JVD  CVS: S1 and S2, irregular, rate ~80, 2/6 REBECA   Chest: Normal resp effort at rest, lungs clear  Abd: +BS, soft NT ND   Ext: LLE in ACE wrap and is neurovascularly intact, also with RLE edema and actually some RUE edema  Skin: No rashes.  Psych: Mood & affect appropriate  Neurological: Alert, no focal deficits    Labs:    (9/11)                        10.8   10.93  )----------( 161               32.7       133  |  92  |  28  ---------------------< 103  4.7   |  41  |  0.96    Ca 9.2       ProBNP 8126   Troponin negative   Lactate 1.8    A1c 6.6  TSH 0.34    UA yellow, clear, small LE, N-, 11-25 WBC, no RBC, few bact, mod sq epi    Imaging:  US arterial duplex B/L carotids 9/7: No significant hemodynamic stenosis of either carotid artery.    US venous duplex RUE 9/7: There is thrombus in the basilic vein (superficial vein) surrounding the catheter in the upper arm extending to the junction with the axillary vein.    US venous duplex RLE 9/6: No evidence of right lower extremity deep venous thrombosis.     US venous duplex LUE 8/30: No evidence of left upper extremity deep venous thrombosis.    CXR 8/30: Heart enlargement and left-sided pacemaker. There is a persistent mild left base effusion and a persistent mild right base pleural pulmonary process. Chest is similar to March 18, 2022. Quite advanced bilateral shoulder degeneration    CT L shoulder WO 8/30: Severe left glenohumeral arthrosis with chondrocalcinosis and chronic remodeling of the bones. Severe supraspinatus and mild to moderate infraspinatus and subscapularis muscle atrophy. Marked subcoracoid bursitis. Marked biceps tenosynovitis. Small left pleural effusion.    CT head WO 8/28: Mild parenchymal atrophy with patchy periventricular hypoattenuation; a  nonspecific finding which statistically reflects chronic microvascular   ischemic change. Calcified plaque [place] of [place]. Coarse calcifications falx cerebri. No evidence of extra-axial collection, intracranial hemorrhage, mass or   mass effect. Gray-white matter differentiation appears preserved. Complete opacification of the visualized left maxillary sinus, demonstrating cortical thickening with heterogeneous internal density, including areas of hyperdensity. Bilateral mastoid air cells and middle ear regions well-aerated. No aggressive calvarial lesion or acute calvarial fracture visualized. Hyperostosis frontalis. Bilateral cataract surgery.    CTA LLE W/ 8/25: Large superficial left calf hematoma with active bleeding. Three-vessel runoff to the left foot. Two-vessel runoff to the right foot via the anterior tibial/dorsalis pedis and peroneal arteries.    XR L tib fib 8/25: The tibia and fibula are intact. No fracture or radiographic osseous lesion. Posteromedial mid calf 16.5 x 4.3 cm soft tissue hematoma.    Cardiac Testing:  Tele 9/8: Afib, rate 80s, tele DC'd    LHC 9/6: Mild diffuse atherosclerosis with moderate severe disease of small caliber Cx in a nondominant territory. Elevated right sided filling pressures in setting of severe TR withnormal left sided filling pressures. Adequate perfusion.    EKG 8/30: Afib, rate 96, RBBB (old)    TTE 8/29: Mild to mod MR. Severe AS. Severe TR. Mild to mod MI. Severe pHTN. Severely dilated LA. LV systolic function mildly impaired; segmental wall motion abnormalities noted. Mild concentric LVH. LVEF 45%. The interventricular septum appears flattened consistent with an RV pressure/volume overload. RA severely dilated. RV with moderate dilation, diffuse hypokinesis, and depression of contractility based on TAPSE. A device wire is seen in the RV and RA. IVC is dilated and not collapsing with inspiration.    EKG 8/28: Rate 105. Afib RVR    EKG 8/25: Rate 75. Afib. RBBB (old)    Meds:  MEDICATIONS  (STANDING):  allopurinol 100 milliGRAM(s) Oral daily  ammonium lactate 12% Lotion 1 Application(s) Topical two times a day  calcium carbonate 1250 mG  + Vitamin D (OsCal 500 + D) 1 Tablet(s) Oral daily  insulin lispro (ADMELOG) corrective regimen sliding scale   SubCutaneous three times a day before meals  insulin lispro (ADMELOG) corrective regimen sliding scale   SubCutaneous at bedtime  levothyroxine 125 MICROGram(s) Oral daily  multivitamin/minerals 1 Tablet(s) Oral daily  polyethylene glycol 3350 17 Gram(s) Oral daily  senna 2 Tablet(s) Oral at bedtime  spironolactone 25 milliGRAM(s) Oral daily  torsemide 20 milliGRAM(s) Oral two times a day    MEDICATIONS  (PRN):  acetaminophen     Tablet .. 650 milliGRAM(s) Oral every 6 hours PRN Moderate Pain (4 - 6)  bisacodyl Suppository 10 milliGRAM(s) Rectal daily PRN Constipation  dextrose Oral Gel 15 Gram(s) Oral once PRN Blood Glucose LESS THAN 70 milliGRAM(s)/deciliter  melatonin 3 milliGRAM(s) Oral at bedtime PRN Insomnia  ondansetron Injectable 4 milliGRAM(s) IV Push every 6 hours PRN Nausea and/or Vomiting

## 2023-09-11 NOTE — PROGRESS NOTE ADULT - PROBLEM SELECTOR PLAN 1
Echo shows EF 45% possibly 2/2 tachyarrhythmia contributed by valvular heart disease, severe AS, HF team on board, now off lasix gtt, on torsemide and Spirolactone 25mg BID   F/U Echo done final read pending   CW Daily weight  Strict I&O  Trend renal values

## 2023-09-11 NOTE — PROGRESS NOTE ADULT - PROBLEM SELECTOR PLAN 4
Rate controlled HR 70's   AC held due to large hematoma Continue to hold for now, evaluation for Watchman after TAVR OPT EP follow up

## 2023-09-11 NOTE — PROGRESS NOTE ADULT - ASSESSMENT
89 year-old woman with HTN, CAD, chronic diastolic CHF, chronic atrial fibrillation on Xarelto, hx of PPM placement, COPD, hypothyroidism, gout, presented 8/25 with LLE pain and swelling, difficulty with ambulation, started after striking LLE on piece of furniture the day prior. Patient was found to have hypotension, rapid afib, large superficial L calf hematoma with active bleed. Admitted to Trauma Surgery, since transferred to Medicine as patient's hematoma is being managed conservatively.     Large, traumatic L calf hematoma  Patient with advanced age, moderate MR, severe AS, severe TR with pulmonary hypertension, RV volume overload, borderline BP, afib with RVR (at the time). Appreciate input from Interventional Radiology, deemed very high risk for procedure, deferred intervention, managed supportively. Trauma Surgery also advised conservative management. Reassuringly, patient remains stable, hemodynamically. Hgb stable, ~10. Completed 7-day empiric course of Unasyn as was previously ordered by Surgery  - Xarelto remains held since admission, likely to be held indefinitely, pending further discussion with Trauma Surgery, Cardiology, patient/family. Being evaluated for Watchman, see below  - Continue to monitor    Acute metabolic encephalopathy  Likely multi factorial due to hematoma, hypoxia, congestive heart failure. Underlying causes were treated/managed, and mental status is now improved.   - Continue to monitor, re-orient frequently    Acute respiratory failure with hypoxia  Likely multi factorial due to CHF, severe heart valve disease, severe pHTN. Current SPO2 97% on 2L/min via NC.   - Wean O2 as tolerated, goal SPO2 92% or greater on room air  - Continue to monitor, treat underlying causes  - Encourage incentive spirometry    Acute on chronic systolic heart failure  Echo showed EF 45% perhaps 2/2 tachyarrhythmia, may also be related to multiple heart valve disease. LHC and RHC performed 9/6. Non obstructive CAD. Elevated R heart pressures consistent with CHF. Now s/p Lasix drip, on torsemide. Metoprolol stopped due to hypotension w dizziness. Aldactone reduced due to same.   - Continue cautious diuresis in setting of severe AS  - Plan for repeat echo today  - F/u CHF Team, Cardiology, Structural Heart Team    Significant heart valve disease.   Echo 8/29 w/ mild to mod MR, severe AS, severe TR, mild to mod PA, severe pHTN, severely dilated LA, LV systolic function mildly impaired; segmental wall motion abnormalities noted, mild concentric LVH,  LVEF 45%, RV pressure/volume overload, RA severely dilated, RV with moderate dilation, diffuse hypokinesis, and depression of contractility based on TAPSE. Structural Heart Team consulted. Recommended patient optimize functional status prior to TAVR.  - Outpatient referral for continued TAVR eval.     Chronic atrial fibrillation  Rapid rates upon admission in setting of pain, large LLE hematoma. Cardiology following. HR now improved as hematoma appears to have stabilized. Xarelto on hold due to the hematoma.  - Continue to HOLD Xarelto given large hematoma  - Referred to Cardiac EP re possible Watchman LAAC procedure / device to provide a non-pharmacologic alternative for non-valvular afib related stroke risk-reduction, to continue to evaluate patient as outpatient for this procedure    Hx of pacemaker placement  S/P PPM interrogation; Normal functioning single chamber PPM with battery longevity 5.9-6.1 years    Hypothyroidism  Stable.   - Continue levothyroxine    Hx of gout  Stable.   - Continue allopurinol    Constipation  Stable.  - Continue bowel regimen, monitor for BM    DM  A1c 6.6, new diagnosis here  - Continue on insulin correctional scale for now, monitor glucose    Urinary retention  Resolved. Patient had Espinoza earlier this admission, passed trial of void.     Physical deconditioning and debility  PT eval appreciated. Recommend EDEN.  - Continue restorative PT sessions.   - OOB to chair daily      DVT px: Off pharmacologic DVT px / AC given hematoma, continue SCDs  Code Status: DNR/DNI  Dispo: Approaching medical stability for DC to [facility]

## 2023-09-11 NOTE — PROGRESS NOTE ADULT - SUBJECTIVE AND OBJECTIVE BOX
REASON FOR VISIT: CHF    HPI:  89 year old woman with PMHx chronic CHF, hypothyroidism, Gout, HTN, COPD, Afib on xarelto, anemia, CAD, and osteopenia presents to the ED c/o left leg pain after hitting her leg yesterday 8/24/23, when going to answer the phone. Pt banged it on furniture and developed a large hematoma. Pt having difficulty ambulating, no other injuries. Last took Xarelto last night. No history of DVT/PE.    Cardiology consulted to evaluate for CHF. This is a pt. of Dr. Harris, last seen in the office on 7/31/23. Pt. takes lasix and torsemide prn at home for HFpEF and xarelto and toprol xl 25 mg daily for Chronic Afib.   At present, admitted for leg hematoma, hypotensive, denies chest pain/SOB/palpitations.     Pt denied head strike LOC or any traumatic injuries/complaints   (25 Aug 2023 13:27)    8/29/23 Seated in bedside chair awake alert no complaints tele -120  8/30/23; seen in bed, looks comfortable, no complaints, Tele: Afib 80s-90s - reconsulted by hospitalist one hr after seeing pt. for chest pain  - seen pt. with jono Harris - pt. denies chest pain - says she is SOB, did not have chest pain, trops and EKG ordered  8/31/23: pt. with sign and symptoms of fluid overload - elevated JVD,, started on ivp lasix. Tele: Afib    9/1/23: feels better, less SOB, diuresis changed to lasix gtt by HF, Tele: Afib , pt. is being Tx to 3E  9/2/23: Awake alert Tele AF V paced Breathing comfortably SOB improved No CP  9/3/23: seated in bedside chair, SOB improved/No JVD , Tele AF   9/4/23 Patient is in chair , gets very tachycardic with activity , sob    IV lasix drip was discontinued to low BP day before   9/5/23: Pt sitting up in chair. Denies cp.  Still with SOB.  Tele: Afib, V pacing  9/6/23; seen pt. in bed, went for RHC/LHC, tele: Afib V pacing 70-80  9/7/23: feels tired, no cardiac complaints, s/p LHC/RHC: non obstructive CAD, elevated right sided pressures, tele: Afib 90   9/8/23: seen in bed, c/o weakness, no chest pain/SOB, on lasix gtt, tele: Afib 70-80   9/9/23: Awake in bed. Breathing improved but feels weak.  Off lasix gtt.  Tele: afib with V pacing  9/10/23 OOB to chair. Breathing improved c/o weakness and loss of appetite, denies CP/SOB at this time. Pt off of tele, no adverse events overnight. Pt would like to be considered for TAVR directly from , she doesn't want to go to [facility] as she has aides at home.   9/11/23: Seated in bedside chair feels fatigued, no CP/SOB    MEDICATIONS  (STANDING):  allopurinol 100 milliGRAM(s) Oral daily  ammonium lactate 12% Lotion 1 Application(s) Topical two times a day  calcium carbonate 1250 mG  + Vitamin D (OsCal 500 + D) 1 Tablet(s) Oral daily  dextrose 5%. 1000 milliLiter(s) (100 mL/Hr) IV Continuous <Continuous>  dextrose 5%. 1000 milliLiter(s) (50 mL/Hr) IV Continuous <Continuous>  dextrose 50% Injectable 12.5 Gram(s) IV Push once  dextrose 50% Injectable 25 Gram(s) IV Push once  dextrose 50% Injectable 25 Gram(s) IV Push once  glucagon  Injectable 1 milliGRAM(s) IntraMuscular once  insulin lispro (ADMELOG) corrective regimen sliding scale   SubCutaneous three times a day before meals  insulin lispro (ADMELOG) corrective regimen sliding scale   SubCutaneous at bedtime  levothyroxine 125 MICROGram(s) Oral daily  multivitamin/minerals 1 Tablet(s) Oral daily  polyethylene glycol 3350 17 Gram(s) Oral daily  senna 2 Tablet(s) Oral at bedtime  spironolactone 25 milliGRAM(s) Oral daily  torsemide 20 milliGRAM(s) Oral two times a day    MEDICATIONS  (PRN):  acetaminophen     Tablet .. 650 milliGRAM(s) Oral every 6 hours PRN Moderate Pain (4 - 6)  bisacodyl Suppository 10 milliGRAM(s) Rectal daily PRN Constipation  dextrose Oral Gel 15 Gram(s) Oral once PRN Blood Glucose LESS THAN 70 milliGRAM(s)/deciliter  melatonin 3 milliGRAM(s) Oral at bedtime PRN Insomnia  ondansetron Injectable 4 milliGRAM(s) IV Push every 6 hours PRN Nausea and/or Vomiting        Vital Signs Last 24 Hrs  T(C): 36.4 (11 Sep 2023 07:22), Max: 36.5 (10 Sep 2023 21:00)  T(F): 97.5 (11 Sep 2023 07:22), Max: 97.7 (10 Sep 2023 21:00)  HR: 89 (11 Sep 2023 11:22) (74 - 89)  BP: 102/52 (11 Sep 2023 11:22) (98/50 - 110/50)  BP(mean): --  RR: 18 (11 Sep 2023 07:22) (18 - 18)  SpO2: 94% (11 Sep 2023 07:22) (94% - 97%)    Parameters below as of 11 Sep 2023 07:22  Patient On (Oxygen Delivery Method): nasal cannula            PHYSICAL EXAM:  Constitutional: NAD, awake and alert  HEENT: No oral cyanosis.  Pulmonary: Non-labored, breath sounds diminished   Cardiovascular: S1 and S2, irregular, 2/6 REBECA   Gastrointestinal:Abd soft, nontender.   Lymph: 1+ LE edema.  LLE ACE wrap in place   Neurological: Alert, no focal deficits  Psych:  Mood & affect appropriate      I&O's Summary    09 Sep 2023 07:01  -  10 Sep 2023 07:00  --------------------------------------------------------  IN: 240 mL / OUT: 1525 mL / NET: -1285 mL      LABS: All Labs Reviewed:    09-10    134<L>  |  91<L>  |  29<H>  ----------------------------<  103<H>  4.3   |  39<H>  |  0.87    Ca    8.9      10 Sep 2023 06:53  Mg     2.2     09-10                               10.4   8.94  )-----------( 217      ( 07 Sep 2023 08:55 )             32.9   09-08    137  |  94<L>  |  25<H>  ----------------------------<  109<H>  4.1   |  40<H>  |  0.92    Ca    8.6      08 Sep 2023 06:53  Mg     1.8     09-08 09-07    137  |  96  |  22  ----------------------------<  113<H>  4.6   |  37<H>  |  0.85    Ca    8.4<L>      07 Sep 2023 07:25  Phos  3.0     09-06  Mg     2.0     09-07    TroponinI hsT: <-37.45        Cardiac Catheterization (09.06.23 @ 11:59):  Diagnostic Conclusions:   1. Mild diffuse atherosclerosis with moderate severe disease of small caliber Cx in a nondominant territory.  2. Elevated right sided filling pressures in setting of severe TR with normal left sided filling pressures. Adequate perfusion.  Recommendations:   1. Recommend aggressive medical management for CAD as per ACC/AHAguidelines  2. Structural heart follow up at  for ongoing [facility] evaluation.      TTE Echo Complete w/o Contrast w/ Doppler (08.29.23 @ 08:45):   The mitral valve leaflets appear thickened. Mild mitral annular calcification is present. Mild to Moderate mitral regurgitation with two jets is present.   Significant fibrocalcific changes noted to the aortic valve leaflets with restriction in leaflet excursion. Calculated MIKE is .91cm^2; this finding is consistent with severe aortic stenosis.   The tricuspid valve leaflets appear mildly thickened and/or calcified, but open well. Severe (4+) tricuspid valve regurgitation is present.   Severe pulmonary hypertension.   Pulmonic valve not well seen. Mild to moderate pulmonic valvular regurgitation is present.   The left atrium is severely dilated.   Left ventricle systolic function appears mildly impaired; segmental wall motion abnormalities noted. Mild concentric left ventricular hypertrophy is present. Estimated Ejection Fraction is 45 %. The interventricular septum appears flattened consistent with an RV pressure/volume overload.   The right atrium appears severely dilated.   A device wire is seen inthe RV and RA.   The right ventricle exhibits moderate dilation, diffuse hypokinesis, and depression of contractility based on TAPSE.   A device wire is seen in the RV and RA.   Pleural effusion - is present.   IVC is dilated and not collapsing with inspiration.

## 2023-09-11 NOTE — PROGRESS NOTE ADULT - PROBLEM SELECTOR PLAN 2
severe AS, Severe TR, severe pHTN Structural Heart Team consulted appreciated   s/p  RHC and LHC on 9/6 revealing non obstructive CAD and elevated right sided pressures, aggressive medical management, ongoing evaluation for TAVR by Structural Heart

## 2023-09-11 NOTE — PROGRESS NOTE ADULT - PROBLEM SELECTOR PLAN 3
large left calf hematoma; Interventional Radiology/trauma surgery recs appreciated deemed very high risk for any procedure, deferred intervention, conservative management, H&H stable  Xarelto remains on hold, Being evaluated for Watchman

## 2023-09-12 NOTE — PROGRESS NOTE ADULT - SUBJECTIVE AND OBJECTIVE BOX
REASON FOR VISIT: CHF    HPI:  89 year old woman with PMHx chronic CHF, hypothyroidism, Gout, HTN, COPD, Afib on xarelto, anemia, CAD, and osteopenia presents to the ED c/o left leg pain after hitting her leg yesterday 8/24/23, when going to answer the phone. Pt banged it on furniture and developed a large hematoma. Pt having difficulty ambulating, no other injuries. Last took Xarelto last night. No history of DVT/PE.    Cardiology consulted to evaluate for CHF. This is a pt. of Dr. Harris, last seen in the office on 7/31/23. Pt. takes lasix and torsemide prn at home for HFpEF and xarelto and toprol xl 25 mg daily for Chronic Afib.   At present, admitted for leg hematoma, hypotensive, denies chest pain/SOB/palpitations.     Pt denied head strike LOC or any traumatic injuries/complaints   (25 Aug 2023 13:27)    8/29/23 Seated in bedside chair awake alert no complaints tele -120  8/30/23; seen in bed, looks comfortable, no complaints, Tele: Afib 80s-90s - reconsulted by hospitalist one hr after seeing pt. for chest pain  - seen pt. with jono Harris - pt. denies chest pain - says she is SOB, did not have chest pain, trops and EKG ordered  8/31/23: pt. with sign and symptoms of fluid overload - elevated JVD,, started on ivp lasix. Tele: Afib    9/1/23: feels better, less SOB, diuresis changed to lasix gtt by HF, Tele: Afib , pt. is being Tx to 3E  9/2/23: Awake alert Tele AF V paced Breathing comfortably SOB improved No CP  9/3/23: seated in bedside chair, SOB improved/No JVD , Tele AF   9/4/23 Patient is in chair , gets very tachycardic with activity , sob    IV lasix drip was discontinued to low BP day before   9/5/23: Pt sitting up in chair. Denies cp.  Still with SOB.  Tele: Afib, V pacing  9/6/23; seen pt. in bed, went for RHC/LHC, tele: Afib V pacing 70-80  9/7/23: feels tired, no cardiac complaints, s/p LHC/RHC: non obstructive CAD, elevated right sided pressures, tele: Afib 90   9/8/23: seen in bed, c/o weakness, no chest pain/SOB, on lasix gtt, tele: Afib 70-80   9/9/23: Awake in bed. Breathing improved but feels weak.  Off lasix gtt.  Tele: afib with V pacing  9/10/23 OOB to chair. Breathing improved c/o weakness and loss of appetite, denies CP/SOB at this time. Pt off of tele, no adverse events overnight. Pt would like to be considered for TAVR directly from , she doesn't want to go to [facility] as she has aides at home.   9/11/23: Seated in bedside chair feels fatigued, no CP/SOB  9/12/23: Pt feels tired, did not sleep well. No CP.  Breathing ok this am    MEDICATIONS  (STANDING):  allopurinol 100 milliGRAM(s) Oral daily  ammonium lactate 12% Lotion 1 Application(s) Topical two times a day  calcium carbonate 1250 mG  + Vitamin D (OsCal 500 + D) 1 Tablet(s) Oral daily  dextrose 5%. 1000 milliLiter(s) (100 mL/Hr) IV Continuous <Continuous>  dextrose 5%. 1000 milliLiter(s) (50 mL/Hr) IV Continuous <Continuous>  dextrose 50% Injectable 12.5 Gram(s) IV Push once  dextrose 50% Injectable 25 Gram(s) IV Push once  dextrose 50% Injectable 25 Gram(s) IV Push once  glucagon  Injectable 1 milliGRAM(s) IntraMuscular once  insulin lispro (ADMELOG) corrective regimen sliding scale   SubCutaneous three times a day before meals  insulin lispro (ADMELOG) corrective regimen sliding scale   SubCutaneous at bedtime  levothyroxine 125 MICROGram(s) Oral daily  multivitamin/minerals 1 Tablet(s) Oral daily  polyethylene glycol 3350 17 Gram(s) Oral daily  senna 2 Tablet(s) Oral at bedtime  spironolactone 25 milliGRAM(s) Oral daily  torsemide 20 milliGRAM(s) Oral two times a day    MEDICATIONS  (PRN):  acetaminophen     Tablet .. 650 milliGRAM(s) Oral every 6 hours PRN Moderate Pain (4 - 6)  bisacodyl Suppository 10 milliGRAM(s) Rectal daily PRN Constipation  dextrose Oral Gel 15 Gram(s) Oral once PRN Blood Glucose LESS THAN 70 milliGRAM(s)/deciliter  melatonin 3 milliGRAM(s) Oral at bedtime PRN Insomnia  ondansetron Injectable 4 milliGRAM(s) IV Push every 6 hours PRN Nausea and/or Vomiting      Vital Signs Last 24 Hrs  T(C): 36.8 (11 Sep 2023 20:00), Max: 36.8 (11 Sep 2023 20:00)  T(F): 98.3 (11 Sep 2023 20:00), Max: 98.3 (11 Sep 2023 20:00)  HR: 85 (12 Sep 2023 04:26) (85 - 89)  BP: 105/59 (12 Sep 2023 04:26) (94/42 - 105/59)  BP(mean): --  RR: 17 (11 Sep 2023 20:00) (17 - 17)  SpO2: 98% (11 Sep 2023 20:00) (98% - 98%)    Parameters below as of 11 Sep 2023 20:00  Patient On (Oxygen Delivery Method): nasal cannula  O2 Flow (L/min): 2      PHYSICAL EXAM:  Constitutional: NAD, awake and alert  HEENT: No oral cyanosis.  Pulmonary: Non-labored, breath sounds diminished   Cardiovascular: S1 and S2, irregular, 2/6 REBECA   Gastrointestinal:Abd soft, nontender.   Lymph: 1+ LE edema.  LLE ACE wrap in place   Neurological: Alert, no focal deficits  Psych:  Mood & affect appropriate      I&O's Summary    09 Sep 2023 07:01  -  10 Sep 2023 07:00  --------------------------------------------------------  IN: 240 mL / OUT: 1525 mL / NET: -1285 mL      LABS: All Labs Reviewed:                          10.8   10.93 )-----------( 161      ( 11 Sep 2023 06:49 )             32.7     09-11    133<L>  |  92<L>  |  28<H>  ----------------------------<  103<H>  4.7   |  41<H>  |  0.96    Ca    9.2      11 Sep 2023 06:49  Mg     2.5     09-11          09-10    134<L>  |  91<L>  |  29<H>  ----------------------------<  103<H>  4.3   |  39<H>  |  0.87    Ca    8.9      10 Sep 2023 06:53  Mg     2.2     09-10                               10.4   8.94  )-----------( 217      ( 07 Sep 2023 08:55 )             32.9   09-08    137  |  94<L>  |  25<H>  ----------------------------<  109<H>  4.1   |  40<H>  |  0.92    Ca    8.6      08 Sep 2023 06:53  Mg     1.8     09-08 09-07    137  |  96  |  22  ----------------------------<  113<H>  4.6   |  37<H>  |  0.85    Ca    8.4<L>      07 Sep 2023 07:25  Phos  3.0     09-06  Mg     2.0     09-07    TroponinI hsT: <-37.45        Cardiac Catheterization (09.06.23 @ 11:59):  Diagnostic Conclusions:   1. Mild diffuse atherosclerosis with moderate severe disease of small caliber Cx in a nondominant territory.  2. Elevated right sided filling pressures in setting of severe TR with normal left sided filling pressures. Adequate perfusion.  Recommendations:   1. Recommend aggressive medical management for CAD as per ACC/AHAguidelines  2. Structural heart follow up at  for ongoing JAYNA evaluation.        < from: Transthoracic Echocardiogram Follow Up (09.11.23 @ 08:36) >   Impression     Summary     Limited study to assess tricuspid insufficiency and pulmonary pressure.   Severe (4+) tricuspid valve regurgitation is present.   Severe pulmonary hypertension.   The left ventricle is normal in size, paradoxical septal motion   The interventricular septum appears flattened consistent with an RV   pressure/volume overload.   An apically displaced papillary muscle is noted.   Estimated left ventricular ejection fraction is 50-55%.   The right atrium appears severely dilated.   A device wire is seen in the RV and RA.   The right ventricle is moderately dilated with decreased contractility.      < end of copied text >    TTE Echo Complete w/o Contrast w/ Doppler (08.29.23 @ 08:45):   The mitral valve leaflets appear thickened. Mild mitral annular calcification is present. Mild to Moderate mitral regurgitation with two jets is present.   Significant fibrocalcific changes noted to the aortic valve leaflets with restriction in leaflet excursion. Calculated MIKE is .91cm^2; this finding is consistent with severe aortic stenosis.   The tricuspid valve leaflets appear mildly thickened and/or calcified, but open well. Severe (4+) tricuspid valve regurgitation is present.   Severe pulmonary hypertension.   Pulmonic valve not well seen. Mild to moderate pulmonic valvular regurgitation is present.   The left atrium is severely dilated.   Left ventricle systolic function appears mildly impaired; segmental wall motion abnormalities noted. Mild concentric left ventricular hypertrophy is present. Estimated Ejection Fraction is 45 %. The interventricular septum appears flattened consistent with an RV pressure/volume overload.   The right atrium appears severely dilated.   A device wire is seen inthe RV and RA.   The right ventricle exhibits moderate dilation, diffuse hypokinesis, and depression of contractility based on TAPSE.   A device wire is seen in the RV and RA.   Pleural effusion - is present.   IVC is dilated and not collapsing with inspiration.

## 2023-09-12 NOTE — PROGRESS NOTE ADULT - NS ATTEND AMEND GEN_ALL_CORE FT
Na down since Lasix gtt stopped.  May be more SOB.  Increase torsemide to 40 mg po bid.  Check NTproBNP.  Avoid SGLT2-i, given risk of UTIs.  D/c planning for rehab later this week. Na down since Lasix gtt stopped.  May have a bit more edema.  C/o lightheadedness.  Continue same dose of torsemide.  Check NTproBNP.  Avoid SGLT2-i, given risk of UTIs.  D/c planning for rehab later this week.

## 2023-09-12 NOTE — PROGRESS NOTE ADULT - PROVIDER SPECIALTY LIST ADULT
Heart Failure
Hospitalist
Hospitalist
Intervent Cardiology
Electrophysiology
Hospitalist
Internal Medicine
Critical Care
Electrophysiology
Hospitalist
Internal Medicine
Internal Medicine
Trauma Surgery
Hospitalist
Hospitalist
Trauma Surgery
Trauma Surgery
Cardiology
Hospitalist
Hospitalist
Cardiology
Cardiology
Hospitalist
Heart Failure
Hospitalist
Cardiology
Cardiology
Heart Failure
Heart Failure
Cardiology

## 2023-09-12 NOTE — PROGRESS NOTE ADULT - SUBJECTIVE AND OBJECTIVE BOX
Subjective:    Tired today, reprots some lightheadedness, denies dyspnea     Medications:  acetaminophen     Tablet .. 650 milliGRAM(s) Oral every 6 hours PRN  allopurinol 100 milliGRAM(s) Oral daily  ammonium lactate 12% Lotion 1 Application(s) Topical two times a day  bisacodyl Suppository 10 milliGRAM(s) Rectal daily PRN  calcium carbonate 1250 mG  + Vitamin D (OsCal 500 + D) 1 Tablet(s) Oral daily  dextrose 5%. 1000 milliLiter(s) IV Continuous <Continuous>  dextrose 5%. 1000 milliLiter(s) IV Continuous <Continuous>  dextrose 50% Injectable 12.5 Gram(s) IV Push once  dextrose 50% Injectable 25 Gram(s) IV Push once  dextrose 50% Injectable 25 Gram(s) IV Push once  dextrose Oral Gel 15 Gram(s) Oral once PRN  glucagon  Injectable 1 milliGRAM(s) IntraMuscular once  insulin lispro (ADMELOG) corrective regimen sliding scale   SubCutaneous three times a day before meals  insulin lispro (ADMELOG) corrective regimen sliding scale   SubCutaneous at bedtime  levothyroxine 125 MICROGram(s) Oral daily  melatonin 3 milliGRAM(s) Oral at bedtime PRN  multivitamin/minerals 1 Tablet(s) Oral daily  ondansetron Injectable 4 milliGRAM(s) IV Push every 6 hours PRN  polyethylene glycol 3350 17 Gram(s) Oral daily  senna 2 Tablet(s) Oral at bedtime  spironolactone 25 milliGRAM(s) Oral daily  thiamine 100 milliGRAM(s) Oral daily  torsemide 20 milliGRAM(s) Oral two times a day      Physical Exam:    Vitals:  Vital Signs Last 24 Hours  T(C): 36.5 (23 @ 08:32), Max: 36.8 (23 @ 20:00)  HR: 78 (23 @ 13:32) (78 - 87)  BP: 105/49 (23 @ 13:32) (94/41 - 105/59)  RR: 18 (23 @ 08:32) (17 - 18)  SpO2: 94% (23 @ 10:42) (94% - 100%)    Weight in k.8 ( @ 06:30)    I&O's Summary    11 Sep 2023 07:01  -  12 Sep 2023 07:00  --------------------------------------------------------  IN: 0 mL / OUT: 400 mL / NET: -400 mL        Tele: Not on Tele     General: No distress. Comfortable.  HEENT: EOM intact.  Neck: Neck supple. JVP moderately elevated. No masses  Chest: Clear to auscultation bilaterally  CV: Normal S1 and S2. No murmurs, rub, or gallops. Radial pulses normal.  Abdomen: Soft, non-distended, non-tender  Skin: No rashes or skin breakdown  Extremities:  Warm, 1+ LE Edema   Neurology: Alert and oriented times three. Sensation intact  Psych: Affect normal    Labs:                        10.8   10.93 )-----------( 161      ( 11 Sep 2023 06:49 )             32.7     09-11    133<L>  |  92<L>  |  28<H>  ----------------------------<  103<H>  4.7   |  41<H>  |  0.96    Ca    9.2      11 Sep 2023 06:49  Mg     2.5     11

## 2023-09-12 NOTE — PROGRESS NOTE ADULT - PROBLEM SELECTOR PLAN 2
severe AS, Severe TR, severe pHTN Structural Heart Team consulted appreciated   s/p  RHC and LHC on 9/6 revealing non obstructive CAD and elevated right sided pressures, aggressive medical management, ongoing evaluation for TAVR by Structural Heart  Repeat echo shows normal LVF, severe TR and pulmonary HTN

## 2023-09-12 NOTE — PROGRESS NOTE ADULT - ASSESSMENT
89 year-old woman with HTN, CAD, chronic diastolic CHF, chronic atrial fibrillation on Xarelto, hx of PPM placement, COPD, hypothyroidism, gout, presented 8/25 with LLE pain and swelling, difficulty with ambulation, started after striking LLE on piece of furniture the day prior. Patient was found to have hypotension, rapid afib, large superficial L calf hematoma with active bleed. Admitted to Trauma Surgery, since transferred to Medicine as patient's hematoma is being managed conservatively.     Large, traumatic L calf hematoma  Patient with advanced age, moderate MR, severe AS, severe TR with pulmonary hypertension, RV volume overload, borderline BP, afib with RVR (at the time). Appreciate input from Interventional Radiology, deemed very high risk for procedure, deferred intervention, managed supportively. Trauma Surgery also advised conservative management. Reassuringly, patient remains stable, hemodynamically. Hgb stable, ~10. Completed 7-day empiric course of Unasyn as was previously ordered by Surgery  - Xarelto remains held since admission, likely to be held indefinitely, pending further discussion with Trauma Surgery, Cardiology, patient/family. Being evaluated for Watchman, see below  - Continue to monitor    Acute metabolic encephalopathy  Likely multi factorial due to hematoma, hypoxia, congestive heart failure. Underlying causes were treated/managed, and mental status is now improved.   - Continue to monitor, re-orient frequently    Acute respiratory failure with hypoxia  Likely multi factorial due to CHF, severe heart valve disease, severe pHTN. Current SPO2 100% on 2L/min via NC.   - Wean O2 as tolerated, goal SPO2 92% or greater on room air  - Continue to monitor, treat underlying causes  - Encourage incentive spirometry    Acute on chronic systolic heart failure  Echo showed EF 45% perhaps 2/2 tachyarrhythmia, may also be related to multiple heart valve disease. LHC and RHC performed 9/6. Non obstructive CAD. Elevated R heart pressures consistent with CHF. Now s/p Lasix drip, on torsemide. Metoprolol stopped due to hypotension w dizziness. Aldactone reduced due to same. Repeat echo 9/11 with improvement in LVEF, now 55%. Still with severe TR, pHTN, RA dilation, etc.    - Continue cautious diuresis in setting of severe AS  - F/u CHF Team, Cardiology, Structural Heart Team    Significant heart valve disease.   Echo 8/29 w/ mild to mod MR, severe AS, severe TR, mild to mod [place], severe pHTN, severely dilated LA, LV systolic function mildly impaired; segmental wall motion abnormalities noted, mild concentric LVH,  LVEF 45%, RV pressure/volume overload, RA severely dilated, RV with moderate dilation, diffuse hypokinesis, and depression of contractility based on TAPSE. Structural Heart Team consulted. Recommended patient optimize functional status prior to TAVR. Patient's CHF since treated, rpt echo now with LVEF 55%, still with severe TR, severe pHTN, RA dilation.   - Outpatient referral for continued TAVR eval.     Chronic atrial fibrillation  Rapid rates upon admission in setting of pain, large LLE hematoma. Cardiology following. HR now improved as hematoma appears to have stabilized. Xarelto on hold due to the hematoma.  - Continue to HOLD Xarelto given large hematoma  - Referred to Cardiac EP re possible Watchman LAAC procedure / device to provide a non-pharmacologic alternative for non-valvular afib related stroke risk-reduction, to continue to evaluate patient as outpatient for this procedure    Hx of pacemaker placement  S/P PPM interrogation; Normal functioning single chamber PPM with battery longevity 5.9-6.1 years    Hypothyroidism  Stable.   - Continue levothyroxine    Hx of gout  Stable.   - Continue allopurinol    Constipation  Stable.  - Continue bowel regimen, monitor for BM    DM  A1c 6.6, new diagnosis here  - Continue on insulin correctional scale for now, monitor glucose    Urinary retention  Resolved. Patient had Espinoza earlier this admission, passed trial of void.   - Monitor voiding    Physical deconditioning and debility  PT eval appreciated. Recommend [facility].  - Continue restorative PT sessions.   - OOB to chair daily      DVT px: Off pharmacologic DVT px / AC given hematoma, continue SCDs  Code Status: DNR/DNI  Dispo: Approaching medical stability for DC to [facility]         89 year-old woman with HTN, CAD, chronic diastolic CHF, chronic atrial fibrillation on Xarelto, hx of PPM placement, COPD, hypothyroidism, gout, presented 8/25 with LLE pain and swelling, difficulty with ambulation, started after striking LLE on piece of furniture the day prior. Patient was found to have hypotension, rapid afib, large superficial L calf hematoma with active bleed. Admitted to Trauma Surgery, since transferred to Medicine as patient's hematoma is being managed conservatively.     Large, traumatic L calf hematoma  Patient with advanced age, moderate MR, severe AS, severe TR with pulmonary hypertension, RV volume overload, borderline BP, afib with RVR (at the time). Appreciate input from Interventional Radiology, deemed very high risk for procedure, deferred intervention, managed supportively. Trauma Surgery also advised conservative management. Reassuringly, patient remains stable, hemodynamically. Hgb stable, ~10. Completed 7-day empiric course of Unasyn as was previously ordered by Surgery  - Xarelto remains held since admission, likely to be held indefinitely, pending further discussion with Trauma Surgery, Cardiology, patient/family. Being evaluated for Watchman, see below  - Continue to monitor    Acute metabolic encephalopathy  Likely multi factorial due to hematoma, hypoxia, congestive heart failure. Underlying causes were treated/managed, and mental status is now improved.   - Continue to monitor, re-orient frequently    Acute respiratory failure with hypoxia  Likely multi factorial due to CHF, severe heart valve disease, severe pHTN. Current SPO2 100% on 2L/min via NC.   - Wean O2 as tolerated, goal SPO2 92% or greater on room air  - Continue to monitor, treat underlying causes  - Encourage incentive spirometry    Acute on chronic systolic heart failure  Echo showed EF 45% perhaps 2/2 tachyarrhythmia, may also be related to multiple heart valve disease. LHC and RHC performed 9/6. Non obstructive CAD. Elevated R heart pressures consistent with CHF. Now s/p Lasix drip, on torsemide. Metoprolol stopped due to hypotension w dizziness. Aldactone reduced due to same. Repeat echo 9/11 with improvement in LVEF, now 55%. Still with severe TR, pHTN, RA dilation, etc.    - Continue cautious diuresis in setting of severe AS  - Repeat proBNP level  - Trend Na, Cr  - Continue Is and Os, daily wt  - F/u CHF Team    Significant heart valve disease.   Echo 8/29 w/ mild to mod MR, severe AS, severe TR, mild to mod [place], severe pHTN, severely dilated LA, LV systolic function mildly impaired; segmental wall motion abnormalities noted, mild concentric LVH,  LVEF 45%, RV pressure/volume overload, RA severely dilated, RV with moderate dilation, diffuse hypokinesis, and depression of contractility based on TAPSE. Structural Heart Team consulted. Recommended patient optimize functional status prior to TAVR. Patient's CHF since treated, rpt echo now with LVEF 55%, still with severe TR, severe pHTN, RA dilation.   - Outpatient referral for continued TAVR eval.     Chronic atrial fibrillation  Rapid rates upon admission in setting of pain, large LLE hematoma. Cardiology following. HR now improved as hematoma appears to have stabilized. Xarelto on hold due to the hematoma.  - Continue to HOLD Xarelto given large hematoma  - Referred to Cardiac EP re possible Watchman LAAC procedure / device to provide a non-pharmacologic alternative for non-valvular afib related stroke risk-reduction, to continue to evaluate patient as outpatient for this procedure    Hx of pacemaker placement  S/P PPM interrogation; Normal functioning single chamber PPM with battery longevity 5.9-6.1 years    Hypothyroidism  Stable.   - Continue levothyroxine    Hx of gout  Stable.   - Continue allopurinol    Constipation  Stable.  - Continue bowel regimen, monitor for BM    DM  A1c 6.6, new diagnosis here  - Continue on insulin correctional scale for now, monitor glucose    Urinary retention  Resolved. Patient had Espinoza earlier this admission, passed trial of void.   - Monitor voiding    Physical deconditioning and debility  PT eval appreciated. Recommend [facility].  - Continue restorative PT sessions.   - OOB to chair daily    Severe protein calorie malnutrition  Appreciate dietician input  - Trend wt  - Added MVI with minerals daily, thiamine 100mg daily        DVT px: Off pharmacologic DVT px / AC given hematoma, continue SCDs  Code Status: DNR/DNI  Dispo: Approaching medical stability for DC to [facility] pending CHF management         89 year-old woman with HTN, CAD, chronic diastolic CHF, chronic atrial fibrillation on Xarelto, hx of PPM placement, COPD, hypothyroidism, gout, presented 8/25 with LLE pain and swelling, difficulty with ambulation, started after striking LLE on piece of furniture the day prior. Patient was found to have hypotension, rapid afib, large superficial L calf hematoma with active bleed. Admitted to Trauma Surgery, since transferred to Medicine as patient's hematoma is being managed conservatively.     Large, traumatic L calf hematoma  Patient with advanced age, moderate MR, severe AS, severe TR with pulmonary hypertension, RV volume overload, borderline BP, afib with RVR (at the time). Appreciate input from Interventional Radiology, deemed very high risk for procedure, deferred intervention, managed supportively. Trauma Surgery also advised conservative management. Reassuringly, patient remains stable, hemodynamically. Hgb stable, ~10. Completed 7-day empiric course of Unasyn as was previously ordered by Surgery  - Xarelto remains held since admission, likely to be held indefinitely, pending further discussion with Trauma Surgery, Cardiology, patient/family. Being evaluated for Watchman, see below  - Continue to monitor    Acute metabolic encephalopathy  Likely multi factorial due to hematoma, hypoxia, congestive heart failure. Underlying causes were treated/managed, and mental status is now improved.   - Continue to monitor, re-orient frequently    Acute respiratory failure with hypoxia  Likely multi factorial due to CHF, severe heart valve disease, severe pHTN. Current SPO2 100% on 2L/min via NC.   - Wean O2 as tolerated, goal SPO2 92% or greater on room air  - Continue to monitor, treat underlying causes  - Encourage incentive spirometry    Acute on chronic systolic heart failure  Echo showed EF 45% perhaps 2/2 tachyarrhythmia, may also be related to multiple heart valve disease. LHC and RHC performed 9/6. Non obstructive CAD. Elevated R heart pressures consistent with CHF. Now s/p Lasix drip, on torsemide. Metoprolol stopped due to hypotension w dizziness. Aldactone reduced due to same. Repeat echo 9/11 with improvement in LVEF, now 55%. Still with severe TR, pHTN, RA dilation, etc.    - Continue cautious diuresis in setting of severe AS, currently on PO torsemide and spironolactone  - Avoiding beta blocker due to hypotension and dizziness on metoprolol  - Repeat proBNP level  - Trend Na, Cr  - Continue Is and Os, daily wt  - F/u CHF Team    Significant heart valve disease.   Echo 8/29 w/ mild to mod MR, severe AS, severe TR, mild to mod [place], severe pHTN, severely dilated LA, LV systolic function mildly impaired; segmental wall motion abnormalities noted, mild concentric LVH,  LVEF 45%, RV pressure/volume overload, RA severely dilated, RV with moderate dilation, diffuse hypokinesis, and depression of contractility based on TAPSE. Structural Heart Team consulted. Recommended patient optimize functional status prior to TAVR. Patient's CHF since treated, rpt echo now with LVEF 55%, still with severe TR, severe pHTN, RA dilation.   - Outpatient referral for continued TAVR eval.     Chronic atrial fibrillation  Rapid rates upon admission in setting of pain, large LLE hematoma. Cardiology following. HR now improved as hematoma appears to have stabilized. Xarelto on hold due to the hematoma.  - Continue to HOLD Xarelto given large hematoma  - Referred to Cardiac EP re possible Watchman LAAC procedure / device to provide a non-pharmacologic alternative for non-valvular afib related stroke risk-reduction, to continue to evaluate patient as outpatient for this procedure    Hx of pacemaker placement  S/P PPM interrogation; Normal functioning single chamber PPM with battery longevity 5.9-6.1 years    Hypothyroidism  Stable.   - Continue levothyroxine    Hx of gout  Stable.   - Continue allopurinol    Constipation  Stable.  - Continue bowel regimen, monitor for BM    DM  A1c 6.6, new diagnosis here  - Continue on insulin correctional scale for now, monitor glucose    Urinary retention  Resolved. Patient had Espinoza earlier this admission, passed trial of void.   - Monitor voiding    Physical deconditioning and debility  PT eval appreciated. Recommend [facility].  - Continue restorative PT sessions.   - OOB to chair daily    Severe protein calorie malnutrition  Appreciate dietician input  - Trend wt  - Added MVI with minerals daily, thiamine 100mg daily        DVT px: Off pharmacologic DVT px / AC given hematoma, continue SCDs  Code Status: DNR/DNI  Dispo: Approaching medical stability for DC to [facility] pending CHF management

## 2023-09-12 NOTE — PROGRESS NOTE ADULT - TIME BILLING
Time spent  coordinating the patient's care. This includes reviewing documentation pertinent to this admission, results and imaging. Further tests, medications, and procedures have been ordered as indicated. Laboratory results and the plan of care were communicated to the patient. Discussed care plan with consultants including cards . Supporting documentation was completed and added to the patient's chart.
Time spent  coordinating the patient's care. This includes reviewing documentation pertinent to this admission, results and imaging. Further tests, medications, and procedures have been ordered as indicated. Laboratory results and the plan of care were communicated to the patient. Discussed care plan with consultants including cards, CHF . Supporting documentation was completed and added to the patient's chart.
Time spent  coordinating the patient's care. This includes reviewing documentation pertinent to this admission, results and imaging. Further tests, medications, and procedures have been ordered as indicated. Laboratory results and the plan of care were communicated to the patient. Discussed care plan with consultants including cards. Supporting documentation was completed and added to the patient's chart.
Time spent  coordinating the patient's care. This includes reviewing documentation pertinent to this admission, results and imaging. Further tests, medications, and procedures have been ordered as indicated. Laboratory results and the plan of care were communicated to the patient. Discussed care plan with consultants including cards . Supporting documentation was completed and added to the patient's chart.
Time spent  coordinating the patient's care. This includes reviewing documentation pertinent to this admission, results and imaging. Further tests, medications, and procedures have been ordered as indicated. Laboratory results and the plan of care were communicated to the patient. Discussed care plan with consultants including cards, CHF. Supporting documentation was completed and added to the patient's chart.
Time spent  coordinating the patient's care. This includes reviewing documentation pertinent to this admission, results and imaging. Further tests, medications, and procedures have been ordered as indicated. Laboratory results and the plan of care were communicated to the patient. Discussed care plan with consultants including cards . Supporting documentation was completed and added to the patient's chart.
Time spent  coordinating the patient's care. This includes reviewing documentation pertinent to this admission, results and imaging. Further tests, medications, and procedures have been ordered as indicated. Laboratory results and the plan of care were communicated to the patient. Discussed care plan with consultants including cards, CHF. Supporting documentation was completed and added to the patient's chart.
Time spent via telehealth interviewing and performing limited visual only exam, reviewing the chart, and coordinating care with the primary team and onsite-HF PA.  I counselled patient on the heart failure disease process and the ongoing medical therapy.

## 2023-09-12 NOTE — PROGRESS NOTE ADULT - SUBJECTIVE AND OBJECTIVE BOX
Chief Complaint: LLE pain and swelling, difficulty with ambulation    Interval Hx: Patient seen and examined. Remains stable. Ambulates with rolling walker. She has some mild dyspnea on exertion. No dyspnea at rest. SPO2 100% on 2L/min via NC. Generalized weakness and deconditioning. Ongoing extremity edema. No chest pain or tightness. No other complaints.     ROS: Multi system review is comprehensively negative x 10 systems except as above    Vitals:  T(F): 97.7 (12 Sep 2023 08:32), Max: 98.3 (11 Sep 2023 20:00)  HR: 79 (12 Sep 2023 08:32) (79 - 89)  BP: 94/41 (12 Sep 2023 08:32) (94/41 - 105/59)  RR: 18 (12 Sep 2023 08:32) (17 - 18)  SpO2: 100% (12 Sep 2023 08:32) (98% - 100%) on O2 via NC    Exam:  Constitutional: No acute distress  HEENT: NCAT PERRL EOMI MMM clear oropharynx  Neck: Supple, no JVD  CVS: S1 and S2, irregular, rate ~80, 2/6 REBECA   Chest: Normal resp effort at rest, lungs clear  Abd: +BS, soft NT ND   Ext: LLE in ACE wrap and is neurovascularly intact, also with RLE edema and actually some RUE edema  Skin: No rashes.  Psych: Mood & affect appropriate  Neurological: Alert, no focal deficits    Labs:    (9/11)                        10.8   10.93  )----------( 161               32.7       133  |  92  |  28  ---------------------< 103  4.7   |  41  |  0.96    Ca 9.2       ProBNP 8126   Troponin negative   Lactate 1.8    A1c 6.6  TSH 0.34    UA yellow, clear, small LE, N-, 11-25 WBC, no RBC, few bact, mod sq epi    Imaging:  US arterial duplex B/L carotids 9/7: No significant hemodynamic stenosis of either carotid artery.    US venous duplex RUE 9/7: There is thrombus in the basilic vein (superficial vein) surrounding the catheter in the upper arm extending to the junction with the axillary vein.    US venous duplex RLE 9/6: No evidence of right lower extremity deep venous thrombosis.     US venous duplex LUE 8/30: No evidence of left upper extremity deep venous thrombosis.    CXR 8/30: Heart enlargement and left-sided pacemaker. There is a persistent mild left base effusion and a persistent mild right base pleural pulmonary process. Chest is similar to March 18, 2022. Quite advanced bilateral shoulder degeneration    CT L shoulder WO 8/30: Severe left glenohumeral arthrosis with chondrocalcinosis and chronic remodeling of the bones. Severe supraspinatus and mild to moderate infraspinatus and subscapularis muscle atrophy. Marked subcoracoid bursitis. Marked biceps tenosynovitis. Small left pleural effusion.    CT head WO 8/28: Mild parenchymal atrophy with patchy periventricular hypoattenuation; a  nonspecific finding which statistically reflects chronic microvascular   ischemic change. Calcified plaque [place]. Coarse calcifications falx cerebri. No evidence of extra-axial collection, intracranial hemorrhage, mass or   mass effect. Gray-white matter differentiation appears preserved. Complete opacification of the visualized left maxillary sinus, demonstrating cortical thickening with heterogeneous internal density, including areas of hyperdensity. Bilateral mastoid air cells and middle ear regions well-aerated. No aggressive calvarial lesion or acute calvarial fracture visualized. Hyperostosis frontalis. Bilateral cataract surgery.    CTA LLE W/ 8/25: Large superficial left calf hematoma with active bleeding. Three-vessel runoff to the left foot. Two-vessel runoff to the right foot via the anterior tibial/dorsalis pedis and peroneal arteries.    XR L tib fib 8/25: The tibia and fibula are intact. No fracture or radiographic osseous lesion. Posteromedial mid calf 16.5 x 4.3 cm soft tissue hematoma.    Cardiac Testing:  TTE 9/11:  Limited study to assess tricuspid insufficiency and pulmonary pressure. Severe (4+) tricuspid valve regurgitation is present. Severe pulmonary hypertension. The left ventricle is normal in size, paradoxical septal motion The interventricular septum appears flattened consistent with an RV pressure/volume overload. An apically displaced papillary muscle is noted. Estimated left ventricular ejection fraction is 50-55%. The right atrium appears severely dilated. A device wire is seen in the RV and RA. The right ventricle is moderately dilated with decreased contractility.    Tele 9/8: Afib, rate 80s, tele DC'd    LHC 9/6: Mild diffuse atherosclerosis with moderate severe disease of small caliber Cx in a nondominant territory. Elevated right sided filling pressures in setting of severe TR with normal left sided filling pressures. Adequate perfusion.    EKG 8/30: Afib, rate 96, RBBB (old)    TTE 8/29: Mild to mod MR. Severe AS. Severe TR. Mild to mod [place]. Severe pHTN. Severely dilated LA. LV systolic function mildly impaired; segmental wall motion abnormalities noted. Mild concentric LVH. LVEF 45%. The interventricular septum appears flattened consistent with an RV pressure/volume overload. RA severely dilated. RV with moderate dilation, diffuse hypokinesis, and depression of contractility based on TAPSE. A device wire is seen in the RV and RA. IVC is dilated and not collapsing with inspiration.    EKG 8/28: Rate 105. Afib RVR    EKG 8/25: Rate 75. Afib. RBBB (old)    Meds:  MEDICATIONS  (STANDING):  allopurinol 100 milliGRAM(s) Oral daily  ammonium lactate 12% Lotion 1 Application(s) Topical two times a day  calcium carbonate 1250 mG  + Vitamin D (OsCal 500 + D) 1 Tablet(s) Oral daily  insulin lispro (ADMELOG) corrective regimen sliding scale   SubCutaneous three times a day before meals  insulin lispro (ADMELOG) corrective regimen sliding scale   SubCutaneous at bedtime  levothyroxine 125 MICROGram(s) Oral daily  multivitamin/minerals 1 Tablet(s) Oral daily  polyethylene glycol 3350 17 Gram(s) Oral daily  senna 2 Tablet(s) Oral at bedtime  spironolactone 25 milliGRAM(s) Oral daily  torsemide 20 milliGRAM(s) Oral two times a day    MEDICATIONS  (PRN):  acetaminophen     Tablet .. 650 milliGRAM(s) Oral every 6 hours PRN Moderate Pain (4 - 6)  bisacodyl Suppository 10 milliGRAM(s) Rectal daily PRN Constipation  dextrose Oral Gel 15 Gram(s) Oral once PRN Blood Glucose LESS THAN 70 milliGRAM(s)/deciliter  melatonin 3 milliGRAM(s) Oral at bedtime PRN Insomnia  ondansetron Injectable 4 milliGRAM(s) IV Push every 6 hours PRN Nausea and/or Vomiting

## 2023-09-12 NOTE — PROGRESS NOTE ADULT - ACCEPTING BEDSIDE PROVIDER ATTESTATION:
I have reviewed and verified the documentation from the telehealth provider and made amendments were necessary.I have discussed the case with the telehealth physician

## 2023-09-12 NOTE — PROGRESS NOTE ADULT - ASSESSMENT
Patient is a 89 year old female with a history of HFmrEF, aortic stenosis hypothyroidism, Gout, HTN, COPD, Afib on xarelto, anemia, PPM, and osteopenia who presented to  ED on 8/25 c/o leg pain after hitting her leg, found to have significant hematoma.  She has been managed medically but became dyspneic after fluid bolus and is now being evaluated by heart failure team.  Improving after Lasix drip switched to Torsemide 20 mg po BID on 9/9, slightly more fluid noted today    TTE 8/29:  LVEF 45% LVEDd 3.65 IVS 1.23 PWd 1.19 LA Severely Dilated, RA Severely Dilated,  RV moderately dilated with diffuse hypokinesis, Mild to Mod MR, Severe AS MIKE 0.8 PVel 3.16 PG/MG 39/25 Severe TR, RVSP 61     LHC/RHC 9/6/23: LCx branch lesion  RA 17 RV 36/5 PA 54/17 (30) PCW 17 PA Sat 52.3  CO 3.68 CI 2.32

## 2023-09-12 NOTE — PROGRESS NOTE ADULT - PROVIDER LOCATION
[facility]

## 2023-09-12 NOTE — PROGRESS NOTE ADULT - NUTRITIONAL ASSESSMENT
This patient has been assessed with a concern for Malnutrition and has been determined to have a diagnosis/diagnoses of Severe protein-calorie malnutrition.    This patient is being managed with:   Diet DASH/TLC-  Sodium & Cholesterol Restricted  Consistent Carbohydrate {Evening Snack} (CSTCHOSN)  1500mL Fluid Restriction ([id number])  Entered: Sep  7 2023  8:23AM    This patient has been assessed with a concern for Malnutrition and has been determined to have a diagnosis/diagnoses of Severe protein-calorie malnutrition.    This patient is being managed with:   Diet DASH/TLC-  Sodium & Cholesterol Restricted  Consistent Carbohydrate {Evening Snack} (CSTCHOSN)  1500mL Fluid Restriction ([id number])  Entered: Sep  7 2023  8:23AM    This patient has been assessed with a concern for Malnutrition and has been determined to have a diagnosis/diagnoses of Severe protein-calorie malnutrition.    This patient is being managed with:   Diet DASH/TLC-  Sodium & Cholesterol Restricted  Consistent Carbohydrate {Evening Snack} (CSTCHOSN)  1500mL Fluid Restriction ([id number])  Entered: Sep  7 2023  8:23AM  
This patient has been assessed with a concern for Malnutrition and has been determined to have a diagnosis/diagnoses of Severe protein-calorie malnutrition.    This patient is being managed with:   Diet DASH/TLC-  Sodium & Cholesterol Restricted  Consistent Carbohydrate {Evening Snack} (CSTCHOSN)  1500mL Fluid Restriction ([id number])  Entered: Sep  7 2023  8:23AM  
This patient has been assessed with a concern for Malnutrition and has been determined to have a diagnosis/diagnoses of Severe protein-calorie malnutrition.    This patient is being managed with:   Diet DASH/TLC-  Sodium & Cholesterol Restricted     Special Instructions for Nursing:  Sodium & Cholesterol Restricted  Entered: Sep  6 2023 12:58PM  
This patient has been assessed with a concern for Malnutrition and has been determined to have a diagnosis/diagnoses of Severe protein-calorie malnutrition.    This patient is being managed with:   Diet DASH/TLC-  Sodium & Cholesterol Restricted  Consistent Carbohydrate {Evening Snack} (CSTCHOSN)  1500mL Fluid Restriction ([id number])  Entered: Sep  7 2023  8:23AM  
This patient has been assessed with a concern for Malnutrition and has been determined to have a diagnosis/diagnoses of Severe protein-calorie malnutrition.    This patient is being managed with:   Diet DASH/TLC-  Sodium & Cholesterol Restricted  Consistent Carbohydrate {Evening Snack} (CSTCHOSN)  1500mL Fluid Restriction ([id number])  Entered: Sep  7 2023  8:23AM  
This patient has been assessed with a concern for Malnutrition and has been determined to have a diagnosis/diagnoses of Severe protein-calorie malnutrition.    This patient is being managed with:   Diet DASH/TLC-  Sodium & Cholesterol Restricted  Consistent Carbohydrate {Evening Snack} (CSTCHOSN)  1500mL Fluid Restriction ([id number])  Entered: Sep  7 2023  8:23AM  
This patient has been assessed with a concern for Malnutrition and has been determined to have a diagnosis/diagnoses of Severe protein-calorie malnutrition.    This patient is being managed with:   Diet DASH/TLC-  Sodium & Cholesterol Restricted  Consistent Carbohydrate {Evening Snack} (CSTCHOSN)  1500mL Fluid Restriction ([id number])  Entered: Sep  7 2023  8:23AM  
This patient has been assessed with a concern for Malnutrition and has been determined to have a diagnosis/diagnoses of Severe protein-calorie malnutrition.    This patient is being managed with:   Diet DASH/TLC-  Sodium & Cholesterol Restricted  Consistent Carbohydrate {Evening Snack} (CSTCHOSN)  1500mL Fluid Restriction ([id number])  Entered: Sep  7 2023  8:23AM  
This patient has been assessed with a concern for Malnutrition and has been determined to have a diagnosis/diagnoses of Severe protein-calorie malnutrition.    This patient is being managed with:   Diet DASH/TLC-  Sodium & Cholesterol Restricted     Special Instructions for Nursing:  Sodium & Cholesterol Restricted  Entered: Sep  6 2023 12:58PM  
This patient has been assessed with a concern for Malnutrition and has been determined to have a diagnosis/diagnoses of Severe protein-calorie malnutrition.    This patient is being managed with:   Diet DASH/TLC-  Sodium & Cholesterol Restricted  Consistent Carbohydrate {Evening Snack} (CSTCHOSN)  1500mL Fluid Restriction ([id number])  Entered: Sep  7 2023  8:23AM  
This patient has been assessed with a concern for Malnutrition and has been determined to have a diagnosis/diagnoses of Severe protein-calorie malnutrition.    This patient is being managed with:   Diet DASH/TLC-  Sodium & Cholesterol Restricted  Consistent Carbohydrate {Evening Snack} (CSTCHOSN)  1500mL Fluid Restriction ([id number])  Entered: Sep  7 2023  8:23AM    This patient has been assessed with a concern for Malnutrition and has been determined to have a diagnosis/diagnoses of Severe protein-calorie malnutrition.    This patient is being managed with:   Diet DASH/TLC-  Sodium & Cholesterol Restricted  Consistent Carbohydrate {Evening Snack} (CSTCHOSN)  1500mL Fluid Restriction ([id number])  Entered: Sep  7 2023  8:23AM  
This patient has been assessed with a concern for Malnutrition and has been determined to have a diagnosis/diagnoses of Severe protein-calorie malnutrition.    This patient is being managed with:   Diet DASH/TLC-  Sodium & Cholesterol Restricted     Special Instructions for Nursing:  Sodium & Cholesterol Restricted  Entered: Sep  6 2023 12:58PM  
This patient has been assessed with a concern for Malnutrition and has been determined to have a diagnosis/diagnoses of Severe protein-calorie malnutrition.    This patient is being managed with:   Diet DASH/TLC-  Sodium & Cholesterol Restricted  Consistent Carbohydrate {Evening Snack} (CSTCHOSN)  1500mL Fluid Restriction ([id number])  Entered: Sep  7 2023  8:23AM  
This patient has been assessed with a concern for Malnutrition and has been determined to have a diagnosis/diagnoses of Severe protein-calorie malnutrition.    This patient is being managed with:   Diet DASH/TLC-  Sodium & Cholesterol Restricted  Consistent Carbohydrate {Evening Snack} (CSTCHOSN)  1500mL Fluid Restriction ([id number])  Entered: Sep  7 2023  8:23AM

## 2023-09-12 NOTE — PROGRESS NOTE ADULT - PROBLEM SELECTOR PLAN 1
Echo shows EF 45% possibly 2/2 tachyarrhythmia contributed by valvular heart disease, severe AS, HF team on board, now off lasix gtt, on torsemide and Spirolactone 25mg QD  F/U Echo done shows normal LVF, EF 50-55%, severe TR/pulmonary HTN, paradoxical septal motion, RA severely dil, RV mod dilated, pleural effusion  CW Daily weight  Strict I&O  Trend renal values  Check BNP

## 2023-09-12 NOTE — PROGRESS NOTE ADULT - NS ATTEND OPT1 GEN_ALL_CORE
I independently performed the documented:
I attest my time as attending is greater than 50% of the total combined time spent on qualifying patient care activities by the PA/NP and attending.
I independently performed the documented:
I attest my time as attending is greater than 50% of the total combined time spent on qualifying patient care activities by the PA/NP and attending.
I independently performed the documented:
I attest my time as attending is greater than 50% of the total combined time spent on qualifying patient care activities by the PA/NP and attending.
I attest my time as attending is greater than 50% of the total combined time spent on qualifying patient care activities by the PA/NP and attending.

## 2023-09-12 NOTE — PROGRESS NOTE ADULT - PROBLEM SELECTOR PLAN 1
More edematous today but reports lightheadedness Improved volume status  - Continue Torsemide 20 mg po BID, first dose this evening  - Continue Spironolactone 25 mg po daily    - Further GDMT as patient progresses   - Strict I & Os  - Daily standing weights.

## 2023-09-12 NOTE — PROGRESS NOTE ADULT - TELEHEALTH PROVIDER ATTESTATION:
I conducted the telehealth visit with patient.I discussed and endorsed patient care to accepting bedside physician

## 2023-09-13 NOTE — DISCHARGE NOTE PROVIDER - NSDCFUADDINST_GEN_ALL_CORE_FT
Avoid added salt in diet. Limit fluid intake to no more than 1.5 liter per day. Monitor weight, preferably 3x per week. Notify MD if > 3 lb weight gain in 2 days or > 5 lbs in 1 week.  - As for the leg hematoma, continue to monitor, apply xeroform dressing, wrap with kerlix and then ACE wrap, change daily.  - Avoid added salt in diet. Limit fluid intake to no more than 1.5 liter per day.   - Monitor weight, preferably 3x per week. Notify MD if > 3 lb weight gain in 2 days or > 5 lbs in 1 week.

## 2023-09-13 NOTE — DISCHARGE NOTE NURSING/CASE MANAGEMENT/SOCIAL WORK - NSDCPEFALRISK_GEN_ALL_CORE
For information on Fall & Injury Prevention, visit: [email or web address] OR  [email or web address] OR  https://www.cdc.gov/steadi/patient.html

## 2023-09-13 NOTE — DISCHARGE NOTE PROVIDER - NSDCCPCAREPLAN_GEN_ALL_CORE_FT
PRINCIPAL DISCHARGE DIAGNOSIS  Diagnosis: Traumatic hematoma  Assessment and Plan of Treatment: You were admitted to the hospital for further evaluation and management of a large, traumatic, left leg hematoma. Additional hospital findings of encephalopathy, hypoxia, congestive heart failure, severe pulmonary hypertension, severe aortic stenosis, rapid a-fib, etc (see below). You were seen by Trauma Surgery and Interventional Radiology and conservative management was recommended, that is, holding blood thinner and monitoring, treating supportively. Reassuringly, the hematoma stabilized. Hemodynamics normalized. Hemoglobin blood counts stabilized, ~10. You completed an empiric 7-day course of antibiotic Unasyn as per Surgery. Your blood thinner, Xarelto, remains held since admission, likely to be held indefinitely. You are being referred to a specialist for possible future Watchman procedure, see below. This is a minimally-invasive heart procedure done to reduce your risk of stroke in setting of having the afib. As for the leg hematoma, continue to monitor, apply xeroform dressing, wrap with kerlix and then ACE wrap, change daily.      SECONDARY DISCHARGE DIAGNOSES  Diagnosis: Acute metabolic encephalopathy  Assessment and Plan of Treatment: Earlier this admission you had some memory impairment, confusion, disorientation, likely multi factorial due to acute medical issues - hematoma, hypoxia, congestive heart failure. Underlying causes were treated/managed and mental status is now improved. Continue to monitor, re-orient frequently.    Diagnosis: Acute respiratory failure with hypoxia  Assessment and Plan of Treatment: Likely multi factorial due to congestive heart failure, severe heart valve disease, severe pHTN. Current SPO2 100% on 2L/min via NC. Wean O2 as tolerated, goal SPO2 92% or greater on room air. Continue to monitor. Encourage incentive spirometry use.    Diagnosis: Acute on chronic systolic heart failure  Assessment and Plan of Treatment: You were found to have mildly reduced squeezing function of the heart, perhaps due to rapid afib, may also be related to multiple heart valve disease. Left and right heart cath was performed 9/6, found non-obstructive coronary aftery disease, no need for angioplasty or stent placement. Elevated R heart pressures were also noted, consistent with congestive heart failure (CHF). Now s/p Lasix drip. Doing well on oral diuretic regimen at this point. Metoprolol stopped due to hypotension w dizziness. Repeat echo 9/11 with improvement in heart squeezing function, now back up to normal, though still with findings of severe heart valve disease, severe pulmonary hypertension. Appreciate input from CHF Team. Continue on current oral diuretic regimen of torsemide and spironolactone. Using these cautiously given the aortic stenosis. Also, avoiding beta blocker due to hypotension and dizziness on metoprolol. Continue to monitor weight upon discharge. Avoid added salt in diet. limit fluid intake to no more than 1.5L per day. Follow up as outpatient with CHF Team Jose Melgar. General Cardiology follow up as well, appointment scheduled for 9/21 at 1PM.    Diagnosis: Severe aortic stenosis  Assessment and Plan of Treatment: Echo 8/29 significant heart valve disease findings (mild to mod mitral regurg, severe aortic stenosis, severe tricuspid regurg, mild to mod pulmonic valve regurg, severe pulmonary hypertension, severely dilated left atrium, mildly impaired left ventricle systolic function, pressure-volume overload of the right heart. Structural Heart Team consulted, recommended evaluation for TAVR (transcatheter aortic valve replacement). However, they advise first improving functional status as much as possible at rehab and then pursuing the procedure to replace the valve. Reassuringly, with management of your congestive heart failure, your heart squeezing function (systolic function) has normalized. Follow up with Jose Melgar as outpatient to arrange your TAVR evaluation.    Diagnosis: Chronic atrial fibrillation  Assessment and Plan of Treatment: Upon admission you had rapid a-fib. Your blood thinner Xarelto was held given the hematoma. Heart rate has since improved. You are not on any medications for heart rate control at this point as you developed dizziness and low blood pressure with metoprolol on board. Continue to HOLD your blood thinner given the hematoma. Follow up with Cardiology as outpatient for  possible Watchman LAAC procedure / device to provide a non-pharmacologic alternative for afib-related stroke risk-reduction.    Diagnosis: Diabetes mellitus  Assessment and Plan of Treatment: You were also diagnosed with borderline diabetes, though given your A1c 6.6, you are essentially at goal as far as diabetes control, without the need for standing medication. Continue diabetic diet. No concentrated sweets. Monitor glucose. Follow up with Primary Care as outpatient to re-assess your A1c blood test level.    Diagnosis: Hypothyroidism  Assessment and Plan of Treatment: Stable. Continue levothyroxine    Diagnosis: Urinary retention  Assessment and Plan of Treatment: Resolved. Patient had Espinoza indwelling urinary catheter earlier this admission but you have since passed trial of void and are doing well without Espinoza catheter.    Diagnosis: History of constipation  Assessment and Plan of Treatment: Hx of constipation. Stable. Continue bowel regimen, monitor for bowel movement.    Diagnosis: History of gout  Assessment and Plan of Treatment: Hx of gout. Stable.  No sign of acute flare. Continue allopurinol    Diagnosis: Malnutrition  Assessment and Plan of Treatment: Appreciate dietician input. Trend wt. Added MVI with minerals daily, thiamine 100mg daily    Diagnosis: Physical deconditioning  Assessment and Plan of Treatment: PT eval appreciated. Recommend rehab. Continue restorative PT sessions.  Out-of-bed to chair daily

## 2023-09-13 NOTE — DISCHARGE NOTE PROVIDER - NSDCCPGOAL_GEN_ALL_CORE_FT
To get better and follow your care plan as instructed.
Patient brought by mother to ED with c/o fever and cough. Patient not in any form of distress.

## 2023-09-13 NOTE — DISCHARGE NOTE NURSING/CASE MANAGEMENT/SOCIAL WORK - NSTOBACCONEVERSMOKERY/N_GEN_A
No
Implemented All Universal Safety Interventions:  [place] to call system. Call bell, personal items and telephone within reach. Instruct patient to call for assistance. Room bathroom lighting operational. Non-slip footwear when patient is off stretcher. Physically safe environment: no spills, clutter or unnecessary equipment. Stretcher in lowest position, wheels locked, appropriate side rails in place.

## 2023-09-13 NOTE — DISCHARGE NOTE NURSING/CASE MANAGEMENT/SOCIAL WORK - NSDCVIVACCINE_GEN_ALL_CORE_FT
Tdap; 25-Aug-2023 08:46; Clara Clark (RN); [employer]; [id number] (Exp. Date: 19-Sep-2025); IntraMuscular; Deltoid Left.; 0.5 milliLiter(s); VIS (VIS Published: 09-May-2013, VIS Presented: 25-Aug-2023);

## 2023-09-13 NOTE — DISCHARGE NOTE NURSING/CASE MANAGEMENT/SOCIAL WORK - PATIENT PORTAL LINK FT
You can access the FollowMyHealth Patient Portal offered by [facility] by registering at the following website: [email or web address]. By joining [facility]’s FollowMyHealth portal, you will also be able to view your health information using other applications (apps) compatible with our system.

## 2023-09-13 NOTE — DISCHARGE NOTE PROVIDER - NSDCPNSUBOBJ_GEN_ALL_CORE
Chief Complaint: LLE pain and swelling, difficulty with ambulation    Interval Hx: Patient seen and examined. Remains stable. Ambulates with rolling walker. She has some mild dyspnea on exertion but overall doing well. No dyspnea at rest. SPO2 100% on 2L/min via NC. No chest pain or tightness. lower extremity swelling improved since admission. Still a significant left leg hematoma but stable. Generalized weakness and deconditioning ongoing for which she is recommended [facility]. No other complaints. Stable for discharged to [facility] today.     ROS: Multi system review is comprehensively negative x 10 systems except as above    Vitals:  T(F): 97.8 (13 Sep 2023 08:55), Max: 98.9 (13 Sep 2023 05:42)  HR: 83 (13 Sep 2023 08:55) (69 - 83)  BP: 97/47 (13 Sep 2023 08:55) (97/47 - 130/82)  RR: 18 (13 Sep 2023 08:55) (16 - 18)  SpO2: 95% (13 Sep 2023 08:55) (95% - 97%) on 2L/min    Exam:  Constitutional: No acute distress  HEENT: NCAT PERRL EOMI MMM clear oropharynx  Neck: Supple, no JVD  CVS: S1 and S2, irregular, rate ~80, 2/6 REBECA   Chest: Normal resp effort at rest, lungs clear  Abd: +BS, soft NT ND   Ext: LLE in ACE wrap and is neurovascularly intact, also with RLE edema and actually some RUE edema  Skin: No rashes.  Psych: Mood & affect appropriate  Neurological: Alert, no focal deficits    Labs:                       10.8   10.93  )----------( 161               32.7       135  |  92  |  24  ---------------------< 103  3.9   |  42  |  0.91    Ca 8.3       Troponin negative     ProBNP 8126 --> 4586     Lactate 1.8    A1c 6.6  TSH 0.34    UA yellow, clear, small LE, N-, 11-25 WBC, no RBC, few bact, mod sq epi    Imaging:  US arterial duplex B/L carotids 9/7: No significant hemodynamic stenosis of either carotid artery.    US venous duplex RUE 9/7: There is thrombus in the basilic vein (superficial vein) surrounding the catheter in the upper arm extending to the junction with the axillary vein.    US venous duplex RLE 9/6: No evidence of right lower extremity deep venous thrombosis.     US venous duplex LUE 8/30: No evidence of left upper extremity deep venous thrombosis.    CXR 8/30: Heart enlargement and left-sided pacemaker. There is a persistent mild left base effusion and a persistent mild right base pleural pulmonary process. Chest is similar to March 18, 2022. Quite advanced bilateral shoulder degeneration    CT L shoulder WO 8/30: Severe left glenohumeral arthrosis with chondrocalcinosis and chronic remodeling of the bones. Severe supraspinatus and mild to moderate infraspinatus and subscapularis muscle atrophy. Marked subcoracoid bursitis. Marked biceps tenosynovitis. Small left pleural effusion.    CT head WO 8/28: Mild parenchymal atrophy with patchy periventricular hypoattenuation; a  nonspecific finding which statistically reflects chronic microvascular   ischemic change. Calcified plaque [place] of [place]. Coarse calcifications falx cerebri. No evidence of extra-axial collection, intracranial hemorrhage, mass or   mass effect. Gray-white matter differentiation appears preserved. Complete opacification of the visualized left maxillary sinus, demonstrating cortical thickening with heterogeneous internal density, including areas of hyperdensity. Bilateral mastoid air cells and middle ear regions well-aerated. No aggressive calvarial lesion or acute calvarial fracture visualized. Hyperostosis frontalis. Bilateral cataract surgery.    CTA LLE W/ 8/25: Large superficial left calf hematoma with active bleeding. Three-vessel runoff to the left foot. Two-vessel runoff to the right foot via the anterior tibial/dorsalis pedis and peroneal arteries.    XR L tib fib 8/25: The tibia and fibula are intact. No fracture or radiographic osseous lesion. Posteromedial mid calf 16.5 x 4.3 cm soft tissue hematoma.    Cardiac Testing:  TTE 9/11:  Limited study to assess tricuspid insufficiency and pulmonary pressure. Severe (4+) tricuspid valve regurgitation is present. Severe pulmonary hypertension. The left ventricle is normal in size, paradoxical septal motion The interventricular septum appears flattened consistent with an RV pressure/volume overload. An apically displaced papillary muscle is noted. Estimated left ventricular ejection fraction is 50-55%. The right atrium appears severely dilated. A device wire is seen in the RV and RA. The right ventricle is moderately dilated with decreased contractility.    Tele 9/8: Afib, rate 80s, tele DC'd    [facility] 9/6: Mild diffuse atherosclerosis with moderate severe disease of small caliber Cx in a nondominant territory. Elevated right sided filling pressures in setting of severe TR with normal left sided filling pressures. Adequate perfusion.    EKG 8/30: Afib, rate 96, RBBB (old)    TTE 8/29: Mild to mod MR. Severe AS. Severe TR. Mild to mod ND. Severe pHTN. Severely dilated LA. LV systolic function mildly impaired; segmental wall motion abnormalities noted. Mild concentric LVH. LVEF 45%. The interventricular septum appears flattened consistent with an RV pressure/volume overload. RA severely dilated. RV with moderate dilation, diffuse hypokinesis, and depression of contractility based on TAPSE. A device wire is seen in the RV and RA. IVC is dilated and not collapsing with inspiration.    EKG 8/28: Rate 105. Afib RVR    EKG 8/25: Rate 75. Afib. RBBB (old)

## 2023-09-13 NOTE — DISCHARGE NOTE PROVIDER - CARE PROVIDERS DIRECT ADDRESSES
,DirectAddress_Unknown,DirectAddress_Unknown,[email or web address]

## 2023-09-13 NOTE — DISCHARGE NOTE PROVIDER - NSDCCAREPROVSEEN_GEN_ALL_CORE_FT
Shavon Rojo (Trauma Surgery)  Shaheen Garcia (Cardiology)  Mildred Braxton (Medicine)  Junior Silva (Heart Failure Team)  Prashanth Mckeon (Urology)  Jose Melgar (Heart Failure Team and Structural Heart Team)  Alfred Sparks (Cardiology)  Laci Harris (Cardiology)  Palla, Venugopal R (Cardiology)  Jackie Sahni (Cardiac Electrophysiology)  Anderson Ruiz (Medicine)  Alejandro Kitchen (Structural Heart Team)  Melody Scott (Heart Failure Team)

## 2023-09-13 NOTE — DISCHARGE NOTE PROVIDER - NSDCMRMEDTOKEN_GEN_ALL_CORE_FT
allopurinol 100 mg oral tablet: 1 tab(s) orally once a day  Caltrate 600 + D oral tablet: 1 tab(s) orally once a day  furosemide 20 mg oral tablet: 1 tab(s) orally once a day as needed for  furosemide 40 mg oral tablet: 1 tab(s) orally once a day  levothyroxine 125 mcg (0.125 mg) oral tablet: 1 tab(s) orally once a day  metoprolol succinate 25 mg oral tablet, extended release: 1 tab(s) orally once a day  nystatin 100,000 units/g topical cream: Apply topically to affected area 2 times a day to foot  potassium chloride 20 mEq oral tablet, extended release: 1 tab(s) orally once a day  PreserVision AREDS 2 oral capsule: 1 cap(s) orally once a day  rivaroxaban 15 mg oral tablet: 1 tab(s) orally once a day (before a meal)   acetaminophen 325 mg oral tablet: 2 tab(s) orally every 6 hours As needed Moderate Pain (4 - 6)  allopurinol 100 mg oral tablet: 1 tab(s) orally once a day  ammonium lactate 12% topical lotion: 1 Apply topically to affected area 2 times a day  bisacodyl 10 mg rectal suppository: 1 suppository(ies) rectal once a day As needed Constipation  Caltrate 600 + D oral tablet: 1 tab(s) orally once a day  levothyroxine 125 mcg (0.125 mg) oral tablet: 1 tab(s) orally once a day  polyethylene glycol 3350 oral powder for reconstitution: 17 gram(s) orally once a day  PreserVision AREDS 2 oral capsule: 1 cap(s) orally once a day  senna leaf extract oral tablet: 2 tab(s) orally once a day (at bedtime)  spironolactone 25 mg oral tablet: 1 tab(s) orally once a day  thiamine 100 mg oral tablet: 1 tab(s) orally once a day  torsemide 20 mg oral tablet: 1 tab(s) orally 2 times a day

## 2023-09-13 NOTE — DISCHARGE NOTE PROVIDER - HOSPITAL COURSE
89 year-old woman with HTN, CAD, chronic diastolic CHF, chronic atrial fibrillation on Xarelto, hx of PPM placement, COPD, hypothyroidism, gout, presented 8/25 with LLE pain and swelling, difficulty with ambulation, started after striking LLE on piece of furniture the day prior. Patient was found to have hypotension, rapid afib, large superficial L calf hematoma with active bleed. Admitted to Trauma Surgery, since transferred to Medicine as patient's hematoma is being managed conservatively.     Large, traumatic L calf hematoma  Patient with advanced age, moderate MR, severe AS, severe TR with pulmonary hypertension, RV volume overload, borderline BP, afib with RVR (at the time). Appreciate input from Interventional Radiology, deemed very high risk for procedure, deferred intervention, managed supportively. Trauma Surgery also advised conservative management. Reassuringly, patient remains stable, hemodynamically. Hgb stable, ~10. Completed 7-day empiric course of Unasyn as was previously ordered by Surgery. Xarelto remains held since admission, likely to be held indefinitely, pending further discussion with Trauma Surgery, Cardiology, patient/family. Being evaluated for Watchman, see below. Continue to monitor. Wrap with ACE wrap. Change every other day.     Acute metabolic encephalopathy  Likely multi factorial due to hematoma, hypoxia, congestive heart failure. Underlying causes were treated/managed, and mental status is now improved. Continue to monitor, re-orient frequently    Acute respiratory failure with hypoxia  Likely multi factorial due to CHF, severe heart valve disease, severe pHTN. Current SPO2 100% on 2L/min via NC. Wean O2 as tolerated, goal SPO2 92% or greater on room air. Continue to monitor. Encourage incentive spirometry use.    Acute on chronic systolic heart failure  Echo showed EF 45% perhaps 2/2 tachyarrhythmia, may also be related to multiple heart valve disease. LHC and RHC performed 9/6. Non obstructive CAD. Elevated R heart pressures consistent with CHF. Now s/p Lasix drip, on torsemide. Metoprolol stopped due to hypotension w dizziness. Aldactone reduced due to same. Repeat echo 9/11 with improvement in LVEF, now 55%. Still with severe TR, pHTN, RA dilation, etc.  Appreciate input from CHF Team. Patient now stable on oral diuretic regimen of torsemide and spironolactone. Using these cautiously given her aortic stenosis. Also, avoiding beta blocker due to hypotension and dizziness on metoprolol. Continue to monitor weight upon discharge. Avoid added salt in diet. limit fluid intake to no more than 1.5L per day. Follow up as outpatient with CHF Team Jose Melgar. General Cardiology follow up as well, appointment scheduled for 9/21 at 1PM.    Significant heart valve disease.   Echo 8/29 w/ mild to mod MR, severe AS, severe TR, mild to mod MD, severe pHTN, severely dilated LA, LV systolic function mildly impaired; segmental wall motion abnormalities noted, mild concentric LVH,  LVEF 45%, RV pressure/volume overload, RA severely dilated, RV with moderate dilation, diffuse hypokinesis, and depression of contractility based on TAPSE. Structural Heart Team consulted. Recommended patient optimize functional status prior to TAVR. Patient's CHF since treated, rpt echo now with LVEF 55%, still with severe TR, severe pHTN, RA dilation.  Outpatient referral for continued TAVR evaluation.     Chronic atrial fibrillation  Rapid rates upon admission in setting of pain, large LLE hematoma. Cardiology following. HR now improved as hematoma appears to have stabilized. Xarelto on hold due to the hematoma. Continue to HOLD Xarelto given large hematoma. Referred to Cardiac EP Dr Odonnell re possible Watchman LAAC procedure / device to provide a non-pharmacologic alternative for non-valvular afib related stroke risk-reduction, to continue to evaluate patient as outpatient for this procedure.    Hx of pacemaker placement  S/P PPM interrogation; Normal functioning single chamber PPM with battery longevity 5.9-6.1 years    Hypothyroidism  Stable. Continue levothyroxine    Hx of gout  Stable.  No sign of acute flare. Continue allopurinol    Hx of constipation  Stable. Continue bowel regimen, monitor for BM    Diabetes mellitus  A1c 6.6, borderline DM, new diagnosis here. Essentially at goal for patient age. Continue diabetic diet. No concentrated sweets.     Urinary retention  Resolved. Patient had Espinoza earlier this admission, passed trial of void and doing well without Espinoza catheter.      Physical deconditioning and debility  PT eval appreciated. Recommend [facility]EN. Continue restorative PT sessions.  OOB to chair daily    Severe protein calorie malnutrition  Appreciate dietician input. Trend wt. Added MVI with minerals daily, thiamine 100mg daily     89 year-old woman with HTN, CAD, chronic diastolic CHF, chronic atrial fibrillation on Xarelto, hx of PPM placement, COPD, hypothyroidism, gout, presented 8/25 with LLE pain and swelling, difficulty with ambulation, started after striking LLE on piece of furniture the day prior. Patient was found to have hypotension, rapid afib, large superficial L calf hematoma with active bleed. Admitted to Trauma Surgery, since transferred to Medicine as patient's hematoma is being managed conservatively.       Large, traumatic L calf hematoma  Patient with advanced age, moderate MR, severe AS, severe TR with pulmonary hypertension, RV volume overload, borderline BP, afib with RVR (at the time). Appreciate input from Interventional Radiology, deemed very high risk for procedure, deferred intervention, managed supportively. Trauma Surgery also advised conservative management. Reassuringly, patient remains stable, hemodynamically. Hgb stable, ~10. Completed 7-day empiric course of Unasyn as was previously ordered by Surgery. Xarelto remains held since admission, likely to be held indefinitely, pending further discussion with Trauma Surgery, Cardiology, patient/family. Being evaluated for Watchman, see below. Continue to monitor. Wrap with ACE wrap. Change every other day.     Acute metabolic encephalopathy  Likely multi factorial due to hematoma, hypoxia, congestive heart failure. Underlying causes were treated/managed, and mental status is now improved. Continue to monitor, re-orient frequently    Acute respiratory failure with hypoxia  Likely multi factorial due to CHF, severe heart valve disease, severe pHTN. Current SPO2 100% on 2L/min via NC. Wean O2 as tolerated, goal SPO2 92% or greater on room air. Continue to monitor. Encourage incentive spirometry use.    Acute on chronic systolic heart failure  Echo showed EF 45% perhaps 2/2 tachyarrhythmia, may also be related to multiple heart valve disease. LHC and RHC performed 9/6. Non obstructive CAD. Elevated R heart pressures consistent with CHF. Now s/p Lasix drip, on torsemide. Metoprolol stopped due to hypotension w dizziness. Aldactone reduced due to same. Repeat echo 9/11 with improvement in LVEF, now 55%. Still with severe TR, pHTN, RA dilation, etc.  Appreciate input from CHF Team. Patient now stable on oral diuretic regimen of torsemide and spironolactone. Using these cautiously given her aortic stenosis. Also, avoiding beta blocker due to hypotension and dizziness on metoprolol. Continue to monitor weight upon discharge. Avoid added salt in diet. limit fluid intake to no more than 1.5L per day. Follow up as outpatient with CHF Team Joes Melgar. General Cardiology follow up as well, appointment scheduled for 9/21 at 1PM.    Significant heart valve disease.   Echo 8/29 w/ mild to mod MR, severe AS, severe TR, mild to mod KS, severe pHTN, severely dilated LA, LV systolic function mildly impaired; segmental wall motion abnormalities noted, mild concentric LVH,  LVEF 45%, RV pressure/volume overload, RA severely dilated, RV with moderate dilation, diffuse hypokinesis, and depression of contractility based on TAPSE. Structural Heart Team consulted. Recommended patient optimize functional status prior to TAVR. Patient's CHF since treated, rpt echo now with LVEF 55%, still with severe TR, severe pHTN, RA dilation.  Outpatient referral for continued TAVR evaluation.     Chronic atrial fibrillation  Rapid rates upon admission in setting of pain, large LLE hematoma. Cardiology following. HR now improved as hematoma appears to have stabilized. Xarelto on hold due to the hematoma. Continue to HOLD Xarelto given large hematoma. Referred to Cardiac EP Dr Odonnell re possible Watchman LAAC procedure / device to provide a non-pharmacologic alternative for non-valvular afib related stroke risk-reduction, to continue to evaluate patient as outpatient for this procedure.    Hx of pacemaker placement  S/P PPM interrogation; Normal functioning single chamber PPM with battery longevity 5.9-6.1 years    Hypothyroidism  Stable. Continue levothyroxine    Hx of gout  Stable.  No sign of acute flare. Continue allopurinol    Hx of constipation  Stable. Continue bowel regimen, monitor for BM    Diabetes mellitus  A1c 6.6, borderline DM, new diagnosis here. Essentially at goal for patient age. Continue diabetic diet. No concentrated sweets.     Urinary retention  Resolved. Patient had Espinoza earlier this admission, passed trial of void and doing well without Espinoza catheter.      Physical deconditioning and debility  PT eval appreciated. Recommend EDEN. Continue restorative PT sessions.  OOB to chair daily    Severe protein calorie malnutrition  Appreciate dietician input. Trend wt. Added MVI with minerals daily, thiamine 100mg daily     89 year-old woman with HTN, CAD, chronic diastolic CHF, chronic atrial fibrillation on Xarelto, hx of PPM placement, COPD, hypothyroidism, gout, presented 8/25 with LLE pain and swelling, difficulty with ambulation, started after striking LLE on piece of furniture the day prior. Patient was found to have hypotension, rapid afib, large superficial L calf hematoma with active bleed. Admitted to Trauma Surgery, since transferred to Medicine as patient's hematoma is being managed conservatively.       Large, traumatic L calf hematoma  Patient with advanced age, moderate MR, severe AS, severe TR with pulmonary hypertension, RV volume overload, borderline BP, afib with RVR (at the time). Appreciate input from Interventional Radiology, deemed very high risk for procedure, deferred intervention, managed supportively. Trauma Surgery also advised conservative management. Reassuringly, patient remains stable, hemodynamically. Hgb stable, ~10. Completed 7-day empiric course of Unasyn as was previously ordered by Surgery. Xarelto remains held since admission, likely to be held indefinitely, pending further discussion with Trauma Surgery, Cardiology, patient/family. Being evaluated for Watchman, see below.  As for the leg hematoma, continue to monitor, apply xeroform dressing, wrap with kerlix and then ACE wrap, change daily.    Acute metabolic encephalopathy  Likely multi factorial due to hematoma, hypoxia, congestive heart failure. Underlying causes were treated/managed, and mental status is now improved. Continue to monitor, re-orient frequently    Acute respiratory failure with hypoxia  Likely multi factorial due to CHF, severe heart valve disease, severe pHTN. Current SPO2 100% on 2L/min via NC. Wean O2 as tolerated, goal SPO2 92% or greater on room air. Continue to monitor. Encourage incentive spirometry use.    Acute on chronic systolic heart failure  Echo showed EF 45% perhaps 2/2 tachyarrhythmia, may also be related to multiple heart valve disease. LHC and RHC performed 9/6. Non obstructive CAD. Elevated R heart pressures consistent with CHF. Now s/p Lasix drip, on torsemide. Metoprolol stopped due to hypotension w dizziness. Aldactone reduced due to same. Repeat echo 9/11 with improvement in LVEF, now 55%. Still with severe TR, pHTN, RA dilation, etc.  Appreciate input from CHF Team. Patient now stable on oral diuretic regimen of torsemide and spironolactone. Using these cautiously given her aortic stenosis. Also, avoiding beta blocker due to hypotension and dizziness on metoprolol. Continue to monitor weight upon discharge. Avoid added salt in diet. limit fluid intake to no more than 1.5L per day. Follow up as outpatient with CHF Team Jose Melgar. General Cardiology follow up as well, appointment scheduled for 9/21 at 1PM.    Significant heart valve disease.   Echo 8/29 w/ mild to mod MR, severe AS, severe TR, mild to mod NM, severe pHTN, severely dilated LA, LV systolic function mildly impaired; segmental wall motion abnormalities noted, mild concentric LVH,  LVEF 45%, RV pressure/volume overload, RA severely dilated, RV with moderate dilation, diffuse hypokinesis, and depression of contractility based on TAPSE. Structural Heart Team consulted. Recommended patient optimize functional status prior to TAVR. Patient's CHF since treated, rpt echo now with LVEF 55%, still with severe TR, severe pHTN, RA dilation.  Outpatient referral for continued TAVR evaluation.     Chronic atrial fibrillation  Rapid rates upon admission in setting of pain, large LLE hematoma. Cardiology following. HR now improved as hematoma appears to have stabilized. Xarelto on hold due to the hematoma. Continue to HOLD Xarelto given large hematoma. Referred to Cardiac EP Dr Odonnell re possible Watchman LAAC procedure / device to provide a non-pharmacologic alternative for non-valvular afib related stroke risk-reduction, to continue to evaluate patient as outpatient for this procedure.    Hx of pacemaker placement  S/P PPM interrogation; Normal functioning single chamber PPM with battery longevity 5.9-6.1 years    Hypothyroidism  Stable. Continue levothyroxine    Hx of gout  Stable.  No sign of acute flare. Continue allopurinol    Hx of constipation  Stable. Continue bowel regimen, monitor for BM    Diabetes mellitus  A1c 6.6, borderline DM, new diagnosis here. Essentially at goal for patient age. Continue diabetic diet. No concentrated sweets.     Urinary retention  Resolved. Patient had Espinoza earlier this admission, passed trial of void and doing well without Espinoza catheter.      Physical deconditioning and debility  PT eval appreciated. Recommend EDEN. Continue restorative PT sessions.  OOB to chair daily    Severe protein calorie malnutrition  Appreciate dietician input. Trend wt. Added MVI with minerals daily, thiamine 100mg daily

## 2023-09-13 NOTE — DISCHARGE NOTE PROVIDER - NSDCFUSCHEDAPPT_GEN_ALL_CORE_FT
Nella Noble Physician [facility]  CARDIOLOGY [address]  Scheduled Appointment: 09/21/2023

## 2023-09-13 NOTE — DISCHARGE NOTE PROVIDER - CARE PROVIDER_API CALL
Nella Noble  NP in Adult Health  [address], [address]  [place] [address]  Phone: [phone]  Fax: [phone]  Scheduled Appointment: 09/21/2023 01:00 PM    Jose Melgar  Physician Assistant Services  [address] [place] [address]  Phone: [phone]  Fax: [phone]  Follow Up Time: 2 weeks    Laci Odonnell  Cardiovascular Disease  [address] [place] [address]  Phone: [phone]  Fax: [phone]  Follow Up Time: 1 month

## 2023-09-13 NOTE — DISCHARGE NOTE PROVIDER - PROVIDER TOKENS
PROVIDER:[TOKEN:[[id number]],SCHEDULEDAPPT:[09/21/2023],SCHEDULEDAPPTTIME:[01:00 PM]],PROVIDER:[TOKEN:[[id number]],FOLLOWUP:[2 weeks]],PROVIDER:[TOKEN:[[id number]],FOLLOWUP:[1 month]]

## 2023-11-19 PROBLEM — Z95.0 HISTORY OF PACEMAKER: Status: ACTIVE | Noted: 2022-12-11

## 2023-11-19 PROBLEM — I07.1 SEVERE TRICUSPID REGURGITATION: Status: ACTIVE | Noted: 2023-01-01

## 2023-11-19 PROBLEM — I36.1 NONRHEUMATIC TRICUSPID VALVE REGURGITATION: Status: ACTIVE | Noted: 2023-01-01

## 2023-12-22 PROBLEM — M19.90 ARTHRITIS: Status: ACTIVE | Noted: 2023-01-01

## 2023-12-29 PROBLEM — S80.10XA LEG HEMATOMA: Status: RESOLVED | Noted: 2023-01-01 | Resolved: 2023-01-01

## 2023-12-29 NOTE — ADDENDUM
[FreeTextEntry1] : [facility] labs reviewed from inpt stay:  9/11/23 cbc wnl, except wbc 10.9 Hg 10.8  TTE: EF 50-55%, sev TR, sev dilated right atrium, sev pHTN  9/13/23 bmp wnl  probnp [id number]

## 2023-12-29 NOTE — ASSESSMENT
[FreeTextEntry1] : _______________________________________________________________ 89 yo F pmhx hearing loss, hypothyroid, preDM, COPD, HTN, afib, cardiomyopathy, +PPM, TR, OA, gout, hx inpt stay 2/22 s/p mechanical fall with partial pelvic and coccyx bone fractures (no surgery needed) c/b CHF exacerbation for f/u  anemia- hx on/off, recent inpt stay with hematoma (no intervention needed per report) c/b CHF exacerbation and subsequent [facility] stay.  Note open wound at left shin prior hematoma area with minimal bleeding. -12/7/23 Hg 9.6 (was 12.2 in 7/23).  Will request hospital records. -check cbc/iron/B12/folate, bmp, will arrange home draw -check FIT -advised hematology f/u, wishes to f/u with prior seen Dr. Damon - asked to forward record -cont home VNS care encouraged -cont'd f/u with cardiology advised -advised prompt medical evaluation if symptoms worsen or new symptoms arise -advised ion office f/u in 1 week for exam due to limited nature of visit today, hesitant - wishes to check on getting house call MD prior to scheduling.  States will call back to arrange visit as needed.

## 2023-12-29 NOTE — HISTORY OF PRESENT ILLNESS
[Home] : at home, [unfilled] , at the time of the visit. [Medical Office: ([facility])___] : at the medical office located in  [Verbal consent obtained from patient] : the patient, [unfilled] [Family Member] : family member [FreeTextEntry1] : f/u [de-identified] : As this is a telemedicine visit, no physical exam could be performed.  Diagnosis and treatment is based on symptoms provided.  Accompanied by daughter Nella  89 yo F pmhx hearing loss, hypothyroid, preDM, COPD, HTN, afib, cardiomyopathy, +PPM, TR, OA, gout, hx inpt stay 2/22 s/p mechanical fall with partial pelvic and coccyx bone fractures (no surgery needed) c/b CHF exacerbation for f/u  Last seen 3/13/23 for f/u.  States has low Hg 9 (was 12) per cardiology- advised to see PMD for this as told may need transfusion States told no reason for anemia felt to be cardiac related.  States was evaluated for possible valve repair- told not  needed.  Report increased GARRIDO x 1 mo States was in rehab until 11/25/23 s/p inpt stay in 9/23 for hematoma at left leg (after banged her leg, on Xarelto) and CHF exacerbation -no intervention done on hematoma, states tx'd conservatively -given diuretic tx and improved  Report increased GARRIDO since at home and also more so since last cardiology visit 12/7/23, mainly with walking Denies CP, dizziness, cough or leg swelling. -on spironolactone 25mg qd since inpt; hx torsemide - off since hospital d/c -metoprolol dose lowered from 25 to 12.5 due to fatigue/lightheadedness and low BP at 25mg at last cardio visit- doing this since 12/7 visit and is better.  BPs since: 105-116/56-87, states fatigue/lightheadedness have resolved. -remains on Xarelto for afib  Reports COPD not active, no meds taken.  States has VNS for monitoring of hematoma - seen qod from [facility] services, last seen yesterday- states told improving, notes is slightly open since re-banged area (left shin) it a few days ago, has minimal bleeding  Report hx anemia in past- told possibly 2/2 microscopic tears in stomach -seen by hematologist, Dr. Damon ~ 3 yrs ago for eval- states was then given series of infusions (not blood)- states had f/u labs thereafter, told Hg normal and no further f/u advised or iron use advised.  Denies GI complaints, BRBPR or melena. -denies hx colonoscopy, hx declined FIT screening- willing now to do card, but not certain if will want further management if postive  Also requesting home visit doctor as finds it hard to bring to office for visit.

## 2023-12-31 PROBLEM — Z23 NEED FOR VACCINATION WITH 13-POLYVALENT PNEUMOCOCCAL CONJUGATE VACCINE: Status: RESOLVED | Noted: 2018-08-23 | Resolved: 2022-11-06

## 2024-01-01 ENCOUNTER — LABORATORY RESULT (OUTPATIENT)
Age: 89
End: 2024-01-01

## 2024-01-01 ENCOUNTER — TRANSCRIPTION ENCOUNTER (OUTPATIENT)
Age: 89
End: 2024-01-01

## 2024-01-01 ENCOUNTER — NON-APPOINTMENT (OUTPATIENT)
Age: 89
End: 2024-01-01

## 2024-01-01 ENCOUNTER — RX RENEWAL (OUTPATIENT)
Age: 89
End: 2024-01-01

## 2024-01-01 ENCOUNTER — APPOINTMENT (OUTPATIENT)
Dept: HOME HEALTH SERVICES | Facility: HOME HEALTH | Age: 89
End: 2024-01-01
Payer: MEDICARE

## 2024-01-01 ENCOUNTER — MED ADMIN CHARGE (OUTPATIENT)
Age: 89
End: 2024-01-01

## 2024-01-01 ENCOUNTER — INPATIENT (INPATIENT)
Facility: HOSPITAL | Age: 89
LOS: 13 days | Discharge: HOSPICE | DRG: 291 | End: 2024-02-24
Attending: HOSPITALIST | Admitting: FAMILY MEDICINE
Payer: MEDICARE

## 2024-01-01 VITALS
SYSTOLIC BLOOD PRESSURE: 104 MMHG | OXYGEN SATURATION: 99 % | DIASTOLIC BLOOD PRESSURE: 69 MMHG | HEART RATE: 68 BPM | TEMPERATURE: 97.8 F | RESPIRATION RATE: 18 BRPM

## 2024-01-01 VITALS
TEMPERATURE: 98 F | OXYGEN SATURATION: 99 % | HEART RATE: 77 BPM | RESPIRATION RATE: 18 BRPM | DIASTOLIC BLOOD PRESSURE: 64 MMHG | SYSTOLIC BLOOD PRESSURE: 116 MMHG

## 2024-01-01 VITALS — WEIGHT: 115.08 LBS | HEIGHT: 61 IN

## 2024-01-01 DIAGNOSIS — R42 DIZZINESS AND GIDDINESS: ICD-10-CM

## 2024-01-01 DIAGNOSIS — I50.42 CHRONIC COMBINED SYSTOLIC (CONGESTIVE) AND DIASTOLIC (CONGESTIVE) HEART FAILURE: ICD-10-CM

## 2024-01-01 DIAGNOSIS — I48.20 CHRONIC ATRIAL FIBRILLATION, UNSPECIFIED: ICD-10-CM

## 2024-01-01 DIAGNOSIS — E87.5 HYPERKALEMIA: ICD-10-CM

## 2024-01-01 DIAGNOSIS — Z96.659 PRESENCE OF UNSPECIFIED ARTIFICIAL KNEE JOINT: Chronic | ICD-10-CM

## 2024-01-01 DIAGNOSIS — F32.0 MAJOR DEPRESSIVE DISORDER, SINGLE EPISODE, MILD: ICD-10-CM

## 2024-01-01 DIAGNOSIS — Z71.89 OTHER SPECIFIED COUNSELING: ICD-10-CM

## 2024-01-01 DIAGNOSIS — N39.0 URINARY TRACT INFECTION, SITE NOT SPECIFIED: ICD-10-CM

## 2024-01-01 DIAGNOSIS — I35.0 NONRHEUMATIC AORTIC (VALVE) STENOSIS: ICD-10-CM

## 2024-01-01 DIAGNOSIS — D64.9 ANEMIA, UNSPECIFIED: ICD-10-CM

## 2024-01-01 DIAGNOSIS — I48.91 UNSPECIFIED ATRIAL FIBRILLATION: ICD-10-CM

## 2024-01-01 DIAGNOSIS — E87.1 HYPO-OSMOLALITY AND HYPONATREMIA: ICD-10-CM

## 2024-01-01 DIAGNOSIS — J44.9 CHRONIC OBSTRUCTIVE PULMONARY DISEASE, UNSPECIFIED: ICD-10-CM

## 2024-01-01 DIAGNOSIS — R63.0 ANOREXIA: ICD-10-CM

## 2024-01-01 DIAGNOSIS — I48.0 PAROXYSMAL ATRIAL FIBRILLATION: ICD-10-CM

## 2024-01-01 DIAGNOSIS — M10.9 GOUT, UNSPECIFIED: ICD-10-CM

## 2024-01-01 DIAGNOSIS — N18.9 CHRONIC KIDNEY DISEASE, UNSPECIFIED: ICD-10-CM

## 2024-01-01 DIAGNOSIS — R26.81 UNSTEADINESS ON FEET: ICD-10-CM

## 2024-01-01 DIAGNOSIS — I50.9 HEART FAILURE, UNSPECIFIED: ICD-10-CM

## 2024-01-01 DIAGNOSIS — M81.0 AGE-RELATED OSTEOPOROSIS W/OUT CURRENT PATHOLOGICAL FRACTURE: ICD-10-CM

## 2024-01-01 DIAGNOSIS — Z95.0 PRESENCE OF CARDIAC PACEMAKER: Chronic | ICD-10-CM

## 2024-01-01 DIAGNOSIS — J18.9 PNEUMONIA, UNSPECIFIED ORGANISM: ICD-10-CM

## 2024-01-01 DIAGNOSIS — R53.1 WEAKNESS: ICD-10-CM

## 2024-01-01 DIAGNOSIS — I25.10 ATHEROSCLEROTIC HEART DISEASE OF NATIVE CORONARY ARTERY WITHOUT ANGINA PECTORIS: ICD-10-CM

## 2024-01-01 DIAGNOSIS — M19.012 PRIMARY OSTEOARTHRITIS, RIGHT SHOULDER: ICD-10-CM

## 2024-01-01 DIAGNOSIS — E43 UNSPECIFIED SEVERE PROTEIN-CALORIE MALNUTRITION: ICD-10-CM

## 2024-01-01 DIAGNOSIS — I10 ESSENTIAL (PRIMARY) HYPERTENSION: ICD-10-CM

## 2024-01-01 DIAGNOSIS — Z23 ENCOUNTER FOR IMMUNIZATION: ICD-10-CM

## 2024-01-01 DIAGNOSIS — E03.9 HYPOTHYROIDISM, UNSPECIFIED: ICD-10-CM

## 2024-01-01 DIAGNOSIS — M19.011 PRIMARY OSTEOARTHRITIS, RIGHT SHOULDER: ICD-10-CM

## 2024-01-01 DIAGNOSIS — M85.80 OTHER SPECIFIED DISORDERS OF BONE DENSITY AND STRUCTURE, UNSPECIFIED SITE: ICD-10-CM

## 2024-01-01 DIAGNOSIS — I42.9 CARDIOMYOPATHY, UNSPECIFIED: ICD-10-CM

## 2024-01-01 DIAGNOSIS — R53.83 OTHER FATIGUE: ICD-10-CM

## 2024-01-01 LAB
A1C WITH ESTIMATED AVERAGE GLUCOSE RESULT: 6.3 % — HIGH (ref 4–5.6)
ALBUMIN SERPL ELPH-MCNC: 3.2 G/DL — LOW (ref 3.3–5)
ALBUMIN SERPL ELPH-MCNC: 3.3 G/DL — SIGNIFICANT CHANGE UP (ref 3.3–5)
ALBUMIN SERPL ELPH-MCNC: 3.7 G/DL — SIGNIFICANT CHANGE UP (ref 3.3–5)
ALBUMIN SERPL ELPH-MCNC: 3.8 G/DL
ALBUMIN SERPL ELPH-MCNC: 3.8 G/DL — SIGNIFICANT CHANGE UP (ref 3.3–5)
ALP BLD-CCNC: 122 U/L
ALP SERPL-CCNC: 110 U/L — SIGNIFICANT CHANGE UP (ref 40–120)
ALP SERPL-CCNC: 127 U/L — HIGH (ref 40–120)
ALT FLD-CCNC: 19 U/L — SIGNIFICANT CHANGE UP (ref 12–78)
ALT FLD-CCNC: 20 U/L — SIGNIFICANT CHANGE UP (ref 12–78)
ALT SERPL-CCNC: 8 U/L
ANION GAP SERPL CALC-SCNC: 0 MMOL/L — LOW (ref 5–17)
ANION GAP SERPL CALC-SCNC: 2 MMOL/L — LOW (ref 5–17)
ANION GAP SERPL CALC-SCNC: 2 MMOL/L — LOW (ref 5–17)
ANION GAP SERPL CALC-SCNC: 3 MMOL/L — LOW (ref 5–17)
ANION GAP SERPL CALC-SCNC: 5 MMOL/L — SIGNIFICANT CHANGE UP (ref 5–17)
ANION GAP SERPL CALC-SCNC: 6 MMOL/L — SIGNIFICANT CHANGE UP (ref 5–17)
ANION GAP SERPL CALC-SCNC: 9 MMOL/L
ANION GAP SERPL CALC-SCNC: 9 MMOL/L
ANISOCYTOSIS BLD QL: SLIGHT — SIGNIFICANT CHANGE UP
APPEARANCE UR: CLEAR — SIGNIFICANT CHANGE UP
APPEARANCE UR: CLEAR — SIGNIFICANT CHANGE UP
AST SERPL-CCNC: 25 U/L
AST SERPL-CCNC: 25 U/L — SIGNIFICANT CHANGE UP (ref 15–37)
AST SERPL-CCNC: 29 U/L — SIGNIFICANT CHANGE UP (ref 15–37)
BACTERIA # UR AUTO: NEGATIVE /HPF — SIGNIFICANT CHANGE UP
BASE EXCESS BLDA CALC-SCNC: -0.3 MMOL/L — SIGNIFICANT CHANGE UP (ref -2–3)
BASE EXCESS BLDA CALC-SCNC: 1.2 MMOL/L — SIGNIFICANT CHANGE UP (ref -2–3)
BASE EXCESS BLDA CALC-SCNC: 7 MMOL/L — HIGH (ref -2–3)
BASE EXCESS BLDV CALC-SCNC: 11.9 MMOL/L — HIGH (ref -2–3)
BASE EXCESS BLDV CALC-SCNC: 5.4 MMOL/L — HIGH (ref -2–3)
BASO STIPL BLD QL SMEAR: PRESENT — SIGNIFICANT CHANGE UP
BASOPHILS # BLD AUTO: 0 K/UL — SIGNIFICANT CHANGE UP (ref 0–0.2)
BASOPHILS # BLD AUTO: 0.01 K/UL — SIGNIFICANT CHANGE UP (ref 0–0.2)
BASOPHILS # BLD AUTO: 0.04 K/UL
BASOPHILS NFR BLD AUTO: 0 % — SIGNIFICANT CHANGE UP (ref 0–2)
BASOPHILS NFR BLD AUTO: 0.2 % — SIGNIFICANT CHANGE UP (ref 0–2)
BASOPHILS NFR BLD AUTO: 0.9 %
BILIRUB DIRECT SERPL-MCNC: 0.2 MG/DL — SIGNIFICANT CHANGE UP (ref 0–0.3)
BILIRUB INDIRECT FLD-MCNC: 0.4 MG/DL — SIGNIFICANT CHANGE UP (ref 0.2–1)
BILIRUB SERPL-MCNC: 0.5 MG/DL
BILIRUB SERPL-MCNC: 0.6 MG/DL — SIGNIFICANT CHANGE UP (ref 0.2–1.2)
BILIRUB SERPL-MCNC: 0.9 MG/DL — SIGNIFICANT CHANGE UP (ref 0.2–1.2)
BILIRUB UR-MCNC: NEGATIVE — SIGNIFICANT CHANGE UP
BILIRUB UR-MCNC: NEGATIVE — SIGNIFICANT CHANGE UP
BLOOD GAS COMMENTS ARTERIAL: SIGNIFICANT CHANGE UP
BLOOD GAS COMMENTS ARTERIAL: SIGNIFICANT CHANGE UP
BUN SERPL-MCNC: 102 MG/DL — HIGH (ref 7–23)
BUN SERPL-MCNC: 106 MG/DL — HIGH (ref 7–23)
BUN SERPL-MCNC: 113 MG/DL — HIGH (ref 7–23)
BUN SERPL-MCNC: 21 MG/DL
BUN SERPL-MCNC: 22 MG/DL
BUN SERPL-MCNC: 22 MG/DL — SIGNIFICANT CHANGE UP (ref 7–23)
BUN SERPL-MCNC: 23 MG/DL — SIGNIFICANT CHANGE UP (ref 7–23)
BUN SERPL-MCNC: 31 MG/DL — HIGH (ref 7–23)
BUN SERPL-MCNC: 51 MG/DL — HIGH (ref 7–23)
BUN SERPL-MCNC: 55 MG/DL — HIGH (ref 7–23)
BUN SERPL-MCNC: 61 MG/DL — HIGH (ref 7–23)
BUN SERPL-MCNC: 75 MG/DL — HIGH (ref 7–23)
BUN SERPL-MCNC: 83 MG/DL — HIGH (ref 7–23)
BUN SERPL-MCNC: 86 MG/DL — HIGH (ref 7–23)
BUN SERPL-MCNC: 93 MG/DL — HIGH (ref 7–23)
CALCIUM SERPL-MCNC: 8.3 MG/DL — LOW (ref 8.5–10.1)
CALCIUM SERPL-MCNC: 8.5 MG/DL — SIGNIFICANT CHANGE UP (ref 8.5–10.1)
CALCIUM SERPL-MCNC: 8.6 MG/DL — SIGNIFICANT CHANGE UP (ref 8.5–10.1)
CALCIUM SERPL-MCNC: 8.7 MG/DL
CALCIUM SERPL-MCNC: 8.7 MG/DL — SIGNIFICANT CHANGE UP (ref 8.5–10.1)
CALCIUM SERPL-MCNC: 8.7 MG/DL — SIGNIFICANT CHANGE UP (ref 8.5–10.1)
CALCIUM SERPL-MCNC: 8.9 MG/DL
CALCIUM SERPL-MCNC: 8.9 MG/DL — SIGNIFICANT CHANGE UP (ref 8.5–10.1)
CALCIUM SERPL-MCNC: 8.9 MG/DL — SIGNIFICANT CHANGE UP (ref 8.5–10.1)
CALCIUM SERPL-MCNC: 9 MG/DL — SIGNIFICANT CHANGE UP (ref 8.5–10.1)
CALCIUM SERPL-MCNC: 9.1 MG/DL — SIGNIFICANT CHANGE UP (ref 8.5–10.1)
CALCIUM SERPL-MCNC: 9.1 MG/DL — SIGNIFICANT CHANGE UP (ref 8.5–10.1)
CALCIUM SERPL-MCNC: 9.4 MG/DL — SIGNIFICANT CHANGE UP (ref 8.5–10.1)
CHLORIDE SERPL-SCNC: 89 MMOL/L — LOW (ref 96–108)
CHLORIDE SERPL-SCNC: 90 MMOL/L — LOW (ref 96–108)
CHLORIDE SERPL-SCNC: 91 MMOL/L — LOW (ref 96–108)
CHLORIDE SERPL-SCNC: 93 MMOL/L — LOW (ref 96–108)
CHLORIDE SERPL-SCNC: 93 MMOL/L — LOW (ref 96–108)
CHLORIDE SERPL-SCNC: 94 MMOL/L — LOW (ref 96–108)
CHLORIDE SERPL-SCNC: 94 MMOL/L — LOW (ref 96–108)
CHLORIDE SERPL-SCNC: 95 MMOL/L
CHLORIDE SERPL-SCNC: 95 MMOL/L — LOW (ref 96–108)
CHLORIDE SERPL-SCNC: 96 MMOL/L
CO2 SERPL-SCNC: 26 MMOL/L
CO2 SERPL-SCNC: 28 MMOL/L
CO2 SERPL-SCNC: 28 MMOL/L — SIGNIFICANT CHANGE UP (ref 22–31)
CO2 SERPL-SCNC: 30 MMOL/L — SIGNIFICANT CHANGE UP (ref 22–31)
CO2 SERPL-SCNC: 31 MMOL/L — SIGNIFICANT CHANGE UP (ref 22–31)
CO2 SERPL-SCNC: 31 MMOL/L — SIGNIFICANT CHANGE UP (ref 22–31)
CO2 SERPL-SCNC: 32 MMOL/L — HIGH (ref 22–31)
CO2 SERPL-SCNC: 33 MMOL/L — HIGH (ref 22–31)
CO2 SERPL-SCNC: 36 MMOL/L — HIGH (ref 22–31)
CO2 SERPL-SCNC: 37 MMOL/L — HIGH (ref 22–31)
COLOR SPEC: YELLOW — SIGNIFICANT CHANGE UP
COLOR SPEC: YELLOW — SIGNIFICANT CHANGE UP
CORTIS AM PEAK SERPL-MCNC: 15.7 UG/DL — SIGNIFICANT CHANGE UP (ref 6–18.4)
CREAT SERPL-MCNC: 0.92 MG/DL
CREAT SERPL-MCNC: 0.93 MG/DL
CREAT SERPL-MCNC: 0.93 MG/DL — SIGNIFICANT CHANGE UP (ref 0.5–1.3)
CREAT SERPL-MCNC: 0.98 MG/DL — SIGNIFICANT CHANGE UP (ref 0.5–1.3)
CREAT SERPL-MCNC: 1.48 MG/DL — HIGH (ref 0.5–1.3)
CREAT SERPL-MCNC: 2.03 MG/DL — HIGH (ref 0.5–1.3)
CREAT SERPL-MCNC: 2.11 MG/DL — HIGH (ref 0.5–1.3)
CREAT SERPL-MCNC: 2.19 MG/DL — HIGH (ref 0.5–1.3)
CREAT SERPL-MCNC: 2.22 MG/DL — HIGH (ref 0.5–1.3)
CREAT SERPL-MCNC: 2.23 MG/DL — HIGH (ref 0.5–1.3)
CREAT SERPL-MCNC: 2.36 MG/DL — HIGH (ref 0.5–1.3)
CREAT SERPL-MCNC: 2.37 MG/DL — HIGH (ref 0.5–1.3)
CREAT SERPL-MCNC: 2.4 MG/DL — HIGH (ref 0.5–1.3)
CREAT SERPL-MCNC: 2.46 MG/DL — HIGH (ref 0.5–1.3)
CREAT SERPL-MCNC: 2.48 MG/DL — HIGH (ref 0.5–1.3)
CULTURE RESULTS: NO GROWTH — SIGNIFICANT CHANGE UP
CULTURE RESULTS: SIGNIFICANT CHANGE UP
DIFF PNL FLD: NEGATIVE — SIGNIFICANT CHANGE UP
DIFF PNL FLD: NEGATIVE — SIGNIFICANT CHANGE UP
EGFR: 17 ML/MIN/1.73M2 — LOW
EGFR: 18 ML/MIN/1.73M2 — LOW
EGFR: 19 ML/MIN/1.73M2 — LOW
EGFR: 20 ML/MIN/1.73M2 — LOW
EGFR: 21 ML/MIN/1.73M2 — LOW
EGFR: 22 ML/MIN/1.73M2 — LOW
EGFR: 32 ML/MIN/1.73M2 — LOW
EGFR: 53 ML/MIN/1.73M2 — LOW
EGFR: 57 ML/MIN/1.73M2
EGFR: 57 ML/MIN/1.73M2
EGFR: 57 ML/MIN/1.73M2 — LOW
EOSINOPHIL # BLD AUTO: 0 K/UL — SIGNIFICANT CHANGE UP (ref 0–0.5)
EOSINOPHIL # BLD AUTO: 0.07 K/UL
EOSINOPHIL NFR BLD AUTO: 0 % — SIGNIFICANT CHANGE UP (ref 0–6)
EOSINOPHIL NFR BLD AUTO: 1.5 %
ESTIMATED AVERAGE GLUCOSE: 134 MG/DL — HIGH (ref 68–114)
FERRITIN SERPL-MCNC: 802 NG/ML — HIGH (ref 13–330)
FLUAV AG NPH QL: SIGNIFICANT CHANGE UP
FLUBV AG NPH QL: SIGNIFICANT CHANGE UP
GAS PNL BLDA: SIGNIFICANT CHANGE UP
GAS PNL BLDV: SIGNIFICANT CHANGE UP
GAS PNL BLDV: SIGNIFICANT CHANGE UP
GLUCOSE 2H P GLC SERPL-MCNC: 186 MG/DL — HIGH (ref 70–139)
GLUCOSE BLDC GLUCOMTR-MCNC: 131 MG/DL — HIGH (ref 70–99)
GLUCOSE BLDC GLUCOMTR-MCNC: 163 MG/DL — HIGH (ref 70–99)
GLUCOSE BLDC GLUCOMTR-MCNC: 205 MG/DL — HIGH (ref 70–99)
GLUCOSE BLDC GLUCOMTR-MCNC: 229 MG/DL — HIGH (ref 70–99)
GLUCOSE SERPL-MCNC: 110 MG/DL
GLUCOSE SERPL-MCNC: 157 MG/DL — HIGH (ref 70–99)
GLUCOSE SERPL-MCNC: 157 MG/DL — HIGH (ref 70–99)
GLUCOSE SERPL-MCNC: 161 MG/DL — HIGH (ref 70–99)
GLUCOSE SERPL-MCNC: 163 MG/DL — HIGH (ref 70–99)
GLUCOSE SERPL-MCNC: 165 MG/DL — HIGH (ref 70–99)
GLUCOSE SERPL-MCNC: 167 MG/DL — HIGH (ref 70–99)
GLUCOSE SERPL-MCNC: 167 MG/DL — HIGH (ref 70–99)
GLUCOSE SERPL-MCNC: 177 MG/DL — HIGH (ref 70–99)
GLUCOSE SERPL-MCNC: 181 MG/DL — HIGH (ref 70–99)
GLUCOSE SERPL-MCNC: 222 MG/DL — HIGH (ref 70–99)
GLUCOSE SERPL-MCNC: 267 MG/DL — HIGH (ref 70–99)
GLUCOSE SERPL-MCNC: 330 MG/DL — HIGH (ref 70–99)
GLUCOSE SERPL-MCNC: 89 MG/DL — SIGNIFICANT CHANGE UP (ref 70–99)
GLUCOSE SERPL-MCNC: 99 MG/DL
GLUCOSE SERPL-MCNC: 99 MG/DL — SIGNIFICANT CHANGE UP (ref 70–99)
GLUCOSE UR QL: NEGATIVE MG/DL — SIGNIFICANT CHANGE UP
GLUCOSE UR QL: NEGATIVE MG/DL — SIGNIFICANT CHANGE UP
HCO3 BLDA-SCNC: 28 MMOL/L — SIGNIFICANT CHANGE UP (ref 21–28)
HCO3 BLDA-SCNC: 29 MMOL/L — HIGH (ref 21–28)
HCO3 BLDA-SCNC: 34 MMOL/L — HIGH (ref 21–28)
HCO3 BLDV-SCNC: 30 MMOL/L — HIGH (ref 22–29)
HCO3 BLDV-SCNC: 38 MMOL/L — HIGH (ref 22–29)
HCT VFR BLD CALC: 29.8 % — LOW (ref 34.5–45)
HCT VFR BLD CALC: 30.2 %
HCT VFR BLD CALC: 30.3 % — LOW (ref 34.5–45)
HCT VFR BLD CALC: 30.5 % — LOW (ref 34.5–45)
HCT VFR BLD CALC: 30.6 % — LOW (ref 34.5–45)
HCT VFR BLD CALC: 30.7 % — LOW (ref 34.5–45)
HCT VFR BLD CALC: 30.8 % — LOW (ref 34.5–45)
HCT VFR BLD CALC: 30.9 % — LOW (ref 34.5–45)
HGB BLD-MCNC: 10 G/DL — LOW (ref 11.5–15.5)
HGB BLD-MCNC: 9.6 G/DL
HGB BLD-MCNC: 9.7 G/DL — LOW (ref 11.5–15.5)
HGB BLD-MCNC: 9.8 G/DL — LOW (ref 11.5–15.5)
HGB BLD-MCNC: 9.8 G/DL — LOW (ref 11.5–15.5)
HGB BLD-MCNC: 9.9 G/DL — LOW (ref 11.5–15.5)
IMM GRANULOCYTES NFR BLD AUTO: 0.2 %
IMM GRANULOCYTES NFR BLD AUTO: 0.3 % — SIGNIFICANT CHANGE UP (ref 0–0.9)
IMM GRANULOCYTES NFR BLD AUTO: 0.4 % — SIGNIFICANT CHANGE UP (ref 0–0.9)
IMM GRANULOCYTES NFR BLD AUTO: 0.4 % — SIGNIFICANT CHANGE UP (ref 0–0.9)
IMM GRANULOCYTES NFR BLD AUTO: 0.8 % — SIGNIFICANT CHANGE UP (ref 0–0.9)
IRON SATN MFR SERPL: 22 % — SIGNIFICANT CHANGE UP (ref 14–50)
IRON SATN MFR SERPL: 66 UG/DL — SIGNIFICANT CHANGE UP (ref 30–160)
KETONES UR-MCNC: NEGATIVE MG/DL — SIGNIFICANT CHANGE UP
KETONES UR-MCNC: NEGATIVE MG/DL — SIGNIFICANT CHANGE UP
LEUKOCYTE ESTERASE UR-ACNC: ABNORMAL
LEUKOCYTE ESTERASE UR-ACNC: NEGATIVE — SIGNIFICANT CHANGE UP
LYMPHOCYTES # BLD AUTO: 0.18 K/UL — LOW (ref 1–3.3)
LYMPHOCYTES # BLD AUTO: 0.31 K/UL — LOW (ref 1–3.3)
LYMPHOCYTES # BLD AUTO: 0.39 K/UL — LOW (ref 1–3.3)
LYMPHOCYTES # BLD AUTO: 0.41 K/UL — LOW (ref 1–3.3)
LYMPHOCYTES # BLD AUTO: 0.67 K/UL — LOW (ref 1–3.3)
LYMPHOCYTES # BLD AUTO: 0.94 K/UL
LYMPHOCYTES # BLD AUTO: 11.9 % — LOW (ref 13–44)
LYMPHOCYTES # BLD AUTO: 12 % — LOW (ref 13–44)
LYMPHOCYTES # BLD AUTO: 3.9 % — LOW (ref 13–44)
LYMPHOCYTES # BLD AUTO: 4.5 % — LOW (ref 13–44)
LYMPHOCYTES # BLD AUTO: 6.3 % — LOW (ref 13–44)
LYMPHOCYTES NFR BLD AUTO: 20.7 %
MACROCYTES BLD QL: SLIGHT — SIGNIFICANT CHANGE UP
MAGNESIUM SERPL-MCNC: 1.6 MG/DL — SIGNIFICANT CHANGE UP (ref 1.6–2.6)
MAGNESIUM SERPL-MCNC: 2.1 MG/DL — SIGNIFICANT CHANGE UP (ref 1.6–2.6)
MAGNESIUM SERPL-MCNC: 2.2 MG/DL — SIGNIFICANT CHANGE UP (ref 1.6–2.6)
MAGNESIUM SERPL-MCNC: 2.3 MG/DL — SIGNIFICANT CHANGE UP (ref 1.6–2.6)
MAN DIFF?: NORMAL
MANUAL SMEAR VERIFICATION: SIGNIFICANT CHANGE UP
MCHC RBC-ENTMCNC: 30.6 PG
MCHC RBC-ENTMCNC: 30.6 PG — SIGNIFICANT CHANGE UP (ref 27–34)
MCHC RBC-ENTMCNC: 30.6 PG — SIGNIFICANT CHANGE UP (ref 27–34)
MCHC RBC-ENTMCNC: 30.7 PG — SIGNIFICANT CHANGE UP (ref 27–34)
MCHC RBC-ENTMCNC: 30.8 PG — SIGNIFICANT CHANGE UP (ref 27–34)
MCHC RBC-ENTMCNC: 30.9 PG — SIGNIFICANT CHANGE UP (ref 27–34)
MCHC RBC-ENTMCNC: 31 PG — SIGNIFICANT CHANGE UP (ref 27–34)
MCHC RBC-ENTMCNC: 31 PG — SIGNIFICANT CHANGE UP (ref 27–34)
MCHC RBC-ENTMCNC: 31.8 GM/DL
MCHC RBC-ENTMCNC: 32 GM/DL — SIGNIFICANT CHANGE UP (ref 32–36)
MCHC RBC-ENTMCNC: 32 GM/DL — SIGNIFICANT CHANGE UP (ref 32–36)
MCHC RBC-ENTMCNC: 32.1 GM/DL — SIGNIFICANT CHANGE UP (ref 32–36)
MCHC RBC-ENTMCNC: 32.2 GM/DL — SIGNIFICANT CHANGE UP (ref 32–36)
MCHC RBC-ENTMCNC: 32.3 GM/DL — SIGNIFICANT CHANGE UP (ref 32–36)
MCHC RBC-ENTMCNC: 32.6 GM/DL — SIGNIFICANT CHANGE UP (ref 32–36)
MCHC RBC-ENTMCNC: 32.8 GM/DL — SIGNIFICANT CHANGE UP (ref 32–36)
MCV RBC AUTO: 93.3 FL — SIGNIFICANT CHANGE UP (ref 80–100)
MCV RBC AUTO: 95.2 FL — SIGNIFICANT CHANGE UP (ref 80–100)
MCV RBC AUTO: 95.6 FL — SIGNIFICANT CHANGE UP (ref 80–100)
MCV RBC AUTO: 96 FL — SIGNIFICANT CHANGE UP (ref 80–100)
MCV RBC AUTO: 96.2 FL
MCV RBC AUTO: 96.6 FL — SIGNIFICANT CHANGE UP (ref 80–100)
MONOCYTES # BLD AUTO: 0.08 K/UL — SIGNIFICANT CHANGE UP (ref 0–0.9)
MONOCYTES # BLD AUTO: 0.19 K/UL — SIGNIFICANT CHANGE UP (ref 0–0.9)
MONOCYTES # BLD AUTO: 0.26 K/UL — SIGNIFICANT CHANGE UP (ref 0–0.9)
MONOCYTES # BLD AUTO: 0.32 K/UL — SIGNIFICANT CHANGE UP (ref 0–0.9)
MONOCYTES # BLD AUTO: 0.53 K/UL
MONOCYTES # BLD AUTO: 0.79 K/UL — SIGNIFICANT CHANGE UP (ref 0–0.9)
MONOCYTES NFR BLD AUTO: 11.7 %
MONOCYTES NFR BLD AUTO: 14 % — SIGNIFICANT CHANGE UP (ref 2–14)
MONOCYTES NFR BLD AUTO: 2.4 % — SIGNIFICANT CHANGE UP (ref 2–14)
MONOCYTES NFR BLD AUTO: 2.7 % — SIGNIFICANT CHANGE UP (ref 2–14)
MONOCYTES NFR BLD AUTO: 4 % — SIGNIFICANT CHANGE UP (ref 2–14)
MONOCYTES NFR BLD AUTO: 6.9 % — SIGNIFICANT CHANGE UP (ref 2–14)
NEUTROPHILS # BLD AUTO: 2.81 K/UL — SIGNIFICANT CHANGE UP (ref 1.8–7.4)
NEUTROPHILS # BLD AUTO: 2.95 K/UL
NEUTROPHILS # BLD AUTO: 4.05 K/UL — SIGNIFICANT CHANGE UP (ref 1.8–7.4)
NEUTROPHILS # BLD AUTO: 4.14 K/UL — SIGNIFICANT CHANGE UP (ref 1.8–7.4)
NEUTROPHILS # BLD AUTO: 5.78 K/UL — SIGNIFICANT CHANGE UP (ref 1.8–7.4)
NEUTROPHILS # BLD AUTO: 6.38 K/UL — SIGNIFICANT CHANGE UP (ref 1.8–7.4)
NEUTROPHILS NFR BLD AUTO: 65 %
NEUTROPHILS NFR BLD AUTO: 71 % — SIGNIFICANT CHANGE UP (ref 43–77)
NEUTROPHILS NFR BLD AUTO: 85.4 % — HIGH (ref 43–77)
NEUTROPHILS NFR BLD AUTO: 88.7 % — HIGH (ref 43–77)
NEUTROPHILS NFR BLD AUTO: 88.8 % — HIGH (ref 43–77)
NEUTROPHILS NFR BLD AUTO: 92.4 % — HIGH (ref 43–77)
NEUTS BAND # BLD: 1 % — SIGNIFICANT CHANGE UP (ref 0–8)
NITRITE UR-MCNC: NEGATIVE — SIGNIFICANT CHANGE UP
NITRITE UR-MCNC: NEGATIVE — SIGNIFICANT CHANGE UP
NRBC # BLD: 0 /100 WBCS — SIGNIFICANT CHANGE UP (ref 0–0)
NRBC # BLD: 1 /100 WBCS — HIGH (ref 0–0)
NRBC # BLD: 2 /100 WBCS — HIGH (ref 0–0)
NRBC # BLD: 3 /100 WBCS — HIGH (ref 0–0)
NRBC # BLD: 3 /100 WBCS — HIGH (ref 0–0)
NRBC # BLD: SIGNIFICANT CHANGE UP /100 WBCS (ref 0–0)
NT-PROBNP SERPL-SCNC: 7548 PG/ML — HIGH (ref 0–450)
NT-PROBNP SERPL-SCNC: HIGH PG/ML (ref 0–450)
OVALOCYTES BLD QL SMEAR: SLIGHT — SIGNIFICANT CHANGE UP
PCO2 BLDA: 61 MMHG — HIGH (ref 32–45)
PCO2 BLDV: 42 MMHG — SIGNIFICANT CHANGE UP (ref 39–42)
PCO2 BLDV: 55 MMHG — HIGH (ref 39–42)
PH BLDA: 7.27 — LOW (ref 7.35–7.45)
PH BLDA: 7.29 — LOW (ref 7.35–7.45)
PH BLDA: 7.36 — SIGNIFICANT CHANGE UP (ref 7.35–7.45)
PH BLDV: 7.45 — HIGH (ref 7.32–7.43)
PH BLDV: 7.46 — HIGH (ref 7.32–7.43)
PH UR: 5 — SIGNIFICANT CHANGE UP (ref 5–8)
PH UR: 5.5 — SIGNIFICANT CHANGE UP (ref 5–8)
PHOSPHATE SERPL-MCNC: 3 MG/DL — SIGNIFICANT CHANGE UP (ref 2.5–4.5)
PHOSPHATE SERPL-MCNC: 3.8 MG/DL — SIGNIFICANT CHANGE UP (ref 2.5–4.5)
PHOSPHATE SERPL-MCNC: 3.9 MG/DL — SIGNIFICANT CHANGE UP (ref 2.5–4.5)
PHOSPHATE SERPL-MCNC: 4.6 MG/DL — HIGH (ref 2.5–4.5)
PHOSPHATE SERPL-MCNC: 4.7 MG/DL — HIGH (ref 2.5–4.5)
PHOSPHATE SERPL-MCNC: 5 MG/DL — HIGH (ref 2.5–4.5)
PLAT MORPH BLD: NORMAL — SIGNIFICANT CHANGE UP
PLATELET # BLD AUTO: 116 K/UL — LOW (ref 150–400)
PLATELET # BLD AUTO: 135 K/UL
PLATELET # BLD AUTO: 146 K/UL — LOW (ref 150–400)
PLATELET # BLD AUTO: 160 K/UL — SIGNIFICANT CHANGE UP (ref 150–400)
PLATELET # BLD AUTO: 161 K/UL — SIGNIFICANT CHANGE UP (ref 150–400)
PLATELET # BLD AUTO: 164 K/UL — SIGNIFICANT CHANGE UP (ref 150–400)
PLATELET # BLD AUTO: 167 K/UL — SIGNIFICANT CHANGE UP (ref 150–400)
PLATELET # BLD AUTO: 170 K/UL — SIGNIFICANT CHANGE UP (ref 150–400)
PO2 BLDA: 106 MMHG — SIGNIFICANT CHANGE UP (ref 83–108)
PO2 BLDA: 397 MMHG — HIGH (ref 83–108)
PO2 BLDA: 72 MMHG — LOW (ref 83–108)
PO2 BLDV: 149 MMHG — HIGH (ref 25–45)
PO2 BLDV: 206 MMHG — HIGH (ref 25–45)
POIKILOCYTOSIS BLD QL AUTO: SLIGHT — SIGNIFICANT CHANGE UP
POLYCHROMASIA BLD QL SMEAR: SLIGHT — SIGNIFICANT CHANGE UP
POTASSIUM SERPL-MCNC: 4.7 MMOL/L — SIGNIFICANT CHANGE UP (ref 3.5–5.3)
POTASSIUM SERPL-MCNC: 4.9 MMOL/L — SIGNIFICANT CHANGE UP (ref 3.5–5.3)
POTASSIUM SERPL-MCNC: 4.9 MMOL/L — SIGNIFICANT CHANGE UP (ref 3.5–5.3)
POTASSIUM SERPL-MCNC: 5 MMOL/L — SIGNIFICANT CHANGE UP (ref 3.5–5.3)
POTASSIUM SERPL-MCNC: 5.2 MMOL/L — SIGNIFICANT CHANGE UP (ref 3.5–5.3)
POTASSIUM SERPL-MCNC: 5.3 MMOL/L — SIGNIFICANT CHANGE UP (ref 3.5–5.3)
POTASSIUM SERPL-MCNC: 5.4 MMOL/L — HIGH (ref 3.5–5.3)
POTASSIUM SERPL-MCNC: 5.5 MMOL/L — HIGH (ref 3.5–5.3)
POTASSIUM SERPL-MCNC: 5.5 MMOL/L — HIGH (ref 3.5–5.3)
POTASSIUM SERPL-MCNC: 5.6 MMOL/L — HIGH (ref 3.5–5.3)
POTASSIUM SERPL-MCNC: 5.7 MMOL/L — HIGH (ref 3.5–5.3)
POTASSIUM SERPL-MCNC: 5.8 MMOL/L — HIGH (ref 3.5–5.3)
POTASSIUM SERPL-MCNC: 6.2 MMOL/L — CRITICAL HIGH (ref 3.5–5.3)
POTASSIUM SERPL-MCNC: 6.3 MMOL/L — CRITICAL HIGH (ref 3.5–5.3)
POTASSIUM SERPL-SCNC: 4.7 MMOL/L — SIGNIFICANT CHANGE UP (ref 3.5–5.3)
POTASSIUM SERPL-SCNC: 4.9 MMOL/L — SIGNIFICANT CHANGE UP (ref 3.5–5.3)
POTASSIUM SERPL-SCNC: 4.9 MMOL/L — SIGNIFICANT CHANGE UP (ref 3.5–5.3)
POTASSIUM SERPL-SCNC: 5 MMOL/L — SIGNIFICANT CHANGE UP (ref 3.5–5.3)
POTASSIUM SERPL-SCNC: 5.2 MMOL/L
POTASSIUM SERPL-SCNC: 5.2 MMOL/L — SIGNIFICANT CHANGE UP (ref 3.5–5.3)
POTASSIUM SERPL-SCNC: 5.3 MMOL/L — SIGNIFICANT CHANGE UP (ref 3.5–5.3)
POTASSIUM SERPL-SCNC: 5.4 MMOL/L — HIGH (ref 3.5–5.3)
POTASSIUM SERPL-SCNC: 5.5 MMOL/L
POTASSIUM SERPL-SCNC: 5.5 MMOL/L — HIGH (ref 3.5–5.3)
POTASSIUM SERPL-SCNC: 5.5 MMOL/L — HIGH (ref 3.5–5.3)
POTASSIUM SERPL-SCNC: 5.6 MMOL/L — HIGH (ref 3.5–5.3)
POTASSIUM SERPL-SCNC: 5.7 MMOL/L — HIGH (ref 3.5–5.3)
POTASSIUM SERPL-SCNC: 5.8 MMOL/L — HIGH (ref 3.5–5.3)
POTASSIUM SERPL-SCNC: 6.2 MMOL/L — CRITICAL HIGH (ref 3.5–5.3)
POTASSIUM SERPL-SCNC: 6.3 MMOL/L — CRITICAL HIGH (ref 3.5–5.3)
PROT SERPL-MCNC: 5.8 G/DL
PROT SERPL-MCNC: 6.6 GM/DL — SIGNIFICANT CHANGE UP (ref 6–8.3)
PROT SERPL-MCNC: 7.1 GM/DL — SIGNIFICANT CHANGE UP (ref 6–8.3)
PROT UR-MCNC: NEGATIVE MG/DL — SIGNIFICANT CHANGE UP
PROT UR-MCNC: NEGATIVE MG/DL — SIGNIFICANT CHANGE UP
RBC # BLD: 3.13 M/UL — LOW (ref 3.8–5.2)
RBC # BLD: 3.14 M/UL
RBC # BLD: 3.17 M/UL — LOW (ref 3.8–5.2)
RBC # BLD: 3.19 M/UL — LOW (ref 3.8–5.2)
RBC # BLD: 3.2 M/UL — LOW (ref 3.8–5.2)
RBC # BLD: 3.21 M/UL — LOW (ref 3.8–5.2)
RBC # BLD: 3.22 M/UL — LOW (ref 3.8–5.2)
RBC # BLD: 3.27 M/UL — LOW (ref 3.8–5.2)
RBC # FLD: 18.8 % — HIGH (ref 10.3–14.5)
RBC # FLD: 18.8 % — HIGH (ref 10.3–14.5)
RBC # FLD: 18.9 % — HIGH (ref 10.3–14.5)
RBC # FLD: 19.1 %
RBC # FLD: 19.8 % — HIGH (ref 10.3–14.5)
RBC # FLD: 20 % — HIGH (ref 10.3–14.5)
RBC # FLD: 20.5 % — HIGH (ref 10.3–14.5)
RBC # FLD: 20.8 % — HIGH (ref 10.3–14.5)
RBC BLD AUTO: ABNORMAL
RBC CASTS # UR COMP ASSIST: 0 /HPF — SIGNIFICANT CHANGE UP (ref 0–4)
RSV RNA NPH QL NAA+NON-PROBE: SIGNIFICANT CHANGE UP
SAO2 % BLDA: 100 % — HIGH (ref 94–98)
SAO2 % BLDA: 96 % — SIGNIFICANT CHANGE UP (ref 94–98)
SAO2 % BLDA: 98 % — SIGNIFICANT CHANGE UP (ref 94–98)
SAO2 % BLDV: 99 % — HIGH (ref 67–88)
SAO2 % BLDV: 99 % — HIGH (ref 67–88)
SARS-COV-2 RNA SPEC QL NAA+PROBE: SIGNIFICANT CHANGE UP
SCHISTOCYTES BLD QL AUTO: SLIGHT — SIGNIFICANT CHANGE UP
SODIUM SERPL-SCNC: 123 MMOL/L — LOW (ref 135–145)
SODIUM SERPL-SCNC: 127 MMOL/L — LOW (ref 135–145)
SODIUM SERPL-SCNC: 128 MMOL/L — LOW (ref 135–145)
SODIUM SERPL-SCNC: 129 MMOL/L — LOW (ref 135–145)
SODIUM SERPL-SCNC: 129 MMOL/L — LOW (ref 135–145)
SODIUM SERPL-SCNC: 130 MMOL/L
SODIUM SERPL-SCNC: 133 MMOL/L
SODIUM SERPL-SCNC: 134 MMOL/L — LOW (ref 135–145)
SP GR SPEC: 1.01 — SIGNIFICANT CHANGE UP (ref 1–1.03)
SP GR SPEC: 1.01 — SIGNIFICANT CHANGE UP (ref 1–1.03)
SPECIMEN SOURCE: SIGNIFICANT CHANGE UP
SQUAMOUS # UR AUTO: 2 /HPF — SIGNIFICANT CHANGE UP (ref 0–5)
T4 FREE SERPL-MCNC: 1.8 NG/DL
TIBC SERPL-MCNC: 296 UG/DL — SIGNIFICANT CHANGE UP (ref 220–430)
TROPONIN I, HIGH SENSITIVITY RESULT: 22.91 NG/L — SIGNIFICANT CHANGE UP
TSH SERPL-ACNC: 0.25 UIU/ML
UIBC SERPL-MCNC: 230 UG/DL — SIGNIFICANT CHANGE UP (ref 110–370)
UROBILINOGEN FLD QL: 0.2 MG/DL — SIGNIFICANT CHANGE UP (ref 0.2–1)
UROBILINOGEN FLD QL: 0.2 MG/DL — SIGNIFICANT CHANGE UP (ref 0.2–1)
VARIANT LYMPHS # BLD: 2 % — SIGNIFICANT CHANGE UP (ref 0–6)
WBC # BLD: 3.29 K/UL — LOW (ref 3.8–10.5)
WBC # BLD: 4.66 K/UL — SIGNIFICANT CHANGE UP (ref 3.8–10.5)
WBC # BLD: 5.48 K/UL — SIGNIFICANT CHANGE UP (ref 3.8–10.5)
WBC # BLD: 5.62 K/UL — SIGNIFICANT CHANGE UP (ref 3.8–10.5)
WBC # BLD: 6.34 K/UL — SIGNIFICANT CHANGE UP (ref 3.8–10.5)
WBC # BLD: 6.51 K/UL — SIGNIFICANT CHANGE UP (ref 3.8–10.5)
WBC # BLD: 6.91 K/UL — SIGNIFICANT CHANGE UP (ref 3.8–10.5)
WBC # FLD AUTO: 3.29 K/UL — LOW (ref 3.8–10.5)
WBC # FLD AUTO: 4.54 K/UL
WBC # FLD AUTO: 4.66 K/UL — SIGNIFICANT CHANGE UP (ref 3.8–10.5)
WBC # FLD AUTO: 5.48 K/UL — SIGNIFICANT CHANGE UP (ref 3.8–10.5)
WBC # FLD AUTO: 5.62 K/UL — SIGNIFICANT CHANGE UP (ref 3.8–10.5)
WBC # FLD AUTO: 6.34 K/UL — SIGNIFICANT CHANGE UP (ref 3.8–10.5)
WBC # FLD AUTO: 6.51 K/UL — SIGNIFICANT CHANGE UP (ref 3.8–10.5)
WBC # FLD AUTO: 6.91 K/UL — SIGNIFICANT CHANGE UP (ref 3.8–10.5)
WBC UR QL: 2 /HPF — SIGNIFICANT CHANGE UP (ref 0–5)

## 2024-01-01 PROCEDURE — 71045 X-RAY EXAM CHEST 1 VIEW: CPT

## 2024-01-01 PROCEDURE — 82962 GLUCOSE BLOOD TEST: CPT

## 2024-01-01 PROCEDURE — 85025 COMPLETE CBC W/AUTO DIFF WBC: CPT

## 2024-01-01 PROCEDURE — 90662 IIV NO PRSV INCREASED AG IM: CPT

## 2024-01-01 PROCEDURE — 71045 X-RAY EXAM CHEST 1 VIEW: CPT | Mod: 26,76

## 2024-01-01 PROCEDURE — 99232 SBSQ HOSP IP/OBS MODERATE 35: CPT

## 2024-01-01 PROCEDURE — 82728 ASSAY OF FERRITIN: CPT

## 2024-01-01 PROCEDURE — 81003 URINALYSIS AUTO W/O SCOPE: CPT

## 2024-01-01 PROCEDURE — 83550 IRON BINDING TEST: CPT

## 2024-01-01 PROCEDURE — 99233 SBSQ HOSP IP/OBS HIGH 50: CPT

## 2024-01-01 PROCEDURE — 93279 PRGRMG DEV EVAL PM/LDLS PM: CPT | Mod: 26

## 2024-01-01 PROCEDURE — 76770 US EXAM ABDO BACK WALL COMP: CPT | Mod: 26

## 2024-01-01 PROCEDURE — 36600 WITHDRAWAL OF ARTERIAL BLOOD: CPT

## 2024-01-01 PROCEDURE — 36415 COLL VENOUS BLD VENIPUNCTURE: CPT

## 2024-01-01 PROCEDURE — 74176 CT ABD & PELVIS W/O CONTRAST: CPT

## 2024-01-01 PROCEDURE — 83036 HEMOGLOBIN GLYCOSYLATED A1C: CPT

## 2024-01-01 PROCEDURE — 94660 CPAP INITIATION&MGMT: CPT

## 2024-01-01 PROCEDURE — G0008: CPT

## 2024-01-01 PROCEDURE — 99497 ADVNCD CARE PLAN 30 MIN: CPT

## 2024-01-01 PROCEDURE — 80069 RENAL FUNCTION PANEL: CPT

## 2024-01-01 PROCEDURE — 76770 US EXAM ABDO BACK WALL COMP: CPT

## 2024-01-01 PROCEDURE — 94640 AIRWAY INHALATION TREATMENT: CPT

## 2024-01-01 PROCEDURE — 99345 HOME/RES VST NEW HIGH MDM 75: CPT | Mod: 25

## 2024-01-01 PROCEDURE — 99497 ADVNCD CARE PLAN 30 MIN: CPT | Mod: 25

## 2024-01-01 PROCEDURE — 87086 URINE CULTURE/COLONY COUNT: CPT

## 2024-01-01 PROCEDURE — 93306 TTE W/DOPPLER COMPLETE: CPT

## 2024-01-01 PROCEDURE — 93010 ELECTROCARDIOGRAM REPORT: CPT

## 2024-01-01 PROCEDURE — 99239 HOSP IP/OBS DSCHRG MGMT >30: CPT

## 2024-01-01 PROCEDURE — 99231 SBSQ HOSP IP/OBS SF/LOW 25: CPT

## 2024-01-01 PROCEDURE — 97116 GAIT TRAINING THERAPY: CPT | Mod: GP

## 2024-01-01 PROCEDURE — 99223 1ST HOSP IP/OBS HIGH 75: CPT

## 2024-01-01 PROCEDURE — 87040 BLOOD CULTURE FOR BACTERIA: CPT

## 2024-01-01 PROCEDURE — G0506: CPT

## 2024-01-01 PROCEDURE — 74176 CT ABD & PELVIS W/O CONTRAST: CPT | Mod: 26

## 2024-01-01 PROCEDURE — 82803 BLOOD GASES ANY COMBINATION: CPT

## 2024-01-01 PROCEDURE — 85027 COMPLETE CBC AUTOMATED: CPT

## 2024-01-01 PROCEDURE — 71045 X-RAY EXAM CHEST 1 VIEW: CPT | Mod: 26,77

## 2024-01-01 PROCEDURE — 93306 TTE W/DOPPLER COMPLETE: CPT | Mod: 26

## 2024-01-01 PROCEDURE — 97530 THERAPEUTIC ACTIVITIES: CPT | Mod: GP

## 2024-01-01 PROCEDURE — 82952 GTT-ADDED SAMPLES: CPT

## 2024-01-01 PROCEDURE — 82533 TOTAL CORTISOL: CPT

## 2024-01-01 PROCEDURE — 71045 X-RAY EXAM CHEST 1 VIEW: CPT | Mod: 26

## 2024-01-01 PROCEDURE — 99498 ADVNCD CARE PLAN ADDL 30 MIN: CPT

## 2024-01-01 PROCEDURE — 99234 HOSP IP/OBS SM DT SF/LOW 45: CPT

## 2024-01-01 PROCEDURE — 71250 CT THORAX DX C-: CPT | Mod: MA

## 2024-01-01 PROCEDURE — 80048 BASIC METABOLIC PNL TOTAL CA: CPT

## 2024-01-01 PROCEDURE — 83540 ASSAY OF IRON: CPT

## 2024-01-01 PROCEDURE — 82947 ASSAY GLUCOSE BLOOD QUANT: CPT

## 2024-01-01 PROCEDURE — 99285 EMERGENCY DEPT VISIT HI MDM: CPT

## 2024-01-01 PROCEDURE — 81001 URINALYSIS AUTO W/SCOPE: CPT

## 2024-01-01 PROCEDURE — 84100 ASSAY OF PHOSPHORUS: CPT

## 2024-01-01 PROCEDURE — 71250 CT THORAX DX C-: CPT | Mod: 26

## 2024-01-01 PROCEDURE — 83880 ASSAY OF NATRIURETIC PEPTIDE: CPT

## 2024-01-01 PROCEDURE — 84132 ASSAY OF SERUM POTASSIUM: CPT

## 2024-01-01 PROCEDURE — 80076 HEPATIC FUNCTION PANEL: CPT

## 2024-01-01 PROCEDURE — 93005 ELECTROCARDIOGRAM TRACING: CPT

## 2024-01-01 PROCEDURE — 97163 PT EVAL HIGH COMPLEX 45 MIN: CPT | Mod: GP

## 2024-01-01 PROCEDURE — 83735 ASSAY OF MAGNESIUM: CPT

## 2024-01-01 RX ORDER — RIVAROXABAN 15 MG-20MG
10 KIT ORAL DAILY
Refills: 0 | Status: DISCONTINUED | OUTPATIENT
Start: 2024-01-01 | End: 2024-01-01

## 2024-01-01 RX ORDER — MIDODRINE HYDROCHLORIDE 2.5 MG/1
2.5 TABLET ORAL THREE TIMES A DAY
Refills: 0 | Status: DISCONTINUED | OUTPATIENT
Start: 2024-01-01 | End: 2024-01-01

## 2024-01-01 RX ORDER — DEXTROSE 10 % IN WATER 10 %
500 INTRAVENOUS SOLUTION INTRAVENOUS
Refills: 0 | Status: DISCONTINUED | OUTPATIENT
Start: 2024-01-01 | End: 2024-01-01

## 2024-01-01 RX ORDER — DEXTROSE 50 % IN WATER 50 %
50 SYRINGE (ML) INTRAVENOUS ONCE
Refills: 0 | Status: COMPLETED | OUTPATIENT
Start: 2024-01-01 | End: 2024-01-01

## 2024-01-01 RX ORDER — CALCIUM CARBONATE/VITAMIN D3 500 MG-10
500-400 TABLET ORAL
Refills: 0 | Status: COMPLETED | COMMUNITY
End: 2024-01-01

## 2024-01-01 RX ORDER — POTASSIUM CHLORIDE 1500 MG/1
20 TABLET, EXTENDED RELEASE ORAL
Qty: 90 | Refills: 0 | Status: DISCONTINUED | COMMUNITY
Start: 2023-01-01 | End: 2024-01-01

## 2024-01-01 RX ORDER — OXYCODONE HYDROCHLORIDE 5 MG/1
2.5 TABLET ORAL EVERY 8 HOURS
Refills: 0 | Status: DISCONTINUED | OUTPATIENT
Start: 2024-01-01 | End: 2024-01-01

## 2024-01-01 RX ORDER — ESCITALOPRAM OXALATE 10 MG/1
1 TABLET, FILM COATED ORAL
Qty: 0 | Refills: 0 | DISCHARGE
Start: 2024-01-01

## 2024-01-01 RX ORDER — FUROSEMIDE 40 MG
20 TABLET ORAL ONCE
Refills: 0 | Status: DISCONTINUED | OUTPATIENT
Start: 2024-01-01 | End: 2024-01-01

## 2024-01-01 RX ORDER — SODIUM ZIRCONIUM CYCLOSILICATE 10 G/10G
10 POWDER, FOR SUSPENSION ORAL ONCE
Refills: 0 | Status: COMPLETED | OUTPATIENT
Start: 2024-01-01 | End: 2024-01-01

## 2024-01-01 RX ORDER — MIDODRINE HYDROCHLORIDE 2.5 MG/1
5 TABLET ORAL THREE TIMES A DAY
Refills: 0 | Status: DISCONTINUED | OUTPATIENT
Start: 2024-01-01 | End: 2024-01-01

## 2024-01-01 RX ORDER — RIVAROXABAN 15 MG-20MG
1 KIT ORAL
Refills: 0 | DISCHARGE

## 2024-01-01 RX ORDER — METOPROLOL SUCCINATE 25 MG/1
25 TABLET, EXTENDED RELEASE ORAL
Qty: 90 | Refills: 2 | Status: ACTIVE | COMMUNITY
Start: 2022-10-10

## 2024-01-01 RX ORDER — ONDANSETRON 8 MG/1
4 TABLET, FILM COATED ORAL ONCE
Refills: 0 | Status: COMPLETED | OUTPATIENT
Start: 2024-01-01 | End: 2024-01-01

## 2024-01-01 RX ORDER — ONDANSETRON 8 MG/1
1 TABLET, FILM COATED ORAL
Qty: 30 | Refills: 0
Start: 2024-01-01

## 2024-01-01 RX ORDER — CEFTRIAXONE 500 MG/1
1000 INJECTION, POWDER, FOR SOLUTION INTRAMUSCULAR; INTRAVENOUS EVERY 24 HOURS
Refills: 0 | Status: COMPLETED | OUTPATIENT
Start: 2024-01-01 | End: 2024-01-01

## 2024-01-01 RX ORDER — METOPROLOL TARTRATE 50 MG
1 TABLET ORAL
Qty: 0 | Refills: 0 | DISCHARGE

## 2024-01-01 RX ORDER — METOPROLOL TARTRATE 50 MG
25 TABLET ORAL AT BEDTIME
Refills: 0 | Status: DISCONTINUED | OUTPATIENT
Start: 2024-01-01 | End: 2024-01-01

## 2024-01-01 RX ORDER — SENNA PLUS 8.6 MG/1
2 TABLET ORAL AT BEDTIME
Refills: 0 | Status: DISCONTINUED | OUTPATIENT
Start: 2024-01-01 | End: 2024-01-01

## 2024-01-01 RX ORDER — ACETAMINOPHEN 500 MG
650 TABLET ORAL EVERY 6 HOURS
Refills: 0 | Status: DISCONTINUED | OUTPATIENT
Start: 2024-01-01 | End: 2024-01-01

## 2024-01-01 RX ORDER — TORSEMIDE 20 MG/1
20 TABLET ORAL
Qty: 180 | Refills: 3 | Status: COMPLETED | COMMUNITY
Start: 2022-12-22 | End: 2024-01-01

## 2024-01-01 RX ORDER — PROCHLORPERAZINE MALEATE 5 MG
1 TABLET ORAL
Qty: 6 | Refills: 0
Start: 2024-01-01 | End: 2024-01-01

## 2024-01-01 RX ORDER — INSULIN HUMAN 100 [IU]/ML
5 INJECTION, SOLUTION SUBCUTANEOUS EVERY 6 HOURS
Refills: 0 | Status: DISCONTINUED | OUTPATIENT
Start: 2024-01-01 | End: 2024-01-01

## 2024-01-01 RX ORDER — INSULIN HUMAN 100 [IU]/ML
5 INJECTION, SOLUTION SUBCUTANEOUS ONCE
Refills: 0 | Status: COMPLETED | OUTPATIENT
Start: 2024-01-01 | End: 2024-01-01

## 2024-01-01 RX ORDER — LEVOTHYROXINE SODIUM 125 MCG
125 TABLET ORAL DAILY
Refills: 0 | Status: DISCONTINUED | OUTPATIENT
Start: 2024-01-01 | End: 2024-01-01

## 2024-01-01 RX ORDER — SPIRONOLACTONE 25 MG/1
25 TABLET ORAL DAILY
Qty: 90 | Refills: 3 | Status: ACTIVE | COMMUNITY
Start: 2023-01-01

## 2024-01-01 RX ORDER — NITROFURANTOIN MACROCRYSTAL 50 MG
100 CAPSULE ORAL
Refills: 0 | Status: DISCONTINUED | OUTPATIENT
Start: 2024-01-01 | End: 2024-01-01

## 2024-01-01 RX ORDER — CALCIUM GLUCONATE 100 MG/ML
1 VIAL (ML) INTRAVENOUS ONCE
Refills: 0 | Status: COMPLETED | OUTPATIENT
Start: 2024-01-01 | End: 2024-01-01

## 2024-01-01 RX ORDER — ESCITALOPRAM OXALATE 5 MG/1
5 TABLET ORAL
Qty: 30 | Refills: 0 | Status: ACTIVE | COMMUNITY
Start: 2024-01-01

## 2024-01-01 RX ORDER — TRAMADOL HYDROCHLORIDE 50 MG/1
25 TABLET ORAL EVERY 12 HOURS
Refills: 0 | Status: DISCONTINUED | OUTPATIENT
Start: 2024-01-01 | End: 2024-01-01

## 2024-01-01 RX ORDER — MULTIVIT-MIN/FERROUS GLUCONATE 9 MG/15 ML
1 LIQUID (ML) ORAL
Refills: 0 | DISCHARGE

## 2024-01-01 RX ORDER — ACETAMINOPHEN 500 MG
975 TABLET ORAL EVERY 8 HOURS
Refills: 0 | Status: DISCONTINUED | OUTPATIENT
Start: 2024-01-01 | End: 2024-01-01

## 2024-01-01 RX ORDER — LANOLIN ALCOHOL/MO/W.PET/CERES
3 CREAM (GRAM) TOPICAL AT BEDTIME
Refills: 0 | Status: DISCONTINUED | OUTPATIENT
Start: 2024-01-01 | End: 2024-01-01

## 2024-01-01 RX ORDER — MIDODRINE HYDROCHLORIDE 2.5 MG/1
1 TABLET ORAL
Qty: 90 | Refills: 0
Start: 2024-01-01

## 2024-01-01 RX ORDER — FUROSEMIDE 40 MG
40 TABLET ORAL DAILY
Refills: 0 | Status: DISCONTINUED | OUTPATIENT
Start: 2024-01-01 | End: 2024-01-01

## 2024-01-01 RX ORDER — THIAMINE MONONITRATE (VIT B1) 100 MG
100 TABLET ORAL DAILY
Refills: 0 | Status: DISCONTINUED | OUTPATIENT
Start: 2024-01-01 | End: 2024-01-01

## 2024-01-01 RX ORDER — ACETAMINOPHEN 500 MG
2 TABLET ORAL
Qty: 0 | Refills: 0 | DISCHARGE
Start: 2024-01-01

## 2024-01-01 RX ORDER — POTASSIUM CHLORIDE 20 MEQ
1 PACKET (EA) ORAL
Refills: 0 | DISCHARGE

## 2024-01-01 RX ORDER — CEFTRIAXONE 500 MG/1
1000 INJECTION, POWDER, FOR SOLUTION INTRAMUSCULAR; INTRAVENOUS ONCE
Refills: 0 | Status: DISCONTINUED | OUTPATIENT
Start: 2024-01-01 | End: 2024-01-01

## 2024-01-01 RX ORDER — ACETAMINOPHEN 500 MG
500 TABLET ORAL
Refills: 0 | Status: ACTIVE | COMMUNITY

## 2024-01-01 RX ORDER — NITROFURANTOIN MACROCRYSTAL 50 MG
1 CAPSULE ORAL
Refills: 0 | DISCHARGE
Start: 2024-01-01

## 2024-01-01 RX ORDER — MORPHINE SULFATE 50 MG/1
0.25 CAPSULE, EXTENDED RELEASE ORAL
Qty: 15 | Refills: 0
Start: 2024-01-01

## 2024-01-01 RX ORDER — CEFTRIAXONE 500 MG/1
1000 INJECTION, POWDER, FOR SOLUTION INTRAMUSCULAR; INTRAVENOUS ONCE
Refills: 0 | Status: COMPLETED | OUTPATIENT
Start: 2024-01-01 | End: 2024-01-01

## 2024-01-01 RX ORDER — APIXABAN 2.5 MG/1
1 TABLET, FILM COATED ORAL
Qty: 60 | Refills: 0
Start: 2024-01-01

## 2024-01-01 RX ORDER — ALBUTEROL 90 UG/1
10 AEROSOL, METERED ORAL ONCE
Refills: 0 | Status: COMPLETED | OUTPATIENT
Start: 2024-01-01 | End: 2024-01-01

## 2024-01-01 RX ORDER — FUROSEMIDE 40 MG
5 TABLET ORAL
Qty: 500 | Refills: 0 | Status: DISCONTINUED | OUTPATIENT
Start: 2024-01-01 | End: 2024-01-01

## 2024-01-01 RX ORDER — IPRATROPIUM/ALBUTEROL SULFATE 18-103MCG
3 AEROSOL WITH ADAPTER (GRAM) INHALATION EVERY 6 HOURS
Refills: 0 | Status: DISCONTINUED | OUTPATIENT
Start: 2024-01-01 | End: 2024-01-01

## 2024-01-01 RX ORDER — ALLOPURINOL 300 MG
100 TABLET ORAL DAILY
Refills: 0 | Status: DISCONTINUED | OUTPATIENT
Start: 2024-01-01 | End: 2024-01-01

## 2024-01-01 RX ORDER — CALCIUM CHLORIDE
500 POWDER (GRAM) MISCELLANEOUS ONCE
Refills: 0 | Status: DISCONTINUED | OUTPATIENT
Start: 2024-01-01 | End: 2024-01-01

## 2024-01-01 RX ORDER — ONDANSETRON 8 MG/1
4 TABLET, FILM COATED ORAL EVERY 6 HOURS
Refills: 0 | Status: DISCONTINUED | OUTPATIENT
Start: 2024-01-01 | End: 2024-01-01

## 2024-01-01 RX ORDER — RIVAROXABAN 10 MG/1
10 TABLET, FILM COATED ORAL
Qty: 90 | Refills: 3 | Status: ACTIVE | COMMUNITY

## 2024-01-01 RX ORDER — POTASSIUM CHLORIDE 20 MEQ
20 PACKET (EA) ORAL DAILY
Refills: 0 | Status: DISCONTINUED | OUTPATIENT
Start: 2024-01-01 | End: 2024-01-01

## 2024-01-01 RX ORDER — RIVAROXABAN 15 MG-20MG
10 KIT ORAL
Refills: 0 | Status: DISCONTINUED | OUTPATIENT
Start: 2024-01-01 | End: 2024-01-01

## 2024-01-01 RX ORDER — LIDOCAINE 4 G/100G
1 CREAM TOPICAL
Qty: 3 | Refills: 0
Start: 2024-01-01

## 2024-01-01 RX ORDER — AZITHROMYCIN 500 MG/1
500 TABLET, FILM COATED ORAL ONCE
Refills: 0 | Status: COMPLETED | OUTPATIENT
Start: 2024-01-01 | End: 2024-01-01

## 2024-01-01 RX ORDER — FUROSEMIDE 40 MG
40 TABLET ORAL ONCE
Refills: 0 | Status: COMPLETED | OUTPATIENT
Start: 2024-01-01 | End: 2024-01-01

## 2024-01-01 RX ORDER — SENNA PLUS 8.6 MG/1
2 TABLET ORAL
Qty: 28 | Refills: 0
Start: 2024-01-01 | End: 2024-03-07

## 2024-01-01 RX ORDER — ALLOPURINOL 100 MG/1
100 TABLET ORAL
Qty: 90 | Refills: 0 | Status: ACTIVE | COMMUNITY
Start: 2018-08-23

## 2024-01-01 RX ORDER — ATORVASTATIN CALCIUM 80 MG/1
20 TABLET, FILM COATED ORAL AT BEDTIME
Refills: 0 | Status: DISCONTINUED | OUTPATIENT
Start: 2024-01-01 | End: 2024-01-01

## 2024-01-01 RX ORDER — FUROSEMIDE 40 MG
40 TABLET ORAL
Refills: 0 | Status: DISCONTINUED | OUTPATIENT
Start: 2024-01-01 | End: 2024-01-01

## 2024-01-01 RX ORDER — BUDESONIDE, MICRONIZED 100 %
0.5 POWDER (GRAM) MISCELLANEOUS
Refills: 0 | Status: DISCONTINUED | OUTPATIENT
Start: 2024-01-01 | End: 2024-01-01

## 2024-01-01 RX ORDER — HYOSCYAMINE SULFATE 0.13 MG
0 TABLET ORAL
Qty: 12 | Refills: 0
Start: 2024-01-01

## 2024-01-01 RX ORDER — MAGNESIUM SULFATE 500 MG/ML
1 VIAL (ML) INJECTION ONCE
Refills: 0 | Status: COMPLETED | OUTPATIENT
Start: 2024-01-01 | End: 2024-01-01

## 2024-01-01 RX ORDER — APIXABAN 2.5 MG/1
2.5 TABLET, FILM COATED ORAL
Refills: 0 | Status: DISCONTINUED | OUTPATIENT
Start: 2024-01-01 | End: 2024-01-01

## 2024-01-01 RX ORDER — SPIRONOLACTONE 25 MG/1
25 TABLET, FILM COATED ORAL DAILY
Refills: 0 | Status: DISCONTINUED | OUTPATIENT
Start: 2024-01-01 | End: 2024-01-01

## 2024-01-01 RX ORDER — ALBUTEROL 90 UG/1
2.5 AEROSOL, METERED ORAL ONCE
Refills: 0 | Status: DISCONTINUED | OUTPATIENT
Start: 2024-01-01 | End: 2024-01-01

## 2024-01-01 RX ORDER — NITROFURANTOIN (MONOHYDRATE/MACROCRYSTALS) 25; 75 MG/1; MG/1
100 CAPSULE ORAL
Qty: 10 | Refills: 0 | Status: ACTIVE | COMMUNITY
Start: 2024-01-01

## 2024-01-01 RX ORDER — SIMETHICONE 80 MG/1
80 TABLET, CHEWABLE ORAL ONCE
Refills: 0 | Status: COMPLETED | OUTPATIENT
Start: 2024-01-01 | End: 2024-01-01

## 2024-01-01 RX ORDER — LEVOTHYROXINE SODIUM 125 MCG
1 TABLET ORAL
Qty: 0 | Refills: 0 | DISCHARGE

## 2024-01-01 RX ORDER — FUROSEMIDE 40 MG
20 TABLET ORAL ONCE
Refills: 0 | Status: COMPLETED | OUTPATIENT
Start: 2024-01-01 | End: 2024-01-01

## 2024-01-01 RX ORDER — FUROSEMIDE 40 MG
10 TABLET ORAL ONCE
Refills: 0 | Status: COMPLETED | OUTPATIENT
Start: 2024-01-01 | End: 2024-01-01

## 2024-01-01 RX ORDER — POLYETHYLENE GLYCOL 3350 17 G/17G
17 POWDER, FOR SOLUTION ORAL DAILY
Refills: 0 | Status: DISCONTINUED | OUTPATIENT
Start: 2024-01-01 | End: 2024-01-01

## 2024-01-01 RX ORDER — ESCITALOPRAM OXALATE 10 MG/1
5 TABLET, FILM COATED ORAL DAILY
Refills: 0 | Status: DISCONTINUED | OUTPATIENT
Start: 2024-01-01 | End: 2024-01-01

## 2024-01-01 RX ORDER — LIDOCAINE 4 G/100G
1 CREAM TOPICAL ONCE
Refills: 0 | Status: COMPLETED | OUTPATIENT
Start: 2024-01-01 | End: 2024-01-01

## 2024-01-01 RX ORDER — ELECTROLYTES/DEXTROSE
SOLUTION, ORAL ORAL
Qty: 30 | Refills: 0 | Status: ACTIVE | COMMUNITY
Start: 2024-01-01

## 2024-01-01 RX ORDER — TRAMADOL HYDROCHLORIDE 50 MG/1
25 TABLET ORAL EVERY 8 HOURS
Refills: 0 | Status: DISCONTINUED | OUTPATIENT
Start: 2024-01-01 | End: 2024-01-01

## 2024-01-01 RX ORDER — POLYETHYLENE GLYCOL 3350 17 G/17G
17 POWDER, FOR SOLUTION ORAL
Qty: 238 | Refills: 0
Start: 2024-01-01 | End: 2024-03-07

## 2024-01-01 RX ORDER — MIDODRINE HYDROCHLORIDE 2.5 MG/1
1 TABLET ORAL
Refills: 0 | DISCHARGE

## 2024-01-01 RX ORDER — FOLIC ACID 0.8 MG
1 TABLET ORAL DAILY
Refills: 0 | Status: DISCONTINUED | OUTPATIENT
Start: 2024-01-01 | End: 2024-01-01

## 2024-01-01 RX ORDER — SODIUM ZIRCONIUM CYCLOSILICATE 10 G/10G
10 POWDER, FOR SUSPENSION ORAL ONCE
Refills: 0 | Status: DISCONTINUED | OUTPATIENT
Start: 2024-01-01 | End: 2024-01-01

## 2024-01-01 RX ORDER — LIDOCAINE 4 G/100G
1 CREAM TOPICAL DAILY
Refills: 0 | Status: DISCONTINUED | OUTPATIENT
Start: 2024-01-01 | End: 2024-01-01

## 2024-01-01 RX ORDER — MIDODRINE HYDROCHLORIDE 5 MG/1
5 TABLET ORAL
Qty: 180 | Refills: 2 | Status: ACTIVE | COMMUNITY
Start: 2023-01-01

## 2024-01-01 RX ADMIN — Medication 975 MILLIGRAM(S): at 06:26

## 2024-01-01 RX ADMIN — Medication 0.5 MILLIGRAM(S): at 07:53

## 2024-01-01 RX ADMIN — OXYCODONE HYDROCHLORIDE 2.5 MILLIGRAM(S): 5 TABLET ORAL at 13:39

## 2024-01-01 RX ADMIN — APIXABAN 2.5 MILLIGRAM(S): 2.5 TABLET, FILM COATED ORAL at 21:58

## 2024-01-01 RX ADMIN — Medication 100 MILLIGRAM(S): at 10:51

## 2024-01-01 RX ADMIN — Medication 3 MILLILITER(S): at 14:28

## 2024-01-01 RX ADMIN — Medication 0.5 MILLIGRAM(S): at 08:16

## 2024-01-01 RX ADMIN — Medication 100 MILLIGRAM(S): at 10:52

## 2024-01-01 RX ADMIN — Medication 25 MILLIGRAM(S): at 22:53

## 2024-01-01 RX ADMIN — MIDODRINE HYDROCHLORIDE 2.5 MILLIGRAM(S): 2.5 TABLET ORAL at 06:06

## 2024-01-01 RX ADMIN — Medication 3 MILLILITER(S): at 20:26

## 2024-01-01 RX ADMIN — Medication 3 MILLILITER(S): at 01:47

## 2024-01-01 RX ADMIN — SODIUM ZIRCONIUM CYCLOSILICATE 10 GRAM(S): 10 POWDER, FOR SUSPENSION ORAL at 00:54

## 2024-01-01 RX ADMIN — MIDODRINE HYDROCHLORIDE 5 MILLIGRAM(S): 2.5 TABLET ORAL at 12:15

## 2024-01-01 RX ADMIN — Medication 3 MILLILITER(S): at 08:20

## 2024-01-01 RX ADMIN — CEFTRIAXONE 1000 MILLIGRAM(S): 500 INJECTION, POWDER, FOR SOLUTION INTRAMUSCULAR; INTRAVENOUS at 15:03

## 2024-01-01 RX ADMIN — Medication 2.5 MG/HR: at 21:48

## 2024-01-01 RX ADMIN — Medication 20 MILLIGRAM(S): at 15:03

## 2024-01-01 RX ADMIN — POLYETHYLENE GLYCOL 3350 17 GRAM(S): 17 POWDER, FOR SOLUTION ORAL at 11:48

## 2024-01-01 RX ADMIN — Medication 40 MILLIGRAM(S): at 05:54

## 2024-01-01 RX ADMIN — Medication 3 MILLILITER(S): at 07:17

## 2024-01-01 RX ADMIN — Medication 50 MILLILITER(S): at 10:56

## 2024-01-01 RX ADMIN — Medication 100 GRAM(S): at 10:59

## 2024-01-01 RX ADMIN — Medication 25 MILLIGRAM(S): at 21:58

## 2024-01-01 RX ADMIN — Medication 975 MILLIGRAM(S): at 21:58

## 2024-01-01 RX ADMIN — POLYETHYLENE GLYCOL 3350 17 GRAM(S): 17 POWDER, FOR SOLUTION ORAL at 12:36

## 2024-01-01 RX ADMIN — MIDODRINE HYDROCHLORIDE 5 MILLIGRAM(S): 2.5 TABLET ORAL at 06:09

## 2024-01-01 RX ADMIN — ONDANSETRON 4 MILLIGRAM(S): 8 TABLET, FILM COATED ORAL at 13:33

## 2024-01-01 RX ADMIN — SIMETHICONE 80 MILLIGRAM(S): 80 TABLET, CHEWABLE ORAL at 18:46

## 2024-01-01 RX ADMIN — INSULIN HUMAN 5 UNIT(S): 100 INJECTION, SOLUTION SUBCUTANEOUS at 10:57

## 2024-01-01 RX ADMIN — MIDODRINE HYDROCHLORIDE 5 MILLIGRAM(S): 2.5 TABLET ORAL at 16:20

## 2024-01-01 RX ADMIN — Medication 0.5 MILLIGRAM(S): at 20:23

## 2024-01-01 RX ADMIN — LIDOCAINE 1 PATCH: 4 CREAM TOPICAL at 11:43

## 2024-01-01 RX ADMIN — SENNA PLUS 2 TABLET(S): 8.6 TABLET ORAL at 22:23

## 2024-01-01 RX ADMIN — Medication 0.5 MILLIGRAM(S): at 20:52

## 2024-01-01 RX ADMIN — Medication 0.5 MILLIGRAM(S): at 20:02

## 2024-01-01 RX ADMIN — Medication 1 MILLIGRAM(S): at 10:08

## 2024-01-01 RX ADMIN — Medication 100 MILLIGRAM(S): at 09:52

## 2024-01-01 RX ADMIN — APIXABAN 2.5 MILLIGRAM(S): 2.5 TABLET, FILM COATED ORAL at 10:09

## 2024-01-01 RX ADMIN — Medication 20 MILLIEQUIVALENT(S): at 10:52

## 2024-01-01 RX ADMIN — MIDODRINE HYDROCHLORIDE 2.5 MILLIGRAM(S): 2.5 TABLET ORAL at 17:15

## 2024-01-01 RX ADMIN — Medication 975 MILLIGRAM(S): at 06:27

## 2024-01-01 RX ADMIN — Medication 3 MILLILITER(S): at 13:28

## 2024-01-01 RX ADMIN — Medication 3 MILLILITER(S): at 02:01

## 2024-01-01 RX ADMIN — Medication 0.5 MILLIGRAM(S): at 07:55

## 2024-01-01 RX ADMIN — POLYETHYLENE GLYCOL 3350 17 GRAM(S): 17 POWDER, FOR SOLUTION ORAL at 13:10

## 2024-01-01 RX ADMIN — Medication 100 GRAM(S): at 10:58

## 2024-01-01 RX ADMIN — Medication 3 MILLILITER(S): at 20:25

## 2024-01-01 RX ADMIN — ESCITALOPRAM OXALATE 5 MILLIGRAM(S): 10 TABLET, FILM COATED ORAL at 11:41

## 2024-01-01 RX ADMIN — Medication 100 MILLIGRAM(S): at 10:09

## 2024-01-01 RX ADMIN — Medication 100 MILLIGRAM(S): at 10:15

## 2024-01-01 RX ADMIN — Medication 3 MILLILITER(S): at 07:23

## 2024-01-01 RX ADMIN — RIVAROXABAN 10 MILLIGRAM(S): KIT at 10:52

## 2024-01-01 RX ADMIN — OXYCODONE HYDROCHLORIDE 2.5 MILLIGRAM(S): 5 TABLET ORAL at 01:09

## 2024-01-01 RX ADMIN — Medication 40 MILLIGRAM(S): at 10:57

## 2024-01-01 RX ADMIN — OXYCODONE HYDROCHLORIDE 2.5 MILLIGRAM(S): 5 TABLET ORAL at 17:38

## 2024-01-01 RX ADMIN — Medication 0.5 MILLIGRAM(S): at 08:53

## 2024-01-01 RX ADMIN — Medication 125 MICROGRAM(S): at 06:26

## 2024-01-01 RX ADMIN — Medication 650 MILLIGRAM(S): at 22:51

## 2024-01-01 RX ADMIN — Medication 50 MILLILITER(S): at 13:05

## 2024-01-01 RX ADMIN — ATORVASTATIN CALCIUM 20 MILLIGRAM(S): 80 TABLET, FILM COATED ORAL at 22:36

## 2024-01-01 RX ADMIN — Medication 3 MILLILITER(S): at 01:56

## 2024-01-01 RX ADMIN — Medication 25 MILLIGRAM(S): at 22:06

## 2024-01-01 RX ADMIN — Medication 20 MILLIGRAM(S): at 05:54

## 2024-01-01 RX ADMIN — Medication 3 MILLILITER(S): at 14:15

## 2024-01-01 RX ADMIN — Medication 25 MILLIGRAM(S): at 20:23

## 2024-01-01 RX ADMIN — LIDOCAINE 1 PATCH: 4 CREAM TOPICAL at 10:09

## 2024-01-01 RX ADMIN — Medication 40 MILLIGRAM(S): at 14:11

## 2024-01-01 RX ADMIN — MIDODRINE HYDROCHLORIDE 2.5 MILLIGRAM(S): 2.5 TABLET ORAL at 06:04

## 2024-01-01 RX ADMIN — Medication 20 MILLIGRAM(S): at 22:12

## 2024-01-01 RX ADMIN — Medication 25 MILLIGRAM(S): at 20:51

## 2024-01-01 RX ADMIN — SODIUM ZIRCONIUM CYCLOSILICATE 10 GRAM(S): 10 POWDER, FOR SUSPENSION ORAL at 10:58

## 2024-01-01 RX ADMIN — Medication 50 MILLILITER(S): at 00:40

## 2024-01-01 RX ADMIN — Medication 1 MILLIGRAM(S): at 10:09

## 2024-01-01 RX ADMIN — SENNA PLUS 2 TABLET(S): 8.6 TABLET ORAL at 22:51

## 2024-01-01 RX ADMIN — Medication 0.5 MILLIGRAM(S): at 19:51

## 2024-01-01 RX ADMIN — Medication 0.5 MILLIGRAM(S): at 20:26

## 2024-01-01 RX ADMIN — Medication 975 MILLIGRAM(S): at 06:14

## 2024-01-01 RX ADMIN — Medication 1 TABLET(S): at 10:51

## 2024-01-01 RX ADMIN — Medication 100 GRAM(S): at 22:11

## 2024-01-01 RX ADMIN — OXYCODONE HYDROCHLORIDE 2.5 MILLIGRAM(S): 5 TABLET ORAL at 03:37

## 2024-01-01 RX ADMIN — Medication 125 MICROGRAM(S): at 06:04

## 2024-01-01 RX ADMIN — Medication 0.5 MILLIGRAM(S): at 21:25

## 2024-01-01 RX ADMIN — Medication 3 MILLILITER(S): at 01:21

## 2024-01-01 RX ADMIN — LIDOCAINE 1 PATCH: 4 CREAM TOPICAL at 11:49

## 2024-01-01 RX ADMIN — Medication 1 TABLET(S): at 10:05

## 2024-01-01 RX ADMIN — Medication 3 MILLILITER(S): at 14:38

## 2024-01-01 RX ADMIN — Medication 3 MILLILITER(S): at 13:37

## 2024-01-01 RX ADMIN — Medication 3 MILLILITER(S): at 13:19

## 2024-01-01 RX ADMIN — Medication 3 MILLILITER(S): at 08:16

## 2024-01-01 RX ADMIN — Medication 0.5 MILLIGRAM(S): at 21:04

## 2024-01-01 RX ADMIN — Medication 100 MILLIGRAM(S): at 10:14

## 2024-01-01 RX ADMIN — Medication 20 MILLIGRAM(S): at 21:55

## 2024-01-01 RX ADMIN — Medication 20 MILLIGRAM(S): at 22:51

## 2024-01-01 RX ADMIN — Medication 50 MILLILITER(S): at 10:57

## 2024-01-01 RX ADMIN — ESCITALOPRAM OXALATE 5 MILLIGRAM(S): 10 TABLET, FILM COATED ORAL at 10:15

## 2024-01-01 RX ADMIN — ATORVASTATIN CALCIUM 20 MILLIGRAM(S): 80 TABLET, FILM COATED ORAL at 20:52

## 2024-01-01 RX ADMIN — TRAMADOL HYDROCHLORIDE 25 MILLIGRAM(S): 50 TABLET ORAL at 05:50

## 2024-01-01 RX ADMIN — Medication 975 MILLIGRAM(S): at 14:44

## 2024-01-01 RX ADMIN — MIDODRINE HYDROCHLORIDE 5 MILLIGRAM(S): 2.5 TABLET ORAL at 17:54

## 2024-01-01 RX ADMIN — LIDOCAINE 1 PATCH: 4 CREAM TOPICAL at 19:41

## 2024-01-01 RX ADMIN — MIDODRINE HYDROCHLORIDE 2.5 MILLIGRAM(S): 2.5 TABLET ORAL at 06:27

## 2024-01-01 RX ADMIN — OXYCODONE HYDROCHLORIDE 2.5 MILLIGRAM(S): 5 TABLET ORAL at 17:48

## 2024-01-01 RX ADMIN — Medication 1 TABLET(S): at 10:57

## 2024-01-01 RX ADMIN — Medication 1 TABLET(S): at 09:02

## 2024-01-01 RX ADMIN — Medication 25 MILLIGRAM(S): at 21:51

## 2024-01-01 RX ADMIN — MIDODRINE HYDROCHLORIDE 2.5 MILLIGRAM(S): 2.5 TABLET ORAL at 17:57

## 2024-01-01 RX ADMIN — ESCITALOPRAM OXALATE 5 MILLIGRAM(S): 10 TABLET, FILM COATED ORAL at 09:52

## 2024-01-01 RX ADMIN — Medication 3 MILLILITER(S): at 13:52

## 2024-01-01 RX ADMIN — MIDODRINE HYDROCHLORIDE 2.5 MILLIGRAM(S): 2.5 TABLET ORAL at 06:24

## 2024-01-01 RX ADMIN — Medication 1 MILLIGRAM(S): at 10:14

## 2024-01-01 RX ADMIN — POLYETHYLENE GLYCOL 3350 17 GRAM(S): 17 POWDER, FOR SOLUTION ORAL at 11:42

## 2024-01-01 RX ADMIN — MIDODRINE HYDROCHLORIDE 2.5 MILLIGRAM(S): 2.5 TABLET ORAL at 18:40

## 2024-01-01 RX ADMIN — Medication 975 MILLIGRAM(S): at 13:37

## 2024-01-01 RX ADMIN — Medication 3 MILLILITER(S): at 08:51

## 2024-01-01 RX ADMIN — Medication 1 MILLIGRAM(S): at 17:57

## 2024-01-01 RX ADMIN — MIDODRINE HYDROCHLORIDE 2.5 MILLIGRAM(S): 2.5 TABLET ORAL at 06:03

## 2024-01-01 RX ADMIN — SENNA PLUS 2 TABLET(S): 8.6 TABLET ORAL at 20:54

## 2024-01-01 RX ADMIN — OXYCODONE HYDROCHLORIDE 2.5 MILLIGRAM(S): 5 TABLET ORAL at 07:43

## 2024-01-01 RX ADMIN — Medication 0.5 MILLIGRAM(S): at 07:23

## 2024-01-01 RX ADMIN — Medication 20 MILLIGRAM(S): at 10:08

## 2024-01-01 RX ADMIN — ESCITALOPRAM OXALATE 5 MILLIGRAM(S): 10 TABLET, FILM COATED ORAL at 10:51

## 2024-01-01 RX ADMIN — MIDODRINE HYDROCHLORIDE 5 MILLIGRAM(S): 2.5 TABLET ORAL at 05:51

## 2024-01-01 RX ADMIN — Medication 100 MILLIGRAM(S): at 10:12

## 2024-01-01 RX ADMIN — Medication 100 MILLIGRAM(S): at 10:59

## 2024-01-01 RX ADMIN — Medication 3 MILLILITER(S): at 14:07

## 2024-01-01 RX ADMIN — Medication 20 MILLIGRAM(S): at 10:01

## 2024-01-01 RX ADMIN — OXYCODONE HYDROCHLORIDE 2.5 MILLIGRAM(S): 5 TABLET ORAL at 11:52

## 2024-01-01 RX ADMIN — Medication 10 MILLIGRAM(S): at 04:31

## 2024-01-01 RX ADMIN — OXYCODONE HYDROCHLORIDE 2.5 MILLIGRAM(S): 5 TABLET ORAL at 04:54

## 2024-01-01 RX ADMIN — Medication 1 MILLIGRAM(S): at 09:53

## 2024-01-01 RX ADMIN — OXYCODONE HYDROCHLORIDE 2.5 MILLIGRAM(S): 5 TABLET ORAL at 04:28

## 2024-01-01 RX ADMIN — ATORVASTATIN CALCIUM 20 MILLIGRAM(S): 80 TABLET, FILM COATED ORAL at 21:51

## 2024-01-01 RX ADMIN — Medication 3 MILLILITER(S): at 01:31

## 2024-01-01 RX ADMIN — Medication 975 MILLIGRAM(S): at 06:07

## 2024-01-01 RX ADMIN — Medication 1 MILLIGRAM(S): at 09:02

## 2024-01-01 RX ADMIN — MIDODRINE HYDROCHLORIDE 2.5 MILLIGRAM(S): 2.5 TABLET ORAL at 17:58

## 2024-01-01 RX ADMIN — SENNA PLUS 2 TABLET(S): 8.6 TABLET ORAL at 22:02

## 2024-01-01 RX ADMIN — Medication 3 MILLILITER(S): at 21:18

## 2024-01-01 RX ADMIN — Medication 20 MILLIGRAM(S): at 09:31

## 2024-01-01 RX ADMIN — ESCITALOPRAM OXALATE 5 MILLIGRAM(S): 10 TABLET, FILM COATED ORAL at 10:56

## 2024-01-01 RX ADMIN — Medication 3 MILLILITER(S): at 20:49

## 2024-01-01 RX ADMIN — ESCITALOPRAM OXALATE 5 MILLIGRAM(S): 10 TABLET, FILM COATED ORAL at 09:02

## 2024-01-01 RX ADMIN — Medication 100 MILLIGRAM(S): at 10:58

## 2024-01-01 RX ADMIN — Medication 40 MILLIGRAM(S): at 14:17

## 2024-01-01 RX ADMIN — Medication 100 MILLIGRAM(S): at 09:53

## 2024-01-01 RX ADMIN — ATORVASTATIN CALCIUM 20 MILLIGRAM(S): 80 TABLET, FILM COATED ORAL at 20:23

## 2024-01-01 RX ADMIN — LIDOCAINE 1 PATCH: 4 CREAM TOPICAL at 10:12

## 2024-01-01 RX ADMIN — MIDODRINE HYDROCHLORIDE 5 MILLIGRAM(S): 2.5 TABLET ORAL at 06:40

## 2024-01-01 RX ADMIN — INSULIN HUMAN 5 UNIT(S): 100 INJECTION, SOLUTION SUBCUTANEOUS at 00:45

## 2024-01-01 RX ADMIN — Medication 1 TABLET(S): at 10:08

## 2024-01-01 RX ADMIN — LIDOCAINE 1 PATCH: 4 CREAM TOPICAL at 13:55

## 2024-01-01 RX ADMIN — SPIRONOLACTONE 25 MILLIGRAM(S): 25 TABLET, FILM COATED ORAL at 10:51

## 2024-01-01 RX ADMIN — Medication 100 MILLIGRAM(S): at 11:41

## 2024-01-01 RX ADMIN — Medication 20 MILLIGRAM(S): at 09:55

## 2024-01-01 RX ADMIN — Medication 125 MICROGRAM(S): at 04:54

## 2024-01-01 RX ADMIN — Medication 975 MILLIGRAM(S): at 22:00

## 2024-01-01 RX ADMIN — APIXABAN 2.5 MILLIGRAM(S): 2.5 TABLET, FILM COATED ORAL at 22:07

## 2024-01-01 RX ADMIN — Medication 3 MILLIGRAM(S): at 20:53

## 2024-01-01 RX ADMIN — SENNA PLUS 2 TABLET(S): 8.6 TABLET ORAL at 22:36

## 2024-01-01 RX ADMIN — MIDODRINE HYDROCHLORIDE 5 MILLIGRAM(S): 2.5 TABLET ORAL at 16:54

## 2024-01-01 RX ADMIN — Medication 975 MILLIGRAM(S): at 13:27

## 2024-01-01 RX ADMIN — OXYCODONE HYDROCHLORIDE 2.5 MILLIGRAM(S): 5 TABLET ORAL at 18:20

## 2024-01-01 RX ADMIN — Medication 20 MILLIGRAM(S): at 13:37

## 2024-01-01 RX ADMIN — Medication 40 MILLIGRAM(S): at 14:35

## 2024-01-01 RX ADMIN — APIXABAN 2.5 MILLIGRAM(S): 2.5 TABLET, FILM COATED ORAL at 22:02

## 2024-01-01 RX ADMIN — INSULIN HUMAN 5 UNIT(S): 100 INJECTION, SOLUTION SUBCUTANEOUS at 13:05

## 2024-01-01 RX ADMIN — APIXABAN 2.5 MILLIGRAM(S): 2.5 TABLET, FILM COATED ORAL at 22:23

## 2024-01-01 RX ADMIN — Medication 3 MILLILITER(S): at 20:50

## 2024-01-01 RX ADMIN — Medication 3 MILLILITER(S): at 20:54

## 2024-01-01 RX ADMIN — ESCITALOPRAM OXALATE 5 MILLIGRAM(S): 10 TABLET, FILM COATED ORAL at 10:08

## 2024-01-01 RX ADMIN — Medication 0.5 MILLIGRAM(S): at 07:51

## 2024-01-01 RX ADMIN — SENNA PLUS 2 TABLET(S): 8.6 TABLET ORAL at 21:51

## 2024-01-01 RX ADMIN — OXYCODONE HYDROCHLORIDE 2.5 MILLIGRAM(S): 5 TABLET ORAL at 18:08

## 2024-01-01 RX ADMIN — Medication 3 MILLILITER(S): at 02:10

## 2024-01-01 RX ADMIN — Medication 100 MILLIGRAM(S): at 11:50

## 2024-01-01 RX ADMIN — Medication 3 MILLILITER(S): at 14:25

## 2024-01-01 RX ADMIN — Medication 10 MILLIGRAM(S): at 19:23

## 2024-01-01 RX ADMIN — ESCITALOPRAM OXALATE 5 MILLIGRAM(S): 10 TABLET, FILM COATED ORAL at 10:12

## 2024-01-01 RX ADMIN — Medication 3 MILLILITER(S): at 20:53

## 2024-01-01 RX ADMIN — APIXABAN 2.5 MILLIGRAM(S): 2.5 TABLET, FILM COATED ORAL at 11:50

## 2024-01-01 RX ADMIN — MIDODRINE HYDROCHLORIDE 2.5 MILLIGRAM(S): 2.5 TABLET ORAL at 16:58

## 2024-01-01 RX ADMIN — Medication 20 MILLIGRAM(S): at 13:13

## 2024-01-01 RX ADMIN — LIDOCAINE 1 PATCH: 4 CREAM TOPICAL at 00:58

## 2024-01-01 RX ADMIN — Medication 125 MICROGRAM(S): at 06:14

## 2024-01-01 RX ADMIN — Medication 20 MILLIGRAM(S): at 14:11

## 2024-01-01 RX ADMIN — Medication 1 TABLET(S): at 10:09

## 2024-01-01 RX ADMIN — Medication 3 MILLILITER(S): at 13:22

## 2024-01-01 RX ADMIN — OXYCODONE HYDROCHLORIDE 2.5 MILLIGRAM(S): 5 TABLET ORAL at 17:54

## 2024-01-01 RX ADMIN — Medication 1 MILLIGRAM(S): at 11:50

## 2024-01-01 RX ADMIN — Medication 1 TABLET(S): at 09:54

## 2024-01-01 RX ADMIN — Medication 1 TABLET(S): at 10:15

## 2024-01-01 RX ADMIN — Medication 100 GRAM(S): at 00:49

## 2024-01-01 RX ADMIN — Medication 20 MILLIGRAM(S): at 22:56

## 2024-01-01 RX ADMIN — Medication 100 MILLIGRAM(S): at 09:31

## 2024-01-01 RX ADMIN — Medication 100 MILLIGRAM(S): at 09:02

## 2024-01-01 RX ADMIN — ATORVASTATIN CALCIUM 20 MILLIGRAM(S): 80 TABLET, FILM COATED ORAL at 21:58

## 2024-01-01 RX ADMIN — Medication 3 MILLILITER(S): at 20:43

## 2024-01-01 RX ADMIN — Medication 3 MILLILITER(S): at 07:54

## 2024-01-01 RX ADMIN — Medication 100 MILLIGRAM(S): at 10:08

## 2024-01-01 RX ADMIN — APIXABAN 2.5 MILLIGRAM(S): 2.5 TABLET, FILM COATED ORAL at 10:12

## 2024-01-01 RX ADMIN — Medication 125 MICROGRAM(S): at 06:24

## 2024-01-01 RX ADMIN — SENNA PLUS 2 TABLET(S): 8.6 TABLET ORAL at 22:06

## 2024-01-01 RX ADMIN — Medication 0.5 MILLIGRAM(S): at 21:18

## 2024-01-01 RX ADMIN — Medication 125 MICROGRAM(S): at 06:10

## 2024-01-01 RX ADMIN — LIDOCAINE 1 PATCH: 4 CREAM TOPICAL at 21:00

## 2024-01-01 RX ADMIN — APIXABAN 2.5 MILLIGRAM(S): 2.5 TABLET, FILM COATED ORAL at 10:08

## 2024-01-01 RX ADMIN — MIDODRINE HYDROCHLORIDE 5 MILLIGRAM(S): 2.5 TABLET ORAL at 18:51

## 2024-01-01 RX ADMIN — Medication 125 MICROGRAM(S): at 05:52

## 2024-01-01 RX ADMIN — MIDODRINE HYDROCHLORIDE 5 MILLIGRAM(S): 2.5 TABLET ORAL at 13:30

## 2024-01-01 RX ADMIN — MIDODRINE HYDROCHLORIDE 5 MILLIGRAM(S): 2.5 TABLET ORAL at 04:54

## 2024-01-01 RX ADMIN — MIDODRINE HYDROCHLORIDE 2.5 MILLIGRAM(S): 2.5 TABLET ORAL at 14:17

## 2024-01-01 RX ADMIN — MIDODRINE HYDROCHLORIDE 5 MILLIGRAM(S): 2.5 TABLET ORAL at 06:10

## 2024-01-01 RX ADMIN — MIDODRINE HYDROCHLORIDE 5 MILLIGRAM(S): 2.5 TABLET ORAL at 05:52

## 2024-01-01 RX ADMIN — Medication 1 TABLET(S): at 09:31

## 2024-01-01 RX ADMIN — SODIUM ZIRCONIUM CYCLOSILICATE 10 GRAM(S): 10 POWDER, FOR SUSPENSION ORAL at 13:06

## 2024-01-01 RX ADMIN — SPIRONOLACTONE 25 MILLIGRAM(S): 25 TABLET, FILM COATED ORAL at 10:59

## 2024-01-01 RX ADMIN — OXYCODONE HYDROCHLORIDE 2.5 MILLIGRAM(S): 5 TABLET ORAL at 11:10

## 2024-01-01 RX ADMIN — RIVAROXABAN 10 MILLIGRAM(S): KIT at 11:00

## 2024-01-01 RX ADMIN — Medication 975 MILLIGRAM(S): at 20:23

## 2024-01-01 RX ADMIN — MIDODRINE HYDROCHLORIDE 5 MILLIGRAM(S): 2.5 TABLET ORAL at 12:36

## 2024-01-01 RX ADMIN — Medication 20 MILLIGRAM(S): at 10:14

## 2024-01-01 RX ADMIN — TRAMADOL HYDROCHLORIDE 25 MILLIGRAM(S): 50 TABLET ORAL at 22:06

## 2024-01-01 RX ADMIN — Medication 3 MILLILITER(S): at 20:23

## 2024-01-01 RX ADMIN — SENNA PLUS 2 TABLET(S): 8.6 TABLET ORAL at 21:58

## 2024-01-01 RX ADMIN — SENNA PLUS 2 TABLET(S): 8.6 TABLET ORAL at 22:55

## 2024-01-01 RX ADMIN — MIDODRINE HYDROCHLORIDE 2.5 MILLIGRAM(S): 2.5 TABLET ORAL at 12:29

## 2024-01-01 RX ADMIN — ESCITALOPRAM OXALATE 5 MILLIGRAM(S): 10 TABLET, FILM COATED ORAL at 10:07

## 2024-01-01 RX ADMIN — ESCITALOPRAM OXALATE 5 MILLIGRAM(S): 10 TABLET, FILM COATED ORAL at 09:31

## 2024-01-01 RX ADMIN — Medication 125 MICROGRAM(S): at 06:07

## 2024-01-01 RX ADMIN — SENNA PLUS 2 TABLET(S): 8.6 TABLET ORAL at 20:24

## 2024-01-01 RX ADMIN — Medication 25 MILLIGRAM(S): at 21:56

## 2024-01-01 RX ADMIN — Medication 650 MILLIGRAM(S): at 06:39

## 2024-01-01 RX ADMIN — Medication 125 MICROGRAM(S): at 06:42

## 2024-01-01 RX ADMIN — Medication 0.5 MILLIGRAM(S): at 20:50

## 2024-01-01 RX ADMIN — Medication 25 MILLIGRAM(S): at 22:12

## 2024-01-01 RX ADMIN — Medication 25 MILLIGRAM(S): at 22:55

## 2024-01-01 RX ADMIN — ONDANSETRON 4 MILLIGRAM(S): 8 TABLET, FILM COATED ORAL at 17:09

## 2024-01-01 RX ADMIN — ATORVASTATIN CALCIUM 20 MILLIGRAM(S): 80 TABLET, FILM COATED ORAL at 21:55

## 2024-01-01 RX ADMIN — POLYETHYLENE GLYCOL 3350 17 GRAM(S): 17 POWDER, FOR SOLUTION ORAL at 11:11

## 2024-01-01 RX ADMIN — ATORVASTATIN CALCIUM 20 MILLIGRAM(S): 80 TABLET, FILM COATED ORAL at 22:23

## 2024-01-01 RX ADMIN — Medication 3 MILLILITER(S): at 21:26

## 2024-01-01 RX ADMIN — MIDODRINE HYDROCHLORIDE 5 MILLIGRAM(S): 2.5 TABLET ORAL at 12:27

## 2024-01-01 RX ADMIN — Medication 975 MILLIGRAM(S): at 22:36

## 2024-01-01 RX ADMIN — OXYCODONE HYDROCHLORIDE 2.5 MILLIGRAM(S): 5 TABLET ORAL at 02:38

## 2024-01-01 RX ADMIN — Medication 0.5 MILLIGRAM(S): at 20:25

## 2024-01-01 RX ADMIN — ESCITALOPRAM OXALATE 5 MILLIGRAM(S): 10 TABLET, FILM COATED ORAL at 11:50

## 2024-01-01 RX ADMIN — OXYCODONE HYDROCHLORIDE 2.5 MILLIGRAM(S): 5 TABLET ORAL at 10:08

## 2024-01-01 RX ADMIN — AZITHROMYCIN 255 MILLIGRAM(S): 500 TABLET, FILM COATED ORAL at 16:14

## 2024-01-01 RX ADMIN — ATORVASTATIN CALCIUM 20 MILLIGRAM(S): 80 TABLET, FILM COATED ORAL at 22:51

## 2024-01-01 RX ADMIN — Medication 2.5 MG/HR: at 20:09

## 2024-01-01 RX ADMIN — Medication 50 MILLILITER(S): at 10:58

## 2024-01-01 RX ADMIN — Medication 125 MICROGRAM(S): at 05:51

## 2024-01-01 RX ADMIN — Medication 125 MICROGRAM(S): at 06:39

## 2024-01-01 RX ADMIN — Medication 975 MILLIGRAM(S): at 01:42

## 2024-01-01 RX ADMIN — Medication 100 MILLIGRAM(S): at 10:57

## 2024-01-01 RX ADMIN — Medication 0.5 MILLIGRAM(S): at 07:44

## 2024-01-01 RX ADMIN — ESCITALOPRAM OXALATE 5 MILLIGRAM(S): 10 TABLET, FILM COATED ORAL at 10:09

## 2024-01-01 RX ADMIN — ESCITALOPRAM OXALATE 5 MILLIGRAM(S): 10 TABLET, FILM COATED ORAL at 10:59

## 2024-01-01 RX ADMIN — Medication 20 MILLIGRAM(S): at 06:41

## 2024-01-01 RX ADMIN — Medication 0.5 MILLIGRAM(S): at 08:15

## 2024-01-01 RX ADMIN — POLYETHYLENE GLYCOL 3350 17 GRAM(S): 17 POWDER, FOR SOLUTION ORAL at 11:44

## 2024-01-01 RX ADMIN — Medication 25 MILLIGRAM(S): at 22:03

## 2024-01-01 RX ADMIN — Medication 0.5 MILLIGRAM(S): at 20:43

## 2024-01-01 RX ADMIN — CEFTRIAXONE 1000 MILLIGRAM(S): 500 INJECTION, POWDER, FOR SOLUTION INTRAMUSCULAR; INTRAVENOUS at 14:12

## 2024-01-01 RX ADMIN — LIDOCAINE 1 PATCH: 4 CREAM TOPICAL at 23:56

## 2024-01-01 RX ADMIN — MIDODRINE HYDROCHLORIDE 5 MILLIGRAM(S): 2.5 TABLET ORAL at 11:49

## 2024-01-01 RX ADMIN — Medication 1 TABLET(S): at 11:50

## 2024-01-01 RX ADMIN — MIDODRINE HYDROCHLORIDE 5 MILLIGRAM(S): 2.5 TABLET ORAL at 17:48

## 2024-01-01 RX ADMIN — MIDODRINE HYDROCHLORIDE 2.5 MILLIGRAM(S): 2.5 TABLET ORAL at 13:11

## 2024-01-01 RX ADMIN — Medication 975 MILLIGRAM(S): at 22:58

## 2024-01-01 RX ADMIN — MIDODRINE HYDROCHLORIDE 2.5 MILLIGRAM(S): 2.5 TABLET ORAL at 06:41

## 2024-01-01 RX ADMIN — Medication 0.5 MILLIGRAM(S): at 09:01

## 2024-01-01 RX ADMIN — LIDOCAINE 1 PATCH: 4 CREAM TOPICAL at 09:17

## 2024-01-01 RX ADMIN — Medication 100 MILLIGRAM(S): at 10:05

## 2024-01-01 RX ADMIN — Medication 0.5 MILLIGRAM(S): at 08:46

## 2024-01-01 RX ADMIN — Medication 3 MILLIGRAM(S): at 21:56

## 2024-01-01 RX ADMIN — TRAMADOL HYDROCHLORIDE 25 MILLIGRAM(S): 50 TABLET ORAL at 23:01

## 2024-01-01 RX ADMIN — Medication 100 MILLIGRAM(S): at 10:03

## 2024-01-01 RX ADMIN — APIXABAN 2.5 MILLIGRAM(S): 2.5 TABLET, FILM COATED ORAL at 21:51

## 2024-01-01 RX ADMIN — Medication 3 MILLILITER(S): at 13:55

## 2024-01-01 RX ADMIN — MIDODRINE HYDROCHLORIDE 5 MILLIGRAM(S): 2.5 TABLET ORAL at 12:26

## 2024-01-01 RX ADMIN — APIXABAN 2.5 MILLIGRAM(S): 2.5 TABLET, FILM COATED ORAL at 20:52

## 2024-01-01 RX ADMIN — Medication 3 MILLILITER(S): at 07:43

## 2024-01-01 RX ADMIN — MIDODRINE HYDROCHLORIDE 5 MILLIGRAM(S): 2.5 TABLET ORAL at 16:56

## 2024-01-01 RX ADMIN — SENNA PLUS 2 TABLET(S): 8.6 TABLET ORAL at 21:55

## 2024-01-01 RX ADMIN — Medication 100 MILLIGRAM(S): at 10:07

## 2024-01-01 RX ADMIN — Medication 1 MILLIGRAM(S): at 10:04

## 2024-01-01 RX ADMIN — Medication 25 MILLIGRAM(S): at 22:36

## 2024-01-01 RX ADMIN — OXYCODONE HYDROCHLORIDE 2.5 MILLIGRAM(S): 5 TABLET ORAL at 03:07

## 2024-01-01 RX ADMIN — Medication 3 MILLILITER(S): at 21:04

## 2024-01-01 RX ADMIN — LIDOCAINE 1 PATCH: 4 CREAM TOPICAL at 19:45

## 2024-01-01 RX ADMIN — LIDOCAINE 1 PATCH: 4 CREAM TOPICAL at 22:08

## 2024-01-01 RX ADMIN — Medication 125 MICROGRAM(S): at 05:54

## 2024-01-01 RX ADMIN — MIDODRINE HYDROCHLORIDE 5 MILLIGRAM(S): 2.5 TABLET ORAL at 05:54

## 2024-01-01 RX ADMIN — MIDODRINE HYDROCHLORIDE 2.5 MILLIGRAM(S): 2.5 TABLET ORAL at 11:44

## 2024-01-01 RX ADMIN — Medication 20 MILLIGRAM(S): at 09:02

## 2024-01-01 RX ADMIN — Medication 1 TABLET(S): at 10:59

## 2024-01-01 RX ADMIN — Medication 3 MILLILITER(S): at 19:51

## 2024-01-01 RX ADMIN — CEFTRIAXONE 1000 MILLIGRAM(S): 500 INJECTION, POWDER, FOR SOLUTION INTRAMUSCULAR; INTRAVENOUS at 15:15

## 2024-01-01 RX ADMIN — Medication 3 MILLILITER(S): at 09:04

## 2024-01-01 RX ADMIN — INSULIN HUMAN 5 UNIT(S): 100 INJECTION, SOLUTION SUBCUTANEOUS at 10:59

## 2024-01-01 RX ADMIN — Medication 650 MILLIGRAM(S): at 21:56

## 2024-01-01 RX ADMIN — Medication 125 MICROGRAM(S): at 06:03

## 2024-01-01 RX ADMIN — OXYCODONE HYDROCHLORIDE 2.5 MILLIGRAM(S): 5 TABLET ORAL at 11:53

## 2024-01-01 RX ADMIN — POLYETHYLENE GLYCOL 3350 17 GRAM(S): 17 POWDER, FOR SOLUTION ORAL at 10:08

## 2024-01-01 RX ADMIN — APIXABAN 2.5 MILLIGRAM(S): 2.5 TABLET, FILM COATED ORAL at 11:42

## 2024-01-01 RX ADMIN — ESCITALOPRAM OXALATE 5 MILLIGRAM(S): 10 TABLET, FILM COATED ORAL at 10:04

## 2024-01-01 RX ADMIN — Medication 3 MILLILITER(S): at 07:50

## 2024-01-01 RX ADMIN — MIDODRINE HYDROCHLORIDE 2.5 MILLIGRAM(S): 2.5 TABLET ORAL at 13:50

## 2024-01-01 RX ADMIN — RIVAROXABAN 10 MILLIGRAM(S): KIT at 10:58

## 2024-01-01 RX ADMIN — OXYCODONE HYDROCHLORIDE 2.5 MILLIGRAM(S): 5 TABLET ORAL at 14:42

## 2024-01-01 RX ADMIN — Medication 3 MILLILITER(S): at 02:17

## 2024-01-01 RX ADMIN — MIDODRINE HYDROCHLORIDE 2.5 MILLIGRAM(S): 2.5 TABLET ORAL at 11:10

## 2024-01-01 RX ADMIN — MIDODRINE HYDROCHLORIDE 2.5 MILLIGRAM(S): 2.5 TABLET ORAL at 18:35

## 2024-01-01 RX ADMIN — ATORVASTATIN CALCIUM 20 MILLIGRAM(S): 80 TABLET, FILM COATED ORAL at 22:55

## 2024-01-01 RX ADMIN — Medication 3 MILLILITER(S): at 20:02

## 2024-01-01 RX ADMIN — ATORVASTATIN CALCIUM 20 MILLIGRAM(S): 80 TABLET, FILM COATED ORAL at 22:06

## 2024-01-01 RX ADMIN — MIDODRINE HYDROCHLORIDE 5 MILLIGRAM(S): 2.5 TABLET ORAL at 11:43

## 2024-01-01 RX ADMIN — APIXABAN 2.5 MILLIGRAM(S): 2.5 TABLET, FILM COATED ORAL at 09:01

## 2024-01-01 RX ADMIN — Medication 3 MILLILITER(S): at 07:53

## 2024-01-01 RX ADMIN — Medication 125 MICROGRAM(S): at 06:09

## 2024-01-01 RX ADMIN — ATORVASTATIN CALCIUM 20 MILLIGRAM(S): 80 TABLET, FILM COATED ORAL at 23:50

## 2024-01-03 NOTE — PROGRESS NOTE ADULT - REASON FOR ADMISSION
LLE hematoma
Traumatic LLE hematoma
LLE hematoma
Traumatic LLE hematoma
Traumatic LLE hematoma
LLE hematoma
LLE hematoma
Traumatic LLE hematoma
LLE hematoma
Traumatic LLE hematoma
LLE hematoma
Encounter for deep vein thrombosis (DVT) prophylaxis

## 2024-01-08 PROBLEM — E87.5 HYPERKALEMIA: Status: ACTIVE | Noted: 2024-01-01

## 2024-01-24 PROBLEM — Z23 ENCOUNTER FOR IMMUNIZATION: Status: ACTIVE | Noted: 2022-12-08 | Resolved: 2024-01-01

## 2024-01-24 PROBLEM — I50.42 CHRONIC COMBINED SYSTOLIC AND DIASTOLIC CONGESTIVE HEART FAILURE: Status: ACTIVE | Noted: 2024-01-01

## 2024-01-24 PROBLEM — Z71.89 ADVANCED CARE PLANNING/COUNSELING DISCUSSION: Status: ACTIVE | Noted: 2024-01-01

## 2024-01-24 PROBLEM — M19.011 ARTHRITIS OF BOTH GLENOHUMERAL JOINTS: Status: ACTIVE | Noted: 2022-09-07

## 2024-01-24 PROBLEM — N18.9 CKD (CHRONIC KIDNEY DISEASE): Status: ACTIVE | Noted: 2022-11-03

## 2024-01-24 PROBLEM — M81.0 AGE RELATED OSTEOPOROSIS, UNSPECIFIED PATHOLOGICAL FRACTURE PRESENCE: Status: ACTIVE | Noted: 2024-01-01

## 2024-01-24 PROBLEM — R26.81 UNSTABLE GAIT: Status: ACTIVE | Noted: 2023-01-01

## 2024-01-24 PROBLEM — I10 HYPERTENSION: Status: ACTIVE | Noted: 2018-08-23

## 2024-01-24 PROBLEM — M10.9 GOUT: Status: ACTIVE | Noted: 2018-08-23

## 2024-01-24 PROBLEM — F32.0 CURRENT MILD EPISODE OF MAJOR DEPRESSIVE DISORDER WITHOUT PRIOR EPISODE: Status: ACTIVE | Noted: 2024-01-01

## 2024-01-24 PROBLEM — D64.9 ANEMIA: Status: ACTIVE | Noted: 2018-08-24

## 2024-01-24 PROBLEM — I35.0 AORTIC VALVE STENOSIS, ETIOLOGY OF CARDIAC VALVE DISEASE UNSPECIFIED: Status: ACTIVE | Noted: 2023-01-01

## 2024-01-24 NOTE — PHYSICAL EXAM
[No Acute Distress] : no acute distress [Normal Sclera/Conjunctiva] : normal sclera/conjunctiva [Normal Outer Ear/Nose] : the ears and nose were normal in appearance [Supple] : the neck was supple [Clear to Auscultation] : lungs were clear to auscultation bilaterally [No Respiratory Distress] : no respiratory distress [No Accessory Muscle Use] : no accessory muscle use [Normal Rate] : heart rate was normal  [Regular Rhythm] : with a regular rhythm [Normal Bowel Sounds] : normal bowel sounds [Normal S1, S2] : normal S1 and S2 [Non Tender] : non-tender [Soft] : abdomen soft [Not Distended] : not distended [No Spinal Tenderness] : no spinal tenderness [No Clubbing, Cyanosis] : no clubbing  or cyanosis of the fingernails [No Gross Sensory Deficits] : no gross sensory deficits [Oriented x3] : oriented to person, place, and time [Normal Mood] : the mood was normal [Normal Affect] : the affect was normal [Normal Insight/Judgement] : insight and judgment were intact [de-identified] : frail elderly lady sitting in chair in NAD. Medical alert button in place [de-identified] : hearing aides in place [de-identified] : systolic murmur, trace edema b/l at ankles [de-identified] : Walks with a rolling walker, smooth gait [de-identified] : L upper leg skin lesion, b/l legs below the knee with skin darkening adn multiple scars with flaking skin

## 2024-01-24 NOTE — HISTORY OF PRESENT ILLNESS
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 March 2022 (Fall with fracture that did not require surgical repair)   Past surgical hx:  B/l Knee replacements Cataract b/l eyes   Medications: Levothyroxine 125mcg Allopurinol 100mg Daily Midodrine 5mg BID Spironolactone 25mg daily Metoprolol 12.5mg Daily Xarelto 10mg Daily  Primary Pharmacy: [facility] [phone]   Allergies: None   Family history: none   Immunizations: Has not had flu shot prior to this visit Also needs updated PNA and covid vaccines   Vision: Reading glasses, only uses distance when driving Hearing: B/l hearing aides Gait/Falls/Assisted devices: Walks with a walker. No falls in the last year Skin: Frequent skin tears with hx of LLE hematoma, Skin lesion Pain: Shoulders hurt on occasion, stable  Appetite: poor appetite BM: regular Urine: sometimes has accidents Sleep: some days better than others, last night not good. Goes to sleep at 730PM and in bed for 13 hours. Wakes up in the night 4-5 times nightly.  Mood: I dont know   Social: Lives alone. HHA: Private pay DME: [employer] Prior/current profession: Licensed .  Enjoys: Reading paper, TV, occasionally reads books, used to knit but has not done it since Nov What matters most: my children and family, grandchildren   Smoking, alcohol, drugs: Smoked until 1978, 3-4 cigarettes a day. does not drink currently. No drug use.  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

## 2024-01-24 NOTE — REVIEW OF SYSTEMS
[Fatigue] : fatigue [Dyspnea on Exertion] : dyspnea on exertion [Unsteady Walking] : ataxia [Insomnia] : insomnia [Easy Bleeding] : easy bleeding [Easy Bruising] : easy bruising [Negative] : Genitourinary [Suicidal] : not suicidal [FreeTextEntry7] : as above [FreeTextEntry2] : as above [FreeTextEntry9] : as above [de-identified] : as above

## 2024-01-24 NOTE — HEALTH RISK ASSESSMENT
[Some assistance needed] : walking [Independent] : using telephone [Full assistance needed] : managing finances [No falls in past year] : Patient reported no falls in the past year [Yes] : The patient does have visual impairment [TimeGetUpGo] : 20

## 2024-01-24 NOTE — COUNSELING
[Normal Weight - ( BMI  <25 )] : normal weight - ( BMI  <25 ) [Continue diet as tolerated] : continue diet as tolerated based on goals of care [Non - Smoker] : non-smoker [Use grab bars] : use grab bars [Medical alert] : medical alert [Use assistive device to avoid falls] : use assistive device to avoid falls [Remove clutter and unsafe carpeting to avoid falls] : remove clutter and unsafe carpeting to avoid falls [Date: ___] : diabetic screening completed on [unfilled] [] : foot exam [Decrease hospital use] : decrease hospital use [Minimize unnecessary interventions] : minimize unnecessary interventions [Maintain functional ability] : maintain functional ability [Discussed disease trajectory with patient/caregiver] : discussed disease trajectory with patient/caregiver [Patient/Caregiver has ___ understanding of disease process] : patient/caregiver has [unfilled] understanding of disease process [Likely to achieve goals/desired outcomes] : likely to achieve goals/desired outcomes [Advanced Directives discussed: ____] : Advanced directives discussed: [unfilled] [Completed Medical Orders for Life-Sustaining Treatment] : completed medical orders for life-sustaining treatment [Limited] : Treatment Guidelines: Limited [DNR] : Code Status: DNR [[facility]: _____[facility]last [facility] date: [unfilled] [DNI] : Intubation: DNI [de-identified] : HCP complete in the past [ - New patient with 2 or more chronic conditions; CCM discussed and patient-centered care plan established] : New patient with 2 or more chronic conditions; CCM discussed and patient-centered care plan established

## 2024-01-24 NOTE — REASON FOR VISIT
[Initial Eval - Existing Diagnosis] : an initial evaluation of an existing diagnosis [Pre-Visit Preparation] : pre-visit preparation was done [FreeTextEntry2] : chart review

## 2024-01-24 NOTE — ASSESSMENT
[FreeTextEntry1] : Will organize RN visit for Covid and PNA vaccine Lexapro LE: use ceraVe and Equiphonjimmy

## 2024-01-29 PROBLEM — R63.0 POOR APPETITE: Status: ACTIVE | Noted: 2024-01-01

## 2024-01-29 PROBLEM — R53.1 WEAKNESS: Status: ACTIVE | Noted: 2024-01-01

## 2024-01-29 PROBLEM — R42 DIZZINESS: Status: ACTIVE | Noted: 2024-01-01

## 2024-01-29 PROBLEM — R42 DIZZINESS, NONSPECIFIC: Status: ACTIVE | Noted: 2024-01-01

## 2024-01-30 PROBLEM — E87.1 HYPONATREMIA: Status: ACTIVE | Noted: 2024-01-01

## 2024-02-07 PROBLEM — R53.83 LETHARGY: Status: ACTIVE | Noted: 2024-01-01

## 2024-02-09 PROBLEM — N39.0 UTI (URINARY TRACT INFECTION): Status: ACTIVE | Noted: 2024-01-01 | Resolved: 2024-03-10

## 2024-02-10 NOTE — H&P ADULT - NSHPPHYSICALEXAM_GEN_ALL_CORE
Physical Exam: Vital Signs Last 24 Hrs  T(C): --  T(F): --  HR: 98 (10 Feb 2024 18:00) (79 - 107)  BP: 94/74 (10 Feb 2024 18:00) (67/41 - 133/91)  BP(mean): 81 (10 Feb 2024 18:00) (51 - 100)  RR: 24 (10 Feb 2024 18:00) (18 - 27)  SpO2: 98% (10 Feb 2024 18:00) (90% - 99%)    Parameters below as of 10 Feb 2024 18:00  Patient On (Oxygen Delivery Method): nasal cannula  O2 Flow (L/min): 2          HEENT: PRRL EOMI    MOUTH/TEETH/GUMS: Clear    NECK: +3  JVD    LUNGS: dull no BS on L side ,     HEART: S1,S2 irregularly irregular     ABDOMEN: soft nontender    EXTREMITIES:  +_1 pedal edema    MUSCULOSKELETAL: no joint swelling     NEURO: no tremor, no focal signs.    SKIN: no rash    : CVA negative,

## 2024-02-10 NOTE — H&P ADULT - HISTORY OF PRESENT ILLNESS
HPI:  The patient is a 89 yo female with Hx. of Diastolic CHF EF 45%, CAD, HTN, Orthostatic hypotension, Atrial fib. on Xarelto, s/p PPM, COPD, Hypothyroidism, Gout , valvular heart disease, sever AS,  severe TR, severe pumonary HTN, presented in ED BIB form home for SOB. The patient  lives alone and has a private aid. She developed worsening SOB, She had iron infusion 5 days ago and was started on Macrobid for UTI  yesterday.     PMHx: last hospitalized  -23,  Diastolic CHF EF 45%, CAD, HTN, Orthostatic hypotension, Atrial fib. on Xarelto, s/p PPM, COPD, Hypothyroidism, Gout , valvular heart disease, sever AS,  severe TR, severe pumonary HTN,    PSHx: [facility] 23, NOCAD ,  PPM     Family Hx: rene  at age 101, does not know father med Hx.     Social Hx.: not smoking, no alcohol use

## 2024-02-10 NOTE — ED ADULT NURSE REASSESSMENT NOTE - NS ED NURSE REASSESS COMMENT FT1
MD Yanez at bedside.
Md Yanez at bedside.
Pt got up to use commode with assistance. As patient returned to bed, she complains of dizziness and blood pressure 89/69. MD Yanez made aware. No new orders at this time. As per Dr. Yanez, keep patient in bed and recheck blood pressure in 15 minutes. comfort and safety maintained.
RN to room. Pt remains mentating at baseline. Pt endorses feeling nauseous and 'dizzy'. Pt medicated per MAR for nausea. BP remains low. Dr. Yanez contacted via phone call and updated on pt low blood pressure and symptoms. Pt safety and comfort maintained.

## 2024-02-10 NOTE — ED ADULT NURSE NOTE - NSFALLHARMRISKINTERV_ED_ALL_ED

## 2024-02-10 NOTE — ED PROVIDER NOTE - CLINICAL SUMMARY MEDICAL DECISION MAKING FREE TEXT BOX
Suspect CHF exacerbation. Will get cardiac labs, EKG, viral swab, urine, and reassess. Since pt is requiring O2 to keep sats above 90%, will admit. Low suspicion for PE as pt is on Eliquis.

## 2024-02-10 NOTE — ED PROVIDER NOTE - PHYSICAL EXAMINATION
Constitutional: Mild respiratory distress, AAOx3  Eyes: EOMI, pupils equal  Head: Normocephalic atraumatic  Mouth: no airway obstruction  Cardiac: Irregularly irregular rhythm  Extremities: Trace b/l edema  Resp: Mild respiratory distress. Bibasilar rales.   GI: Abd s/nt/nd  Neuro: CN2-12 intact. No focal deficits.   Skin: No rashes

## 2024-02-10 NOTE — ED ADULT NURSE NOTE - OBJECTIVE STATEMENT
Pt arrives to ED with son c/o shortness of breath which has worsened over the past couple of days. Pt states she feels weaker compared to normal. Pt denies any chest pain. pt has hx of CHF is on diuretic at home. Does not require O2 at home, placed on 2L O2 in triage. Pt lives at home alone and has home health aides come in for approx ~8 hours a day. Pt normally uses walker at home. Pt is JEANNETTE, heriberto x4. Pt arrives to ED with son c/o shortness of breath which has worsened over the past couple of days. Pt states she feels weaker compared to normal. Pt denies any chest pain. pt has hx of afib on xarelto, CHF is on diuretic at home. Does not require O2 at home, placed on 2L O2 in triage d/t SOB. Per son pt also recentyl dx of UTI started abx yesterday. Pt lives at home alone and has home health aides come in for approx ~8 hours a day. Pt normally uses walker at home. Pt is JEANNETTE, roseo x4.

## 2024-02-10 NOTE — ED ADULT TRIAGE NOTE - CHIEF COMPLAINT QUOTE
Pt presents to er with complaints of SOB, states she has a history of CHF with a pacemaker. sees  for cardilogy. Denies recent fevers.

## 2024-02-10 NOTE — PATIENT PROFILE ADULT - FALL HARM RISK - HARM RISK INTERVENTIONS

## 2024-02-10 NOTE — CONSULT NOTE ADULT - SUBJECTIVE AND OBJECTIVE BOX
The patient is a 89 yo female with Hx. of Diastolic CHF EF 45%, CAD, HTN, Orthostatic hypotension, Atrial fib. on Xarelto, s/p PPM, COPD, Hypothyroidism, Gout , valvular heart disease, sever AS,  severe TR, severe pumonary HTN, presented in ED BIB form home for SOB. The patient  lives alone and has a private aid. She developed worsening SOB, She had iron infusion 5 days ago and was started on Macrobid for UTI  yesterday.     Patient is a 89y old  Female who presents with a chief complaint of CHF, pleural effusion, (10 Feb 2024 18:17)      BRIEF HOSPITAL COURSE:   The patient is a 89 yo female with Hx. of Diastolic CHF EF 45%, CAD, HTN, Orthostatic hypotension, Atrial fib. on Xarelto, s/p PPM, COPD, Hypothyroidism, Gout , valvular heart disease, sever AS,  severe TR, severe pumonary HTN, presented in ED BIB form home for SOB. The patient  lives alone and has a private aid. She developed worsening SOB, She had iron infusion 5 days ago and was started on Macrobid for UTI  yesterday.    ICU consulted for hypotension and severe pulmonary edema and congestion in the setting of CHF exacerbation. On presentation the patient states she has had SOB and dyspnea that has improved she has no other complaints at this time. Patient labs show hyponatremia, her blood pressure has improved and is sating 98% on 2L NC.   During previous admission on 2023 patient had DNR when asked to reinstate patient stated she want's everything done for her at this time.         PAST MEDICAL & SURGICAL HISTORY:  Mitral valve insufficiency      Aortic valve regurgitation      Osteopenia      CAD (coronary artery disease)      Gout      Hypothyroid      CHF (congestive heart failure)      Anxiety      Anemia      HTN (hypertension)      Afib      COPD (chronic obstructive pulmonary disease)      Chronic obstructive pulmonary disease (COPD)      HTN (hypertension)      Cardiac pacemaker      Gout      Hypothyroidism      Insomnia      Chronic CHF      History of ST elevation myocardial infarction (STEMI)      Status cardiac pacemaker      S/P knee replacement      No significant past surgical history          Review of Systems:  CONSTITUTIONAL: No fever, chills, or fatigue  EYES: No eye pain, visual disturbances, or discharge  ENMT:  No difficulty hearing, tinnitus, vertigo; No sinus or throat pain  NECK: No pain or stiffness  RESPIRATORY: No cough, wheezing, chills or hemoptysis; No shortness of breath  CARDIOVASCULAR: No chest pain, palpitations, dizziness, or leg swelling  GASTROINTESTINAL: No abdominal or epigastric pain. No nausea, vomiting, or hematemesis; No diarrhea or constipation. No melena or hematochezia.  GENITOURINARY: No dysuria, frequency, hematuria, or incontinence  NEUROLOGICAL: No headaches, memory loss, loss of strength, numbness, or tremors  SKIN: No itching, burning, rashes, or lesions   MUSCULOSKELETAL: No joint pain or swelling; No muscle, back, or extremity pain  PSYCHIATRIC: No depression, anxiety, mood swings, or difficulty sleeping      Medications:  cefTRIAXone Injectable. 1000 milliGRAM(s) IV Push every 24 hours  nitrofurantoin monohydrate/macrocrystals (MACROBID) 100 milliGRAM(s) Oral two times a day    metoprolol succinate ER 25 milliGRAM(s) Oral at bedtime  midodrine. 5 milliGRAM(s) Oral three times a day  spironolactone 25 milliGRAM(s) Oral daily      acetaminophen     Tablet .. 650 milliGRAM(s) Oral every 6 hours PRN  escitalopram 5 milliGRAM(s) Oral daily  melatonin 3 milliGRAM(s) Oral at bedtime PRN  ondansetron Injectable 4 milliGRAM(s) IV Push every 6 hours PRN      rivaroxaban 10 milliGRAM(s) Oral daily    aluminum hydroxide/magnesium hydroxide/simethicone Suspension 30 milliLiter(s) Oral every 4 hours PRN      allopurinol 100 milliGRAM(s) Oral daily  levothyroxine 125 MICROGram(s) Oral daily    multivitamin 1 Tablet(s) Oral daily  potassium chloride    Tablet ER 20 milliEquivalent(s) Oral daily  thiamine 100 milliGRAM(s) Oral daily    Vital Signs Last 24 Hrs  HR: 98 (10 Feb 2024 18:45) (79 - 107)  BP: 95/67 (10 Feb 2024 18:45) (67/41 - 133/91)  BP(mean): 74 (10 Feb 2024 18:45) (51 - 100)  RR: 21 (10 Feb 2024 18:45) (18 - 27)  SpO2: 93% (10 Feb 2024 18:45) (90% - 99%)    O2 Parameters below as of 10 Feb 2024 18:45  Patient On (Oxygen Delivery Method): nasal cannula  O2 Flow (L/min): 2    I&O's Detail        LABS:                        10.0   5.62  )-----------( 164      ( 10 Feb 2024 12:54 )             30.5     10    123<L>  |  91<L>  |  23  ----------------------------<  99  4.9   |  30  |  0.93    Ca    8.7      10 Feb 2024 12:54  Mg     1.6     02-10    TPro  7.1  /  Alb  3.8  /  TBili  0.9  /  DBili  x   /  AST  29  /  ALT  20  /  AlkPhos  127<H>  02-10          CAPILLARY BLOOD GLUCOSE      POCT Blood Glucose.: 131 mg/dL (10 Feb 2024 17:13)      Urinalysis Basic - ( 10 Feb 2024 15:07 )    Color: Yellow / Appearance: Clear / S.008 / pH: x  Gluc: x / Ketone: Negative mg/dL  / Bili: Negative / Urobili: 0.2 mg/dL   Blood: x / Protein: Negative mg/dL / Nitrite: Negative   Leuk Esterase: Trace / RBC: 0 /HPF / WBC 2 /HPF   Sq Epi: x / Non Sq Epi: 2 /HPF / Bacteria: Negative /HPF      CULTURES:        Physical Examination:    General: No acute distress.  Alert, oriented, interactive     HEENT: Pupils equal, reactive to light.  Symmetric.    PULM: Bibasilar crackles with decreased breath sounds.     CVS: Irregularly irregular rate and rhythm, crescendo decrescendo murmur at the right sternal boarder    ABD: Soft, nondistended, nontender, normoactive bowel sounds, no masses    EXT: Bilateral lower extremity edema.     SKIN: Warm and well perfused, no rashes noted.    NEURO: A&Ox3, no focal deficits        RADIOLOGY:   < from: CT Chest No Cont (02.10.24 @ 14:34) >  IMPRESSION:    Severe cardiomegaly with enlarging large bilateral pleural effusions.    Severe pulmonary arterial hypertension.        --- End of Report ---    < end of copied text >    CARE TIME SPENT: 50 minutes   Time spent evaluating/treating patient with medical issues that pose a high risk for life threatening deterioration and/or end-organ damage, reviewing data/labs/imaging, discussing case with multidisciplinary team, discussing plan/goals of care with patient/family. Non-inclusive of procedure time.

## 2024-02-10 NOTE — H&P ADULT - ASSESSMENT
89 yo female with Hx. of Diastolic CHF EF 45%, CAD, HTN, Orthostatic hypotension, Atrial fib. on Xarelto, s/p PPM, COPD, Hypothyroidism, Gout , valvular heart disease, sever AS,  severe TR, severe pumonary HTN, presented in ED BIB form home for SOB. The patient  lives alone and has a private aid. She developed worsening SOB, She had iron infusion 5 days ago and was started on Macrobid for UTI  yesterday.   assessment Dx:                       1. acute CHF                        2. bilateral pleural effusion                        3. Hypnatremia                        4. Orthostatic hypotension                       5. Atrial fib.                        6. s/p PPM                       7. Non obs CAD                       8 . COPD                       9. Hypothyroidism                      10. severe valvular heart disease    Plan:    admit to telemetry                medications: received IV lasix , place on fluid restrictions, continue meds as per med rec.               VTEP: on Xarelto                Labs: cbc, bmp               Radiology:CT chest                Cardiac diagnostics: EKG               Consults: cardiology, nephrology, pulmonary, Palliative care.                Advance Directive: had Prior DNR, now she wanted to be resuscitated.                                  89 yo female with Hx. of Diastolic CHF EF 45%, CAD, HTN, Orthostatic hypotension, Atrial fib. on Xarelto, s/p PPM, COPD, Hypothyroidism, Gout , valvular heart disease, sever AS,  severe TR, severe pumonary HTN, presented in ED BIB form home for SOB. The patient  lives alone and has a private aid. She developed worsening SOB, She had iron infusion 5 days ago and was started on Macrobid for UTI  yesterday.   assessment Dx:                       1. acute CHF                        2. bilateral pleural effusion                        3. Hypnatremia                        4. Orthostatic hypotension                       5. Atrial fib.                        6. s/p PPM                       7. Non obs CAD                       8 . COPD                       9. Hypothyroidism                      10. severe valvular heart disease                      11. UTI     Plan:    admit to telemetry                medications: received IV lasix , place on fluid restrictions, continue meds as per med rec.               VTEP: on Xarelto                Labs: cbc, bmp               Radiology:CT chest                Cardiac diagnostics: EKG               Consults: cardiology, nephrology, pulmonary, Palliative care.                Advance Directive: had Prior DNR, now she wanted to be resuscitated.                                  89 yo female with Hx. of Diastolic CHF EF 45%, CAD, HTN, Orthostatic hypotension, Atrial fib. on Xarelto, s/p PPM, COPD, Hypothyroidism, Gout , valvular heart disease, sever AS,  severe TR, severe pumonary HTN, presented in ED BIB form home for SOB. The patient  lives alone and has a private aid. She developed worsening SOB, She had iron infusion 5 days ago and was started on Macrobid for UTI  yesterday.   assessment Dx:                       1. acute CHF                        2. bilateral pleural effusion                        3. Hypnatremia                        4. Orthostatic hypotension                       5. Atrial fib.                        6. s/p PPM                       7. Non obs CAD                       8 . COPD                       9. Hypothyroidism                      10. severe valvular heart disease                      11. UTI     Plan:    admit to telemetry                medications: received IV lasix , place on fluid restrictions, started on Ceftriaxone in ED for suspected  pneumonia / UTI,  continue meds as per med rec.                VTEP: on Xarelto                Labs: cbc, bmp               Radiology:CT chest                Cardiac diagnostics: EKG               Consults: cardiology, nephrology, pulmonary, Palliative care.                Advance Directive: had Prior DNR, now she wanted to be resuscitated.                                  89 yo female with Hx. of Diastolic CHF EF 45%, CAD, HTN, Orthostatic hypotension, Atrial fib. on Xarelto, s/p PPM, COPD, Hypothyroidism, Gout , valvular heart disease, sever AS,  severe TR, severe pumonary HTN, presented in ED BIB form home for SOB. The patient  lives alone and has a private aid. She developed worsening SOB, She had iron infusion 5 days ago and was started on Macrobid for UTI  yesterday.   assessment Dx:                       1. acute CHF                        2. bilateral pleural effusion                        3. Hypnatremia                        4. Orthostatic hypotension                       5. Atrial fib.                        6. s/p PPM                       7. Non obs CAD                       8 . COPD                       9. Hypothyroidism                      10. severe valvular heart disease                      11. UTI     Plan:    admit to telemetry                medications: received IV lasix , place on fluid restrictions, started on Ceftriaxone in ED for suspected  pneumonia / UTI,  continue meds as per med rec.                VTEP: on Xarelto                Labs: cbc, bmp               Radiology:CT chest                Cardiac diagnostics: EKG               Consults: cardiology, nephrology, pulmonary, Palliative care.                Advance Directive: had Prior DNR, now she wanted to be resuscitated.                Case discussed with the intensivist Dr. Mckenzie, The patient prognostic is poor.                Total time spent 75 minutes.

## 2024-02-10 NOTE — ED PROVIDER NOTE - OBJECTIVE STATEMENT
89 y/o female w/ a PMHx of HTN, CAD, chronic diastolic CHF, chronic atrial fibrillation on Xarelto, hx of PPM placement, COPD, hypothyroidism, and gout presents to the ED for worsening SOB. Denies CP, fever, chills, cough, abd pain, n/v/d, sick contacts, or recent travel. Pt had a iron infusion x5 days ago w/o relief. UTI and low sodium, stared on abx yesterday w/o relief in Sx. No other complaints at this time. PCP: Dr. Horan. Cardio: Dr. Parrish.

## 2024-02-10 NOTE — H&P ADULT - NSHPLABSRESULTS_GEN_ALL_CORE
10.0   5.62  )-----------( 164      ( 10 Feb 2024 12:54 )             30.5       02-10    123<L>  |  91<L>  |  23  ----------------------------<  99  4.9   |  30  |  0.93    Ca    8.7      10 Feb 2024 12:54  Mg     1.6     02-10    TPro  7.1  /  Alb  3.8  /  TBili  0.9  /  DBili  x   /  AST  29  /  ALT  20  /  AlkPhos  127<H>  02-10    BNP 7548, troponin 22.91  CT chest :   Severe cardiomegaly with enlarging large bilateral pleural effusions.  Severe pulmonary arterial hypertension.

## 2024-02-10 NOTE — PATIENT PROFILE ADULT - NSTRANSFERBELONGINGSDISPO_GEN_A_NUR
with patient Suturegard Body: The suture ends were repeatedly re-tightened and re-clamped to achieve the desired tissue expansion.

## 2024-02-10 NOTE — ED ADULT NURSE NOTE - NSICDXPASTSURGICALHX_GEN_ALL_CORE_FT
Normal
PAST SURGICAL HISTORY:  No significant past surgical history     S/P knee replacement     Status cardiac pacemaker

## 2024-02-10 NOTE — H&P ADULT - NSHPREVIEWOFSYSTEMS_GEN_ALL_CORE
ROS: alert , communicating ,    Eyes: no changes in vision  ENT/Mouth: no changes    Cardiovascular: no chest pain    Respiratory: has  SOB at rest ,     Gastrointestinal: no diarrhea, no nausea, no vomiting    Genitourinary: no dysuria      Musculoskeletal: no pain    Integumentary: no itching    Neurological: No Headache, no tremor,    Endocrine: no excessive thirst,     Allergic/Immunologic: no itching

## 2024-02-10 NOTE — ED CLERICAL - NS ED CLERK NOTE PRE-ARRIVAL INFORMATION; ADDITIONAL PRE-ARRIVAL INFORMATION

## 2024-02-11 NOTE — CONSULT NOTE ADULT - SUBJECTIVE AND OBJECTIVE BOX
COVERING NEPHROLOGY for Dr Toledo      The patient is a 89 yo female with Hx. of Diastolic CHF EF 45%, CAD, HTN, Orthostatic hypotension, Atrial fib. on Xarelto, s/p PPM, COPD, Hypothyroidism, Gout , valvular heart disease, sever AS,  severe TR, severe pumonary HTN, presented in ED BIB form home for SOB. The patient  lives alone and has a private aid. She developed worsening SOB, She had iron infusion 5 days ago and was started on Macrobid for UTI  yesterday.   renal eval for acute hyponatremia in setting of acute CHF/pulm HTN and bilateral plueral effusions      Home Medications:  allopurinol 100 mg oral tablet: 1 tab(s) orally once a day (10 Feb 2024 14:48)  escitalopram 5 mg oral tablet: 1 tab(s) orally (10 Feb 2024 14:55)  levothyroxine 125 mcg (0.125 mg) oral tablet: 1 tab(s) orally once a day (10 Feb 2024 14:48)  metoprolol succinate 25 mg oral tablet, extended release: 1 tab(s) orally once a day (10 Feb 2024 14:55)  midodrine 5 mg oral tablet: 1 tab(s) orally 2 times a day (10 Feb 2024 14:56)  Multiple Vitamins oral tablet: 1 tab(s) orally once a day (10 Feb 2024 14:55)  nitrofurantoin macrocrystals 100 mg oral capsule: 1 cap(s) orally 2 times a day for 5 days ***course not complete*** (10 Feb 2024 14:55)  potassium chloride 20 mEq oral tablet, extended release: 1 tab(s) orally once a day (10 Feb 2024 14:55)  spironolactone 25 mg oral tablet: 1 tab(s) orally once a day (10 Feb 2024 14:48)  thiamine 100 mg oral tablet: 1 tab(s) orally once a day (10 Feb 2024 14:48)  torsemide 20 mg oral tablet: 1 tab(s) orally 2 times a day (10 Feb 2024 14:48)  Xarelto 10 mg oral tablet: 1 tab(s) orally once a day (10 Feb 2024 14:55)    MEDICATIONS  (STANDING):  albuterol/ipratropium for Nebulization 3 milliLiter(s) Nebulizer every 6 hours  allopurinol 100 milliGRAM(s) Oral daily  cefTRIAXone Injectable. 1000 milliGRAM(s) IV Push every 24 hours  escitalopram 5 milliGRAM(s) Oral daily  furosemide   Injectable 40 milliGRAM(s) IV Push two times a day  levothyroxine 125 MICROGram(s) Oral daily  methylPREDNISolone sodium succinate Injectable 20 milliGRAM(s) IV Push every 8 hours  metoprolol succinate ER 25 milliGRAM(s) Oral at bedtime  midodrine. 5 milliGRAM(s) Oral three times a day  multivitamin 1 Tablet(s) Oral daily  potassium chloride    Tablet ER 20 milliEquivalent(s) Oral daily  rivaroxaban 10 milliGRAM(s) Oral daily  spironolactone 25 milliGRAM(s) Oral daily  thiamine 100 milliGRAM(s) Oral daily      Allergies    codeine (Unknown)    Intolerances        SOCIAL HISTORY:  Denies ETOh,Smoking,     FAMILY HISTORY:  No pertinent family history in first degree relatives    No pertinent family history in first degree relatives        VITAL:  T(C): 36.6 (02-11-24), Max: 36.8 (02-10-24)  T(F): 97.8 (02-11-24), Max: 98.2 (02-10-24)  HR: 127 (02-11-24) (72 - 127)  BP: 103/61 (02-11-24) (67/41 - 133/91)  RR: 18 (02-11-24)  SpO2: 97% (02-11-24) (90% - 100%)    I and O's:        PHYSICAL EXAM:    Constitutional: frail   HEENT:  EOMI,  MM  Neck: No LAD, No JVD  Respiratory: poor AE   Cardiovascular: S1 and S2  Gastrointestinal: BS+, soft, NT/ND  Extremities: peripheral edema +  Neurological: A/O x 3, no focal deficits  : No Espinoza  Skin: No rashes  Access: Not applicable    LABS:                          9.7    6.34  )-----------( 167      ( 11 Feb 2024 07:17 )             29.8                         10.0   5.62  )-----------( 164      ( 10 Feb 2024 12:54 )             30.5       127    |  94     |  22     ----------------------------<  89        11 Feb 2024 07:17  4.9     |  33     |  0.98     123    |  91     |  23     ----------------------------<  99        10 Feb 2024 12:54  4.9     |  30     |  0.93     Ca    8.5        11 Feb 2024 07:17  Ca    8.7        10 Feb 2024 12:54      Mg     1.6       10 Feb 2024 12:54    TPro  7.1    /  Alb  3.8    /  TBili  0.9    /        10 Feb 2024 12:54  DBili  x      /  AST  29     /  ALT  20     /  AlkPhos  127            Urine Studies:  Urinalysis Basic - ( 11 Feb 2024 07:17 )    Color: x / Appearance: x / SG: x / pH: x  Gluc: 89 mg/dL / Ketone: x  / Bili: x / Urobili: x   Blood: x / Protein: x / Nitrite: x   Leuk Esterase: x / RBC: x / WBC x   Sq Epi: x / Non Sq Epi: x / Bacteria: x            RADIOLOGY & ADDITIONAL STUDIES:      ACC: [id number] EXAM:  CT CHEST   ORDERED BY: ART STEVENS     PROCEDURE DATE:  02/10/2024          INTERPRETATION:  CLINICAL INFORMATION: 89-year-old with shortness of   breath; for evaluation of pulmonary edema versus pneumonia.    COMPARISON: Comparison is made to prior studies dated 3/11/2022 and   2/21/2022.    CONTRAST/COMPLICATIONS:  IV Contrast: NONE  Oral Contrast: NONE  Complications: None reported at time of study completion    PROCEDURE:  CT of the Chest was performed.  Sagittal andcoronal reformats were performed.    FINDINGS:    LUNGS AND AIRWAYS: Bibasilar compressive atelectasis. No pulmonary   consolidation or mass.  PLEURA: There are enlarging moderate to large bilateral pleural effusions  MEDIASTINUM AND CHICA: No lymphadenopathy.  VESSELS: There is enlargement of the main pulmonary artery spanning 3.4   cm, consistent with pulmonary arterial hypertension. Severe arterial   vascular calcification.  HEART: There is severe cardiomegaly with four-chamber enlargement.  There   are permanent pacemaker with lead tips in the right ventricle. Severe   coronary arterial calcification.  CHEST WALL AND LOWER NECK: Within normal limits.  VISUALIZED UPPER ABDOMEN: Small volume of free fluid within the upper   abdomen.  BONES: Within normal limits.            IMPRESSION:    Severe cardiomegaly with enlarging large bilateral pleural effusions.    Severe pulmonary arterial hypertension.        --- End of Report ---            RADHAMES SIEGEL MD; Attending Radiologist  This document has been electronically signed. Fe           ,

## 2024-02-11 NOTE — CONSULT NOTE ADULT - SUBJECTIVE AND OBJECTIVE BOX
89y  Female  codeine (Unknown)    CC; Patient is a 89y old  Female who presents with a chief complaint of Acute hypoxic and hypercapnic respiratory failure     HPI:  HPI:   89 year old female PMHx of Diastolic CHF EF 45%, CAD, HTN, Orthostatic hypotension, Atrial fib. on Xarelto, s/p PPM, COPD, Hypothyroidism, Gout , valvular heart disease, sever AS,  severe TR, severe pulmonary HTN, presented to the ER for SOB.  Admitted to tele for acute hypoxic respiratory failure with brief NIV in form of bipap that improved the SOB significantly The thought process being it was Likely multi factorial due to CHF exacerbation, ( Given significant valvular disease and severe pHTN) as well as bilateral pleural effusions.  She received IV Lasix , place on fluid restrictions and was started on Ceftriaxone in ED for suspected  pneumonia / UTI.    Thoracic consult was called for bilateral pleural effusions right is moderate and the left is small to moderate Pt was seen at bedside resting comfortably stable from a respiratory perspective, no SOB and not showing any increased work of breathing with accessory muscle use while saturating in mid 90's on nasal cannula. No indication for intervention tonight, will defiantly re-evaluate and escalate plan overnight should the patients respiratory status become unstable.   Dr De La Rosa is aware and day team/attending will evaluate further plan moving froward in the morning.     PAST MEDICAL & SURGICAL HISTORY:  Mitral valve insufficiency  Aortic valve regurgitation  Osteopenia  CAD (coronary artery disease)  Gout  Hypothyroid  CHF (congestive heart failure) EF 45%   Anxiety  Anemia  HTN (hypertension)  Afib  COPD (chronic obstructive pulmonary disease)    History of ST elevation myocardial infarction (STEMI)  Status cardiac pacemaker  S/P knee replacement  No significant past surgical history    FAMILY HISTORY:  No pertinent family history in first degree relatives  No pertinent family history in first degree relatives    Vital Signs Last 24 Hrs  T(C): 36.6 (11 Feb 2024 22:03), Max: 36.6 (11 Feb 2024 08:30)  T(F): 97.9 (11 Feb 2024 22:03), Max: 97.9 (11 Feb 2024 22:03)  HR: 119 (11 Feb 2024 22:03) (78 - 127)  BP: 103/62 (11 Feb 2024 22:03) (99/58 - 112/62)  BP(mean): --  RR: 18 (11 Feb 2024 22:03) (18 - 18)  SpO2: 93% (11 Feb 2024 22:03) (93% - 99%)    Parameters below as of 11 Feb 2024 22:03  Patient On (Oxygen Delivery Method): nasal cannula  O2 Flow (L/min): 5    ABG - ( 11 Feb 2024 08:47 )  pH, Arterial: 7.29  pH, Blood: x     /  pCO2: 61    /  pO2: 106   / HCO3: 29    / Base Excess: 1.2   /  SaO2: 98        LABS  02-11  127<L>  |  94<L>  |  22  ----------------------------<  89  4.9   |  33<H>  |  0.98  Ca    8.5      11 Feb 2024 07:17  Mg     1.6     02-10  TPro  7.1  /  Alb  3.8  /  TBili  0.9  /  DBili  x   /  AST  29  /  ALT  20  /  AlkPhos  127<H>  02-10                        9.7    6.34  )-----------( 167      ( 11 Feb 2024 07:17 )             29.8     LIVER FUNCTIONS - ( 10 Feb 2024 12:54 )  Alb: 3.8 g/dL / Pro: 7.1 gm/dL / ALK PHOS: 127 U/L / ALT: 20 U/L / AST: 29 U/L / GGT: x           CAPILLARY BLOOD GLUCOSE  POCT Blood Glucose.: 205 mg/dL (10 Feb 2024 20:51)    Urinalysis Basic - ( 11 Feb 2024 07:17 )  Color: x / Appearance: x / SG: x / pH: x  Gluc: 89 mg/dL / Ketone: x  / Bili: x / Urobili: x   Blood: x / Protein: x / Nitrite: x   Leuk Esterase: x / RBC: x / WBC x   Sq Epi: x / Non Sq Epi: x / Bacteria: x    MEDICATIONS  (STANDING):  albuterol/ipratropium for Nebulization 3 milliLiter(s) Nebulizer every 6 hours  allopurinol 100 milliGRAM(s) Oral daily  cefTRIAXone Injectable. 1000 milliGRAM(s) IV Push every 24 hours  escitalopram 5 milliGRAM(s) Oral daily  furosemide   Injectable 40 milliGRAM(s) IV Push two times a day  levothyroxine 125 MICROGram(s) Oral daily  methylPREDNISolone sodium succinate Injectable 20 milliGRAM(s) IV Push every 8 hours  metoprolol succinate ER 25 milliGRAM(s) Oral at bedtime  midodrine. 5 milliGRAM(s) Oral three times a day  multivitamin 1 Tablet(s) Oral daily  potassium chloride    Tablet ER 20 milliEquivalent(s) Oral daily  rivaroxaban 10 milliGRAM(s) Oral daily  spironolactone 25 milliGRAM(s) Oral daily  thiamine 100 milliGRAM(s) Oral daily      REVIEW OF SYSTEMS:    CONSTITUTIONAL: No fever, weight loss, or fatigue  EYES: No eye pain, visual disturbances, or discharge  ENMT:  No difficulty hearing, tinnitus, vertigo; No sinus or throat pain  NECK: No pain or stiffness  BREASTS: No pain, masses, or nipple discharge  RESPIRATORY: No cough, wheezing, chills or hemoptysis; No shortness of breath  CARDIOVASCULAR: No chest pain, palpitations, dizziness, or leg swelling  GASTROINTESTINAL: No abdominal or epigastric pain. No nausea, vomiting, or hematemesis; No diarrhea or constipation. No melena or hematochezia.  GENITOURINARY: No dysuria, frequency, hematuria, or incontinence  NEUROLOGICAL: No headaches, memory loss, loss of strength, numbness, or tremors  SKIN: No itching, burning, rashes, or lesions   LYMPH NODES: No enlarged glands  ENDOCRINE: No heat or cold intolerance; No hair loss  MUSCULOSKELETAL: No joint pain or swelling; No muscle, back, or extremity pain  PSYCHIATRIC: No depression, anxiety, mood swings, or difficulty sleeping  HEME/LYMPH: No easy bruising, or bleeding gums  ALLERY AND IMMUNOLOGIC: No hives or eczema      Physicial Exam:     Constitutional: NAD, well-groomed, well-developed  HEENT: PERRLA, EOMI, no drainage or redness  Neck: No bruits; no thyromegaly or nodules,  No JVD  Back: Normal spine flexure, No CVA tenderness, No deformity or limitation of movement  Respiratory: Breath Sounds equal & clear to percussion & auscultation, no accessory muscle use  Cardiovascular: Regular rate & rhythm, normal S1, S2; no murmurs, gallops or rubs; no S3, S4  Gastrointestinal: Soft, non-tender, non distended no hepatosplenomegaly, normal bowel sounds  Extremities: No peripheral edema, No cyanosis, clubbing   Vascular: Equal and normal pulses: 2+ peripheral pulses throughout  Neurological: GCS:    A&O x 3; no sensory, motor  deficits, normal reflexes  Psychiatric: Normal mood, normal affect  Musculoskeletal: No joint pain, swelling or deformity; no limitation of movement  Skin: No rashes

## 2024-02-11 NOTE — CONSULT NOTE ADULT - SUBJECTIVE AND OBJECTIVE BOX
Patient is a 89y old  Female who presents with a chief complaint of CHF, pleural effusion, (2024 12:12)    HPI:  HPI:  The patient is a 89 yo female with Hx. of Diastolic CHF EF 45%, CAD, HTN, Orthostatic hypotension, Atrial fib. on Xarelto, s/p PPM, COPD, Hypothyroidism, Gout , valvular heart disease, sever AS,  severe TR, severe pulmonary HTN, presented in ED BIB form home for SOB. The patient  lives alone and has a private aid. She developed worsening SOB, She had iron infusion 5 days ago and was started on Macrobid for UTI day prior to admit.     PMHx: last hospitalized  -23,  Diastolic CHF EF 45%, CAD, HTN, Orthostatic hypotension, Atrial fib. on Xarelto, s/p PPM, COPD, Hypothyroidism, Gout , valvular heart disease, sever AS,  severe TR, severe pumonary HTN,    PSHx: [facility], NOCAD ,  PPM     Meds: per reconciliation sheet, noted below  MEDICATIONS  (STANDING):  albuterol/ipratropium for Nebulization 3 milliLiter(s) Nebulizer every 6 hours  allopurinol 100 milliGRAM(s) Oral daily  cefTRIAXone Injectable. 1000 milliGRAM(s) IV Push every 24 hours  escitalopram 5 milliGRAM(s) Oral daily  furosemide   Injectable 40 milliGRAM(s) IV Push two times a day  levothyroxine 125 MICROGram(s) Oral daily  methylPREDNISolone sodium succinate Injectable 20 milliGRAM(s) IV Push every 8 hours  metoprolol succinate ER 25 milliGRAM(s) Oral at bedtime  midodrine. 5 milliGRAM(s) Oral three times a day  multivitamin 1 Tablet(s) Oral daily  potassium chloride    Tablet ER 20 milliEquivalent(s) Oral daily  rivaroxaban 10 milliGRAM(s) Oral daily  spironolactone 25 milliGRAM(s) Oral daily  thiamine 100 milliGRAM(s) Oral daily      Allergies    codeine (Unknown)    Intolerances      Social: no smoking, no alcohol, no illegal drugs; no recent travel, no exposure to TB  FAMILY HISTORY:  No pertinent family history in first degree relatives    No pertinent family history in first degree relatives       no history of premature cardiovascular disease in first degree relatives    ROS: the patient denies fever, no chills, no HA, no dizziness, no sore throat, no blurry vision, no CP, no palpitations, no SOB, no cough, no abdominal pain, no diarrhea, no N/V, no dysuria, no leg pain, no claudication, no rash, no joint aches, no rectal pain or bleeding, no night sweats    All other systems reviewed and are negative    Vital Signs Last 24 Hrs  T(C): 36.6 (2024 08:30), Max: 36.8 (10 Feb 2024 23:00)  T(F): 97.8 (2024 08:30), Max: 98.2 (10 Feb 2024 23:00)  HR: 127 (2024 08:30) (72 - 127)  BP: 103/61 (2024 08:30) (67/41 - 133/91)  BP(mean): 84 (10 Feb 2024 19:45) (51 - 100)  RR: 18 (:30) (18 - 28)  SpO2: 97% (2024 08:30) (90% - 100%)    Parameters below as of 2024 08:30  Patient On (Oxygen Delivery Method): room air      Daily     Daily Weight in k (2024 05:54)    PE:    Constitutional: frail looking  HEENT: NC/AT, EOMI, PERRLA, conjunctivae clear; ears and nose atraumatic; pharynx benign  Neck: supple; thyroid not palpable  Back: no tenderness  Respiratory: respiratory effort normal; clear to auscultation  Cardiovascular: S1S2 regular, no murmurs  Abdomen: soft, not tender, not distended, positive BS; liver and spleen WNL  Genitourinary: no suprapubic tenderness  Lymphatic: no LN palpable  Musculoskeletal: no muscle tenderness, no joint swelling or tenderness  Extremities: no pedal edema  Neurological/ Psychiatric: AxOx3, Judgement and insight normal;  moving all extremities  Skin: no rashes; no palpable lesions    Labs: all available labs reviewed                        9.7    6.34  )-----------( 167      ( 2024 07:17 )             29.8     02-11    127<L>  |  94<L>  |  22  ----------------------------<  89  4.9   |  33<H>  |  0.98    Ca    8.5      2024 07:17  Mg     1.6     -10    TPro  7.1  /  Alb  3.8  /  TBili  0.9  /  DBili  x   /  AST  29  /  ALT  20  /  AlkPhos  127<H>  02-10     LIVER FUNCTIONS - ( 10 Feb 2024 12:54 )  Alb: 3.8 g/dL / Pro: 7.1 gm/dL / ALK PHOS: 127 U/L / ALT: 20 U/L / AST: 29 U/L / GGT: x           Urinalysis Basic - ( 2024 07:17 )    Color: x / Appearance: x / SG: x / pH: x  Gluc: 89 mg/dL / Ketone: x  / Bili: x / Urobili: x   Blood: x / Protein: x / Nitrite: x   Leuk Esterase: x / RBC: x / WBC x   Sq Epi: x / Non Sq Epi: x / Bacteria: x          Radiology: all available radiological tests reviewed  < from: CT Chest No Cont (02.10.24 @ 14:34) >  ACC: [id number] EXAM:  CT CHEST   ORDERED BY: ART STEVENS     PROCEDURE DATE:  02/10/2024          INTERPRETATION:  CLINICAL INFORMATION: 89-year-old with shortness of   breath; for evaluation of pulmonary edema versus pneumonia.    COMPARISON: Comparison is made to prior studies dated 3/11/2022 and   2022.    CONTRAST/COMPLICATIONS:  IV Contrast: NONE  Oral Contrast: NONE  Complications: None reported at time of study completion    PROCEDURE:  CT of the Chest was performed.  Sagittal andcoronal reformats were performed.    FINDINGS:    LUNGS AND AIRWAYS: Bibasilar compressive atelectasis. No pulmonary   consolidation or mass.  PLEURA: There are enlarging moderate to large bilateral pleural effusions  MEDIASTINUM AND CHICA: No lymphadenopathy.  VESSELS: There is enlargement of the main pulmonary artery spanning 3.4   cm, consistent with pulmonary arterial hypertension. Severe arterial   vascular calcification.  HEART: There is severe cardiomegaly with four-chamber enlargement.  There   are permanent pacemaker with lead tips in the right ventricle. Severe   coronary arterial calcification.  CHEST WALL AND LOWER NECK: Within normal limits.  VISUALIZED UPPER ABDOMEN: Small volume of free fluid within the upper   abdomen.  BONES: Within normal limits.            IMPRESSION:    Severe cardiomegaly with enlarging large bilateral pleural effusions.    Severe pulmonary arterial hypertension.    < end of copied text >    Advanced directives addressed: full resuscitation

## 2024-02-11 NOTE — DIETITIAN NUTRITION RISK NOTIFICATION - ADDITIONAL COMMENTS/DIETITIAN RECOMMENDATIONS
Liberalize diet to Regular to optimize po/nutrient intake.   MVI w/ minerals daily to ensure 100% RDA met   Suggest add Vit C 500 mg BID, add Zinc Sulfate 220 mg x 10 days to promote wound healing   Consider adding thiamine 100 mg daily 2/2 poor PO intake/ malnutrition   Ensure plus bid  Mukesh bid  Monitor bowel movements, if no BM for >3 days, consider implementing bowel regimen.  suggest Confirm Goals of Care regarding nutrition support   Consider adding appetite stimulant such as Remeron or Marinol 2/2 chronically poor appetite/ PO intake   Monitor PO intake, tolerance, labs and weight.

## 2024-02-11 NOTE — CONSULT NOTE ADULT - SUBJECTIVE AND OBJECTIVE BOX
CHIEF COMPLAINT: SOB      HPI:  Ms. Brennan is a 89 yo female with a hx HFpEF (LVEF 50-55% based on 9/2023 TTE, severe TR, severe AS, severe pulmonary hypertension, atrial fibrillation on xarelto s/p PPM, COPD, hypothyroidism, hypertension presenting with several days of worsening SOB. Of note, was started on macrobid for UTI on day prior to admission. Reports adherence with medication. Lives alone, with health aides. Denies chest pain, fevers, chills, cough.     Pt was most recently admitted to  in 9/2023. At the time, she was evaluated for TAVR. Underwent RHC/LHC showing Mild diffuse atherosclerosis with moderate severe disease of small caliber Cx in a nondominant territory; Elevated right sided filling pressures in setting of severe TR with normal left sided filling pressures. Adequate perfusion. No intervention performed.     Outpatient cardiologist: Dr. Harris      In the ED, she was tachycardic to 107, normotensive, saturating 93%.   Labs notable for hgb 10, Na 123, Hs trop neg x1, proBNP 7548, COVID and RVP neg  CT chest: Severe cardiomegaly with enlarging large bilateral pleural effusions.  Severe pulmonary arterial hypertension.    Cardiology consulted for decompensated HF.     PAST MEDICAL / SURGICAL HISTORY:  PAST MEDICAL & SURGICAL HISTORY:  Mitral valve insufficiency      Aortic valve regurgitation      Osteopenia      CAD (coronary artery disease)      Gout      Hypothyroid      CHF (congestive heart failure)      Anxiety      Anemia      HTN (hypertension)      Afib      COPD (chronic obstructive pulmonary disease)      Chronic obstructive pulmonary disease (COPD)      HTN (hypertension)      Cardiac pacemaker      Gout      Hypothyroidism      Insomnia      Chronic CHF      History of ST elevation myocardial infarction (STEMI)      Status cardiac pacemaker      S/P knee replacement      No significant past surgical history          SOCIAL HISTORY:   Alcohol: Denied  Smoking: Nonsmoker  Drug Use: Denied  Marital Status:     FAMILY HISTORY: FAMILY HISTORY:  No pertinent family history in first degree relatives    No pertinent family history in first degree relatives        MEDICATIONS  (STANDING):  allopurinol 100 milliGRAM(s) Oral daily  cefTRIAXone Injectable. 1000 milliGRAM(s) IV Push every 24 hours  escitalopram 5 milliGRAM(s) Oral daily  levothyroxine 125 MICROGram(s) Oral daily  metoprolol succinate ER 25 milliGRAM(s) Oral at bedtime  midodrine. 5 milliGRAM(s) Oral three times a day  multivitamin 1 Tablet(s) Oral daily  potassium chloride    Tablet ER 20 milliEquivalent(s) Oral daily  rivaroxaban 10 milliGRAM(s) Oral daily  spironolactone 25 milliGRAM(s) Oral daily  thiamine 100 milliGRAM(s) Oral daily    MEDICATIONS  (PRN):  acetaminophen     Tablet .. 650 milliGRAM(s) Oral every 6 hours PRN Mild Pain (1 - 3)  aluminum hydroxide/magnesium hydroxide/simethicone Suspension 30 milliLiter(s) Oral every 4 hours PRN Dyspepsia  melatonin 3 milliGRAM(s) Oral at bedtime PRN Insomnia  ondansetron Injectable 4 milliGRAM(s) IV Push every 6 hours PRN Nausea and/or Vomiting      Allergies    codeine (Unknown)    Intolerances        REVIEW OF SYSTEMS:  CONSTITUTIONAL: No weakness, fevers or chills  Eyes: No visual changes  NECK: No pain or stiffness  RESPIRATORY: No cough, wheezing, hemoptysis; +shortness of breath  CARDIOVASCULAR: No chest pain or palpitations  GASTROINTESTINAL: No abdominal pain. No nausea, vomiting, or hematemesis; No diarrhea or constipation. No melena or hematochezia.  GENITOURINARY: No dysuria, frequency or hematuria  NEUROLOGICAL: No numbness.  SKIN: No itching or rash  All other review of systems is negative unless indicated above    VITAL SIGNS:   Vital Signs Last 24 Hrs  T(C): 36.6 (11 Feb 2024 08:30), Max: 36.8 (10 Feb 2024 23:00)  T(F): 97.8 (11 Feb 2024 08:30), Max: 98.2 (10 Feb 2024 23:00)  HR: 127 (11 Feb 2024 08:30) (72 - 127)  BP: 103/61 (11 Feb 2024 08:30) (67/41 - 133/91)  BP(mean): 84 (10 Feb 2024 19:45) (51 - 100)  RR: 18 (11 Feb 2024 08:30) (18 - 28)  SpO2: 97% (11 Feb 2024 08:30) (90% - 100%)    Parameters below as of 11 Feb 2024 08:30  Patient On (Oxygen Delivery Method): room air        I&O's Summary      PHYSICAL EXAM:  Constitutional: NAD, awake and alert  HEENT:  EOMI,  Pupils round, No oral cyanosis.  Pulmonary: Non-labored, diminished at bases b/l, no wheezing or rhonchi   Cardiovascular: irregular, systolic murmur +JVD  Gastrointestinal: Bowel Sounds present, soft, nontender.   Lymph: trace peripheral edema. No cervical lymphadenopathy.  Neurological: Alert and oriented x2, no focal deficits  Skin: No rashes.  Psych:  Mood & affect appropriate    LABS:                        9.7    6.34  )-----------( 167      ( 11 Feb 2024 07:17 )             29.8                         10.0   5.62  )-----------( 164      ( 10 Feb 2024 12:54 )             30.5     11 Feb 2024 07:17    127    |  94     |  22     ----------------------------<  89     4.9     |  33     |  0.98   10 Feb 2024 12:54    123    |  91     |  23     ----------------------------<  99     4.9     |  30     |  0.93     Ca    8.5        11 Feb 2024 07:17  Ca    8.7        10 Feb 2024 12:54  Mg     1.6       10 Feb 2024 12:54    TPro  7.1    /  Alb  3.8    /  TBili  0.9    /  DBili  x      /  AST  29     /  ALT  20     /  AlkPhos  127    10 Feb 2024 12:54    e< from: Transthoracic Echocardiogram Follow Up (09.11.23 @ 08:36) >  Impression     Summary     Limited study to assess tricuspid insufficiency and pulmonary pressure.   Severe (4+) tricuspid valve regurgitation is present.   Severe pulmonary hypertension.   The left ventricle is normal in size, paradoxical septal motion   The interventricular septum appears flattened consistent with an RV   pressure/volume overload.   An apically displaced papillary muscle is noted.   Estimated left ventricular ejection fraction is 50-55%.   The right atrium appears severely dilated.   A device wire is seen in the RV and RA.   The right ventricle is moderately dilated with decreased contractility.     Signature     ----------------------------------------------------------------   Electronically signed by Venugopal Palla, MD(Interpreting   physician) on 09/11/2023 04:51 PM   ----------------------------------------------------------------    < end of copied text >    < from: Cardiac Catheterization (09.06.23 @ 11:59) >    Diagnostic Findings:     Coronary Angiography   The coronary circulation is right dominant.      LM   Left main artery: Angiography shows mild atherosclerosis.      LAD   Proximal left anterior descending: There is a 30 % stenosis.      CX   Distal circumflex: The segment is extremely small. There is a 70 %  stenosis.    RCA   Proximal right coronary artery: There is a 40 % stenosis.      < end of copied text >    Diagnostic Conclusions:     1. Mild diffuse atherosclerosis with moderate severe disease of small  caliber Cx in a nondominant territory.  2. Elevated right sided filling pressures in setting of severe TR with  normal left sided filling pressures. Adequate perfusion.  Recommendations:     1. Recommend aggressive medical management for CAD as per ACC/AHA  guidelines  2. Structural heart follow up at  for ongoing JAYNA evaluation.

## 2024-02-11 NOTE — DIETITIAN INITIAL EVALUATION ADULT - PERTINENT MEDS FT
MEDICATIONS  (STANDING):  allopurinol 100 milliGRAM(s) Oral daily  cefTRIAXone Injectable. 1000 milliGRAM(s) IV Push every 24 hours  escitalopram 5 milliGRAM(s) Oral daily  levothyroxine 125 MICROGram(s) Oral daily  metoprolol succinate ER 25 milliGRAM(s) Oral at bedtime  midodrine. 5 milliGRAM(s) Oral three times a day  multivitamin 1 Tablet(s) Oral daily  potassium chloride    Tablet ER 20 milliEquivalent(s) Oral daily  rivaroxaban 10 milliGRAM(s) Oral daily  spironolactone 25 milliGRAM(s) Oral daily  thiamine 100 milliGRAM(s) Oral daily    MEDICATIONS  (PRN):  acetaminophen     Tablet .. 650 milliGRAM(s) Oral every 6 hours PRN Mild Pain (1 - 3)  aluminum hydroxide/magnesium hydroxide/simethicone Suspension 30 milliLiter(s) Oral every 4 hours PRN Dyspepsia  melatonin 3 milliGRAM(s) Oral at bedtime PRN Insomnia  ondansetron Injectable 4 milliGRAM(s) IV Push every 6 hours PRN Nausea and/or Vomiting

## 2024-02-11 NOTE — CONSULT NOTE ADULT - SUBJECTIVE AND OBJECTIVE BOX
HPI:  HPI:  The patient is a 89 yo female with Hx. of Diastolic CHF EF 45%, CAD, HTN, Orthostatic hypotension, Atrial fib. on Xarelto, s/p PPM, COPD, Hypothyroidism, Gout , valvular heart disease, sever AS,  severe TR, severe pumonary HTN, presented in ED BIB form home for SOB. The patient  lives alone and has a private aid. She developed worsening SOB, She had iron infusion 5 days ago and was started on Macrobid for UTI  yesterday.     PMHx: last hospitalized  -23,  Diastolic CHF EF 45%, CAD, HTN, Orthostatic hypotension, Atrial fib. on Xarelto, s/p PPM, COPD, Hypothyroidism, Gout , valvular heart disease, sever AS,  severe TR, severe pumonary HTN,    PSHx: [facility] 23, NOCAD ,  PPM     Family Hx: rene  at age 101, does not know father med Hx.     Social Hx.: not smoking, no alcohol use             (10 Feb 2024 18:17)      PAST MEDICAL & SURGICAL HISTORY:  Mitral valve insufficiency      Aortic valve regurgitation      Osteopenia      CAD (coronary artery disease)      Gout      Hypothyroid      CHF (congestive heart failure)      Anxiety      Anemia      HTN (hypertension)      Afib      COPD (chronic obstructive pulmonary disease)      Chronic obstructive pulmonary disease (COPD)      HTN (hypertension)      Cardiac pacemaker      Gout      Hypothyroidism      Insomnia      Chronic CHF      History of ST elevation myocardial infarction (STEMI)      Status cardiac pacemaker      S/P knee replacement      No significant past surgical history          Home Medications:  allopurinol 100 mg oral tablet: 1 tab(s) orally once a day (10 Feb 2024 14:48)  escitalopram 5 mg oral tablet: 1 tab(s) orally (10 Feb 2024 14:55)  levothyroxine 125 mcg (0.125 mg) oral tablet: 1 tab(s) orally once a day (10 Feb 2024 14:48)  metoprolol succinate 25 mg oral tablet, extended release: 1 tab(s) orally once a day (10 Feb 2024 14:55)  midodrine 5 mg oral tablet: 1 tab(s) orally 2 times a day (10 Feb 2024 14:56)  Multiple Vitamins oral tablet: 1 tab(s) orally once a day (10 Feb 2024 14:55)  nitrofurantoin macrocrystals 100 mg oral capsule: 1 cap(s) orally 2 times a day for 5 days ***course not complete*** (10 Feb 2024 14:55)  potassium chloride 20 mEq oral tablet, extended release: 1 tab(s) orally once a day (10 Feb 2024 14:55)  spironolactone 25 mg oral tablet: 1 tab(s) orally once a day (10 Feb 2024 14:48)  thiamine 100 mg oral tablet: 1 tab(s) orally once a day (10 Feb 2024 14:48)  torsemide 20 mg oral tablet: 1 tab(s) orally 2 times a day (10 Feb 2024 14:48)  Xarelto 10 mg oral tablet: 1 tab(s) orally once a day (10 Feb 2024 14:55)      MEDICATIONS  (STANDING):  allopurinol 100 milliGRAM(s) Oral daily  cefTRIAXone Injectable. 1000 milliGRAM(s) IV Push every 24 hours  escitalopram 5 milliGRAM(s) Oral daily  levothyroxine 125 MICROGram(s) Oral daily  metoprolol succinate ER 25 milliGRAM(s) Oral at bedtime  midodrine. 5 milliGRAM(s) Oral three times a day  multivitamin 1 Tablet(s) Oral daily  potassium chloride    Tablet ER 20 milliEquivalent(s) Oral daily  rivaroxaban 10 milliGRAM(s) Oral daily  spironolactone 25 milliGRAM(s) Oral daily  thiamine 100 milliGRAM(s) Oral daily    MEDICATIONS  (PRN):  acetaminophen     Tablet .. 650 milliGRAM(s) Oral every 6 hours PRN Mild Pain (1 - 3)  aluminum hydroxide/magnesium hydroxide/simethicone Suspension 30 milliLiter(s) Oral every 4 hours PRN Dyspepsia  melatonin 3 milliGRAM(s) Oral at bedtime PRN Insomnia  ondansetron Injectable 4 milliGRAM(s) IV Push every 6 hours PRN Nausea and/or Vomiting      Allergies    codeine (Unknown)    Intolerances        SOCIAL HISTORY: Denies tobacco, etoh abuse or illicit drug use    FAMILY HISTORY:  No pertinent family history in first degree relatives    No pertinent family history in first degree relatives        Vital Signs Last 24 Hrs  T(C): 36.6 (2024 08:30), Max: 36.8 (10 Feb 2024 23:00)  T(F): 97.8 (2024 08:30), Max: 98.2 (10 Feb 2024 23:00)  HR: 127 (2024 08:30) (72 - 127)  BP: 103/61 (2024 08:30) (67/41 - 133/91)  BP(mean): 84 (10 Feb 2024 19:45) (51 - 100)  RR: 18 (2024 08:30) (18 - 28)  SpO2: 97% (2024 08:30) (90% - 100%)    Parameters below as of 2024 08:30  Patient On (Oxygen Delivery Method): room air            REVIEW OF SYSTEMS:    CONSTITUTIONAL:  As per HPI.  HEENT:  Eyes:  No diplopia or blurred vision. ENT:  No earache, sore throat or runny nose.  CARDIOVASCULAR:  No pressure, squeezing, tightness, heaviness or aching about the chest, neck, axilla or epigastrium.  RESPIRATORY:  No cough, shortness of breath, PND or orthopnea.  GASTROINTESTINAL:  No nausea, vomiting or diarrhea.  GENITOURINARY:  No dysuria, frequency or urgency.  MUSCULOSKELETAL:  As per HPI.  SKIN:  No change in skin, hair or nails.  NEUROLOGIC:  No paresthesias, fasciculations, seizures or weakness.  PSYCHIATRIC:  No disorder of thought or mood.  ENDOCRINE:  No heat or cold intolerance, polyuria or polydipsia.  HEMATOLOGICAL:  No easy bruising or bleedings:  .     PHYSICAL EXAMINATION:    GENERAL APPEARANCE:  Pt. is not currently dyspneic, in no distress. Pt. is alert, oriented, and pleasant.  HEENT:  Pupils are normal and react normally. No icterus. Mucous membranes well colored.  NECK:  Supple. No lymphadenopathy. Jugular venous pressure not elevated. Carotids equal.   HEART:   The cardiac impulse has a normal quality. Regular. Normal S1 and S2. There are no murmurs, rubs or gallops noted  CHEST:  Chest is clear to auscultation. Normal respiratory effort.  ABDOMEN:  Soft and nontender.   EXTREMITIES:  There is no cyanosis, clubbing or edema.   SKIN:  No rash or significant lesions are noted.    LABS:                        9.7    6.34  )-----------( 167      ( 2024 07:17 )             29.8     02-11    127<L>  |  94<L>  |  22  ----------------------------<  89  4.9   |  33<H>  |  0.98    Ca    8.5      2024 07:17  Mg     1.6     02-10    TPro  7.1  /  Alb  3.8  /  TBili  0.9  /  DBili  x   /  AST  29  /  ALT  20  /  AlkPhos  127<H>  02-10    LIVER FUNCTIONS - ( 10 Feb 2024 12:54 )  Alb: 3.8 g/dL / Pro: 7.1 gm/dL / ALK PHOS: 127 U/L / ALT: 20 U/L / AST: 29 U/L / GGT: x                 Urinalysis Basic - ( 2024 07:17 )    Color: x / Appearance: x / SG: x / pH: x  Gluc: 89 mg/dL / Ketone: x  / Bili: x / Urobili: x   Blood: x / Protein: x / Nitrite: x   Leuk Esterase: x / RBC: x / WBC x   Sq Epi: x / Non Sq Epi: x / Bacteria: x      ABG - ( 2024 08:47 )  pH, Arterial: 7.29  pH, Blood: x     /  pCO2: 61    /  pO2: 106   / HCO3: 29    / Base Excess: 1.2   /  SaO2: 98                    RADIOLOGY & ADDITIONAL STUDIES:        HPI:    89 yo female with Hx. of Diastolic CHF EF 45%, CAD, HTN, Orthostatic hypotension, Atrial fib. on Xarelto, s/p PPM, COPD, Hypothyroidism, Gout , valvular heart disease, sever AS,  severe TR, severe pumonary HTN, admitted form home for SOB. The patient  lives alone and has a private aid. She developed worsening SOB, She had iron infusion 5 days ago and was started on Macrobid for UTI  yesterday. pat seen for pulmonary eval for pleural effusion, pat was on bipap last night, on 100% NRB in am & lowered to 5lpm nc sat 94%, tired & sleepy. ABG - ( 2024 08:47 )pH, Arterial: 7.29  pH, Blood: x     /  pCO2: 61    /  pO2: 106   / HCO3: 29    / Base Excess: 1.2   /  SaO2: 98,  LAST NIGHT ICU consulted for hypotension and severe pulmonary edema and congestion in the setting of CHF exacerbation.      PMHx: last hospitalized  -23,  Diastolic CHF EF 45%, CAD, HTN, Orthostatic hypotension, Atrial fib. on Xarelto, s/p PPM, COPD, Hypothyroidism, Gout , valvular heart disease, sever AS,  severe TR, severe pumonary HTN,    PSHx: [facility] 23, NOCAD ,  PPM     Family Hx: rene  at age 101, does not know father med Hx.     Social Hx.: not smoking, no alcohol use          PAST MEDICAL & SURGICAL HISTORY:  Mitral valve insufficiency      Aortic valve regurgitation      Osteopenia      CAD (coronary artery disease)      Gout      Hypothyroid      CHF (congestive heart failure)      Anxiety      Anemia      HTN (hypertension)      Afib      COPD (chronic obstructive pulmonary disease)      Chronic obstructive pulmonary disease (COPD)      HTN (hypertension)      Cardiac pacemaker      Gout      Hypothyroidism      Insomnia      Chronic CHF      History of ST elevation myocardial infarction (STEMI)      Status cardiac pacemaker      S/P knee replacement      No significant past surgical history          Home Medications:  allopurinol 100 mg oral tablet: 1 tab(s) orally once a day (10 Feb 2024 14:48)  escitalopram 5 mg oral tablet: 1 tab(s) orally (10 Feb 2024 14:55)  levothyroxine 125 mcg (0.125 mg) oral tablet: 1 tab(s) orally once a day (10 Feb 2024 14:48)  metoprolol succinate 25 mg oral tablet, extended release: 1 tab(s) orally once a day (10 Feb 2024 14:55)  midodrine 5 mg oral tablet: 1 tab(s) orally 2 times a day (10 Feb 2024 14:56)  Multiple Vitamins oral tablet: 1 tab(s) orally once a day (10 Feb 2024 14:55)  nitrofurantoin macrocrystals 100 mg oral capsule: 1 cap(s) orally 2 times a day for 5 days ***course not complete*** (10 Feb 2024 14:55)  potassium chloride 20 mEq oral tablet, extended release: 1 tab(s) orally once a day (10 Feb 2024 14:55)  spironolactone 25 mg oral tablet: 1 tab(s) orally once a day (10 Feb 2024 14:48)  thiamine 100 mg oral tablet: 1 tab(s) orally once a day (10 Feb 2024 14:48)  torsemide 20 mg oral tablet: 1 tab(s) orally 2 times a day (10 Feb 2024 14:48)  Xarelto 10 mg oral tablet: 1 tab(s) orally once a day (10 Feb 2024 14:55)      MEDICATIONS  (STANDING):  allopurinol 100 milliGRAM(s) Oral daily  cefTRIAXone Injectable. 1000 milliGRAM(s) IV Push every 24 hours  escitalopram 5 milliGRAM(s) Oral daily  levothyroxine 125 MICROGram(s) Oral daily  metoprolol succinate ER 25 milliGRAM(s) Oral at bedtime  midodrine. 5 milliGRAM(s) Oral three times a day  multivitamin 1 Tablet(s) Oral daily  potassium chloride    Tablet ER 20 milliEquivalent(s) Oral daily  rivaroxaban 10 milliGRAM(s) Oral daily  spironolactone 25 milliGRAM(s) Oral daily  thiamine 100 milliGRAM(s) Oral daily    MEDICATIONS  (PRN):  acetaminophen     Tablet .. 650 milliGRAM(s) Oral every 6 hours PRN Mild Pain (1 - 3)  aluminum hydroxide/magnesium hydroxide/simethicone Suspension 30 milliLiter(s) Oral every 4 hours PRN Dyspepsia  melatonin 3 milliGRAM(s) Oral at bedtime PRN Insomnia  ondansetron Injectable 4 milliGRAM(s) IV Push every 6 hours PRN Nausea and/or Vomiting      Allergies    codeine (Unknown)    Intolerances        SOCIAL HISTORY: Denies tobacco, etoh abuse or illicit drug use    FAMILY HISTORY:  No pertinent family history in first degree relatives    No pertinent family history in first degree relatives        Vital Signs Last 24 Hrs  T(C): 36.6 (2024 08:30), Max: 36.8 (10 Feb 2024 23:00)  T(F): 97.8 (2024 08:30), Max: 98.2 (10 Feb 2024 23:00)  HR: 127 (2024 08:30) (72 - 127)  BP: 103/61 (2024 08:30) (67/41 - 133/91)  BP(mean): 84 (10 Feb 2024 19:45) (51 - 100)  RR: 18 (2024 08:30) (18 - 28)  SpO2: 97% (2024 08:30) (90% - 100%)    Parameters below as of 2024 08:30  Patient On (Oxygen Delivery Method): room air            REVIEW OF SYSTEMS:    CONSTITUTIONAL:  As per HPI.  HEENT:  Eyes:  No diplopia or blurred vision. ENT:  No earache, sore throat or runny nose.  CARDIOVASCULAR:  No pressure, squeezing, tightness, heaviness or aching about the chest, neck, axilla or epigastrium.  RESPIRATORY:  No cough, +shortness of breath, PND or orthopnea.  GASTROINTESTINAL:  No nausea, vomiting or diarrhea.  GENITOURINARY:  No dysuria, frequency or urgency.  MUSCULOSKELETAL:  As per HPI.  SKIN:  No change in skin, hair or nails.  NEUROLOGIC:  No paresthesias, fasciculations, seizures or weakness.  PSYCHIATRIC:  No disorder of thought or mood.  ENDOCRINE:  No heat or cold intolerance, polyuria or polydipsia.  HEMATOLOGICAL:  No easy bruising or bleedings:  .     PHYSICAL EXAMINATION:    GENERAL APPEARANCE:  Pt. is not currently dyspneic, in no distress. Pt. is alert, oriented, and pleasant.  HEENT:  Pupils are normal and react normally. No icterus. Mucous membranes well colored.  NECK:  Supple. No lymphadenopathy. Jugular venous pressure not elevated. Carotids equal.   HEART:   The cardiac impulse has a normal quality. Regular. Normal S1 and S2. There are no murmurs, rubs or gallops noted  CHEST:  Chest crackles to auscultation. Normal respiratory effort.  ABDOMEN:  Soft and nontender.   EXTREMITIES:  There is no cyanosis, clubbing or edema.   SKIN:  No rash or significant lesions are noted.    LABS:                        9.7    6.34  )-----------( 167      ( 2024 07:17 )             29.8     02-11    127<L>  |  94<L>  |  22  ----------------------------<  89  4.9   |  33<H>  |  0.98    Ca    8.5      2024 07:17  Mg     1.6     02-10    TPro  7.1  /  Alb  3.8  /  TBili  0.9  /  DBili  x   /  AST  29  /  ALT  20  /  AlkPhos  127<H>  02-10    LIVER FUNCTIONS - ( 10 Feb 2024 12:54 )  Alb: 3.8 g/dL / Pro: 7.1 gm/dL / ALK PHOS: 127 U/L / ALT: 20 U/L / AST: 29 U/L / GGT: x                 Urinalysis Basic - ( 2024 07:17 )    Color: x / Appearance: x / SG: x / pH: x  Gluc: 89 mg/dL / Ketone: x  / Bili: x / Urobili: x   Blood: x / Protein: x / Nitrite: x   Leuk Esterase: x / RBC: x / WBC x   Sq Epi: x / Non Sq Epi: x / Bacteria: x      ABG - ( 2024 08:47 )  pH, Arterial: 7.29  pH, Blood: x     /  pCO2: 61    /  pO2: 106   / HCO3: 29    / Base Excess: 1.2   /  SaO2: 98                    RADIOLOGY & ADDITIONAL STUDIES:     CT Chest No Cont (02.10.24 @ 14:34) >  IMPRESSION:    Severe cardiomegaly with enlarging large bilateral pleural effusions.    Severe pulmonary arterial hypertension.

## 2024-02-11 NOTE — DIETITIAN INITIAL EVALUATION ADULT - NAME AND PHONE
Ellie Lerma RDN, CDN, [facility]      [phone]   [email or web address]

## 2024-02-11 NOTE — DIETITIAN INITIAL EVALUATION ADULT - OTHER INFO
HPI:  The patient is a 89 yo female with Hx. of Diastolic CHF EF 45%, CAD, HTN, Orthostatic hypotension, Atrial fib. on Xarelto, s/p PPM, COPD, Hypothyroidism, Gout , valvular heart disease, sever AS,  severe TR, severe pumonary HTN, presented in ED BIB form home for SOB. The patient  lives alone and has a private aid. She developed worsening SOB, She had iron infusion 5 days ago and was started on Macrobid for UTI  yesterday.    Admit dx: Pna  Visited with pt at bedside.  Pt is forgetful, somewhat confused but pleasant.   RD bedscale wt is 54 kg   119#  Wt on 9/6/2023 was 57 kg, pt was dx'ed with severe malnutrition  Appetite at  is fair  Suggest confirm Goals of Care regarding nutrition support   Consider adding appetite stimulant such as Remeron or Marinol 2/2 chronically poor appetite/ PO intake   Add Ensure plus tid  Recommendations to follow in Plan/Intervention

## 2024-02-11 NOTE — DIETITIAN INITIAL EVALUATION ADULT - DIET TYPE
Liberalize diet to Regular to optimize po/nutrient intake./regular/supplement (specify)/phase 1 bariatric full liquid

## 2024-02-11 NOTE — CONSULT NOTE ADULT - PROBLEM SELECTOR RECOMMENDATION 9
Pt presenting with SOB, pleural effusions, elevated BNP in setting known valvular disease (last TTE with LVEF 50-55%, severe TR, severe AS)  at home on torsemide 20mg BID ; she was given only 20mg IV lasix in ED   will start lasix 40mg IV lasix BID and reassess   cont spironolactone  close monitoring of electrolytes, Cr

## 2024-02-11 NOTE — PROGRESS NOTE ADULT - ASSESSMENT
89 year-old woman with DM type II, HTN, CAD, chronic diastolic CHF, chronic atrial fibrillation on Xarelto, hx of PPM placement, severe aortic stenosis, severe tricuspid regurgitation, severe pulmonary HTN, chronic orthostatic hypotension on midodrine, COPD, hypothyroidism, gout, just started on macrobid for 1 day for UTI, presented with worsening shortness of breath. Reports adherence with medication. Lives alone, with health aides. Denied chest pain, fevers, chills, cough. She was most recently admitted to  in 9/2023. At the time, she was evaluated for TAVR. Underwent RHC/LHC showing mild diffuse atherosclerosis with moderate severe disease of small caliber Cx in a nondominant territory. She had elevated right sided filling pressures in setting of severe TR with normal left sided filling pressures. Adequate perfusion. No intervention performed. Her outpatient cardiologist is Dr. Harris. In the ED, she was tachycardic to 107, normotensive, saturating 93%. She had increased work of breathing. Labs notable for Hgb 10, Na 123, Hs trop neg x1, proBNP 7548, COVID and RVP neg. CT chest w/ severe cardiomegaly with enlarging large bilateral pleural effusions. Severe pulmonary arterial hypertension. Admitted for further management of acute respiratory failure.     Acute hypoxic and hypercapneic respiratory failure  Likely multi factorial due to CHF exacerbation, severe AS, severe TR, severe pHTN, COPD with probable exacerbation. No sign of pneumonia at this point. Has required NIPPV with BiPAP to reduce work of breathing and improve hypercapnea.   - Continue NIPPV with BiPAP, certainly overnight tonight  - Continue O2 supplementation as needed, wean as tolerated, goal SPO2 92%  - Continue to treat underlying issues, see below    Acute on chronic diastolic CHF  Patient presenting with SOB, pleural effusions, elevated BNP in setting known valvular disease, HFpEF. Last TTE with LVEF 50-55%, severe TR, severe AS. At home, shes on torsemide 20mg BID. Appreciate input from Cardiology.  - Continue diuresis with IV Lasix  - Continue spironolactone  - Continue metoprolol  - Strict Is and Os, daily wt, trend Cr and Lytes    Severe aortic stenosis   Previously evaluated by Dr Levin for structural intervention/JAYNA. Patient and family had opted for medical management.    CAD  Recent cath with mild diffuse atherosclerosis with moderate severe disease of small caliber Cx in a nondominant territory.  - Cont med management with metoprolol, unclear why not on statin (will need to clarify if she did not previously tolerate)    Chronic atrial fibrillation  RVR in the ED in setting of active respiratory symptoms.   - Continue metoprolol  - Will try to wean midodrine as tolerated  - Continue Xarelto for stroke risk reduction    Hx of pacemaker placement  Consulted cardiac EP for interrogation  - F/u PPM interrogation    COPD with acute exacerbation  Appreciate input from Pulmonary  Medicine  - Continue systemic steroids with methylprednisolone  - Continue DuoNeb q6h    Significant pleural effusions B/L  Thoracic Surgery consulted for input re possible drainage.  - F/u with Thoracic Surgery    UTI, present prior to admission  Recent dx of UTI. Was treated with macrobid, which she received for 1 day before getting admitted to hospital. Now on empiric ceftriaxone.   - Continue ceftriaxone 1g daily  - F/u in process cultures    Hyponatremia  Suspect hypervolemic due to fluid overload. Na now improving.   - Monitor Na trend during diuresis   - Free water/fluid restriction   - Avoid thiazide diuretics if Na remains low ( HCTZ, metolazone etc)     Borderline hyperkalemia  K 4.9.   - If K rises, would stop potassium supplement (as she is on both potassium supplement and spironolactone)    DM  type II  A1c 6.6 in 9/2023  - Obtain updated A1c  - Continue on insulin correctional scale for now    Hypothyroidism  Stable.   - Continue levothyroxine    Hx of gout  Stable.   - Continue allopurinol    Physical deconditioning and debility  PT consulted  - F/u PT eval    Severe protein calorie malnutrition  Appreciate dietician input  - Trend wt  - Added MVI with minerals daily, thiamine 100mg daily        DVT px: On Xarelto for afib  Code Status: DNR/DNI/Trial NIV - confirmed with patient today 2/11/24  Dispo: To be determined pending further clinical assessment

## 2024-02-11 NOTE — DIETITIAN NUTRITION RISK NOTIFICATION - TREATMENT: THE FOLLOWING DIET HAS BEEN RECOMMENDED
Diet, Regular:   1200mL Fluid Restriction ([id number])  Supplement Feeding Modality:  Oral  Ensure Plus High Protein Cans or Servings Per Day:  1       Frequency:  Two Times a day (02-11-24 @ 10:31) [Active]

## 2024-02-11 NOTE — DIETITIAN INITIAL EVALUATION ADULT - NSFNSPHYEXAMSKINFT_GEN_A_CORE
Pressure Injury 1: Left:, buttocks, Stage II  Pressure Injury 2: coccyx, Suspected deep tissue injury  Pressure Injury 3: Left:, heel, Suspected deep tissue injury  Pressure Injury 4: Right:, heel, Suspected deep tissue injury  Xu 13

## 2024-02-11 NOTE — PROGRESS NOTE ADULT - SUBJECTIVE AND OBJECTIVE BOX
Chief Complaint: Shortness of breath    Interval Hx: Patient seen and examined this AM. Reports improvement in her breathing since admission. No dyspnea at rest. Remains hypoxic. On supplemental O2. Generally weak and deconditioned. No chest pain or tightness. No dizziness, lightheadedness or palpitations. No abdominal complaints. No urinary complaints.     ROS: Multi system review is comprehensively negative x 10 systems except as above    Vitals:  T(F): 97.8 (11 Feb 2024 08:30), Max: 98.2 (10 Feb 2024 23:00)  HR: 120 (11 Feb 2024 14:15) (72 - 127)  BP: 112/62 (11 Feb 2024 14:15) (92/63 - 112/76)  RR: 18 (11 Feb 2024 14:15) (18 - 28)  SpO2: 94% (11 Feb 2024 14:15) (93% - 100%) on O2 via NC 5L/min    Exam:  Constitutional: No acute distress  HEENT: NCAT PERRL EOMI MMM clear oropharynx  Neck: Supple, no JVD  CVS: S1 and S2, irregular, rate ~80, 2/6 REBECA   Chest: Normal resp effort at rest, lungs clear  Abd: +BS, soft NT ND   Ext: B/L LE edema  Skin: Warm, dry  Psych: Mood & affect appropriate  Neurological: Alert, no focal deficits    Labs:                      9.7    6.34  )--------( 167              29.8       127  |  94  |  22  ---------------------<  89  4.9   |  33  |  0.98    Ca  8.5      Mg  1.6        TPro  7.1  /  Alb  3.8  /  TBili  0.9  /  DBili  x   /  AST  29  /  ALT  20  /  AlkPhos  127    Troponin negative   proBNP 7548    ABG 2/11 8:47   pH 7.29  /  pCO2: 61  /  pO2: 106  /   HCO3: 29  /  Base Excess: 1.2  /   SaO2: 98        UA 2/10: Yellow, clear, neg ketone, neg nitrite, trace LE, 2 WBC, 0 RBC, bact neg    Micro:  COVID19 PCR 2/10: Negative  Flu PCR 2/10: Negative  RSV PCR 2/10: Negative    Imaging:  CT chest WO 2/10: Bibasilar compressive atelectasis. No pulmonary consolidation or mass. There are enlarging moderate to large bilateral pleural effusions. No lymphadenopathy. There is enlargement of the main pulmonary artery spanning 3.4cm, consistent with pulmonary arterial hypertension. Severe arterial vascular calcification. There is severe cardiomegaly with four-chamber enlargement.  There are permanent pacemaker with lead tips in the right ventricle. Severe coronary arterial calcification.    CXR 2/10: Bilateral pleural effusions with adjacent atelectasis versus pneumonia at the left base.    Cardiac Testing:  Tele 2/10: Afib rate     EKG 2/10: Afib rate 100s    Prior visit cardiac testing   TTE 9/11:  Limited study to assess tricuspid insufficiency and pulmonary pressure. Severe (4+) tricuspid valve regurgitation is present. Severe pulmonary hypertension. The left ventricle is normal in size, paradoxical septal motion The interventricular septum appears flattened consistent with an RV pressure/volume overload. An apically displaced papillary muscle is noted. Estimated left ventricular ejection fraction is 50-55%. The right atrium appears severely dilated. A device wire is seen in the RV and RA. The right ventricle is moderately dilated with decreased contractility.    LHC 9/6: Mild diffuse atherosclerosis with moderate severe disease of small caliber Cx in a nondominant territory. Elevated right sided filling pressures in setting of severe TR with normal left sided filling pressures. Adequate perfusion.    TTE 8/29: Mild to mod MR. Severe AS. Severe TR. Mild to mod AR. Severe pHTN. Severely dilated LA. LV systolic function mildly impaired; segmental wall motion abnormalities noted. Mild concentric LVH. LVEF 45%. The interventricular septum appears flattened consistent with an RV pressure/volume overload. RA severely dilated. RV with moderate dilation, diffuse hypokinesis, and depression of contractility based on TAPSE. A device wire is seen in the RV and RA. IVC is dilated and not collapsing with inspiration.    Meds:  MEDICATIONS  (STANDING):  albuterol/ipratropium for Nebulization 3 milliLiter(s) Nebulizer every 6 hours  allopurinol 100 milliGRAM(s) Oral daily  cefTRIAXone Injectable. 1000 milliGRAM(s) IV Push every 24 hours  escitalopram 5 milliGRAM(s) Oral daily  furosemide   Injectable 40 milliGRAM(s) IV Push two times a day  levothyroxine 125 MICROGram(s) Oral daily  magnesium sulfate  IVPB 1 Gram(s) IV Intermittent once  methylPREDNISolone sodium succinate Injectable 20 milliGRAM(s) IV Push every 8 hours  metoprolol succinate ER 25 milliGRAM(s) Oral at bedtime  midodrine. 5 milliGRAM(s) Oral three times a day  multivitamin 1 Tablet(s) Oral daily  potassium chloride    Tablet ER 20 milliEquivalent(s) Oral daily  rivaroxaban 10 milliGRAM(s) Oral daily  spironolactone 25 milliGRAM(s) Oral daily  thiamine 100 milliGRAM(s) Oral daily    MEDICATIONS  (PRN):  acetaminophen     Tablet .. 650 milliGRAM(s) Oral every 6 hours PRN Mild Pain (1 - 3)  aluminum hydroxide/magnesium hydroxide/simethicone Suspension 30 milliLiter(s) Oral every 4 hours PRN Dyspepsia  melatonin 3 milliGRAM(s) Oral at bedtime PRN Insomnia  ondansetron Injectable 4 milliGRAM(s) IV Push every 6 hours PRN Nausea and/or Vomiting

## 2024-02-11 NOTE — DIETITIAN INITIAL EVALUATION ADULT - ORAL INTAKE PTA/DIET HISTORY
Pt lives at home and has aid, who shops and cooks for her.  Based on dietary recall, PO intake estimated < 75% ENN > one month

## 2024-02-11 NOTE — CONSULT NOTE ADULT - NSCONSULTADDITIONALINFOA_GEN_ALL_CORE
Time spent 75 mins included assessed presenting problems of acute illness that poses probability  end organ damage/dysfunction.  Medical decision making including plan of daily care, reviewing data, reviewing radiology, discussing with multidisciplinary team, non inclusive of procedures, discussing goals of care with patient/family

## 2024-02-11 NOTE — CONSULT NOTE ADULT - PROBLEM SELECTOR RECOMMENDATION 4
persistent Afib, cont AC with xarelto  recommend weaning midodrine as tolerated; cont metoprolol - may need to transition to tartrate to better uptitrate while in hospital

## 2024-02-11 NOTE — PROVIDER CONTACT NOTE (OTHER) - ASSESSMENT
Pt short of breath and 85% on NRB. Lung sounds absent on L side, crackles and diminished on R. Pt AxOx4 and reports feeling "bad". BP 92/ Pt short of breath and 85% on NRB. Lung sounds absent on L side, crackles and diminished on R. Pt AxOx4 and reports feeling "bad". BP 92/63

## 2024-02-11 NOTE — DIETITIAN INITIAL EVALUATION ADULT - PERTINENT LABORATORY DATA
02-11    127<L>  |  94<L>  |  22  ----------------------------<  89  4.9   |  33<H>  |  0.98    Ca    8.5      11 Feb 2024 07:17  Mg     1.6     02-10    TPro  7.1  /  Alb  3.8  /  TBili  0.9  /  DBili  x   /  AST  29  /  ALT  20  /  AlkPhos  127<H>  02-10  POCT Blood Glucose.: 205 mg/dL (02-10-24 @ 20:51)  A1C with Estimated Average Glucose Result: 6.6 % (09-06-23 @ 07:12)

## 2024-02-12 NOTE — PHYSICAL THERAPY INITIAL EVALUATION ADULT - PERTINENT HX OF CURRENT PROBLEM, REHAB EVAL
89 year old female PMHx of Diastolic CHF EF 45%, CAD, HTN, Orthostatic hypotension, Atrial fib. on Xarelto, s/p PPM, COPD, Hypothyroidism, Gout , valvular heart disease, sever AS,  severe TR, severe pulmonary HTN, presented to the ER for SOB. bilateral pleural effusions moderate right larger than left

## 2024-02-12 NOTE — CONSULT NOTE ADULT - CONVERSATION DETAILS
Met with patient to offer support & introduce team, alongside Pastora Hernandez LMSW. Pt appears Kiana but able to communicate, alert, oriented and able to make needs known. Pt confirms that she resides home with [facility] aides who provide coverage all day. She recounts her experience at a [facility] and explains that she will never go back to one. Her main goal is to get home. She has daughter Citlalli who lives in [place] & son who lives in [place]. She explains that "this is not her first rodeo" and that she has "all of her faculties." That being said, she defers to her children for decision-making but also would like to be consulted if needed. Her main goal is to get home but she is okay with coming back to the hospital if needed. She explains that "I don't want to die but if I live, I live, If I die, I die. Everything is in God's hands." Team provided active listening and emotional support.     Team met with pt's daughter Citlalli while patient was working with physical therapy. She confirms all the above information. Her hope is for patient to return home. Team explained options for the future including hospice and its philosophy. She does not feel her mother is ready for hospice at this point. Citlalli confirmed [facility] reflecting DNR/DNI wishes. Dtr Citlalli explains that pt be struggling with a bit of depression as she is not interested in doing the things she used to enjoy doing 1 year ago, i.e. playing cards, knitting, reading. She has reportedly tried Lexapro for 2 days and stopped because it made her dizzy.     Emotional support provided. All questions addressed. Plan for pt to return home with aides & [facility] - dtr requesting transport wheelchair. Our team will continue to follow.

## 2024-02-12 NOTE — PROGRESS NOTE ADULT - ASSESSMENT
89 yo female with Hx. of Diastolic CHF EF 45%, CAD, HTN, Orthostatic hypotension, Atrial fib. on Xarelto, s/p PPM, COPD, Hypothyroidism, Gout , valvular heart disease, sever AS,  severe TR, severe pumonary HTN, presented in ED BIB form home for SOB. The patient  lives alone and has a private aid. She developed worsening SOB, She had iron infusion 5 days ago and was started on Macrobid for UTI  yesterday.     PROBLEMS:    AC hypoxaemic & hypercapnic respiratory failure  Acute excerbation of COPD  Acute CHF   Bilateral pleural effusion  Hypnatremia   Orthostatic hypotension  Atrial fib-s/p PPM  Non obs CAD    Hypothyroidism  Severe valvular heart disease  UTI     PLAN:    pulmonary better  IV lasix  BIPAP PRN & TITRATE FIO2/VENOUS GAS  Taper IV SOLU-MEDROLS 20MG Q12HR  AEROSOLS/ NEB  Fluid restrictions  IV Ceftriaxone pneumonia / UTI  VTEP-on Xarelto

## 2024-02-12 NOTE — PHYSICAL THERAPY INITIAL EVALUATION ADULT - GENERAL OBSERVATIONS, REHAB EVAL
Pt. received semi-supine in bed + O2 4 l nc + tele + IV agreeable to PT. Pt.'s dtr at BS. Bed mob with Min A, amb with RW CG A 30 ft. with portable O2 and IV pole. RN aware.

## 2024-02-12 NOTE — PROGRESS NOTE ADULT - SUBJECTIVE AND OBJECTIVE BOX
CHIEF COMPLAINT: SOB      HPI:  Ms. Brennan is a 89 yo female with a hx HFpEF (LVEF 50-55% based on 9/2023 TTE, severe TR, severe AS, severe pulmonary hypertension, atrial fibrillation on xarelto s/p PPM, COPD, hypothyroidism, hypertension presenting with several days of worsening SOB. Of note, was started on macrobid for UTI on day prior to admission. Reports adherence with medication. Lives alone, with health aides. Denies chest pain, fevers, chills, cough.     Pt was most recently admitted to  in 9/2023. At the time, she was evaluated for TAVR. Underwent RHC/LHC showing Mild diffuse atherosclerosis with moderate severe disease of small caliber Cx in a nondominant territory; Elevated right sided filling pressures in setting of severe TR with normal left sided filling pressures. Adequate perfusion. No intervention performed.     Outpatient cardiologist: Dr. Harris      In the ED, she was tachycardic to 107, normotensive, saturating 93%.   Labs notable for hgb 10, Na 123, Hs trop neg x1, proBNP 7548, COVID and RVP neg  CT chest: Severe cardiomegaly with enlarging large bilateral pleural effusions.  Severe pulmonary arterial hypertension.    Cardiology consulted for decompensated HF.     PAST MEDICAL / SURGICAL HISTORY:  PAST MEDICAL & SURGICAL HISTORY:  Mitral valve insufficiency      Aortic valve regurgitation      Osteopenia      CAD (coronary artery disease)      Gout      Hypothyroid      CHF (congestive heart failure)      Anxiety      Anemia      HTN (hypertension)      Afib      COPD (chronic obstructive pulmonary disease)      Chronic obstructive pulmonary disease (COPD)      HTN (hypertension)      Cardiac pacemaker      Gout      Hypothyroidism      Insomnia      Chronic CHF      History of ST elevation myocardial infarction (STEMI)      Status cardiac pacemaker      S/P knee replacement      No significant past surgical history          SOCIAL HISTORY:   Alcohol: Denied  Smoking: Nonsmoker  Drug Use: Denied  Marital Status:     FAMILY HISTORY: FAMILY HISTORY:  No pertinent family history in first degree relatives    No pertinent family history in first degree relatives        MEDICATIONS  (STANDING):  allopurinol 100 milliGRAM(s) Oral daily  cefTRIAXone Injectable. 1000 milliGRAM(s) IV Push every 24 hours  escitalopram 5 milliGRAM(s) Oral daily  levothyroxine 125 MICROGram(s) Oral daily  metoprolol succinate ER 25 milliGRAM(s) Oral at bedtime  midodrine. 5 milliGRAM(s) Oral three times a day  multivitamin 1 Tablet(s) Oral daily  potassium chloride    Tablet ER 20 milliEquivalent(s) Oral daily  rivaroxaban 10 milliGRAM(s) Oral daily  spironolactone 25 milliGRAM(s) Oral daily  thiamine 100 milliGRAM(s) Oral daily    MEDICATIONS  (PRN):  acetaminophen     Tablet .. 650 milliGRAM(s) Oral every 6 hours PRN Mild Pain (1 - 3)  aluminum hydroxide/magnesium hydroxide/simethicone Suspension 30 milliLiter(s) Oral every 4 hours PRN Dyspepsia  melatonin 3 milliGRAM(s) Oral at bedtime PRN Insomnia  ondansetron Injectable 4 milliGRAM(s) IV Push every 6 hours PRN Nausea and/or Vomiting      Allergies    codeine (Unknown)    Intolerances        REVIEW OF SYSTEMS:  CONSTITUTIONAL: No weakness, fevers or chills  Eyes: No visual changes  NECK: No pain or stiffness  RESPIRATORY: No cough, wheezing, hemoptysis; +shortness of breath  CARDIOVASCULAR: No chest pain or palpitations  GASTROINTESTINAL: No abdominal pain. No nausea, vomiting, or hematemesis; No diarrhea or constipation. No melena or hematochezia.  GENITOURINARY: No dysuria, frequency or hematuria  NEUROLOGICAL: No numbness.  SKIN: No itching or rash  All other review of systems is negative unless indicated above    VITAL SIGNS:   Vital Signs Last 24 Hrs  T(C): 36.6 (11 Feb 2024 08:30), Max: 36.8 (10 Feb 2024 23:00)  T(F): 97.8 (11 Feb 2024 08:30), Max: 98.2 (10 Feb 2024 23:00)  HR: 127 (11 Feb 2024 08:30) (72 - 127)  BP: 103/61 (11 Feb 2024 08:30) (67/41 - 133/91)  BP(mean): 84 (10 Feb 2024 19:45) (51 - 100)  RR: 18 (11 Feb 2024 08:30) (18 - 28)  SpO2: 97% (11 Feb 2024 08:30) (90% - 100%)    Parameters below as of 11 Feb 2024 08:30  Patient On (Oxygen Delivery Method): room air        I&O's Summary      PHYSICAL EXAM:  Constitutional: NAD, awake and alert  HEENT:  EOMI,  Pupils round, No oral cyanosis.  Pulmonary: Non-labored, diminished at bases b/l, no wheezing or rhonchi   Cardiovascular: irregular, systolic murmur +JVD  Gastrointestinal: Bowel Sounds present, soft, nontender.   Lymph: trace peripheral edema. No cervical lymphadenopathy.  Neurological: Alert and oriented x2, no focal deficits  Skin: No rashes.  Psych:  Mood & affect appropriate    LABS:                        9.7    6.34  )-----------( 167      ( 11 Feb 2024 07:17 )             29.8                         10.0   5.62  )-----------( 164      ( 10 Feb 2024 12:54 )             30.5     11 Feb 2024 07:17    127    |  94     |  22     ----------------------------<  89     4.9     |  33     |  0.98   10 Feb 2024 12:54    123    |  91     |  23     ----------------------------<  99     4.9     |  30     |  0.93     Ca    8.5        11 Feb 2024 07:17  Ca    8.7        10 Feb 2024 12:54  Mg     1.6       10 Feb 2024 12:54    TPro  7.1    /  Alb  3.8    /  TBili  0.9    /  DBili  x      /  AST  29     /  ALT  20     /  AlkPhos  127    10 Feb 2024 12:54    e< from: Transthoracic Echocardiogram Follow Up (09.11.23 @ 08:36) >  Impression     Summary     Limited study to assess tricuspid insufficiency and pulmonary pressure.   Severe (4+) tricuspid valve regurgitation is present.   Severe pulmonary hypertension.   The left ventricle is normal in size, paradoxical septal motion   The interventricular septum appears flattened consistent with an RV   pressure/volume overload.   An apically displaced papillary muscle is noted.   Estimated left ventricular ejection fraction is 50-55%.   The right atrium appears severely dilated.   A device wire is seen in the RV and RA.   The right ventricle is moderately dilated with decreased contractility.     Signature     ----------------------------------------------------------------   Electronically signed by Venugopal Palla, MD(Interpreting   physician) on 09/11/2023 04:51 PM   ----------------------------------------------------------------    < end of copied text >    < from: Cardiac Catheterization (09.06.23 @ 11:59) >    Diagnostic Findings:     Coronary Angiography   The coronary circulation is right dominant.      LM   Left main artery: Angiography shows mild atherosclerosis.      LAD   Proximal left anterior descending: There is a 30 % stenosis.      CX   Distal circumflex: The segment is extremely small. There is a 70 %  stenosis.    RCA   Proximal right coronary artery: There is a 40 % stenosis.      < end of copied text >    Diagnostic Conclusions:     1. Mild diffuse atherosclerosis with moderate severe disease of small  caliber Cx in a nondominant territory.  2. Elevated right sided filling pressures in setting of severe TR with  normal left sided filling pressures. Adequate perfusion.  Recommendations:     1. Recommend aggressive medical management for CAD as per ACC/AHA  guidelines  2. Structural heart follow up at  for ongoing [facility] evaluation.             CHIEF COMPLAINT: SOB      HPI:  Ms. Brennan is a 89 yo female with a hx HFpEF (LVEF 50-55% based on 9/2023 TTE, severe TR, severe AS, severe pulmonary hypertension, atrial fibrillation on xarelto s/p PPM, COPD, hypothyroidism, hypertension presenting with several days of worsening SOB. Of note, was started on macrobid for UTI on day prior to admission. Reports adherence with medication. Lives alone, with health aides. Denies chest pain, fevers, chills, cough.     Pt was most recently admitted to  in 9/2023. At the time, she was evaluated for TAVR. Underwent RHC/LHC showing Mild diffuse atherosclerosis with moderate severe disease of small caliber Cx in a nondominant territory; Elevated right sided filling pressures in setting of severe TR with normal left sided filling pressures. Adequate perfusion. No intervention performed.     Outpatient cardiologist: Dr. Harris      In the ED, she was tachycardic to 107, normotensive, saturating 93%.   Labs notable for hgb 10, Na 123, Hs trop neg x1, proBNP 7548, COVID and RVP neg  CT chest: Severe cardiomegaly with enlarging large bilateral pleural effusions.  Severe pulmonary arterial hypertension.    Cardiology consulted for decompensated HF.   2/12/24: Awake alert comfortable, tel AF 90's      PAST MEDICAL / SURGICAL HISTORY:  PAST MEDICAL & SURGICAL HISTORY:  Mitral valve insufficiency      Aortic valve regurgitation      Osteopenia      CAD (coronary artery disease)      Gout      Hypothyroid      CHF (congestive heart failure)      Anxiety      Anemia      HTN (hypertension)      Afib      COPD (chronic obstructive pulmonary disease)      Chronic obstructive pulmonary disease (COPD)      HTN (hypertension)      Cardiac pacemaker      Gout      Hypothyroidism      Insomnia      Chronic CHF      History of ST elevation myocardial infarction (STEMI)      Status cardiac pacemaker      S/P knee replacement      No significant past surgical history          SOCIAL HISTORY:   Alcohol: Denied  Smoking: Nonsmoker  Drug Use: Denied  Marital Status:     FAMILY HISTORY: FAMILY HISTORY:  No pertinent family history in first degree relatives    No pertinent family history in first degree relatives      MEDICATIONS  (STANDING):  albuterol/ipratropium for Nebulization 3 milliLiter(s) Nebulizer every 6 hours  allopurinol 100 milliGRAM(s) Oral daily  atorvastatin 20 milliGRAM(s) Oral at bedtime  cefTRIAXone Injectable. 1000 milliGRAM(s) IV Push every 24 hours  dextrose 10%. 500 milliLiter(s) (50 mL/Hr) IV Continuous <Continuous>  escitalopram 5 milliGRAM(s) Oral daily  levothyroxine 125 MICROGram(s) Oral daily  methylPREDNISolone sodium succinate Injectable 20 milliGRAM(s) IV Push every 8 hours  metoprolol succinate ER 25 milliGRAM(s) Oral at bedtime  midodrine. 2.5 milliGRAM(s) Oral three times a day  multivitamin 1 Tablet(s) Oral daily  rivaroxaban 10 milliGRAM(s) Oral daily  thiamine 100 milliGRAM(s) Oral daily    MEDICATIONS  (PRN):  acetaminophen     Tablet .. 650 milliGRAM(s) Oral every 6 hours PRN Mild Pain (1 - 3)  aluminum hydroxide/magnesium hydroxide/simethicone Suspension 30 milliLiter(s) Oral every 4 hours PRN Dyspepsia  melatonin 3 milliGRAM(s) Oral at bedtime PRN Insomnia  ondansetron Injectable 4 milliGRAM(s) IV Push every 6 hours PRN Nausea and/or Vomiting      Allergies    codeine (Unknown)    Intolerances      Vital Signs Last 24 Hrs  T(C): 36.7 (12 Feb 2024 08:22), Max: 36.7 (12 Feb 2024 08:22)  T(F): 98 (12 Feb 2024 08:22), Max: 98 (12 Feb 2024 08:22)  HR: 100 (12 Feb 2024 13:21) (78 - 120)  BP: 104/67 (12 Feb 2024 08:22) (102/65 - 112/62)  BP(mean): --  RR: 18 (12 Feb 2024 08:22) (18 - 18)  SpO2: 95% (12 Feb 2024 08:22) (92% - 99%)    Parameters below as of 12 Feb 2024 13:21  Patient On (Oxygen Delivery Method): nasal cannula        I&O's Summary      PHYSICAL EXAM:  Constitutional: NAD, awake and alert  HEENT:  EOMI,  Pupils round, No oral cyanosis.  Pulmonary: Non-labored, diminished at bases bilaterally  Cardiovascular: irregular, + systolic murmur   Gastrointestinal: Abd soft, nontender.   Lymph: No LE Edema  Neurological: Alert and oriented x2, no focal deficits  Psych:  Mood & affect appropriate    LABS:                        9.7    6.34  )-----------( 167      ( 11 Feb 2024 07:17 )             29.8                         10.0   5.62  )-----------( 164      ( 10 Feb 2024 12:54 )             30.5     11 Feb 2024 07:17    127    |  94     |  22     ----------------------------<  89     4.9     |  33     |  0.98   10 Feb 2024 12:54    123    |  91     |  23     ----------------------------<  99     4.9     |  30     |  0.93     Ca    8.5        11 Feb 2024 07:17  Ca    8.7        10 Feb 2024 12:54  Mg     1.6       10 Feb 2024 12:54    TPro  7.1    /  Alb  3.8    /  TBili  0.9    /  DBili  x      /  AST  29     /  ALT  20     /  AlkPhos  127    10 Feb 2024 12:54    e< from: Transthoracic Echocardiogram Follow Up (09.11.23 @ 08:36) >  Impression     Summary     Limited study to assess tricuspid insufficiency and pulmonary pressure.   Severe (4+) tricuspid valve regurgitation is present.   Severe pulmonary hypertension.   The left ventricle is normal in size, paradoxical septal motion   The interventricular septum appears flattened consistent with an RV   pressure/volume overload.   An apically displaced papillary muscle is noted.   Estimated left ventricular ejection fraction is 50-55%.   The right atrium appears severely dilated.   A device wire is seen in the RV and RA.   The right ventricle is moderately dilated with decreased contractility.     Signature     ----------------------------------------------------------------   Electronically signed by Venugopal Palla, MD(Interpreting   physician) on 09/11/2023 04:51 PM   ----------------------------------------------------------------    < end of copied text >    < from: Cardiac Catheterization (09.06.23 @ 11:59) >    Diagnostic Findings:     Coronary Angiography   The coronary circulation is right dominant.      LM   Left main artery: Angiography shows mild atherosclerosis.      LAD   Proximal left anterior descending: There is a 30 % stenosis.      CX   Distal circumflex: The segment is extremely small. There is a 70 %  stenosis.    RCA   Proximal right coronary artery: There is a 40 % stenosis.      < end of copied text >    Diagnostic Conclusions:     1. Mild diffuse atherosclerosis with moderate severe disease of small  caliber Cx in a nondominant territory.  2. Elevated right sided filling pressures in setting of severe TR with  normal left sided filling pressures. Adequate perfusion.  Recommendations:     1. Recommend aggressive medical management for CAD as per ACC/AHA  guidelines  2. Structural heart follow up at  for ongoing [facility] evaluation.

## 2024-02-12 NOTE — PROGRESS NOTE ADULT - PROBLEM SELECTOR PLAN 2
Previously evaluated by Dr Levin for structural intervention/[facility]- pt and family had opted for medical management  CW current medications. Valvular heart disease:  Previously evaluated by Dr Levin for structural intervention- pt and family had opted for medical management  Had conversation today with patient and daughter at bedside and wishes remain the same to CW medical management

## 2024-02-12 NOTE — PROGRESS NOTE ADULT - SUBJECTIVE AND OBJECTIVE BOX
Subjective:    pat better, sitting in bed, sitting in chair, no respiratory distress.    Home Medications:  allopurinol 100 mg oral tablet: 1 tab(s) orally once a day (10 Feb 2024 14:48)  escitalopram 5 mg oral tablet: 1 tab(s) orally (10 Feb 2024 14:55)  levothyroxine 125 mcg (0.125 mg) oral tablet: 1 tab(s) orally once a day (10 Feb 2024 14:48)  metoprolol succinate 25 mg oral tablet, extended release: 1 tab(s) orally once a day (10 Feb 2024 14:55)  midodrine 5 mg oral tablet: 1 tab(s) orally 2 times a day (10 Feb 2024 14:56)  Multiple Vitamins oral tablet: 1 tab(s) orally once a day (10 Feb 2024 14:55)  nitrofurantoin macrocrystals 100 mg oral capsule: 1 cap(s) orally 2 times a day for 5 days ***course not complete*** (10 Feb 2024 14:55)  potassium chloride 20 mEq oral tablet, extended release: 1 tab(s) orally once a day (10 Feb 2024 14:55)  spironolactone 25 mg oral tablet: 1 tab(s) orally once a day (10 Feb 2024 14:48)  thiamine 100 mg oral tablet: 1 tab(s) orally once a day (10 Feb 2024 14:48)  torsemide 20 mg oral tablet: 1 tab(s) orally 2 times a day (10 Feb 2024 14:48)  Xarelto 10 mg oral tablet: 1 tab(s) orally once a day (10 Feb 2024 14:55)    MEDICATIONS  (STANDING):  albuterol/ipratropium for Nebulization 3 milliLiter(s) Nebulizer every 6 hours  allopurinol 100 milliGRAM(s) Oral daily  atorvastatin 20 milliGRAM(s) Oral at bedtime  cefTRIAXone Injectable. 1000 milliGRAM(s) IV Push every 24 hours  dextrose 10%. 500 milliLiter(s) (50 mL/Hr) IV Continuous <Continuous>  escitalopram 5 milliGRAM(s) Oral daily  furosemide   Injectable 40 milliGRAM(s) IV Push daily  levothyroxine 125 MICROGram(s) Oral daily  methylPREDNISolone sodium succinate Injectable 20 milliGRAM(s) IV Push every 8 hours  metoprolol succinate ER 25 milliGRAM(s) Oral at bedtime  midodrine. 2.5 milliGRAM(s) Oral three times a day  multivitamin 1 Tablet(s) Oral daily  rivaroxaban 10 milliGRAM(s) Oral daily  thiamine 100 milliGRAM(s) Oral daily    MEDICATIONS  (PRN):  acetaminophen     Tablet .. 650 milliGRAM(s) Oral every 6 hours PRN Mild Pain (1 - 3)  aluminum hydroxide/magnesium hydroxide/simethicone Suspension 30 milliLiter(s) Oral every 4 hours PRN Dyspepsia  melatonin 3 milliGRAM(s) Oral at bedtime PRN Insomnia  ondansetron Injectable 4 milliGRAM(s) IV Push every 6 hours PRN Nausea and/or Vomiting      Allergies    codeine (Unknown)    Intolerances        Vital Signs Last 24 Hrs  T(C): 36.7 (12 Feb 2024 08:22), Max: 36.7 (12 Feb 2024 08:22)  T(F): 98 (12 Feb 2024 08:22), Max: 98 (12 Feb 2024 08:22)  HR: 100 (12 Feb 2024 13:21) (78 - 119)  BP: 104/67 (12 Feb 2024 08:22) (102/65 - 107/66)  BP(mean): --  RR: 18 (12 Feb 2024 08:22) (18 - 18)  SpO2: 95% (12 Feb 2024 08:22) (92% - 99%)    Parameters below as of 12 Feb 2024 13:21  Patient On (Oxygen Delivery Method): nasal cannula          PHYSICAL EXAMINATION:    NECK:  Supple. No lymphadenopathy. Jugular venous pressure not elevated. Carotids equal.   HEART:   The cardiac impulse has a normal quality. Reg., Nl S1 and S2.  There are no murmurs, rubs or gallops noted  CHEST:  Chest crackles to auscultation. Normal respiratory effort.  ABDOMEN:  Soft and nontender.   EXTREMITIES:  There is no edema.       LABS:                        9.9    3.29  )-----------( 170      ( 12 Feb 2024 06:32 )             30.8     02-12    128<L>  |  95<L>  |  31<H>  ----------------------------<  157<H>  6.2<HH>   |  28  |  1.48<H>    Ca    8.6      12 Feb 2024 06:32  Phos  3.0     02-12  Mg     2.1     02-12    TPro  6.6  /  Alb  3.3  /  TBili  0.6  /  DBili  0.2  /  AST  25  /  ALT  19  /  AlkPhos  110  02-12      Urinalysis Basic - ( 12 Feb 2024 06:32 )    Color: x / Appearance: x / SG: x / pH: x  Gluc: 157 mg/dL / Ketone: x  / Bili: x / Urobili: x   Blood: x / Protein: x / Nitrite: x   Leuk Esterase: x / RBC: x / WBC x   Sq Epi: x / Non Sq Epi: x / Bacteria: x             Subjective:    pat better, sitting in bed, sitting in chair, no respiratory distress.    Home Medications:  allopurinol 100 mg oral tablet: 1 tab(s) orally once a day (10 Feb 2024 14:48)  escitalopram 5 mg oral tablet: 1 tab(s) orally (10 Feb 2024 14:55)  levothyroxine 125 mcg (0.125 mg) oral tablet: 1 tab(s) orally once a day (10 Feb 2024 14:48)  metoprolol succinate 25 mg oral tablet, extended release: 1 tab(s) orally once a day (10 Feb 2024 14:55)  midodrine 5 mg oral tablet: 1 tab(s) orally 2 times a day (10 Feb 2024 14:56)  Multiple Vitamins oral tablet: 1 tab(s) orally once a day (10 Feb 2024 14:55)  nitrofurantoin macrocrystals 100 mg oral capsule: 1 cap(s) orally 2 times a day for 5 days ***course not complete*** (10 Feb 2024 14:55)  potassium chloride 20 mEq oral tablet, extended release: 1 tab(s) orally once a day (10 Feb 2024 14:55)  spironolactone 25 mg oral tablet: 1 tab(s) orally once a day (10 Feb 2024 14:48)  thiamine 100 mg oral tablet: 1 tab(s) orally once a day (10 Feb 2024 14:48)  torsemide 20 mg oral tablet: 1 tab(s) orally 2 times a day (10 Feb 2024 14:48)  Xarelto 10 mg oral tablet: 1 tab(s) orally once a day (10 Feb 2024 14:55)    MEDICATIONS  (STANDING):  albuterol/ipratropium for Nebulization 3 milliLiter(s) Nebulizer every 6 hours  allopurinol 100 milliGRAM(s) Oral daily  atorvastatin 20 milliGRAM(s) Oral at bedtime  cefTRIAXone Injectable. 1000 milliGRAM(s) IV Push every 24 hours  dextrose 10%. 500 milliLiter(s) (50 mL/Hr) IV Continuous <Continuous>  escitalopram 5 milliGRAM(s) Oral daily  furosemide   Injectable 40 milliGRAM(s) IV Push daily  levothyroxine 125 MICROGram(s) Oral daily  methylPREDNISolone sodium succinate Injectable 20 milliGRAM(s) IV Push every 8 hours  metoprolol succinate ER 25 milliGRAM(s) Oral at bedtime  midodrine. 2.5 milliGRAM(s) Oral three times a day  multivitamin 1 Tablet(s) Oral daily  rivaroxaban 10 milliGRAM(s) Oral daily  thiamine 100 milliGRAM(s) Oral daily    MEDICATIONS  (PRN):  acetaminophen     Tablet .. 650 milliGRAM(s) Oral every 6 hours PRN Mild Pain (1 - 3)  aluminum hydroxide/magnesium hydroxide/simethicone Suspension 30 milliLiter(s) Oral every 4 hours PRN Dyspepsia  melatonin 3 milliGRAM(s) Oral at bedtime PRN Insomnia  ondansetron Injectable 4 milliGRAM(s) IV Push every 6 hours PRN Nausea and/or Vomiting      Allergies    codeine (Unknown)    Intolerances        Vital Signs Last 24 Hrs  T(C): 36.7 (12 Feb 2024 08:22), Max: 36.7 (12 Feb 2024 08:22)  T(F): 98 (12 Feb 2024 08:22), Max: 98 (12 Feb 2024 08:22)  HR: 100 (12 Feb 2024 13:21) (78 - 119)  BP: 104/67 (12 Feb 2024 08:22) (102/65 - 107/66)  BP(mean): --  RR: 18 (12 Feb 2024 08:22) (18 - 18)  SpO2: 95% (12 Feb 2024 08:22) (92% - 99%)    Parameters below as of 12 Feb 2024 13:21  Patient On (Oxygen Delivery Method): nasal cannula          PHYSICAL EXAMINATION:    NECK:  Supple. No lymphadenopathy. Jugular venous pressure not elevated. Carotids equal.   HEART:   The cardiac impulse has a normal quality. Reg., Nl S1 and S2.  There are no murmurs, rubs or gallops noted  CHEST:  Chest crackles to auscultation. Normal respiratory effort.  ABDOMEN:  Soft and nontender.   EXTREMITIES:  There is no edema.       LABS:                        9.9    3.29  )-----------( 170      ( 12 Feb 2024 06:32 )             30.8     02-12    128<L>  |  95<L>  |  31<H>  ----------------------------<  157<H>  6.2<HH>   |  28  |  1.48<H>    Ca    8.6      12 Feb 2024 06:32  Phos  3.0     02-12  Mg     2.1     02-12    TPro  6.6  /  Alb  3.3  /  TBili  0.6  /  DBili  0.2  /  AST  25  /  ALT  19  /  AlkPhos  110  02-12      Urinalysis Basic - ( 12 Feb 2024 06:32 )    Color: x / Appearance: x / SG: x / pH: x  Gluc: 157 mg/dL / Ketone: x  / Bili: x / Urobili: x   Blood: x / Protein: x / Nitrite: x   Leuk Esterase: x / RBC: x / WBC x   Sq Epi: x / Non Sq Epi: x / Bacteria: x        pH, Venous: 7.46  pCO2, Venous: 42 mmHg  pO2, Venous: 149 mmHg  HCO3, Venous: 30 mmol/L  Base Excess, Venous: 5.4 mmol/L  Oxygen Saturation, Venous: 99 %  Blood Gas Source Venous: Venous

## 2024-02-12 NOTE — PROGRESS NOTE ADULT - ASSESSMENT
89 year-old woman with DM type II, HTN, CAD, chronic diastolic CHF, chronic atrial fibrillation on Xarelto, hx of PPM placement, severe aortic stenosis, severe tricuspid regurgitation, severe pulmonary HTN, chronic orthostatic hypotension on midodrine, COPD, hypothyroidism, gout, just started on macrobid for 1 day for UTI, presented with worsening shortness of breath. Reports adherence with medication. Lives alone, with health aides. Denied chest pain, fevers, chills, cough. She was most recently admitted to  in 9/2023. At the time, she was evaluated for TAVR. Underwent RHC/LHC showing mild diffuse atherosclerosis with moderate severe disease of small caliber Cx in a nondominant territory. She had elevated right sided filling pressures in setting of severe TR with normal left sided filling pressures. Adequate perfusion. No intervention performed. Her outpatient cardiologist is Dr. Harris. In the ED, she was tachycardic to 107, normotensive, saturating 93%. She had increased work of breathing. Labs notable for Hgb 10, Na 123, Hs trop neg x1, proBNP 7548, COVID and RVP neg. CT chest w/ severe cardiomegaly with enlarging large bilateral pleural effusions. Severe pulmonary arterial hypertension. Admitted for further management of acute respiratory failure.     Acute hypoxic and hypercapneic respiratory failure  Likely multi factorial due to CHF exacerbation, severe AS, severe TR, severe pHTN, COPD with probable exacerbation. No sign of pneumonia at this point. Has required NIPPV with BiPAP to reduce work of breathing and improve hypercapnea.   - Continue NIPPV with BiPAP, certainly overnight tonight  - Continue O2 supplementation as needed, wean as tolerated, goal SPO2 92%  - Continue to treat underlying issues, see below    Acute on chronic diastolic CHF  Patient presenting with SOB, pleural effusions, elevated BNP in setting known valvular disease, HFpEF. Last TTE with LVEF 50-55%, severe TR, severe AS. At home, shes on torsemide 20mg BID. Appreciate input from Cardiology.  - Continue diuresis with IV Lasix but may need to cut back if Cr continues to climb  - Holding spironolactone due to hyperK (but note that patient was concurrently on potassium supplementation tabs which also have been stopped)  - Continue metoprolol  - Strict Is and Os, daily wt, trend Cr and Lytes    Severe aortic stenosis   Previously evaluated by Dr Levin for structural intervention/JAYNA. Patient and family had opted for medical management.  - Continue medical management    CAD  Recent cath with mild diffuse atherosclerosis with moderate severe disease of small caliber Cx in a nondominant territory.  - Cont med management with metoprolol, unclear why not on statin (will need to clarify if she did not previously tolerate)    Chronic atrial fibrillation  RVR in the ED in setting of active respiratory symptoms.   - Continue metoprolol  - Will try to wean midodrine as tolerated  - Continue Xarelto for stroke risk reduction    Hx of pacemaker placement  Consulted cardiac EP for interrogation  - F/u PPM interrogation    COPD with acute exacerbation  Appreciate input from Pulmonary  Medicine  - Continue systemic steroids with methylprednisolone  - Continue DuoNeb q6h    Significant pleural effusions B/L  Thoracic Surgery consulted for input re possible drainage.  - F/u with Thoracic Surgery    UTI, present prior to admission  Recent dx of UTI. Was treated with macrobid, which she received for 1 day before getting admitted to hospital. Now on empiric ceftriaxone.   - Continue ceftriaxone 1g daily  - F/u in process cultures    Hyponatremia  Suspect hypervolemic due to fluid overload. Na now improving.   - Monitor Na trend during diuresis   - Free water/fluid restriction   - Avoid thiazide diuretics if Na remains low ( HCTZ, metolazone etc)     Hyperkalemia  K 6.3. Administered hyperkalemia protocol. Held potassium supplementation tabs. Held spironolactone.   - Rpt K level this afternoon     DM  type II  A1c 6.6 in 9/2023  - Obtain updated A1c  - Continue on insulin correctional scale for now    Hypothyroidism  Stable.   - Continue levothyroxine    Hx of gout  Stable.   - Continue allopurinol    Physical deconditioning and debility  PT consulted  - F/u PT eval    Severe protein calorie malnutrition  Appreciate dietician input  - Trend wt  - Added MVI with minerals daily, thiamine 100mg daily        DVT px: On Xarelto for afib  Code Status: DNR/DNI/Trial NIV   Dispo: To be determined pending further clinical assessment   89 year-old woman with DM type II, HTN, CAD, chronic diastolic CHF, chronic atrial fibrillation on Xarelto, hx of PPM placement, severe aortic stenosis, severe tricuspid regurgitation, severe pulmonary HTN, chronic orthostatic hypotension on midodrine, COPD, hypothyroidism, gout, just started on macrobid for 1 day for UTI, presented with worsening shortness of breath. Reports adherence with medication. Lives alone, with health aides. Denied chest pain, fevers, chills, cough. She was most recently admitted to  in 9/2023. At the time, she was evaluated for TAVR. Underwent RHC/LHC showing mild diffuse atherosclerosis with moderate severe disease of small caliber Cx in a nondominant territory. She had elevated right sided filling pressures in setting of severe TR with normal left sided filling pressures. Adequate perfusion. No intervention performed. Her outpatient cardiologist is Dr. Harris. In the ED, she was tachycardic to 107, normotensive, saturating 93%. She had increased work of breathing. Labs notable for Hgb 10, Na 123, Hs trop neg x1, proBNP 7548, COVID and RVP neg. CT chest w/ severe cardiomegaly with enlarging large bilateral pleural effusions. Severe pulmonary arterial hypertension. Admitted for further management of acute respiratory failure.     Acute hypoxic and hypercapneic respiratory failure  Likely multi factorial due to CHF exacerbation, severe AS, severe TR, severe pHTN, COPD with probable exacerbation. No sign of pneumonia at this point. Has required NIPPV with BiPAP to reduce work of breathing and improve hypercapnea.   - Continue NIPPV with BiPAP, certainly overnight tonight  - Continue O2 supplementation as needed, wean as tolerated, goal SPO2 92%  - Continue to treat underlying issues, see below    Acute on chronic diastolic CHF  Patient presenting with SOB, pleural effusions, elevated BNP in setting known valvular disease, HFpEF. Last TTE with LVEF 50-55%, severe TR, severe AS. At home, shes on torsemide 20mg BID. Appreciate input from Cardiology.  - Continue diuresis with IV Lasix but may need to cut back if Cr continues to climb  - Holding spironolactone due to hyperK (but note that patient was concurrently on potassium supplementation tabs which also have been stopped)  - Continue metoprolol  - Strict Is and Os, daily wt, trend Cr and Lytes    Severe aortic stenosis   Previously evaluated by Dr Levin for structural intervention/JAYNA. Patient and family had opted for medical management.  - Continue medical management    CAD  Recent cath with mild diffuse atherosclerosis with moderate severe disease of small caliber Cx in a nondominant territory.  - Cont med management with metoprolol, unclear why not on statin (will need to clarify if she did not previously tolerate)    Chronic atrial fibrillation  RVR in the ED in setting of active respiratory symptoms.   - Continue metoprolol  - Will try to wean midodrine as tolerated  - Continue Xarelto for stroke risk reduction    Hx of pacemaker placement  Consulted cardiac EP for interrogation  - F/u PPM interrogation    COPD with acute exacerbation  Appreciate input from Pulmonary  Medicine  - Continue systemic steroids with methylprednisolone  - Continue DuoNeb q6h    Significant pleural effusions B/L  Thoracic Surgery consulted for input re possible drainage.  - F/u with Thoracic Surgery    Hyponatremia  Suspect hypervolemic due to fluid overload. Na now improving.   - Monitor Na trend during diuresis   - Free water/fluid restriction   - Avoid thiazide diuretics if Na remains low ( HCTZ, metolazone etc)     Hyperkalemia  K 6.3. Administered hyperkalemia protocol. Held potassium supplementation tabs. Held spironolactone.   - Rpt K level this afternoon     DM  type II  A1c 6.6 in 9/2023  - Obtain updated A1c  - Continue on insulin correctional scale for now    Hypothyroidism  Stable.   - Continue levothyroxine    Hx of gout  Stable.   - Continue allopurinol    Physical deconditioning and debility  PT consulted  - F/u PT eval    Severe protein calorie malnutrition  Appreciate dietician input  - Trend wt  - Added MVI with minerals daily, thiamine 100mg daily    UTI, present prior to admission  Recent dx of UTI. Was treated with macrobid for 1 day before getting admitted to hospital. Here she received an empiric 3 day course of ceftriaxone. Urine and blood cultures returned negative.      DVT px: On Xarelto for afib  Code Status: DNR/DNI/Trial NIV   Dispo: To be determined pending further clinical assessment   89 year-old woman with DM type II, HTN, CAD, chronic diastolic CHF, chronic atrial fibrillation on Xarelto, hx of PPM placement, severe aortic stenosis, severe tricuspid regurgitation, severe pulmonary HTN, chronic orthostatic hypotension on midodrine, COPD, hypothyroidism, gout, just started on macrobid for 1 day for UTI, presented with worsening shortness of breath. Reports adherence with medication. Lives alone, with health aides. Denied chest pain, fevers, chills, cough. She was most recently admitted to  in 9/2023. At the time, she was evaluated for TAVR. Underwent RHC/LHC showing mild diffuse atherosclerosis with moderate severe disease of small caliber Cx in a nondominant territory. She had elevated right sided filling pressures in setting of severe TR with normal left sided filling pressures. Adequate perfusion. No intervention performed. Her outpatient cardiologist is Dr. Harris. In the ED, she was tachycardic to 107, normotensive, saturating 93%. She had increased work of breathing. Labs notable for Hgb 10, Na 123, Hs trop neg x1, proBNP 7548, COVID and RVP neg. CT chest w/ severe cardiomegaly with enlarging large bilateral pleural effusions. Severe pulmonary arterial hypertension. Admitted for further management of acute respiratory failure.     Acute hypoxic and hypercapneic respiratory failure  Likely multi factorial due to CHF exacerbation, severe AS, severe TR, severe pHTN, COPD with probable exacerbation. No sign of pneumonia at this point. Has required NIPPV with BiPAP to reduce work of breathing and improve hypercapnea.   - Continue NIPPV with BiPAP, certainly overnight tonight  - Continue O2 supplementation as needed, wean as tolerated, goal SPO2 92%  - Continue to treat underlying issues, see below    Acute on chronic diastolic CHF  Patient presenting with SOB, pleural effusions, elevated BNP in setting known valvular disease, HFpEF. Last TTE with LVEF 50-55%, severe TR, severe AS. At home, shes on torsemide 20mg BID. Appreciate input from Cardiology.  - Continue diuresis with IV Lasix but may need to cut back if Cr continues to climb  - Holding spironolactone due to hyperK (but note that patient was concurrently on potassium supplementation tabs which also have been stopped)  - Continue metoprolol  - Strict Is and Os, daily wt, trend Cr and Lytes    Severe aortic stenosis   Previously evaluated by Dr Levin for structural intervention/JAYNA. Patient and family had opted for medical management.  - Continue medical management    CAD  Recent cath with mild diffuse atherosclerosis with moderate severe disease of small caliber Cx in a nondominant territory.  - Cont med management with metoprolol, unclear why not on statin (will need to clarify if she did not previously tolerate)    Chronic atrial fibrillation  RVR in the ED in setting of active respiratory symptoms.   - Continue metoprolol  - Will try to wean midodrine as tolerated  - Continue Xarelto for stroke risk reduction    Hx of pacemaker placement  Consulted cardiac EP for interrogation. Underlying rhythm aFib. Normal functioning single chamber PPM with battery longevity 5.6 years. Afib with V-pacing 19%, overall rate histogram is acceptable, brief RVR noted. No setting change made.  - Continue routine outpatient Cardiac EP follow up as needed    COPD with acute exacerbation  Appreciate input from Pulmonary  Medicine  - Continue systemic steroids with methylprednisolone  - Continue DuoNeb q6h    Significant pleural effusions B/L  Thoracic Surgery consulted for input re possible drainage.  - F/u with Thoracic Surgery    Hyponatremia  Suspect hypervolemic due to fluid overload. Na now improving.   - Monitor Na trend during diuresis   - Free water/fluid restriction   - Avoid thiazide diuretics if Na remains low ( HCTZ, metolazone etc)     Hyperkalemia  K 6.3. Administered hyperkalemia protocol. Held potassium supplementation tabs. Held spironolactone.   - Rpt K level this afternoon     DM  type II  A1c 6.6 in 9/2023  - Obtain updated A1c  - Continue on insulin correctional scale for now    Hypothyroidism  Stable.   - Continue levothyroxine    Hx of gout  Stable.   - Continue allopurinol    Physical deconditioning and debility  PT consulted  - F/u PT eval    Severe protein calorie malnutrition  Appreciate dietician input  - Trend wt  - Added MVI with minerals daily, thiamine 100mg daily    Constipation  No nausea or emesis. Intact bowel sounds.   - Ordered for bowel regimen    UTI, present prior to admission  Recent dx of UTI. Was treated with macrobid for 1 day before getting admitted to hospital. Here she received an empiric 3 day course of ceftriaxone. Urine and blood cultures returned negative.      DVT px: On Xarelto for afib  Code Status: DNR/DNI/Trial NIV   Dispo: To be determined pending further clinical assessment

## 2024-02-12 NOTE — PROGRESS NOTE ADULT - SUBJECTIVE AND OBJECTIVE BOX
Subjective:  Pt seen, on NC, on lasix 40 IV q 12, on xarelto. Breathing has improved since hospitalization but not baseline. C/o not having a BM, abd fullness.    Vital Signs:  Vital Signs Last 24 Hrs  T(C): 36.7 (02-12-24 @ 08:22), Max: 36.7 (02-12-24 @ 08:22)  T(F): 98 (02-12-24 @ 08:22), Max: 98 (02-12-24 @ 08:22)  HR: 100 (02-12-24 @ 13:21) (78 - 119)  BP: 104/67 (02-12-24 @ 08:22) (102/65 - 107/66)  RR: 18 (02-12-24 @ 08:22) (18 - 18)  SpO2: 95% (02-12-24 @ 08:22) (92% - 99%) on (O2)    Telemetry/Alarms:    Relevant labs, radiology and Medications reviewed                        9.9    3.29  )-----------( 170      ( 12 Feb 2024 06:32 )             30.8     02-12    128<L>  |  95<L>  |  31<H>  ----------------------------<  157<H>  6.2<HH>   |  28  |  1.48<H>    Ca    8.6      12 Feb 2024 06:32  Phos  3.0     02-12  Mg     2.1     02-12    TPro  6.6  /  Alb  3.3  /  TBili  0.6  /  DBili  0.2  /  AST  25  /  ALT  19  /  AlkPhos  110  02-12      MEDICATIONS  (STANDING):  albuterol/ipratropium for Nebulization 3 milliLiter(s) Nebulizer every 6 hours  allopurinol 100 milliGRAM(s) Oral daily  atorvastatin 20 milliGRAM(s) Oral at bedtime  dextrose 10%. 500 milliLiter(s) (50 mL/Hr) IV Continuous <Continuous>  escitalopram 5 milliGRAM(s) Oral daily  furosemide   Injectable 40 milliGRAM(s) IV Push daily  levothyroxine 125 MICROGram(s) Oral daily  methylPREDNISolone sodium succinate Injectable 20 milliGRAM(s) IV Push every 8 hours  metoprolol succinate ER 25 milliGRAM(s) Oral at bedtime  midodrine. 2.5 milliGRAM(s) Oral three times a day  multivitamin 1 Tablet(s) Oral daily  rivaroxaban 10 milliGRAM(s) Oral daily  thiamine 100 milliGRAM(s) Oral daily    MEDICATIONS  (PRN):  acetaminophen     Tablet .. 650 milliGRAM(s) Oral every 6 hours PRN Mild Pain (1 - 3)  aluminum hydroxide/magnesium hydroxide/simethicone Suspension 30 milliLiter(s) Oral every 4 hours PRN Dyspepsia  melatonin 3 milliGRAM(s) Oral at bedtime PRN Insomnia  ondansetron Injectable 4 milliGRAM(s) IV Push every 6 hours PRN Nausea and/or Vomiting      Physical exam  Gen NAD  Neuro AAOx3  Card tachycardic  Pulm mild tachynpnic, decreased bases b/l  Abd distended  Ext warm, edema    Tubes:    I&O's Summary    11 Feb 2024 07:01  -  12 Feb 2024 07:00  --------------------------------------------------------  IN: 0 mL / OUT: 225 mL / NET: -225 mL        Assessment  89y Female  w/ PAST MEDICAL & SURGICAL HISTORY:  Mitral valve insufficiency      Aortic valve regurgitation      Osteopenia      CAD (coronary artery disease)      Gout      Hypothyroid      CHF (congestive heart failure)      Anxiety      Anemia      HTN (hypertension)      Afib      COPD (chronic obstructive pulmonary disease)      Chronic obstructive pulmonary disease (COPD)      HTN (hypertension)      Cardiac pacemaker      Gout      Hypothyroidism      Insomnia      Chronic CHF      History of ST elevation myocardial infarction (STEMI)      Status cardiac pacemaker      S/P knee replacement      No significant past surgical history      admitted with complaints of Patient is a 89y old  Female who presents with a chief complaint of CHF, pleural effusion, (12 Feb 2024 14:12)  .  89 year old female PMHx of Diastolic CHF EF 45%, CAD, sever AS,  severe TR, severe pulmonary HTN, AFIB on Xarelto ,COPD, Orthostatic hypotension, recent pneumonia who was    admitted with SOB found to have bilateral pleural effusions.     Effusions small to mod  recommend aggressive diuresis w cont IV lasix as tolerated to avoid invasive procedure  pt on xarelto, if drainage deemed to be necessary by medical team, will need to hold AC for procedure.  care as per medical teams  no drainage at this time, pls recall if pt does not improve and intervention necessary, pls hold AC if intervention needed    Discussed with Cardiothoracic Team at AM rounds.

## 2024-02-12 NOTE — PROGRESS NOTE ADULT - ASSESSMENT
89 yo female with Hx. of Diastolic CHF EF 45%, CAD, HTN, Orthostatic hypotension, Atrial fib. on Xarelto, s/p PPM, COPD, Hypothyroidism, Gout , valvular heart disease, severe AS,  severe TR, severe pulmonary HTN, presented in ED BIB form home for SOB. The patient  lives alone and has a private aid. She developed worsening SOB, She had iron infusion 5 days ago and was started on Macrobid for UTI day prior to admit.     1. UTI  - recent dx of UTI  - f/u urine cx blood cx no growth  - on abx prior to admit  - on rocephin 1gm daily #3, complete 3 days and stop - i put in dc order after 3rd dose  - continue with abx coverage   - fu cbc  - tolerating abx well so far; no side effects noted  - reason for abx use and side effects reviewed with patient    - IV - po abx token not applicable in this case

## 2024-02-12 NOTE — PHYSICAL THERAPY INITIAL EVALUATION ADULT - MANUAL MUSCLE TESTING RESULTS, REHAB EVAL
Bilateral UE and LE strength 3/5 throughout grossly. Bilateral UE and LE strength 3/5 throughout grossly./grossly assessed due to

## 2024-02-12 NOTE — PROGRESS NOTE ADULT - SUBJECTIVE AND OBJECTIVE BOX
Date of service: 02-12-24 @ 10:53    pt seen and examined  no new complaints  afebrile    ROS: no fever or chills; denies dizziness, no HA, no SOB or cough, no abdominal pain, no diarrhea or constipation no legs pain, no rashes    MEDICATIONS  (STANDING):  albuterol/ipratropium for Nebulization 3 milliLiter(s) Nebulizer every 6 hours  allopurinol 100 milliGRAM(s) Oral daily  calcium gluconate IVPB 1 Gram(s) IV Intermittent once  cefTRIAXone Injectable. 1000 milliGRAM(s) IV Push every 24 hours  dextrose 10%. 500 milliLiter(s) (50 mL/Hr) IV Continuous <Continuous>  dextrose 50% Injectable 50 milliLiter(s) IV Push once  escitalopram 5 milliGRAM(s) Oral daily  insulin regular  human recombinant 5 Unit(s) IV Push once  levothyroxine 125 MICROGram(s) Oral daily  methylPREDNISolone sodium succinate Injectable 20 milliGRAM(s) IV Push every 8 hours  metoprolol succinate ER 25 milliGRAM(s) Oral at bedtime  midodrine. 5 milliGRAM(s) Oral three times a day  multivitamin 1 Tablet(s) Oral daily  rivaroxaban 10 milliGRAM(s) Oral daily  sodium zirconium cyclosilicate 10 Gram(s) Oral once  spironolactone 25 milliGRAM(s) Oral daily  thiamine 100 milliGRAM(s) Oral daily      Vital Signs Last 24 Hrs  T(C): 36.7 (12 Feb 2024 08:22), Max: 36.7 (12 Feb 2024 08:22)  T(F): 98 (12 Feb 2024 08:22), Max: 98 (12 Feb 2024 08:22)  HR: 102 (12 Feb 2024 08:22) (78 - 120)  BP: 104/67 (12 Feb 2024 08:22) (99/58 - 112/62)  BP(mean): --  RR: 18 (12 Feb 2024 08:22) (18 - 18)  SpO2: 95% (12 Feb 2024 08:22) (92% - 99%)    Parameters below as of 12 Feb 2024 08:22  Patient On (Oxygen Delivery Method): nasal cannula  O2 Flow (L/min): 5            PE:  Constitutional: frail looking  HEENT: NC/AT, EOMI, PERRLA, conjunctivae clear; ears and nose atraumatic; pharynx benign  Neck: supple; thyroid not palpable  Back: no tenderness  Respiratory: respiratory effort normal; clear to auscultation  Cardiovascular: S1S2 regular, no murmurs  Abdomen: soft, not tender, not distended, positive BS; liver and spleen WNL  Genitourinary: no suprapubic tenderness  Lymphatic: no LN palpable  Musculoskeletal: no muscle tenderness, no joint swelling or tenderness  Extremities: no pedal edema  Neurological/ Psychiatric: AxOx3, Judgement and insight normal;  moving all extremities  Skin: no rashes; no palpable lesions    Labs: all available labs reviewed                                   9.9    3.29  )-----------( 170      ( 12 Feb 2024 06:32 )             30.8     02-12    128<L>  |  95<L>  |  31<H>  ----------------------------<  157<H>  6.2<HH>   |  28  |  1.48<H>    Ca    8.6      12 Feb 2024 06:32  Phos  3.0     02-12  Mg     2.1     02-12    TPro  6.6  /  Alb  3.3  /  TBili  0.6  /  DBili  0.2  /  AST  25  /  ALT  19  /  AlkPhos  110  02-12        LIVER FUNCTIONS - ( 10 Feb 2024 12:54 )  Alb: 3.8 g/dL / Pro: 7.1 gm/dL / ALK PHOS: 127 U/L / ALT: 20 U/L / AST: 29 U/L / GGT: x           Urinalysis Basic - ( 11 Feb 2024 07:17 )    Color: x / Appearance: x / SG: x / pH: x  Gluc: 89 mg/dL / Ketone: x  / Bili: x / Urobili: x   Blood: x / Protein: x / Nitrite: x   Leuk Esterase: x / RBC: x / WBC x   Sq Epi: x / Non Sq Epi: x / Bacteria: x      Culture - Urine (02.10.24 @ 15:07)   Specimen Source: Clean Catch None  Culture Results:   <10,000 CFU/mL Normal Urogenital [place]  Culture - Blood (02.10.24 @ 14:23)   Specimen Source: .Blood None  Culture Results:   No growth at 24 hours    Radiology: all available radiological tests reviewed  < from: CT Chest No Cont (02.10.24 @ 14:34) >  ACC: [id number] EXAM:  CT CHEST   ORDERED BY: ART STEVENS     PROCEDURE DATE:  02/10/2024          INTERPRETATION:  CLINICAL INFORMATION: 89-year-old with shortness of   breath; for evaluation of pulmonary edema versus pneumonia.    COMPARISON: Comparison is made to prior studies dated 3/11/2022 and   2/21/2022.    CONTRAST/COMPLICATIONS:  IV Contrast: NONE  Oral Contrast: NONE  Complications: None reported at time of study completion    PROCEDURE:  CT of the Chest was performed.  Sagittal andcoronal reformats were performed.    FINDINGS:    LUNGS AND AIRWAYS: Bibasilar compressive atelectasis. No pulmonary   consolidation or mass.  PLEURA: There are enlarging moderate to large bilateral pleural effusions  MEDIASTINUM AND CHICA: No lymphadenopathy.  VESSELS: There is enlargement of the main pulmonary artery spanning 3.4   cm, consistent with pulmonary arterial hypertension. Severe arterial   vascular calcification.  HEART: There is severe cardiomegaly with four-chamber enlargement.  There   are permanent pacemaker with lead tips in the right ventricle. Severe   coronary arterial calcification.  CHEST WALL AND LOWER NECK: Within normal limits.  VISUALIZED UPPER ABDOMEN: Small volume of free fluid within the upper   abdomen.  BONES: Within normal limits.            IMPRESSION:    Severe cardiomegaly with enlarging large bilateral pleural effusions.    Severe pulmonary arterial hypertension.    < end of copied text >    Advanced directives addressed: full resuscitation

## 2024-02-12 NOTE — CONSULT NOTE ADULT - SUBJECTIVE AND OBJECTIVE BOX
HPI: Pt is a 89y old Female with hx of Diastolic CHF EF 45%, CAD, HTN, Orthostatic hypotension, Atrial fib. on Xarelto, s/p PPM, COPD, Hypothyroidism, Gout , valvular heart disease, sever AS,  severe TR, severe pumonary HTN, presented in ED BIB form home for SOB. The patient  lives alone and has a private aid. She developed worsening SOB, She had iron infusion 5 days ago and was started on Macrobid for UTI  yesterday.  Palliative consulted for GOC.    : Seen and examined at bedside. Awake, alert, oriented x3 with notable short-term memory loss. Denies pain, SOB, on 5LNC in NAD. Patient defers to her children for any medical decision making. Spoke with patient's daughter, Citlalli, see GOC discussion below.      PAIN: ( )Yes   (X)No  denies    DYSPNEA: ( ) Yes  (X) No  denies currently, on 5LNC  no dyspnea at rest    PAST MEDICAL & SURGICAL HISTORY:  Mitral valve insufficiency  Aortic valve regurgitation  Osteopenia  CAD (coronary artery disease)  Gout  Hypothyroid  CHF (congestive heart failure)  Anxiety  Anemia  HTN (hypertension)  Afib  COPD (chronic obstructive pulmonary disease)  Chronic obstructive pulmonary disease (COPD)  HTN (hypertension)  Cardiac pacemaker  Gout  Hypothyroidism  Insomnia  Chronic CHF  History of ST elevation myocardial infarction (STEMI)  Status cardiac pacemaker  S/P knee replacement  No significant past surgical history      SOCIAL HX:    Hx opiate tolerance ( )YES  ( )NO    Baseline ADLs  (Prior to Admission)  ( ) Independent   (X)Dependent      FAMILY HISTORY:  No pertinent family history in first degree relatives    No pertinent family history in first degree relatives      Review of Systems:    Anxiety- denies  Depression- denies  Physical Discomfort- denies  Dyspnea- denies currently  Constipation- denies  Diarrhea- denies  Nausea- denies  Vomiting- denies  Anorexia-  Weight Loss-   Cough- ++  Secretions- denies  Fatigue- denies  Weakness- ++  Delirium- denies    All other systems reviewed and negative  Unable to obtain/Limited due to:      PHYSICAL EXAM:    Vital Signs Last 24 Hrs  T(C): 36.7 (2024 08:22), Max: 36.7 (2024 08:22)  T(F): 98 (2024 08:22), Max: 98 (2024 08:22)  HR: 100 (2024 13:21) (78 - 120)  BP: 104/67 (2024 08:22) (102/65 - 112/62)  BP(mean): --  RR: 18 (2024 08:22) (18 - 18)  SpO2: 95% (2024 08:22) (92% - 99%)    Parameters below as of 2024 13:21  Patient On (Oxygen Delivery Method): nasal cannula      Daily     Daily Weight in k.8 (2024 06:33)    PPSV2:   20%  FAST:    General: awake, alert, pleasant, chronically-ill appearing  Mental Status: A&Ox3 with some short-term memory deficits  HEENT: on 5LNC  Lungs: Diminished to anterior lung fields  Cardiac: Irregular rate and rhythm  GI: Abdomen slightly distended. +BSx4  : No suprapubic tenderness  Ext: +PVD bilateral lower extremities. no edema  Neuro: A&Ox3, short-term memory loss. Speech intact. No focal neuro deficits.      LABS:                        9.9    3.29  )-----------( 170      ( 2024 06:32 )             30.8     02    128<L>  |  95<L>  |  31<H>  ----------------------------<  157<H>  6.2<HH>   |  28  |  1.48<H>    Ca    8.6      2024 06:32  Phos  3.0     12  Mg     2.1         TPro  6.6  /  Alb  3.3  /  TBili  0.6  /  DBili  0.2  /  AST  25  /  ALT  19  /  AlkPhos  110  02-12      Albumin: Albumin: 3.3 g/dL ( @ 06:32)      Allergies    codeine (Unknown)    Intolerances      MEDICATIONS  (STANDING):  albuterol/ipratropium for Nebulization 3 milliLiter(s) Nebulizer every 6 hours  allopurinol 100 milliGRAM(s) Oral daily  cefTRIAXone Injectable. 1000 milliGRAM(s) IV Push every 24 hours  dextrose 10%. 500 milliLiter(s) (50 mL/Hr) IV Continuous <Continuous>  escitalopram 5 milliGRAM(s) Oral daily  levothyroxine 125 MICROGram(s) Oral daily  methylPREDNISolone sodium succinate Injectable 20 milliGRAM(s) IV Push every 8 hours  metoprolol succinate ER 25 milliGRAM(s) Oral at bedtime  midodrine. 5 milliGRAM(s) Oral three times a day  multivitamin 1 Tablet(s) Oral daily  rivaroxaban 10 milliGRAM(s) Oral daily  spironolactone 25 milliGRAM(s) Oral daily  thiamine 100 milliGRAM(s) Oral daily    MEDICATIONS  (PRN):  acetaminophen     Tablet .. 650 milliGRAM(s) Oral every 6 hours PRN Mild Pain (1 - 3)  aluminum hydroxide/magnesium hydroxide/simethicone Suspension 30 milliLiter(s) Oral every 4 hours PRN Dyspepsia  melatonin 3 milliGRAM(s) Oral at bedtime PRN Insomnia  ondansetron Injectable 4 milliGRAM(s) IV Push every 6 hours PRN Nausea and/or Vomiting      RADIOLOGY/ADDITIONAL STUDIES:  < from: CT Chest No Cont (02.10.24 @ 14:34) >    ACC: [id number] EXAM:  CT CHEST   ORDERED BY: ART STEVENS     PROCEDURE DATE:  02/10/2024          INTERPRETATION:  CLINICAL INFORMATION: 89-year-old with shortness of   breath; for evaluation of pulmonary edema versus pneumonia.    COMPARISON: Comparison is made to prior studies dated 3/11/2022 and   2022.    CONTRAST/COMPLICATIONS:  IV Contrast: NONE  Oral Contrast: NONE  Complications: None reported at time of study completion    PROCEDURE:  CT of the Chest was performed.  Sagittal andcoronal reformats were performed.    FINDINGS:    LUNGS AND AIRWAYS: Bibasilar compressive atelectasis. No pulmonary   consolidation or mass.  PLEURA: There are enlarging moderate to large bilateral pleural effusions  MEDIASTINUM AND CHICA: No lymphadenopathy.  VESSELS: There is enlargement of the main pulmonary artery spanning 3.4   cm, consistent with pulmonary arterial hypertension. Severe arterial   vascular calcification.  HEART: There is severe cardiomegaly with four-chamber enlargement.  There   are permanent pacemaker with lead tips in the right ventricle. Severe   coronary arterial calcification.  CHEST WALL AND LOWER NECK: Within normal limits.  VISUALIZED UPPER ABDOMEN: Small volume of free fluid within the upper   abdomen.  BONES: Within normal limits.            IMPRESSION:    Severe cardiomegaly with enlarging large bilateral pleural effusions.    Severe pulmonary arterial hypertension.        --- End of Report ---            RADHAMES SIEGEL MD; Attending Radiologist  This document has been electronically signed. Feb 10 2024  2:58PM    < end of copied text >    < from: Xray Chest 1 View- PORTABLE-Urgent (02.10.24 @ 13:11) >    ACC: [id number] EXAM:  XR CHEST PORTABLE URGENT 1V   ORDERED BY: ART STEVENS     PROCEDURE DATE:  02/10/2024          INTERPRETATION:  CLINICAL HISTORY: Shortness of breath, wheezing.    TECHNIQUE: Single upright AP chest radiograph.    COMPARISON: Prior from 2023.    COMMENT:  Left-sided pacemaker is seen.    Bilateral pleural effusions are noted with adjacent atelectasis versus   pneumonia at the left base..  There is no pulmonary vascular congestion.    There is no pneumothorax.    The mediastinal contour is enlarged, stable.  The trachea is midline.    The osseous structures are intact.    IMPRESSION:  Bilateral pleural effusions with adjacent atelectasis versus pneumonia at   the left base.    --- End of Report ---            PIYUSH ESCOBAR MD; Attending Interventional Radiologist  This document has been electronically signed. Feb 10 2024 10:32PM    < end of copied text >

## 2024-02-12 NOTE — PROGRESS NOTE ADULT - PROBLEM SELECTOR PLAN 4
persistent Afib, cont AC with xarelto  recommend weaning midodrine as tolerated; cont metoprolol - may need to transition to tartrate to better uptitrate while in hospital persistent Afib, cont AC with xarelto  weaning midodrine as tolerated; cont metoprolol for rate control

## 2024-02-12 NOTE — PROGRESS NOTE ADULT - SUBJECTIVE AND OBJECTIVE BOX
NEPHROLOGY INTERVAL HPI/OVERNIGHT EVENTS:    Date of Service: 24 @ 14:12    --seen by Dr Hopper yesterday.  Alert,  SOB only slightly better.  Feeling weak.    HPI:   The patient is a 89 yo female with Hx. of Diastolic CHF EF 45%, CAD, HTN, Orthostatic hypotension, Atrial fib. on Xarelto, s/p PPM, COPD, Hypothyroidism, Gout , valvular heart disease, sever AS,  severe TR, severe pumonary HTN, presented in ED BIB form home for SOB. The patient  lives alone and has a private aid. She developed worsening SOB, She had iron infusion 5 days ago and was started on Macrobid for UTI  yesterday.   renal eval for acute hyponatremia in setting of acute CHF/pulm HTN and bilateral plueral effusions      Home Medications:  allopurinol 100 mg oral tablet: 1 tab(s) orally once a day (10 Feb 2024 14:48)  escitalopram 5 mg oral tablet: 1 tab(s) orally (10 Feb 2024 14:55)  levothyroxine 125 mcg (0.125 mg) oral tablet: 1 tab(s) orally once a day (10 Feb 2024 14:48)  metoprolol succinate 25 mg oral tablet, extended release: 1 tab(s) orally once a day (10 Feb 2024 14:55)  midodrine 5 mg oral tablet: 1 tab(s) orally 2 times a day (10 Feb 2024 14:56)  Multiple Vitamins oral tablet: 1 tab(s) orally once a day (10 Feb 2024 14:55)  nitrofurantoin macrocrystals 100 mg oral capsule: 1 cap(s) orally 2 times a day for 5 days ***course not complete*** (10 Feb 2024 14:55)  potassium chloride 20 mEq oral tablet, extended release: 1 tab(s) orally once a day (10 Feb 2024 14:55)  spironolactone 25 mg oral tablet: 1 tab(s) orally once a day (10 Feb 2024 14:48)  thiamine 100 mg oral tablet: 1 tab(s) orally once a day (10 Feb 2024 14:48)  torsemide 20 mg oral tablet: 1 tab(s) orally 2 times a day (10 Feb 2024 14:48)  Xarelto 10 mg oral tablet: 1 tab(s) orally once a day (10 Feb 2024 14:55)      MEDICATIONS  (STANDING):  albuterol/ipratropium for Nebulization 3 milliLiter(s) Nebulizer every 6 hours  allopurinol 100 milliGRAM(s) Oral daily  atorvastatin 20 milliGRAM(s) Oral at bedtime  cefTRIAXone Injectable. 1000 milliGRAM(s) IV Push every 24 hours  dextrose 10%. 500 milliLiter(s) (50 mL/Hr) IV Continuous <Continuous>  escitalopram 5 milliGRAM(s) Oral daily  levothyroxine 125 MICROGram(s) Oral daily  methylPREDNISolone sodium succinate Injectable 20 milliGRAM(s) IV Push every 8 hours  metoprolol succinate ER 25 milliGRAM(s) Oral at bedtime  midodrine. 2.5 milliGRAM(s) Oral three times a day  multivitamin 1 Tablet(s) Oral daily  rivaroxaban 10 milliGRAM(s) Oral daily  thiamine 100 milliGRAM(s) Oral daily    MEDICATIONS  (PRN):  acetaminophen     Tablet .. 650 milliGRAM(s) Oral every 6 hours PRN Mild Pain (1 - 3)  aluminum hydroxide/magnesium hydroxide/simethicone Suspension 30 milliLiter(s) Oral every 4 hours PRN Dyspepsia  melatonin 3 milliGRAM(s) Oral at bedtime PRN Insomnia  ondansetron Injectable 4 milliGRAM(s) IV Push every 6 hours PRN Nausea and/or Vomiting    Vital Signs Last 24 Hrs  T(C): 36.7 (2024 08:22), Max: 36.7 (2024 08:22)  T(F): 98 (2024 08:22), Max: 98 (2024 08:22)  HR: 100 (2024 13:21) (78 - 120)  BP: 104/67 (2024 08:22) (102/65 - 112/62)  BP(mean): --  RR: 18 (2024 08:22) (18 - 18)  SpO2: 95% (2024 08:22) (92% - 99%)    Parameters below as of 2024 13:21  Patient On (Oxygen Delivery Method): nasal cannula      Daily     Daily Weight in k.8 (2024 06:33)     @ 07:01  -   @ 07:00  --------------------------------------------------------  IN: 0 mL / OUT: 225 mL / NET: -225 mL    PHYSICAL EXAM:  GENERAL: alert, no acute distress.  CHEST/LUNG: poor insp effort,  decreased bs.  HEART: S1S2 RRR  ABDOMEN: soft  EXTREMITIES: 1+ edema  SKIN:     LABS:                        9.9    3.29  )-----------( 170      ( 2024 06:32 )             30.8         128<L>  |  95<L>  |  31<H>  ----------------------------<  157<H>  6.2<HH>   |  28  |  1.48<H>    Ca    8.6      2024 06:32  Phos  3.0       Mg     2.1         TPro  6.6  /  Alb  3.3  /  TBili  0.6  /  DBili  0.2  /  AST  25  /  ALT  19  /  AlkPhos  110        Urinalysis Basic - ( 2024 06:32 )    Color: x / Appearance: x / SG: x / pH: x  Gluc: 157 mg/dL / Ketone: x  / Bili: x / Urobili: x   Blood: x / Protein: x / Nitrite: x   Leuk Esterase: x / RBC: x / WBC x   Sq Epi: x / Non Sq Epi: x / Bacteria: x      Magnesium: 2.1 mg/dL ( @ 06:32)  Phosphorus: 3.0 mg/dL ( @ 06:32)    ABG - ( 2024 08:47 )  pH, Arterial: 7.29  pH, Blood: x     /  pCO2: 61    /  pO2: 106   / HCO3: 29    / Base Excess: 1.2   /  SaO2: 98                    RADIOLOGY & ADDITIONAL TESTS:

## 2024-02-12 NOTE — PROGRESS NOTE ADULT - PROBLEM SELECTOR PLAN 1
Pt presenting with SOB, pleural effusions, elevated BNP in setting known valvular disease (last TTE with LVEF 50-55%, severe TR, severe AS)  at home on torsemide 20mg BID, currently on lasix 40mg IV lasix BID and reassess   cont spironolactone  close monitoring of electrolytes, Cr. Pt presenting with SOB, pleural effusions, elevated BNP in setting known valvular disease (last TTE with LVEF 50-55%, severe TR, severe AS)  Thoracic surgery consulted for Pleural effusion no need for urgent intervention, The Team will round on and evaluate for long term plan in the morning, CW lasix 40mg and reassess,  suspend Spironolactone d/t electrolyte imbalances, labs to be repeated later today ([facility] Hospitalist) close monitoring of electrolytes,   H/O orthostatic hypotension will check readings   Wean midodrine (decreased to 2.5 MG TID from 5 MG )

## 2024-02-12 NOTE — PROGRESS NOTE ADULT - ASSESSMENT
The patient is a 89 yo female with Hx. of Diastolic CHF EF 45%, CAD, HTN, Orthostatic hypotension, Atrial fib. on Xarelto, s/p PPM, COPD, Hypothyroidism, Gout , valvular heart disease, sever AS,  severe TR, severe pumonary HTN, presented in ED BIB form home for SOB. The patient  lives alone and has a private aid. She developed worsening SOB, She had iron infusion 5 days ago and was started on Macrobid for UTI  yesterday.   renal eval for acute hyponatremia in setting of acute CHF/pulm HTN and bilateral plueral effusions    Hyponatremia - improving   suspect hypervolemic due to fluid overload   monitor Na trend during diuresis    IV diuresis as per medicine/cardiology teams    free water/fluid restriction    avoid thiazide diuretics if Na remains low -  ( HCTZ, metalozone etc)    k borderline on Aldactone and KCl supplements - would stop KCL if K rises     2/12 SY  --Hyponatremia with CHF : Sodium level slowly improving : will resume IV lasix.  --JOAQUÍN : creat elevated but remains fluid overloaded : continue IV lasix to stabilize resp status first.  --Decompensated CHF with severe pulm HTN : prognosis grave.  --GOC evaluation ongoing.  --D/c spironolactone for now,  until electrolytes and creat level holding steady.

## 2024-02-12 NOTE — PROCEDURE NOTE - INTERROGATION NOTE: COMMENTS
Normal functioning single chamber PPM with battery longevity 5.6 years. Afib with V-pacing 19%, overall rate histogram is acceptable, brief RVR noted. No setting change made.

## 2024-02-12 NOTE — PHYSICAL THERAPY INITIAL EVALUATION ADULT - ADDITIONAL COMMENTS
Pt is not a good historian of PLOF or home environment. Pt. lives in her own home with HHA morning till noon then afternoon till bedtime. Pt. was able to amb with RW w/o assistance.

## 2024-02-12 NOTE — PROGRESS NOTE ADULT - PROBLEM SELECTOR PLAN 3
Recent cath as above: Mild diffuse atherosclerosis with moderate severe disease of small  caliber Cx in a nondominant territory.  cont med management- metoprolol, unclear why not on statin - will need to clarify if she did not previously tolerate. Recent cath as above: Mild diffuse atherosclerosis with moderate severe disease of small  caliber Cx in a nondominant territory.  cont medical management, statin initiated ( per patient and daughter patient never on a statin)

## 2024-02-12 NOTE — GOALS OF CARE CONVERSATION - ADVANCED CARE PLANNING - CONVERSATION DETAILS
HPI: As per medical record,   The patient is a 89 yo female with Hx. of Diastolic CHF EF 45%, CAD, HTN, Orthostatic hypotension, Atrial fib. on Xarelto, s/p PPM, COPD, Hypothyroidism, Gout , valvular heart disease, sever AS,  severe TR, severe pumonary HTN, presented in ED BIB form home for SOB. The patient  lives alone and has a private aid. She developed worsening SOB, She had iron infusion 5 days ago and was started on Macrobid for UTI  yesterday. (10 Feb 2024 18:17)      PERTINENT PMH REVIEWED:  [ x ] YES [ ] NO           Primary Contact: Citlalli Aponte ([phone])    HCP [  ] Surrogate [ x  ] Guardian [   ]    Mental Status: [ x ] Alert  [x  ] Oriented [  ] Confused [  ] Lethargic   Concerns of Depression [  ] None reported by pt but dtr suspects pt may have some depression.   Anxiety [   ] none reported  Baseline ADLs (prior to admission):  Independent [ x ] moderately [ ] fully   Dependent   [ ] moderately [ ]fully    Family Meeting attendees: Patient and pt's daughter Citlalli participated in discussion (separately).     Anticipated Grief: Patient[  ] Family [  ]    Caregiver [place] Assessed: Yes [ x ] No [  ]    Hoahaoism: Not identified. Pt does state that "everything is in God's hands now.     Spiritual Concerns: None reported.  available PRN.     Goals of Care: Pursue treatment if it will improve her quality of life.     Previous Services: Private hire aide, jodi w/ [facility] and [place], DME    ADVANCE DIRECTIVES:  [ X ] YES [  ] NO    - MOLST reflecting DNR/DNI wishes    Anticipated D/C Plan: TBD                     Summary: SW met with patient to offer support. Palliative SW role explained. Pt appears alert, oriented and able to make needs known. She denies any pain, depression, and/or anxiety. She is disappointed because her procedure was postponed. She offers no questions/concerns at this time. Pt requested assistance from aide to use the bathroom at which time visit ended. Emotional support provided. Our team will continue to follow. HPI: As per medical record,   The patient is a 89 yo female with Hx. of Diastolic CHF EF 45%, CAD, HTN, Orthostatic hypotension, Atrial fib. on Xarelto, s/p PPM, COPD, Hypothyroidism, Gout , valvular heart disease, sever AS,  severe TR, severe pumonary HTN, presented in ED BIB form home for SOB. The patient  lives alone and has a private aid. She developed worsening SOB, She had iron infusion 5 days ago and was started on Macrobid for UTI  yesterday. (10 Feb 2024 18:17)      PERTINENT PMH REVIEWED:  [ x ] YES [ ] NO           Primary Contact: Citlalli Aponte ([phone])    HCP [  ] Surrogate [ x  ] Guardian [   ]    Mental Status: [ x ] Alert  [x  ] Oriented [  ] Confused [  ] Lethargic   Concerns of Depression [  ] None reported by pt but dtr suspects pt may have some depression.   Anxiety [   ] none reported  Baseline ADLs (prior to admission):  Independent [ x ] moderately [ ] fully   Dependent   [ ] moderately [ ]fully    Family Meeting attendees: Patient and pt's daughter Citlalli participated in discussion (separately).     Anticipated Grief: Patient[  ] Family [  ]    Caregiver [place] Assessed: Yes [ x ] No [  ]    [place]: Not identified. Pt does state that "everything is in God's hands now.     Spiritual Concerns: None reported.  available PRN.     Goals of Care: Pursue treatment if it will improve her quality of life.     Previous Services: MLTC aide, Home visit DME, jodi w/ [facility] and [facility]    ADVANCE DIRECTIVES:  [ X ] YES [  ] NO    - MOLST reflecting DNR/DNI wishes   - Dtr reports that pt has completed a Health Care Proxy in past - no copy on chart    Anticipated D/C Plan: Pt wishes to return home with her MLTC aides                     Summary: SW met with patient to offer support & introduce team. Palliative SW role explained. Pt appears [place] but able to communicate, alert, oriented and able to make needs known. Pt confirms that she resides home with MLTC aides who provide coverage all day. She recounts her experience at a [facility] and explains that she will never go back to one. Her main goal is to get home. She has daughter Citlalli who lives in [place] & son who lives in [place]. She explains that "this is not her first rodeo" and that she has "all of her faculties." That being said, she defers to her children for decision-making but also would like to be consulted if needed. Her main goal is to get home but she is okay with coming back to the hospital if needed. She explains that "I don't want to die but if I live, I live, If I die, I die. Everything is in God's hands." Team provided active listening and emotional support.     Team met with pt's daughter Citlalli while patient was working with physical therapy. She confirms all the above information. Her hope is for patient to return home. Team explained options for the future including hospice and its philosophy. She does not feel her mother is ready for hospice at this point. Citlalli confirmed MOLST reflecting DNR/DNI wishes. Dtr Citlalli explains that pt be struggling with a bit of depression as she is not interested in doing the things she used to enjoy doing 1 year ago, i.e. playing cards, knitting, reading. She has reportedly tried Lexapro for 2 days and stopped because it made her dizzy.     Emotional support provided. All questions addressed. Plan for pt to return home with aides & [facility] - dtr requesting transport wheelchair. Our team will continue to follow. HPI: As per medical record,   The patient is a 89 yo female with Hx. of Diastolic CHF EF 45%, CAD, HTN, Orthostatic hypotension, Atrial fib. on Xarelto, s/p PPM, COPD, Hypothyroidism, Gout , valvular heart disease, sever AS,  severe TR, severe pumonary HTN, presented in ED BIB form home for SOB. The patient  lives alone and has a private aid. She developed worsening SOB, She had iron infusion 5 days ago and was started on Macrobid for UTI  yesterday. (10 Feb 2024 18:17)      PERTINENT PMH REVIEWED:  [ x ] YES [ ] NO           Primary Contact: Citlalli Fadi ([phone], [phone]), christelle Lorenzo ([phone])    HCP [  ] Surrogate [ x  ] Guardian [   ]    Mental Status: [ x ] Alert  [x  ] Oriented [  ] Confused [  ] Lethargic   Concerns of Depression [  ] None reported by pt but dtr suspects pt may have some depression.   Anxiety [   ] none reported  Baseline ADLs (prior to admission):  Independent [ x ] moderately [ ] fully   Dependent   [ ] moderately [ ]fully    Family Meeting attendees: Patient and pt's daughter Citlalli participated in discussion (separately).     Anticipated Grief: Patient[  ] Family [  ]    Caregiver [place] Assessed: Yes [ x ] No [  ]    Hoahaoism: Not identified. Pt does state that "everything is in God's hands now.     Spiritual Concerns: None reported.  available PRN.     Goals of Care: Pursue treatment if it will improve her quality of life.     Previous Services: MLTC aide, Home visit [facility], jodi w/ Isra Cano and [facility]    ADVANCE DIRECTIVES:  [ X ] YES [  ] NO    - MOLST reflecting DNR/DNI wishes   - Dtr reports that pt has completed a Health Care Proxy in past - no copy on chart    Anticipated D/C Plan: Pt wishes to return home with her MLTC aides                     Summary: SW met with patient to offer support & introduce team. Palliative SW role explained. Pt appears [place] but able to communicate, alert, oriented and able to make needs known. Pt confirms that she resides home with MLTC aides who provide coverage all day. She recounts her experience at a [facility] and explains that she will never go back to one. Her main goal is to get home. She has daughter Citlalli who lives in [place] & son who lives in [place]. She explains that "this is not her first rodeo" and that she has "all of her faculties." That being said, she defers to her children for decision-making but also would like to be consulted if needed. Her main goal is to get home but she is okay with coming back to the hospital if needed. She explains that "I don't want to die but if I live, I live, If I die, I die. Everything is in God's hands." Team provided active listening and emotional support.     Team met with pt's daughter Citlalli while patient was working with physical therapy. She confirms all the above information. Her hope is for patient to return home. Team explained options for the future including hospice and its philosophy. She does not feel her mother is ready for hospice at this point. Citlalli confirmed MOLST reflecting DNR/DNI wishes. Dtr Citlalli explains that pt be struggling with a bit of depression as she is not interested in doing the things she used to enjoy doing 1 year ago, i.e. playing cards, knitting, reading. She has reportedly tried Lexapro for 2 days and stopped because it made her dizzy.     Emotional support provided. All questions addressed. Plan for pt to return home with aides & [facility] - dtr requesting transport wheelchair. Our team will continue to follow.

## 2024-02-13 NOTE — PROGRESS NOTE ADULT - PROBLEM SELECTOR PLAN 1
Pt presenting with SOB, pleural effusions, elevated BNP in setting known valvular disease (last TTE with LVEF 50-55%, severe TR, severe AS)  Thoracic surgery consulted for Pleural effusion no need for urgent intervention,  lasix Spironolactone on hold due to d/t electrolyte imbalances and worsened creatinine ,  H/O orthostatic hypotension will check readings   Wean midodrine (decreased to 2.5 MG TID from 5 MG )

## 2024-02-13 NOTE — PROGRESS NOTE ADULT - ASSESSMENT
Pt is a 89y old Female with hx of Diastolic CHF EF 45%, CAD, HTN, Orthostatic hypotension, Atrial fib. on Xarelto, s/p PPM, COPD, Hypothyroidism, Gout , valvular heart disease, sever AS,  severe TR, severe pumonary HTN, presented in ED BIB form home for SOB. The patient  lives alone and has a private aid. She developed worsening SOB, She had iron infusion 5 days ago and was started on Macrobid for UTI  yesterday.     1. Acute hypoxic and hypercapnic respiratory failure 2/2 CHF, severe valvular disease, pHTN, probable COPD excaerbation  -NIPPV nightly; on 5LNC currently  -Lasix & spirolactone on hold due to worsening JOAQUÍN & electrolyte imbalances   -monitor BMP closely  -IV Solumedrol  -strict I&O, daily weight, 1.2L FR  -cardiology following  -Pulmicort and Duoneb nebs  -thoracics consulted: no indication for thora at this time as effusions too small to tap    2. Severe aortic stenosis  -was evaluated by Dr. Levin for TAVR; family opted for medical management      Process of Care   --Reviewed dx/treatment problems and alignment with Goals of Care       Physical Aspects of Care   --Pain   patient denies at this time   c/w current management     --Bowel Regimen   denies constipation   risk for constipation d/t immobility   daily dulcolax as needed     --Dyspnea   no dyspnea at rest  on 5LNC  NIPPV at nightly  continue medical management for CHF/COPD exacerbations  comfortable and in NAD     --Nausea Vomiting   denies     --Weakness   PT as tolerated         Psychological and Psychiatric Aspects of Care:    --Grief/ Bereavement: emotional support provided   --Hx of psychiatric dx: none   --Pastoral Care Available PRN          Social Aspects of Care   --SW involved         Cultural Aspects   --Primary Language: English           Goals of Care:  Discussed with daughter 2/12. MOLST with DNR/DNI trial NIV. Not ready for comfort focus/ hospice at this time. Palliative will continue to follow as patient has poor prognosis and high risk of decompensation. For continued GOC discussion based on clinical condition.          We discussed Palliative Care team being a supportive team when a patient has ongoing illnesses.  We also discussed that it is not an end of life care service, but can help navigate symptoms and emotional support throughout their hospital stay here.           Ethical and Legal Aspects: NA           Capacity- A&Ox3, some short-term memory deficits; has simple decision making capacity but defers to her children         HCP/Surrogate: Citlalli Aponte, daughter [phone] & son Tutu [phone]          Code Status: DNR/DNI trial NIV    MOLST: MOLST with limitations of DNR/DNI trial NIV    Dispo Plan: home with prior aide services          Discussed With: Dr. Brooke coordinated with attending and SW and RN            Time Spent: 60 minutes including the care, coordination and counseling of this patient, 50% of which was spent coordinating and counseling.

## 2024-02-13 NOTE — PROGRESS NOTE ADULT - ASSESSMENT
The patient is a 89 yo female with Hx. of Diastolic CHF EF 45%, CAD, HTN, Orthostatic hypotension, Atrial fib. on Xarelto, s/p PPM, COPD, Hypothyroidism, Gout , valvular heart disease, sever AS,  severe TR, severe pumonary HTN, presented in ED BIB form home for SOB. The patient  lives alone and has a private aid. She developed worsening SOB, She had iron infusion 5 days ago and was started on Macrobid for UTI  yesterday.   renal eval for acute hyponatremia in setting of acute CHF/pulm HTN and bilateral plueral effusions    Hyponatremia - improving   suspect hypervolemic due to fluid overload   monitor Na trend during diuresis    IV diuresis as per medicine/cardiology teams    free water/fluid restriction    avoid thiazide diuretics if Na remains low -  ( HCTZ, metalozone etc)    k borderline on Aldactone and KCl supplements - would stop KCL if K rises     2/12 SY  --Hyponatremia with CHF : Sodium level slowly improving : will resume IV lasix.  --JOAQUÍN : creat elevated but remains fluid overloaded : continue IV lasix to stabilize resp status first.  --Decompensated CHF with severe pulm HTN : prognosis grave.  --GOC evaluation ongoing.  --D/c spironolactone for now,  until electrolytes and creat level holding steady.    2/13  Pt with CHF, on IV lasix  HYperkalemia treated with Ca IV and PO Lokelma  overall clinical status improved

## 2024-02-13 NOTE — PROGRESS NOTE ADULT - PROBLEM SELECTOR PLAN 2
Valvular heart disease:  Previously evaluated by Dr Levin for structural intervention- pt and family had opted for medical management  Had conversation today with patient and daughter at bedside and wishes remain the same to CW medical management

## 2024-02-13 NOTE — PROGRESS NOTE ADULT - SUBJECTIVE AND OBJECTIVE BOX
Subjective:    pat better, sitting in bed, no new complaint, 3lpm nc sat 99%.    Home Medications:  allopurinol 100 mg oral tablet: 1 tab(s) orally once a day (10 Feb 2024 14:48)  escitalopram 5 mg oral tablet: 1 tab(s) orally (10 Feb 2024 14:55)  levothyroxine 125 mcg (0.125 mg) oral tablet: 1 tab(s) orally once a day (10 Feb 2024 14:48)  metoprolol succinate 25 mg oral tablet, extended release: 1 tab(s) orally once a day (10 Feb 2024 14:55)  midodrine 5 mg oral tablet: 1 tab(s) orally 2 times a day (10 Feb 2024 14:56)  Multiple Vitamins oral tablet: 1 tab(s) orally once a day (10 Feb 2024 14:55)  nitrofurantoin macrocrystals 100 mg oral capsule: 1 cap(s) orally 2 times a day for 5 days ***course not complete*** (10 Feb 2024 14:55)  potassium chloride 20 mEq oral tablet, extended release: 1 tab(s) orally once a day (10 Feb 2024 14:55)  spironolactone 25 mg oral tablet: 1 tab(s) orally once a day (10 Feb 2024 14:48)  thiamine 100 mg oral tablet: 1 tab(s) orally once a day (10 Feb 2024 14:48)  torsemide 20 mg oral tablet: 1 tab(s) orally 2 times a day (10 Feb 2024 14:48)  Xarelto 10 mg oral tablet: 1 tab(s) orally once a day (10 Feb 2024 14:55)    MEDICATIONS  (STANDING):  albuterol    0.083%. 2.5 milliGRAM(s) Nebulizer once  albuterol/ipratropium for Nebulization 3 milliLiter(s) Nebulizer every 6 hours  allopurinol 100 milliGRAM(s) Oral daily  atorvastatin 20 milliGRAM(s) Oral at bedtime  buDESOnide    Inhalation Suspension 0.5 milliGRAM(s) Inhalation two times a day  dextrose 10%. 500 milliLiter(s) (50 mL/Hr) IV Continuous <Continuous>  escitalopram 5 milliGRAM(s) Oral daily  levothyroxine 125 MICROGram(s) Oral daily  methylPREDNISolone sodium succinate Injectable 20 milliGRAM(s) IV Push every 8 hours  metoprolol succinate ER 25 milliGRAM(s) Oral at bedtime  midodrine. 2.5 milliGRAM(s) Oral three times a day  multivitamin 1 Tablet(s) Oral daily  polyethylene glycol 3350 17 Gram(s) Oral daily  senna 2 Tablet(s) Oral at bedtime  thiamine 100 milliGRAM(s) Oral daily    MEDICATIONS  (PRN):  acetaminophen     Tablet .. 650 milliGRAM(s) Oral every 6 hours PRN Mild Pain (1 - 3)  aluminum hydroxide/magnesium hydroxide/simethicone Suspension 30 milliLiter(s) Oral every 4 hours PRN Dyspepsia  melatonin 3 milliGRAM(s) Oral at bedtime PRN Insomnia  ondansetron Injectable 4 milliGRAM(s) IV Push every 6 hours PRN Nausea and/or Vomiting      Allergies    codeine (Unknown)    Intolerances        Vital Signs Last 24 Hrs  T(C): 36.4 (13 Feb 2024 08:08), Max: 36.6 (12 Feb 2024 18:53)  T(F): 97.6 (13 Feb 2024 08:08), Max: 97.9 (12 Feb 2024 18:53)  HR: 113 (13 Feb 2024 08:08) (99 - 113)  BP: 119/68 (13 Feb 2024 08:08) (90/53 - 119/68)  BP(mean): --  RR: 18 (13 Feb 2024 08:08) (18 - 18)  SpO2: 99% (13 Feb 2024 08:08) (94% - 99%)    Parameters below as of 13 Feb 2024 08:08  Patient On (Oxygen Delivery Method): nasal cannula  O2 Flow (L/min): 3        PHYSICAL EXAMINATION:    NECK:  Supple. No lymphadenopathy. Jugular venous pressure not elevated. Carotids equal.   HEART:   The cardiac impulse has a normal quality. Reg., Nl S1 and S2.  There are no murmurs, rubs or gallops noted  CHEST:  Chest crackles to auscultation. Normal respiratory effort.  ABDOMEN:  Soft and nontender.   EXTREMITIES:  There is no edema.       LABS:                        9.9    3.29  )-----------( 170      ( 12 Feb 2024 06:32 )             30.8     02-13    128<L>  |  90<L>  |  55<H>  ----------------------------<  222<H>  5.5<H>   |  33<H>  |  2.11<H>    Ca    9.4      13 Feb 2024 14:55  Phos  3.0     02-12  Mg     2.1     02-12    TPro  6.6  /  Alb  3.3  /  TBili  0.6  /  DBili  0.2  /  AST  25  /  ALT  19  /  AlkPhos  110  02-12      Urinalysis Basic - ( 13 Feb 2024 14:55 )    Color: x / Appearance: x / SG: x / pH: x  Gluc: 222 mg/dL / Ketone: x  / Bili: x / Urobili: x   Blood: x / Protein: x / Nitrite: x   Leuk Esterase: x / RBC: x / WBC x   Sq Epi: x / Non Sq Epi: x / Bacteria: x

## 2024-02-13 NOTE — PROGRESS NOTE ADULT - ASSESSMENT
89 year-old woman with DM type II, HTN, CAD, chronic diastolic CHF, chronic atrial fibrillation on Xarelto, hx of PPM placement, severe aortic stenosis, severe tricuspid regurgitation, severe pulmonary HTN, chronic orthostatic hypotension on midodrine, COPD, hypothyroidism, gout, just started on macrobid for 1 day for UTI, presented with worsening shortness of breath. Reports adherence with medication. Lives alone, with health aides. Denied chest pain, fevers, chills, cough. She was most recently admitted to  in 9/2023. At the time, she was evaluated for TAVR. Underwent RHC/LHC showing mild diffuse atherosclerosis with moderate severe disease of small caliber Cx in a nondominant territory. She had elevated right sided filling pressures in setting of severe TR with normal left sided filling pressures. Adequate perfusion. No intervention performed. Her outpatient cardiologist is Dr. Harris. In the ED, she was tachycardic to 107, normotensive, saturating 93%. She had increased work of breathing. Labs notable for Hgb 10, Na 123, Hs trop neg x1, proBNP 7548, COVID and RVP neg. CT chest w/ severe cardiomegaly with enlarging large bilateral pleural effusions. Severe pulmonary arterial hypertension. Admitted for further management of acute respiratory failure.     Acute hypoxic and hypercapneic respiratory failure  Likely multi factorial due to CHF exacerbation, severe AS, severe TR, severe pHTN, pleural effusions, compressive atelectasis, COPD with probable exacerbation. No sign of pneumonia at this point. Has required NIPPV with BiPAP to reduce work of breathing and improve hypercapnea.   - Continue NIPPV with BiPAP overnight  - Continue O2 supplementation as needed, wean as tolerated, goal SPO2 92%  - Continue to treat underlying issues, see below    Acute on chronic diastolic CHF  Patient presenting with SOB, pleural effusions, elevated BNP in setting known valvular disease, HFpEF. Last TTE with LVEF 50-55%, severe TR, severe AS. At home, shes on torsemide 20mg BID. Appreciate input from Cardiology. Some improvement in oxygenation with IV Lasix but Cr uptrending significantly.  - Held diuretic  - Continue metoprolol  - Monitor IS and OS, daily wt  - Trend Cr and Lytes  - Fluid restrict    Severe aortic stenosis   Previously evaluated by Dr Levin for structural intervention/JAYNA. Patient and family had opted for medical management.  - Continue medical management    CAD  Recent cath with mild diffuse atherosclerosis with moderate severe disease of small caliber Cx in a nondominant territory.  - Cont med management with metoprolol, unclear why not on statin (will need to clarify if she did not previously tolerate)    Chronic atrial fibrillation  RVR in the ED in setting of active respiratory symptoms.   - Continue metoprolol  - Will try to wean midodrine as tolerated  - Holding Xarelto as planning for possible thoracentesis / pigtail chest tube placement to drain pleural effusion in 48 hrs     Hx of pacemaker placement  Consulted cardiac EP for interrogation. Underlying rhythm aFib. Normal functioning single chamber PPM with battery longevity 5.6 years. Afib with V-pacing 19%, overall rate histogram is acceptable, brief RVR noted. No setting change made.  - Continue routine outpatient Cardiac EP follow up as needed    COPD with acute exacerbation  Appreciate input from Pulmonary  Medicine  - Continue systemic steroids with methylprednisolone  - Continue DuoNeb q6h    Significant pleural effusions B/L  Difficult to diurese patient as Cr rising. Still with hypoxia, sizable pleural effusions. Thoracic Surgery consulted for input re possible drainage, patient in agreement.   - Holding DOAC  - Plan for pleural drainage catheter placement in 48 hrs  - F/u with Thoracic Surgery    Hyponatremia  Suspect hypervolemic due to fluid overload. Na now improving.   - Trend NA  - Free water/fluid restriction   - Avoid thiazide diuretics if Na remains low ( HCTZ, metolazone etc)     Hyperkalemia  Administered hyperkalemia protocol. Held potassium supplementation tabs. Held spironolactone.   - Continue to monitor closely    DM  type II  A1c 6.6 in 9/2023  - Obtain updated A1c  - Continue on insulin correctional scale for now    Hypothyroidism  Stable.   - Continue levothyroxine    Hx of gout  Stable.   - Continue allopurinol    Physical deconditioning and debility  PT consulted  - F/u PT eval    Severe protein calorie malnutrition  Appreciate dietician input  - Trend wt  - Added MVI with minerals daily, thiamine 100mg daily    Constipation  No nausea or emesis. Intact bowel sounds.   - Ordered for bowel regimen    UTI, present prior to admission  Recent dx of UTI. Was treated with macrobid for 1 day before getting admitted to hospital. Here she received an empiric 3 day course of ceftriaxone. Urine and blood cultures returned negative.      DVT px: On Xarelto for afib but now held as patient prob will require pleural drainage catheter placement  Code Status: DNR/DNI/Trial NIV   Dispo: To be determined pending further clinical assessment

## 2024-02-13 NOTE — PROGRESS NOTE ADULT - SUBJECTIVE AND OBJECTIVE BOX
Chief Complaint: Shortness of breath    Interval Hx: Patient seen and examined this AM. Feels relatively unchanged. No significant dyspnea at rest. Does have dyspnea with exertion. No chest pain or tightness. No cough. No other complaints aside for generalized weakness. She remains on supplemental oxygen, somewhat improved since admission but her Cr is uptrending, doubt she can tolerate any further diuresis. Consulted with Thoracic Surgery as patient would be amenable to pigtail chest tube drainage of pleural effusion. Holding DOAC now in preparation.     ROS: Multi system review is comprehensively negative x 10 systems except as above    Vitals:  T(F): 97.6 (13 Feb 2024 08:08), Max: 97.7 (12 Feb 2024 20:06)  HR: 99 (13 Feb 2024 17:13) (99 - 113)  BP: 95/54 (13 Feb 2024 17:13) (91/58 - 119/68)  RR: 18 (13 Feb 2024 08:08) (18 - 18)  SpO2: 99% (13 Feb 2024 08:08) (95% - 99%) on 3L/min via NC    Exam:  Constitutional: No acute distress  HEENT: NCAT PERRL EOMI MMM clear oropharynx  Neck: Supple, no JVD  CVS: S1 and S2, irregular, rate ~90, 2/6 REBECA   Chest: Normal resp effort at rest, diminished breath sounds at bases B/L  Abd: +BS, soft NT ND   Ext: B/L LE edema  Skin: Warm, dry  Psych: Mood & affect appropriate  Neurological: Alert, no focal deficits    Labs:                      9.9    3.29  )--------( 170              30.8       128  |  90  |  55  ---------------------<  222  5.5   |  33  |  2.11    Ca  9.4      Mg  2.1    Phos 3.0       TPro  6.6  /  Alb  3.3  /  TBili  0.6  /  DBili  0.2   /  AST  25  /  ALT  19  /  AlkPhos  110    Troponin negative   proBNP 7548    ABG 2/11 8:47   pH 7.29  /  pCO2: 61  /  pO2: 106  /   HCO3: 29  /  Base Excess: 1.2  /   SaO2: 98        UA 2/10: Yellow, clear, neg ketone, neg nitrite, trace LE, 2 WBC, 0 RBC, bact neg    Micro:  COVID19 PCR 2/10: Negative  Flu PCR 2/10: Negative  RSV PCR 2/10: Negative  Urine culture 2/10: Negative  Blood culture 2/10: Negative    Imaging:  CT chest WO 2/10: Bibasilar compressive atelectasis. No pulmonary consolidation or mass. There are enlarging moderate to large bilateral pleural effusions. No lymphadenopathy. There is enlargement of the main pulmonary artery spanning 3.4cm, consistent with pulmonary arterial hypertension. Severe arterial vascular calcification. There is severe cardiomegaly with four-chamber enlargement.  There are permanent pacemaker with lead tips in the right ventricle. Severe coronary arterial calcification.    CXR 2/10: Bilateral pleural effusions with adjacent atelectasis versus pneumonia at the left base.    Cardiac Testing:  EKG 2/13: Afib with RVR, rate 112    PPM interrogation 2/12: Underlying rhythm aFib. Normal functioning single chamber PPM with battery longevity 5.6 years. Afib with V-pacing 19%, overall rate histogram is acceptable, brief RVR noted. No setting change made.    Tele 2/10: Afib rate     EKG 2/10: Afib rate 100s    Prior visit cardiac testing   TTE 9/11:  Limited study to assess tricuspid insufficiency and pulmonary pressure. Severe (4+) tricuspid valve regurgitation is present. Severe pulmonary hypertension. The left ventricle is normal in size, paradoxical septal motion The interventricular septum appears flattened consistent with an RV pressure/volume overload. An apically displaced papillary muscle is noted. Estimated left ventricular ejection fraction is 50-55%. The right atrium appears severely dilated. A device wire is seen in the RV and RA. The right ventricle is moderately dilated with decreased contractility.    LHC 9/6: Mild diffuse atherosclerosis with moderate severe disease of small caliber Cx in a nondominant territory. Elevated right sided filling pressures in setting of severe TR with normal left sided filling pressures. Adequate perfusion.    TTE 8/29: Mild to mod MR. Severe AS. Severe TR. Mild to mod TX. Severe pHTN. Severely dilated LA. LV systolic function mildly impaired; segmental wall motion abnormalities noted. Mild concentric LVH. LVEF 45%. The interventricular septum appears flattened consistent with an RV pressure/volume overload. RA severely dilated. RV with moderate dilation, diffuse hypokinesis, and depression of contractility based on TAPSE. A device wire is seen in the RV and RA. IVC is dilated and not collapsing with inspiration.    Meds:  MEDICATIONS  (STANDING):  albuterol/ipratropium for Nebulization 3 milliLiter(s) Nebulizer every 6 hours  allopurinol 100 milliGRAM(s) Oral daily  atorvastatin 20 milliGRAM(s) Oral at bedtime  buDESOnide    Inhalation Suspension 0.5 milliGRAM(s) Inhalation two times a day  escitalopram 5 milliGRAM(s) Oral daily  levothyroxine 125 MICROGram(s) Oral daily  methylPREDNISolone sodium succinate Injectable 20 milliGRAM(s) IV Push every 12 hours  metoprolol succinate ER 25 milliGRAM(s) Oral at bedtime  midodrine. 2.5 milliGRAM(s) Oral three times a day  multivitamin 1 Tablet(s) Oral daily  polyethylene glycol 3350 17 Gram(s) Oral daily  senna 2 Tablet(s) Oral at bedtime  thiamine 100 milliGRAM(s) Oral daily    MEDICATIONS  (PRN):  acetaminophen     Tablet .. 650 milliGRAM(s) Oral every 6 hours PRN Mild Pain (1 - 3)  aluminum hydroxide/magnesium hydroxide/simethicone Suspension 30 milliLiter(s) Oral every 4 hours PRN Dyspepsia  melatonin 3 milliGRAM(s) Oral at bedtime PRN Insomnia  ondansetron Injectable 4 milliGRAM(s) IV Push every 6 hours PRN Nausea and/or Vomiting

## 2024-02-13 NOTE — PROGRESS NOTE ADULT - SUBJECTIVE AND OBJECTIVE BOX
NEPHROLOGY INTERVAL HPI/OVERNIGHT EVENTS:    Date of Service: 02-12-24 @ 14:12  2/13- OOB sleeping arousable NAD, evaluated for CKD JOAQUÍN and heart failure  2/12--seen by Dr Hopper yesterday.  Alert,  SOB only slightly better.  Feeling weak.    HPI:   The patient is a 89 yo female with Hx. of Diastolic CHF EF 45%, CAD, HTN, Orthostatic hypotension, Atrial fib. on Xarelto, s/p PPM, COPD, Hypothyroidism, Gout , valvular heart disease, sever AS,  severe TR, severe pumonary HTN, presented in ED BIB form home for SOB. The patient  lives alone and has a private aid. She developed worsening SOB, She had iron infusion 5 days ago and was started on Macrobid for UTI  yesterday.   renal eval for acute hyponatremia in setting of acute CHF/pulm HTN and bilateral plueral effusions      Home Medications:  allopurinol 100 mg oral tablet: 1 tab(s) orally once a day (10 Feb 2024 14:48)  escitalopram 5 mg oral tablet: 1 tab(s) orally (10 Feb 2024 14:55)  levothyroxine 125 mcg (0.125 mg) oral tablet: 1 tab(s) orally once a day (10 Feb 2024 14:48)  metoprolol succinate 25 mg oral tablet, extended release: 1 tab(s) orally once a day (10 Feb 2024 14:55)  midodrine 5 mg oral tablet: 1 tab(s) orally 2 times a day (10 Feb 2024 14:56)  Multiple Vitamins oral tablet: 1 tab(s) orally once a day (10 Feb 2024 14:55)  nitrofurantoin macrocrystals 100 mg oral capsule: 1 cap(s) orally 2 times a day for 5 days ***course not complete*** (10 Feb 2024 14:55)  potassium chloride 20 mEq oral tablet, extended release: 1 tab(s) orally once a day (10 Feb 2024 14:55)  spironolactone 25 mg oral tablet: 1 tab(s) orally once a day (10 Feb 2024 14:48)  thiamine 100 mg oral tablet: 1 tab(s) orally once a day (10 Feb 2024 14:48)  torsemide 20 mg oral tablet: 1 tab(s) orally 2 times a day (10 Feb 2024 14:48)  Xarelto 10 mg oral tablet: 1 tab(s) orally once a day (10 Feb 2024 14:55)      MEDICATIONS  (STANDING):  albuterol/ipratropium for Nebulization 3 milliLiter(s) Nebulizer every 6 hours  allopurinol 100 milliGRAM(s) Oral daily  atorvastatin 20 milliGRAM(s) Oral at bedtime  cefTRIAXone Injectable. 1000 milliGRAM(s) IV Push every 24 hours  dextrose 10%. 500 milliLiter(s) (50 mL/Hr) IV Continuous <Continuous>  escitalopram 5 milliGRAM(s) Oral daily  levothyroxine 125 MICROGram(s) Oral daily  methylPREDNISolone sodium succinate Injectable 20 milliGRAM(s) IV Push every 8 hours  metoprolol succinate ER 25 milliGRAM(s) Oral at bedtime  midodrine. 2.5 milliGRAM(s) Oral three times a day  multivitamin 1 Tablet(s) Oral daily  rivaroxaban 10 milliGRAM(s) Oral daily  thiamine 100 milliGRAM(s) Oral daily    MEDICATIONS  (PRN):  acetaminophen     Tablet .. 650 milliGRAM(s) Oral every 6 hours PRN Mild Pain (1 - 3)  aluminum hydroxide/magnesium hydroxide/simethicone Suspension 30 milliLiter(s) Oral every 4 hours PRN Dyspepsia  melatonin 3 milliGRAM(s) Oral at bedtime PRN Insomnia  ondansetron Injectable 4 milliGRAM(s) IV Push every 6 hours PRN Nausea and/or Vomiting    Vital Signs Last 24 Hrs  T(C): 36.6 (13 Feb 2024 21:22), Max: 36.6 (13 Feb 2024 21:22)  T(F): 97.8 (13 Feb 2024 21:22), Max: 97.8 (13 Feb 2024 21:22)  HR: 99 (13 Feb 2024 21:22) (87 - 113)  BP: 97/54 (13 Feb 2024 21:22) (95/54 - 119/68)  BP(mean): --  RR: 18 (13 Feb 2024 21:22) (18 - 18)  SpO2: 92% (13 Feb 2024 21:22) (92% - 99%)    Parameters below as of 13 Feb 2024 21:22  Patient On (Oxygen Delivery Method): nasal cannula  O2 Flow (L/min): 3    I&O's Detail        PHYSICAL EXAM:  GENERAL: alert, no acute distress.  CHEST/LUNG: poor insp effort,  decreased bs.  HEART: S1S2 RRR  ABDOMEN: soft  EXTREMITIES: 1+ edema  SKIN:     LABS:                            9.9    3.29  )-----------( 170      ( 12 Feb 2024 06:32 )             30.8     02-13    128<L>  |  90<L>  |  55<H>  ----------------------------<  222<H>  5.5<H>   |  33<H>  |  2.11<H>    Ca    9.4      13 Feb 2024 14:55  Phos  3.0     02-12  Mg     2.1     02-12    TPro  6.6  /  Alb  3.3  /  TBili  0.6  /  DBili  0.2  /  AST  25  /  ALT  19  /  AlkPhos  110  02-12 02-12    128<L>  |  95<L>  |  31<H>  ----------------------------<  157<H>  6.2<HH>   |  28  |  1.48<H>    Ca    8.6      12 Feb 2024 06:32  Phos  3.0     02-12  Mg     2.1     02-12    TPro  6.6  /  Alb  3.3  /  TBili  0.6  /  DBili  0.2  /  AST  25  /  ALT  19  /  AlkPhos  110  02-12      Urinalysis Basic - ( 12 Feb 2024 06:32 )    Color: x / Appearance: x / SG: x / pH: x  Gluc: 157 mg/dL / Ketone: x  / Bili: x / Urobili: x   Blood: x / Protein: x / Nitrite: x   Leuk Esterase: x / RBC: x / WBC x   Sq Epi: x / Non Sq Epi: x / Bacteria: x      Magnesium: 2.1 mg/dL (02-12 @ 06:32)  Phosphorus: 3.0 mg/dL (02-12 @ 06:32)    ABG - ( 11 Feb 2024 08:47 )  pH, Arterial: 7.29  pH, Blood: x     /  pCO2: 61    /  pO2: 106   / HCO3: 29    / Base Excess: 1.2   /  SaO2: 98                    RADIOLOGY & ADDITIONAL TESTS:

## 2024-02-13 NOTE — PROGRESS NOTE ADULT - ASSESSMENT
89 yo female with Hx. of Diastolic CHF EF 45%, CAD, HTN, Orthostatic hypotension, Atrial fib. on Xarelto, s/p PPM, COPD, Hypothyroidism, Gout , valvular heart disease, sever AS,  severe TR, severe pumonary HTN, presented in ED BIB form home for SOB. The patient  lives alone and has a private aid. She developed worsening SOB, She had iron infusion 5 days ago and was started on Macrobid for UTI  yesterday.     PROBLEMS:    AC hypoxaemic & hypercapnic respiratory failure  Acute excerbation of COPD  Acute CHF   Bilateral pleural effusion  Hypnatremia   Orthostatic hypotension  Atrial fib-s/p PPM  Non obs CAD    Hypothyroidism  Severe valvular heart disease  UTI     PLAN:    pulmonary stable  IV lasix  BIPAP PRN & TITRATE FIO2/VENOUS GAS  Taper IV SOLU-MEDROLS 20MG Q12HR  AEROSOLS/ NEB  Fluid restrictions  IV Ceftriaxone pneumonia / UTI  VTEP-on Xarelto      89 yo female with Hx. of Diastolic CHF EF 45%, CAD, HTN, Orthostatic hypotension, Atrial fib. on Xarelto, s/p PPM, COPD, Hypothyroidism, Gout , valvular heart disease, sever AS,  severe TR, severe pumonary HTN, presented in ED BIB form home for SOB. The patient  lives alone and has a private aid. She developed worsening SOB, She had iron infusion 5 days ago and was started on Macrobid for UTI  yesterday.     PROBLEMS:    AC hypoxaemic & hypercapnic respiratory failure  Acute excerbation of COPD  Acute CHF   Bilateral pleural effusion  Hypnatremia   Orthostatic hypotension  Atrial fib-s/p PPM  Non obs CAD    Hypothyroidism  Severe valvular heart disease  UTI     PLAN:    pulmonary stable  IV lasix  BIPAP PRN & TITRATE FIO2/VENOUS GAS  Taper IV SOLU-MEDROLS 20MG Q12HR  AEROSOLS/ NEB  Fluid restrictions  off abx  VTEP-on Xarelto

## 2024-02-13 NOTE — PROGRESS NOTE ADULT - PROBLEM SELECTOR PLAN 4
persistent Afib, cont AC with xarelto  weaning midodrine as tolerated; cont metoprolol for rate control

## 2024-02-13 NOTE — PROGRESS NOTE ADULT - SUBJECTIVE AND OBJECTIVE BOX
CHIEF COMPLAINT: SOB      HPI:  Ms. Brennan is a 89 yo female with a hx HFpEF (LVEF 50-55% based on 9/2023 TTE, severe TR, severe AS, severe pulmonary hypertension, atrial fibrillation on xarelto s/p PPM, COPD, hypothyroidism, hypertension presenting with several days of worsening SOB. Of note, was started on macrobid for UTI on day prior to admission. Reports adherence with medication. Lives alone, with health aides. Denies chest pain, fevers, chills, cough.     Pt was most recently admitted to  in 9/2023. At the time, she was evaluated for TAVR. Underwent RHC/LHC showing Mild diffuse atherosclerosis with moderate severe disease of small caliber Cx in a nondominant territory; Elevated right sided filling pressures in setting of severe TR with normal left sided filling pressures. Adequate perfusion. No intervention performed.     Outpatient cardiologist: Dr. Harris      In the ED, she was tachycardic to 107, normotensive, saturating 93%.   Labs notable for hgb 10, Na 123, Hs trop neg x1, proBNP 7548, COVID and RVP neg  CT chest: Severe cardiomegaly with enlarging large bilateral pleural effusions.  Severe pulmonary arterial hypertension.    Cardiology consulted for decompensated HF.   2/12/24: Awake alert comfortable, tel AF 90's  2/13/24 Patient is comfortable ,  HR controlled         PAST MEDICAL & SURGICAL HISTORY:  Mitral valve insufficiency      Aortic valve regurgitation      Osteopenia      CAD (coronary artery disease)      Gout      Hypothyroid      CHF (congestive heart failure)      Anxiety      Anemia      HTN (hypertension)      Afib      COPD (chronic obstructive pulmonary disease)      Chronic obstructive pulmonary disease (COPD)      HTN (hypertension)      Cardiac pacemaker      Gout      Hypothyroidism      Insomnia      Chronic CHF      History of ST elevation myocardial infarction (STEMI)      Status cardiac pacemaker      S/P knee replacement    MEDICATIONS  (STANDING):  albuterol    0.083%. 2.5 milliGRAM(s) Nebulizer once  albuterol/ipratropium for Nebulization 3 milliLiter(s) Nebulizer every 6 hours  allopurinol 100 milliGRAM(s) Oral daily  atorvastatin 20 milliGRAM(s) Oral at bedtime  buDESOnide    Inhalation Suspension 0.5 milliGRAM(s) Inhalation two times a day  dextrose 10%. 500 milliLiter(s) (50 mL/Hr) IV Continuous <Continuous>  escitalopram 5 milliGRAM(s) Oral daily  levothyroxine 125 MICROGram(s) Oral daily  methylPREDNISolone sodium succinate Injectable 20 milliGRAM(s) IV Push every 8 hours  metoprolol succinate ER 25 milliGRAM(s) Oral at bedtime  midodrine. 2.5 milliGRAM(s) Oral three times a day  multivitamin 1 Tablet(s) Oral daily  polyethylene glycol 3350 17 Gram(s) Oral daily  senna 2 Tablet(s) Oral at bedtime  thiamine 100 milliGRAM(s) Oral daily    MEDICATIONS  (PRN):  acetaminophen     Tablet .. 650 milliGRAM(s) Oral every 6 hours PRN Mild Pain (1 - 3)  aluminum hydroxide/magnesium hydroxide/simethicone Suspension 30 milliLiter(s) Oral every 4 hours PRN Dyspepsia  melatonin 3 milliGRAM(s) Oral at bedtime PRN Insomnia  ondansetron Injectable 4 milliGRAM(s) IV Push every 6 hours PRN Nausea and/or Vomiting    Vital Signs Last 24 Hrs  T(C): 36.4 (13 Feb 2024 08:08), Max: 36.6 (12 Feb 2024 18:53)  T(F): 97.6 (13 Feb 2024 08:08), Max: 97.9 (12 Feb 2024 18:53)  HR: 113 (13 Feb 2024 08:08) (99 - 113)  BP: 119/68 (13 Feb 2024 08:08) (90/53 - 119/68)  BP(mean): --  RR: 18 (13 Feb 2024 08:08) (18 - 18)  SpO2: 99% (13 Feb 2024 08:08) (94% - 99%)    Parameters below as of 13 Feb 2024 08:08  Patient On (Oxygen Delivery Method): nasal cannula  O2 Flow (L/min): 3      I&O's Summary        PHYSICAL EXAM:  Constitutional: NAD, awake and frail   HEENT:  EOMI,  Pupils round, No oral cyanosis.  Pulmonary: Non-labored, diminished at bases bilaterally  Cardiovascular: irregular, + systolic murmur   Gastrointestinal: Abd soft, nontender.   Lymph: No LE Edema  Neurological: Alert and oriented x2, no focal deficits  Psych:  Mood & affect appropriate    LABS:                                    9.9    3.29  )-----------( 170      ( 12 Feb 2024 06:32 )             30.8     02-13    x   |  x   |  x   ----------------------------<  330<H>  x    |  x   |  x     Ca    8.7      13 Feb 2024 06:34  Phos  3.0     02-12  Mg     2.1     02-12    TPro  6.6  /  Alb  3.3  /  TBili  0.6  /  DBili  0.2  /  AST  25  /  ALT  19  /  AlkPhos  110  02-12        LIVER FUNCTIONS - ( 12 Feb 2024 06:32 )  Alb: 3.3 g/dL / Pro: 6.6 gm/dL / ALK PHOS: 110 U/L / ALT: 19 U/L / AST: 25 U/L / GGT: x             Urinalysis Basic - ( 13 Feb 2024 11:48 )    Color: x / Appearance: x / SG: x / pH: x  Gluc: 330 mg/dL / Ketone: x  / Bili: x / Urobili: x   Blood: x / Protein: x / Nitrite: x   Leuk Esterase: x / RBC: x / WBC x   Sq Epi: x / Non Sq Epi: x / Bacteria: x        Culture Results:   <10,000 CFU/mL Normal Urogenital Xiomara (02-10-24 @ 15:07)  Culture Results:   No growth at 48 Hours (02-10-24 @ 14:23)  Culture Results:   No growth at 48 Hours (02-10-24 @ 14:14)      e< from: Transthoracic Echocardiogram Follow Up (09.11.23 @ 08:36) >  Impression     Summary     Limited study to assess tricuspid insufficiency and pulmonary pressure.   Severe (4+) tricuspid valve regurgitation is present.   Severe pulmonary hypertension.   The left ventricle is normal in size, paradoxical septal motion   The interventricular septum appears flattened consistent with an RV   pressure/volume overload.   An apically displaced papillary muscle is noted.   Estimated left ventricular ejection fraction is 50-55%.   The right atrium appears severely dilated.   A device wire is seen in the RV and RA.   The right ventricle is moderately dilated with decreased contractility.     Signature     ----------------------------------------------------------------   Electronically signed by Venugopal Palla, MD(Interpreting   physician) on 09/11/2023 04:51 PM   ----------------------------------------------------------------    < end of copied text >    < from: Cardiac Catheterization (09.06.23 @ 11:59) >    Diagnostic Findings:     Coronary Angiography   The coronary circulation is right dominant.      LM   Left main artery: Angiography shows mild atherosclerosis.      LAD   Proximal left anterior descending: There is a 30 % stenosis.      CX   Distal circumflex: The segment is extremely small. There is a 70 %  stenosis.    RCA   Proximal right coronary artery: There is a 40 % stenosis.      < end of copied text >    Diagnostic Conclusions:     1. Mild diffuse atherosclerosis with moderate severe disease of small  caliber Cx in a nondominant territory.  2. Elevated right sided filling pressures in setting of severe TR with  normal left sided filling pressures. Adequate perfusion.  Recommendations:     1. Recommend aggressive medical management for CAD as per ACC/AHA  guidelines  2. Structural heart follow up at  for ongoing JAYNA evaluation.

## 2024-02-13 NOTE — PROGRESS NOTE ADULT - SUBJECTIVE AND OBJECTIVE BOX
HPI: Pt is a 89y old Female with hx of Diastolic CHF EF 45%, CAD, HTN, Orthostatic hypotension, Atrial fib. on Xarelto, s/p PPM, COPD, Hypothyroidism, Gout , valvular heart disease, sever AS,  severe TR, severe pumonary HTN, presented in ED BIB form home for SOB. The patient  lives alone and has a private aid. She developed worsening SOB, She had iron infusion 5 days ago and was started on Macrobid for UTI  yesterday.  Palliative consulted for GOC.    : Seen and examined at bedside. Awake, alert, oriented x3 with notable short-term memory loss. Denies pain, SOB, on 5LNC in NAD. Patient defers to her children for any medical decision making. Spoke with patient's daughter, Citlalli, see GOC discussion below.  : Drowsy today, reported not sleeping well overnight. A&Ox3, forgetful. On 5LNC, mildly dyspneic at rest. Denies pain.      CODE STATUS: DNR/DNI trial NIV      Pain and Dyspnea:     denies pain  denies dyspnea but appears mildly dyspneic at rest  on 5LNC      Review of Systems:    Anxiety- denies  Depression- denies  Physical Discomfort- denies  Dyspnea- denies currently  Constipation- denies  Diarrhea- denies  Nausea- denies  Vomiting- denies  Anorexia-  Weight Loss-   Cough- ++  Secretions- denies  Fatigue- denies  Weakness- ++  Delirium- denies    All other systems reviewed and negative  Unable to obtain/Limited due to:      PHYSICAL EXAM:    Vital Signs Last 24 Hrs  T(C): 36.4 (2024 08:08), Max: 36.6 (2024 18:53)  T(F): 97.6 (2024 08:08), Max: 97.9 (2024 18:53)  HR: 113 (2024 08:08) (99 - 113)  BP: 119/68 (2024 08:08) (90/53 - 119/68)  BP(mean): --  RR: 18 (2024 08:08) (18 - 18)  SpO2: 99% (2024 08:08) (94% - 99%)    Parameters below as of 2024 08:08  Patient On (Oxygen Delivery Method): nasal cannula  O2 Flow (L/min): 3    Daily     Daily Weight in k.8 (2024 07:53)    PPSV2:   20%  FAST:    General: awake, alert, pleasant, chronically-ill appearing  HEENT: on 5LNC  Lungs: Diminished to anterior lung fields  Cardiac: Irregular rate and rhythm  GI: Abdomen slightly distended. +BSx4  : No suprapubic tenderness  Ext: +PVD bilateral lower extremities. no edema  Neuro: A&Ox3, short-term memory loss. Speech intact. No focal neuro deficits.        LABS and RADIOLOGY: REVIEWED

## 2024-02-13 NOTE — PROGRESS NOTE ADULT - PROBLEM SELECTOR PLAN 3
Recent cath as above: Mild diffuse atherosclerosis with moderate severe disease of small  caliber Cx in a nondominant territory.  cont medical management, statin initiated ( per patient and daughter patient never on a statin)

## 2024-02-14 NOTE — PROGRESS NOTE ADULT - ASSESSMENT
89 year old female PMHx of Diastolic CHF EF 45%, CAD, sever AS,  severe TR, severe pulmonary HTN, AFIB on Xarelto ,COPD, Orthostatic hypotension, recent pneumonia who was    admitted with SOB found to have bilateral pleural effusions.       Plan   Moderate Rt pleural effusion   Increased creatinine from lasix   hold Xarelto for thoracentesis tomorrow   care as per medical teams      Discussed with Cardiothoracic Team at AM rounds.

## 2024-02-14 NOTE — PROGRESS NOTE ADULT - ASSESSMENT
89 yo female with Hx. of Diastolic CHF EF 45%, CAD, HTN, Orthostatic hypotension, Atrial fib. on Xarelto, s/p PPM, COPD, Hypothyroidism, Gout , valvular heart disease, sever AS,  severe TR, severe pumonary HTN, presented in ED BIB form home for SOB. The patient  lives alone and has a private aid. She developed worsening SOB, She had iron infusion 5 days ago and was started on Macrobid for UTI  yesterday.     PROBLEMS:    AC hypoxaemic & hypercapnic respiratory failure  Acute excerbation of COPD  Acute CHF   Bilateral pleural effusion  Hypnatremia   Orthostatic hypotension  Atrial fib-s/p PPM  Non obs CAD    Hypothyroidism  Severe valvular heart disease  UTI     PLAN:    pulmonary stable  IV lasix  BIPAP PRN & TITRATE FIO2/VENOUS GAS  IV SOLU-MEDROLS 20MG Q12HR  AEROSOLS/ NEB  Fluid restrictions  off abx  VTEP-on Xarelto

## 2024-02-14 NOTE — PROGRESS NOTE ADULT - SUBJECTIVE AND OBJECTIVE BOX
Subjective:    pat better, refused bipap last night, sob on activities. sitting in bed, 3lpm nc sat 97%.    Home Medications:  allopurinol 100 mg oral tablet: 1 tab(s) orally once a day (10 Feb 2024 14:48)  escitalopram 5 mg oral tablet: 1 tab(s) orally (10 Feb 2024 14:55)  levothyroxine 125 mcg (0.125 mg) oral tablet: 1 tab(s) orally once a day (10 Feb 2024 14:48)  metoprolol succinate 25 mg oral tablet, extended release: 1 tab(s) orally once a day (10 Feb 2024 14:55)  midodrine 5 mg oral tablet: 1 tab(s) orally 2 times a day (10 Feb 2024 14:56)  Multiple Vitamins oral tablet: 1 tab(s) orally once a day (10 Feb 2024 14:55)  nitrofurantoin macrocrystals 100 mg oral capsule: 1 cap(s) orally 2 times a day for 5 days ***course not complete*** (10 Feb 2024 14:55)  potassium chloride 20 mEq oral tablet, extended release: 1 tab(s) orally once a day (10 Feb 2024 14:55)  spironolactone 25 mg oral tablet: 1 tab(s) orally once a day (10 Feb 2024 14:48)  thiamine 100 mg oral tablet: 1 tab(s) orally once a day (10 Feb 2024 14:48)  torsemide 20 mg oral tablet: 1 tab(s) orally 2 times a day (10 Feb 2024 14:48)  Xarelto 10 mg oral tablet: 1 tab(s) orally once a day (10 Feb 2024 14:55)    MEDICATIONS  (STANDING):  albuterol/ipratropium for Nebulization 3 milliLiter(s) Nebulizer every 6 hours  allopurinol 100 milliGRAM(s) Oral daily  atorvastatin 20 milliGRAM(s) Oral at bedtime  buDESOnide    Inhalation Suspension 0.5 milliGRAM(s) Inhalation two times a day  dextrose 10%. 500 milliLiter(s) (50 mL/Hr) IV Continuous <Continuous>  escitalopram 5 milliGRAM(s) Oral daily  folic acid 1 milliGRAM(s) Oral daily  levothyroxine 125 MICROGram(s) Oral daily  methylPREDNISolone sodium succinate Injectable 20 milliGRAM(s) IV Push every 12 hours  metoprolol succinate ER 25 milliGRAM(s) Oral at bedtime  midodrine. 2.5 milliGRAM(s) Oral three times a day  multivitamin 1 Tablet(s) Oral daily  polyethylene glycol 3350 17 Gram(s) Oral daily  senna 2 Tablet(s) Oral at bedtime  thiamine 100 milliGRAM(s) Oral daily    MEDICATIONS  (PRN):  acetaminophen     Tablet .. 650 milliGRAM(s) Oral every 6 hours PRN Mild Pain (1 - 3)  aluminum hydroxide/magnesium hydroxide/simethicone Suspension 30 milliLiter(s) Oral every 4 hours PRN Dyspepsia  melatonin 3 milliGRAM(s) Oral at bedtime PRN Insomnia  ondansetron Injectable 4 milliGRAM(s) IV Push every 6 hours PRN Nausea and/or Vomiting      Allergies    codeine (Unknown)    Intolerances        Vital Signs Last 24 Hrs  T(C): 36.4 (14 Feb 2024 07:49), Max: 36.6 (13 Feb 2024 21:22)  T(F): 97.6 (14 Feb 2024 07:49), Max: 97.8 (13 Feb 2024 21:22)  HR: 76 (14 Feb 2024 14:49) (70 - 99)  BP: 100/59 (14 Feb 2024 12:33) (95/54 - 100/59)  BP(mean): --  RR: 18 (14 Feb 2024 07:49) (18 - 18)  SpO2: 97% (14 Feb 2024 07:49) (92% - 97%)    Parameters below as of 14 Feb 2024 07:49  Patient On (Oxygen Delivery Method): nasal cannula  O2 Flow (L/min): 3        PHYSICAL EXAMINATION:    NECK:  Supple. No lymphadenopathy. Jugular venous pressure not elevated. Carotids equal.   HEART:   The cardiac impulse has a normal quality. Reg., Nl S1 and S2.  There are no murmurs, rubs or gallops noted  CHEST:  Chest crackles to auscultation. Normal respiratory effort.  ABDOMEN:  Soft and nontender.   EXTREMITIES:  There is no edema.       LABS:                        9.8    6.51  )-----------( 160      ( 14 Feb 2024 06:03 )             30.3     02-14    127<L>  |  91<L>  |  61<H>  ----------------------------<  167<H>  5.7<H>   |  33<H>  |  2.19<H>    Ca    9.1      14 Feb 2024 06:03  Phos  3.8     02-14  Mg     2.2     02-14        Urinalysis Basic - ( 14 Feb 2024 06:03 )    Color: x / Appearance: x / SG: x / pH: x  Gluc: 167 mg/dL / Ketone: x  / Bili: x / Urobili: x   Blood: x / Protein: x / Nitrite: x   Leuk Esterase: x / RBC: x / WBC x   Sq Epi: x / Non Sq Epi: x / Bacteria: x

## 2024-02-14 NOTE — PROGRESS NOTE ADULT - SUBJECTIVE AND OBJECTIVE BOX
Subjective:  Pt in bed NAD no issues overnight     T(C): 36.4 (02-14-24 @ 07:49), Max: 36.6 (02-13-24 @ 21:22)  HR: 99 (02-14-24 @ 07:49) (70 - 99)  BP: 100/59 (02-14-24 @ 12:33) (95/54 - 100/59)  ABP: --  ABP(mean): --  RR: 18 (02-14-24 @ 07:49) (18 - 18)  SpO2: 97% (02-14-24 @ 07:49) (92% - 97%) RA   Wt(kg): --  CVP(mm Hg): --  CO: --  CI: --  PA: --                                              Tele:  Afib             02-14    127<L>  |  91<L>  |  61<H>  ----------------------------<  167<H>  5.7<H>   |  33<H>  |  2.19<H>    Ca    9.1      14 Feb 2024 06:03  Phos  3.8     02-14  Mg     2.2     02-14                                 9.8    6.51  )-----------( 160      ( 14 Feb 2024 06:03 )             30.3                 CAPILLARY BLOOD GLUCOSE               CXR: moderate rt pleural effusion     Physical exam  Gen NAD  Neuro AAOx3  Card irreg S1 S2   Pulm decreased bases b/l  Abd distended  Ext warm, edema          Assessment:  89yFemale    with PAST MEDICAL & SURGICAL HISTORY:  Mitral valve insufficiency      Aortic valve regurgitation      Osteopenia      CAD (coronary artery disease)      Gout      Hypothyroid      CHF (congestive heart failure)      Anxiety      Anemia      HTN (hypertension)      Afib      COPD (chronic obstructive pulmonary disease)      Chronic obstructive pulmonary disease (COPD)      HTN (hypertension)      Cardiac pacemaker      Gout      Hypothyroidism      Insomnia      Chronic CHF      History of ST elevation myocardial infarction (STEMI)      Status cardiac pacemaker      S/P knee replacement      No significant past surgical history            Plan:

## 2024-02-14 NOTE — PROGRESS NOTE ADULT - PROBLEM SELECTOR PLAN 1
Pt presenting with SOB likely multifactorial 2/2 pleural effusions, CHF exacerbation, elevated BNP in setting known valvular disease severe AS, severe TR, severe pHTN, pleural effusions, compressive atelectasis, COPD  Creat up trending diuretics on hold due to d/t electrolyte imbalances and worsening creatinine ( nephrology consulted)  Thoracic surgery consulted for Pleural effusion Right pigtail planned for tomorrow Holding DOAC ([facility] Hospitalist)    HF/Structural heart team consulted   F/U Echo ordered

## 2024-02-14 NOTE — PROGRESS NOTE ADULT - ASSESSMENT
The patient is a 89 yo female with Hx. of Diastolic CHF EF 45%, CAD, HTN, Orthostatic hypotension, Atrial fib. on Xarelto, s/p PPM, COPD, Hypothyroidism, Gout , valvular heart disease, sever AS,  severe TR, severe pumonary HTN, presented in ED BIB form home for SOB. The patient  lives alone and has a private aid. She developed worsening SOB, She had iron infusion 5 days ago and was started on Macrobid for UTI  yesterday.   renal eval for acute hyponatremia in setting of acute CHF/pulm HTN and bilateral plueral effusions    Hyponatremia - improving   suspect hypervolemic due to fluid overload   monitor Na trend during diuresis    IV diuresis as per medicine/cardiology teams    free water/fluid restriction    avoid thiazide diuretics if Na remains low -  ( HCTZ, metalozone etc)    k borderline on Aldactone and KCl supplements - would stop KCL if K rises     2/12 SY  --Hyponatremia with CHF : Sodium level slowly improving : will resume IV lasix.  --JOAQUÍN : creat elevated but remains fluid overloaded : continue IV lasix to stabilize resp status first.  --Decompensated CHF with severe pulm HTN : prognosis grave.  --GOC evaluation ongoing.  --D/c spironolactone for now,  until electrolytes and creat level holding steady.    2/13  Pt with CHF, on IV lasix  HYperkalemia treated with Ca IV and PO Lokelma  overall clinical status improved    2/14 SY  --JOAQUÍN: creat remains elevated.  Resp status improved.  Diuretics on hold for now.     Follow up with Renal sonogram.  --Decompensated CHF/severe pulm HTN : improved and stable   --Persistent Hyponatremia and Hyperkalemia in the setting of JOAQUÍN.  Check Cortisol level as well.

## 2024-02-14 NOTE — PROGRESS NOTE ADULT - ASSESSMENT
89 year-old woman with DM type II, HTN, CAD, chronic diastolic CHF, chronic atrial fibrillation on Xarelto, hx of PPM placement, severe aortic stenosis, severe tricuspid regurgitation, severe pulmonary HTN, chronic orthostatic hypotension on midodrine, COPD, hypothyroidism, gout, just started on macrobid for 1 day for UTI, presented with worsening shortness of breath. Reports adherence with medication. Lives alone, with health aides. Denied chest pain, fevers, chills, cough. She was most recently admitted to  in 9/2023. At the time, she was evaluated for TAVR. Underwent RHC/LHC showing mild diffuse atherosclerosis with moderate severe disease of small caliber Cx in a nondominant territory. She had elevated right sided filling pressures in setting of severe TR with normal left sided filling pressures. Adequate perfusion. No intervention performed. Her outpatient cardiologist is Dr. Harris. In the ED, she was tachycardic to 107, normotensive, saturating 93%. She had increased work of breathing. Labs notable for Hgb 10, Na 123, Hs trop neg x1, proBNP 7548, COVID and RVP neg. CT chest w/ severe cardiomegaly with enlarging large bilateral pleural effusions. Severe pulmonary arterial hypertension. Admitted for further management of acute respiratory failure.     Acute hypoxic and hypercapneic respiratory failure  Likely multi factorial due to CHF exacerbation, severe AS, severe TR, severe pHTN, pleural effusions, compressive atelectasis, COPD with probable exacerbation. No sign of pneumonia at this point. Has required NIPPV with BiPAP to reduce work of breathing and improve hypercapnea.   - Continue NIPPV with BiPAP overnight  - Continue O2 supplementation as needed, wean as tolerated, goal SPO2 92%  - Continue to treat underlying issues, see below    Acute on chronic diastolic CHF  Patient presenting with SOB, pleural effusions, elevated BNP in setting known valvular disease, HFpEF. Last TTE with LVEF 50-55%, severe TR, severe AS. At home, shes on torsemide 20mg BID. Some improvement in oxygenation with IV Lasix but Cr up-trended significantly and diuretics now on hold. Appreciate input from Cardiology.   - Holding diuretics  - Continue metoprolol  - Follow-up TTE ordered  - CHF Team consulted  - Monitor IS and OS, daily wt, trend Cr and lytes, fluid restrict    Severe aortic stenosis   Previously evaluated by Dr Levin for structural intervention / JAYNA. Patient and family had opted for medical management.  - Continue medical management    CAD  Recent cath 9/2023 with mild diffuse atherosclerosis with moderate severe disease of small caliber Cx in a nondominant territory.  - Cont med management with metoprolol, statin    Chronic atrial fibrillation, hx of PPM  RVR in the ED in setting of active respiratory symptoms. PPM interrogated 2/12/24. Normal functioning single chamber PPM with battery longevity 5.6 years. Afib with V-pacing 19%, overall rate histogram is acceptable, brief RVR noted. No setting change made.  - Continue metoprolol  - Trying to wean midodrine as tolerated  - Holding Xarelto as planning for possible thoracentesis / pigtail chest tube placement to drain pleural effusion, scheduled for tomorrow    COPD with acute exacerbation  Appreciate input from Pulmonary Medicine  - Continue systemic steroids with methylprednisolone  - Continue DuoNeb q6h    Significant pleural effusions B/L  Difficult to diurese patient as Cr rising. Still with hypoxia, sizable pleural effusions. Thoracic Surgery consulted for input re possible drainage, patient in agreement.   - Holding DOAC  - Plan for pleural drainage catheter placement tomorrow  - F/u with Thoracic Surgery    Hyponatremia  Suspect hypervolemic due to fluid overload. Persisting.   - Free water/fluid restriction   - Avoid thiazide diuretics if Na remains low ( HCTZ, metolazone etc)   - Nephrology following    Hyperkalemia  In setting of concurrent spironolactone and potassium supplementation tabs. Stopped both and administered hyperkalemia protocol. K K has improved but remains >5. Now could be related to JOAQUÍN, see below  - Continue Lokelma, discuss further with Nephrology    JOAQUÍN   Baseline Cr ~0.9 as was on presentation. Increased to significantly since then, now 2.19. With patient having received IV Lasix. She has not received any contrast studies. No NSAIDs. She did have UTI diagnosed a day or two prior to admission and has since completed a 3-day course of ceftriaxone but doubt related. Could have retention as she has been rather immobile. Nephrology consulted. Obtained renal bladder US today. No sign of hydronephrosis, mass or calculi. Bladder was distended however, volume 436cc. No bladder wall thickening. No bladder mass.   - Requested bladder scan if patient does not void soon, plan to place Espinoza for bladder decompression if she does not void  - Strict Is and Os  - Trend Cr  - Avoid nephrotic medications  - Renally dose medications where applicable    UTI, present prior to admission  Recent dx of UTI. Was treated with macrobid for 1 day before getting admitted to hospital. Here she received an empiric 3 day course of ceftriaxone. Urine and blood cultures returned negative.    DM  type II  A1c 6.3. Well controlled.   - Continue on insulin correctional scale for now    Hypothyroidism  Stable.   - Continue levothyroxine    Hx of gout  Stable.   - Continue allopurinol    Constipation  No nausea or emesis. Intact bowel sounds.   - Ordered for bowel regimen    Physical deconditioning and debility  Appreciate input from PT. Recommended home with home PT and more assistance vs [facility]  - Continue restorative PT sessions  - OOB to chair daily    Severe protein calorie malnutrition  Appreciate dietician input  - Trend wt  - Added MVI with minerals daily, thiamine 100mg daily        DVT px: On Xarelto for afib but now held as of yesterday as patient is planned for pleural drainage catheter placement  Code Status: DNR/DNI/Trial NIV   Dispo: To be determined pending further clinical assessment

## 2024-02-14 NOTE — PROGRESS NOTE ADULT - SUBJECTIVE AND OBJECTIVE BOX
CHIEF COMPLAINT: SOB      HPI:  Ms. Brennan is a 89 yo female with a hx HFpEF (LVEF 50-55% based on 9/2023 TTE, severe TR, severe AS, severe pulmonary hypertension, atrial fibrillation on xarelto s/p PPM, COPD, hypothyroidism, hypertension presenting with several days of worsening SOB. Of note, was started on macrobid for UTI on day prior to admission. Reports adherence with medication. Lives alone, with health aides. Denies chest pain, fevers, chills, cough.     Pt was most recently admitted to  in 9/2023. At the time, she was evaluated for TAVR. Underwent RHC/LHC showing Mild diffuse atherosclerosis with moderate severe disease of small caliber Cx in a nondominant territory; Elevated right sided filling pressures in setting of severe TR with normal left sided filling pressures. Adequate perfusion. No intervention performed.     Outpatient cardiologist: Dr. Harris      In the ED, she was tachycardic to 107, normotensive, saturating 93%.   Labs notable for hgb 10, Na 123, Hs trop neg x1, proBNP 7548, COVID and RVP neg  CT chest: Severe cardiomegaly with enlarging large bilateral pleural effusions.  Severe pulmonary arterial hypertension.    Cardiology consulted for decompensated HF.   2/12/24: Awake alert comfortable, tel AF 90's  2/13/24 Patient is comfortable ,  HR controlled   2/14/24: Awake alert semirecumbent in bed tele AF HR controlled         PAST MEDICAL & SURGICAL HISTORY:  Mitral valve insufficiency      Aortic valve regurgitation      Osteopenia      CAD (coronary artery disease)      Gout      Hypothyroid      CHF (congestive heart failure)      Anxiety      Anemia      HTN (hypertension)      Afib      COPD (chronic obstructive pulmonary disease)      Chronic obstructive pulmonary disease (COPD)      HTN (hypertension)      Cardiac pacemaker      Gout      Hypothyroidism      Insomnia      Chronic CHF      History of ST elevation myocardial infarction (STEMI)      Status cardiac pacemaker      S/P knee replacement    MEDICATIONS  (STANDING):  albuterol/ipratropium for Nebulization 3 milliLiter(s) Nebulizer every 6 hours  allopurinol 100 milliGRAM(s) Oral daily  atorvastatin 20 milliGRAM(s) Oral at bedtime  buDESOnide    Inhalation Suspension 0.5 milliGRAM(s) Inhalation two times a day  dextrose 10%. 500 milliLiter(s) (50 mL/Hr) IV Continuous <Continuous>  escitalopram 5 milliGRAM(s) Oral daily  folic acid 1 milliGRAM(s) Oral daily  levothyroxine 125 MICROGram(s) Oral daily  methylPREDNISolone sodium succinate Injectable 20 milliGRAM(s) IV Push every 12 hours  metoprolol succinate ER 25 milliGRAM(s) Oral at bedtime  midodrine. 2.5 milliGRAM(s) Oral three times a day  multivitamin 1 Tablet(s) Oral daily  polyethylene glycol 3350 17 Gram(s) Oral daily  senna 2 Tablet(s) Oral at bedtime  thiamine 100 milliGRAM(s) Oral daily    MEDICATIONS  (PRN):  acetaminophen     Tablet .. 650 milliGRAM(s) Oral every 6 hours PRN Mild Pain (1 - 3)  aluminum hydroxide/magnesium hydroxide/simethicone Suspension 30 milliLiter(s) Oral every 4 hours PRN Dyspepsia  melatonin 3 milliGRAM(s) Oral at bedtime PRN Insomnia  ondansetron Injectable 4 milliGRAM(s) IV Push every 6 hours PRN Nausea and/or Vomiting            Vital Signs Last 24 Hrs  T(C): 36.4 (14 Feb 2024 07:49), Max: 36.6 (13 Feb 2024 21:22)  T(F): 97.6 (14 Feb 2024 07:49), Max: 97.8 (13 Feb 2024 21:22)  HR: 99 (14 Feb 2024 07:49) (70 - 99)  BP: 100/59 (14 Feb 2024 12:33) (95/54 - 100/59)  BP(mean): --  RR: 18 (14 Feb 2024 07:49) (18 - 18)  SpO2: 97% (14 Feb 2024 07:49) (92% - 97%)    Parameters below as of 14 Feb 2024 07:49  Patient On (Oxygen Delivery Method): nasal cannula  O2 Flow (L/min): 3    O2 Flow (L/min): 3        I&O's Summary        PHYSICAL EXAM:  Constitutional: NAD, awake and frail   HEENT:  EOMI,  Pupils round, No oral cyanosis.  Pulmonary: Non-labored, diminished at bases bilaterally  Cardiovascular: irregular, + systolic murmur   Gastrointestinal: Abd soft, nontender.   Lymph: No LE Edema  Neurological: Alert and oriented x2, no focal deficits  Psych:  Mood & affect appropriate    LABS:                                    9.9    3.29  )-----------( 170      ( 12 Feb 2024 06:32 )             30.8     02-13    x   |  x   |  x   ----------------------------<  330<H>  x    |  x   |  x     Ca    8.7      13 Feb 2024 06:34  Phos  3.0     02-12  Mg     2.1     02-12    TPro  6.6  /  Alb  3.3  /  TBili  0.6  /  DBili  0.2  /  AST  25  /  ALT  19  /  AlkPhos  110  02-12        LIVER FUNCTIONS - ( 12 Feb 2024 06:32 )  Alb: 3.3 g/dL / Pro: 6.6 gm/dL / ALK PHOS: 110 U/L / ALT: 19 U/L / AST: 25 U/L / GGT: x             Urinalysis Basic - ( 13 Feb 2024 11:48 )    Color: x / Appearance: x / SG: x / pH: x  Gluc: 330 mg/dL / Ketone: x  / Bili: x / Urobili: x   Blood: x / Protein: x / Nitrite: x   Leuk Esterase: x / RBC: x / WBC x   Sq Epi: x / Non Sq Epi: x / Bacteria: x        Culture Results:   <10,000 CFU/mL Normal Urogenital Xiomara (02-10-24 @ 15:07)  Culture Results:   No growth at 48 Hours (02-10-24 @ 14:23)  Culture Results:   No growth at 48 Hours (02-10-24 @ 14:14)      e< from: Transthoracic Echocardiogram Follow Up (09.11.23 @ 08:36) >  Impression     Summary     Limited study to assess tricuspid insufficiency and pulmonary pressure.   Severe (4+) tricuspid valve regurgitation is present.   Severe pulmonary hypertension.   The left ventricle is normal in size, paradoxical septal motion   The interventricular septum appears flattened consistent with an RV   pressure/volume overload.   An apically displaced papillary muscle is noted.   Estimated left ventricular ejection fraction is 50-55%.   The right atrium appears severely dilated.   A device wire is seen in the RV and RA.   The right ventricle is moderately dilated with decreased contractility.     Signature     ----------------------------------------------------------------   Electronically signed by Venugopal Palla, MD(Interpreting   physician) on 09/11/2023 04:51 PM   ----------------------------------------------------------------    < end of copied text >    < from: Cardiac Catheterization (09.06.23 @ 11:59) >    Diagnostic Findings:     Coronary Angiography   The coronary circulation is right dominant.      LM   Left main artery: Angiography shows mild atherosclerosis.      LAD   Proximal left anterior descending: There is a 30 % stenosis.      CX   Distal circumflex: The segment is extremely small. There is a 70 %  stenosis.    RCA   Proximal right coronary artery: There is a 40 % stenosis.      < end of copied text >    Diagnostic Conclusions:     1. Mild diffuse atherosclerosis with moderate severe disease of small  caliber Cx in a nondominant territory.  2. Elevated right sided filling pressures in setting of severe TR with  normal left sided filling pressures. Adequate perfusion.  Recommendations:     1. Recommend aggressive medical management for CAD as per ACC/AHA  guidelines  2. Structural heart follow up at  for ongoing JAYNA evaluation.

## 2024-02-14 NOTE — PROGRESS NOTE ADULT - PROBLEM SELECTOR PLAN 4
persistent Afib, cont AC with xarelto  weaning midodrine as tolerated; cont metoprolol for rate control  PPM Interrogated 2/12/24 Normal functioning single chamber PPM with battery longevity 5.6 years. Afib with V-pacing 19%, overall rate histogram is acceptable, brief RVR noted. No setting change made

## 2024-02-14 NOTE — PROGRESS NOTE ADULT - PROBLEM SELECTOR PLAN 2
Valvular heart disease:  Previously evaluated by Dr Levin for structural intervention- pt and family had opted for medical management  Had prior conversation with patient and daughter at bedside and wishes to CW medical management

## 2024-02-14 NOTE — PROGRESS NOTE ADULT - SUBJECTIVE AND OBJECTIVE BOX
NEPHROLOGY INTERVAL HPI/OVERNIGHT EVENTS:    Date of Service: 24 @ 10:24    --appears comfortable.  but c/o intermittent cough and SOB.  - OOB sleeping arousable NAD, evaluated for CKD JOAQUÍN and heart failure  --seen by Dr Hopper yesterday.  Alert,  SOB only slightly better.  Feeling weak.    HPI:   The patient is a 89 yo female with Hx. of Diastolic CHF EF 45%, CAD, HTN, Orthostatic hypotension, Atrial fib. on Xarelto, s/p PPM, COPD, Hypothyroidism, Gout , valvular heart disease, sever AS,  severe TR, severe pumonary HTN, presented in ED BIB form home for SOB. The patient  lives alone and has a private aid. She developed worsening SOB, She had iron infusion 5 days ago and was started on Macrobid for UTI  yesterday.   renal eval for acute hyponatremia in setting of acute CHF/pulm HTN and bilateral plueral effusions    MEDICATIONS  (STANDING):  albuterol/ipratropium for Nebulization 3 milliLiter(s) Nebulizer every 6 hours  allopurinol 100 milliGRAM(s) Oral daily  atorvastatin 20 milliGRAM(s) Oral at bedtime  buDESOnide    Inhalation Suspension 0.5 milliGRAM(s) Inhalation two times a day  escitalopram 5 milliGRAM(s) Oral daily  levothyroxine 125 MICROGram(s) Oral daily  methylPREDNISolone sodium succinate Injectable 20 milliGRAM(s) IV Push every 12 hours  metoprolol succinate ER 25 milliGRAM(s) Oral at bedtime  midodrine. 2.5 milliGRAM(s) Oral three times a day  multivitamin 1 Tablet(s) Oral daily  polyethylene glycol 3350 17 Gram(s) Oral daily  senna 2 Tablet(s) Oral at bedtime  sodium zirconium cyclosilicate 10 Gram(s) Oral once  thiamine 100 milliGRAM(s) Oral daily    MEDICATIONS  (PRN):  acetaminophen     Tablet .. 650 milliGRAM(s) Oral every 6 hours PRN Mild Pain (1 - 3)  aluminum hydroxide/magnesium hydroxide/simethicone Suspension 30 milliLiter(s) Oral every 4 hours PRN Dyspepsia  melatonin 3 milliGRAM(s) Oral at bedtime PRN Insomnia  ondansetron Injectable 4 milliGRAM(s) IV Push every 6 hours PRN Nausea and/or Vomiting    Vital Signs Last 24 Hrs  T(C): 36.4 (2024 07:49), Max: 36.6 (2024 21:22)  T(F): 97.6 (2024 07:49), Max: 97.8 (2024 21:22)  HR: 99 (2024 07:49) (70 - 99)  BP: 100/55 (2024 07:49) (95/54 - 100/55)  BP(mean): --  RR: 18 (2024 07:49) (18 - 18)  SpO2: 97% (2024 07:49) (92% - 97%)    Parameters below as of 2024 07:49  Patient On (Oxygen Delivery Method): nasal cannula  O2 Flow (L/min): 3    Daily     Daily Weight in k.2 (2024 05:34)    PHYSICAL EXAM:  GENERAL: comfortable.  CHEST/LUNG: scattered rhonchi  HEART: S1S2 RRR  ABDOMEN: soft  EXTREMITIES: NO LE edema  SKIN:     LABS:                        9.8    6.51  )-----------( 160      ( 2024 06:03 )             30.3     02-14    127<L>  |  91<L>  |  61<H>  ----------------------------<  167<H>  5.7<H>   |  33<H>  |  2.19<H>    Ca    9.1      2024 06:03  Phos  3.8       Mg     2.2             Urinalysis Basic - ( 2024 06:03 )    Color: x / Appearance: x / SG: x / pH: x  Gluc: 167 mg/dL / Ketone: x  / Bili: x / Urobili: x   Blood: x / Protein: x / Nitrite: x   Leuk Esterase: x / RBC: x / WBC x   Sq Epi: x / Non Sq Epi: x / Bacteria: x      Magnesium: 2.2 mg/dL ( @ 06:03)  Phosphorus: 3.8 mg/dL ( @ 06:03)          RADIOLOGY & ADDITIONAL TESTS:

## 2024-02-14 NOTE — PROGRESS NOTE ADULT - PROBLEM SELECTOR PLAN 3
Recent cath 9/2023 as above: Mild diffuse atherosclerosis with moderate severe disease of small  caliber Cx in a nondominant territory.  cont medical management, statin initiated ( per patient and daughter patient never on a statin)

## 2024-02-14 NOTE — PROGRESS NOTE ADULT - SUBJECTIVE AND OBJECTIVE BOX
Chief Complaint: Shortness of breath    Interval Hx: Patient seen and examined this AM. Feels relatively unchanged. No significant dyspnea at rest. Does have dyspnea with exertion. No chest pain or tightness. No cough. No other complaints aside for generalized weakness. She remains on supplemental oxygen, somewhat improved since admission but her Cr significantly increased since admission, albeit stable the past day or so. Doubt she can tolerate any further diuresis. Consulted with Thoracic Surgery as patient would be amenable to pigtail chest tube drainage of pleural effusion. Holding DOAC now in preparation for thoracentesis with thoracostomy tomorrow.     ROS: Multi system review is comprehensively negative x 10 systems except as above    Vitals:  T(F): 97.6 (14 Feb 2024 07:49), Max: 97.8 (13 Feb 2024 21:22)  HR: 76 (14 Feb 2024 14:49) (70 - 99)  BP: 100/59 (14 Feb 2024 12:33) (97/54 - 100/59)  RR: 18 (14 Feb 2024 07:49) (18 - 18)  SpO2: 97% (14 Feb 2024 07:49) (92% - 97%) on 3L/min via NC    Exam:  Constitutional: No acute distress  HEENT: NCAT PERRL EOMI MMM clear oropharynx  Neck: Supple, no JVD  CVS: S1 and S2, irregular, rate ~70, 2/6 REBECA   Chest: Normal resp effort at rest, diminished breath sounds at bases B/L  Abd: +BS, soft NT ND   Ext: B/L LE edema  Skin: Warm, dry  Psych: Mood & affect appropriate  Neurological: Alert, no focal deficits    Labs:                      9.8    6.51  )--------( 160              30.3       127  |  91  |  61  ---------------------<  167  5.7   |  33  |  2.19    Ca  9.4      Mg  2.2    Phos 3.8       TPro  6.6  /  Alb  3.3  /  TBili  0.6  /  DBili  0.2   /  AST  25  /  ALT  19  /  AlkPhos  110    Troponin negative   proBNP 7548    A1c 6.3    ABG 2/11 8:47   pH 7.29  /  pCO2: 61  /  pO2: 106  /   HCO3: 29  /  Base Excess: 1.2  /   SaO2: 98        UA 2/10: Yellow, clear, neg ketone, neg nitrite, trace LE, 2 WBC, 0 RBC, bact neg    Micro:  COVID19 PCR 2/10: Negative  Flu PCR 2/10: Negative  RSV PCR 2/10: Negative  Urine culture 2/10: Negative  Blood culture 2/10: Negative    Imaging:  US kidneys and bladder 2/14: Right kidney 9.1 cm. No renal mass, hydronephrosis or calculi. Left kidney 6.9 cm. Limited visualization. No gross mass, hydronephrosis or calculi. Bladder distended with volume 436 cc. No bladder wall thickening. No intraluminal mass.    CXR 2/13: Frontal expiratory view of the chest shows the heart to be similarly enlarged in size. Left cardiac pacemaker is again noted. The lungs show less pulmonary congestion with smaller pleural effusions and there is no evidence of pneumothorax.    CT chest WO 2/10: Bibasilar compressive atelectasis. No pulmonary consolidation or mass. There are enlarging moderate to large bilateral pleural effusions. No lymphadenopathy. There is enlargement of the main pulmonary artery spanning 3.4cm, consistent with pulmonary arterial hypertension. Severe arterial vascular calcification. There is severe cardiomegaly with four-chamber enlargement.  There are permanent pacemaker with lead tips in the right ventricle. Severe coronary arterial calcification.    CXR 2/10: Bilateral pleural effusions with adjacent atelectasis versus pneumonia at the left base.    Cardiac Testing:  Tele 2/14: Rate controlled afib    EKG 2/13: Afib with RVR, rate 112    PPM interrogation 2/12: Underlying rhythm afib. Normal functioning single chamber PPM with battery longevity 5.6 years. Afib with V-pacing 19%, overall rate histogram is acceptable, brief RVR noted. No setting change made.    Tele 2/10: Afib rate     EKG 2/10: Afib rate 100s    Prior visit cardiac testing   TTE 9/11:  Limited study to assess tricuspid insufficiency and pulmonary pressure. Severe (4+) tricuspid valve regurgitation is present. Severe pulmonary hypertension. The left ventricle is normal in size, paradoxical septal motion The interventricular septum appears flattened consistent with an RV pressure/volume overload. An apically displaced papillary muscle is noted. Estimated left ventricular ejection fraction is 50-55%. The right atrium appears severely dilated. A device wire is seen in the RV and RA. The right ventricle is moderately dilated with decreased contractility.    LHC 9/6: Mild diffuse atherosclerosis with moderate severe disease of small caliber Cx in a nondominant territory. Elevated right sided filling pressures in setting of severe TR with normal left sided filling pressures. Adequate perfusion.    TTE 8/29: Mild to mod MR. Severe AS. Severe TR. Mild to mod DC. Severe pHTN. Severely dilated LA. LV systolic function mildly impaired; segmental wall motion abnormalities noted. Mild concentric LVH. LVEF 45%. The interventricular septum appears flattened consistent with an RV pressure/volume overload. RA severely dilated. RV with moderate dilation, diffuse hypokinesis, and depression of contractility based on TAPSE. A device wire is seen in the RV and RA. IVC is dilated and not collapsing with inspiration.    Meds:  MEDICATIONS  (STANDING):  albuterol/ipratropium for Nebulization 3 milliLiter(s) Nebulizer every 6 hours  allopurinol 100 milliGRAM(s) Oral daily  atorvastatin 20 milliGRAM(s) Oral at bedtime  buDESOnide    Inhalation Suspension 0.5 milliGRAM(s) Inhalation two times a day  dextrose 10%. 500 milliLiter(s) (50 mL/Hr) IV Continuous <Continuous>  escitalopram 5 milliGRAM(s) Oral daily  folic acid 1 milliGRAM(s) Oral daily  levothyroxine 125 MICROGram(s) Oral daily  methylPREDNISolone sodium succinate Injectable 20 milliGRAM(s) IV Push every 12 hours  metoprolol succinate ER 25 milliGRAM(s) Oral at bedtime  midodrine. 2.5 milliGRAM(s) Oral three times a day  multivitamin 1 Tablet(s) Oral daily  polyethylene glycol 3350 17 Gram(s) Oral daily  senna 2 Tablet(s) Oral at bedtime  thiamine 100 milliGRAM(s) Oral daily    MEDICATIONS  (PRN):  acetaminophen     Tablet .. 650 milliGRAM(s) Oral every 6 hours PRN Mild Pain (1 - 3)  aluminum hydroxide/magnesium hydroxide/simethicone Suspension 30 milliLiter(s) Oral every 4 hours PRN Dyspepsia  melatonin 3 milliGRAM(s) Oral at bedtime PRN Insomnia  ondansetron Injectable 4 milliGRAM(s) IV Push every 6 hours PRN Nausea and/or Vomiting

## 2024-02-15 NOTE — PROGRESS NOTE ADULT - PROBLEM SELECTOR PLAN 4
persistent Afib, on AC with xarelto  weaning midodrine as tolerated; cont metoprolol for rate control  PPM Interrogated 2/12/24 Normal functioning single chamber PPM with battery longevity 5.6 years. Afib with V-pacing 19%, overall rate histogram is acceptable, brief RVR noted. No setting change made persistent Afib, on AC with xarelto  weaning midodrine as tolerated; cont metoprolol for rate control  PPM Interrogated 2/12/24 Normal functioning single chamber PPM with battery longevity 5.6 years. Afib with V-pacing 19%, overall rate histogram is acceptable, brief RVR noted. No setting change made  Xarelto on hold for thoracentesis today

## 2024-02-15 NOTE — PROGRESS NOTE ADULT - SUBJECTIVE AND OBJECTIVE BOX
Chief Complaint: Shortness of breath    Interval Hx: Patient seen and examined this AM. Feels only marginally improved subjectively since admission. Continues to have dyspnea supine. No chest pain or tightness. No cough. No fever or chills. No other complaints aside for generalized weakness. She remains on supplemental oxygen, slightly improved since admission. Her Cr has significantly increased since admission, in the setting of diuretics and also urinary retention / obstructive uropathy for which Espinoza was placed yesterday. CHF Team and Nephrology recommended resuming IV Lasix for a dose today now that Espinoza is in place. Lasix had been held the past couple of days with her uptrending Cr. Additionally, patient is planned for pigtail chest tube drainage of pleural effusion. DOAC remains on hold for this procedure as per Thoracic Surgery.     ROS: Multi system review is comprehensively negative x 10 systems except as above    Vitals:  Afebrile  BP 110s/70s  HR 90s  RR 18  O2 94% on 3L/min    Exam:  Constitutional: No acute distress  HEENT: NCAT PERRL EOMI MMM clear oropharynx  Neck: Supple, no JVD  CVS: S1 and S2, irregular, rate ~80, 2/6 REBECA   Chest: Normal resp effort at rest, diminished breath sounds at bases B/L  Abd: +BS, soft NT ND   : Indwelling Espinoza catheter in place, clear yellow urine in gravity bag  Ext: B/L LE edema  Skin: Warm, dry  Psych: Mood & affect appropriate  Neurological: Alert, no focal deficits    Labs:                      9.9    6.91  )--------( 161              30.7      127  |  90  |  75  ---------------------<  177  5.2   |  32  |  2.37    Ca  8.9     Mg  2.3    Phos 3.8       TPro  6.6  /  Alb  3.3  /  TBili  0.6  /  DBili  0.2   /  AST  25  /  ALT  19  /  AlkPhos  110    Troponin negative   proBNP 7548    A1c 6.3    Fasting AM cortisol 15.7    ABG 2/11 8:47   pH 7.29  /  pCO2: 61  /  pO2: 106  /   HCO3: 29  /  Base Excess: 1.2  /   SaO2: 98        UA 2/10: Yellow, clear, neg ketone, neg nitrite, trace LE, 2 WBC, 0 RBC, bact neg    Micro:  COVID19 PCR 2/10: Negative  Flu PCR 2/10: Negative  RSV PCR 2/10: Negative  Urine culture 2/10: Negative  Blood culture 2/10: Negative    Imaging:  US kidneys and bladder 2/14: Right kidney 9.1 cm. No renal mass, hydronephrosis or calculi. Left kidney 6.9 cm. Limited visualization. No gross mass, hydronephrosis or calculi. Bladder distended with volume 436 cc. No bladder wall thickening. No intraluminal mass.    CXR 2/13: Frontal expiratory view of the chest shows the heart to be similarly enlarged in size. Left cardiac pacemaker is again noted. The lungs show less pulmonary congestion with smaller pleural effusions and there is no evidence of pneumothorax.    CT chest WO 2/10: Bibasilar compressive atelectasis. No pulmonary consolidation or mass. There are enlarging moderate to large bilateral pleural effusions. No lymphadenopathy. There is enlargement of the main pulmonary artery spanning 3.4cm, consistent with pulmonary arterial hypertension. Severe arterial vascular calcification. There is severe cardiomegaly with four-chamber enlargement.  There are permanent pacemaker with lead tips in the right ventricle. Severe coronary arterial calcification.    CXR 2/10: Bilateral pleural effusions with adjacent atelectasis versus pneumonia at the left base.    Cardiac Testing:  Tele 2/14: Rate controlled afib    EKG 2/13: Afib with RVR, rate 112    PPM interrogation 2/12: Underlying rhythm afib. Normal functioning single chamber PPM with battery longevity 5.6 years. Afib with V-pacing 19%, overall rate histogram is acceptable, brief RVR noted. No setting change made.    Tele 2/10: Afib rate     EKG 2/10: Afib rate 100s    Prior visit cardiac testing   TTE 9/11:  Limited study to assess tricuspid insufficiency and pulmonary pressure. Severe (4+) tricuspid valve regurgitation is present. Severe pulmonary hypertension. The left ventricle is normal in size, paradoxical septal motion The interventricular septum appears flattened consistent with an RV pressure/volume overload. An apically displaced papillary muscle is noted. Estimated left ventricular ejection fraction is 50-55%. The right atrium appears severely dilated. A device wire is seen in the RV and RA. The right ventricle is moderately dilated with decreased contractility.    LHC 9/6: Mild diffuse atherosclerosis with moderate severe disease of small caliber Cx in a nondominant territory. Elevated right sided filling pressures in setting of severe TR with normal left sided filling pressures. Adequate perfusion.    TTE 8/29: Mild to mod MR. Severe AS. Severe TR. Mild to mod [place]. Severe pHTN. Severely dilated LA. LV systolic function mildly impaired; segmental wall motion abnormalities noted. Mild concentric LVH. LVEF 45%. The interventricular septum appears flattened consistent with an RV pressure/volume overload. RA severely dilated. RV with moderate dilation, diffuse hypokinesis, and depression of contractility based on TAPSE. A device wire is seen in the RV and RA. IVC is dilated and not collapsing with inspiration.    Meds:  MEDICATIONS  (STANDING):  acetaminophen     Tablet .. 975 milliGRAM(s) Oral every 8 hours  albuterol/ipratropium for Nebulization 3 milliLiter(s) Nebulizer every 6 hours  allopurinol 100 milliGRAM(s) Oral daily  atorvastatin 20 milliGRAM(s) Oral at bedtime  buDESOnide    Inhalation Suspension 0.5 milliGRAM(s) Inhalation two times a day  escitalopram 5 milliGRAM(s) Oral daily  folic acid 1 milliGRAM(s) Oral daily  levothyroxine 125 MICROGram(s) Oral daily  methylPREDNISolone sodium succinate Injectable 20 milliGRAM(s) IV Push daily  metoprolol succinate ER 25 milliGRAM(s) Oral at bedtime  midodrine. 2.5 milliGRAM(s) Oral three times a day  multivitamin 1 Tablet(s) Oral daily  polyethylene glycol 3350 17 Gram(s) Oral daily  senna 2 Tablet(s) Oral at bedtime  thiamine 100 milliGRAM(s) Oral daily    MEDICATIONS  (PRN):  aluminum hydroxide/magnesium hydroxide/simethicone Suspension 30 milliLiter(s) Oral every 4 hours PRN Dyspepsia  melatonin 3 milliGRAM(s) Oral at bedtime PRN Insomnia  ondansetron Injectable 4 milliGRAM(s) IV Push every 6 hours PRN Nausea and/or Vomiting  oxyCODONE    IR 2.5 milliGRAM(s) Oral every 8 hours PRN Severe Pain (7 - 10)  traMADol 25 milliGRAM(s) Oral every 12 hours PRN Mild Pain (1 - 3)   Chief Complaint: Shortness of breath    Interval Hx: Patient seen and examined this AM. Feels only marginally improved subjectively since admission. Continues to have dyspnea supine. No chest pain or tightness. No cough. No fever or chills. No other complaints aside for generalized weakness. She remains on supplemental oxygen, with oxygenation slightly improved since admission, having received IV diruetcis. Her Cr has significantly increased since admission in the setting of these diuretics so they were went the past day or so. Additionally, patient was found yesterday to have urinary retention of > 500cc, possible obstructive uropathy, and this Espinoza was immediately placed. Today, CHF Team and Nephrology recommended resuming IV Lasix for a dose now that Espinoza is in place. Lastly, patient is planned for pigtail chest tube drainage of pleural effusion. DOAC remains on hold for this procedure as per Thoracic Surgery.     ROS: Multi system review is comprehensively negative x 10 systems except as above    Vitals:  Afebrile  BP 110s/70s  HR 90s  RR 18  O2 94% on 3L/min    Exam:  Constitutional: No acute distress  HEENT: NCAT PERRL EOMI MMM clear oropharynx  Neck: Supple, no JVD  CVS: S1 and S2, irregular, rate ~80, 2/6 REBECA   Chest: Normal resp effort at rest, diminished breath sounds at bases B/L  Abd: +BS, soft NT ND   : Indwelling Espinoza catheter in place, clear yellow urine in gravity bag  Ext: B/L LE edema  Skin: Warm, dry  Psych: Mood & affect appropriate  Neurological: Alert, no focal deficits    Labs:                      9.9    6.91  )--------( 161              30.7      127  |  90  |  75  ---------------------<  177  5.2   |  32  |  2.37    Ca  8.9     Mg  2.3    Phos 3.8       TPro  6.6  /  Alb  3.3  /  TBili  0.6  /  DBili  0.2   /  AST  25  /  ALT  19  /  AlkPhos  110    Troponin negative   proBNP 7548    A1c 6.3    Fasting AM cortisol 15.7    ABG 2/11 8:47   pH 7.29  /  pCO2: 61  /  pO2: 106  /   HCO3: 29  /  Base Excess: 1.2  /   SaO2: 98        UA 2/10: Yellow, clear, neg ketone, neg nitrite, trace LE, 2 WBC, 0 RBC, bact neg    Micro:  COVID19 PCR 2/10: Negative  Flu PCR 2/10: Negative  RSV PCR 2/10: Negative  Urine culture 2/10: Negative  Blood culture 2/10: Negative    Imaging:  US kidneys and bladder 2/14: Right kidney 9.1 cm. No renal mass, hydronephrosis or calculi. Left kidney 6.9 cm. Limited visualization. No gross mass, hydronephrosis or calculi. Bladder distended with volume 436 cc. No bladder wall thickening. No intraluminal mass.    CXR 2/13: Frontal expiratory view of the chest shows the heart to be similarly enlarged in size. Left cardiac pacemaker is again noted. The lungs show less pulmonary congestion with smaller pleural effusions and there is no evidence of pneumothorax.    CT chest WO 2/10: Bibasilar compressive atelectasis. No pulmonary consolidation or mass. There are enlarging moderate to large bilateral pleural effusions. No lymphadenopathy. There is enlargement of the main pulmonary artery spanning 3.4cm, consistent with pulmonary arterial hypertension. Severe arterial vascular calcification. There is severe cardiomegaly with four-chamber enlargement.  There are permanent pacemaker with lead tips in the right ventricle. Severe coronary arterial calcification.    CXR 2/10: Bilateral pleural effusions with adjacent atelectasis versus pneumonia at the left base.    Cardiac Testing:  Tele 2/14: Rate controlled afib    EKG 2/13: Afib with RVR, rate 112    PPM interrogation 2/12: Underlying rhythm afib. Normal functioning single chamber PPM with battery longevity 5.6 years. Afib with V-pacing 19%, overall rate histogram is acceptable, brief RVR noted. No setting change made.    Tele 2/10: Afib rate     EKG 2/10: Afib rate 100s    Prior visit cardiac testing   TTE 9/11:  Limited study to assess tricuspid insufficiency and pulmonary pressure. Severe (4+) tricuspid valve regurgitation is present. Severe pulmonary hypertension. The left ventricle is normal in size, paradoxical septal motion The interventricular septum appears flattened consistent with an RV pressure/volume overload. An apically displaced papillary muscle is noted. Estimated left ventricular ejection fraction is 50-55%. The right atrium appears severely dilated. A device wire is seen in the RV and RA. The right ventricle is moderately dilated with decreased contractility.    LHC 9/6: Mild diffuse atherosclerosis with moderate severe disease of small caliber Cx in a nondominant territory. Elevated right sided filling pressures in setting of severe TR with normal left sided filling pressures. Adequate perfusion.    TTE 8/29: Mild to mod MR. Severe AS. Severe TR. Mild to mod [place]. Severe pHTN. Severely dilated LA. LV systolic function mildly impaired; segmental wall motion abnormalities noted. Mild concentric LVH. LVEF 45%. The interventricular septum appears flattened consistent with an RV pressure/volume overload. RA severely dilated. RV with moderate dilation, diffuse hypokinesis, and depression of contractility based on TAPSE. A device wire is seen in the RV and RA. IVC is dilated and not collapsing with inspiration.    Meds:  MEDICATIONS  (STANDING):  acetaminophen     Tablet .. 975 milliGRAM(s) Oral every 8 hours  albuterol/ipratropium for Nebulization 3 milliLiter(s) Nebulizer every 6 hours  allopurinol 100 milliGRAM(s) Oral daily  atorvastatin 20 milliGRAM(s) Oral at bedtime  buDESOnide    Inhalation Suspension 0.5 milliGRAM(s) Inhalation two times a day  escitalopram 5 milliGRAM(s) Oral daily  folic acid 1 milliGRAM(s) Oral daily  levothyroxine 125 MICROGram(s) Oral daily  methylPREDNISolone sodium succinate Injectable 20 milliGRAM(s) IV Push daily  metoprolol succinate ER 25 milliGRAM(s) Oral at bedtime  midodrine. 2.5 milliGRAM(s) Oral three times a day  multivitamin 1 Tablet(s) Oral daily  polyethylene glycol 3350 17 Gram(s) Oral daily  senna 2 Tablet(s) Oral at bedtime  thiamine 100 milliGRAM(s) Oral daily    MEDICATIONS  (PRN):  aluminum hydroxide/magnesium hydroxide/simethicone Suspension 30 milliLiter(s) Oral every 4 hours PRN Dyspepsia  melatonin 3 milliGRAM(s) Oral at bedtime PRN Insomnia  ondansetron Injectable 4 milliGRAM(s) IV Push every 6 hours PRN Nausea and/or Vomiting  oxyCODONE    IR 2.5 milliGRAM(s) Oral every 8 hours PRN Severe Pain (7 - 10)  traMADol 25 milliGRAM(s) Oral every 12 hours PRN Mild Pain (1 - 3)   Chief Complaint: Shortness of breath    Interval Hx: Patient seen and examined this AM. Feels only marginally improved subjectively since admission. Continues to have dyspnea supine. No chest pain or tightness. No cough. No fever or chills. No other complaints aside for generalized weakness. She remains on supplemental oxygen, with oxygenation slightly improved since admission, having received IV diruetcis. Her Cr has significantly increased since admission in the setting of these diuretics so they were went the past day or so. Additionally, patient was found yesterday to have urinary retention of > 500cc, possible obstructive uropathy, and this Espinoza was immediately placed. Today, CHF Team and Nephrology recommended resuming IV Lasix for a dose now that Espinoza is in place. Lastly, patient is planned for pigtail chest tube drainage of pleural effusion. DOAC remains on hold for this procedure as per Thoracic Surgery.     ROS: Multi system review is comprehensively negative x 10 systems except as above    Vitals:  Afebrile  BP 110s/70s  HR 90s  RR 18  O2 94% on 3L/min    Exam:  Constitutional: No acute distress  HEENT: NCAT PERRL EOMI MMM clear oropharynx  Neck: Supple, no JVD  CVS: S1 and S2, irregular, rate ~80, 2/6 REBECA   Chest: Normal resp effort at rest, diminished breath sounds at bases B/L  Abd: +BS, soft NT ND   : Indwelling Espinoza catheter in place, clear yellow urine in gravity bag  Ext: B/L LE edema  Skin: Warm, dry  Psych: Mood & affect appropriate  Neurological: Alert, no focal deficits    Labs:                      9.9    6.91  )--------( 161              30.7      127  |  90  |  75  ---------------------<  177  5.2   |  32  |  2.37    Ca  8.9     Mg  2.3    Phos 3.8       TPro  6.6  /  Alb  3.3  /  TBili  0.6  /  DBili  0.2   /  AST  25  /  ALT  19  /  AlkPhos  110    Troponin negative   proBNP 7548    A1c 6.3    Fasting AM cortisol 15.7    ABG 2/11 8:47   pH 7.29  /  pCO2: 61  /  pO2: 106  /   HCO3: 29  /  Base Excess: 1.2  /   SaO2: 98        UA 2/10: Yellow, clear, neg ketone, neg nitrite, trace LE, 2 WBC, 0 RBC, bact neg    Micro:  COVID19 PCR 2/10: Negative  Flu PCR 2/10: Negative  RSV PCR 2/10: Negative  Urine culture 2/10: Negative  Blood culture 2/10: Negative    Imaging:  US kidneys and bladder 2/14: Right kidney 9.1 cm. No renal mass, hydronephrosis or calculi. Left kidney 6.9 cm. Limited visualization. No gross mass, hydronephrosis or calculi. Bladder distended with volume 436 cc. No bladder wall thickening. No intraluminal mass.    CXR 2/13: Frontal expiratory view of the chest shows the heart to be similarly enlarged in size. Left cardiac pacemaker is again noted. The lungs show less pulmonary congestion with smaller pleural effusions and there is no evidence of pneumothorax.    CT chest WO 2/10: Bibasilar compressive atelectasis. No pulmonary consolidation or mass. There are enlarging moderate to large bilateral pleural effusions. No lymphadenopathy. There is enlargement of the main pulmonary artery spanning 3.4cm, consistent with pulmonary arterial hypertension. Severe arterial vascular calcification. There is severe cardiomegaly with four-chamber enlargement.  There are permanent pacemaker with lead tips in the right ventricle. Severe coronary arterial calcification.    CXR 2/10: Bilateral pleural effusions with adjacent atelectasis versus pneumonia at the left base.    Cardiac Testing:  Follow-up TTE 2/15:  Mild concentric left ventricular hypertrophy is present. Mildly reduced systolic function. Estimated LVEF 45-50%. Septal flattening is seen; this finding is consistent with right heart pressure / volume overload. The right ventricle is severely dilated with reduced function. The right atrium appears severely dilated. The left atrium is mildly dilated. Mild mitral regurgitation is present. The aortic valve appears severely calcified. Valve opening seems to be restricted. Moderate to severe aortic stenosis. Severe tricuspid valve regurgitation is present. Probable severe pulmonary hypertension. Trace pulmonic valvular regurgitation is present.    Tele 2/14: Rate controlled afib    EKG 2/13: Afib with RVR, rate 112    PPM interrogation 2/12: Underlying rhythm afib. Normal functioning single chamber PPM with battery longevity 5.6 years. Afib with V-pacing 19%, overall rate histogram is acceptable, brief RVR noted. No setting change made.    Tele 2/10: Afib rate     EKG 2/10: Afib rate 100s    Prior visit cardiac testing   TTE 9/11:  Limited study to assess tricuspid insufficiency and pulmonary pressure. Severe (4+) tricuspid valve regurgitation is present. Severe pulmonary hypertension. The left ventricle is normal in size, paradoxical septal motion The interventricular septum appears flattened consistent with an RV pressure/volume overload. An apically displaced papillary muscle is noted. Estimated left ventricular ejection fraction is 50-55%. The right atrium appears severely dilated. A device wire is seen in the RV and RA. The right ventricle is moderately dilated with decreased contractility.    LHC 9/6: Mild diffuse atherosclerosis with moderate severe disease of small caliber Cx in a nondominant territory. Elevated right sided filling pressures in setting of severe TR with normal left sided filling pressures. Adequate perfusion.    TTE 8/29: Mild to mod MR. Severe AS. Severe TR. Mild to mod [place]. Severe pHTN. Severely dilated LA. LV systolic function mildly impaired; segmental wall motion abnormalities noted. Mild concentric LVH. LVEF 45%. The interventricular septum appears flattened consistent with an RV pressure/volume overload. RA severely dilated. RV with moderate dilation, diffuse hypokinesis, and depression of contractility based on TAPSE. A device wire is seen in the RV and RA. IVC is dilated and not collapsing with inspiration.    Meds:  MEDICATIONS  (STANDING):  acetaminophen     Tablet .. 975 milliGRAM(s) Oral every 8 hours  albuterol/ipratropium for Nebulization 3 milliLiter(s) Nebulizer every 6 hours  allopurinol 100 milliGRAM(s) Oral daily  atorvastatin 20 milliGRAM(s) Oral at bedtime  buDESOnide    Inhalation Suspension 0.5 milliGRAM(s) Inhalation two times a day  escitalopram 5 milliGRAM(s) Oral daily  folic acid 1 milliGRAM(s) Oral daily  levothyroxine 125 MICROGram(s) Oral daily  methylPREDNISolone sodium succinate Injectable 20 milliGRAM(s) IV Push daily  metoprolol succinate ER 25 milliGRAM(s) Oral at bedtime  midodrine. 2.5 milliGRAM(s) Oral three times a day  multivitamin 1 Tablet(s) Oral daily  polyethylene glycol 3350 17 Gram(s) Oral daily  senna 2 Tablet(s) Oral at bedtime  thiamine 100 milliGRAM(s) Oral daily    MEDICATIONS  (PRN):  aluminum hydroxide/magnesium hydroxide/simethicone Suspension 30 milliLiter(s) Oral every 4 hours PRN Dyspepsia  melatonin 3 milliGRAM(s) Oral at bedtime PRN Insomnia  ondansetron Injectable 4 milliGRAM(s) IV Push every 6 hours PRN Nausea and/or Vomiting  oxyCODONE    IR 2.5 milliGRAM(s) Oral every 8 hours PRN Severe Pain (7 - 10)  traMADol 25 milliGRAM(s) Oral every 12 hours PRN Mild Pain (1 - 3)

## 2024-02-15 NOTE — PROGRESS NOTE ADULT - ASSESSMENT
Pt is a 89y old Female with hx of Diastolic CHF EF 45%, CAD, HTN, Orthostatic hypotension, Atrial fib. on Xarelto, s/p PPM, COPD, Hypothyroidism, Gout , valvular heart disease, sever AS,  severe TR, severe pumonary HTN, presented in ED BIB form home for SOB. The patient  lives alone and has a private aid. She developed worsening SOB, She had iron infusion 5 days ago and was started on Macrobid for UTI  yesterday.     1. Acute hypoxic and hypercapnic respiratory failure 2/2 CHF, severe valvular disease, pHTN, probable COPD excaerbation  -NIPPV nightly; on 3LNC currently  -Lasix & spirolactone on hold due to worsening JOAQUÍN & electrolyte imbalances   -pending thoracentesis 2/15  -monitor BMP closely  -IV Solumedrol  -strict I&O, daily weight, 1.2L FR  -cardiology following  -Pulmicort and Duoneb nebs    2. Severe aortic stenosis  -was evaluated by Dr. Levin for TAVR; family opted for medical management  -discussed hospice with patient's daughter; not ready for hospice as of yet, continue current medical management      Process of Care   --Reviewed dx/treatment problems and alignment with Goals of Care       Physical Aspects of Care   --Pain   patient denies at this time   c/w current management     --Bowel Regimen   denies constipation   risk for constipation d/t immobility   daily dulcolax as needed     --Dyspnea   no dyspnea at rest  on 3LNC  NIPPV at nightly  continue medical management for CHF/COPD exacerbations  pending thoracentesis 2/15  comfortable and in NAD     --Nausea Vomiting   denies     --Weakness   PT as tolerated         Psychological and Psychiatric Aspects of Care:    --Grief/ Bereavement: emotional support provided   --Hx of psychiatric dx: none   --Pastoral Care Available PRN          Social Aspects of Care   --SW involved         Cultural Aspects   --Primary Language: English           Goals of Care:  Discussed with daughter 2/12. MOLST with DNR/DNI trial NIV. Not ready for comfort focus/ hospice at this time. Palliative will continue to follow as patient has poor prognosis and high risk of decompensation. For continued GOC discussion based on clinical condition.          We discussed Palliative Care team being a supportive team when a patient has ongoing illnesses.  We also discussed that it is not an end of life care service, but can help navigate symptoms and emotional support throughout their hospital stay here.           Ethical and Legal Aspects: NA           Capacity- A&Ox3, some short-term memory deficits; has simple decision making capacity but defers to her children         HCP/Surrogate: Citlalli Aponte, daughter [phone] & son Tutu [phone]          Code Status: DNR/DNI trial NIV    MOLST: MOLST with limitations of DNR/DNI trial NIV    Dispo Plan: home with prior aide services          Discussed With: Dr. Brooke coordinated with attending and SW and RN            Time Spent: 60 minutes including the care, coordination and counseling of this patient, 50% of which was spent coordinating and counseling.

## 2024-02-15 NOTE — PROGRESS NOTE ADULT - ASSESSMENT
89 year old female PMHx of Diastolic CHF EF 45%, CAD, sever AS,  severe TR, severe pulmonary HTN, AFIB on Xarelto ,COPD, Orthostatic hypotension, recent pneumonia who was    admitted with SOB found to have bilateral pleural effusions.       Plan   Attempted thoracentesis s/p small apical PTX  CXR in am    Increased creatinine from lasix   hold Xarelto today   will follow up CXR in am  pt stable   care as per medical teams      Discussed with Cardiothoracic Team at AM rounds.

## 2024-02-15 NOTE — PROGRESS NOTE ADULT - SUBJECTIVE AND OBJECTIVE BOX
Subjective: Pt in bed NAD Rt Thoracentesis - aborted dt no fluid return in syringe and noted few bubbles of air. CXR with small PTX     T(C): 36.7 (02-15-24 @ 16:55), Max: 36.7 (02-15-24 @ 16:55)  HR: 95 (02-15-24 @ 16:55) (92 - 107)  BP: 110/67 (02-15-24 @ 16:55) (92/61 - 112/71)  ABP: --  ABP(mean): --  RR: 19 (02-15-24 @ 16:55) (18 - 19)  SpO2: 95% (02-15-24 @ 16:55) (93% - 95%) 2 L NC   Wt(kg): --  CVP(mm Hg): --  CO: --  CI: --  PA: --                                              Tele: Afib               02-15    127<L>  |  90<L>  |  75<H>  ----------------------------<  177<H>  5.2   |  32<H>  |  2.37<H>    Ca    8.9      15 Feb 2024 06:18  Phos  3.9     02-15  Mg     2.3     02-15    TPro  x   /  Alb  3.7  /  TBili  x   /  DBili  x   /  AST  x   /  ALT  x   /  AlkPhos  x   02-15                               9.9    6.91  )-----------( 161      ( 15 Feb 2024 06:18 )             30.7                 CAPILLARY BLOOD GLUCOSE      POCT Blood Glucose.: 163 mg/dL (14 Feb 2024 17:59)           CXR: < from: Xray Chest 1 View- PORTABLE-Urgent (Xray Chest 1 View- PORTABLE-Urgent .) (02.13.24 @ 18:46) >  Frontal expiratory view of the chest shows the heart to be similarly   enlarged in size. Left cardiac pacemaker is again noted.    The lungs show less pulmonary congestion with smaller pleural effusions   and there is no evidence of pneumothorax.    IMPRESSION:  Decreased congestive changes.        Thank you for the courtesy of this referral.    < end of copied text >      Physical exam  Gen NAD frail   Neuro AAOx3  Card irreg S1 S2   Pulm decreased bases b/l  Abd distended  Ext warm, edema            Assessment:  89yFemale    with PAST MEDICAL & SURGICAL HISTORY:  Mitral valve insufficiency      Aortic valve regurgitation      Osteopenia      CAD (coronary artery disease)      Gout      Hypothyroid      CHF (congestive heart failure)      Anxiety      Anemia      HTN (hypertension)      Afib      COPD (chronic obstructive pulmonary disease)      Chronic obstructive pulmonary disease (COPD)      HTN (hypertension)      Cardiac pacemaker      Gout      Hypothyroidism      Insomnia      Chronic CHF      History of ST elevation myocardial infarction (STEMI)      Status cardiac pacemaker      S/P knee replacement      No significant past surgical history            Plan:

## 2024-02-15 NOTE — PROGRESS NOTE ADULT - SUBJECTIVE AND OBJECTIVE BOX
NEPHROLOGY INTERVAL HPI/OVERNIGHT EVENTS:    Date of Service: 02-14-24 @ 10:24  2/15- supine, sob, raised head of bed, more comfortable, discussed with HF team Ramón and Dr Sara jackson placed for retention, 500 ml  2/14--appears comfortable.  but c/o intermittent cough and SOB.  2/13- OOB sleeping arousable NAD, evaluated for CKD JOAQUÍN and heart failure  2/12--seen by Dr Hopper yesterday.  Alert,  SOB only slightly better.  Feeling weak.    HPI:   The patient is a 89 yo female with Hx. of Diastolic CHF EF 45%, CAD, HTN, Orthostatic hypotension, Atrial fib. on Xarelto, s/p PPM, COPD, Hypothyroidism, Gout , valvular heart disease, sever AS,  severe TR, severe pumonary HTN, presented in ED BIB form home for SOB. The patient  lives alone and has a private aid. She developed worsening SOB, She had iron infusion 5 days ago and was started on Macrobid for UTI  yesterday.   renal eval for acute hyponatremia in setting of acute CHF/pulm HTN and bilateral plueral effusions    MEDICATIONS  (STANDING):  acetaminophen     Tablet .. 975 milliGRAM(s) Oral every 8 hours  albuterol/ipratropium for Nebulization 3 milliLiter(s) Nebulizer every 6 hours  allopurinol 100 milliGRAM(s) Oral daily  atorvastatin 20 milliGRAM(s) Oral at bedtime  buDESOnide    Inhalation Suspension 0.5 milliGRAM(s) Inhalation two times a day  escitalopram 5 milliGRAM(s) Oral daily  folic acid 1 milliGRAM(s) Oral daily  levothyroxine 125 MICROGram(s) Oral daily  methylPREDNISolone sodium succinate Injectable 20 milliGRAM(s) IV Push daily  metoprolol succinate ER 25 milliGRAM(s) Oral at bedtime  midodrine. 2.5 milliGRAM(s) Oral three times a day  multivitamin 1 Tablet(s) Oral daily  polyethylene glycol 3350 17 Gram(s) Oral daily  senna 2 Tablet(s) Oral at bedtime  thiamine 100 milliGRAM(s) Oral daily    MEDICATIONS  (PRN):  aluminum hydroxide/magnesium hydroxide/simethicone Suspension 30 milliLiter(s) Oral every 4 hours PRN Dyspepsia  melatonin 3 milliGRAM(s) Oral at bedtime PRN Insomnia  ondansetron Injectable 4 milliGRAM(s) IV Push every 6 hours PRN Nausea and/or Vomiting  oxyCODONE    IR 2.5 milliGRAM(s) Oral every 8 hours PRN Severe Pain (7 - 10)  traMADol 25 milliGRAM(s) Oral every 12 hours PRN Mild Pain (1 - 3)    I&O's Detail    14 Feb 2024 07:01  -  15 Feb 2024 07:00  --------------------------------------------------------  IN:  Total IN: 0 mL    OUT:    Indwelling Catheter - Urethral (mL): 700 mL  Total OUT: 700 mL    Total NET: -700 mL          PHYSICAL EXAM:  GENERAL: comfortable.  CHEST/LUNG: scattered rhonchi  HEART: S1S2 RRR  ABDOMEN: soft  EXTREMITIES: NO LE edema  SKIN:     LABS:                          9.9    6.91  )-----------( 161      ( 15 Feb 2024 06:18 )             30.7                           9.8    6.51  )-----------( 160      ( 14 Feb 2024 06:03 )             30.3     02-15    127<L>  |  90<L>  |  75<H>  ----------------------------<  177<H>  5.2   |  32<H>  |  2.37<H>    Ca    8.9      15 Feb 2024 06:18  Phos  3.9     02-15  Mg     2.3     02-15    TPro  x   /  Alb  3.7  /  TBili  x   /  DBili  x   /  AST  x   /  ALT  x   /  AlkPhos  x   02-15      02-14    127<L>  |  91<L>  |  61<H>  ----------------------------<  167<H>  5.7<H>   |  33<H>  |  2.19<H>    Ca    9.1      14 Feb 2024 06:03  Phos  3.8     02-14  Mg     2.2     02-14        Urinalysis Basic - ( 14 Feb 2024 06:03 )    Color: x / Appearance: x / SG: x / pH: x  Gluc: 167 mg/dL / Ketone: x  / Bili: x / Urobili: x   Blood: x / Protein: x / Nitrite: x   Leuk Esterase: x / RBC: x / WBC x   Sq Epi: x / Non Sq Epi: x / Bacteria: x      Magnesium: 2.2 mg/dL (02-14 @ 06:03)  Phosphorus: 3.8 mg/dL (02-14 @ 06:03)          RADIOLOGY & ADDITIONAL TESTS:

## 2024-02-15 NOTE — PROGRESS NOTE ADULT - PROBLEM SELECTOR PLAN 1
Pt presenting with SOB likely multifactorial 2/2 pleural effusions, CHF exacerbation, elevated BNP in setting known valvular disease severe AS, severe TR, severe pHTN, pleural effusions, compressive atelectasis, COPD  Diuretics on hold due to elevated creatinine (trending up) Nephrology following  Thoracic surgery consulted for Pleural effusion.  DOAC on hold for thoracentesis today    HF/Structural heart team consulted   F/U Echo performed, results pending Pt presenting with SOB likely multifactorial 2/2 pleural effusions, CHF exacerbation, elevated BNP in setting known valvular disease severe AS, severe TR, severe pHTN, pleural effusions, compressive atelectasis, COPD  Diuretics on hold due to elevated creatinine (trending up) Nephrology following  Thoracic surgery consulted for Pleural effusion.  DOAC on hold for thoracentesis today    HF/Structural heart team consulted   F/U Echo performed, results pending  BNP trending up. Pt appears volume overloaded.  Will give lasix 40mg IV x 1. D/W  and Jose Melgar PA

## 2024-02-15 NOTE — PROGRESS NOTE ADULT - ASSESSMENT
The patient is a 89 yo female with Hx. of Diastolic CHF EF 45%, CAD, HTN, Orthostatic hypotension, Atrial fib. on Xarelto, s/p PPM, COPD, Hypothyroidism, Gout , valvular heart disease, sever AS,  severe TR, severe pumonary HTN, presented in ED BIB form home for SOB. The patient  lives alone and has a private aid. She developed worsening SOB, She had iron infusion 5 days ago and was started on Macrobid for UTI  yesterday.   renal eval for acute hyponatremia in setting of acute CHF/pulm HTN and bilateral plueral effusions    Hyponatremia - improving   suspect hypervolemic due to fluid overload   monitor Na trend during diuresis    IV diuresis as per medicine/cardiology teams    free water/fluid restriction    avoid thiazide diuretics if Na remains low -  ( HCTZ, metalozone etc)    k borderline on Aldactone and KCl supplements - would stop KCL if K rises     2/12 SY  --Hyponatremia with CHF : Sodium level slowly improving : will resume IV lasix.  --JOAQUÍN : creat elevated but remains fluid overloaded : continue IV lasix to stabilize resp status first.  --Decompensated CHF with severe pulm HTN : prognosis grave.  --GOC evaluation ongoing.  --D/c spironolactone for now,  until electrolytes and creat level holding steady.    2/13  Pt with CHF, on IV lasix  HYperkalemia treated with Ca IV and PO Lokelma  overall clinical status improved    2/14 SY  --JOAQUÍN: creat remains elevated.  Resp status improved.  Diuretics on hold for now.     Follow up with Renal sonogram.  --Decompensated CHF/severe pulm HTN : improved and stable   --Persistent Hyponatremia and Hyperkalemia in the setting of JOAQUÍN.  Check Cortisol level as well.    2/15  Seen for worsening dyspnea, hyponatremia/ JOAQUÍN/ CKD  CXR shows effusion viewed on PACs myself- agree with Lasix 40 mg IVp x 1 today, for pigtail procedure for effusion  Hyponatremia, may be from obstructive uropathy Jackson placed  Low Na may also be due to worsening HF, Lasix given  Mild hyperkalemia due to obstr uropathy, CKD , jackson placed and Lasix given  Labs ordered for 8 PM  raise in creat may be noted post diuretic but pt is c/o  SOB supine

## 2024-02-15 NOTE — PROGRESS NOTE ADULT - PROBLEM SELECTOR PLAN 2
Valvular heart disease:  Previously evaluated by Dr Levin for structural intervention- pt and family had opted for medical management  My coworker had prior conversation with patient and daughter who wishes to CW medical management

## 2024-02-15 NOTE — PROGRESS NOTE ADULT - SUBJECTIVE AND OBJECTIVE BOX
HPI: Pt is a 89y old Female with hx of Diastolic CHF EF 45%, CAD, HTN, Orthostatic hypotension, Atrial fib. on Xarelto, s/p PPM, COPD, Hypothyroidism, Gout , valvular heart disease, sever AS,  severe TR, severe pumonary HTN, presented in ED BIB form home for SOB. The patient  lives alone and has a private aid. She developed worsening SOB, She had iron infusion 5 days ago and was started on Macrobid for UTI  yesterday.  Palliative consulted for GOC.    : Seen and examined at bedside. Awake, alert, oriented x3 with notable short-term memory loss. Denies pain, SOB, on 5LNC in NAD. Patient defers to her children for any medical decision making. Spoke with patient's daughter, Citlalli, see GOC discussion below.  : Drowsy today, reported not sleeping well overnight. A&Ox3, forgetful. On 5LNC, mildly dyspneic at rest. Denies pain.  2/15: Drowsy, arousable to voice. Remains on supplemental O2, no dyspnea at rest. Worsening JOAQUÍN- diuretics on hold. Pending thoracentesis today.      CODE STATUS: DNR/DNI trial NIV      Pain and Dyspnea:     denies pain  denies dyspnea  no dyspnea at rest  on supplemental O2 in NAD  pending thoracentesis today      Review of Systems:    Anxiety- denies  Depression- denies  Physical Discomfort- denies  Dyspnea- denies currently  Constipation- denies  Diarrhea- denies  Nausea- denies  Vomiting- denies  Anorexia- ++  Weight Loss-   Cough- ++  Secretions- denies  Fatigue- ++  Weakness- ++  Delirium- denies    All other systems reviewed and negative  Unable to obtain/Limited due to:        PHYSICAL EXAM:    Vital Signs Last 24 Hrs  T(C): 36.4 (15 Feb 2024 07:55), Max: 36.4 (15 Feb 2024 07:55)  T(F): 97.5 (15 Feb 2024 07:55), Max: 97.5 (15 Feb 2024 07:55)  HR: 92 (15 Feb 2024 14:06) (92 - 107)  BP: 92/61 (15 Feb 2024 14:06) (92/61 - 112/71)  BP(mean): --  RR: 18 (15 Feb 2024 07:55) (18 - 18)  SpO2: 94% (15 Feb 2024 07:55) (93% - 95%)    Parameters below as of 15 Feb 2024 13:35  Patient On (Oxygen Delivery Method): nasal cannula      Daily     Daily Weight in k (15 Feb 2024 06:25)    PPSV2:  20%  FAST:    General: awake, alert, pleasant, chronically-ill appearing  HEENT: on 3LNC  Lungs: Diminished to anterior lung fields  Cardiac: Irregular rate and rhythm  GI: Abdomen slightly distended. +BSx4  : No suprapubic tenderness  Ext: +PVD bilateral lower extremities. no edema  Neuro: A&Ox3, short-term memory loss. Speech intact. No focal neuro deficits.        LABS and RADIOLOGY: REVIEWED

## 2024-02-15 NOTE — PROGRESS NOTE ADULT - ASSESSMENT
89 year-old woman with DM type II, HTN, CAD, chronic diastolic CHF, chronic atrial fibrillation on Xarelto, hx of PPM placement, severe aortic stenosis, severe tricuspid regurgitation, severe pulmonary HTN, chronic orthostatic hypotension on midodrine, COPD, hypothyroidism, gout, just started on macrobid for 1 day for UTI, presented with worsening shortness of breath. Reports adherence with medication. Lives alone, with health aides. Denied chest pain, fevers, chills, cough. She was most recently admitted to  in 9/2023. At the time, she was evaluated for TAVR. Underwent RHC/LHC showing mild diffuse atherosclerosis with moderate severe disease of small caliber Cx in a nondominant territory. She had elevated right sided filling pressures in setting of severe TR with normal left sided filling pressures. Adequate perfusion. No intervention performed. Her outpatient cardiologist is Dr. Harris. In the ED, she was tachycardic to 107, normotensive, saturating 93%. She had increased work of breathing. Labs notable for Hgb 10, Na 123, Hs trop neg x1, proBNP 7548, COVID and RVP neg. CT chest w/ severe cardiomegaly with enlarging large bilateral pleural effusions. Severe pulmonary arterial hypertension. Admitted for further management of acute respiratory failure.      She remains on supplemental oxygen, somewhat improved since admission but her Cr significantly increased since admission, in the setting of diuretics and also urinary retention / obstructive uropathy for which Espinoza was placed yesterday. CHF Team and Nephrology recommended resuming IV Lasix for a dose today now that Espinoza is in place. Lasix had been held the past couple of days with her uptrending Cr. Additionally, patient is planned for pigtail chest tube drainage of pleural effusion. DOAC remains on hold for this procedure as per Thoracic Surgery.       Acute hypoxic and hypercapneic respiratory failure  Likely multi factorial due to CHF exacerbation, severe AS, severe TR, severe pHTN, pleural effusions, compressive atelectasis, COPD with probable exacerbation. No sign of pneumonia at this point. Had required NIPPV with BiPAP to reduce work of breathing and improve hypercapnea. Discontinued BiPAP this AM as she has not been using it, citing discomfort with the mask etc. Patient now on O2 via NC, 3L/min. Subjectively, she feels only marginally improved since admission. Continues to have dyspnea supine. No chest pain or tightness. No cough. No fever or chills. No other complaints aside for generalized weakness.   - Continue O2 supplementation as needed, wean as tolerated, goal SPO2 92% or greater  - Continue to treat underlying issues, see below  - Planned for R chest pigtail pleural drainage catheter placement today by Thoracic Surgery    Acute on chronic diastolic CHF  Patient presenting with SOB, pleural effusions, elevated BNP in setting known valvular disease, HFpEF. Last TTE with LVEF 50-55%, severe TR, severe AS. At home, shes on torsemide 20mg BID. Some improvement in oxygenation with IV Lasix but Cr up-trended significantly and diuretics were placed on hold. Appreciate input from Cardiology and Nephrology today. Still find patient volume overloaded and note that patient is now with indwelling Espinoza placed yesterday for retention, recommended administering Lasix 40mg IV x 1 today  - Re-started Lasix today with 40mg IVP x 1 as per CHF Team and Nephrology  - Continue metoprolol  - Follow-up TTE ordered  - Monitor Is and Os, daily wt  - Trend Cr and lytes  - Continue fluid restriction     Severe aortic stenosis   Previously evaluated by Dr Levin for structural intervention / JAYNA. Patient and family had opted for medical management.  - Continue medical management    CAD  Recent cath 9/2023 with mild diffuse atherosclerosis with moderate severe disease of small caliber Cx in a nondominant territory.  - Continue med management with metoprolol, statin    Chronic atrial fibrillation, hx of PPM  RVR in the ED in setting of active respiratory symptoms. PPM interrogated 2/12/24. Normal functioning single chamber PPM with battery longevity 5.6 years. Afib with V-pacing 19%, overall rate histogram is acceptable, brief RVR noted. No setting change made.  - Continue metoprolol  - Trying to wean midodrine as tolerated  - Holding Xarelto as patient is for pigtail chest tube placement today to drain pleural effusion    COPD with acute exacerbation  Appreciate input from Pulmonary Medicine  - Continue systemic steroids with methylprednisolone  - Continue DuoNeb q6h    Significant pleural effusions B/L  Difficult to diurese patient as rising. Still with hypoxia, sizable pleural effusions. Thoracic Surgery consulted for input re possible drainage, patient in agreement.   - For pleural drainage catheter placement this afternoon  - F/u with Thoracic Surgery    Hyponatremia  Suspect hypervolemic due to fluid overload. Persisting.   - Free water/fluid restriction   - Avoid thiazide diuretics if Na remains low ( HCTZ, metolazone etc)   - Nephrology following    Hyperkalemia  In setting of concurrent spironolactone and potassium supplementation tabs. Stopped both and administered hyperkalemia protocol. K has improved but remains >5. Now could be related to JOAQUÍN, see below.  - Discuss further with Nephrology today    JOAQUÍN   Baseline Cr ~0.9 as was on presentation. Increased to significantly since then, now 2.19. With patient having received IV Lasix. She has not received any contrast studies. No NSAIDs. She did have UTI diagnosed a day or two prior to admission and has since completed a 3-day course of ceftriaxone but doubt related. Thought she could have retention as she has been rather immobile and constipated. Nephrology consulted. Obtained renal bladder US yesterday 2/14. No sign of hydronephrosis, mass or calculi. Bladder was distended however, volume 436cc. No bladder wall thickening. No bladder mass. Back up on the floors thereafter, patient was not able to void, had bladder scan up to ~540cc, Espinoza placed. Note UA collected upon Espinoza placement is negative for infection.  - Continue with Espinoza (2/14-)  - Strict Is and Os  - Trend Cr  - Avoid nephrotic medications  - Renally dose medications where applicable    UTI, present prior to admission  Recent dx of UTI. Was treated with macrobid for 1 day before getting admitted to hospital. Here she received an empiric 3 day course of ceftriaxone. Urine and blood cultures returned negative. UA collected from Espinoza insertion negative for infection.    DM  type II  A1c 6.3. Well controlled.   - Continue on insulin correctional scale for now    Hypothyroidism  Stable.   - Continue levothyroxine    Hx of gout  Stable.   - Continue allopurinol    Constipation  No nausea or emesis. Intact bowel sounds.   - Continue bowel regimen    Physical deconditioning and debility  Appreciate input from PT. Recommended home with home PT and more assistance vs [facility]  - Continue restorative PT sessions  - OOB to chair daily    Severe protein calorie malnutrition  Appreciate dietician input  - Trend wt  - Added MVI with minerals daily, thiamine 100mg daily  - Diet supplemented with Ensure High Protein BID      DVT px: On Xarelto for afib but now held as patient is planned for pleural drainage catheter placement  Code Status: DNR/DNI/Trial NIV   Dispo: To be determined pending further clinical assessment   89 year-old woman with DM type II, HTN, CAD, chronic diastolic CHF, chronic atrial fibrillation on Xarelto, hx of PPM placement, severe aortic stenosis, severe tricuspid regurgitation, severe pulmonary HTN, chronic orthostatic hypotension on midodrine, COPD, hypothyroidism, gout, just started on macrobid for 1 day for UTI, presented with worsening shortness of breath. Reports adherence with medication. Lives alone, with health aides. Denied chest pain, fevers, chills, cough. She was most recently admitted to  in 9/2023. At the time, she was evaluated for TAVR. Underwent RHC/LHC showing mild diffuse atherosclerosis with moderate severe disease of small caliber Cx in a nondominant territory. She had elevated right sided filling pressures in setting of severe TR with normal left sided filling pressures. Adequate perfusion. No intervention performed. Her outpatient cardiologist is Dr. Harris. In the ED, she was tachycardic to 107, normotensive, saturating 93%. She had increased work of breathing. Labs notable for Hgb 10, Na 123, Hs trop neg x1, proBNP 7548, COVID and RVP neg. CT chest w/ severe cardiomegaly with enlarging large bilateral pleural effusions. Severe pulmonary arterial hypertension. Admitted for further management of acute respiratory failure.     Acute hypoxic and hypercapneic respiratory failure  Likely multi factorial due to CHF exacerbation, severe AS, severe TR, severe pHTN, pleural effusions, compressive atelectasis, COPD with probable exacerbation. No sign of pneumonia at this point. Had required NIPPV with BiPAP to reduce work of breathing and improve hypercapnea. Discontinued BiPAP this AM as she has not been using it, citing discomfort with the mask etc. Patient now on O2 via NC, 3L/min. Subjectively, she feels only marginally improved since admission. Continues to have dyspnea supine. No chest pain or tightness. No cough. No fever or chills. No other complaints aside for generalized weakness.   - Continue O2 supplementation as needed, wean as tolerated, goal SPO2 92% or greater  - Continue to treat underlying issues, see below  - Planned for R chest pigtail pleural drainage catheter placement today by Thoracic Surgery    Acute on chronic diastolic CHF  Patient presenting with SOB, pleural effusions, elevated BNP in setting known valvular disease, HFpEF. Last TTE with LVEF 50-55%, severe TR, severe AS. At home, shes on torsemide 20mg BID. Some improvement in oxygenation with IV Lasix but Cr up-trended significantly and diuretics were placed on hold. Appreciate input from Cardiology and Nephrology today. Still find patient volume overloaded and note that patient is now with indwelling Espinoza placed yesterday for retention, recommended administering Lasix 40mg IV x 1 today  - Re-started Lasix today with 40mg IVP x 1 as per CHF Team and Nephrology  - Continue metoprolol  - Follow-up TTE ordered  - Monitor Is and Os, daily wt  - Trend Cr and lytes  - Continue fluid restriction     Severe aortic stenosis   Previously evaluated by Dr Levin for structural intervention / JAYNA. Patient and family had opted for medical management.  - Continue medical management    CAD  Recent cath 9/2023 with mild diffuse atherosclerosis with moderate severe disease of small caliber Cx in a nondominant territory.  - Continue med management with metoprolol, statin    Chronic atrial fibrillation, hx of PPM  RVR in the ED in setting of active respiratory symptoms. PPM interrogated 2/12/24. Normal functioning single chamber PPM with battery longevity 5.6 years. Afib with V-pacing 19%, overall rate histogram is acceptable, brief RVR noted. No setting change made.  - Continue metoprolol  - Trying to wean midodrine as tolerated  - Holding Xarelto as patient is for pigtail chest tube placement today to drain pleural effusion    COPD with acute exacerbation  Appreciate input from Pulmonary Medicine  - Continue systemic steroids with methylprednisolone  - Continue DuoNeb q6h    Significant pleural effusions B/L  Difficult to diurese patient as rising. Still with hypoxia, sizable pleural effusions. Thoracic Surgery consulted for input re possible drainage, patient in agreement.   - For pleural drainage catheter placement this afternoon  - F/u with Thoracic Surgery    Hyponatremia  Suspect hypervolemic due to fluid overload. Persisting.   - Free water/fluid restriction   - Avoid thiazide diuretics if Na remains low ( HCTZ, metolazone etc)   - Nephrology following    Hyperkalemia  In setting of concurrent spironolactone and potassium supplementation tabs. Stopped both and administered hyperkalemia protocol. K has improved but remains >5. Now could be related to JOAQUÍN, see below.  - Discuss further with Nephrology today    JOAQUÍN   Baseline Cr ~0.9 as was on presentation. Increased to significantly since then, now 2.19. With patient having received IV Lasix. She has not received any contrast studies. No NSAIDs. She did have UTI diagnosed a day or two prior to admission and has since completed a 3-day course of ceftriaxone but doubt related. Thought she could have retention as she has been rather immobile and constipated. Nephrology consulted. Obtained renal bladder US yesterday 2/14. No sign of hydronephrosis, mass or calculi. Bladder was distended however, volume 436cc. No bladder wall thickening. No bladder mass. Back up on the floors thereafter, patient was not able to void, had bladder scan up to ~540cc, Espinoza placed. Note UA collected upon Espinoza placement is negative for infection.  - Continue with Espinoza (2/14-)  - Strict Is and Os  - Trend Cr  - Avoid nephrotic medications  - Renally dose medications where applicable    UTI, present prior to admission  Recent dx of UTI. Was treated with macrobid for 1 day before getting admitted to hospital. Here she received an empiric 3 day course of ceftriaxone. Urine and blood cultures returned negative. UA collected from Espinoza insertion negative for infection.    DM  type II  A1c 6.3. Well controlled.   - Continue on insulin correctional scale for now    Hypothyroidism  Stable.   - Continue levothyroxine    Hx of gout  Stable.   - Continue allopurinol    Constipation  No nausea or emesis. Intact bowel sounds.   - Continue bowel regimen    Physical deconditioning and debility  Appreciate input from PT. Recommended home with home PT and more assistance vs [facility]  - Continue restorative PT sessions  - OOB to chair daily    Severe protein calorie malnutrition  Appreciate dietician input  - Trend wt  - Added MVI with minerals daily, thiamine 100mg daily  - Diet supplemented with Ensure High Protein BID      DVT px: On Xarelto for afib but now held as patient is planned for pleural drainage catheter placement  Code Status: DNR/DNI/Trial NIV   Dispo: To be determined pending further clinical assessment   89 year-old woman with DM type II, HTN, CAD, chronic diastolic CHF, chronic atrial fibrillation on Xarelto, hx of PPM placement, severe aortic stenosis, severe tricuspid regurgitation, severe pulmonary HTN, chronic orthostatic hypotension on midodrine, COPD, hypothyroidism, gout, just started on macrobid for 1 day for UTI, presented with worsening shortness of breath. Reports adherence with medication. Lives alone, with health aides. Denied chest pain, fevers, chills, cough. She was most recently admitted to  in 9/2023. At the time, she was evaluated for TAVR. Underwent RHC/LHC showing mild diffuse atherosclerosis with moderate severe disease of small caliber Cx in a nondominant territory. She had elevated right sided filling pressures in setting of severe TR with normal left sided filling pressures. Adequate perfusion. No intervention performed. Her outpatient cardiologist is Dr. Harris. In the ED, she was tachycardic to 107, normotensive, saturating 93%. She had increased work of breathing. Labs notable for Hgb 10, Na 123, Hs trop neg x1, proBNP 7548, COVID and RVP neg. CT chest w/ severe cardiomegaly with enlarging large bilateral pleural effusions. Severe pulmonary arterial hypertension. Admitted for further management of acute respiratory failure.     Acute hypoxic and hypercapneic respiratory failure  Likely multi factorial due to CHF exacerbation, severe AS, severe TR, severe pHTN, pleural effusions, compressive atelectasis, COPD with probable exacerbation. No sign of pneumonia at this point. Had required NIPPV with BiPAP to reduce work of breathing and improve hypercapnea. Discontinued BiPAP this AM as she has not been using it, citing discomfort with the mask etc. Patient now on O2 via NC, 3L/min. Subjectively, she feels only marginally improved since admission. Continues to have dyspnea supine. No chest pain or tightness. No cough. No fever or chills. No other complaints aside for generalized weakness.   - Continue O2 supplementation as needed, wean as tolerated, goal SPO2 92% or greater  - Continue to treat underlying issues, see below  - Planned for R chest pigtail pleural drainage catheter placement today by Thoracic Surgery  - Resume diuresis with IV Lasix as per Cardiology and Nephrology input from today    Acute on chronic diastolic CHF  Patient presenting with SOB, pleural effusions, elevated BNP in setting known valvular disease, HFpEF. Last TTE with LVEF 50-55%, severe TR, severe AS. At home, shes on torsemide 20mg BID. Some improvement in oxygenation with IV Lasix but Cr up-trended significantly and diuretics were placed on hold. Appreciate input from Cardiology and Nephrology today. Still find patient volume overloaded and note that patient is now with indwelling Espinoza placed yesterday for retention, recommended administering Lasix 40mg IV x 1 today. Follow up TTE obtained. EF 45-50%. Septal flattening consistent with right heart pressure / volume overload. RB severely dilated with reduced function. RA severely dilated. LA mildly dilated. AS described as mod-severe. TR severe. Probably severe pHTN.  - Re-started Lasix today with 40mg IVP x 1 as per Cardiology and Nephrology  - Continue metoprolol  - Monitor Is and Os, daily wt  - Trend Cr and lytes  - Continue fluid restriction   - F/u with Cardiology, consulted CHF Team    Severe aortic stenosis   Previously evaluated by Dr Levin for structural intervention / JAYNA. Patient and family had opted for medical management.  - Continue medical management    CAD  Recent cath 9/2023 with mild diffuse atherosclerosis with moderate severe disease of small caliber Cx in a nondominant territory.  - Continue med management with metoprolol, statin    Chronic atrial fibrillation, hx of PPM  RVR in the ED in setting of active respiratory symptoms. PPM interrogated 2/12/24. Normal functioning single chamber PPM with battery longevity 5.6 years. Afib with V-pacing 19%, overall rate histogram is acceptable, brief RVR noted. No setting change made.  - Continue metoprolol  - Trying to wean midodrine as tolerated  - Holding Xarelto as patient is for pigtail chest tube placement today to drain pleural effusion    COPD with acute exacerbation  Appreciate input from Pulmonary Medicine  - Continue systemic steroids with methylprednisolone  - Continue DuoNeb q6h    Significant pleural effusions B/L  Difficult to diurese patient as rising. Still with hypoxia, sizable pleural effusions. Thoracic Surgery consulted for input re possible drainage, patient in agreement.   - For pleural drainage catheter placement this afternoon  - F/u with Thoracic Surgery    Hyponatremia  Suspect hypervolemic due to fluid overload. Persisting.   - Free water/fluid restriction   - Avoid thiazide diuretics if Na remains low ( HCTZ, metolazone etc)   - Nephrology following    Hyperkalemia  In setting of concurrent spironolactone and potassium supplementation tabs. Stopped both and administered hyperkalemia protocol. K has improved but remains >5. Now could be related to JOAQUÍN, see below.  - Discuss further with Nephrology today    JOAQUÍN   Baseline Cr ~0.9 as was on presentation. Increased to significantly since then, now 2.19. With patient having received IV Lasix. She has not received any contrast studies. No NSAIDs. She did have UTI diagnosed a day or two prior to admission and has since completed a 3-day course of ceftriaxone but doubt related. Thought she could have retention as she has been rather immobile and constipated. Nephrology consulted. Obtained renal bladder US yesterday 2/14. No sign of hydronephrosis, mass or calculi. Bladder was distended however, volume 436cc. No bladder wall thickening. No bladder mass. Back up on the floors thereafter, patient was not able to void, had bladder scan up to ~540cc, Espinoza placed. Note UA collected upon Espinoza placement is negative for infection.  - Continue with Espinoza (2/14-)  - Strict Is and Os  - Trend Cr  - Avoid nephrotic medications  - Renally dose medications where applicable    UTI, present prior to admission  Recent dx of UTI. Was treated with macrobid for 1 day before getting admitted to hospital. Here she received an empiric 3 day course of ceftriaxone. Urine and blood cultures returned negative. UA collected from Espinoza insertion negative for infection.    DM  type II  A1c 6.3. Well controlled.   - Continue on insulin correctional scale for now    Hypothyroidism  Stable.   - Continue levothyroxine    Hx of gout  Stable.   - Continue allopurinol    Constipation  No nausea or emesis. Intact bowel sounds.   - Continue bowel regimen    Physical deconditioning and debility  Appreciate input from PT. Recommended home with home PT and more assistance vs [facility]  - Continue restorative PT sessions  - OOB to chair daily    Severe protein calorie malnutrition  Appreciate dietician input  - Trend wt  - Added MVI with minerals daily, thiamine 100mg daily  - Diet supplemented with Ensure High Protein BID      DVT px: On Xarelto for afib but now held as patient is planned for pleural drainage catheter placement  Code Status: DNR/DNI/Trial NIV   Dispo: To be determined pending further clinical assessment

## 2024-02-15 NOTE — PROGRESS NOTE ADULT - SUBJECTIVE AND OBJECTIVE BOX
CHIEF COMPLAINT: SOB      HPI:  Ms. Brennan is a 89 yo female with a hx HFpEF (LVEF 50-55% based on 9/2023 TTE, severe TR, severe AS, severe pulmonary hypertension, atrial fibrillation on xarelto s/p PPM, COPD, hypothyroidism, hypertension presenting with several days of worsening SOB. Of note, was started on macrobid for UTI on day prior to admission. Reports adherence with medication. Lives alone, with health aides. Denies chest pain, fevers, chills, cough.     Pt was most recently admitted to  in 9/2023. At the time, she was evaluated for TAVR. Underwent RHC/LHC showing Mild diffuse atherosclerosis with moderate severe disease of small caliber Cx in a nondominant territory; Elevated right sided filling pressures in setting of severe TR with normal left sided filling pressures. Adequate perfusion. No intervention performed.     Outpatient cardiologist: Dr. Harris      In the ED, she was tachycardic to 107, normotensive, saturating 93%.   Labs notable for hgb 10, Na 123, Hs trop neg x1, proBNP 7548, COVID and RVP neg  CT chest: Severe cardiomegaly with enlarging large bilateral pleural effusions.  Severe pulmonary arterial hypertension.    Cardiology consulted for decompensated HF.   2/12/24: Awake alert comfortable, tel AF 90's  2/13/24 Patient is comfortable ,  HR controlled   2/14/24: Awake alert semirecumbent in bed tele AF HR controlled   2/15/24: Pt sleeping comfortably, NAD.  Thoracentesis planned for today. Tele: Afib rate controlled      MEDICATIONS  (STANDING):  albuterol/ipratropium for Nebulization 3 milliLiter(s) Nebulizer every 6 hours  allopurinol 100 milliGRAM(s) Oral daily  atorvastatin 20 milliGRAM(s) Oral at bedtime  buDESOnide    Inhalation Suspension 0.5 milliGRAM(s) Inhalation two times a day  escitalopram 5 milliGRAM(s) Oral daily  folic acid 1 milliGRAM(s) Oral daily  levothyroxine 125 MICROGram(s) Oral daily  methylPREDNISolone sodium succinate Injectable 20 milliGRAM(s) IV Push every 12 hours  metoprolol succinate ER 25 milliGRAM(s) Oral at bedtime  midodrine. 2.5 milliGRAM(s) Oral three times a day  multivitamin 1 Tablet(s) Oral daily  polyethylene glycol 3350 17 Gram(s) Oral daily  senna 2 Tablet(s) Oral at bedtime  thiamine 100 milliGRAM(s) Oral daily    MEDICATIONS  (PRN):  acetaminophen     Tablet .. 650 milliGRAM(s) Oral every 6 hours PRN Mild Pain (1 - 3)  aluminum hydroxide/magnesium hydroxide/simethicone Suspension 30 milliLiter(s) Oral every 4 hours PRN Dyspepsia  melatonin 3 milliGRAM(s) Oral at bedtime PRN Insomnia  ondansetron Injectable 4 milliGRAM(s) IV Push every 6 hours PRN Nausea and/or Vomiting    Vital Signs Last 24 Hrs  T(C): 36.4 (15 Feb 2024 07:55), Max: 36.4 (15 Feb 2024 07:55)  T(F): 97.5 (15 Feb 2024 07:55), Max: 97.5 (15 Feb 2024 07:55)  HR: 98 (15 Feb 2024 07:55) (76 - 107)  BP: 112/71 (15 Feb 2024 07:55) (96/50 - 112/71)  BP(mean): --  RR: 18 (15 Feb 2024 07:55) (18 - 18)  SpO2: 94% (15 Feb 2024 07:55) (93% - 95%)    Parameters below as of 15 Feb 2024 07:55  Patient On (Oxygen Delivery Method): nasal cannula      I&O's Summary        PHYSICAL EXAM:  Constitutional: NAD, awake and frail   HEENT:  EOMI,  Pupils round, No oral cyanosis.  Pulmonary: Non-labored, diminished at bases bilaterally  Cardiovascular: irregular, + systolic murmur   Gastrointestinal: Abd soft, nontender.   Lymph: Trace bilateral edema  Neurological: Alert and oriented x2, no focal deficits  Psych:  Mood & affect appropriate    LABS:                          9.9    6.91  )-----------( 161      ( 15 Feb 2024 06:18 )             30.7                                     9.9    3.29  )-----------( 170      ( 12 Feb 2024 06:32 )             30.8     02-15    127<L>  |  90<L>  |  75<H>  ----------------------------<  177<H>  5.2   |  32<H>  |  2.37<H>    Ca    8.9      15 Feb 2024 06:18  Phos  3.9     02-15  Mg     2.3     02-15    TPro  x   /  Alb  3.7  /  TBili  x   /  DBili  x   /  AST  x   /  ALT  x   /  AlkPhos  x   02-15      02-13    x   |  x   |  x   ----------------------------<  330<H>  x    |  x   |  x     Ca    8.7      13 Feb 2024 06:34  Phos  3.0     02-12  Mg     2.1     02-12    TPro  6.6  /  Alb  3.3  /  TBili  0.6  /  DBili  0.2  /  AST  25  /  ALT  19  /  AlkPhos  110  02-12        LIVER FUNCTIONS - ( 12 Feb 2024 06:32 )  Alb: 3.3 g/dL / Pro: 6.6 gm/dL / ALK PHOS: 110 U/L / ALT: 19 U/L / AST: 25 U/L / GGT: x             Urinalysis Basic - ( 13 Feb 2024 11:48 )    Color: x / Appearance: x / SG: x / pH: x  Gluc: 330 mg/dL / Ketone: x  / Bili: x / Urobili: x   Blood: x / Protein: x / Nitrite: x   Leuk Esterase: x / RBC: x / WBC x   Sq Epi: x / Non Sq Epi: x / Bacteria: x        Culture Results:   <10,000 CFU/mL Normal Urogenital Xiomara (02-10-24 @ 15:07)  Culture Results:   No growth at 48 Hours (02-10-24 @ 14:23)  Culture Results:   No growth at 48 Hours (02-10-24 @ 14:14)          e< from: Transthoracic Echocardiogram Follow Up (09.11.23 @ 08:36) >  Impression     Summary     Limited study to assess tricuspid insufficiency and pulmonary pressure.   Severe (4+) tricuspid valve regurgitation is present.   Severe pulmonary hypertension.   The left ventricle is normal in size, paradoxical septal motion   The interventricular septum appears flattened consistent with an RV   pressure/volume overload.   An apically displaced papillary muscle is noted.   Estimated left ventricular ejection fraction is 50-55%.   The right atrium appears severely dilated.   A device wire is seen in the RV and RA.   The right ventricle is moderately dilated with decreased contractility.     Signature     ----------------------------------------------------------------   Electronically signed by Venugopal Palla, MD(Interpreting   physician) on 09/11/2023 04:51 PM   ----------------------------------------------------------------    < end of copied text >    < from: Cardiac Catheterization (09.06.23 @ 11:59) >    Diagnostic Findings:     Coronary Angiography   The coronary circulation is right dominant.      LM   Left main artery: Angiography shows mild atherosclerosis.      LAD   Proximal left anterior descending: There is a 30 % stenosis.      CX   Distal circumflex: The segment is extremely small. There is a 70 %  stenosis.    RCA   Proximal right coronary artery: There is a 40 % stenosis.      < end of copied text >    Diagnostic Conclusions:     1. Mild diffuse atherosclerosis with moderate severe disease of small  caliber Cx in a nondominant territory.  2. Elevated right sided filling pressures in setting of severe TR with  normal left sided filling pressures. Adequate perfusion.  Recommendations:     1. Recommend aggressive medical management for CAD as per ACC/AHA  guidelines  2. Structural heart follow up at  for ongoing [facility] evaluation.        < from: Xray Chest 1 View- PORTABLE-Urgent (Xray Chest 1 View- PORTABLE-Urgent .) (02.13.24 @ 18:46) >  IMPRESSION:  Decreased congestive changes.    < end of copied text >    < from: CT Chest No Cont (02.10.24 @ 14:34) >  IMPRESSION:    Severe cardiomegaly with enlarging large bilateral pleural effusions.    Severe pulmonary arterial hypertension.      < end of copied text >     CHIEF COMPLAINT: SOB      HPI:  Ms. Brennan is a 89 yo female with a hx HFpEF (LVEF 50-55% based on 9/2023 TTE, severe TR, severe AS, severe pulmonary hypertension, atrial fibrillation on xarelto s/p PPM, COPD, hypothyroidism, hypertension presenting with several days of worsening SOB. Of note, was started on macrobid for UTI on day prior to admission. Reports adherence with medication. Lives alone, with health aides. Denies chest pain, fevers, chills, cough.     Pt was most recently admitted to  in 9/2023. At the time, she was evaluated for TAVR. Underwent RHC/LHC showing Mild diffuse atherosclerosis with moderate severe disease of small caliber Cx in a nondominant territory; Elevated right sided filling pressures in setting of severe TR with normal left sided filling pressures. Adequate perfusion. No intervention performed.     Outpatient cardiologist: Dr. Harris      In the ED, she was tachycardic to 107, normotensive, saturating 93%.   Labs notable for hgb 10, Na 123, Hs trop neg x1, proBNP 7548, COVID and RVP neg  CT chest: Severe cardiomegaly with enlarging large bilateral pleural effusions.  Severe pulmonary arterial hypertension.    Cardiology consulted for decompensated HF.   2/12/24: Awake alert comfortable, tel AF 90's  2/13/24 Patient is comfortable ,  HR controlled   2/14/24: Awake alert semirecumbent in bed tele AF HR controlled   2/15/24: Pt sleeping comfortably, NAD.  Thoracentesis planned for today. Tele: Afib rate controlled      MEDICATIONS  (STANDING):  albuterol/ipratropium for Nebulization 3 milliLiter(s) Nebulizer every 6 hours  allopurinol 100 milliGRAM(s) Oral daily  atorvastatin 20 milliGRAM(s) Oral at bedtime  buDESOnide    Inhalation Suspension 0.5 milliGRAM(s) Inhalation two times a day  escitalopram 5 milliGRAM(s) Oral daily  folic acid 1 milliGRAM(s) Oral daily  levothyroxine 125 MICROGram(s) Oral daily  methylPREDNISolone sodium succinate Injectable 20 milliGRAM(s) IV Push every 12 hours  metoprolol succinate ER 25 milliGRAM(s) Oral at bedtime  midodrine. 2.5 milliGRAM(s) Oral three times a day  multivitamin 1 Tablet(s) Oral daily  polyethylene glycol 3350 17 Gram(s) Oral daily  senna 2 Tablet(s) Oral at bedtime  thiamine 100 milliGRAM(s) Oral daily    MEDICATIONS  (PRN):  acetaminophen     Tablet .. 650 milliGRAM(s) Oral every 6 hours PRN Mild Pain (1 - 3)  aluminum hydroxide/magnesium hydroxide/simethicone Suspension 30 milliLiter(s) Oral every 4 hours PRN Dyspepsia  melatonin 3 milliGRAM(s) Oral at bedtime PRN Insomnia  ondansetron Injectable 4 milliGRAM(s) IV Push every 6 hours PRN Nausea and/or Vomiting    Vital Signs Last 24 Hrs  T(C): 36.4 (15 Feb 2024 07:55), Max: 36.4 (15 Feb 2024 07:55)  T(F): 97.5 (15 Feb 2024 07:55), Max: 97.5 (15 Feb 2024 07:55)  HR: 98 (15 Feb 2024 07:55) (76 - 107)  BP: 112/71 (15 Feb 2024 07:55) (96/50 - 112/71)  BP(mean): --  RR: 18 (15 Feb 2024 07:55) (18 - 18)  SpO2: 94% (15 Feb 2024 07:55) (93% - 95%)    Parameters below as of 15 Feb 2024 07:55  Patient On (Oxygen Delivery Method): nasal cannula      I&O's Summary        PHYSICAL EXAM:  Constitutional: NAD, awake and frail   HEENT:  EOMI,  Pupils round, No oral cyanosis.  Pulmonary: Non-labored, diminished at bases bilaterally  Cardiovascular: irregular, + systolic murmur   Gastrointestinal: Abd soft, nontender.   Lymph: bilateral LE edema noted  Neurological: Alert and oriented x2, no focal deficits  Psych:  Mood & affect appropriate    LABS:                          9.9    6.91  )-----------( 161      ( 15 Feb 2024 06:18 )             30.7                                     9.9    3.29  )-----------( 170      ( 12 Feb 2024 06:32 )             30.8     02-15    127<L>  |  90<L>  |  75<H>  ----------------------------<  177<H>  5.2   |  32<H>  |  2.37<H>    Ca    8.9      15 Feb 2024 06:18  Phos  3.9     02-15  Mg     2.3     02-15    TPro  x   /  Alb  3.7  /  TBili  x   /  DBili  x   /  AST  x   /  ALT  x   /  AlkPhos  x   02-15      02-13    x   |  x   |  x   ----------------------------<  330<H>  x    |  x   |  x     Ca    8.7      13 Feb 2024 06:34  Phos  3.0     02-12  Mg     2.1     02-12    TPro  6.6  /  Alb  3.3  /  TBili  0.6  /  DBili  0.2  /  AST  25  /  ALT  19  /  AlkPhos  110  02-12        LIVER FUNCTIONS - ( 12 Feb 2024 06:32 )  Alb: 3.3 g/dL / Pro: 6.6 gm/dL / ALK PHOS: 110 U/L / ALT: 19 U/L / AST: 25 U/L / GGT: x             Urinalysis Basic - ( 13 Feb 2024 11:48 )    Color: x / Appearance: x / SG: x / pH: x  Gluc: 330 mg/dL / Ketone: x  / Bili: x / Urobili: x   Blood: x / Protein: x / Nitrite: x   Leuk Esterase: x / RBC: x / WBC x   Sq Epi: x / Non Sq Epi: x / Bacteria: x    Pro-Brain Natriuretic Peptide: 46859 pg/mL (02.15.24 @ 06:18)   Pro-Brain Natriuretic Peptide: 7548 pg/mL (02.10.24 @ 12:54)   Culture Results:   <10,000 CFU/mL Normal Urogenital Xiomara (02-10-24 @ 15:07)  Culture Results:   No growth at 48 Hours (02-10-24 @ 14:23)  Culture Results:   No growth at 48 Hours (02-10-24 @ 14:14)          e< from: Transthoracic Echocardiogram Follow Up (09.11.23 @ 08:36) >  Impression     Summary     Limited study to assess tricuspid insufficiency and pulmonary pressure.   Severe (4+) tricuspid valve regurgitation is present.   Severe pulmonary hypertension.   The left ventricle is normal in size, paradoxical septal motion   The interventricular septum appears flattened consistent with an RV   pressure/volume overload.   An apically displaced papillary muscle is noted.   Estimated left ventricular ejection fraction is 50-55%.   The right atrium appears severely dilated.   A device wire is seen in the RV and RA.   The right ventricle is moderately dilated with decreased contractility.     Signature     ----------------------------------------------------------------   Electronically signed by Venugopal Palla, MD(Interpreting   physician) on 09/11/2023 04:51 PM   ----------------------------------------------------------------    < end of copied text >    < from: Cardiac Catheterization (09.06.23 @ 11:59) >    Diagnostic Findings:     Coronary Angiography   The coronary circulation is right dominant.      LM   Left main artery: Angiography shows mild atherosclerosis.      LAD   Proximal left anterior descending: There is a 30 % stenosis.      CX   Distal circumflex: The segment is extremely small. There is a 70 %  stenosis.    RCA   Proximal right coronary artery: There is a 40 % stenosis.      < end of copied text >    Diagnostic Conclusions:     1. Mild diffuse atherosclerosis with moderate severe disease of small  caliber Cx in a nondominant territory.  2. Elevated right sided filling pressures in setting of severe TR with  normal left sided filling pressures. Adequate perfusion.  Recommendations:     1. Recommend aggressive medical management for CAD as per ACC/AHA  guidelines  2. Structural heart follow up at  for ongoing [facility] evaluation.        < from: Xray Chest 1 View- PORTABLE-Urgent (Xray Chest 1 View- PORTABLE-Urgent .) (02.13.24 @ 18:46) >  IMPRESSION:  Decreased congestive changes.    < end of copied text >    < from: CT Chest No Cont (02.10.24 @ 14:34) >  IMPRESSION:    Severe cardiomegaly with enlarging large bilateral pleural effusions.    Severe pulmonary arterial hypertension.      < end of copied text >

## 2024-02-15 NOTE — PROGRESS NOTE ADULT - ASSESSMENT
89 yo female with Hx. of Diastolic CHF EF 45%, CAD, HTN, Orthostatic hypotension, Atrial fib. on Xarelto, s/p PPM, COPD, Hypothyroidism, Gout , valvular heart disease, sever AS,  severe TR, severe pumonary HTN, presented in ED BIB form home for SOB. The patient  lives alone and has a private aid. She developed worsening SOB, She had iron infusion 5 days ago and was started on Macrobid for UTI  yesterday.     PROBLEMS:    AC hypoxaemic & hypercapnic respiratory failure  Acute excerbation of COPD  Acute CHF   Bilateral pleural effusion  Hypnatremia   Orthostatic hypotension  Atrial fib-s/p PPM  Non obs CAD    Hypothyroidism  Severe valvular heart disease  UTI     PLAN:    pulmonary stable  IV lasix  BIPAP PRN & TITRATE FIO2/VENOUS GAS  Taper IV SOLU-MEDROLS 20MG Qdaily  AEROSOLS/ NEB  Fluid restrictions  off abx  VTEP-on Xarelto

## 2024-02-15 NOTE — PROGRESS NOTE ADULT - SUBJECTIVE AND OBJECTIVE BOX
Subjective:    pat better, lying in bed, refusing bipap, 2lpm nc sat 94%, no respiratory distress.    Home Medications:  allopurinol 100 mg oral tablet: 1 tab(s) orally once a day (10 Feb 2024 14:48)  escitalopram 5 mg oral tablet: 1 tab(s) orally (10 Feb 2024 14:55)  levothyroxine 125 mcg (0.125 mg) oral tablet: 1 tab(s) orally once a day (10 Feb 2024 14:48)  metoprolol succinate 25 mg oral tablet, extended release: 1 tab(s) orally once a day (10 Feb 2024 14:55)  midodrine 5 mg oral tablet: 1 tab(s) orally 2 times a day (10 Feb 2024 14:56)  Multiple Vitamins oral tablet: 1 tab(s) orally once a day (10 Feb 2024 14:55)  nitrofurantoin macrocrystals 100 mg oral capsule: 1 cap(s) orally 2 times a day for 5 days ***course not complete*** (10 Feb 2024 14:55)  potassium chloride 20 mEq oral tablet, extended release: 1 tab(s) orally once a day (10 Feb 2024 14:55)  spironolactone 25 mg oral tablet: 1 tab(s) orally once a day (10 Feb 2024 14:48)  thiamine 100 mg oral tablet: 1 tab(s) orally once a day (10 Feb 2024 14:48)  torsemide 20 mg oral tablet: 1 tab(s) orally 2 times a day (10 Feb 2024 14:48)  Xarelto 10 mg oral tablet: 1 tab(s) orally once a day (10 Feb 2024 14:55)    MEDICATIONS  (STANDING):  acetaminophen     Tablet .. 975 milliGRAM(s) Oral every 8 hours  albuterol/ipratropium for Nebulization 3 milliLiter(s) Nebulizer every 6 hours  allopurinol 100 milliGRAM(s) Oral daily  atorvastatin 20 milliGRAM(s) Oral at bedtime  buDESOnide    Inhalation Suspension 0.5 milliGRAM(s) Inhalation two times a day  escitalopram 5 milliGRAM(s) Oral daily  folic acid 1 milliGRAM(s) Oral daily  levothyroxine 125 MICROGram(s) Oral daily  methylPREDNISolone sodium succinate Injectable 20 milliGRAM(s) IV Push every 12 hours  metoprolol succinate ER 25 milliGRAM(s) Oral at bedtime  midodrine. 2.5 milliGRAM(s) Oral three times a day  multivitamin 1 Tablet(s) Oral daily  polyethylene glycol 3350 17 Gram(s) Oral daily  senna 2 Tablet(s) Oral at bedtime  thiamine 100 milliGRAM(s) Oral daily    MEDICATIONS  (PRN):  aluminum hydroxide/magnesium hydroxide/simethicone Suspension 30 milliLiter(s) Oral every 4 hours PRN Dyspepsia  melatonin 3 milliGRAM(s) Oral at bedtime PRN Insomnia  ondansetron Injectable 4 milliGRAM(s) IV Push every 6 hours PRN Nausea and/or Vomiting  oxyCODONE    IR 2.5 milliGRAM(s) Oral every 8 hours PRN Severe Pain (7 - 10)  traMADol 25 milliGRAM(s) Oral every 12 hours PRN Mild Pain (1 - 3)      Allergies    codeine (Unknown)    Intolerances        Vital Signs Last 24 Hrs  T(C): 36.4 (15 Feb 2024 07:55), Max: 36.4 (15 Feb 2024 07:55)  T(F): 97.5 (15 Feb 2024 07:55), Max: 97.5 (15 Feb 2024 07:55)  HR: 92 (15 Feb 2024 14:06) (92 - 107)  BP: 92/61 (15 Feb 2024 14:06) (92/61 - 112/71)  BP(mean): --  RR: 18 (15 Feb 2024 07:55) (18 - 18)  SpO2: 94% (15 Feb 2024 07:55) (93% - 95%)    Parameters below as of 15 Feb 2024 13:35  Patient On (Oxygen Delivery Method): nasal cannula          PHYSICAL EXAMINATION:    NECK:  Supple. No lymphadenopathy. Jugular venous pressure not elevated. Carotids equal.   HEART:   The cardiac impulse has a normal quality. Reg., Nl S1 and S2.  There are no murmurs, rubs or gallops noted  CHEST:  Chest crackles to auscultation. Normal respiratory effort.  ABDOMEN:  Soft and nontender.   EXTREMITIES:  There is no edema.       LABS:                        9.9    6.91  )-----------( 161      ( 15 Feb 2024 06:18 )             30.7     02-15    127<L>  |  90<L>  |  75<H>  ----------------------------<  177<H>  5.2   |  32<H>  |  2.37<H>    Ca    8.9      15 Feb 2024 06:18  Phos  3.9     02-15  Mg     2.3     02-15    TPro  x   /  Alb  3.7  /  TBili  x   /  DBili  x   /  AST  x   /  ALT  x   /  AlkPhos  x   02-15      Urinalysis Basic - ( 15 Feb 2024 06:18 )    Color: x / Appearance: x / SG: x / pH: x  Gluc: 177 mg/dL / Ketone: x  / Bili: x / Urobili: x   Blood: x / Protein: x / Nitrite: x   Leuk Esterase: x / RBC: x / WBC x   Sq Epi: x / Non Sq Epi: x / Bacteria: x

## 2024-02-16 NOTE — PROGRESS NOTE ADULT - SUBJECTIVE AND OBJECTIVE BOX
Subjective:  Pt in bed NAD no issues overnight.  PTX resolved on CXR     T(C): 36.4 (02-16-24 @ 07:51), Max: 36.7 (02-15-24 @ 16:55)  HR: 100 (02-16-24 @ 07:51) (90 - 110)  BP: 100/69 (02-16-24 @ 07:51) (92/61 - 123/65)  ABP: --  ABP(mean): --  RR: 19 (02-16-24 @ 07:51) (19 - 19)  SpO2: 95% (02-16-24 @ 07:51) (91% - 96%) 2 L NC   Wt(kg): --  CVP(mm Hg): --  CO: --  CI: --  PA: --                                              Tele: afib             02-16    129<L>  |  91<L>  |  86<H>  ----------------------------<  181<H>  5.3   |  36<H>  |  2.46<H>    Ca    9.1      16 Feb 2024 05:46  Phos  3.9     02-15  Mg     2.3     02-15    TPro  x   /  Alb  3.7  /  TBili  x   /  DBili  x   /  AST  x   /  ALT  x   /  AlkPhos  x   02-15                               9.9    4.66  )-----------( 146      ( 16 Feb 2024 05:46 )             30.9                 CAPILLARY BLOOD GLUCOSE      POCT Blood Glucose.: 229 mg/dL (16 Feb 2024 00:11)           CXR: + resolving Rt PTX with mixture of compressive atelectasis and effusion     Physical exam  Gen NAD frail   Neuro AAOx3  Card irreg S1 S2   Pulm decreased bases b/l  Abd distended  Ext warm, edema            Assessment:  89yFemale    with PAST MEDICAL & SURGICAL HISTORY:  Mitral valve insufficiency      Aortic valve regurgitation      Osteopenia      CAD (coronary artery disease)      Gout      Hypothyroid      CHF (congestive heart failure)      Anxiety      Anemia      HTN (hypertension)      Afib      COPD (chronic obstructive pulmonary disease)      Chronic obstructive pulmonary disease (COPD)      HTN (hypertension)      Cardiac pacemaker      Gout      Hypothyroidism      Insomnia      Chronic CHF      History of ST elevation myocardial infarction (STEMI)      Status cardiac pacemaker      S/P knee replacement      No significant past surgical history            Plan:

## 2024-02-16 NOTE — PROGRESS NOTE ADULT - ASSESSMENT
Pt is a 89y old Female with hx of Diastolic CHF EF 45%, CAD, HTN, Orthostatic hypotension, Atrial fib. on Xarelto, s/p PPM, COPD, Hypothyroidism, Gout , valvular heart disease, sever AS,  severe TR, severe pumonary HTN, presented in ED BIB form home for SOB. The patient  lives alone and has a private aid. She developed worsening SOB, She had iron infusion 5 days ago and was started on Macrobid for UTI  yesterday.     1. Acute hypoxic and hypercapnic respiratory failure 2/2 CHF, severe valvular disease, pHTN, probable COPD excaerbation  -NIPPV nightly; on 5LNC currently  -Lasix gtt   -s/p attempted thora on 2/15 with resulting small stable PTX  -CXR daily  -monitor BMP closely  -IV Solumedrol  -strict I&O, daily weight, 1.2L FR  -cardiology following  -CHF team following  -Pulmicort and Duoneb nebs    2. Severe aortic stenosis  -was evaluated by Dr. Levin for TAVR; family opted for medical management  -discussed hospice with patient's daughter; not ready for hospice as of yet, continue current medical management      Process of Care   --Reviewed dx/treatment problems and alignment with Goals of Care       Physical Aspects of Care   --Pain   patient denies at this time   c/w current management     --Bowel Regimen   denies constipation   risk for constipation d/t immobility   daily dulcolax as needed     --Dyspnea   no dyspnea at rest  on 3LNC  NIPPV at nightly  lasix gtt  continue medical management for CHF/COPD exacerbations  comfortable and in NAD     --Nausea Vomiting   denies     --Weakness   PT as tolerated         Psychological and Psychiatric Aspects of Care:    --Grief/ Bereavement: emotional support provided   --Hx of psychiatric dx: none   --Pastoral Care Available PRN          Social Aspects of Care   --SW involved         Cultural Aspects   --Primary Language: English           Goals of Care:  Discussed with daughter 2/12. MOLST with DNR/DNI trial NIV. Not ready for comfort focus/ hospice at this time. Palliative will continue to follow as patient has poor prognosis and high risk of decompensation. For continued GOC discussion based on clinical condition.          We discussed Palliative Care team being a supportive team when a patient has ongoing illnesses.  We also discussed that it is not an end of life care service, but can help navigate symptoms and emotional support throughout their hospital stay here.           Ethical and Legal Aspects: NA           Capacity- A&Ox2-3, some short-term memory deficits; has simple decision making capacity but defers to her children         HCP/Surrogate: Citlalli Aponte, daughter [phone] & son Tutu [phone]          Code Status: DNR/DNI trial NIV    MOLST: MOLST with limitations of DNR/DNI trial NIV    Dispo Plan: home with prior aide services          Discussed With: Dr. Brooke coordinated with attending and SW and RN            Time Spent: 60 minutes including the care, coordination and counseling of this patient, 50% of which was spent coordinating and counseling.

## 2024-02-16 NOTE — CONSULT NOTE ADULT - PROVIDER SPECIALTY LIST ADULT
Infectious Disease
Heart Failure
Nephrology
Pulmonology
Critical Care
Thoracic Surgery
Palliative Care
Cardiology

## 2024-02-16 NOTE — CONSULT NOTE ADULT - CONSULT REQUESTED DATE/TIME
11-Feb-2024 20:30
10-Feb-2024 05:45
11-Feb-2024 12:22
12-Feb-2024 13:35
11-Feb-2024 11:57
11-Feb-2024 12:12
11-Feb-2024 10:06
16-Feb-2024 15:56

## 2024-02-16 NOTE — CONSULT NOTE ADULT - PROBLEM SELECTOR RECOMMENDATION 9
Volume overloaded on exam  - increase lasix to gtt 5 mg/hr  - continue toprol 25 mg   - consider resuming MRA when cr improves  - consider ARB/ARNI and SGLT2i when cr improved as BP allows   - Strict I & Os  - Daily standing weights Volume overloaded on exam  - increase lasix to gtt 5 mg/hr--> if diuresis of <1L in 24 hours, increase to 10mg/hr.  - continue toprol 25 mg   - consider additional GDMT when cr improved as BP allows   - Strict I & Os  - Daily standing weights

## 2024-02-16 NOTE — CONSULT NOTE ADULT - REASON FOR ADMISSION
CHF, pleural effusion,

## 2024-02-16 NOTE — PROGRESS NOTE ADULT - SUBJECTIVE AND OBJECTIVE BOX
Subjective:    pat s/p R thoro-dry tap, small PTX- resolved on fu CXR, pat lying in bed, 4lpm nc sat 94%.    Home Medications:  allopurinol 100 mg oral tablet: 1 tab(s) orally once a day (10 Feb 2024 14:48)  escitalopram 5 mg oral tablet: 1 tab(s) orally (10 Feb 2024 14:55)  levothyroxine 125 mcg (0.125 mg) oral tablet: 1 tab(s) orally once a day (10 Feb 2024 14:48)  metoprolol succinate 25 mg oral tablet, extended release: 1 tab(s) orally once a day (10 Feb 2024 14:55)  midodrine 5 mg oral tablet: 1 tab(s) orally 2 times a day (10 Feb 2024 14:56)  Multiple Vitamins oral tablet: 1 tab(s) orally once a day (10 Feb 2024 14:55)  nitrofurantoin macrocrystals 100 mg oral capsule: 1 cap(s) orally 2 times a day for 5 days ***course not complete*** (10 Feb 2024 14:55)  potassium chloride 20 mEq oral tablet, extended release: 1 tab(s) orally once a day (10 Feb 2024 14:55)  spironolactone 25 mg oral tablet: 1 tab(s) orally once a day (10 Feb 2024 14:48)  thiamine 100 mg oral tablet: 1 tab(s) orally once a day (10 Feb 2024 14:48)  torsemide 20 mg oral tablet: 1 tab(s) orally 2 times a day (10 Feb 2024 14:48)  Xarelto 10 mg oral tablet: 1 tab(s) orally once a day (10 Feb 2024 14:55)    MEDICATIONS  (STANDING):  acetaminophen     Tablet .. 975 milliGRAM(s) Oral every 8 hours  albuterol/ipratropium for Nebulization 3 milliLiter(s) Nebulizer every 6 hours  allopurinol 100 milliGRAM(s) Oral daily  atorvastatin 20 milliGRAM(s) Oral at bedtime  buDESOnide    Inhalation Suspension 0.5 milliGRAM(s) Inhalation two times a day  escitalopram 5 milliGRAM(s) Oral daily  folic acid 1 milliGRAM(s) Oral daily  furosemide Infusion 5 mG/Hr (2.5 mL/Hr) IV Continuous <Continuous>  levothyroxine 125 MICROGram(s) Oral daily  methylPREDNISolone sodium succinate Injectable 20 milliGRAM(s) IV Push daily  metoprolol succinate ER 25 milliGRAM(s) Oral at bedtime  midodrine. 2.5 milliGRAM(s) Oral three times a day  multivitamin 1 Tablet(s) Oral daily  polyethylene glycol 3350 17 Gram(s) Oral daily  senna 2 Tablet(s) Oral at bedtime  thiamine 100 milliGRAM(s) Oral daily    MEDICATIONS  (PRN):  aluminum hydroxide/magnesium hydroxide/simethicone Suspension 30 milliLiter(s) Oral every 4 hours PRN Dyspepsia  melatonin 3 milliGRAM(s) Oral at bedtime PRN Insomnia  ondansetron Injectable 4 milliGRAM(s) IV Push every 6 hours PRN Nausea and/or Vomiting  oxyCODONE    IR 2.5 milliGRAM(s) Oral every 8 hours PRN Severe Pain (7 - 10)  traMADol 25 milliGRAM(s) Oral every 12 hours PRN Mild Pain (1 - 3)      Allergies    codeine (Unknown)    Intolerances        Vital Signs Last 24 Hrs  T(C): 36.4 (16 Feb 2024 07:51), Max: 36.4 (15 Feb 2024 21:43)  T(F): 97.5 (16 Feb 2024 07:51), Max: 97.5 (15 Feb 2024 21:43)  HR: 90 (16 Feb 2024 13:39) (90 - 110)  BP: 92/67 (16 Feb 2024 13:47) (92/67 - 123/65)  BP(mean): --  RR: 18 (16 Feb 2024 13:47) (18 - 19)  SpO2: 95% (16 Feb 2024 13:47) (91% - 96%)    Parameters below as of 16 Feb 2024 13:47  Patient On (Oxygen Delivery Method): nasal cannula          PHYSICAL EXAMINATION:    NECK:  Supple. No lymphadenopathy. Jugular venous pressure not elevated. Carotids equal.   HEART:   The cardiac impulse has a normal quality. Reg., Nl S1 and S2.  There are no murmurs, rubs or gallops noted  CHEST:  Chest crackles to auscultation. Normal respiratory effort.  ABDOMEN:  Soft and nontender.   EXTREMITIES:  There is no edema.       LABS:                        9.9    4.66  )-----------( 146      ( 16 Feb 2024 05:46 )             30.9     02-16    129<L>  |  91<L>  |  86<H>  ----------------------------<  181<H>  5.3   |  36<H>  |  2.46<H>    Ca    9.1      16 Feb 2024 05:46  Phos  3.9     02-15  Mg     2.3     02-15    TPro  x   /  Alb  3.7  /  TBili  x   /  DBili  x   /  AST  x   /  ALT  x   /  AlkPhos  x   02-15      Urinalysis Basic - ( 16 Feb 2024 05:46 )    Color: x / Appearance: x / SG: x / pH: x  Gluc: 181 mg/dL / Ketone: x  / Bili: x / Urobili: x   Blood: x / Protein: x / Nitrite: x   Leuk Esterase: x / RBC: x / WBC x   Sq Epi: x / Non Sq Epi: x / Bacteria: x

## 2024-02-16 NOTE — PROGRESS NOTE ADULT - SUBJECTIVE AND OBJECTIVE BOX
NEPHROLOGY INTERVAL HPI/OVERNIGHT EVENTS:    Date of Service: 02-14-24 @ 10:24  2/16- OOB in chair looks comfortable, responsive her home aide at bedside  2/15- supine, sob, raised head of bed, more comfortable, discussed with HF team Ramón and Dr Sara jackson placed for retention, 500 ml  2/14--appears comfortable.  but c/o intermittent cough and SOB.  2/13- OOB sleeping arousable NAD, evaluated for CKD JOAQUÍN and heart failure  2/12--seen by Dr Hopper yesterday.  Alert,  SOB only slightly better.  Feeling weak.    HPI:   The patient is a 89 yo female with Hx. of Diastolic CHF EF 45%, CAD, HTN, Orthostatic hypotension, Atrial fib. on Xarelto, s/p PPM, COPD, Hypothyroidism, Gout , valvular heart disease, sever AS,  severe TR, severe pumonary HTN, presented in ED BIB form home for SOB. The patient  lives alone and has a private aid. She developed worsening SOB, She had iron infusion 5 days ago and was started on Macrobid for UTI  yesterday.   renal eval for acute hyponatremia in setting of acute CHF/pulm HTN and bilateral plueral effusions    MEDICATIONS  (STANDING):  acetaminophen     Tablet .. 975 milliGRAM(s) Oral every 8 hours  albuterol/ipratropium for Nebulization 3 milliLiter(s) Nebulizer every 6 hours  allopurinol 100 milliGRAM(s) Oral daily  atorvastatin 20 milliGRAM(s) Oral at bedtime  buDESOnide    Inhalation Suspension 0.5 milliGRAM(s) Inhalation two times a day  escitalopram 5 milliGRAM(s) Oral daily  folic acid 1 milliGRAM(s) Oral daily  furosemide   Injectable 40 milliGRAM(s) IV Push once  levothyroxine 125 MICROGram(s) Oral daily  methylPREDNISolone sodium succinate Injectable 20 milliGRAM(s) IV Push daily  metoprolol succinate ER 25 milliGRAM(s) Oral at bedtime  midodrine. 2.5 milliGRAM(s) Oral three times a day  multivitamin 1 Tablet(s) Oral daily  polyethylene glycol 3350 17 Gram(s) Oral daily  rivaroxaban 10 milliGRAM(s) Oral with dinner  senna 2 Tablet(s) Oral at bedtime  thiamine 100 milliGRAM(s) Oral daily    MEDICATIONS  (PRN):  aluminum hydroxide/magnesium hydroxide/simethicone Suspension 30 milliLiter(s) Oral every 4 hours PRN Dyspepsia  melatonin 3 milliGRAM(s) Oral at bedtime PRN Insomnia  ondansetron Injectable 4 milliGRAM(s) IV Push every 6 hours PRN Nausea and/or Vomiting  oxyCODONE    IR 2.5 milliGRAM(s) Oral every 8 hours PRN Severe Pain (7 - 10)  traMADol 25 milliGRAM(s) Oral every 12 hours PRN Mild Pain (1 - 3)    Vital Signs Last 24 Hrs  T(C): 36.4 (16 Feb 2024 07:51), Max: 36.7 (15 Feb 2024 16:55)  T(F): 97.5 (16 Feb 2024 07:51), Max: 98 (15 Feb 2024 16:55)  HR: 100 (16 Feb 2024 07:51) (90 - 110)  BP: 92/67 (16 Feb 2024 13:47) (92/61 - 123/65)  BP(mean): --  RR: 18 (16 Feb 2024 13:47) (18 - 19)  SpO2: 95% (16 Feb 2024 13:47) (91% - 96%)    Parameters below as of 16 Feb 2024 13:47  Patient On (Oxygen Delivery Method): nasal cannula        PHYSICAL EXAM:  GENERAL: comfortable.  CHEST/LUNG: scattered rhonchi  HEART: S1S2 RRR  ABDOMEN: soft  EXTREMITIES: NO LE edema  SKIN:     LABS:                          9.9    4.66  )-----------( 146      ( 16 Feb 2024 05:46 )             30.9                             9.9    6.91  )-----------( 161      ( 15 Feb 2024 06:18 )             30.7                           9.8    6.51  )-----------( 160      ( 14 Feb 2024 06:03 )             30.3     02-16    129<L>  |  91<L>  |  86<H>  ----------------------------<  181<H>  5.3   |  36<H>  |  2.46<H>    Ca    9.1      16 Feb 2024 05:46  Phos  3.9     02-15  Mg     2.3     02-15    TPro  x   /  Alb  3.7  /  TBili  x   /  DBili  x   /  AST  x   /  ALT  x   /  AlkPhos  x   02-15      02-15    127<L>  |  90<L>  |  75<H>  ----------------------------<  177<H>  5.2   |  32<H>  |  2.37<H>    Ca    8.9      15 Feb 2024 06:18  Phos  3.9     02-15  Mg     2.3     02-15    TPro  x   /  Alb  3.7  /  TBili  x   /  DBili  x   /  AST  x   /  ALT  x   /  AlkPhos  x   02-15      02-14    127<L>  |  91<L>  |  61<H>  ----------------------------<  167<H>  5.7<H>   |  33<H>  |  2.19<H>    Ca    9.1      14 Feb 2024 06:03  Phos  3.8     02-14  Mg     2.2     02-14        Urinalysis Basic - ( 14 Feb 2024 06:03 )    Color: x / Appearance: x / SG: x / pH: x  Gluc: 167 mg/dL / Ketone: x  / Bili: x / Urobili: x   Blood: x / Protein: x / Nitrite: x   Leuk Esterase: x / RBC: x / WBC x   Sq Epi: x / Non Sq Epi: x / Bacteria: x      Magnesium: 2.2 mg/dL (02-14 @ 06:03)  Phosphorus: 3.8 mg/dL (02-14 @ 06:03)          RADIOLOGY & ADDITIONAL TESTS:

## 2024-02-16 NOTE — PROGRESS NOTE ADULT - PROBLEM SELECTOR PLAN 1
Pt presenting with SOB likely multifactorial 2/2 pleural effusions, CHF exacerbation, elevated BNP in setting known valvular disease severe AS, severe TR, severe pHTN, pleural effusions, compressive atelectasis, COPD  Thoracentesis attempted 2/15/24, s/p small apical PTX .    Pt still with LE edema.  Will give lasix 40mg IV x 1 again today.  D/W renal and HF team  F/U Echo reveals mild LVH, Mildly reduced systolic function. EF 45-50%. Septal flattening is seen; c/w right heart   pressure / volume overload.  Mild MR, Moderate to severe AS, Severe TR, Probable severe pulmonary hypertension.  D/W with OSITO Melgar. Pt may benefit from lasix gtt

## 2024-02-16 NOTE — CONSULT NOTE ADULT - PROBLEM SELECTOR RECOMMENDATION 2
Previously evaluated by Dr Levin for structural intervention/JAYNA- pt and family had opted for medical management.
- Mod-Sev AS on echo  - will consult structrual team

## 2024-02-16 NOTE — PROGRESS NOTE ADULT - SUBJECTIVE AND OBJECTIVE BOX
HPI: Pt is a 89y old Female with hx of Diastolic CHF EF 45%, CAD, HTN, Orthostatic hypotension, Atrial fib. on Xarelto, s/p PPM, COPD, Hypothyroidism, Gout , valvular heart disease, sever AS,  severe TR, severe pumonary HTN, presented in ED BIB form home for SOB. The patient  lives alone and has a private aid. She developed worsening SOB, She had iron infusion 5 days ago and was started on Macrobid for UTI  yesterday.  Palliative consulted for GOC.    : Seen and examined at bedside. Awake, alert, oriented x3 with notable short-term memory loss. Denies pain, SOB, on 5LNC in NAD. Patient defers to her children for any medical decision making. Spoke with patient's daughter, Citlalli, see GOC discussion below.  : Drowsy today, reported not sleeping well overnight. A&Ox3, forgetful. On 5LNC, mildly dyspneic at rest. Denies pain.  2/15: Drowsy, arousable to voice. Remains on supplemental O2, no dyspnea at rest. Worsening JOAQUÍN- diuretics on hold. Pending thoracentesis today.  : Drowsy this AM, very tired, not sleeping well overnight. Had attempted thora yesterday afternoon with resulting small PTX but repeat CXR with resolving PTX. Remains on 5LNC, breathing appears slightly more comfortable today.       CODE STATUS: DNR/DNI trial NIV      Pain and Dyspnea:     denies pain  denies dyspnea  on 5LNC in NAD      Review of Systems:    Anxiety- denies  Depression- denies  Physical Discomfort- denies  Dyspnea- denies currently  Constipation- denies  Diarrhea- denies  Nausea- denies  Vomiting- denies  Anorexia- ++  Weight Loss-   Cough- ++  Secretions- denies  Fatigue- ++  Weakness- ++  Delirium- denies    All other systems reviewed and negative  Unable to obtain/Limited due to:      PHYSICAL EXAM:    Vital Signs Last 24 Hrs  T(C): 36.4 (2024 07:51), Max: 36.4 (15 Feb 2024 21:43)  T(F): 97.5 (2024 07:51), Max: 97.5 (15 Feb 2024 21:43)  HR: 123 (2024 18:13) (90 - 123)  BP: 108/68 (2024 18:13) (92/67 - 108/68)  BP(mean): --  RR: 18 (2024 18:13) (18 - 19)  SpO2: 93% (2024 18:13) (91% - 96%)    Parameters below as of 2024 18:13  Patient On (Oxygen Delivery Method): nasal cannula      Daily     Daily Weight in k.6 (2024 06:13)    PPSV2:  20 %  FAST:      General: drowsy, pleasant, chronically-ill appearing  HEENT: on 5LNC  Lungs: Diminished to anterior lung fields  Cardiac: Irregular rate and rhythm  GI: Abdomen soft, nontender, nondistended. +BSx4  : Espinoza draining clear yellow urine  Ext: +PVD bilateral lower extremities. no edema  Neuro: A&Ox2. Speech intact. No focal neuro deficits.        LABS and RADIOLOGY: REVIEWED

## 2024-02-16 NOTE — CONSULT NOTE ADULT - PROBLEM SELECTOR RECOMMENDATION 3
Recent cath as above: Mild diffuse atherosclerosis with moderate severe disease of small  caliber Cx in a nondominant territory.  cont med management- metoprolol, unclear why not on statin - will need to clarify if she did not previously tolerate
on toprol  on xarelto

## 2024-02-16 NOTE — PROGRESS NOTE ADULT - ASSESSMENT
89 year old female PMHx of Diastolic CHF EF 45%, CAD, sever AS,  severe TR, severe pulmonary HTN, AFIB on Xarelto ,COPD, Orthostatic hypotension, recent pneumonia who was    admitted with SOB found to have bilateral pleural effusions.       Plan   Attempted thoracentesis s/p small apical PTX  CXR this am with resolving PTX   CXR in am    Increased creatinine from lasix   resume Xarelto today ? Med team will determine   Will hold off on any intervention today   pt stable   care as per medical teams  DW Thoracic team in am rounds  89 year old female PMHx of Diastolic CHF EF 45%, CAD, sever AS,  severe TR, severe pulmonary HTN, AFIB on Xarelto ,COPD, Orthostatic hypotension, recent pneumonia who was    admitted with SOB found to have bilateral pleural effusions.       Plan   Attempted thoracentesis s/p small apical PTX  CXR this am with resolving PTX   CXR in am    Increased creatinine from lasix   resume Xarelto today ? Med team will determine   Will hold off on any intervention today   pt stable   Will sign off - please reconsult if needed   care as per medical teams  COLEEN Thoracic team in am rounds

## 2024-02-16 NOTE — PROGRESS NOTE ADULT - SUBJECTIVE AND OBJECTIVE BOX
CHIEF COMPLAINT: SOB      HPI:  Ms. Brennan is a 89 yo female with a hx HFpEF (LVEF 50-55% based on 9/2023 TTE, severe TR, severe AS, severe pulmonary hypertension, atrial fibrillation on xarelto s/p PPM, COPD, hypothyroidism, hypertension presenting with several days of worsening SOB. Of note, was started on macrobid for UTI on day prior to admission. Reports adherence with medication. Lives alone, with health aides. Denies chest pain, fevers, chills, cough.     Pt was most recently admitted to  in 9/2023. At the time, she was evaluated for TAVR. Underwent RHC/LHC showing Mild diffuse atherosclerosis with moderate severe disease of small caliber Cx in a nondominant territory; Elevated right sided filling pressures in setting of severe TR with normal left sided filling pressures. Adequate perfusion. No intervention performed.     Outpatient cardiologist: Dr. Harris      In the ED, she was tachycardic to 107, normotensive, saturating 93%.   Labs notable for hgb 10, Na 123, Hs trop neg x1, proBNP 7548, COVID and RVP neg  CT chest: Severe cardiomegaly with enlarging large bilateral pleural effusions.  Severe pulmonary arterial hypertension.    Cardiology consulted for decompensated HF.   2/12/24: Awake alert comfortable, tel AF 90's  2/13/24 Patient is comfortable ,  HR controlled   2/14/24: Awake alert semirecumbent in bed tele AF HR controlled   2/15/24: Pt sleeping comfortably, NAD.  Thoracentesis planned for today. Tele: Afib rate controlled  2/16/24: Pt sleeping but arouses when name called.  Denies cp, + SOB.  Tele: Afib with V pacing, PVC's      MEDICATIONS  (STANDING):  acetaminophen     Tablet .. 975 milliGRAM(s) Oral every 8 hours  albuterol/ipratropium for Nebulization 3 milliLiter(s) Nebulizer every 6 hours  allopurinol 100 milliGRAM(s) Oral daily  atorvastatin 20 milliGRAM(s) Oral at bedtime  buDESOnide    Inhalation Suspension 0.5 milliGRAM(s) Inhalation two times a day  escitalopram 5 milliGRAM(s) Oral daily  folic acid 1 milliGRAM(s) Oral daily  furosemide   Injectable 40 milliGRAM(s) IV Push once  levothyroxine 125 MICROGram(s) Oral daily  methylPREDNISolone sodium succinate Injectable 20 milliGRAM(s) IV Push daily  metoprolol succinate ER 25 milliGRAM(s) Oral at bedtime  midodrine. 2.5 milliGRAM(s) Oral three times a day  multivitamin 1 Tablet(s) Oral daily  polyethylene glycol 3350 17 Gram(s) Oral daily  senna 2 Tablet(s) Oral at bedtime  thiamine 100 milliGRAM(s) Oral daily    MEDICATIONS  (PRN):  aluminum hydroxide/magnesium hydroxide/simethicone Suspension 30 milliLiter(s) Oral every 4 hours PRN Dyspepsia  melatonin 3 milliGRAM(s) Oral at bedtime PRN Insomnia  ondansetron Injectable 4 milliGRAM(s) IV Push every 6 hours PRN Nausea and/or Vomiting  oxyCODONE    IR 2.5 milliGRAM(s) Oral every 8 hours PRN Severe Pain (7 - 10)  traMADol 25 milliGRAM(s) Oral every 12 hours PRN Mild Pain (1 - 3)    Vital Signs Last 24 Hrs  T(C): 36.4 (16 Feb 2024 07:51), Max: 36.7 (15 Feb 2024 16:55)  T(F): 97.5 (16 Feb 2024 07:51), Max: 98 (15 Feb 2024 16:55)  HR: 100 (16 Feb 2024 07:51) (90 - 110)  BP: 92/67 (16 Feb 2024 13:47) (92/67 - 123/65)  BP(mean): --  RR: 18 (16 Feb 2024 13:47) (18 - 19)  SpO2: 95% (16 Feb 2024 13:47) (91% - 96%)    Parameters below as of 16 Feb 2024 13:47  Patient On (Oxygen Delivery Method): nasal cannula      I&O's Summary        PHYSICAL EXAM:  Constitutional: NAD, awake and frail   HEENT:  EOMI,  Pupils round, No oral cyanosis.  Pulmonary: Non-labored, diminished at bases bilaterally  Cardiovascular: irregular, + systolic murmur   Gastrointestinal: Abd soft, nontender.   Lymph: bilateral LE edema noted 1+  Neurological: Alert and oriented x2, no focal deficits  Psych:  Mood & affect appropriate    LABS:                          9.9    4.66  )-----------( 146      ( 16 Feb 2024 05:46 )             30.9                             9.9    6.91  )-----------( 161      ( 15 Feb 2024 06:18 )             30.7                                     9.9    3.29  )-----------( 170      ( 12 Feb 2024 06:32 )             30.8     02-16    129<L>  |  91<L>  |  86<H>  ----------------------------<  181<H>  5.3   |  36<H>  |  2.46<H>    Ca    9.1      16 Feb 2024 05:46  Phos  3.9     02-15  Mg     2.3     02-15    TPro  x   /  Alb  3.7  /  TBili  x   /  DBili  x   /  AST  x   /  ALT  x   /  AlkPhos  x   02-15      02-15    127<L>  |  90<L>  |  75<H>  ----------------------------<  177<H>  5.2   |  32<H>  |  2.37<H>    Ca    8.9      15 Feb 2024 06:18  Phos  3.9     02-15  Mg     2.3     02-15    TPro  x   /  Alb  3.7  /  TBili  x   /  DBili  x   /  AST  x   /  ALT  x   /  AlkPhos  x   02-15      02-13    x   |  x   |  x   ----------------------------<  330<H>  x    |  x   |  x     Ca    8.7      13 Feb 2024 06:34  Phos  3.0     02-12  Mg     2.1     02-12    TPro  6.6  /  Alb  3.3  /  TBili  0.6  /  DBili  0.2  /  AST  25  /  ALT  19  /  AlkPhos  110  02-12        LIVER FUNCTIONS - ( 12 Feb 2024 06:32 )  Alb: 3.3 g/dL / Pro: 6.6 gm/dL / ALK PHOS: 110 U/L / ALT: 19 U/L / AST: 25 U/L / GGT: x             Urinalysis Basic - ( 13 Feb 2024 11:48 )    Color: x / Appearance: x / SG: x / pH: x  Gluc: 330 mg/dL / Ketone: x  / Bili: x / Urobili: x   Blood: x / Protein: x / Nitrite: x   Leuk Esterase: x / RBC: x / WBC x   Sq Epi: x / Non Sq Epi: x / Bacteria: x    Pro-Brain Natriuretic Peptide: 28791 pg/mL (02.15.24 @ 06:18)   Pro-Brain Natriuretic Peptide: 7548 pg/mL (02.10.24 @ 12:54)   Culture Results:   <10,000 CFU/mL Normal Urogenital Xiomara (02-10-24 @ 15:07)  Culture Results:   No growth at 48 Hours (02-10-24 @ 14:23)  Culture Results:   No growth at 48 Hours (02-10-24 @ 14:14)          e< from: Transthoracic Echocardiogram Follow Up (09.11.23 @ 08:36) >  Impression     Summary     Limited study to assess tricuspid insufficiency and pulmonary pressure.   Severe (4+) tricuspid valve regurgitation is present.   Severe pulmonary hypertension.   The left ventricle is normal in size, paradoxical septal motion   The interventricular septum appears flattened consistent with an RV   pressure/volume overload.   An apically displaced papillary muscle is noted.   Estimated left ventricular ejection fraction is 50-55%.   The right atrium appears severely dilated.   A device wire is seen in the RV and RA.   The right ventricle is moderately dilated with decreased contractility.     Signature     ----------------------------------------------------------------   Electronically signed by Venugopal Palla, MD(Interpreting   physician) on 09/11/2023 04:51 PM   ----------------------------------------------------------------    < end of copied text >    < from: Cardiac Catheterization (09.06.23 @ 11:59) >    Diagnostic Findings:     Coronary Angiography   The coronary circulation is right dominant.      LM   Left main artery: Angiography shows mild atherosclerosis.      LAD   Proximal left anterior descending: There is a 30 % stenosis.      CX   Distal circumflex: The segment is extremely small. There is a 70 %  stenosis.    RCA   Proximal right coronary artery: There is a 40 % stenosis.      < end of copied text >    Diagnostic Conclusions:     1. Mild diffuse atherosclerosis with moderate severe disease of small  caliber Cx in a nondominant territory.  2. Elevated right sided filling pressures in setting of severe TR with  normal left sided filling pressures. Adequate perfusion.  Recommendations:     1. Recommend aggressive medical management for CAD as per ACC/AHA  guidelines  2. Structural heart follow up at  for ongoing JAYNA evaluation.        < from: Xray Chest 1 View- PORTABLE-Urgent (Xray Chest 1 View- PORTABLE-Urgent .) (02.13.24 @ 18:46) >  IMPRESSION:  Decreased congestive changes.    < end of copied text >    < from: CT Chest No Cont (02.10.24 @ 14:34) >  IMPRESSION:    Severe cardiomegaly with enlarging large bilateral pleural effusions.    Severe pulmonary arterial hypertension.      < end of copied text >    < from: Xray Chest 1 View-PORTABLE IMMEDIATE (Xray Chest 1 View-PORTABLE IMMEDIATE .) (02.16.24 @ 03:16) >  FINDINGS:    2/15/2024 at 7:15 AM: The cardiac silhouette is enlarged, unchanged.   There is a left-sided single lead pacemaker, unchanged. There are   bilateral pleural effusions and mild pulmonary vascular congestion, not   significant changed. No focal consolidation is seen. Surgical clips   overlie the left axilla. There is bilateral glenohumeral arthrosis. No   pneumothorax is seen.    2/15/2024 at 5:14 PM: The patient's chin obscures the bilateral lung   apices. Otherwise, there has been no significant interval change.    12/16/2024 at 2:48 AM: No significant interval change.    IMPRESSION: Stable cardiomegaly. Bilateral pleural effusions and mild   pulmonary edema, not significantly changed. No pneumothorax is seen.    < end of copied text >    < from: TTE Echo Complete w/o Contrast w/ Doppler (02.15.24 @ 09:19) >   Impression     Summary     Mild concentric left ventricular hypertrophy is present.   Mildly reduced systolic function. Estimated LVEF 45-50%.   Septal flattening is seen; this finding is consistent with right heart   pressure / volume overload.   The right ventricle is severely dilated with reduced function.   The right atrium appears severely dilated.   The left atrium is mildly dilated.   Mild mitral regurgitation is present.   The aortic valve appears severely calcified. Valve opening seems to be   restricted.   Moderate to severe aortic stenosis.   Severe tricuspid valve regurgitation is present.   Probable severe pulmonary hypertension.   Trace pulmonic valvular regurgitation is present.      < end of copied text >

## 2024-02-16 NOTE — CONSULT NOTE ADULT - SUBJECTIVE AND OBJECTIVE BOX
The patient is a 89 yo female HFrEF (EF 45%, LVEDd 3.6) with a Hx CAD, HTN, Orthostatic hypotension, Atrial fib. on Xarelto, s/p PPM, COPD, Hypothyroidism, Gout , valvular heart disease, severe AS,  severe TR, severe pumonary HTN, presented to  ED on 2/10 for worsening dyspnea.     She was admitted in september of last year for ADHF with severe AS, improved after IV diuresis.  She was instructed to follow up with structural at [facility], but was never discharged form rehab.  Per patients aide, she was recently discharged from [facility] and shortly after returning home began experiencing worsening dyspnea.  Aide is uncertain of any weight gain. Found to have pleural effusions on imaging she was started on Lasix IV pushes intially 20 mg and up to 40 mg with limited output and rising cr.  Diuretic was held for 2 days and patients breathing worsened with rising cr and BNP.  She denies syncope, lightheadedness, dizziness, chest pain, palpitations, PND, orthopnea, nausea, vomiting, LE edema       PAST MEDICAL & SURGICAL HISTORY:  Mitral valve insufficiency  Aortic valve regurgitation  Osteopenia  CAD (coronary artery disease)  Gout  Hypothyroid  CHF (congestive heart failure)  Anxiety  Anemia  HTN (hypertension)  Afib  COPD (chronic obstructive pulmonary disease)  Chronic obstructive pulmonary disease (COPD)  HTN (hypertension)  Cardiac pacemaker  Gout  Hypothyroidism  Insomnia  Chronic CHF  History of ST elevation myocardial infarction (STEMI)    Status cardiac pacemaker  S/P knee replacement        REVIEW OF SYSTEMS:  14 point ROS negative in detail apart from as documented in HPI.    MEDICATIONS  (STANDING):  acetaminophen     Tablet .. 975 milliGRAM(s) Oral every 8 hours  albuterol/ipratropium for Nebulization 3 milliLiter(s) Nebulizer every 6 hours  allopurinol 100 milliGRAM(s) Oral daily  atorvastatin 20 milliGRAM(s) Oral at bedtime  buDESOnide    Inhalation Suspension 0.5 milliGRAM(s) Inhalation two times a day  escitalopram 5 milliGRAM(s) Oral daily  folic acid 1 milliGRAM(s) Oral daily  levothyroxine 125 MICROGram(s) Oral daily  methylPREDNISolone sodium succinate Injectable 20 milliGRAM(s) IV Push daily  metoprolol succinate ER 25 milliGRAM(s) Oral at bedtime  midodrine. 2.5 milliGRAM(s) Oral three times a day  multivitamin 1 Tablet(s) Oral daily  polyethylene glycol 3350 17 Gram(s) Oral daily  senna 2 Tablet(s) Oral at bedtime  thiamine 100 milliGRAM(s) Oral daily    MEDICATIONS  (PRN):  aluminum hydroxide/magnesium hydroxide/simethicone Suspension 30 milliLiter(s) Oral every 4 hours PRN Dyspepsia  melatonin 3 milliGRAM(s) Oral at bedtime PRN Insomnia  ondansetron Injectable 4 milliGRAM(s) IV Push every 6 hours PRN Nausea and/or Vomiting  oxyCODONE    IR 2.5 milliGRAM(s) Oral every 8 hours PRN Severe Pain (7 - 10)  traMADol 25 milliGRAM(s) Oral every 12 hours PRN Mild Pain (1 - 3)    Home Medications:  allopurinol 100 mg oral tablet: 1 tab(s) orally once a day (10 Feb 2024 14:48)  escitalopram 5 mg oral tablet: 1 tab(s) orally (10 Feb 2024 14:55)  levothyroxine 125 mcg (0.125 mg) oral tablet: 1 tab(s) orally once a day (10 Feb 2024 14:48)  metoprolol succinate 25 mg oral tablet, extended release: 1 tab(s) orally once a day (10 Feb 2024 14:55)  midodrine 5 mg oral tablet: 1 tab(s) orally 2 times a day (10 Feb 2024 14:56)  Multiple Vitamins oral tablet: 1 tab(s) orally once a day (10 Feb 2024 14:55)  nitrofurantoin macrocrystals 100 mg oral capsule: 1 cap(s) orally 2 times a day for 5 days ***course not complete*** (10 Feb 2024 14:55)  potassium chloride 20 mEq oral tablet, extended release: 1 tab(s) orally once a day (10 Feb 2024 14:55)  spironolactone 25 mg oral tablet: 1 tab(s) orally once a day (10 Feb 2024 14:48)  thiamine 100 mg oral tablet: 1 tab(s) orally once a day (10 Feb 2024 14:48)  torsemide 20 mg oral tablet: 1 tab(s) orally 2 times a day (10 Feb 2024 14:48)  Xarelto 10 mg oral tablet: 1 tab(s) orally once a day (10 Feb 2024 14:55)    Allergies    codeine (Unknown)    Intolerances      Social History:    FAMILY HISTORY:  No pertinent family history in first degree relatives    No pertinent family history in first degree relatives        ICU Vital Signs Last 24 Hrs  T(C): 36.4, Max: 36.7 (02-15 @ 16:55)  HR: 90 (90 - 110)  BP: 92/67 (92/67 - 123/65)  BP(mean): --  ABP: --  ABP(mean): --  RR: 18 (18 - 19)  SpO2: 95% (91% - 96%)    Weight in k.6 (24)  Weight in k (02-15-24)      I&O's Summary Last 24 Hrs    IN: 0 mL / OUT: 600 mL / NET: -600 mL      Tele: AF 90s     Physical Exam:    General: No distress. Comfortable.  HEENT: EOM intact.  Neck: Neck supple. JVP moderately elevated. No masses  Chest: Clear to auscultation bilaterally  CV: Normal S1 and S2. Systolic  murmur present, No rub or gallops. Radial pulses normal.  Abdomen: Soft, non-distended, non-tender  Skin: No rashes or skin breakdown  Extremities:  Warm, no edema   Neurology: Alert and oriented times three. Sensation intact  Psych: Affect normal    Labs:    ( 24 @ 05:46 )               9.9    4.66  )--------( 146                  30.9     ( 24 @ 05:46 )     129  |  91  |  86  ---------------------<  181  5.3  |  36  |  2.46    Ca 9.1  Phos x   Mg x     ( 02-15-24 @ 06:18 )  TPro  x   /  Alb  3.7  /  TBili  x   /  DBili  x   /  AST  x   /  ALT  x   /  AlkPhos  x                    The patient is a 89 yo female HFrEF (EF 45%, LVEDd 3.6) with a Hx CAD, HTN, Orthostatic hypotension, Atrial fib. on Xarelto, s/p PPM, COPD, Hypothyroidism, Gout , valvular heart disease, severe AS,  severe TR, severe pumonary HTN, presented to  ED on 2/10 for worsening dyspnea.     She was admitted in 2023 for ADHF with severe AS, improved after IV diuresis.  She was instructed to follow up with structural at [facility], but was never discharged from rehab.  Per patients aide, she was recently discharged from [facility] and shortly after returning home began experiencing worsening dyspnea.  Aide is uncertain of any weight gain. Found to have pleural effusions on imaging she was started on Lasix IV pushes intially 20 mg and up to 40 mg with limited output and rising cr.  Diuretic was held for 2 days and patients breathing worsened with rising cr and BNP.  She denies syncope, lightheadedness, dizziness, chest pain, palpitations, PND, orthopnea, nausea, vomiting, LE edema       PAST MEDICAL & SURGICAL HISTORY:  Mitral valve insufficiency  Aortic valve regurgitation  Osteopenia  CAD (coronary artery disease)  Gout  Hypothyroid  CHF (congestive heart failure)  Anxiety  Anemia  HTN (hypertension)  Afib  COPD (chronic obstructive pulmonary disease)  Chronic obstructive pulmonary disease (COPD)  HTN (hypertension)  Cardiac pacemaker  Gout  Hypothyroidism  Insomnia  Chronic CHF  History of ST elevation myocardial infarction (STEMI)    Status cardiac pacemaker  S/P knee replacement        REVIEW OF SYSTEMS:  14 point ROS negative in detail apart from as documented in HPI.    MEDICATIONS  (STANDING):  acetaminophen     Tablet .. 975 milliGRAM(s) Oral every 8 hours  albuterol/ipratropium for Nebulization 3 milliLiter(s) Nebulizer every 6 hours  allopurinol 100 milliGRAM(s) Oral daily  atorvastatin 20 milliGRAM(s) Oral at bedtime  buDESOnide    Inhalation Suspension 0.5 milliGRAM(s) Inhalation two times a day  escitalopram 5 milliGRAM(s) Oral daily  folic acid 1 milliGRAM(s) Oral daily  levothyroxine 125 MICROGram(s) Oral daily  methylPREDNISolone sodium succinate Injectable 20 milliGRAM(s) IV Push daily  metoprolol succinate ER 25 milliGRAM(s) Oral at bedtime  midodrine. 2.5 milliGRAM(s) Oral three times a day  multivitamin 1 Tablet(s) Oral daily  polyethylene glycol 3350 17 Gram(s) Oral daily  senna 2 Tablet(s) Oral at bedtime  thiamine 100 milliGRAM(s) Oral daily    MEDICATIONS  (PRN):  aluminum hydroxide/magnesium hydroxide/simethicone Suspension 30 milliLiter(s) Oral every 4 hours PRN Dyspepsia  melatonin 3 milliGRAM(s) Oral at bedtime PRN Insomnia  ondansetron Injectable 4 milliGRAM(s) IV Push every 6 hours PRN Nausea and/or Vomiting  oxyCODONE    IR 2.5 milliGRAM(s) Oral every 8 hours PRN Severe Pain (7 - 10)  traMADol 25 milliGRAM(s) Oral every 12 hours PRN Mild Pain (1 - 3)    Home Medications:  allopurinol 100 mg oral tablet: 1 tab(s) orally once a day (10 Feb 2024 14:48)  escitalopram 5 mg oral tablet: 1 tab(s) orally (10 Feb 2024 14:55)  levothyroxine 125 mcg (0.125 mg) oral tablet: 1 tab(s) orally once a day (10 Feb 2024 14:48)  metoprolol succinate 25 mg oral tablet, extended release: 1 tab(s) orally once a day (10 Feb 2024 14:55)  midodrine 5 mg oral tablet: 1 tab(s) orally 2 times a day (10 Feb 2024 14:56)  Multiple Vitamins oral tablet: 1 tab(s) orally once a day (10 Feb 2024 14:55)  nitrofurantoin macrocrystals 100 mg oral capsule: 1 cap(s) orally 2 times a day for 5 days ***course not complete*** (10 Feb 2024 14:55)  potassium chloride 20 mEq oral tablet, extended release: 1 tab(s) orally once a day (10 Feb 2024 14:55)  spironolactone 25 mg oral tablet: 1 tab(s) orally once a day (10 Feb 2024 14:48)  thiamine 100 mg oral tablet: 1 tab(s) orally once a day (10 Feb 2024 14:48)  torsemide 20 mg oral tablet: 1 tab(s) orally 2 times a day (10 Feb 2024 14:48)  Xarelto 10 mg oral tablet: 1 tab(s) orally once a day (10 Feb 2024 14:55)    Allergies    codeine (Unknown)    Intolerances      Social History:    FAMILY HISTORY:  No pertinent family history in first degree relatives    No pertinent family history in first degree relatives        ICU Vital Signs Last 24 Hrs  T(C): 36.4, Max: 36.7 (02-15 @ 16:55)  HR: 90 (90 - 110)  BP: 92/67 (92/67 - 123/65)  BP(mean): --  ABP: --  ABP(mean): --  RR: 18 (18 - 19)  SpO2: 95% (91% - 96%)    Weight in k.6 (24)  Weight in k (02-15-24)      I&O's Summary Last 24 Hrs    IN: 0 mL / OUT: 600 mL / NET: -600 mL      Tele: AF 90s     Physical Exam:    General: No distress. Comfortable.  HEENT: EOM intact.  Neck: Neck supple. JVP moderately elevated. No masses  Chest: Clear to auscultation bilaterally  CV: Normal S1 and S2. Systolic  murmur present, No rub or gallops. Radial pulses normal.  Abdomen: Soft, non-distended, non-tender  Skin: No rashes or skin breakdown  Extremities:  Warm, no edema   Neurology: Alert and oriented times three. Sensation intact  Psych: Affect normal    Labs:    ( 24 @ 05:46 )               9.9    4.66  )--------( 146                  30.9     ( 24 @ 05:46 )     129  |  91  |  86  ---------------------<  181  5.3  |  36  |  2.46    Ca 9.1  Phos x   Mg x     ( 02-15-24 @ 06:18 )  TPro  x   /  Alb  3.7  /  TBili  x   /  DBili  x   /  AST  x   /  ALT  x   /  AlkPhos  x

## 2024-02-16 NOTE — CONSULT NOTE ADULT - CONSULT REASON
Pulmonary edema and hypotension
HF
Pleural effusion
UTI ?
hyponatremia in setting of acute CHF and pleural effusions
hypoxaemia
goc
HFrEF, AS

## 2024-02-16 NOTE — PROGRESS NOTE ADULT - ASSESSMENT
The patient is a 89 yo female with Hx. of Diastolic CHF EF 45%, CAD, HTN, Orthostatic hypotension, Atrial fib. on Xarelto, s/p PPM, COPD, Hypothyroidism, Gout , valvular heart disease, sever AS,  severe TR, severe pumonary HTN, presented in ED BIB form home for SOB. The patient  lives alone and has a private aid. She developed worsening SOB, She had iron infusion 5 days ago and was started on Macrobid for UTI  yesterday.   renal eval for acute hyponatremia in setting of acute CHF/pulm HTN and bilateral plueral effusions    Hyponatremia - improving   suspect hypervolemic due to fluid overload   monitor Na trend during diuresis    IV diuresis as per medicine/cardiology teams    free water/fluid restriction    avoid thiazide diuretics if Na remains low -  ( HCTZ, metalozone etc)    k borderline on Aldactone and KCl supplements - would stop KCL if K rises     2/12 SY  --Hyponatremia with CHF : Sodium level slowly improving : will resume IV lasix.  --JOAQUÍN : creat elevated but remains fluid overloaded : continue IV lasix to stabilize resp status first.  --Decompensated CHF with severe pulm HTN : prognosis grave.  --GOC evaluation ongoing.  --D/c spironolactone for now,  until electrolytes and creat level holding steady.    2/13  Pt with CHF, on IV lasix  HYperkalemia treated with Ca IV and PO Lokelma  overall clinical status improved    2/14 SY  --JOAQUÍN: creat remains elevated.  Resp status improved.  Diuretics on hold for now.     Follow up with Renal sonogram.  --Decompensated CHF/severe pulm HTN : improved and stable   --Persistent Hyponatremia and Hyperkalemia in the setting of JOAQUÍN.  Check Cortisol level as well.    2/15  Seen for worsening dyspnea, hyponatremia/ JOAQUÍN/ CKD  CXR shows effusion viewed on PACs myself- agree with Lasix 40 mg IVp x 1 today, for pigtail procedure for effusion  Hyponatremia, may be from obstructive uropathy Jackson placed  Low Na may also be due to worsening HF, Lasix given  Mild hyperkalemia due to obstr uropathy, CKD , jackson placed and Lasix given  Labs ordered for 8 PM  raise in creat may be noted post diuretic but pt is c/o  SOB supine    2/16  d/w Jose Melgar   CKD/ JOAQUÍN dyspnea, small R apical PTX clearing, CXR still w R effusion and PVC  -agree with Lasix 40 mg IV x 1 now  Monitor serum bicarb, contraction alkalosis, to prevent compensatory hypoventilation  Hyponatremia improving with diuresis  Creat slightly up due to volume contraction  Mild hyperkalemia will monitor may improve with lasix IV  Monitor renetta GROSSMAN in place  Anemia, chronic, Hgb 10 range, will check iron stores, Retic count

## 2024-02-16 NOTE — CONSULT NOTE ADULT - NS ATTEND AMEND GEN_ALL_CORE FT
89 y/o F with HFmrEF (EF 45%), CAD, AFib, AS, admitted with ADHF. Diuresis c/b rising creatinine. Responded well to Lasix gtt on prior hospitalization, would recommend resuming at this time. Monitor and replete electrolytes to keep K > 4 and Mag > 2. Add Aldactone for hypokalemia and HF.   Aortic valve work up and intervention if indicated. Favor working up as an inpatient.   \ 89 y/o F with HFmrEF (EF 45%), CAD, AFib, AS, admitted with ADHF. Diuresis c/b rising creatinine. Responded well to Lasix gtt on prior hospitalization, would recommend resuming at this time. Monitor and replete electrolytes to keep K > 4 and Mag > 2.   Aortic valve work up and intervention if indicated. Favor working up as an inpatient.

## 2024-02-16 NOTE — PROGRESS NOTE ADULT - PROBLEM SELECTOR PLAN 4
persistent Afib, on AC with xarelto  weaning midodrine as tolerated; cont metoprolol for rate control  PPM Interrogated 2/12/24 Normal functioning single chamber PPM with battery longevity 5.6 years. Afib with V-pacing 19%, overall rate histogram is acceptable, brief RVR noted. Xarelto was on hold for thoracentesis.  Resume when clear by primary team persistent Afib, on AC with xarelto  weaning midodrine as tolerated; cont metoprolol for rate control  PPM Interrogated 2/12/24 Normal functioning single chamber PPM with battery longevity 5.6 years. Afib with V-pacing 19%, overall rate histogram is acceptable, brief RVR noted. Xarelto was on hold for thoracentesis which was attempted 2/15/24.  Resume xarelto when cleared by primary team

## 2024-02-16 NOTE — PROGRESS NOTE ADULT - PROBLEM SELECTOR PLAN 2
Valvular heart disease:  Previously evaluated by Dr Levin for structural intervention- pt and family had opted for medical management.  Repeat echo as above.  My coworker had prior conversation with patient and daughter who wishes to CW medical management

## 2024-02-16 NOTE — PROGRESS NOTE ADULT - ASSESSMENT
89 yo female with Hx. of Diastolic CHF EF 45%, CAD, HTN, Orthostatic hypotension, Atrial fib. on Xarelto, s/p PPM, COPD, Hypothyroidism, Gout , valvular heart disease, sever AS,  severe TR, severe pumonary HTN, presented in ED BIB form home for SOB. The patient  lives alone and has a private aid. She developed worsening SOB, She had iron infusion 5 days ago and was started on Macrobid for UTI  yesterday.     PROBLEMS:    AC hypoxaemic & hypercapnic respiratory failure  Acute excerbation of COPD  Acute CHF   Bilateral pleural effusion  Hypnatremia   Orthostatic hypotension  Atrial fib-s/p PPM  Non obs CAD    Hypothyroidism  Severe valvular heart disease  UTI     PLAN:    pulmonary stable  IV lasix  BIPAP PRN & TITRATE FIO2/VENOUS GAS  IV SOLU-MEDROLS 20MG Qdaily  AEROSOLS/ NEB  Fluid restrictions  off abx  VTEP-on Xarelto

## 2024-02-16 NOTE — PROGRESS NOTE ADULT - NS ATTEND AMEND GEN_ALL_CORE FT
Discussed above with CHERY Kruger, OSITO Aguirre and Dr. Braxton   s/p lasix IVP 40mg x1; appreciate HF recs- now starting lasix gtt

## 2024-02-16 NOTE — PROGRESS NOTE ADULT - ASSESSMENT
89 year-old woman with DM type II, HTN, CAD, chronic diastolic CHF, chronic atrial fibrillation on Xarelto, hx of PPM placement, severe aortic stenosis, severe tricuspid regurgitation, severe pulmonary HTN, chronic orthostatic hypotension on midodrine, COPD, hypothyroidism, gout, just started on macrobid for 1 day for UTI, presented with worsening shortness of breath. Reports adherence with medication. Lives alone, with health aides. Denied chest pain, fevers, chills, cough. She was most recently admitted to  in 9/2023. At the time, she was evaluated for TAVR. Underwent RHC/LHC showing mild diffuse atherosclerosis with moderate severe disease of small caliber Cx in a nondominant territory. She had elevated right sided filling pressures in setting of severe TR with normal left sided filling pressures. Adequate perfusion. No intervention performed. Her outpatient cardiologist is Dr. Harris. In the ED, she was tachycardic to 107, normotensive, saturating 93%. She had increased work of breathing. Labs notable for Hgb 10, Na 123, Hs trop neg x1, proBNP 7548, COVID and RVP neg. CT chest w/ severe cardiomegaly with enlarging large bilateral pleural effusions. Severe pulmonary arterial hypertension. Admitted for further management of acute respiratory failure.     Acute hypoxic and hypercapneic respiratory failure  Likely multi factorial due to CHF exacerbation, severe AS, severe TR, severe pHTN, pleural effusions, compressive atelectasis, COPD with probable exacerbation. No sign of pneumonia at this point. Had required NIPPV with BiPAP to reduce work of breathing and improve hypercapnea. Discontinued BiPAP this AM as she has not been using it, citing discomfort with the mask etc. Patient now on O2 via NC, 3L/min. Subjectively, she feels only marginally improved since admission. Continues to have dyspnea supine. No chest pain or tightness. No cough. No fever or chills. No other complaints aside for generalized weakness.   - Continue O2 supplementation as needed, wean as tolerated, goal SPO2 92% or greater  - Continue to treat underlying issues, see below  - Planned for R chest pigtail pleural drainage catheter placement today by Thoracic Surgery  - Resume diuresis with IV Lasix as per Cardiology and Nephrology input from today    Acute on chronic diastolic CHF  Patient presenting with SOB, pleural effusions, elevated BNP in setting known valvular disease, HFpEF. Last TTE with LVEF 50-55%, severe TR, severe AS. At home, shes on torsemide 20mg BID. Some improvement in oxygenation with IV Lasix but Cr up-trended significantly and diuretics were placed on hold. Appreciate input from Cardiology and Nephrology today. Still find patient volume overloaded and note that patient is now with indwelling Espinoza placed yesterday for retention, recommended administering Lasix 40mg IV x 1 today. Follow up TTE obtained. EF 45-50%. Septal flattening consistent with right heart pressure / volume overload. RB severely dilated with reduced function. RA severely dilated. LA mildly dilated. AS described as mod-severe. TR severe. Probably severe pHTN.  - Re-started Lasix today with 40mg IVP x 1 as per Cardiology and Nephrology  - Continue metoprolol  - Monitor Is and Os, daily wt  - Trend Cr and lytes  - Continue fluid restriction   - F/u with Cardiology, consulted CHF Team    Severe aortic stenosis   Previously evaluated by Dr Levin for structural intervention / JAYNA. Patient and family had opted for medical management.  - Continue medical management    CAD  Recent cath 9/2023 with mild diffuse atherosclerosis with moderate severe disease of small caliber Cx in a nondominant territory.  - Continue med management with metoprolol, statin    Chronic atrial fibrillation, hx of PPM  RVR in the ED in setting of active respiratory symptoms. PPM interrogated 2/12/24. Normal functioning single chamber PPM with battery longevity 5.6 years. Afib with V-pacing 19%, overall rate histogram is acceptable, brief RVR noted. No setting change made.  - Continue metoprolol  - Trying to wean midodrine as tolerated  - Holding Xarelto as patient is for pigtail chest tube placement today to drain pleural effusion    COPD with acute exacerbation  Appreciate input from Pulmonary Medicine  - Continue systemic steroids with methylprednisolone  - Continue DuoNeb q6h    Significant pleural effusions B/L  Difficult to diurese patient as rising. Still with hypoxia, sizable pleural effusions. Thoracic Surgery consulted for input re possible drainage, patient in agreement.   - For pleural drainage catheter placement this afternoon  - F/u with Thoracic Surgery    Hyponatremia  Suspect hypervolemic due to fluid overload. Persisting.   - Free water/fluid restriction   - Avoid thiazide diuretics if Na remains low ( HCTZ, metolazone etc)   - Nephrology following    Hyperkalemia  In setting of concurrent spironolactone and potassium supplementation tabs. Stopped both and administered hyperkalemia protocol. K has improved but remains >5. Now could be related to JOAQUÍN, see below.  - Discuss further with Nephrology today    JOAQUÍN   Baseline Cr ~0.9 as was on presentation. Increased to significantly since then, now 2.19. With patient having received IV Lasix. She has not received any contrast studies. No NSAIDs. She did have UTI diagnosed a day or two prior to admission and has since completed a 3-day course of ceftriaxone but doubt related. Thought she could have retention as she has been rather immobile and constipated. Nephrology consulted. Obtained renal bladder US yesterday 2/14. No sign of hydronephrosis, mass or calculi. Bladder was distended however, volume 436cc. No bladder wall thickening. No bladder mass. Back up on the floors thereafter, patient was not able to void, had bladder scan up to ~540cc, Espinoza placed. Note UA collected upon Espinoza placement is negative for infection.  - Continue with Espinoza (2/14-)  - Strict Is and Os  - Trend Cr  - Avoid nephrotic medications  - Renally dose medications where applicable    UTI, present prior to admission  Recent dx of UTI. Was treated with macrobid for 1 day before getting admitted to hospital. Here she received an empiric 3 day course of ceftriaxone. Urine and blood cultures returned negative. UA collected from Espinoza insertion negative for infection.    DM  type II  A1c 6.3. Well controlled.   - Continue on insulin correctional scale for now    Hypothyroidism  Stable.   - Continue levothyroxine    Hx of gout  Stable.   - Continue allopurinol    Constipation  No nausea or emesis. Intact bowel sounds.   - Continue bowel regimen    Physical deconditioning and debility  Appreciate input from PT. Recommended home with home PT and more assistance vs [facility]  - Continue restorative PT sessions  - OOB to chair daily    Severe protein calorie malnutrition  Appreciate dietician input  - Trend wt  - Added MVI with minerals daily, thiamine 100mg daily  - Diet supplemented with Ensure High Protein BID      DVT px: On Xarelto for afib but now held as patient is planned for pleural drainage catheter placement  Code Status: DNR/DNI/Trial NIV   Dispo: To be determined pending further clinical assessment     89 year-old woman with DM type II, HTN, CAD, chronic diastolic CHF, chronic atrial fibrillation on Xarelto, hx of PPM placement, severe aortic stenosis, severe tricuspid regurgitation, severe pulmonary HTN, chronic orthostatic hypotension on midodrine, COPD, hypothyroidism, gout, just started on macrobid for 1 day for UTI, presented with worsening shortness of breath. Reports adherence with medication. Lives alone, with health aides. Denied chest pain, fevers, chills, cough. She was most recently admitted to  in 9/2023. At the time, she was evaluated for TAVR. Underwent RHC/LHC showing mild diffuse atherosclerosis with moderate severe disease of small caliber Cx in a nondominant territory. She had elevated right sided filling pressures in setting of severe TR with normal left sided filling pressures. Adequate perfusion. No intervention performed. Her outpatient cardiologist is Dr. Harris. In the ED, she was tachycardic to 107, normotensive, saturating 93%. She had increased work of breathing. Labs notable for Hgb 10, Na 123, Hs trop neg x1, proBNP 7548, COVID and RVP neg. CT chest w/ severe cardiomegaly with enlarging large bilateral pleural effusions. Severe pulmonary arterial hypertension. Admitted for further management of acute respiratory failure.     Acute hypoxic and hypercapneic respiratory failure  Likely multi factorial due to CHF exacerbation, severe AS, severe TR, severe pHTN, pleural effusions, compressive atelectasis, COPD with probable exacerbation. No sign of pneumonia at this point. Had required NIPPV with BiPAP to reduce work of breathing and improve hypercapnea. Discontinued BiPAP this AM as she has not been using it, citing discomfort with the mask etc. Patient now on O2 via NC, 3L/min. Subjectively, she feels only marginally improved since admission. Continues to have dyspnea supine. No chest pain or tightness. No cough. No fever or chills. No other complaints aside for generalized weakness.   - Continue O2 supplementation as needed, wean as tolerated, goal SPO2 92% or greater  - Continue to treat underlying issues, see below  - had planned for pigtail but developed small PTX, will not pursue IR drainage, continue diuresis  - continue iv diuresis    Acute on chronic diastolic CHF  Patient presenting with SOB, pleural effusions, elevated BNP in setting known valvular disease, HFpEF. Last TTE with LVEF 50-55%, severe TR, severe AS. At home, shes on torsemide 20mg BID. Some improvement in oxygenation with IV Lasix but Cr up-trended significantly and diuretics were placed on hold. Appreciate input from Cardiology and Nephrology today. Still find patient volume overloaded and note that patient is now with indwelling Espinoza placed yesterday for retention, recommended administering Lasix 40mg IV x 1 today. Follow up TTE obtained. EF 45-50%. Septal flattening consistent with right heart pressure / volume overload. RB severely dilated with reduced function. RA severely dilated. LA mildly dilated. AS described as mod-severe. TR severe. Probably severe pHTN.  - continue iv diuresis, now drip  - Continue metoprolol  - Monitor Is and Os, daily wt  - Trend Cr and lytes  - Continue fluid restriction   - F/u with Cardiology, consulted CHF Team    Severe aortic stenosis   Previously evaluated by Dr Levin for structural intervention / JAYNA. Patient and family had opted for medical management.  - Continue medical management    CAD  Recent cath 9/2023 with mild diffuse atherosclerosis with moderate severe disease of small caliber Cx in a nondominant territory.  - Continue med management with metoprolol, statin    Chronic atrial fibrillation, hx of PPM  RVR in the ED in setting of active respiratory symptoms. PPM interrogated 2/12/24. Normal functioning single chamber PPM with battery longevity 5.6 years. Afib with V-pacing 19%, overall rate histogram is acceptable, brief RVR noted. No setting change made.  - Continue metoprolol  - Trying to wean midodrine as tolerated  -holding Xarelto as CrCl<15, can likely resume in coming days as renal fxn improves    COPD with acute exacerbation  Appreciate input from Pulmonary Medicine  - Continue systemic steroids with methylprednisolone, now 20qd  - Continue DuoNeb q6h    Significant pleural effusions B/L  -continue iv diuresis, now drip  -pigtail attempted but w PTX, will hold off on IR attempt for now    Hyponatremia  Suspect hypervolemic due to fluid overload. Persisting.   - Free water/fluid restriction   - Avoid thiazide diuretics if Na remains low ( HCTZ, metolazone etc)   - Nephrology following    Hyperkalemia  In setting of concurrent spironolactone and potassium supplementation tabs. Stopped both and administered hyperkalemia protocol. K has improved but remains >5. Now could be related to JOAQUÍN, see below.  - Discuss further with Nephrology today    JOAQUÍN   Baseline Cr ~0.9 as was on presentation. Increased to significantly since then, now 2.19. With patient having received IV Lasix. She has not received any contrast studies. No NSAIDs. She did have UTI diagnosed a day or two prior to admission and has since completed a 3-day course of ceftriaxone but doubt related. Thought she could have retention as she has been rather immobile and constipated. Nephrology consulted. Obtained renal bladder US yesterday 2/14. No sign of hydronephrosis, mass or calculi. Bladder was distended however, volume 436cc. No bladder wall thickening. No bladder mass. Back up on the floors thereafter, patient was not able to void, had bladder scan up to ~540cc, Espinoza placed. Note UA collected upon Espinoza placement is negative for infection.  - Continue with Espinoza (2/14-)  - Strict Is and Os  - Trend Cr  - Avoid nephrotic medications  - Renally dose medications where applicable    UTI, present prior to admission  Recent dx of UTI. Was treated with macrobid for 1 day before getting admitted to hospital. Here she received an empiric 3 day course of ceftriaxone. Urine and blood cultures returned negative. UA collected from Espinoza insertion negative for infection.    DM  type II  A1c 6.3. Well controlled.   - Continue on insulin correctional scale for now    Hypothyroidism  Stable.   - Continue levothyroxine    Hx of gout  Stable.   - Continue allopurinol    Constipation  No nausea or emesis. Intact bowel sounds.   - Continue bowel regimen    Physical deconditioning and debility  Appreciate input from PT. Recommended home with home PT and more assistance vs [facility]  - Continue restorative PT sessions  - OOB to chair daily    Severe protein calorie malnutrition  Appreciate dietician input  - Trend wt  - Added MVI with minerals daily, thiamine 100mg daily  - Diet supplemented with Ensure High Protein BID      DVT ppx: SCDs, resume Xarelto when CrCl improves  Code Status: DNR/DNI/Trial NIV   Dispo: Medically active

## 2024-02-16 NOTE — PROGRESS NOTE ADULT - SUBJECTIVE AND OBJECTIVE BOX
HOSPITALIST ATTENDING PROGRESS NOTE    Chart and meds reviewed.  Patient seen and examined.    CC: CHF    Subjective: Continued SOB, speaks minimally, aide at bedside. CHF team to start lasix drip.     All other systems reviewed and found to be negative with the exception of what has been described above.    MEDICATIONS  (STANDING):  acetaminophen     Tablet .. 975 milliGRAM(s) Oral every 8 hours  albuterol/ipratropium for Nebulization 3 milliLiter(s) Nebulizer every 6 hours  allopurinol 100 milliGRAM(s) Oral daily  atorvastatin 20 milliGRAM(s) Oral at bedtime  buDESOnide    Inhalation Suspension 0.5 milliGRAM(s) Inhalation two times a day  escitalopram 5 milliGRAM(s) Oral daily  folic acid 1 milliGRAM(s) Oral daily  furosemide Infusion 5 mG/Hr (2.5 mL/Hr) IV Continuous <Continuous>  levothyroxine 125 MICROGram(s) Oral daily  methylPREDNISolone sodium succinate Injectable 20 milliGRAM(s) IV Push daily  metoprolol succinate ER 25 milliGRAM(s) Oral at bedtime  midodrine. 2.5 milliGRAM(s) Oral three times a day  multivitamin 1 Tablet(s) Oral daily  polyethylene glycol 3350 17 Gram(s) Oral daily  senna 2 Tablet(s) Oral at bedtime  thiamine 100 milliGRAM(s) Oral daily    MEDICATIONS  (PRN):  aluminum hydroxide/magnesium hydroxide/simethicone Suspension 30 milliLiter(s) Oral every 4 hours PRN Dyspepsia  melatonin 3 milliGRAM(s) Oral at bedtime PRN Insomnia  ondansetron Injectable 4 milliGRAM(s) IV Push every 6 hours PRN Nausea and/or Vomiting  oxyCODONE    IR 2.5 milliGRAM(s) Oral every 8 hours PRN Severe Pain (7 - 10)  traMADol 25 milliGRAM(s) Oral every 12 hours PRN Mild Pain (1 - 3)      VITALS:  T(F): 97.5 (02-16-24 @ 07:51), Max: 97.5 (02-15-24 @ 21:43)  HR: 123 (02-16-24 @ 18:13) (90 - 123)  BP: 108/68 (02-16-24 @ 18:13) (92/67 - 108/68)  RR: 18 (02-16-24 @ 18:13) (18 - 19)  SpO2: 93% (02-16-24 @ 18:13) (91% - 96%)  Wt(kg): --    I&O's Summary    15 Feb 2024 07:01  -  16 Feb 2024 07:00  --------------------------------------------------------  IN: 0 mL / OUT: 600 mL / NET: -600 mL        CAPILLARY BLOOD GLUCOSE      POCT Blood Glucose.: 229 mg/dL (16 Feb 2024 00:11)      PHYSICAL EXAM:  Gen: NAD  HEENT:  pupils equal and reactive, EOMI, no oropharyngeal lesions, erythema, exudates, oral thrush  NECK:   supple, no carotid bruits, no palpable lymph nodes, no thyromegaly  CV:  +S1, +S2, regular, no murmurs or rubs  RESP:   lungs clear to auscultation bilaterally, no wheezing, rales, rhonchi, good air entry bilaterally  BREAST:  not examined  GI:  abdomen soft, non-tender, non-distended, normal BS, no bruits, no abdominal masses, no palpable masses  RECTAL:  not examined  :  not examined  MSK:   normal muscle tone, no atrophy, no rigidity, no contractions  EXT:  no clubbing, no cyanosis, no edema, no calf pain, swelling or erythema  VASCULAR:  pulses equal and symmetric in the upper and lower extremities  NEURO:  AAOX3, no focal neurological deficits, follows all commands, able to move extremities spontaneously  SKIN:  no ulcers, lesions or rashes    LABS:                            9.9    4.66  )-----------( 146      ( 16 Feb 2024 05:46 )             30.9     02-16    129<L>  |  91<L>  |  86<H>  ----------------------------<  181<H>  5.3   |  36<H>  |  2.46<H>    Ca    9.1      16 Feb 2024 05:46  Phos  3.9     02-15  Mg     2.3     02-15    TPro  x   /  Alb  3.7  /  TBili  x   /  DBili  x   /  AST  x   /  ALT  x   /  AlkPhos  x   02-15        LIVER FUNCTIONS - ( 15 Feb 2024 06:18 )  Alb: 3.7 g/dL / Pro: x     / ALK PHOS: x     / ALT: x     / AST: x     / GGT: x             Urinalysis Basic - ( 16 Feb 2024 05:46 )    Color: x / Appearance: x / SG: x / pH: x  Gluc: 181 mg/dL / Ketone: x  / Bili: x / Urobili: x   Blood: x / Protein: x / Nitrite: x   Leuk Esterase: x / RBC: x / WBC x   Sq Epi: x / Non Sq Epi: x / Bacteria: x    Micro:  COVID19 PCR 2/10: Negative  Flu PCR 2/10: Negative  RSV PCR 2/10: Negative  Urine culture 2/10: Negative  Blood culture 2/10: Negative    Imaging:  US kidneys and bladder 2/14: Right kidney 9.1 cm. No renal mass, hydronephrosis or calculi. Left kidney 6.9 cm. Limited visualization. No gross mass, hydronephrosis or calculi. Bladder distended with volume 436 cc. No bladder wall thickening. No intraluminal mass.    CXR 2/13: Frontal expiratory view of the chest shows the heart to be similarly enlarged in size. Left cardiac pacemaker is again noted. The lungs show less pulmonary congestion with smaller pleural effusions and there is no evidence of pneumothorax.    CT chest WO 2/10: Bibasilar compressive atelectasis. No pulmonary consolidation or mass. There are enlarging moderate to large bilateral pleural effusions. No lymphadenopathy. There is enlargement of the main pulmonary artery spanning 3.4cm, consistent with pulmonary arterial hypertension. Severe arterial vascular calcification. There is severe cardiomegaly with four-chamber enlargement.  There are permanent pacemaker with lead tips in the right ventricle. Severe coronary arterial calcification.    CXR 2/10: Bilateral pleural effusions with adjacent atelectasis versus pneumonia at the left base.    Cardiac Testing:  Follow-up TTE 2/15:  Mild concentric left ventricular hypertrophy is present. Mildly reduced systolic function. Estimated LVEF 45-50%. Septal flattening is seen; this finding is consistent with right heart pressure / volume overload. The right ventricle is severely dilated with reduced function. The right atrium appears severely dilated. The left atrium is mildly dilated. Mild mitral regurgitation is present. The aortic valve appears severely calcified. Valve opening seems to be restricted. Moderate to severe aortic stenosis. Severe tricuspid valve regurgitation is present. Probable severe pulmonary hypertension. Trace pulmonic valvular regurgitation is present.    Tele 2/14: Rate controlled afib    EKG 2/13: Afib with RVR, rate 112    PPM interrogation 2/12: Underlying rhythm afib. Normal functioning single chamber PPM with battery longevity 5.6 years. Afib with V-pacing 19%, overall rate histogram is acceptable, brief RVR noted. No setting change made.    Tele 2/10: Afib rate     EKG 2/10: Afib rate 100s    Prior visit cardiac testing   TTE 9/11:  Limited study to assess tricuspid insufficiency and pulmonary pressure. Severe (4+) tricuspid valve regurgitation is present. Severe pulmonary hypertension. The left ventricle is normal in size, paradoxical septal motion The interventricular septum appears flattened consistent with an RV pressure/volume overload. An apically displaced papillary muscle is noted. Estimated left ventricular ejection fraction is 50-55%. The right atrium appears severely dilated. A device wire is seen in the RV and RA. The right ventricle is moderately dilated with decreased contractility.    LHC 9/6: Mild diffuse atherosclerosis with moderate severe disease of small caliber Cx in a nondominant territory. Elevated right sided filling pressures in setting of severe TR with normal left sided filling pressures. Adequate perfusion.    TTE 8/29: Mild to mod MR. Severe AS. Severe TR. Mild to mod [place]. Severe pHTN. Severely dilated LA. LV systolic function mildly impaired; segmental wall motion abnormalities noted. Mild concentric LVH. LVEF 45%. The interventricular septum appears flattened consistent with an RV pressure/volume overload. RA severely dilated. RV with moderate dilation, diffuse hypokinesis, and depression of contractility based on TAPSE. A device wire is seen in the RV and RA. IVC is dilated and not collapsing with inspiration.    Telemetry, personally reviewed:  2/16/24: afib , vpaced

## 2024-02-16 NOTE — CONSULT NOTE ADULT - ASSESSMENT
The patient is a 89 yo female with Hx. of Diastolic CHF EF 45%, CAD, HTN, Orthostatic hypotension, Atrial fib. on Xarelto, s/p PPM, COPD, Hypothyroidism, Gout , valvular heart disease, sever AS,  severe TR, severe pumonary HTN, presented in ED BIB form home for SOB. The patient  lives alone and has a private aid. She developed worsening SOB, She had iron infusion 5 days ago and was started on Macrobid for UTI  yesterday.   renal eval for acute hyponatremia in setting of acute CHF/pulm HTN and bilateral plueral effusions    Hyponatremia - improving   suspect hypervolemic due to fluid overload   monitor Na trend during diuresis    IV diuresis as per medicine/cardiology teams    free water/fluid restriction    avoid thiazide diuretics if Na remains low -  ( HCTZ, metalozone etc)    k borderline on Aldactone and KCl supplements - would stop KCL if K rises     Dr Rasheed al to resume care Monday     Thank you for the courtesy of this consult. We will follow this patient with you.   Management is subject to change if new information becomes available or patient condition changes.         
89 yo female with Hx. of Diastolic CHF EF 45%, CAD, HTN, Orthostatic hypotension, Atrial fib. on Xarelto, s/p PPM, COPD, Hypothyroidism, Gout , valvular heart disease, sever AS,  severe TR, severe pumonary HTN, presented in ED BIB form home for SOB. The patient  lives alone and has a private aid. She developed worsening SOB, She had iron infusion 5 days ago and was started on Macrobid for UTI  yesterday.     PROBLEMS:    AC hypoxaemic & hypercapnic respiratory failure  Acute excerbation of COPD  Acute CHF   Bilateral pleural effusion  Hypnatremia   Orthostatic hypotension  Atrial fib-s/p PPM  Non obs CAD    Hypothyroidism  Severe valvular heart disease  UTI     PLAN:    IV lasix  BIPAP PRN & TITRATE FIO2/VENOUS GAS  IV SOLU-MEDROLS 20MG Q12HR  AEROSOLS/ NEB  Fluid restrictions  IV Ceftriaxone pneumonia / UTI  VTEP-on Xarelto               
The patient is a 89 yo female with Hx. of Diastolic CHF EF 45%, CAD, HTN, Orthostatic hypotension, Atrial fib. on Xarelto, s/p PPM, COPD, Hypothyroidism, Gout , valvular heart disease, sever AS,  severe TR, severe pumonary HTN, with   #Pulmonary edema   #Acute CHF exacerbation   #Hyponatremia     Patient noted to have improved blood pressure and has no signs of acute respiratory distress or mental status changes at the time of evaluation. Patient may benefit from low dose diuresis and intermittent BiPAP for CHF, patient is not a candidate for ICU at this time, please reconsult if patient status deteriorates.     Neuro:  - No Active issues  - Avoid Neuro Deliriogenic / sedative medications  - Aspiration Precautions, HOB > 30 degrees    CV:  - Diastolic CHF with severe AS and MVR resulting in pulmonary edema patient would benefit from slow diursis with low dose lasix, home midodrine, and BiPAP for correction of pulmonary edema. Continue to monitor BP and maintain map >65  - Repeat echo in am   - Avoid IVF in seting of volume overload   - avoid antihypertensive medications     Pulm:  - Start steriods for enhanced anti-inflammatory effect  - COPD recommend albuterol and Spiriva   - Supplemental oxygen as needed to maintain SpO2 > 92%,  - Incentive spirometry                  GI:  - Continue Protonix 40mg Daily  - Keep NPO while on BiPAP   - Enteral feeds as tolerated to avoid Stress ulceration and optimize nutritional state when indicated    Renal:  - In setting of hyponatremia recommend slow diuresis for CHF and check BMP q6 hours for changes   - Even to net negative fluid balance as tolerated by hemodynamics and renal fx.    - Preserved Renal Function Continue to monitor Bun/Cr and UOP  - Replacing electrolytes as needed with Goal K> 4, PO> 3, Mg> 2               - Strict I&O's  - Avoid Nephro toxic medication  - Renally dose meds    Heme:  - Lovenox and SCDs for DVT/PE ppx       ID:  - WBC 5.62,  remains afebrile with no antipyretics administered   - Continue ABX azithromycin in setting of COPD    - Microbiology and Radiology reviewed   - trend CBC with diff, CMP  and fever curve  - Avoiding antibiotics unless there is a concern for a bacterial/fungal infection    Endo:  - ISS for aggressive glycemic control to limit FS glucose to < 180mg/dl.  - Keep Euthyroid    Time spent on this patient encounter, which includes documenting this note in the electronic medical record, was 50 minutes including assessing the presenting problems with associated risks, reviewing the medical record to prepare for the encounter, and meeting face to face with the patient to obtain additional history. I have also performed an appropriate physical exam, made interventions listed and ordered and interpreted appropriate diagnostic studies as documented. To improve  communication and patient safety, I have coordinated care with the multidisciplinary team including the bedside nurse, appropriate attending of record and consultants as needed.    Date of entry of this note is equal to the date of services rendered    Plan discussed with Dr. Casey 
89 yo female HFrEF (EF 45%, LVEDd 3.6) with a Hx CAD, HTN, Orthostatic hypotension, Atrial fib. on Xarelto, s/p PPM, COPD, Hypothyroidism, Gout , valvular heart disease, severe AS,  severe TR, severe pumonary HTN, presented to  ED on 2/10 for worsening dyspnea. She is volume overloaded on exam.    TTE 2/15: EF 45-50%, LVEDd 2.86 IVS 1.1 PWd 1.17  LA Mildly dilated, RA Severely dilated, RV severely dilated with reduced function  Mild MR, Mod-Sev AS MIKE 0.99 PVel 2.36 PG/MG 22/10, Severe TR  IVC dilated   
89 yo female with Hx. of Diastolic CHF EF 45%, CAD, HTN, Orthostatic hypotension, Atrial fib. on Xarelto, s/p PPM, COPD, Hypothyroidism, Gout , valvular heart disease, severe AS,  severe TR, severe pulmonary HTN, presented in ED BIB form home for SOB. The patient  lives alone and has a private aid. She developed worsening SOB, She had iron infusion 5 days ago and was started on Macrobid for UTI day prior to admit.     1. UTI  - recent dx of UTI  - f/u urine cx blood cx  - f/u outpt cx  - on rocephin 1gm daily  - continue with abx coverage pending cx  - fu cbc  - tolerating abx well so far; no side effects noted  - reason for abx use and side effects reviewed with patient    Clinical team may change from intravenous to oral antibiotics when the following criteria are met:   1. Patient is clinically improving/stable       a)	Improved signs and symptoms of infection from initial presentation       b)	Afebrile for 24 hours       c)	Leukocytosis trending towards normal range   2. Patient is tolerating oral intake   3. Initial blood cultures are negative     Cannot advise changing to oral antibiotic therapy until culture sensitivity is available.  
89 year old female PMHx of Diastolic CHF EF 45%, CAD, sever AS,  severe TR, severe pulmonary HTN, AFIB on Xarelto ,COPD, Orthostatic hypotension, recent pneumonia who was    admitted with bilateral pleural effusions moderate right larger than left . Respiratory status stable tonight, no need for urgent intervention Dr De La Rosa is aware, The Team will round on and evaluate for long term plan in the morning. 
 Pt is a 89y old Female with hx of Diastolic CHF EF 45%, CAD, HTN, Orthostatic hypotension, Atrial fib. on Xarelto, s/p PPM, COPD, Hypothyroidism, Gout , valvular heart disease, sever AS,  severe TR, severe pumonary HTN, presented in ED BIB form home for SOB. The patient  lives alone and has a private aid. She developed worsening SOB, She had iron infusion 5 days ago and was started on Macrobid for UTI  yesterday.     1. Acute hypoxic and hypercapnic respiratory failure 2/2 CHF, severe valvular disease, pHTN, probable COPD excaerbation  -NIPPV nightly; on 5LNC currently  -diuresis with IV Lasix  -monitor BMP closely  -IV Solumedrol  -strict I&O, daily weight, 1.2L FR  -cardiology following  -Pulmicort and Duoneb nebs  -thoracics consulted: no indication for thora at this time as effusions too small to tap    2. Severe aortic stenosis  -was evaluated by Dr. Levin for TAVR; family opted for medical management      Process of Care   --Reviewed dx/treatment problems and alignment with Goals of Care       Physical Aspects of Care   --Pain   patient denies at this time   c/w current management     --Bowel Regimen   denies constipation   risk for constipation d/t immobility   daily dulcolax as needed     --Dyspnea   no dyspnea at rest  on 5LNC  NIPPV at nightly  continue medical management for CHF/COPD exacerbations  comfortable and in NAD     --Nausea Vomiting   denies     --Weakness   PT as tolerated         Psychological and Psychiatric Aspects of Care:    --Grief/ Bereavement: emotional support provided   --Hx of psychiatric dx: none   --Pastoral Care Available PRN          Social Aspects of Care   --SW involved         Cultural Aspects   --Primary Language: English           Goals of Care:  see [facility] discussion above          We discussed Palliative Care team being a supportive team when a patient has ongoing illnesses.  We also discussed that it is not an end of life care service, but can help navigate symptoms and emotional support throughout their hospital stay here.           Ethical and Legal Aspects: NA           Capacity- A&Ox3, some short-term memory deficits; has simple decision making capacity but defers to her children         HCP/Surrogate: Citlalli Aponte, daughter [phone]          Code Status: DNR/DNI trial NIV    MOLST: MOLST with limitations of DNR/DNI trial NIV    Dispo Plan: home with prior aide services          Discussed With: Dr. Fonseca            Case coordinated with attending and SW and RN            Time Spent: 75 minutes including the care, coordination and counseling of this patient, 50% of which was spent coordinating and counseling.

## 2024-02-17 NOTE — PROGRESS NOTE ADULT - PROBLEM SELECTOR PLAN 1
-Pt presenting with SOB likely multifactorial 2/2 pleural effusions, CHF exacerbation, elevated BNP in setting known valvular disease severe AS, severe TR, severe pHTN, pleural effusions, compressive atelectasis, COPD  -Thoracentesis attempted 2/15/24, s/p small apical PTX .    -F/U Echo reveals mild LVH, Mildly reduced systolic function. EF 45-50%. Septal flattening is seen; c/w right heart pressure / volume overload.  Mild MR, Moderate to severe AS, Severe TR, Probable severe pulmonary hypertension.    -D/W with OSITO Melgar 2/16 - now on lasix gtt cont. at current dose.

## 2024-02-17 NOTE — PROGRESS NOTE ADULT - ASSESSMENT
89 year-old woman with DM type II, HTN, CAD, chronic diastolic CHF, chronic atrial fibrillation on Xarelto, hx of PPM placement, severe aortic stenosis, severe tricuspid regurgitation, severe pulmonary HTN, chronic orthostatic hypotension on midodrine, COPD, hypothyroidism, gout, just started on macrobid for 1 day for UTI, presented with worsening shortness of breath. Reports adherence with medication. Lives alone, with health aides. Denied chest pain, fevers, chills, cough. She was most recently admitted to  in 9/2023. At the time, she was evaluated for TAVR. Underwent RHC/LHC showing mild diffuse atherosclerosis with moderate severe disease of small caliber Cx in a nondominant territory. She had elevated right sided filling pressures in setting of severe TR with normal left sided filling pressures. Adequate perfusion. No intervention performed. Her outpatient cardiologist is Dr. Harris. In the ED, she was tachycardic to 107, normotensive, saturating 93%. She had increased work of breathing. Labs notable for Hgb 10, Na 123, Hs trop neg x1, proBNP 7548, COVID and RVP neg. CT chest w/ severe cardiomegaly with enlarging large bilateral pleural effusions. Severe pulmonary arterial hypertension. Admitted for further management of acute respiratory failure.     Acute hypoxic and hypercapneic respiratory failure  Likely multi factorial due to CHF exacerbation, severe AS, severe TR, severe pHTN, pleural effusions, compressive atelectasis, COPD with probable exacerbation. No sign of pneumonia at this point. Had required NIPPV with BiPAP to reduce work of breathing and improve hypercapnea. Discontinued BiPAP this AM as she has not been using it, citing discomfort with the mask etc. Patient now on O2 via NC, 3L/min. Subjectively, she feels only marginally improved since admission. Continues to have dyspnea supine. No chest pain or tightness. No cough. No fever or chills. No other complaints aside for generalized weakness.   - Continue O2 supplementation as needed, wean as tolerated, goal SPO2 92% or greater  - Continue to treat underlying issues, see below  - had planned for pigtail but developed small PTX, will not pursue IR drainage, continue diuresis  - continue iv diuresis    Acute on chronic diastolic CHF  Patient presenting with SOB, pleural effusions, elevated BNP in setting known valvular disease, HFpEF. Last TTE with LVEF 50-55%, severe TR, severe AS. At home, shes on torsemide 20mg BID. Some improvement in oxygenation with IV Lasix but Cr up-trended significantly and diuretics were placed on hold. Appreciate input from Cardiology and Nephrology today. Still find patient volume overloaded and note that patient is now with indwelling Espinoza placed yesterday for retention, recommended administering Lasix 40mg IV x 1 today. Follow up TTE obtained. EF 45-50%. Septal flattening consistent with right heart pressure / volume overload. RB severely dilated with reduced function. RA severely dilated. LA mildly dilated. AS described as mod-severe. TR severe. Probably severe pHTN.  - continue iv diuresis, now drip  - Continue metoprolol  - Monitor Is and Os, daily wt  - Trend Cr and lytes  - Continue fluid restriction   - F/u with Cardiology, consulted CHF Team    Severe aortic stenosis   Previously evaluated by Dr Levin for structural intervention / JAYNA. Patient and family had opted for medical management.  - Continue medical management    CAD  Recent cath 9/2023 with mild diffuse atherosclerosis with moderate severe disease of small caliber Cx in a nondominant territory.  - Continue med management with metoprolol, statin    Chronic atrial fibrillation, hx of PPM  RVR in the ED in setting of active respiratory symptoms. PPM interrogated 2/12/24. Normal functioning single chamber PPM with battery longevity 5.6 years. Afib with V-pacing 19%, overall rate histogram is acceptable, brief RVR noted. No setting change made.  - Continue metoprolol  - Trying to wean midodrine as tolerated  -holding Xarelto as CrCl<15, can likely resume in coming days as renal fxn improves    COPD with acute exacerbation  Appreciate input from Pulmonary Medicine  - Continue systemic steroids with methylprednisolone, now 20qd  - Continue DuoNeb q6h    Significant pleural effusions B/L  -continue iv diuresis, now drip  -pigtail attempted but w PTX, will hold off on IR attempt for now    Hyponatremia  Suspect hypervolemic due to fluid overload. Persisting.   - Free water/fluid restriction   - Avoid thiazide diuretics if Na remains low ( HCTZ, metolazone etc)   - Nephrology following    Hyperkalemia  In setting of concurrent spironolactone and potassium supplementation tabs. Stopped both and administered hyperkalemia protocol. K has improved but remains >5. Now could be related to JOAQUÍN, see below.  - Discuss further with Nephrology today    JOAQUÍN   Baseline Cr ~0.9 as was on presentation. Increased to significantly since then, now 2.19. With patient having received IV Lasix. She has not received any contrast studies. No NSAIDs. She did have UTI diagnosed a day or two prior to admission and has since completed a 3-day course of ceftriaxone but doubt related. Thought she could have retention as she has been rather immobile and constipated. Nephrology consulted. Obtained renal bladder US yesterday 2/14. No sign of hydronephrosis, mass or calculi. Bladder was distended however, volume 436cc. No bladder wall thickening. No bladder mass. Back up on the floors thereafter, patient was not able to void, had bladder scan up to ~540cc, Espinoza placed. Note UA collected upon Espinoza placement is negative for infection.  - Continue with Espinoza (2/14-)  - Strict Is and Os  - Trend Cr  - Avoid nephrotic medications  - Renally dose medications where applicable    UTI, present prior to admission  Recent dx of UTI. Was treated with macrobid for 1 day before getting admitted to hospital. Here she received an empiric 3 day course of ceftriaxone. Urine and blood cultures returned negative. UA collected from Espinoza insertion negative for infection.    DM  type II  A1c 6.3. Well controlled.   - Continue on insulin correctional scale for now    Hypothyroidism  Stable.   - Continue levothyroxine    Hx of gout  Stable.   - Continue allopurinol    Constipation  No nausea or emesis. Intact bowel sounds.   - Continue bowel regimen    Physical deconditioning and debility  Appreciate input from PT. Recommended home with home PT and more assistance vs [facility]  - Continue restorative PT sessions  - OOB to chair daily    Severe protein calorie malnutrition  Appreciate dietician input  - Trend wt  - Added MVI with minerals daily, thiamine 100mg daily  - Diet supplemented with Ensure High Protein BID      DVT ppx: SCDs, resume Xarelto when CrCl improves  Code Status: DNR/DNI/Trial NIV   Dispo: Medically active     89 year-old woman with DM type II, HTN, CAD, chronic diastolic CHF, chronic atrial fibrillation on Xarelto, hx of PPM placement, severe aortic stenosis, severe tricuspid regurgitation, severe pulmonary HTN, chronic orthostatic hypotension on midodrine, COPD, hypothyroidism, gout, just started on macrobid for 1 day for UTI, presented with worsening shortness of breath. Reports adherence with medication. Lives alone, with health aides. Denied chest pain, fevers, chills, cough. She was most recently admitted to  in 9/2023. At the time, she was evaluated for TAVR. Underwent RHC/LHC showing mild diffuse atherosclerosis with moderate severe disease of small caliber Cx in a nondominant territory. She had elevated right sided filling pressures in setting of severe TR with normal left sided filling pressures. Adequate perfusion. No intervention performed. Her outpatient cardiologist is Dr. Harris. In the ED, she was tachycardic to 107, normotensive, saturating 93%. She had increased work of breathing. Labs notable for Hgb 10, Na 123, Hs trop neg x1, proBNP 7548, COVID and RVP neg. CT chest w/ severe cardiomegaly with enlarging large bilateral pleural effusions. Severe pulmonary arterial hypertension. Admitted for further management of acute respiratory failure.     Acute hypoxic and hypercapneic respiratory failure  Likely multi factorial due to CHF exacerbation, severe AS, severe TR, severe pHTN, pleural effusions, compressive atelectasis, COPD with probable exacerbation. No sign of pneumonia at this point. Had required NIPPV with BiPAP to reduce work of breathing and improve hypercapnea. Discontinued BiPAP this AM as she has not been using it, citing discomfort with the mask etc. Patient now on O2 via NC, 3L/min. Subjectively, she feels only marginally improved since admission. Continues to have dyspnea supine. No chest pain or tightness. No cough. No fever or chills. No other complaints aside for generalized weakness.   - Continue O2 supplementation as needed, wean as tolerated, goal SPO2 92% or greater  - Continue to treat underlying issues, see below  - had planned for pigtail but developed small PTX, will not pursue IR drainage, continue diuresis  - continue iv diuresis, now lasix drip    Acute on chronic diastolic CHF  Patient presenting with SOB, pleural effusions, elevated BNP in setting known valvular disease, HFpEF. Last TTE with LVEF 50-55%, severe TR, severe AS. At home, shes on torsemide 20mg BID. Some improvement in oxygenation with IV Lasix but Cr up-trended significantly and diuretics were placed on hold. Appreciate input from Cardiology and Nephrology today. Still find patient volume overloaded and note that patient is now with indwelling Espinoza placed yesterday for retention, recommended administering Lasix 40mg IV x 1 today. Follow up TTE obtained. EF 45-50%. Septal flattening consistent with right heart pressure / volume overload. RB severely dilated with reduced function. RA severely dilated. LA mildly dilated. AS described as mod-severe. TR severe. Probably severe pHTN.  - continue iv diuresis, now drip  - Continue metoprolol  - Monitor Is and Os, daily wt  - Trend Cr and lytes  - Continue fluid restriction   - F/u with Cardiology, consulted CHF Team    Severe aortic stenosis   Previously evaluated by Dr Levin for structural intervention / JAYNA. Patient and family had opted for medical management.  - Continue medical management    CAD  Recent cath 9/2023 with mild diffuse atherosclerosis with moderate severe disease of small caliber Cx in a nondominant territory.  - Continue med management with metoprolol, statin    Chronic atrial fibrillation, hx of PPM  RVR in the ED in setting of active respiratory symptoms. PPM interrogated 2/12/24. Normal functioning single chamber PPM with battery longevity 5.6 years. Afib with V-pacing 19%, overall rate histogram is acceptable, brief RVR noted. No setting change made.  - Continue metoprolol  - Trying to wean midodrine as tolerated  -holding Xarelto as CrCl<15, can likely resume in coming days as renal fxn improves    COPD with acute exacerbation  Appreciate input from Pulmonary Medicine  - Continue systemic steroids with methylprednisolone, now 20qd  - Continue DuoNeb q6h    Significant pleural effusions B/L  -continue iv diuresis, now drip  -pigtail attempted but w PTX, will hold off on IR attempt for now    Hyponatremia  Suspect hypervolemic due to fluid overload. Persisting.   - Free water/fluid restriction   - Avoid thiazide diuretics if Na remains low ( HCTZ, metolazone etc)   - Nephrology following    Hyperkalemia  In setting of concurrent spironolactone and potassium supplementation tabs. Stopped both and administered hyperkalemia protocol. K has improved but remains >5. Now could be related to JOAQUÍN, see below.  - Discuss further with Nephrology today    JOAQUÍN , urinary retention  Baseline Cr ~0.9 as was on presentation. Increased to significantly since then, now 2.19. With patient having received IV Lasix. She has not received any contrast studies. No NSAIDs. She did have UTI diagnosed a day or two prior to admission and has since completed a 3-day course of ceftriaxone but doubt related. Thought she could have retention as she has been rather immobile and constipated. Nephrology consulted. Obtained renal bladder US yesterday 2/14. No sign of hydronephrosis, mass or calculi. Bladder was distended however, volume 436cc. No bladder wall thickening. No bladder mass. Back up on the floors thereafter, patient was not able to void, had bladder scan up to ~540cc, Espinoza placed. Note UA collected upon Espinoza placement is negative for infection.  - Continue with Espinoza (2/14-)  - Strict Is and Os  - Trend Cr  - Avoid nephrotic medications  - Renally dose medications where applicable    UTI, present prior to admission  Recent dx of UTI. Was treated with macrobid for 1 day before getting admitted to hospital. Here she received an empiric 3 day course of ceftriaxone. Urine and blood cultures returned negative. UA collected from Espinoza insertion negative for infection.    DM  type II  A1c 6.3. Well controlled.   - Continue on insulin correctional scale for now    Hypothyroidism  Stable.   - Continue levothyroxine    Hx of gout  Stable.   - Continue allopurinol    Constipation  No nausea or emesis. Intact bowel sounds.   - Continue bowel regimen    Physical deconditioning and debility  Appreciate input from PT. Recommended home with home PT and more assistance vs [facility]  - Continue restorative PT sessions  - OOB to chair daily    Severe protein calorie malnutrition  Appreciate dietician input  - Trend wt  - Added MVI with minerals daily, thiamine 100mg daily  - Diet supplemented with Ensure High Protein BID      DVT ppx: SCDs, resume Xarelto when CrCl improves  Code Status: DNR/DNI/Trial NIV   Dispo: Medically active

## 2024-02-17 NOTE — PROGRESS NOTE ADULT - SUBJECTIVE AND OBJECTIVE BOX
Subjective:    pat still sob on activities, on iv lasix drip, afebrile, 4lpm nc sat 94%.    Home Medications:  allopurinol 100 mg oral tablet: 1 tab(s) orally once a day (10 Feb 2024 14:48)  escitalopram 5 mg oral tablet: 1 tab(s) orally (10 Feb 2024 14:55)  levothyroxine 125 mcg (0.125 mg) oral tablet: 1 tab(s) orally once a day (10 Feb 2024 14:48)  metoprolol succinate 25 mg oral tablet, extended release: 1 tab(s) orally once a day (10 Feb 2024 14:55)  midodrine 5 mg oral tablet: 1 tab(s) orally 2 times a day (10 Feb 2024 14:56)  Multiple Vitamins oral tablet: 1 tab(s) orally once a day (10 Feb 2024 14:55)  nitrofurantoin macrocrystals 100 mg oral capsule: 1 cap(s) orally 2 times a day for 5 days ***course not complete*** (10 Feb 2024 14:55)  potassium chloride 20 mEq oral tablet, extended release: 1 tab(s) orally once a day (10 Feb 2024 14:55)  spironolactone 25 mg oral tablet: 1 tab(s) orally once a day (10 Feb 2024 14:48)  thiamine 100 mg oral tablet: 1 tab(s) orally once a day (10 Feb 2024 14:48)  torsemide 20 mg oral tablet: 1 tab(s) orally 2 times a day (10 Feb 2024 14:48)  Xarelto 10 mg oral tablet: 1 tab(s) orally once a day (10 Feb 2024 14:55)    MEDICATIONS  (STANDING):  acetaminophen     Tablet .. 975 milliGRAM(s) Oral every 8 hours  albuterol/ipratropium for Nebulization 3 milliLiter(s) Nebulizer every 6 hours  allopurinol 100 milliGRAM(s) Oral daily  atorvastatin 20 milliGRAM(s) Oral at bedtime  buDESOnide    Inhalation Suspension 0.5 milliGRAM(s) Inhalation two times a day  escitalopram 5 milliGRAM(s) Oral daily  folic acid 1 milliGRAM(s) Oral daily  furosemide Infusion 5 mG/Hr (2.5 mL/Hr) IV Continuous <Continuous>  levothyroxine 125 MICROGram(s) Oral daily  methylPREDNISolone sodium succinate Injectable 20 milliGRAM(s) IV Push daily  metoprolol succinate ER 25 milliGRAM(s) Oral at bedtime  midodrine. 2.5 milliGRAM(s) Oral three times a day  multivitamin 1 Tablet(s) Oral daily  polyethylene glycol 3350 17 Gram(s) Oral daily  senna 2 Tablet(s) Oral at bedtime  thiamine 100 milliGRAM(s) Oral daily    MEDICATIONS  (PRN):  aluminum hydroxide/magnesium hydroxide/simethicone Suspension 30 milliLiter(s) Oral every 4 hours PRN Dyspepsia  melatonin 3 milliGRAM(s) Oral at bedtime PRN Insomnia  ondansetron Injectable 4 milliGRAM(s) IV Push every 6 hours PRN Nausea and/or Vomiting  oxyCODONE    IR 2.5 milliGRAM(s) Oral every 8 hours PRN Severe Pain (7 - 10)  traMADol 25 milliGRAM(s) Oral every 12 hours PRN Mild Pain (1 - 3)      Allergies    codeine (Unknown)    Intolerances        Vital Signs Last 24 Hrs  T(C): 36.5 (17 Feb 2024 08:12), Max: 36.5 (17 Feb 2024 08:12)  T(F): 97.7 (17 Feb 2024 08:12), Max: 97.7 (17 Feb 2024 08:12)  HR: 86 (17 Feb 2024 09:05) (84 - 123)  BP: 93/54 (17 Feb 2024 08:12) (92/67 - 108/68)  BP(mean): --  RR: 18 (17 Feb 2024 08:12) (17 - 18)  SpO2: 94% (17 Feb 2024 08:12) (93% - 98%)    Parameters below as of 17 Feb 2024 08:12  Patient On (Oxygen Delivery Method): nasal cannula  O2 Flow (L/min): 4        PHYSICAL EXAMINATION:    NECK:  Supple. No lymphadenopathy. Jugular venous pressure not elevated. Carotids equal.   HEART:   The cardiac impulse has a normal quality. Reg., Nl S1 and S2.  There are no murmurs, rubs or gallops noted  CHEST:  Chest crackles to auscultation. Normal respiratory effort.  ABDOMEN:  Soft and nontender.   EXTREMITIES:  There is no edema.       LABS:                        9.8    5.48  )-----------( 116      ( 17 Feb 2024 06:05 )             30.6     02-17    128<L>  |  90<L>  |  93<H>  ----------------------------<  165<H>  5.5<H>   |  32<H>  |  2.36<H>    Ca    9.0      17 Feb 2024 06:05  Phos  4.6     02-17    TPro  x   /  Alb  3.2<L>  /  TBili  x   /  DBili  x   /  AST  x   /  ALT  x   /  AlkPhos  x   02-17      Urinalysis Basic - ( 17 Feb 2024 06:05 )    Color: x / Appearance: x / SG: x / pH: x  Gluc: 165 mg/dL / Ketone: x  / Bili: x / Urobili: x   Blood: x / Protein: x / Nitrite: x   Leuk Esterase: x / RBC: x / WBC x   Sq Epi: x / Non Sq Epi: x / Bacteria: x

## 2024-02-17 NOTE — PROGRESS NOTE ADULT - SUBJECTIVE AND OBJECTIVE BOX
NEPHROLOGY INTERVAL HPI/OVERNIGHT EVENTS:    Date of Service: 02-14-24 @ 10:24  2/17- pt comfortable, breathing well, on lasix drip 5 mg/ hr  2/16- OOB in chair looks comfortable, responsive her home aide at bedside  2/15- supine, sob, raised head of bed, more comfortable, discussed with HF team Ramón and Dr Sara jackson placed for retention, 500 ml  2/14--appears comfortable.  but c/o intermittent cough and SOB.  2/13- OOB sleeping arousable NAD, evaluated for CKD JOAQUÍN and heart failure  2/12--seen by Dr Hopper yesterday.  Alert,  SOB only slightly better.  Feeling weak.    HPI:   The patient is a 89 yo female with Hx. of Diastolic CHF EF 45%, CAD, HTN, Orthostatic hypotension, Atrial fib. on Xarelto, s/p PPM, COPD, Hypothyroidism, Gout , valvular heart disease, sever AS,  severe TR, severe pumonary HTN, presented in ED BIB form home for SOB. The patient  lives alone and has a private aid. She developed worsening SOB, She had iron infusion 5 days ago and was started on Macrobid for UTI  yesterday.   renal eval for acute hyponatremia in setting of acute CHF/pulm HTN and bilateral plueral effusions    MEDICATIONS  (STANDING):  acetaminophen     Tablet .. 975 milliGRAM(s) Oral every 8 hours  albuterol/ipratropium for Nebulization 3 milliLiter(s) Nebulizer every 6 hours  allopurinol 100 milliGRAM(s) Oral daily  atorvastatin 20 milliGRAM(s) Oral at bedtime  buDESOnide    Inhalation Suspension 0.5 milliGRAM(s) Inhalation two times a day  escitalopram 5 milliGRAM(s) Oral daily  folic acid 1 milliGRAM(s) Oral daily  furosemide   Injectable 40 milliGRAM(s) IV Push once  levothyroxine 125 MICROGram(s) Oral daily  methylPREDNISolone sodium succinate Injectable 20 milliGRAM(s) IV Push daily  metoprolol succinate ER 25 milliGRAM(s) Oral at bedtime  midodrine. 2.5 milliGRAM(s) Oral three times a day  multivitamin 1 Tablet(s) Oral daily  polyethylene glycol 3350 17 Gram(s) Oral daily  rivaroxaban 10 milliGRAM(s) Oral with dinner  senna 2 Tablet(s) Oral at bedtime  thiamine 100 milliGRAM(s) Oral daily    MEDICATIONS  (PRN):  aluminum hydroxide/magnesium hydroxide/simethicone Suspension 30 milliLiter(s) Oral every 4 hours PRN Dyspepsia  melatonin 3 milliGRAM(s) Oral at bedtime PRN Insomnia  ondansetron Injectable 4 milliGRAM(s) IV Push every 6 hours PRN Nausea and/or Vomiting  oxyCODONE    IR 2.5 milliGRAM(s) Oral every 8 hours PRN Severe Pain (7 - 10)  traMADol 25 milliGRAM(s) Oral every 12 hours PRN Mild Pain (1 - 3)      Vital Signs Last 24 Hrs  T(C): 36.3 (17 Feb 2024 20:43), Max: 36.5 (17 Feb 2024 08:12)  T(F): 97.4 (17 Feb 2024 20:43), Max: 97.7 (17 Feb 2024 08:12)  HR: 82 (17 Feb 2024 20:43) (82 - 105)  BP: 101/66 (17 Feb 2024 20:43) (93/54 - 101/66)  BP(mean): --  RR: 18 (17 Feb 2024 20:43) (17 - 18)  SpO2: 96% (17 Feb 2024 20:43) (93% - 96%)    Parameters below as of 17 Feb 2024 20:43  Patient On (Oxygen Delivery Method): nasal cannula  O2 Flow (L/min): 4    PHYSICAL EXAM:  GENERAL: comfortable.  CHEST/LUNG: sclear anteriorly  HEART: S1S2 RRR  ABDOMEN: soft  EXTREMITIES: NO LE edema  SKIN:     LABS:                                   9.8    5.48  )-----------( 116      ( 17 Feb 2024 06:05 )             30.6                9.9    4.66  )-----------( 146      ( 16 Feb 2024 05:46 )             30.9         02-17    128<L>  |  90<L>  |  93<H>  ----------------------------<  165<H>  5.5<H>   |  32<H>  |  2.36<H>    Ca    9.0      17 Feb 2024 06:05  Phos  4.6     02-17    TPro  x   /  Alb  3.2<L>  /  TBili  x   /  DBili  x   /  AST  x   /  ALT  x   /  AlkPhos  x   02-17 02-16    129<L>  |  91<L>  |  86<H>  ----------------------------<  181<H>  5.3   |  36<H>  |  2.46<H>    Ca    9.1      16 Feb 2024 05:46  Phos  3.9     02-15  Mg     2.3     02-15    TPro  x   /  Alb  3.7  /  TBili  x   /  DBili  x   /  AST  x   /  ALT  x   /  AlkPhos  x   02-15      02-15    127<L>  |  90<L>  |  75<H>  ----------------------------<  177<H>  5.2   |  32<H>  |  2.37<H>    Ca    8.9      15 Feb 2024 06:18  Phos  3.9     02-15  Mg     2.3     02-15    TPro  x   /  Alb  3.7  /  TBili  x   /  DBili  x   /  AST  x   /  ALT  x   /  AlkPhos  x   02-15      02-14    127<L>  |  91<L>  |  61<H>  ----------------------------<  167<H>  5.7<H>   |  33<H>  |  2.19<H>    Ca    9.1      14 Feb 2024 06:03  Phos  3.8     02-14  Mg     2.2     02-14        Urinalysis Basic - ( 14 Feb 2024 06:03 )    Color: x / Appearance: x / SG: x / pH: x  Gluc: 167 mg/dL / Ketone: x  / Bili: x / Urobili: x   Blood: x / Protein: x / Nitrite: x   Leuk Esterase: x / RBC: x / WBC x   Sq Epi: x / Non Sq Epi: x / Bacteria: x      Magnesium: 2.2 mg/dL (02-14 @ 06:03)  Phosphorus: 3.8 mg/dL (02-14 @ 06:03)          RADIOLOGY & ADDITIONAL TESTS:

## 2024-02-17 NOTE — PROGRESS NOTE ADULT - SUBJECTIVE AND OBJECTIVE BOX
HOSPITALIST ATTENDING PROGRESS NOTE    Chart and meds reviewed.  Patient seen and examined.    CC: CHF    Subjective: Remains on lasix drip, weaning O2 as able, on 4L.     All other systems reviewed and found to be negative with the exception of what has been described above.    MEDICATIONS  (STANDING):  acetaminophen     Tablet .. 975 milliGRAM(s) Oral every 8 hours  albuterol/ipratropium for Nebulization 3 milliLiter(s) Nebulizer every 6 hours  allopurinol 100 milliGRAM(s) Oral daily  atorvastatin 20 milliGRAM(s) Oral at bedtime  buDESOnide    Inhalation Suspension 0.5 milliGRAM(s) Inhalation two times a day  escitalopram 5 milliGRAM(s) Oral daily  folic acid 1 milliGRAM(s) Oral daily  furosemide Infusion 5 mG/Hr (2.5 mL/Hr) IV Continuous <Continuous>  levothyroxine 125 MICROGram(s) Oral daily  methylPREDNISolone sodium succinate Injectable 20 milliGRAM(s) IV Push daily  metoprolol succinate ER 25 milliGRAM(s) Oral at bedtime  midodrine. 2.5 milliGRAM(s) Oral three times a day  multivitamin 1 Tablet(s) Oral daily  polyethylene glycol 3350 17 Gram(s) Oral daily  senna 2 Tablet(s) Oral at bedtime  thiamine 100 milliGRAM(s) Oral daily    MEDICATIONS  (PRN):  aluminum hydroxide/magnesium hydroxide/simethicone Suspension 30 milliLiter(s) Oral every 4 hours PRN Dyspepsia  melatonin 3 milliGRAM(s) Oral at bedtime PRN Insomnia  ondansetron Injectable 4 milliGRAM(s) IV Push every 6 hours PRN Nausea and/or Vomiting  oxyCODONE    IR 2.5 milliGRAM(s) Oral every 8 hours PRN Severe Pain (7 - 10)  traMADol 25 milliGRAM(s) Oral every 12 hours PRN Mild Pain (1 - 3)      VITALS:  T(F): 97.7 (02-17-24 @ 08:12), Max: 97.7 (02-17-24 @ 08:12)  HR: 86 (02-17-24 @ 09:05) (84 - 123)  BP: 93/54 (02-17-24 @ 08:12) (93/54 - 108/68)  RR: 18 (02-17-24 @ 08:12) (17 - 18)  SpO2: 94% (02-17-24 @ 08:12) (93% - 98%)  Wt(kg): --    I&O's Summary    16 Feb 2024 07:01  -  17 Feb 2024 07:00  --------------------------------------------------------  IN: 0 mL / OUT: 900 mL / NET: -900 mL        CAPILLARY BLOOD GLUCOSE          PHYSICAL EXAM:  Gen: NAD  HEENT:  pupils equal and reactive, EOMI, no oropharyngeal lesions, erythema, exudates, oral thrush  NECK:   supple, no carotid bruits, no palpable lymph nodes, no thyromegaly  CV:  +S1, +S2, regular, no murmurs or rubs  RESP:   lungs clear to auscultation bilaterally, no wheezing, rales, rhonchi, good air entry bilaterally  BREAST:  not examined  GI:  abdomen soft, non-tender, non-distended, normal BS, no bruits, no abdominal masses, no palpable masses  RECTAL:  not examined  :  not examined, + jackson  MSK:   normal muscle tone, no atrophy, no rigidity, no contractions  EXT:  no clubbing, no cyanosis, no edema, no calf pain, swelling or erythema  VASCULAR:  pulses equal and symmetric in the upper and lower extremities  NEURO:  AAOX3, no focal neurological deficits, follows all commands, able to move extremities spontaneously  SKIN:  no ulcers, lesions or rashes    LABS:                            9.8    5.48  )-----------( 116      ( 17 Feb 2024 06:05 )             30.6     02-17    128<L>  |  90<L>  |  93<H>  ----------------------------<  165<H>  5.5<H>   |  32<H>  |  2.36<H>    Ca    9.0      17 Feb 2024 06:05  Phos  4.6     02-17    TPro  x   /  Alb  3.2<L>  /  TBili  x   /  DBili  x   /  AST  x   /  ALT  x   /  AlkPhos  x   02-17        LIVER FUNCTIONS - ( 17 Feb 2024 06:05 )  Alb: 3.2 g/dL / Pro: x     / ALK PHOS: x     / ALT: x     / AST: x     / GGT: x             Urinalysis Basic - ( 17 Feb 2024 06:05 )    Color: x / Appearance: x / SG: x / pH: x  Gluc: 165 mg/dL / Ketone: x  / Bili: x / Urobili: x   Blood: x / Protein: x / Nitrite: x   Leuk Esterase: x / RBC: x / WBC x   Sq Epi: x / Non Sq Epi: x / Bacteria: x    Micro:  COVID19 PCR 2/10: Negative  Flu PCR 2/10: Negative  RSV PCR 2/10: Negative  Urine culture 2/10: Negative  Blood culture 2/10: Negative    Imaging:  US kidneys and bladder 2/14: Right kidney 9.1 cm. No renal mass, hydronephrosis or calculi. Left kidney 6.9 cm. Limited visualization. No gross mass, hydronephrosis or calculi. Bladder distended with volume 436 cc. No bladder wall thickening. No intraluminal mass.    CXR 2/13: Frontal expiratory view of the chest shows the heart to be similarly enlarged in size. Left cardiac pacemaker is again noted. The lungs show less pulmonary congestion with smaller pleural effusions and there is no evidence of pneumothorax.    CT chest WO 2/10: Bibasilar compressive atelectasis. No pulmonary consolidation or mass. There are enlarging moderate to large bilateral pleural effusions. No lymphadenopathy. There is enlargement of the main pulmonary artery spanning 3.4cm, consistent with pulmonary arterial hypertension. Severe arterial vascular calcification. There is severe cardiomegaly with four-chamber enlargement.  There are permanent pacemaker with lead tips in the right ventricle. Severe coronary arterial calcification.    CXR 2/10: Bilateral pleural effusions with adjacent atelectasis versus pneumonia at the left base.    Cardiac Testing:  Follow-up TTE 2/15:  Mild concentric left ventricular hypertrophy is present. Mildly reduced systolic function. Estimated LVEF 45-50%. Septal flattening is seen; this finding is consistent with right heart pressure / volume overload. The right ventricle is severely dilated with reduced function. The right atrium appears severely dilated. The left atrium is mildly dilated. Mild mitral regurgitation is present. The aortic valve appears severely calcified. Valve opening seems to be restricted. Moderate to severe aortic stenosis. Severe tricuspid valve regurgitation is present. Probable severe pulmonary hypertension. Trace pulmonic valvular regurgitation is present.    Tele 2/14: Rate controlled afib    EKG 2/13: Afib with RVR, rate 112    PPM interrogation 2/12: Underlying rhythm afib. Normal functioning single chamber PPM with battery longevity 5.6 years. Afib with V-pacing 19%, overall rate histogram is acceptable, brief RVR noted. No setting change made.    Tele 2/10: Afib rate     EKG 2/10: Afib rate 100s    Prior visit cardiac testing   TTE 9/11:  Limited study to assess tricuspid insufficiency and pulmonary pressure. Severe (4+) tricuspid valve regurgitation is present. Severe pulmonary hypertension. The left ventricle is normal in size, paradoxical septal motion The interventricular septum appears flattened consistent with an RV pressure/volume overload. An apically displaced papillary muscle is noted. Estimated left ventricular ejection fraction is 50-55%. The right atrium appears severely dilated. A device wire is seen in the RV and RA. The right ventricle is moderately dilated with decreased contractility.    LHC 9/6: Mild diffuse atherosclerosis with moderate severe disease of small caliber Cx in a nondominant territory. Elevated right sided filling pressures in setting of severe TR with normal left sided filling pressures. Adequate perfusion.    TTE 8/29: Mild to mod MR. Severe AS. Severe TR. Mild to mod NE. Severe pHTN. Severely dilated LA. LV systolic function mildly impaired; segmental wall motion abnormalities noted. Mild concentric LVH. LVEF 45%. The interventricular septum appears flattened consistent with an RV pressure/volume overload. RA severely dilated. RV with moderate dilation, diffuse hypokinesis, and depression of contractility based on TAPSE. A device wire is seen in the RV and RA. IVC is dilated and not collapsing with inspiration.    Telemetry, personally reviewed:  2/16/24: afib , vpaced  2/17/24: afib, vpaced,

## 2024-02-17 NOTE — PROGRESS NOTE ADULT - SUBJECTIVE AND OBJECTIVE BOX
HPI:  Ms. Brennan is a 89 yo female with a hx HFpEF (LVEF 50-55% based on 2023 TTE, severe TR, severe AS, severe pulmonary hypertension, atrial fibrillation on xarelto s/p PPM, COPD, hypothyroidism, hypertension presenting with several days of worsening SOB. Of note, was started on macrobid for UTI on day prior to admission. Reports adherence with medication. Lives alone, with health aides. Denies chest pain, fevers, chills, cough.     Pt was most recently admitted to  in 2023. At the time, she was evaluated for TAVR. Underwent RHC/LHC showing Mild diffuse atherosclerosis with moderate severe disease of small caliber Cx in a nondominant territory; Elevated right sided filling pressures in setting of severe TR with normal left sided filling pressures. Adequate perfusion. No intervention performed.     Outpatient cardiologist: Dr. Harris      In the ED, she was tachycardic to 107, normotensive, saturating 93%.   Labs notable for hgb 10, Na 123, Hs trop neg x1, proBNP 7548, COVID and RVP neg  CT chest: Severe cardiomegaly with enlarging large bilateral pleural effusions.  Severe pulmonary arterial hypertension.    Cardiology consulted for decompensated HF.   24: Awake alert comfortable, tel AF 90's  24 Patient is comfortable ,  HR controlled   24: Awake alert semirecumbent in bed tele AF HR controlled   2/15/24: Pt sleeping comfortably, NAD.  Thoracentesis planned for today. Tele: Afib rate controlled  24: Pt sleeping but arouses when name called.  Denies cp, + SOB.  Tele: Afib with V pacing, PVC's  : no complaints.      MEDICATIONS:  OUTPATIENT  Home Medications:  allopurinol 100 mg oral tablet: 1 tab(s) orally once a day (10 Feb 2024 14:48)  escitalopram 5 mg oral tablet: 1 tab(s) orally (10 Feb 2024 14:55)  levothyroxine 125 mcg (0.125 mg) oral tablet: 1 tab(s) orally once a day (10 Feb 2024 14:48)  metoprolol succinate 25 mg oral tablet, extended release: 1 tab(s) orally once a day (10 Feb 2024 14:55)  midodrine 5 mg oral tablet: 1 tab(s) orally 2 times a day (10 Feb 2024 14:56)  Multiple Vitamins oral tablet: 1 tab(s) orally once a day (10 Feb 2024 14:55)  nitrofurantoin macrocrystals 100 mg oral capsule: 1 cap(s) orally 2 times a day for 5 days ***course not complete*** (10 Feb 2024 14:55)  potassium chloride 20 mEq oral tablet, extended release: 1 tab(s) orally once a day (10 Feb 2024 14:55)  spironolactone 25 mg oral tablet: 1 tab(s) orally once a day (10 Feb 2024 14:48)  thiamine 100 mg oral tablet: 1 tab(s) orally once a day (10 Feb 2024 14:48)  torsemide 20 mg oral tablet: 1 tab(s) orally 2 times a day (10 Feb 2024 14:48)  Xarelto 10 mg oral tablet: 1 tab(s) orally once a day (10 Feb 2024 14:55)      INPATIENT  MEDICATIONS  (STANDING):  acetaminophen     Tablet .. 975 milliGRAM(s) Oral every 8 hours  albuterol/ipratropium for Nebulization 3 milliLiter(s) Nebulizer every 6 hours  allopurinol 100 milliGRAM(s) Oral daily  atorvastatin 20 milliGRAM(s) Oral at bedtime  buDESOnide    Inhalation Suspension 0.5 milliGRAM(s) Inhalation two times a day  escitalopram 5 milliGRAM(s) Oral daily  folic acid 1 milliGRAM(s) Oral daily  furosemide Infusion 5 mG/Hr (2.5 mL/Hr) IV Continuous <Continuous>  levothyroxine 125 MICROGram(s) Oral daily  methylPREDNISolone sodium succinate Injectable 20 milliGRAM(s) IV Push daily  metoprolol succinate ER 25 milliGRAM(s) Oral at bedtime  midodrine. 2.5 milliGRAM(s) Oral three times a day  multivitamin 1 Tablet(s) Oral daily  polyethylene glycol 3350 17 Gram(s) Oral daily  senna 2 Tablet(s) Oral at bedtime  thiamine 100 milliGRAM(s) Oral daily    MEDICATIONS  (PRN):  aluminum hydroxide/magnesium hydroxide/simethicone Suspension 30 milliLiter(s) Oral every 4 hours PRN Dyspepsia  melatonin 3 milliGRAM(s) Oral at bedtime PRN Insomnia  ondansetron Injectable 4 milliGRAM(s) IV Push every 6 hours PRN Nausea and/or Vomiting  oxyCODONE    IR 2.5 milliGRAM(s) Oral every 8 hours PRN Severe Pain (7 - 10)  traMADol 25 milliGRAM(s) Oral every 12 hours PRN Mild Pain (1 - 3)          Vital Signs Last 24 Hrs  T(C): 36.5 (2024 08:12), Max: 36.5 (2024 08:12)  T(F): 97.7 (2024 08:12), Max: 97.7 (2024 08:12)  HR: 95 (2024 14:31) (84 - 123)  BP: 93/54 (2024 08:12) (93/54 - 108/68)  BP(mean): --  RR: 18 (2024 08:12) (17 - 18)  SpO2: 94% (2024 08:12) (93% - 98%)    Parameters below as of 2024 08:12  Patient On (Oxygen Delivery Method): nasal cannula  O2 Flow (L/min): 4  Daily     Daily Weight in k.2 (2024 06:17)I&O's Summary    2024 07:01  -  2024 07:00  --------------------------------------------------------  IN: 0 mL / OUT: 900 mL / NET: -900 mL        I&O's Detail    2024 07:01  -  2024 07:00  --------------------------------------------------------  IN:  Total IN: 0 mL    OUT:    Indwelling Catheter - Urethral (mL): 900 mL  Total OUT: 900 mL    Total NET: -900 mL          I&O's Summary    2024 07:01  -  2024 07:00  --------------------------------------------------------  IN: 0 mL / OUT: 900 mL / NET: -900 mL        PHYSICAL EXAM:  Constitutional: NAD, awake and frail   HEENT:  EOMI,  Pupils round, No oral cyanosis.  Pulmonary: Non-labored, diminished at bases bilaterally  Cardiovascular: irregular, + systolic murmur   Gastrointestinal: Abd soft, nontender.   Lymph: bilateral LE edema noted 1+  Neurological: Alert and oriented x2, no focal deficits  Psych:  Mood & affect appropriate      ===============================  ===============================  LABS:                         9.8    5.48  )-----------( 116      ( 2024 06:05 )             30.6                         9.9    4.66  )-----------( 146      ( 2024 05:46 )             30.9                         9.9    6.91  )-----------( 161      ( 15 Feb 2024 06:18 )             30.7     2024 06:05    128    |  90     |  93     ----------------------------<  165    5.5     |  32     |  2.36   2024 05:46    129    |  91     |  86     ----------------------------<  181    5.3     |  36     |  2.46   15 Feb 2024 20:20    127    |  89     |  83     ----------------------------<  267    5.8     |  33     |  2.48     Ca    9.0        2024 06:05  Ca    9.1        2024 05:46  Ca    8.9        15 2024 20:20  Phos  4.6       2024 06:05  Phos  3.9       15 2024 06:18  Mg     2.3       15 2024 06:18    TPro  x      /  Alb  3.2    /  TBili  x      /  DBili  x      /  AST  x      /  ALT  x      /  AlkPhos  x      2024 06:05  TPro  x      /  Alb  3.7    /  TBili  x      /  DBili  x      /  AST  x      /  ALT  x      /  AlkPhos  x      15 2024 06:18      ===============================  ===============================  CARDIAC BIOMARKERS:  BNP  Serum Pro-Brain Natriuretic Peptide: 54174 pg/mL *H* [0 - 450] (22 @ 09:30)  Serum Pro-Brain Natriuretic Peptide: 60443 pg/mL *H* [0 - 450] (22 @ 04:40)  Serum Pro-Brain Natriuretic Peptide: 43830 pg/mL *H* [0 - 450] (22 @ 14:26)      TROPONIN  Troponin I, High Sensitivity Result: 22.91 ng/L (02-10-24 @ 12:54)  Troponin I, High Sensitivity Result: 37.45 ng/L (23 @ 16:07)  Troponin I, High Sensitivity Result: 141.88 ng/L *H* (22 @ 07:20)  Troponin I, High Sensitivity Result: 161.96 ng/L *H* (22 @ 05:51)  Troponin I, High Sensitivity Result: 210.61 ng/L *H* (22 @ 14:26)      ===============================  ===============================      e< from: Transthoracic Echocardiogram Follow Up (23 @ 08:36) >  Impression     Summary     Limited study to assess tricuspid insufficiency and pulmonary pressure.   Severe (4+) tricuspid valve regurgitation is present.   Severe pulmonary hypertension.   The left ventricle is normal in size, paradoxical septal motion   The interventricular septum appears flattened consistent with an RV   pressure/volume overload.   An apically displaced papillary muscle is noted.   Estimated left ventricular ejection fraction is 50-55%.   The right atrium appears severely dilated.   A device wire is seen in the RV and RA.   The right ventricle is moderately dilated with decreased contractility.     Signature     ----------------------------------------------------------------   Electronically signed by Venugopal Palla, MD(Interpreting   physician) on 2023 04:51 PM   ----------------------------------------------------------------    < end of copied text >    < from: Cardiac Catheterization (23 @ 11:59) >    Diagnostic Findings:     Coronary Angiography   The coronary circulation is right dominant.      LM   Left main artery: Angiography shows mild atherosclerosis.      LAD   Proximal left anterior descending: There is a 30 % stenosis.      CX   Distal circumflex: The segment is extremely small. There is a 70 %  stenosis.    RCA   Proximal right coronary artery: There is a 40 % stenosis.      < end of copied text >    Diagnostic Conclusions:     1. Mild diffuse atherosclerosis with moderate severe disease of small  caliber Cx in a nondominant territory.  2. Elevated right sided filling pressures in setting of severe TR with  normal left sided filling pressures. Adequate perfusion.  Recommendations:     1. Recommend aggressive medical management for CAD as per ACC/AHA  guidelines  2. Structural heart follow up at  for ongoing JAYNA evaluation.        < from: Xray Chest 1 View- PORTABLE-Urgent (Xray Chest 1 View- PORTABLE-Urgent .) (24 @ 18:46) >  IMPRESSION:  Decreased congestive changes.    < end of copied text >    < from: CT Chest No Cont (02.10.24 @ 14:34) >  IMPRESSION:    Severe cardiomegaly with enlarging large bilateral pleural effusions.    Severe pulmonary arterial hypertension.      < end of copied text >    < from: Xray Chest 1 View-PORTABLE IMMEDIATE (Xray Chest 1 View-PORTABLE IMMEDIATE .) (24 @ 03:16) >  FINDINGS:    2/15/2024 at 7:15 AM: The cardiac silhouette is enlarged, unchanged.   There is a left-sided single lead pacemaker, unchanged. There are   bilateral pleural effusions and mild pulmonary vascular congestion, not   significant changed. No focal consolidation is seen. Surgical clips   overlie the left axilla. There is bilateral glenohumeral arthrosis. No   pneumothorax is seen.    2/15/2024 at 5:14 PM: The patient's chin obscures the bilateral lung   apices. Otherwise, there has been no significant interval change.    2024 at 2:48 AM: No significant interval change.    IMPRESSION: Stable cardiomegaly. Bilateral pleural effusions and mild   pulmonary edema, not significantly changed. No pneumothorax is seen.    < end of copied text >    < from: TTE Echo Complete w/o Contrast w/ Doppler (02.15.24 @ 09:19) >   Impression     Summary     Mild concentric left ventricular hypertrophy is present.   Mildly reduced systolic function. Estimated LVEF 45-50%.   Septal flattening is seen; this finding is consistent with right heart   pressure / volume overload.   The right ventricle is severely dilated with reduced function.   The right atrium appears severely dilated.   The left atrium is mildly dilated.   Mild mitral regurgitation is present.   The aortic valve appears severely calcified. Valve opening seems to be   restricted.   Moderate to severe aortic stenosis.   Severe tricuspid valve regurgitation is present.   Probable severe pulmonary hypertension.   Trace pulmonic valvular regurgitation is present.      < end of copied text >

## 2024-02-17 NOTE — PROGRESS NOTE ADULT - PROBLEM SELECTOR PLAN 3
-Recent cath 9/2023 as above: Mild diffuse atherosclerosis with moderate severe disease of small  caliber Cx in a nondominant territory.  -cont medical management, statin initiated ( per patient and daughter patient never on a statin)

## 2024-02-17 NOTE — PROGRESS NOTE ADULT - PROBLEM SELECTOR PLAN 2
-Previously evaluated by Dr Levin for structural intervention- pt and family had opted for medical management.    -My coworker had prior conversation with patient and daughter who wishes to CW medical management

## 2024-02-17 NOTE — PROGRESS NOTE ADULT - PROBLEM SELECTOR PLAN 4
-persistent Afib, on AC with xarelto  -weaning midodrine as tolerated; cont metoprolol for rate control  -PPM Interrogated 2/12/24 Normal functioning single chamber PPM with battery longevity 5.6 years. Afib with V-pacing 19%, overall rate histogram is acceptable, brief RVR noted. Xarelto was on hold for thoracentesis which was attempted 2/15/24.    -Resume xarelto when cleared by primary team

## 2024-02-17 NOTE — PROGRESS NOTE ADULT - ASSESSMENT
89 yo female with Hx. of Diastolic CHF EF 45%, CAD, HTN, Orthostatic hypotension, Atrial fib. on Xarelto, s/p PPM, COPD, Hypothyroidism, Gout , valvular heart disease, sever AS,  severe TR, severe pumonary HTN, presented in ED BIB form home for SOB. The patient  lives alone and has a private aid. She developed worsening SOB, She had iron infusion 5 days ago and was started on Macrobid for UTI  yesterday.     PROBLEMS:    AC hypoxaemic & hypercapnic respiratory failure  Acute excerbation of COPD  Acute CHF   Bilateral pleural effusion  Hypnatremia   Orthostatic hypotension  Atrial fib-s/p PPM  Non obs CAD    Hypothyroidism  Severe valvular heart disease  UTI     PLAN:    pulmonary stable  IV Lasix drip  BIPAP PRN & TITRATE FIO2/VENOUS GAS  IV SOLU-MEDROLS 20MG Qdaily  AEROSOLS/ NEB  Fluid restrictions  off abx  VTEP-on Xarelto

## 2024-02-17 NOTE — PROGRESS NOTE ADULT - ASSESSMENT
The patient is a 89 yo female with Hx. of Diastolic CHF EF 45%, CAD, HTN, Orthostatic hypotension, Atrial fib. on Xarelto, s/p PPM, COPD, Hypothyroidism, Gout , valvular heart disease, sever AS,  severe TR, severe pumonary HTN, presented in ED BIB form home for SOB. The patient  lives alone and has a private aid. She developed worsening SOB, She had iron infusion 5 days ago and was started on Macrobid for UTI  yesterday.   renal eval for acute hyponatremia in setting of acute CHF/pulm HTN and bilateral plueral effusions    Hyponatremia - improving   suspect hypervolemic due to fluid overload   monitor Na trend during diuresis    IV diuresis as per medicine/cardiology teams    free water/fluid restriction    avoid thiazide diuretics if Na remains low -  ( HCTZ, metalozone etc)    k borderline on Aldactone and KCl supplements - would stop KCL if K rises     2/12 SY  --Hyponatremia with CHF : Sodium level slowly improving : will resume IV lasix.  --JOAQUÍN : creat elevated but remains fluid overloaded : continue IV lasix to stabilize resp status first.  --Decompensated CHF with severe pulm HTN : prognosis grave.  --GOC evaluation ongoing.  --D/c spironolactone for now,  until electrolytes and creat level holding steady.    2/13  Pt with CHF, on IV lasix  HYperkalemia treated with Ca IV and PO Lokelma  overall clinical status improved    2/14 SY  --JOAQUÍN: creat remains elevated.  Resp status improved.  Diuretics on hold for now.     Follow up with Renal sonogram.  --Decompensated CHF/severe pulm HTN : improved and stable   --Persistent Hyponatremia and Hyperkalemia in the setting of JOAQUÍN.  Check Cortisol level as well.    2/15  Seen for worsening dyspnea, hyponatremia/ JOAQUÍN/ CKD  CXR shows effusion viewed on PACs myself- agree with Lasix 40 mg IVp x 1 today, for pigtail procedure for effusion  Hyponatremia, may be from obstructive uropathy Jackson placed  Low Na may also be due to worsening HF, Lasix given  Mild hyperkalemia due to obstr uropathy, CKD , jackson placed and Lasix given  Labs ordered for 8 PM  raise in creat may be noted post diuretic but pt is c/o  SOB supine    2/16  d/w Jose Melgar   CKD/ JOAQUÍN dyspnea, small R apical PTX clearing, CXR still w R effusion and PVC  -agree with Lasix 40 mg IV x 1 now  Monitor serum bicarb, contraction alkalosis, to prevent compensatory hypoventilation  Hyponatremia improving with diuresis  Creat slightly up due to volume contraction  Mild hyperkalemia will monitor may improve with lasix IV  Monitor renetta GROSSMAN in place  Anemia, chronic, Hgb 10 range, will check iron stores, Retic count    2/17  CKD/JOAQUÍN- stable renal function creat slightly up with diuretics  Hyperkalemia- even with iv lasix drip, may need qod Lokelma  Hyponatremia stable at 128-130 range  Hypotension- more pronounced w Lasix drip, will dc Lasix drip monitor clinically in am  Elevated bicarb levels improved down from 36 to 32

## 2024-02-18 NOTE — PROGRESS NOTE ADULT - SUBJECTIVE AND OBJECTIVE BOX
HOSPITALIST ATTENDING PROGRESS NOTE    Chart and meds reviewed.  Patient seen and examined.    CC: CHF    Subjective: Some back pain, Overnight, required venti mask, now on 4L NC. Lasix drip turned off by renal yesterday given borderline BPs. Updated family at bedside.    All other systems reviewed and found to be negative with the exception of what has been described above.    MEDICATIONS  (STANDING):  acetaminophen     Tablet .. 975 milliGRAM(s) Oral every 8 hours  albuterol/ipratropium for Nebulization 3 milliLiter(s) Nebulizer every 6 hours  allopurinol 100 milliGRAM(s) Oral daily  atorvastatin 20 milliGRAM(s) Oral at bedtime  buDESOnide    Inhalation Suspension 0.5 milliGRAM(s) Inhalation two times a day  escitalopram 5 milliGRAM(s) Oral daily  folic acid 1 milliGRAM(s) Oral daily  levothyroxine 125 MICROGram(s) Oral daily  lidocaine   4% Patch 1 Patch Transdermal daily  methylPREDNISolone sodium succinate Injectable 20 milliGRAM(s) IV Push daily  metoprolol succinate ER 25 milliGRAM(s) Oral at bedtime  midodrine. 2.5 milliGRAM(s) Oral three times a day  multivitamin 1 Tablet(s) Oral daily  polyethylene glycol 3350 17 Gram(s) Oral daily  senna 2 Tablet(s) Oral at bedtime  thiamine 100 milliGRAM(s) Oral daily    MEDICATIONS  (PRN):  aluminum hydroxide/magnesium hydroxide/simethicone Suspension 30 milliLiter(s) Oral every 4 hours PRN Dyspepsia  melatonin 3 milliGRAM(s) Oral at bedtime PRN Insomnia  ondansetron Injectable 4 milliGRAM(s) IV Push every 6 hours PRN Nausea and/or Vomiting  oxyCODONE    IR 2.5 milliGRAM(s) Oral every 8 hours PRN Severe Pain (7 - 10)  traMADol 25 milliGRAM(s) Oral every 12 hours PRN Mild Pain (1 - 3)      VITALS:  T(F): 97.7 (02-18-24 @ 07:46), Max: 97.7 (02-18-24 @ 07:46)  HR: 90 (02-18-24 @ 07:46) (82 - 97)  BP: 94/63 (02-18-24 @ 07:46) (94/63 - 101/66)  RR: 18 (02-18-24 @ 07:46) (18 - 18)  SpO2: 99% (02-18-24 @ 07:46) (96% - 99%)  Wt(kg): --    I&O's Summary    17 Feb 2024 07:01  -  18 Feb 2024 07:00  --------------------------------------------------------  IN: 0 mL / OUT: 300 mL / NET: -300 mL    18 Feb 2024 07:01  -  18 Feb 2024 13:44  --------------------------------------------------------  IN: 0 mL / OUT: 550 mL / NET: -550 mL        CAPILLARY BLOOD GLUCOSE          PHYSICAL EXAM:  Gen: NAD  HEENT:  pupils equal and reactive, EOMI, no oropharyngeal lesions, erythema, exudates, oral thrush  NECK:   supple, no carotid bruits, no palpable lymph nodes, no thyromegaly  CV:  +S1, +S2, regular, no murmurs or rubs  RESP:   lungs clear to auscultation bilaterally, no wheezing, rales, rhonchi, good air entry bilaterally  BREAST:  not examined  GI:  abdomen soft, non-tender, non-distended, normal BS, no bruits, no abdominal masses, no palpable masses  RECTAL:  not examined  :  not examined  MSK:   normal muscle tone, no atrophy, no rigidity, no contractions  EXT:  no clubbing, no cyanosis, no edema, no calf pain, swelling or erythema  VASCULAR:  pulses equal and symmetric in the upper and lower extremities  NEURO:  AAOX3, no focal neurological deficits, follows all commands, able to move extremities spontaneously  SKIN:  no ulcers, lesions or rashes    LABS:                            9.8    5.48  )-----------( 116      ( 17 Feb 2024 06:05 )             30.6     02-18    128<L>  |  91<L>  |  102<H>  ----------------------------<  167<H>  5.4<H>   |  32<H>  |  2.40<H>    Ca    8.6      18 Feb 2024 07:02  Phos  4.7     02-18    TPro  x   /  Alb  3.3  /  TBili  x   /  DBili  x   /  AST  x   /  ALT  x   /  AlkPhos  x   02-18        LIVER FUNCTIONS - ( 18 Feb 2024 07:02 )  Alb: 3.3 g/dL / Pro: x     / ALK PHOS: x     / ALT: x     / AST: x     / GGT: x             Urinalysis Basic - ( 18 Feb 2024 07:02 )    Color: x / Appearance: x / SG: x / pH: x  Gluc: 167 mg/dL / Ketone: x  / Bili: x / Urobili: x   Blood: x / Protein: x / Nitrite: x   Leuk Esterase: x / RBC: x / WBC x   Sq Epi: x / Non Sq Epi: x / Bacteria: x    Micro:  COVID19 PCR 2/10: Negative  Flu PCR 2/10: Negative  RSV PCR 2/10: Negative  Urine culture 2/10: Negative  Blood culture 2/10: Negative    Imaging:  US kidneys and bladder 2/14: Right kidney 9.1 cm. No renal mass, hydronephrosis or calculi. Left kidney 6.9 cm. Limited visualization. No gross mass, hydronephrosis or calculi. Bladder distended with volume 436 cc. No bladder wall thickening. No intraluminal mass.    CXR 2/13: Frontal expiratory view of the chest shows the heart to be similarly enlarged in size. Left cardiac pacemaker is again noted. The lungs show less pulmonary congestion with smaller pleural effusions and there is no evidence of pneumothorax.    CT chest WO 2/10: Bibasilar compressive atelectasis. No pulmonary consolidation or mass. There are enlarging moderate to large bilateral pleural effusions. No lymphadenopathy. There is enlargement of the main pulmonary artery spanning 3.4cm, consistent with pulmonary arterial hypertension. Severe arterial vascular calcification. There is severe cardiomegaly with four-chamber enlargement.  There are permanent pacemaker with lead tips in the right ventricle. Severe coronary arterial calcification.    CXR 2/10: Bilateral pleural effusions with adjacent atelectasis versus pneumonia at the left base.    Cardiac Testing:  Follow-up TTE 2/15:  Mild concentric left ventricular hypertrophy is present. Mildly reduced systolic function. Estimated LVEF 45-50%. Septal flattening is seen; this finding is consistent with right heart pressure / volume overload. The right ventricle is severely dilated with reduced function. The right atrium appears severely dilated. The left atrium is mildly dilated. Mild mitral regurgitation is present. The aortic valve appears severely calcified. Valve opening seems to be restricted. Moderate to severe aortic stenosis. Severe tricuspid valve regurgitation is present. Probable severe pulmonary hypertension. Trace pulmonic valvular regurgitation is present.    Tele 2/14: Rate controlled afib    EKG 2/13: Afib with RVR, rate 112    PPM interrogation 2/12: Underlying rhythm afib. Normal functioning single chamber PPM with battery longevity 5.6 years. Afib with V-pacing 19%, overall rate histogram is acceptable, brief RVR noted. No setting change made.    Tele 2/10: Afib rate     EKG 2/10: Afib rate 100s    Prior visit cardiac testing   TTE 9/11:  Limited study to assess tricuspid insufficiency and pulmonary pressure. Severe (4+) tricuspid valve regurgitation is present. Severe pulmonary hypertension. The left ventricle is normal in size, paradoxical septal motion The interventricular septum appears flattened consistent with an RV pressure/volume overload. An apically displaced papillary muscle is noted. Estimated left ventricular ejection fraction is 50-55%. The right atrium appears severely dilated. A device wire is seen in the RV and RA. The right ventricle is moderately dilated with decreased contractility.    LHC 9/6: Mild diffuse atherosclerosis with moderate severe disease of small caliber Cx in a nondominant territory. Elevated right sided filling pressures in setting of severe TR with normal left sided filling pressures. Adequate perfusion.    TTE 8/29: Mild to mod MR. Severe AS. Severe TR. Mild to mod NJ. Severe pHTN. Severely dilated LA. LV systolic function mildly impaired; segmental wall motion abnormalities noted. Mild concentric LVH. LVEF 45%. The interventricular septum appears flattened consistent with an RV pressure/volume overload. RA severely dilated. RV with moderate dilation, diffuse hypokinesis, and depression of contractility based on TAPSE. A device wire is seen in the RV and RA. IVC is dilated and not collapsing with inspiration.    Telemetry, personally reviewed:  2/16/24: afib , vpaced  2/17/24: afib, vpaced,   2/18/24: afib, intermittent desat

## 2024-02-18 NOTE — PROGRESS NOTE ADULT - SUBJECTIVE AND OBJECTIVE BOX
Subjective:    pat c/o backach, required VM alternate with 4lpm nc sat 96%. Afebrile.    Home Medications:  allopurinol 100 mg oral tablet: 1 tab(s) orally once a day (10 Feb 2024 14:48)  escitalopram 5 mg oral tablet: 1 tab(s) orally (10 Feb 2024 14:55)  levothyroxine 125 mcg (0.125 mg) oral tablet: 1 tab(s) orally once a day (10 Feb 2024 14:48)  metoprolol succinate 25 mg oral tablet, extended release: 1 tab(s) orally once a day (10 Feb 2024 14:55)  midodrine 5 mg oral tablet: 1 tab(s) orally 2 times a day (10 Feb 2024 14:56)  Multiple Vitamins oral tablet: 1 tab(s) orally once a day (10 Feb 2024 14:55)  nitrofurantoin macrocrystals 100 mg oral capsule: 1 cap(s) orally 2 times a day for 5 days ***course not complete*** (10 Feb 2024 14:55)  potassium chloride 20 mEq oral tablet, extended release: 1 tab(s) orally once a day (10 Feb 2024 14:55)  spironolactone 25 mg oral tablet: 1 tab(s) orally once a day (10 Feb 2024 14:48)  thiamine 100 mg oral tablet: 1 tab(s) orally once a day (10 Feb 2024 14:48)  torsemide 20 mg oral tablet: 1 tab(s) orally 2 times a day (10 Feb 2024 14:48)  Xarelto 10 mg oral tablet: 1 tab(s) orally once a day (10 Feb 2024 14:55)    MEDICATIONS  (STANDING):  acetaminophen     Tablet .. 975 milliGRAM(s) Oral every 8 hours  albuterol/ipratropium for Nebulization 3 milliLiter(s) Nebulizer every 6 hours  allopurinol 100 milliGRAM(s) Oral daily  atorvastatin 20 milliGRAM(s) Oral at bedtime  buDESOnide    Inhalation Suspension 0.5 milliGRAM(s) Inhalation two times a day  escitalopram 5 milliGRAM(s) Oral daily  folic acid 1 milliGRAM(s) Oral daily  levothyroxine 125 MICROGram(s) Oral daily  lidocaine   4% Patch 1 Patch Transdermal daily  methylPREDNISolone sodium succinate Injectable 20 milliGRAM(s) IV Push daily  metoprolol succinate ER 25 milliGRAM(s) Oral at bedtime  midodrine. 2.5 milliGRAM(s) Oral three times a day  multivitamin 1 Tablet(s) Oral daily  polyethylene glycol 3350 17 Gram(s) Oral daily  senna 2 Tablet(s) Oral at bedtime  thiamine 100 milliGRAM(s) Oral daily    MEDICATIONS  (PRN):  aluminum hydroxide/magnesium hydroxide/simethicone Suspension 30 milliLiter(s) Oral every 4 hours PRN Dyspepsia  melatonin 3 milliGRAM(s) Oral at bedtime PRN Insomnia  ondansetron Injectable 4 milliGRAM(s) IV Push every 6 hours PRN Nausea and/or Vomiting  oxyCODONE    IR 2.5 milliGRAM(s) Oral every 8 hours PRN Severe Pain (7 - 10)  traMADol 25 milliGRAM(s) Oral every 12 hours PRN Mild Pain (1 - 3)      Allergies    codeine (Unknown)    Intolerances        Vital Signs Last 24 Hrs  T(C): 36.5 (18 Feb 2024 07:46), Max: 36.5 (18 Feb 2024 07:46)  T(F): 97.7 (18 Feb 2024 07:46), Max: 97.7 (18 Feb 2024 07:46)  HR: 90 (18 Feb 2024 07:46) (82 - 97)  BP: 94/63 (18 Feb 2024 07:46) (94/63 - 101/66)  BP(mean): --  RR: 18 (18 Feb 2024 07:46) (18 - 18)  SpO2: 99% (18 Feb 2024 07:46) (96% - 99%)    Parameters below as of 18 Feb 2024 07:46  Patient On (Oxygen Delivery Method): mask, Venturi          PHYSICAL EXAMINATION:    NECK:  Supple. No lymphadenopathy. Jugular venous pressure not elevated. Carotids equal.   HEART:   The cardiac impulse has a normal quality. Reg., Nl S1 and S2.  There are no murmurs, rubs or gallops noted  CHEST:  Chest crackles to auscultation. Normal respiratory effort.  ABDOMEN:  Soft and nontender.   EXTREMITIES:  There is no edema.       LABS:                        9.8    5.48  )-----------( 116      ( 17 Feb 2024 06:05 )             30.6     02-18    128<L>  |  91<L>  |  102<H>  ----------------------------<  167<H>  5.4<H>   |  32<H>  |  2.40<H>    Ca    8.6      18 Feb 2024 07:02  Phos  4.7     02-18    TPro  x   /  Alb  3.3  /  TBili  x   /  DBili  x   /  AST  x   /  ALT  x   /  AlkPhos  x   02-18      Urinalysis Basic - ( 18 Feb 2024 07:02 )    Color: x / Appearance: x / SG: x / pH: x  Gluc: 167 mg/dL / Ketone: x  / Bili: x / Urobili: x   Blood: x / Protein: x / Nitrite: x   Leuk Esterase: x / RBC: x / WBC x   Sq Epi: x / Non Sq Epi: x / Bacteria: x

## 2024-02-18 NOTE — PROGRESS NOTE ADULT - SUBJECTIVE AND OBJECTIVE BOX
NEPHROLOGY INTERVAL HPI/OVERNIGHT EVENTS:    Date of Service: 02-14-24 @ 10:24  2/18- in bed her son is at the bedside, pt c/o generalize things including back pain  2/17- pt comfortable, breathing well, on lasix drip 5 mg/ hr  2/16- OOB in chair looks comfortable, responsive her home aide at bedside  2/15- supine, sob, raised head of bed, more comfortable, discussed with HF team Ramón and Dr Sara jackson placed for retention, 500 ml  2/14--appears comfortable.  but c/o intermittent cough and SOB.  2/13- OOB sleeping arousable NAD, evaluated for CKD JOAQUÍN and heart failure  2/12--seen by Dr Hopper yesterday.  Alert,  SOB only slightly better.  Feeling weak.    HPI:   The patient is a 89 yo female with Hx. of Diastolic CHF EF 45%, CAD, HTN, Orthostatic hypotension, Atrial fib. on Xarelto, s/p PPM, COPD, Hypothyroidism, Gout , valvular heart disease, sever AS,  severe TR, severe pumonary HTN, presented in ED BIB form home for SOB. The patient  lives alone and has a private aid. She developed worsening SOB, She had iron infusion 5 days ago and was started on Macrobid for UTI  yesterday.   renal eval for acute hyponatremia in setting of acute CHF/pulm HTN and bilateral plueral effusions    MEDICATIONS  (STANDING):  acetaminophen     Tablet .. 975 milliGRAM(s) Oral every 8 hours  albuterol/ipratropium for Nebulization 3 milliLiter(s) Nebulizer every 6 hours  allopurinol 100 milliGRAM(s) Oral daily  atorvastatin 20 milliGRAM(s) Oral at bedtime  buDESOnide    Inhalation Suspension 0.5 milliGRAM(s) Inhalation two times a day  escitalopram 5 milliGRAM(s) Oral daily  folic acid 1 milliGRAM(s) Oral daily  furosemide   Injectable 40 milliGRAM(s) IV Push once  levothyroxine 125 MICROGram(s) Oral daily  methylPREDNISolone sodium succinate Injectable 20 milliGRAM(s) IV Push daily  metoprolol succinate ER 25 milliGRAM(s) Oral at bedtime  midodrine. 2.5 milliGRAM(s) Oral three times a day  multivitamin 1 Tablet(s) Oral daily  polyethylene glycol 3350 17 Gram(s) Oral daily  rivaroxaban 10 milliGRAM(s) Oral with dinner  senna 2 Tablet(s) Oral at bedtime  thiamine 100 milliGRAM(s) Oral daily    MEDICATIONS  (PRN):  aluminum hydroxide/magnesium hydroxide/simethicone Suspension 30 milliLiter(s) Oral every 4 hours PRN Dyspepsia  melatonin 3 milliGRAM(s) Oral at bedtime PRN Insomnia  ondansetron Injectable 4 milliGRAM(s) IV Push every 6 hours PRN Nausea and/or Vomiting  oxyCODONE    IR 2.5 milliGRAM(s) Oral every 8 hours PRN Severe Pain (7 - 10)  traMADol 25 milliGRAM(s) Oral every 12 hours PRN Mild Pain (1 - 3)        Vital Signs Last 24 Hrs  T(C): 36.5 (18 Feb 2024 07:46), Max: 36.5 (18 Feb 2024 07:46)  T(F): 97.7 (18 Feb 2024 07:46), Max: 97.7 (18 Feb 2024 07:46)  HR: 93 (18 Feb 2024 14:11) (82 - 98)  BP: 94/63 (18 Feb 2024 07:46) (94/63 - 101/66)  BP(mean): --  RR: 18 (18 Feb 2024 07:46) (18 - 18)  SpO2: 99% (18 Feb 2024 07:46) (96% - 99%)  Parameters below as of 18 Feb 2024 07:46  Patient On (Oxygen Delivery Method): mask, Venturi        PHYSICAL EXAM:  GENERAL: comfortable.  CHEST/LUNG:  clear anteriorly  HEART: S1S2 RRR  ABDOMEN: soft  EXTREMITIES: NO LE edema  SKIN:     LABS:                                  9.8    5.48  )-----------( 116      ( 17 Feb 2024 06:05 )             30.6                9.9    4.66  )-----------( 146      ( 16 Feb 2024 05:46 )             30.9     02-18    128<L>  |  91<L>  |  102<H>  ----------------------------<  167<H>  5.4<H>   |  32<H>  |  2.40<H>    Ca    8.6      18 Feb 2024 07:02  Phos  4.7     02-18    TPro  x   /  Alb  3.3  /  TBili  x   /  DBili  x   /  AST  x   /  ALT  x   /  AlkPhos  x   02-18 02-17    128<L>  |  90<L>  |  93<H>  ----------------------------<  165<H>  5.5<H>   |  32<H>  |  2.36<H>    Ca    9.0      17 Feb 2024 06:05  Phos  4.6     02-17    TPro  x   /  Alb  3.2<L>  /  TBili  x   /  DBili  x   /  AST  x   /  ALT  x   /  AlkPhos  x   02-17 02-16    129<L>  |  91<L>  |  86<H>  ----------------------------<  181<H>  5.3   |  36<H>  |  2.46<H>    Ca    9.1      16 Feb 2024 05:46  Phos  3.9     02-15  Mg     2.3     02-15    TPro  x   /  Alb  3.7  /  TBili  x   /  DBili  x   /  AST  x   /  ALT  x   /  AlkPhos  x   02-15      02-15    127<L>  |  90<L>  |  75<H>  ----------------------------<  177<H>  5.2   |  32<H>  |  2.37<H>    Ca    8.9      15 Feb 2024 06:18  Phos  3.9     02-15  Mg     2.3     02-15    TPro  x   /  Alb  3.7  /  TBili  x   /  DBili  x   /  AST  x   /  ALT  x   /  AlkPhos  x   02-15      02-14    127<L>  |  91<L>  |  61<H>  ----------------------------<  167<H>  5.7<H>   |  33<H>  |  2.19<H>    Ca    9.1      14 Feb 2024 06:03  Phos  3.8     02-14  Mg     2.2     02-14        Urinalysis Basic - ( 14 Feb 2024 06:03 )    Color: x / Appearance: x / SG: x / pH: x  Gluc: 167 mg/dL / Ketone: x  / Bili: x / Urobili: x   Blood: x / Protein: x / Nitrite: x   Leuk Esterase: x / RBC: x / WBC x   Sq Epi: x / Non Sq Epi: x / Bacteria: x      Magnesium: 2.2 mg/dL (02-14 @ 06:03)  Phosphorus: 3.8 mg/dL (02-14 @ 06:03)          RADIOLOGY & ADDITIONAL TESTS:

## 2024-02-18 NOTE — PROGRESS NOTE ADULT - PROBLEM SELECTOR PLAN 4
-persistent Afib, on AC with xarelto  -cont metoprolol for rate control  -PPM Interrogated 2/12/24 Normal functioning single chamber PPM with battery longevity 5.6 years. Afib with V-pacing 19%, overall rate histogram is acceptable, brief RVR noted. Xarelto was on hold for thoracentesis which was attempted 2/15/24.    -Resume xarelto when cleared by primary team -persistent Afib, on AC with xarelto  -cont metoprolol for rate control  -PPM Interrogated 2/12/24 Normal functioning single chamber PPM with battery longevity 5.6 years. Afib with V-pacing 19%, overall rate histogram is acceptable, brief RVR noted. Xarelto was on hold for thoracentesis which was attempted 2/15/24.    -will restart NOAC - will change xarelto to eliquis reduced dose given renal insuff. (>80y, <60 kg, Cr >1.5)

## 2024-02-18 NOTE — PROGRESS NOTE ADULT - ASSESSMENT
The patient is a 89 yo female with Hx. of Diastolic CHF EF 45%, CAD, HTN, Orthostatic hypotension, Atrial fib. on Xarelto, s/p PPM, COPD, Hypothyroidism, Gout , valvular heart disease, sever AS,  severe TR, severe pumonary HTN, presented in ED BIB form home for SOB. The patient  lives alone and has a private aid. She developed worsening SOB, She had iron infusion 5 days ago and was started on Macrobid for UTI  yesterday.   renal eval for acute hyponatremia in setting of acute CHF/pulm HTN and bilateral plueral effusions    Hyponatremia - improving   suspect hypervolemic due to fluid overload   monitor Na trend during diuresis    IV diuresis as per medicine/cardiology teams    free water/fluid restriction    avoid thiazide diuretics if Na remains low -  ( HCTZ, metalozone etc)    k borderline on Aldactone and KCl supplements - would stop KCL if K rises     2/12 SY  --Hyponatremia with CHF : Sodium level slowly improving : will resume IV lasix.  --JOAQUÍN : creat elevated but remains fluid overloaded : continue IV lasix to stabilize resp status first.  --Decompensated CHF with severe pulm HTN : prognosis grave.  --GOC evaluation ongoing.  --D/c spironolactone for now,  until electrolytes and creat level holding steady.    2/13  Pt with CHF, on IV lasix  HYperkalemia treated with Ca IV and PO Lokelma  overall clinical status improved    2/14 SY  --JOAQUÍN: creat remains elevated.  Resp status improved.  Diuretics on hold for now.     Follow up with Renal sonogram.  --Decompensated CHF/severe pulm HTN : improved and stable   --Persistent Hyponatremia and Hyperkalemia in the setting of JOAQUÍN.  Check Cortisol level as well.    2/15  Seen for worsening dyspnea, hyponatremia/ JOAQUÍN/ CKD  CXR shows effusion viewed on PACs myself- agree with Lasix 40 mg IVp x 1 today, for pigtail procedure for effusion  Hyponatremia, may be from obstructive uropathy Jackson placed  Low Na may also be due to worsening HF, Lasix given  Mild hyperkalemia due to obstr uropathy, CKD , jackson placed and Lasix given  Labs ordered for 8 PM  raise in creat may be noted post diuretic but pt is c/o  SOB supine    2/16  d/w Jose Melgar   CKD/ JOAQUÍN dyspnea, small R apical PTX clearing, CXR still w R effusion and PVC  -agree with Lasix 40 mg IV x 1 now  Monitor serum bicarb, contraction alkalosis, to prevent compensatory hypoventilation  Hyponatremia improving with diuresis  Creat slightly up due to volume contraction  Mild hyperkalemia will monitor may improve with lasix IV  Monitor Urenetta BLOUNT in place  Anemia, chronic, Hgb 10 range, will check iron stores, Retic count    2/17  CKD/JOAQUÍN- stable renal function creat slightly up with diuretics  Hyperkalemia- even with iv lasix drip, may need qod Lokelma  Hyponatremia stable at 128-130 range  Hypotension- more pronounced w Lasix drip, will dc Lasix drip monitor clinically in am  Elevated bicarb levels improved down from 36 to 32      2/18  CKD/ JOAQUÍN creatinine holding stable  with diuresis  As above,  Lasix drip held due to low BP,   CHF UO on 2-17 900 ml after 5 mg /hr  iv Lasix from ~ 8 pm 2/16 to 9 pm 2/17  discussed with dr Braxton   And discussed with Dr Kat   Will resume the lasix drip and increase the Midodrine to 5 or 10 mg tid to maintain the BP  If bp does not tolerate will reduce or dc                  The patient is a 89 yo female with Hx. of Diastolic CHF EF 45%, CAD, HTN, Orthostatic hypotension, Atrial fib. on Xarelto, s/p PPM, COPD, Hypothyroidism, Gout , valvular heart disease, sever AS,  severe TR, severe pulmonary HTN, presented in ED BIB form home for SOB. The patient  lives alone and has a private aid. She developed worsening SOB, She had iron infusion 5 days ago and was started on Macrobid for UTI  yesterday.   renal eval for acute hyponatremia in setting of acute CHF/pulm HTN and bilateral plueral effusions    Hyponatremia - improving   suspect hypervolemic due to fluid overload   monitor Na trend during diuresis    IV diuresis as per medicine/cardiology teams    free water/fluid restriction    avoid thiazide diuretics if Na remains low -  ( HCTZ, metalozone etc)    k borderline on Aldactone and KCl supplements - would stop KCL if K rises     2/12 SY  --Hyponatremia with CHF : Sodium level slowly improving : will resume IV lasix.  --JOAQUÍN : creat elevated but remains fluid overloaded : continue IV lasix to stabilize resp status first.  --Decompensated CHF with severe pulm HTN : prognosis grave.  --GOC evaluation ongoing.  --D/c spironolactone for now,  until electrolytes and creat level holding steady.    2/13  Pt with CHF, on IV lasix  HYperkalemia treated with Ca IV and PO Lokelma  overall clinical status improved    2/14 SY  --JOAQUÍN: creat remains elevated.  Resp status improved.  Diuretics on hold for now.     Follow up with Renal sonogram.  --Decompensated CHF/severe pulm HTN : improved and stable   --Persistent Hyponatremia and Hyperkalemia in the setting of JOAQUÍN.  Check Cortisol level as well.    2/15  Seen for worsening dyspnea, hyponatremia/ JOAQUÍN/ CKD  CXR shows effusion viewed on PACs myself- agree with Lasix 40 mg IVp x 1 today, for pigtail procedure for effusion  Hyponatremia, may be from obstructive uropathy Jackson placed  Low Na may also be due to worsening HF, Lasix given  Mild hyperkalemia due to obstr uropathy, CKD , jackson placed and Lasix given  Labs ordered for 8 PM  raise in creat may be noted post diuretic but pt is c/o  SOB supine    2/16  d/w Jose Melgar   CKD/ JOAQUÍN dyspnea, small R apical PTX clearing, CXR still w R effusion and PVC  -agree with Lasix 40 mg IV x 1 now  Monitor serum bicarb, contraction alkalosis, to prevent compensatory hypoventilation  Hyponatremia improving with diuresis  Creat slightly up due to volume contraction  Mild hyperkalemia will monitor may improve with lasix IV  Monitor Urenetta BLOUNT in place  Anemia, chronic, Hgb 10 range, will check iron stores, Retic count    2/17  CKD/JOAQUÍN- stable renal function creat slightly up with diuretics  Hyperkalemia- even with iv lasix drip, may need qod Lokelma  Hyponatremia stable at 128-130 range  Hypotension- more pronounced w Lasix drip, will dc Lasix drip monitor clinically in am  Elevated bicarb levels improved down from 36 to 32      2/18  CKD/ JOAQUÍN creatinine holding stable  with diuresis  As above,  Lasix drip held due to low BP,   CHF UO on 2-17 900 ml after 5 mg /hr  iv Lasix from ~ 8 pm 2/16 to 9 pm 2/17  discussed with dr Braxton   And discussed with Dr Kat   Will resume the lasix drip and increase the Midodrine to 5 or 10 mg tid to maintain the BP  If bp does not tolerate will reduce or dc

## 2024-02-18 NOTE — PROGRESS NOTE ADULT - PROBLEM SELECTOR PLAN 1
-Pt presenting with SOB likely multifactorial 2/2 pleural effusions, CHF exacerbation, elevated BNP in setting known valvular disease severe AS, severe TR, severe pHTN, pleural effusions, compressive atelectasis, COPD  -Thoracentesis attempted 2/15/24, s/p small apical PTX .    -F/U Echo reveals mild LVH, Mildly reduced systolic function. EF 45-50%. Septal flattening is seen; c/w right heart pressure / volume overload.  Mild MR, Moderate to severe AS, Severe TR, Probable severe pulmonary hypertension.    -D/W with OSITO Melgar 2/16 - lasix gtt started on 2/16  held for hypotension overnight.  consider increasing midodrine from 2.5 to 5 TID  & attempt diuresis w/ lasix gtt again. -Pt presenting with SOB likely multifactorial 2/2 pleural effusions, CHF exacerbation, elevated BNP in setting known valvular disease severe AS, severe TR, severe pHTN, pleural effusions, compressive atelectasis, COPD  -Thoracentesis attempted 2/15/24, s/p small apical PTX .    -F/U Echo reveals mild LVH, Mildly reduced systolic function. EF 45-50%. Septal flattening is seen; c/w right heart pressure / volume overload.  Mild MR, Moderate to severe AS, Severe TR, Probable severe pulmonary hypertension.    -D/W with OSITO Melgar 2/16 - lasix gtt started on 2/16  held for hypotension overnight.  Will increase midodrine from 2.5 to 5 TID  & attempt diuresis w/ lasix gtt again.

## 2024-02-18 NOTE — PROGRESS NOTE ADULT - ASSESSMENT
89 year-old woman with DM type II, HTN, CAD, chronic diastolic CHF, chronic atrial fibrillation on Xarelto, hx of PPM placement, severe aortic stenosis, severe tricuspid regurgitation, severe pulmonary HTN, chronic orthostatic hypotension on midodrine, COPD, hypothyroidism, gout, just started on macrobid for 1 day for UTI, presented with worsening shortness of breath. Reports adherence with medication. Lives alone, with health aides. Denied chest pain, fevers, chills, cough. She was most recently admitted to  in 9/2023. At the time, she was evaluated for TAVR. Underwent RHC/LHC showing mild diffuse atherosclerosis with moderate severe disease of small caliber Cx in a nondominant territory. She had elevated right sided filling pressures in setting of severe TR with normal left sided filling pressures. Adequate perfusion. No intervention performed. Her outpatient cardiologist is Dr. Harris. In the ED, she was tachycardic to 107, normotensive, saturating 93%. She had increased work of breathing. Labs notable for Hgb 10, Na 123, Hs trop neg x1, proBNP 7548, COVID and RVP neg. CT chest w/ severe cardiomegaly with enlarging large bilateral pleural effusions. Severe pulmonary arterial hypertension. Admitted for further management of acute respiratory failure.     Acute hypoxic and hypercapneic respiratory failure  Likely multi factorial due to CHF exacerbation, severe AS, severe TR, severe pHTN, pleural effusions, compressive atelectasis, COPD with probable exacerbation. No sign of pneumonia at this point. Had required NIPPV with BiPAP to reduce work of breathing and improve hypercapnea. Discontinued BiPAP this AM as she has not been using it, citing discomfort with the mask etc. Patient now on O2 via NC, 3L/min. Subjectively, she feels only marginally improved since admission. Continues to have dyspnea supine. No chest pain or tightness. No cough. No fever or chills. No other complaints aside for generalized weakness.   - Continue O2 supplementation as needed, wean as tolerated, goal SPO2 92% or greater  - Continue to treat underlying issues, see below  - had planned for pigtail but developed small PTX, will not pursue IR drainage, continue diuresis as tolerated  -s/p lasix drip, now off, will need to d/w cards/renal re: next steps in diuresis    Acute on chronic diastolic CHF  Patient presenting with SOB, pleural effusions, elevated BNP in setting known valvular disease, HFpEF. Last TTE with LVEF 50-55%, severe TR, severe AS. At home, shes on torsemide 20mg BID. Some improvement in oxygenation with IV Lasix but Cr up-trended significantly and diuretics were placed on hold. Appreciate input from Cardiology and Nephrology today. Still find patient volume overloaded and note that patient is now with indwelling Espinoza placed yesterday for retention, recommended administering Lasix 40mg IV x 1 today. Follow up TTE obtained. EF 45-50%. Septal flattening consistent with right heart pressure / volume overload. RB severely dilated with reduced function. RA severely dilated. LA mildly dilated. AS described as mod-severe. TR severe. Probably severe pHTN.  -s/p lasix drip, now off, will need to d/w cards/renal re: next steps in diuresis  - Continue metoprolol  - Monitor Is and Os, daily wt  - Trend Cr and lytes  - Continue fluid restriction   - F/u with Cardiology, consulted CHF Team    Severe aortic stenosis   Previously evaluated by Dr Levin for structural intervention / JAYNA. Patient and family had opted for medical management.  - Continue medical management    CAD  Recent cath 9/2023 with mild diffuse atherosclerosis with moderate severe disease of small caliber Cx in a nondominant territory.  - Continue med management with metoprolol, statin    Chronic atrial fibrillation, hx of PPM  RVR in the ED in setting of active respiratory symptoms. PPM interrogated 2/12/24. Normal functioning single chamber PPM with battery longevity 5.6 years. Afib with V-pacing 19%, overall rate histogram is acceptable, brief RVR noted. No setting change made.  - Continue metoprolol  - Trying to wean midodrine as tolerated  -holding Xarelto as CrCl<15, can likely resume in coming days as renal fxn improves    COPD with acute exacerbation  Appreciate input from Pulmonary Medicine  - Continue systemic steroids with methylprednisolone, now 20qd  - Continue DuoNeb q6h    Significant pleural effusions B/L  -s/p lasix drip, now off, will need to d/w cards/renal re: next steps in diuresis  -pigtail attempted but w PTX, will hold off on IR attempt for now    Hyponatremia  Suspect hypervolemic due to fluid overload. Persisting.   - Free water/fluid restriction   - Avoid thiazide diuretics if Na remains low ( HCTZ, metolazone etc)   - Nephrology following    Hyperkalemia  In setting of concurrent spironolactone and potassium supplementation tabs. Stopped both and administered hyperkalemia protocol. K has improved but remains >5. Now could be related to JOAQUÍN, see below.  - may need standing lokelma    JOAQUÍN , urinary retention  Baseline Cr ~0.9 as was on presentation. Increased to significantly since then, now 2.19. With patient having received IV Lasix. She has not received any contrast studies. No NSAIDs. She did have UTI diagnosed a day or two prior to admission and has since completed a 3-day course of ceftriaxone but doubt related. Thought she could have retention as she has been rather immobile and constipated. Nephrology consulted. Obtained renal bladder US yesterday 2/14. No sign of hydronephrosis, mass or calculi. Bladder was distended however, volume 436cc. No bladder wall thickening. No bladder mass. Back up on the floors thereafter, patient was not able to void, had bladder scan up to ~540cc, Espinoza placed. Note UA collected upon Espinoza placement is negative for infection.  - Continue with Espinoza (2/14-)  - Strict Is and Os  - Trend Cr  - Avoid nephrotic medications  - Renally dose medications where applicable    UTI, present prior to admission  Recent dx of UTI. Was treated with macrobid for 1 day before getting admitted to hospital. Here she received an empiric 3 day course of ceftriaxone. Urine and blood cultures returned negative. UA collected from Espinoza insertion negative for infection.    DM  type II  A1c 6.3. Well controlled.   - Continue on insulin correctional scale for now    Hypothyroidism  Stable.   - Continue levothyroxine    Hx of gout  Stable.   - Continue allopurinol    Constipation  No nausea or emesis. Intact bowel sounds.   - Continue bowel regimen    Physical deconditioning and debility  Appreciate input from PT. Recommended home with home PT and more assistance vs [facility]  - Continue restorative PT sessions  - OOB to chair daily    Severe protein calorie malnutrition  Appreciate dietician input  - Trend wt  - Added MVI with minerals daily, thiamine 100mg daily  - Diet supplemented with Ensure High Protein BID      DVT ppx: SCDs, resume Xarelto when CrCl improves  Code Status: DNR/DNI/Trial NIV   Dispo: Medically active

## 2024-02-18 NOTE — PROGRESS NOTE ADULT - SUBJECTIVE AND OBJECTIVE BOX
HPI:  Ms. Brennan is a 89 yo female with a hx HFpEF (LVEF 50-55% based on 2023 TTE, severe TR, severe AS, severe pulmonary hypertension, atrial fibrillation on xarelto s/p PPM, COPD, hypothyroidism, hypertension presenting with several days of worsening SOB. Of note, was started on macrobid for UTI on day prior to admission. Reports adherence with medication. Lives alone, with health aides. Denies chest pain, fevers, chills, cough.     Pt was most recently admitted to  in 2023. At the time, she was evaluated for TAVR. Underwent RHC/LHC showing Mild diffuse atherosclerosis with moderate severe disease of small caliber Cx in a nondominant territory; Elevated right sided filling pressures in setting of severe TR with normal left sided filling pressures. Adequate perfusion. No intervention performed.     Outpatient cardiologist: Dr. Harris      In the ED, she was tachycardic to 107, normotensive, saturating 93%.   Labs notable for hgb 10, Na 123, Hs trop neg x1, proBNP 7548, COVID and RVP neg  CT chest: Severe cardiomegaly with enlarging large bilateral pleural effusions.  Severe pulmonary arterial hypertension.    Cardiology consulted for decompensated HF.   24: Awake alert comfortable, tel AF 90's  24 Patient is comfortable ,  HR controlled   24: Awake alert semirecumbent in bed tele AF HR controlled   2/15/24: Pt sleeping comfortably, NAD.  Thoracentesis planned for today. Tele: Afib rate controlled  24: Pt sleeping but arouses when name called.  Denies cp, + SOB.  Tele: Afib with V pacing, PVC's  : no complaints.  : daughter & son at bedside, dyspnea unchanged.      MEDICATIONS:  OUTPATIENT  Home Medications:  allopurinol 100 mg oral tablet: 1 tab(s) orally once a day (10 Feb 2024 14:48)  escitalopram 5 mg oral tablet: 1 tab(s) orally (10 Feb 2024 14:55)  levothyroxine 125 mcg (0.125 mg) oral tablet: 1 tab(s) orally once a day (10 Feb 2024 14:48)  metoprolol succinate 25 mg oral tablet, extended release: 1 tab(s) orally once a day (10 Feb 2024 14:55)  midodrine 5 mg oral tablet: 1 tab(s) orally 2 times a day (10 Feb 2024 14:56)  Multiple Vitamins oral tablet: 1 tab(s) orally once a day (10 Feb 2024 14:55)  nitrofurantoin macrocrystals 100 mg oral capsule: 1 cap(s) orally 2 times a day for 5 days ***course not complete*** (10 Feb 2024 14:55)  potassium chloride 20 mEq oral tablet, extended release: 1 tab(s) orally once a day (10 Feb 2024 14:55)  spironolactone 25 mg oral tablet: 1 tab(s) orally once a day (10 Feb 2024 14:48)  thiamine 100 mg oral tablet: 1 tab(s) orally once a day (10 Feb 2024 14:48)  torsemide 20 mg oral tablet: 1 tab(s) orally 2 times a day (10 Feb 2024 14:48)  Xarelto 10 mg oral tablet: 1 tab(s) orally once a day (10 Feb 2024 14:55)      INPATIENT  MEDICATIONS  (STANDING):  acetaminophen     Tablet .. 975 milliGRAM(s) Oral every 8 hours  albuterol/ipratropium for Nebulization 3 milliLiter(s) Nebulizer every 6 hours  allopurinol 100 milliGRAM(s) Oral daily  atorvastatin 20 milliGRAM(s) Oral at bedtime  buDESOnide    Inhalation Suspension 0.5 milliGRAM(s) Inhalation two times a day  escitalopram 5 milliGRAM(s) Oral daily  folic acid 1 milliGRAM(s) Oral daily  levothyroxine 125 MICROGram(s) Oral daily  lidocaine   4% Patch 1 Patch Transdermal daily  methylPREDNISolone sodium succinate Injectable 20 milliGRAM(s) IV Push daily  metoprolol succinate ER 25 milliGRAM(s) Oral at bedtime  midodrine. 2.5 milliGRAM(s) Oral three times a day  multivitamin 1 Tablet(s) Oral daily  polyethylene glycol 3350 17 Gram(s) Oral daily  senna 2 Tablet(s) Oral at bedtime  thiamine 100 milliGRAM(s) Oral daily    MEDICATIONS  (PRN):  aluminum hydroxide/magnesium hydroxide/simethicone Suspension 30 milliLiter(s) Oral every 4 hours PRN Dyspepsia  melatonin 3 milliGRAM(s) Oral at bedtime PRN Insomnia  ondansetron Injectable 4 milliGRAM(s) IV Push every 6 hours PRN Nausea and/or Vomiting  oxyCODONE    IR 2.5 milliGRAM(s) Oral every 8 hours PRN Severe Pain (7 - 10)  traMADol 25 milliGRAM(s) Oral every 12 hours PRN Mild Pain (1 - 3)          Vital Signs Last 24 Hrs  T(C): 36.5 (2024 07:46), Max: 36.5 (2024 07:46)  T(F): 97.7 (2024 07:46), Max: 97.7 (2024 07:46)  HR: 93 (2024 14:11) (82 - 98)  BP: 94/63 (2024 07:46) (94/63 - 101/66)  BP(mean): --  RR: 18 (2024 07:46) (18 - 18)  SpO2: 99% (:46) (96% - 99%)    Parameters below as of 2024 07:46  Patient On (Oxygen Delivery Method): mask, Venturi    Daily     Daily Weight in k.2 (2024 06:15)I&O's Summary    2024 07:01  -  2024 07:00  --------------------------------------------------------  IN: 0 mL / OUT: 300 mL / NET: -300 mL    2024 07:01  -  2024 15:17  --------------------------------------------------------  IN: 0 mL / OUT: 550 mL / NET: -550 mL        I&O's Detail    2024 07:01  -  2024 07:00  --------------------------------------------------------  IN:  Total IN: 0 mL    OUT:    Indwelling Catheter - Urethral (mL): 300 mL  Total OUT: 300 mL    Total NET: -300 mL      2024 07:  -  2024 15:17  --------------------------------------------------------  IN:  Total IN: 0 mL    OUT:    Indwelling Catheter - Urethral (mL): 550 mL  Total OUT: 550 mL    Total NET: -550 mL          I&O's Summary    2024 07:01  -  2024 07:00  --------------------------------------------------------  IN: 0 mL / OUT: 300 mL / NET: -300 mL    2024 07:  -  2024 15:17  --------------------------------------------------------  IN: 0 mL / OUT: 550 mL / NET: -550 mL        PHYSICAL EXAM:  Constitutional: NAD, awake and frail   HEENT:  EOMI,  Pupils round, No oral cyanosis.  Pulmonary: Non-labored, diminished at bases bilaterally  Cardiovascular: irregular, + systolic murmur   Gastrointestinal: Abd soft, nontender.   Lymph: bilateral LE edema noted 1+  Neurological: Alert and oriented x2, no focal deficits  Psych:  Mood & affect appropriate    ===============================  ===============================  LABS:                         9.8    5.48  )-----------( 116      ( 2024 06:05 )             30.6                         9.9    4.66  )-----------( 146      ( 2024 05:46 )             30.9     2024 07:02    128    |  91     |  102    ----------------------------<  167    5.4     |  32     |  2.40   2024 06:05    128    |  90     |  93     ----------------------------<  165    5.5     |  32     |  2.36   2024 05:46    129    |  91     |  86     ----------------------------<  181    5.3     |  36     |  2.46     Ca    8.6        2024 07:02  Ca    9.0        2024 06:05  Ca    9.1        2024 05:46  Phos  4.7       2024 07:02  Phos  4.6       2024 06:05    TPro  x      /  Alb  3.3    /  TBili  x      /  DBili  x      /  AST  x      /  ALT  x      /  AlkPhos  x      2024 07:02  TPro  x      /  Alb  3.2    /  TBili  x      /  DBili  x      /  AST  x      /  ALT  x      /  AlkPhos  x      2024 06:05      ===============================  ===============================  CARDIAC BIOMARKERS:  BNP  Serum Pro-Brain Natriuretic Peptide: 14795 pg/mL *H* [0 - 450] (22 @ 09:30)  Serum Pro-Brain Natriuretic Peptide: 37031 pg/mL *H* [0 - 450] (22 @ 04:40)  Serum Pro-Brain Natriuretic Peptide: 50861 pg/mL *H* [0 - 450] (22 @ 14:26)      TROPONIN  Troponin I, High Sensitivity Result: 22.91 ng/L (02-10-24 @ 12:54)  Troponin I, High Sensitivity Result: 37.45 ng/L (23 @ 16:07)  Troponin I, High Sensitivity Result: 141.88 ng/L *H* (22 @ 07:20)  Troponin I, High Sensitivity Result: 161.96 ng/L *H* (22 @ 05:51)  Troponin I, High Sensitivity Result: 210.61 ng/L *H* (22 @ 14:26)      ===============================  ===============================      e< from: Transthoracic Echocardiogram Follow Up (23 @ 08:36) >  Impression     Summary     Limited study to assess tricuspid insufficiency and pulmonary pressure.   Severe (4+) tricuspid valve regurgitation is present.   Severe pulmonary hypertension.   The left ventricle is normal in size, paradoxical septal motion   The interventricular septum appears flattened consistent with an RV   pressure/volume overload.   An apically displaced papillary muscle is noted.   Estimated left ventricular ejection fraction is 50-55%.   The right atrium appears severely dilated.   A device wire is seen in the RV and RA.   The right ventricle is moderately dilated with decreased contractility.     Signature     ----------------------------------------------------------------   Electronically signed by Venugopal Palla, MD(Interpreting   physician) on 2023 04:51 PM   ----------------------------------------------------------------    < end of copied text >    < from: Cardiac Catheterization (23 @ 11:59) >    Diagnostic Findings:     Coronary Angiography   The coronary circulation is right dominant.      LM   Left main artery: Angiography shows mild atherosclerosis.      LAD   Proximal left anterior descending: There is a 30 % stenosis.      CX   Distal circumflex: The segment is extremely small. There is a 70 %  stenosis.    RCA   Proximal right coronary artery: There is a 40 % stenosis.      < end of copied text >    Diagnostic Conclusions:     1. Mild diffuse atherosclerosis with moderate severe disease of small  caliber Cx in a nondominant territory.  2. Elevated right sided filling pressures in setting of severe TR with  normal left sided filling pressures. Adequate perfusion.  Recommendations:     1. Recommend aggressive medical management for CAD as per ACC/AHA  guidelines  2. Structural heart follow up at  for ongoing [facility]AY[facility] evaluation.        < from: Xray Chest 1 View- PORTABLE-Urgent (Xray Chest 1 View- PORTABLE-Urgent .) (24 @ 18:46) >  IMPRESSION:  Decreased congestive changes.    < end of copied text >    < from: CT Chest No Cont (02.10.24 @ 14:34) >  IMPRESSION:    Severe cardiomegaly with enlarging large bilateral pleural effusions.    Severe pulmonary arterial hypertension.      < end of copied text >    < from: Xray Chest 1 View-PORTABLE IMMEDIATE (Xray Chest 1 View-PORTABLE IMMEDIATE .) (24 @ 03:16) >  FINDINGS:    2/15/2024 at 7:15 AM: The cardiac silhouette is enlarged, unchanged.   There is a left-sided single lead pacemaker, unchanged. There are   bilateral pleural effusions and mild pulmonary vascular congestion, not   significant changed. No focal consolidation is seen. Surgical clips   overlie the left axilla. There is bilateral glenohumeral arthrosis. No   pneumothorax is seen.    2/15/2024 at 5:14 PM: The patient's chin obscures the bilateral lung   apices. Otherwise, there has been no significant interval change.    2024 at 2:48 AM: No significant interval change.    IMPRESSION: Stable cardiomegaly. Bilateral pleural effusions and mild   pulmonary edema, not significantly changed. No pneumothorax is seen.    < end of copied text >    < from: TTE Echo Complete w/o Contrast w/ Doppler (02.15.24 @ 09:19) >   Impression     Summary     Mild concentric left ventricular hypertrophy is present.   Mildly reduced systolic function. Estimated LVEF 45-50%.   Septal flattening is seen; this finding is consistent with right heart   pressure / volume overload.   The right ventricle is severely dilated with reduced function.   The right atrium appears severely dilated.   The left atrium is mildly dilated.   Mild mitral regurgitation is present.   The aortic valve appears severely calcified. Valve opening seems to be   restricted.   Moderate to severe aortic stenosis.   Severe tricuspid valve regurgitation is present.   Probable severe pulmonary hypertension.   Trace pulmonic valvular regurgitation is present.      ===============================    Alfred Sparks M.D.  Cardiology, [facility]  Cell: [phone]  Offices:   [phone] ([facility] Office)  [phone] ([facility] Office)

## 2024-02-19 NOTE — PROGRESS NOTE ADULT - SUBJECTIVE AND OBJECTIVE BOX
Subjective:    pat better, lying in bed, 5lpm nc [id number]%, afebrile, no new complaints.    Home Medications:  allopurinol 100 mg oral tablet: 1 tab(s) orally once a day (10 Feb 2024 14:48)  escitalopram 5 mg oral tablet: 1 tab(s) orally (10 Feb 2024 14:55)  levothyroxine 125 mcg (0.125 mg) oral tablet: 1 tab(s) orally once a day (10 Feb 2024 14:48)  metoprolol succinate 25 mg oral tablet, extended release: 1 tab(s) orally once a day (10 Feb 2024 14:55)  midodrine 5 mg oral tablet: 1 tab(s) orally 2 times a day (10 Feb 2024 14:56)  Multiple Vitamins oral tablet: 1 tab(s) orally once a day (10 Feb 2024 14:55)  nitrofurantoin macrocrystals 100 mg oral capsule: 1 cap(s) orally 2 times a day for 5 days ***course not complete*** (10 Feb 2024 14:55)  potassium chloride 20 mEq oral tablet, extended release: 1 tab(s) orally once a day (10 Feb 2024 14:55)  spironolactone 25 mg oral tablet: 1 tab(s) orally once a day (10 Feb 2024 14:48)  thiamine 100 mg oral tablet: 1 tab(s) orally once a day (10 Feb 2024 14:48)  torsemide 20 mg oral tablet: 1 tab(s) orally 2 times a day (10 Feb 2024 14:48)  Xarelto 10 mg oral tablet: 1 tab(s) orally once a day (10 Feb 2024 14:55)    MEDICATIONS  (STANDING):  acetaminophen     Tablet .. 975 milliGRAM(s) Oral every 8 hours  albuterol/ipratropium for Nebulization 3 milliLiter(s) Nebulizer every 6 hours  allopurinol 100 milliGRAM(s) Oral daily  apixaban 2.5 milliGRAM(s) Oral two times a day  atorvastatin 20 milliGRAM(s) Oral at bedtime  buDESOnide    Inhalation Suspension 0.5 milliGRAM(s) Inhalation two times a day  escitalopram 5 milliGRAM(s) Oral daily  folic acid 1 milliGRAM(s) Oral daily  furosemide Infusion 5 mG/Hr (2.5 mL/Hr) IV Continuous <Continuous>  levothyroxine 125 MICROGram(s) Oral daily  lidocaine   4% Patch 1 Patch Transdermal daily  methylPREDNISolone sodium succinate Injectable 20 milliGRAM(s) IV Push daily  metoprolol succinate ER 25 milliGRAM(s) Oral at bedtime  midodrine. 5 milliGRAM(s) Oral three times a day  multivitamin 1 Tablet(s) Oral daily  polyethylene glycol 3350 17 Gram(s) Oral daily  senna 2 Tablet(s) Oral at bedtime  thiamine 100 milliGRAM(s) Oral daily    MEDICATIONS  (PRN):  aluminum hydroxide/magnesium hydroxide/simethicone Suspension 30 milliLiter(s) Oral every 4 hours PRN Dyspepsia  melatonin 3 milliGRAM(s) Oral at bedtime PRN Insomnia  ondansetron Injectable 4 milliGRAM(s) IV Push every 6 hours PRN Nausea and/or Vomiting  oxyCODONE    IR 2.5 milliGRAM(s) Oral every 8 hours PRN Severe Pain (7 - 10)  traMADol 25 milliGRAM(s) Oral every 12 hours PRN Mild Pain (1 - 3)      Allergies    codeine (Unknown)    Intolerances        Vital Signs Last 24 Hrs  T(C): 36.4 (19 Feb 2024 07:47), Max: 36.4 (19 Feb 2024 07:47)  T(F): 97.5 (19 Feb 2024 07:47), Max: 97.5 (19 Feb 2024 07:47)  HR: 93 (19 Feb 2024 08:16) (89 - 100)  BP: 108/62 (19 Feb 2024 07:47) (108/62 - 123/107)  BP(mean): --  RR: 18 (19 Feb 2024 07:47) (18 - 18)  SpO2: 100% (19 Feb 2024 07:47) (100% - 100%)    Parameters below as of 19 Feb 2024 07:47  Patient On (Oxygen Delivery Method): nasal cannula  O2 Flow (L/min): 5        PHYSICAL EXAMINATION:    NECK:  Supple. No lymphadenopathy. Jugular venous pressure not elevated. Carotids equal.   HEART:   The cardiac impulse has a normal quality. Reg., Nl S1 and S2.  There are no murmurs, rubs or gallops noted  CHEST:  Chest crackles to auscultation. Normal respiratory effort.  ABDOMEN:  Soft and nontender.   EXTREMITIES:  There is no edema.       LABS:    02-19    129<L>  |  93<L>  |  106<H>  ----------------------------<  161<H>  5.0   |  31  |  2.22<H>    Ca    8.6      19 Feb 2024 05:56  Phos  5.0     02-19    TPro  x   /  Alb  3.3  /  TBili  x   /  DBili  x   /  AST  x   /  ALT  x   /  AlkPhos  x   02-19      Urinalysis Basic - ( 19 Feb 2024 05:56 )    Color: x / Appearance: x / SG: x / pH: x  Gluc: 161 mg/dL / Ketone: x  / Bili: x / Urobili: x   Blood: x / Protein: x / Nitrite: x   Leuk Esterase: x / RBC: x / WBC x   Sq Epi: x / Non Sq Epi: x / Bacteria: x

## 2024-02-19 NOTE — PROGRESS NOTE ADULT - ASSESSMENT
89 yo female with Hx. of Diastolic CHF EF 45%, CAD, HTN, Orthostatic hypotension, Atrial fib. on Xarelto, s/p PPM, COPD, Hypothyroidism, Gout , valvular heart disease, sever AS,  severe TR, severe pumonary HTN, presented in ED BIB form home for SOB. The patient  lives alone and has a private aid. She developed worsening SOB, She had iron infusion 5 days ago and was started on Macrobid for UTI  yesterday.     PROBLEMS:    AC hypoxaemic & hypercapnic respiratory failure  Acute excerbation of COPD  Acute CHF   Bilateral pleural effusion  Hypnatremia   Orthostatic hypotension  Atrial fib-s/p PPM  Non obs CAD    Hypothyroidism  Severe valvular heart disease  UTI     PLAN:    pulmonary stable  IV Lasix   BIPAP PRN & TITRATE FIO2  IV SOLU-MEDROLS 20MG Qdaily  AEROSOLS/ NEB  Fluid restrictions  off abx  VTEP-on Xarelto

## 2024-02-19 NOTE — PROGRESS NOTE ADULT - PROBLEM SELECTOR PLAN 4
persistent Afib rate controlled, on AC with xarelto  PPM Interrogated 2/12/24 Normal functioning single chamber PPM with battery longevity 5.6 years. Afib with V-pacing 19%, overall rate histogram is acceptable, brief RVR noted.    xarelto changed to eliquis reduced dose given renal insuff. (>80y, <60 kg, Cr >1.5)

## 2024-02-19 NOTE — PROGRESS NOTE ADULT - PROBLEM SELECTOR PLAN 2
Previously evaluated by Dr Levin for structural intervention- pt and family had opted for medical management.    I had prior conversation with patient and daughter who wishes to CW medical management

## 2024-02-19 NOTE — PROGRESS NOTE ADULT - PROBLEM SELECTOR PLAN 3
Recent cath 9/2023 as above: Mild diffuse atherosclerosis with moderate severe disease of small  caliber Cx in a nondominant territory.  cont medical management, statin initiated

## 2024-02-19 NOTE — PROGRESS NOTE ADULT - ASSESSMENT
The patient is a 89 yo female with Hx. of Diastolic CHF EF 45%, CAD, HTN, Orthostatic hypotension, Atrial fib. on Xarelto, s/p PPM, COPD, Hypothyroidism, Gout , valvular heart disease, sever AS,  severe TR, severe pulmonary HTN, presented in ED BIB form home for SOB. The patient  lives alone and has a private aid. She developed worsening SOB, She had iron infusion 5 days ago and was started on Macrobid for UTI  yesterday.   renal eval for acute hyponatremia in setting of acute CHF/pulm HTN and bilateral plueral effusions    Hyponatremia - improving   suspect hypervolemic due to fluid overload   monitor Na trend during diuresis    IV diuresis as per medicine/cardiology teams    free water/fluid restriction    avoid thiazide diuretics if Na remains low -  ( HCTZ, metalozone etc)    k borderline on Aldactone and KCl supplements - would stop KCL if K rises     2/12 SY  --Hyponatremia with CHF : Sodium level slowly improving : will resume IV lasix.  --JOAQUÍN : creat elevated but remains fluid overloaded : continue IV lasix to stabilize resp status first.  --Decompensated CHF with severe pulm HTN : prognosis grave.  --GOC evaluation ongoing.  --D/c spironolactone for now,  until electrolytes and creat level holding steady.    2/13  Pt with CHF, on IV lasix  HYperkalemia treated with Ca IV and PO Lokelma  overall clinical status improved    2/14 SY  --JOAQUÍN: creat remains elevated.  Resp status improved.  Diuretics on hold for now.     Follow up with Renal sonogram.  --Decompensated CHF/severe pulm HTN : improved and stable   --Persistent Hyponatremia and Hyperkalemia in the setting of JOAQUÍN.  Check Cortisol level as well.    2/15  Seen for worsening dyspnea, hyponatremia/ JOAQUÍN/ CKD  CXR shows effusion viewed on PACs myself- agree with Lasix 40 mg IVp x 1 today, for pigtail procedure for effusion  Hyponatremia, may be from obstructive uropathy Jackson placed  Low Na may also be due to worsening HF, Lasix given  Mild hyperkalemia due to obstr uropathy, CKD , jackson placed and Lasix given  Labs ordered for 8 PM  raise in creat may be noted post diuretic but pt is c/o  SOB supine    2/16  d/w Jose Melgar   CKD/ JOAQUÍN dyspnea, small R apical PTX clearing, CXR still w R effusion and PVC  -agree with Lasix 40 mg IV x 1 now  Monitor serum bicarb, contraction alkalosis, to prevent compensatory hypoventilation  Hyponatremia improving with diuresis  Creat slightly up due to volume contraction  Mild hyperkalemia will monitor may improve with lasix IV  Monitor Urenetta BLOUNT in place  Anemia, chronic, Hgb 10 range, will check iron stores, Retic count    2/17  CKD/JOAQUÍN- stable renal function creat slightly up with diuretics  Hyperkalemia- even with iv lasix drip, may need qod Lokelma  Hyponatremia stable at 128-130 range  Hypotension- more pronounced w Lasix drip, will dc Lasix drip monitor clinically in am  Elevated bicarb levels improved down from 36 to 32      2/18  CKD/ JOAQUÍN creatinine holding stable  with diuresis  As above,  Lasix drip held due to low BP,   CHF UO on 2-17 900 ml after 5 mg /hr  iv Lasix from ~ 8 pm 2/16 to 9 pm 2/17  discussed with dr Braxton   And discussed with Dr Kat   Will resume the lasix drip and increase the Midodrine to 5 or 10 mg tid to maintain the BP  If bp does not tolerate will reduce or dc                  The patient is a 89 yo female with Hx. of Diastolic CHF EF 45%, CAD, HTN, Orthostatic hypotension, Atrial fib. on Xarelto, s/p PPM, COPD, Hypothyroidism, Gout , valvular heart disease, sever AS,  severe TR, severe pulmonary HTN, presented in ED BIB form home for SOB. The patient  lives alone and has a private aid. She developed worsening SOB, She had iron infusion 5 days ago and was started on Macrobid for UTI  yesterday.   renal eval for acute hyponatremia in setting of acute CHF/pulm HTN and bilateral plueral effusions    Hyponatremia - improving   suspect hypervolemic due to fluid overload   monitor Na trend during diuresis    IV diuresis as per medicine/cardiology teams    free water/fluid restriction    avoid thiazide diuretics if Na remains low -  ( HCTZ, metalozone etc)    k borderline on Aldactone and KCl supplements - would stop KCL if K rises     2/12 SY  --Hyponatremia with CHF : Sodium level slowly improving : will resume IV lasix.  --JOAQUÍN : creat elevated but remains fluid overloaded : continue IV lasix to stabilize resp status first.  --Decompensated CHF with severe pulm HTN : prognosis grave.  --GOC evaluation ongoing.  --D/c spironolactone for now,  until electrolytes and creat level holding steady.    2/13  Pt with CHF, on IV lasix  HYperkalemia treated with Ca IV and PO Lokelma  overall clinical status improved    2/14 SY  --JOAQUÍN: creat remains elevated.  Resp status improved.  Diuretics on hold for now.     Follow up with Renal sonogram.  --Decompensated CHF/severe pulm HTN : improved and stable   --Persistent Hyponatremia and Hyperkalemia in the setting of JOAQUÍN.  Check Cortisol level as well.    2/15  Seen for worsening dyspnea, hyponatremia/ JOAQUÍN/ CKD  CXR shows effusion viewed on PACs myself- agree with Lasix 40 mg IVp x 1 today, for pigtail procedure for effusion  Hyponatremia, may be from obstructive uropathy Jackson placed  Low Na may also be due to worsening HF, Lasix given  Mild hyperkalemia due to obstr uropathy, CKD , jackson placed and Lasix given  Labs ordered for 8 PM  raise in creat may be noted post diuretic but pt is c/o  SOB supine    2/16  d/w Jose Melgar   CKD/ JOAQUÍN dyspnea, small R apical PTX clearing, CXR still w R effusion and PVC  -agree with Lasix 40 mg IV x 1 now  Monitor serum bicarb, contraction alkalosis, to prevent compensatory hypoventilation  Hyponatremia improving with diuresis  Creat slightly up due to volume contraction  Mild hyperkalemia will monitor may improve with lasix IV  Monitor UO jackson in place  Anemia, chronic, Hgb 10 range, will check iron stores, Retic count    2/17  CKD/JOAQUÍN- stable renal function creat slightly up with diuretics  Hyperkalemia- even with iv lasix drip, may need qod Lokelma  Hyponatremia stable at 128-130 range  Hypotension- more pronounced w Lasix drip, will dc Lasix drip monitor clinically in am  Elevated bicarb levels improved down from 36 to 32      2/18  CKD/ JOAQUÍN creatinine holding stable  with diuresis  As above,  Lasix drip held due to low BP,   CHF UO on 2-17 900 ml after 5 mg /hr  iv Lasix from ~ 8 pm 2/16 to 9 pm 2/17  discussed with dr Braxton   And discussed with Dr Kat   Will resume the lasix drip and increase the Midodrine to 5 or 10 mg tid to maintain the BP  If bp does not tolerate will reduce or dc     2/19  CKD/JOAQUÍN/ CHF/ Hypotension  joaquín, Creat improving w diuresis  elevated bicarb level improving slightly on BMP  pH and bicarb improved on ABG, pCO2 remains 61  Tolerating Lasix drip intermittently  Will dc lasix drip today pt feels weak and washed out, will allow pt to redistribute  third spacing  Hypotension stable on Midodrine, will continue for now  Discussed with Dr Ruiz and Dr Palla / Cardiology  CXR reviewed and compared to prior by myself mild improvement noted especially R base  Will evaluate in am for cont of lasix drip vs bid IVP dosing

## 2024-02-19 NOTE — PROGRESS NOTE ADULT - SUBJECTIVE AND OBJECTIVE BOX
HPI:  Ms. Brennan is a 89 yo female with a hx HFpEF (LVEF 50-55% based on 9/2023 TTE, severe TR, severe AS, severe pulmonary hypertension, atrial fibrillation on xarelto s/p PPM, COPD, hypothyroidism, hypertension presenting with several days of worsening SOB. Of note, was started on macrobid for UTI on day prior to admission. Reports adherence with medication. Lives alone, with health aides. Denies chest pain, fevers, chills, cough.     Pt was most recently admitted to  in 9/2023. At the time, she was evaluated for TAVR. Underwent RHC/LHC showing Mild diffuse atherosclerosis with moderate severe disease of small caliber Cx in a nondominant territory; Elevated right sided filling pressures in setting of severe TR with normal left sided filling pressures. Adequate perfusion. No intervention performed.     Outpatient cardiologist: Dr. Harris      In the ED, she was tachycardic to 107, normotensive, saturating 93%.   Labs notable for hgb 10, Na 123, Hs trop neg x1, proBNP 7548, COVID and RVP neg  CT chest: Severe cardiomegaly with enlarging large bilateral pleural effusions.  Severe pulmonary arterial hypertension.    Cardiology consulted for decompensated HF.   2/12/24: Awake alert comfortable, tel AF 90's  2/13/24 Patient is comfortable ,  HR controlled   2/14/24: Awake alert semirecumbent in bed tele AF HR controlled   2/15/24: Pt sleeping comfortably, NAD.  Thoracentesis planned for today. Tele: Afib rate controlled  2/16/24: Pt sleeping but arouses when name called.  Denies cp, + SOB.  Tele: Afib with V pacing, PVC's  2/17/'24: no complaints.  2/18/'24: daughter & son at bedside, dyspnea unchanged.  2/19/24: Sleeping but arousable  tele AF  V oaced      MEDICATIONS:  OUTPATIENT  Home Medications:  allopurinol 100 mg oral tablet: 1 tab(s) orally once a day (10 Feb 2024 14:48)  escitalopram 5 mg oral tablet: 1 tab(s) orally (10 Feb 2024 14:55)  levothyroxine 125 mcg (0.125 mg) oral tablet: 1 tab(s) orally once a day (10 Feb 2024 14:48)  metoprolol succinate 25 mg oral tablet, extended release: 1 tab(s) orally once a day (10 Feb 2024 14:55)  midodrine 5 mg oral tablet: 1 tab(s) orally 2 times a day (10 Feb 2024 14:56)  Multiple Vitamins oral tablet: 1 tab(s) orally once a day (10 Feb 2024 14:55)  nitrofurantoin macrocrystals 100 mg oral capsule: 1 cap(s) orally 2 times a day for 5 days ***course not complete*** (10 Feb 2024 14:55)  potassium chloride 20 mEq oral tablet, extended release: 1 tab(s) orally once a day (10 Feb 2024 14:55)  spironolactone 25 mg oral tablet: 1 tab(s) orally once a day (10 Feb 2024 14:48)  thiamine 100 mg oral tablet: 1 tab(s) orally once a day (10 Feb 2024 14:48)  torsemide 20 mg oral tablet: 1 tab(s) orally 2 times a day (10 Feb 2024 14:48)  Xarelto 10 mg oral tablet: 1 tab(s) orally once a day (10 Feb 2024 14:55)      MEDICATIONS  (STANDING):  acetaminophen     Tablet .. 975 milliGRAM(s) Oral every 8 hours  albuterol/ipratropium for Nebulization 3 milliLiter(s) Nebulizer every 6 hours  allopurinol 100 milliGRAM(s) Oral daily  apixaban 2.5 milliGRAM(s) Oral two times a day  atorvastatin 20 milliGRAM(s) Oral at bedtime  buDESOnide    Inhalation Suspension 0.5 milliGRAM(s) Inhalation two times a day  escitalopram 5 milliGRAM(s) Oral daily  folic acid 1 milliGRAM(s) Oral daily  levothyroxine 125 MICROGram(s) Oral daily  lidocaine   4% Patch 1 Patch Transdermal daily  methylPREDNISolone sodium succinate Injectable 20 milliGRAM(s) IV Push daily  metoprolol succinate ER 25 milliGRAM(s) Oral at bedtime  midodrine. 5 milliGRAM(s) Oral three times a day  multivitamin 1 Tablet(s) Oral daily  polyethylene glycol 3350 17 Gram(s) Oral daily  senna 2 Tablet(s) Oral at bedtime  thiamine 100 milliGRAM(s) Oral daily    MEDICATIONS  (PRN):  aluminum hydroxide/magnesium hydroxide/simethicone Suspension 30 milliLiter(s) Oral every 4 hours PRN Dyspepsia  melatonin 3 milliGRAM(s) Oral at bedtime PRN Insomnia  ondansetron Injectable 4 milliGRAM(s) IV Push every 6 hours PRN Nausea and/or Vomiting  oxyCODONE    IR 2.5 milliGRAM(s) Oral every 8 hours PRN Severe Pain (7 - 10)  traMADol 25 milliGRAM(s) Oral every 12 hours PRN Mild Pain (1 - 3)          Vital Signs Last 24 Hrs  T(C): 36.4 (19 Feb 2024 07:47), Max: 36.4 (19 Feb 2024 07:47)  T(F): 97.5 (19 Feb 2024 07:47), Max: 97.5 (19 Feb 2024 07:47)  HR: 93 (19 Feb 2024 08:16) (89 - 100)  BP: 108/62 (19 Feb 2024 07:47) (108/62 - 123/107)  BP(mean): --  RR: 18 (19 Feb 2024 07:47) (18 - 18)  SpO2: 100% (19 Feb 2024 07:47) (100% - 100%)    Parameters below as of 19 Feb 2024 07:47  Patient On (Oxygen Delivery Method): nasal cannula  O2 Flow (L/min): 5          I&O's Detail    17 Feb 2024 07:01  -  18 Feb 2024 07:00  --------------------------------------------------------  IN:  Total IN: 0 mL    OUT:    Indwelling Catheter - Urethral (mL): 300 mL  Total OUT: 300 mL    Total NET: -300 mL      18 Feb 2024 07:01  -  18 Feb 2024 15:17  --------------------------------------------------------  IN:  Total IN: 0 mL    OUT:    Indwelling Catheter - Urethral (mL): 550 mL  Total OUT: 550 mL    Total NET: -550 mL          I&O's Summary    17 Feb 2024 07:01  -  18 Feb 2024 07:00  --------------------------------------------------------  IN: 0 mL / OUT: 300 mL / NET: -300 mL    18 Feb 2024 07:01  -  18 Feb 2024 15:17  --------------------------------------------------------  IN: 0 mL / OUT: 550 mL / NET: -550 mL        PHYSICAL EXAM:  Constitutional: NAD, awake and frail   HEENT:  EOMI,  Pupils round, No oral cyanosis.  Pulmonary: Non-labored, diminished at bases bilaterally  Cardiovascular: irregular, + systolic murmur   Gastrointestinal: Abd soft, nontender.   Lymph: bilateral LE edema noted 1+  Neurological: Alert and oriented x2, no focal deficits  Psych:  Mood & affect appropriate    ===============================  ===============================  LABS:                         9.8    5.48  )-----------( 116      ( 17 Feb 2024 06:05 )             30.6                         9.9    4.66  )-----------( 146      ( 16 Feb 2024 05:46 )             30.9     18 Feb 2024 07:02    128    |  91     |  102    ----------------------------<  167    5.4     |  32     |  2.40   17 Feb 2024 06:05    128    |  90     |  93     ----------------------------<  165    5.5     |  32     |  2.36   16 Feb 2024 05:46    129    |  91     |  86     ----------------------------<  181    5.3     |  36     |  2.46     Ca    8.6        18 Feb 2024 07:02  Ca    9.0        17 Feb 2024 06:05  Ca    9.1        16 Feb 2024 05:46  Phos  4.7       18 Feb 2024 07:02  Phos  4.6       17 Feb 2024 06:05    TPro  x      /  Alb  3.3    /  TBili  x      /  DBili  x      /  AST  x      /  ALT  x      /  AlkPhos  x      18 Feb 2024 07:02  TPro  x      /  Alb  3.2    /  TBili  x      /  DBili  x      /  AST  x      /  ALT  x      /  AlkPhos  x      17 Feb 2024 06:05      ===============================  ===============================  CARDIAC BIOMARKERS:  BNP  Serum Pro-Brain Natriuretic Peptide: 76084 pg/mL *H* [0 - 450] (03-11-22 @ 09:30)  Serum Pro-Brain Natriuretic Peptide: 44501 pg/mL *H* [0 - 450] (02-23-22 @ 04:40)  Serum Pro-Brain Natriuretic Peptide: 34233 pg/mL *H* [0 - 450] (02-21-22 @ 14:26)      TROPONIN  Troponin I, High Sensitivity Result: 22.91 ng/L (02-10-24 @ 12:54)  Troponin I, High Sensitivity Result: 37.45 ng/L (08-30-23 @ 16:07)  Troponin I, High Sensitivity Result: 141.88 ng/L *H* (02-22-22 @ 07:20)  Troponin I, High Sensitivity Result: 161.96 ng/L *H* (02-22-22 @ 05:51)  Troponin I, High Sensitivity Result: 210.61 ng/L *H* (02-21-22 @ 14:26)      ===============================  ===============================      e< from: Transthoracic Echocardiogram Follow Up (09.11.23 @ 08:36) >  Impression     Summary     Limited study to assess tricuspid insufficiency and pulmonary pressure.   Severe (4+) tricuspid valve regurgitation is present.   Severe pulmonary hypertension.   The left ventricle is normal in size, paradoxical septal motion   The interventricular septum appears flattened consistent with an RV   pressure/volume overload.   An apically displaced papillary muscle is noted.   Estimated left ventricular ejection fraction is 50-55%.   The right atrium appears severely dilated.   A device wire is seen in the RV and RA.   The right ventricle is moderately dilated with decreased contractility.     Signature     ----------------------------------------------------------------   Electronically signed by Venugopal Palla, MD(Interpreting   physician) on 09/11/2023 04:51 PM   ----------------------------------------------------------------    < end of copied text >    < from: Cardiac Catheterization (09.06.23 @ 11:59) >    Diagnostic Findings:     Coronary Angiography   The coronary circulation is right dominant.      LM   Left main artery: Angiography shows mild atherosclerosis.      LAD   Proximal left anterior descending: There is a 30 % stenosis.      CX   Distal circumflex: The segment is extremely small. There is a 70 %  stenosis.    RCA   Proximal right coronary artery: There is a 40 % stenosis.      < end of copied text >    Diagnostic Conclusions:     1. Mild diffuse atherosclerosis with moderate severe disease of small  caliber Cx in a nondominant territory.  2. Elevated right sided filling pressures in setting of severe TR with  normal left sided filling pressures. Adequate perfusion.  Recommendations:     1. Recommend aggressive medical management for CAD as per ACC/AHA  guidelines  2. Structural heart follow up at  for ongoing JAYNA evaluation.        < from: Xray Chest 1 View- PORTABLE-Urgent (Xray Chest 1 View- PORTABLE-Urgent .) (02.13.24 @ 18:46) >  IMPRESSION:  Decreased congestive changes.    < end of copied text >    < from: CT Chest No Cont (02.10.24 @ 14:34) >  IMPRESSION:    Severe cardiomegaly with enlarging large bilateral pleural effusions.    Severe pulmonary arterial hypertension.      < end of copied text >    < from: Xray Chest 1 View-PORTABLE IMMEDIATE (Xray Chest 1 View-PORTABLE IMMEDIATE .) (02.16.24 @ 03:16) >  FINDINGS:    2/15/2024 at 7:15 AM: The cardiac silhouette is enlarged, unchanged.   There is a left-sided single lead pacemaker, unchanged. There are   bilateral pleural effusions and mild pulmonary vascular congestion, not   significant changed. No focal consolidation is seen. Surgical clips   overlie the left axilla. There is bilateral glenohumeral arthrosis. No   pneumothorax is seen.    2/15/2024 at 5:14 PM: The patient's chin obscures the bilateral lung   apices. Otherwise, there has been no significant interval change.    12/16/2024 at 2:48 AM: No significant interval change.    IMPRESSION: Stable cardiomegaly. Bilateral pleural effusions and mild   pulmonary edema, not significantly changed. No pneumothorax is seen.    < end of copied text >    < from: TTE Echo Complete w/o Contrast w/ Doppler (02.15.24 @ 09:19) >   Impression     Summary     Mild concentric left ventricular hypertrophy is present.   Mildly reduced systolic function. Estimated LVEF 45-50%.   Septal flattening is seen; this finding is consistent with right heart   pressure / volume overload.   The right ventricle is severely dilated with reduced function.   The right atrium appears severely dilated.   The left atrium is mildly dilated.   Mild mitral regurgitation is present.   The aortic valve appears severely calcified. Valve opening seems to be   restricted.   Moderate to severe aortic stenosis.   Severe tricuspid valve regurgitation is present.   Probable severe pulmonary hypertension.   Trace pulmonic valvular regurgitation is present.      ===============================    Alfred Sparks M.D.  Cardiology, [facility]  Cell: [phone]  Offices:   [phone] ([facility] Office)  [phone] ([facility] Office)

## 2024-02-19 NOTE — PROGRESS NOTE ADULT - SUBJECTIVE AND OBJECTIVE BOX
Chief Complaint: Shortness of breath    Interval Hx: Patient seen and examined this AM. Not much improvement since admission. Continues to require oxygen supplementation. Weak. Tired. Slightly drowsy. Seen by Cardiology and Nephrology today and they've held her Lasix drip.     ROS: Multi system review is comprehensively negative x 10 systems except as above    Vitals:  T(F): 97.5 (19 Feb 2024 07:47), Max: 97.5 (19 Feb 2024 07:47)  HR: 93 (19 Feb 2024 08:16) (89 - 100)  BP: 108/62 (19 Feb 2024 07:47) (108/62 - 123/107)  RR: 18 (19 Feb 2024 07:47) (18 - 18)  SpO2: 100% (19 Feb 2024 07:47) (100% - 100%) on 5L/min    Exam:  Constitutional: No acute distress  HEENT: NCAT PERRL EOMI MMM clear oropharynx  Neck: Supple, no JVD  CVS: S1 and S2, irregular, rate ~90, 2/6 REBECA   Chest: Normal resp effort at rest, diminished breath sounds at bases B/L  Abd: +BS, soft NT ND   : Indwelling Espinoza catheter in place, clear yellow urine in gravity bag  Ext: B/L LE edema  Skin: Warm, dry  Psych: Mood & affect appropriate  Neurological: Slightly drowsy but easily arousable, no focal deficits    Labs:                      9.8   5.48)--------(116            30.6    Iron 66  TIBC 296  Iron sat 22%  Ferritin 802      129 |  93  |  106  ---------------------<  161  5.0   |  31  |  2.22    Ca    8.6    Phos  5.0        TPro  6.6  /  Alb  3.3  /  TBili  0.6  /  DBili  0.2   /  AST  25  /  ALT  19  /  AlkPhos  110    ABG 2/18: pH 7.36  /  pCO2: 61  /  pO2: 72   /  HCO3: 34   /  SaO2: 96        Troponin negative   proBNP 7548    A1c 6.3    Fasting AM cortisol 15.7     UA 2/10: Yellow, clear, neg ketone, neg nitrite, trace LE, 2 WBC, 0 RBC, bact neg    Micro:  COVID19 PCR 2/10: Negative  Flu PCR 2/10: Negative  RSV PCR 2/10: Negative  Urine culture 2/10: Negative  Blood culture 2/10: Negative    Imaging:  US kidneys and bladder 2/14: Right kidney 9.1 cm. No renal mass, hydronephrosis or calculi. Left kidney 6.9 cm. Limited visualization. No gross mass, hydronephrosis or calculi. Bladder distended with volume 436 cc. No bladder wall thickening. No intraluminal mass.    CXR 2/13: Frontal expiratory view of the chest shows the heart to be similarly enlarged in size. Left cardiac pacemaker is again noted. The lungs show less pulmonary congestion with smaller pleural effusions and there is no evidence of pneumothorax.    CT chest WO 2/10: Bibasilar compressive atelectasis. No pulmonary consolidation or mass. There are enlarging moderate to large bilateral pleural effusions. No lymphadenopathy. There is enlargement of the main pulmonary artery spanning 3.4cm, consistent with pulmonary arterial hypertension. Severe arterial vascular calcification. There is severe cardiomegaly with four-chamber enlargement.  There are permanent pacemaker with lead tips in the right ventricle. Severe coronary arterial calcification.    CXR 2/10: Bilateral pleural effusions with adjacent atelectasis versus pneumonia at the left base.    Cardiac Testing:  Tele 2/19: Afib , occ V-paced    Tele 2/18: Afib, intermittent desat    Tele 2/17: Afib, vpaced,     Tele 2/16: Afib , vpaced    Follow-up TTE 2/15:  Mild concentric left ventricular hypertrophy is present. Mildly reduced systolic function. Estimated LVEF 45-50%. Septal flattening is seen; this finding is consistent with right heart pressure / volume overload. The right ventricle is severely dilated with reduced function. The right atrium appears severely dilated. The left atrium is mildly dilated. Mild mitral regurgitation is present. The aortic valve appears severely calcified. Valve opening seems to be restricted. Moderate to severe aortic stenosis. Severe tricuspid valve regurgitation is present. Probable severe pulmonary hypertension. Trace pulmonic valvular regurgitation is present.    Tele 2/14: Rate controlled afib    EKG 2/13: Afib with RVR, rate 112    PPM interrogation 2/12: Underlying rhythm afib. Normal functioning single chamber PPM with battery longevity 5.6 years. Afib with V-pacing 19%, overall rate histogram is acceptable, brief RVR noted. No setting change made.    Tele 2/10: Afib rate     EKG 2/10: Afib rate 100s    Prior visit cardiac testing   TTE 9/11:  Limited study to assess tricuspid insufficiency and pulmonary pressure. Severe (4+) tricuspid valve regurgitation is present. Severe pulmonary hypertension. The left ventricle is normal in size, paradoxical septal motion The interventricular septum appears flattened consistent with an RV pressure/volume overload. An apically displaced papillary muscle is noted. Estimated left ventricular ejection fraction is 50-55%. The right atrium appears severely dilated. A device wire is seen in the RV and RA. The right ventricle is moderately dilated with decreased contractility.    LHC 9/6: Mild diffuse atherosclerosis with moderate severe disease of small caliber Cx in a nondominant territory. Elevated right sided filling pressures in setting of severe TR with normal left sided filling pressures. Adequate perfusion.    TTE 8/29: Mild to mod MR. Severe AS. Severe TR. Mild to mod AK. Severe pHTN. Severely dilated LA. LV systolic function mildly impaired; segmental wall motion abnormalities noted. Mild concentric LVH. LVEF 45%. The interventricular septum appears flattened consistent with an RV pressure/volume overload. RA severely dilated. RV with moderate dilation, diffuse hypokinesis, and depression of contractility based on TAPSE. A device wire is seen in the RV and RA. IVC is dilated and not collapsing with inspiration.    Meds:  MEDICATIONS  (STANDING):  acetaminophen     Tablet .. 975 milliGRAM(s) Oral every 8 hours  albuterol/ipratropium for Nebulization 3 milliLiter(s) Nebulizer every 6 hours  allopurinol 100 milliGRAM(s) Oral daily  apixaban 2.5 milliGRAM(s) Oral two times a day  atorvastatin 20 milliGRAM(s) Oral at bedtime  buDESOnide    Inhalation Suspension 0.5 milliGRAM(s) Inhalation two times a day  escitalopram 5 milliGRAM(s) Oral daily  folic acid 1 milliGRAM(s) Oral daily  levothyroxine 125 MICROGram(s) Oral daily  lidocaine   4% Patch 1 Patch Transdermal daily  methylPREDNISolone sodium succinate Injectable 20 milliGRAM(s) IV Push daily  metoprolol succinate ER 25 milliGRAM(s) Oral at bedtime  midodrine. 5 milliGRAM(s) Oral three times a day  multivitamin 1 Tablet(s) Oral daily  polyethylene glycol 3350 17 Gram(s) Oral daily  senna 2 Tablet(s) Oral at bedtime  thiamine 100 milliGRAM(s) Oral daily    MEDICATIONS  (PRN):  aluminum hydroxide/magnesium hydroxide/simethicone Suspension 30 milliLiter(s) Oral every 4 hours PRN Dyspepsia  melatonin 3 milliGRAM(s) Oral at bedtime PRN Insomnia  ondansetron Injectable 4 milliGRAM(s) IV Push every 6 hours PRN Nausea and/or Vomiting  oxyCODONE    IR 2.5 milliGRAM(s) Oral every 8 hours PRN Severe Pain (7 - 10)  traMADol 25 milliGRAM(s) Oral every 12 hours PRN Mild Pain (1 - 3)

## 2024-02-19 NOTE — PROGRESS NOTE ADULT - SUBJECTIVE AND OBJECTIVE BOX
NEPHROLOGY INTERVAL HPI/OVERNIGHT EVENTS:    Date of Service: 02-14-24 @ 10:24  2/18- in bed her son is at the bedside, pt c/o generalize things including back pain  2/17- pt comfortable, breathing well, on lasix drip 5 mg/ hr  2/16- OOB in chair looks comfortable, responsive her home aide at bedside  2/15- supine, sob, raised head of bed, more comfortable, discussed with HF team Ramón and Dr Sara jackson placed for retention, 500 ml  2/14--appears comfortable.  but c/o intermittent cough and SOB.  2/13- OOB sleeping arousable NAD, evaluated for CKD JOAQUÍN and heart failure  2/12--seen by Dr Hopper yesterday.  Alert,  SOB only slightly better.  Feeling weak.    HPI:   The patient is a 89 yo female with Hx. of Diastolic CHF EF 45%, CAD, HTN, Orthostatic hypotension, Atrial fib. on Xarelto, s/p PPM, COPD, Hypothyroidism, Gout , valvular heart disease, sever AS,  severe TR, severe pumonary HTN, presented in ED BIB form home for SOB. The patient  lives alone and has a private aid. She developed worsening SOB, She had iron infusion 5 days ago and was started on Macrobid for UTI  yesterday.   renal eval for acute hyponatremia in setting of acute CHF/pulm HTN and bilateral plueral effusions    MEDICATIONS  (STANDING):  acetaminophen     Tablet .. 975 milliGRAM(s) Oral every 8 hours  albuterol/ipratropium for Nebulization 3 milliLiter(s) Nebulizer every 6 hours  allopurinol 100 milliGRAM(s) Oral daily  atorvastatin 20 milliGRAM(s) Oral at bedtime  buDESOnide    Inhalation Suspension 0.5 milliGRAM(s) Inhalation two times a day  escitalopram 5 milliGRAM(s) Oral daily  folic acid 1 milliGRAM(s) Oral daily  furosemide   Injectable 40 milliGRAM(s) IV Push once  levothyroxine 125 MICROGram(s) Oral daily  methylPREDNISolone sodium succinate Injectable 20 milliGRAM(s) IV Push daily  metoprolol succinate ER 25 milliGRAM(s) Oral at bedtime  midodrine. 2.5 milliGRAM(s) Oral three times a day  multivitamin 1 Tablet(s) Oral daily  polyethylene glycol 3350 17 Gram(s) Oral daily  rivaroxaban 10 milliGRAM(s) Oral with dinner  senna 2 Tablet(s) Oral at bedtime  thiamine 100 milliGRAM(s) Oral daily    MEDICATIONS  (PRN):  aluminum hydroxide/magnesium hydroxide/simethicone Suspension 30 milliLiter(s) Oral every 4 hours PRN Dyspepsia  melatonin 3 milliGRAM(s) Oral at bedtime PRN Insomnia  ondansetron Injectable 4 milliGRAM(s) IV Push every 6 hours PRN Nausea and/or Vomiting  oxyCODONE    IR 2.5 milliGRAM(s) Oral every 8 hours PRN Severe Pain (7 - 10)  traMADol 25 milliGRAM(s) Oral every 12 hours PRN Mild Pain (1 - 3)        Vital Signs Last 24 Hrs  T(C): 36.5 (18 Feb 2024 07:46), Max: 36.5 (18 Feb 2024 07:46)  T(F): 97.7 (18 Feb 2024 07:46), Max: 97.7 (18 Feb 2024 07:46)  HR: 93 (18 Feb 2024 14:11) (82 - 98)  BP: 94/63 (18 Feb 2024 07:46) (94/63 - 101/66)  BP(mean): --  RR: 18 (18 Feb 2024 07:46) (18 - 18)  SpO2: 99% (18 Feb 2024 07:46) (96% - 99%)  Parameters below as of 18 Feb 2024 07:46  Patient On (Oxygen Delivery Method): mask, Venturi        PHYSICAL EXAM:  GENERAL: comfortable.  CHEST/LUNG:  clear anteriorly  HEART: S1S2 RRR  ABDOMEN: soft  EXTREMITIES: NO LE edema  SKIN:     LABS:                                  9.8    5.48  )-----------( 116      ( 17 Feb 2024 06:05 )             30.6                9.9    4.66  )-----------( 146      ( 16 Feb 2024 05:46 )             30.9     02-18    128<L>  |  91<L>  |  102<H>  ----------------------------<  167<H>  5.4<H>   |  32<H>  |  2.40<H>    Ca    8.6      18 Feb 2024 07:02  Phos  4.7     02-18    TPro  x   /  Alb  3.3  /  TBili  x   /  DBili  x   /  AST  x   /  ALT  x   /  AlkPhos  x   02-18 02-17    128<L>  |  90<L>  |  93<H>  ----------------------------<  165<H>  5.5<H>   |  32<H>  |  2.36<H>    Ca    9.0      17 Feb 2024 06:05  Phos  4.6     02-17    TPro  x   /  Alb  3.2<L>  /  TBili  x   /  DBili  x   /  AST  x   /  ALT  x   /  AlkPhos  x   02-17 02-16    129<L>  |  91<L>  |  86<H>  ----------------------------<  181<H>  5.3   |  36<H>  |  2.46<H>    Ca    9.1      16 Feb 2024 05:46  Phos  3.9     02-15  Mg     2.3     02-15    TPro  x   /  Alb  3.7  /  TBili  x   /  DBili  x   /  AST  x   /  ALT  x   /  AlkPhos  x   02-15      02-15    127<L>  |  90<L>  |  75<H>  ----------------------------<  177<H>  5.2   |  32<H>  |  2.37<H>    Ca    8.9      15 Feb 2024 06:18  Phos  3.9     02-15  Mg     2.3     02-15    TPro  x   /  Alb  3.7  /  TBili  x   /  DBili  x   /  AST  x   /  ALT  x   /  AlkPhos  x   02-15      02-14    127<L>  |  91<L>  |  61<H>  ----------------------------<  167<H>  5.7<H>   |  33<H>  |  2.19<H>    Ca    9.1      14 Feb 2024 06:03  Phos  3.8     02-14  Mg     2.2     02-14        Urinalysis Basic - ( 14 Feb 2024 06:03 )    Color: x / Appearance: x / SG: x / pH: x  Gluc: 167 mg/dL / Ketone: x  / Bili: x / Urobili: x   Blood: x / Protein: x / Nitrite: x   Leuk Esterase: x / RBC: x / WBC x   Sq Epi: x / Non Sq Epi: x / Bacteria: x      Magnesium: 2.2 mg/dL (02-14 @ 06:03)  Phosphorus: 3.8 mg/dL (02-14 @ 06:03)          RADIOLOGY & ADDITIONAL TESTS:   NEPHROLOGY INTERVAL HPI/OVERNIGHT EVENTS:    Date of Service: 02-14-24 @ 10:24  2/19-pt sleeping arousable, somnolent  2/18- in bed her son is at the bedside, pt c/o generalize things including back pain  2/17- pt comfortable, breathing well, on lasix drip 5 mg/ hr  2/16- OOB in chair looks comfortable, responsive her home aide at bedside  2/15- supine, sob, raised head of bed, more comfortable, discussed with HF team Ramón and Dr Sara jackson placed for retention, 500 ml  2/14--appears comfortable.  but c/o intermittent cough and SOB.  2/13- OOB sleeping arousable NAD, evaluated for CKD JOAQUÍN and heart failure  2/12--seen by Dr Hopper yesterday.  Alert,  SOB only slightly better.  Feeling weak.    HPI:   The patient is a 89 yo female with Hx. of Diastolic CHF EF 45%, CAD, HTN, Orthostatic hypotension, Atrial fib. on Xarelto, s/p PPM, COPD, Hypothyroidism, Gout , valvular heart disease, sever AS,  severe TR, severe pumonary HTN, presented in ED BIB form home for SOB. The patient  lives alone and has a private aid. She developed worsening SOB, She had iron infusion 5 days ago and was started on Macrobid for UTI  yesterday.   renal eval for acute hyponatremia in setting of acute CHF/pulm HTN and bilateral plueral effusions    MEDICATIONS  (STANDING):  acetaminophen     Tablet .. 975 milliGRAM(s) Oral every 8 hours  albuterol/ipratropium for Nebulization 3 milliLiter(s) Nebulizer every 6 hours  allopurinol 100 milliGRAM(s) Oral daily  atorvastatin 20 milliGRAM(s) Oral at bedtime  buDESOnide    Inhalation Suspension 0.5 milliGRAM(s) Inhalation two times a day  escitalopram 5 milliGRAM(s) Oral daily  folic acid 1 milliGRAM(s) Oral daily  furosemide   Injectable 40 milliGRAM(s) IV Push once  levothyroxine 125 MICROGram(s) Oral daily  methylPREDNISolone sodium succinate Injectable 20 milliGRAM(s) IV Push daily  metoprolol succinate ER 25 milliGRAM(s) Oral at bedtime  midodrine. 2.5 milliGRAM(s) Oral three times a day  multivitamin 1 Tablet(s) Oral daily  polyethylene glycol 3350 17 Gram(s) Oral daily  rivaroxaban 10 milliGRAM(s) Oral with dinner  senna 2 Tablet(s) Oral at bedtime  thiamine 100 milliGRAM(s) Oral daily    MEDICATIONS  (PRN):  aluminum hydroxide/magnesium hydroxide/simethicone Suspension 30 milliLiter(s) Oral every 4 hours PRN Dyspepsia  melatonin 3 milliGRAM(s) Oral at bedtime PRN Insomnia  ondansetron Injectable 4 milliGRAM(s) IV Push every 6 hours PRN Nausea and/or Vomiting  oxyCODONE    IR 2.5 milliGRAM(s) Oral every 8 hours PRN Severe Pain (7 - 10)  traMADol 25 milliGRAM(s) Oral every 12 hours PRN Mild Pain (1 - 3)          Vital Signs Last 24 Hrs  T(C): 36.8 (19 Feb 2024 20:34), Max: 36.8 (19 Feb 2024 20:34)  T(F): 98.2 (19 Feb 2024 20:34), Max: 98.2 (19 Feb 2024 20:34)  HR: 86 (19 Feb 2024 20:42) (86 - 100)  BP: 118/68 (19 Feb 2024 20:34) (108/62 - 123/107)  BP(mean): --  RR: 18 (19 Feb 2024 20:34) (18 - 18)  SpO2: 100% (19 Feb 2024 20:42) (95% - 100%)    Parameters below as of 19 Feb 2024 20:42  Patient On (Oxygen Delivery Method): nasal cannula, 3            PHYSICAL EXAM:  GENERAL: comfortable.  CHEST/LUNG:  clear anteriorly  HEART: S1S2 RRR  ABDOMEN: soft  EXTREMITIES: NO LE edema  SKIN:     LABS:                                  9.8    5.48  )-----------( 116      ( 17 Feb 2024 06:05 )             30.6                9.9    4.66  )-----------( 146      ( 16 Feb 2024 05:46 )             30.9       02-19    129<L>  |  93<L>  |  106<H>  ----------------------------<  161<H>  5.0   |  31  |  2.22<H>    Ca    8.6      19 Feb 2024 05:56  Phos  5.0     02-19    TPro  x   /  Alb  3.3  /  TBili  x   /  DBili  x   /  AST  x   /  ALT  x   /  AlkPhos  x   02-19 02-18    128<L>  |  91<L>  |  102<H>  ----------------------------<  167<H>  5.4<H>   |  32<H>  |  2.40<H>    Ca    8.6      18 Feb 2024 07:02  Phos  4.7     02-18    TPro  x   /  Alb  3.3  /  TBili  x   /  DBili  x   /  AST  x   /  ALT  x   /  AlkPhos  x   02-18 02-17    128<L>  |  90<L>  |  93<H>  ----------------------------<  165<H>  5.5<H>   |  32<H>  |  2.36<H>    Ca    9.0      17 Feb 2024 06:05  Phos  4.6     02-17    TPro  x   /  Alb  3.2<L>  /  TBili  x   /  DBili  x   /  AST  x   /  ALT  x   /  AlkPhos  x   02-17 02-16    129<L>  |  91<L>  |  86<H>  ----------------------------<  181<H>  5.3   |  36<H>  |  2.46<H>    Ca    9.1      16 Feb 2024 05:46  Phos  3.9     02-15  Mg     2.3     02-15    TPro  x   /  Alb  3.7  /  TBili  x   /  DBili  x   /  AST  x   /  ALT  x   /  AlkPhos  x   02-15      02-15    127<L>  |  90<L>  |  75<H>  ----------------------------<  177<H>  5.2   |  32<H>  |  2.37<H>    Ca    8.9      15 Feb 2024 06:18  Phos  3.9     02-15  Mg     2.3     02-15    TPro  x   /  Alb  3.7  /  TBili  x   /  DBili  x   /  AST  x   /  ALT  x   /  AlkPhos  x   02-15      02-14    127<L>  |  91<L>  |  61<H>  ----------------------------<  167<H>  5.7<H>   |  33<H>  |  2.19<H>    Ca    9.1      14 Feb 2024 06:03  Phos  3.8     02-14  Mg     2.2     02-14        Urinalysis Basic - ( 14 Feb 2024 06:03 )    Color: x / Appearance: x / SG: x / pH: x  Gluc: 167 mg/dL / Ketone: x  / Bili: x / Urobili: x   Blood: x / Protein: x / Nitrite: x   Leuk Esterase: x / RBC: x / WBC x   Sq Epi: x / Non Sq Epi: x / Bacteria: x      Magnesium: 2.2 mg/dL (02-14 @ 06:03)  Phosphorus: 3.8 mg/dL (02-14 @ 06:03)          RADIOLOGY & ADDITIONAL TESTS:

## 2024-02-19 NOTE — PROGRESS NOTE ADULT - ASSESSMENT
89 year-old woman with DM type II, HTN, CAD, chronic diastolic CHF, chronic atrial fibrillation on Xarelto, hx of PPM placement, severe aortic stenosis, severe tricuspid regurgitation, severe pulmonary HTN, chronic orthostatic hypotension on midodrine, COPD, hypothyroidism, gout, just started on macrobid for 1 day for UTI, presented with worsening shortness of breath. Reports adherence with medication. Lives alone, with health aides. Denied chest pain, fevers, chills, cough. She was most recently admitted to  in 9/2023. At the time, she was evaluated for TAVR. Underwent RHC/LHC showing mild diffuse atherosclerosis with moderate severe disease of small caliber Cx in a nondominant territory. She had elevated right sided filling pressures in setting of severe TR with normal left sided filling pressures. Adequate perfusion. No intervention performed. Her outpatient cardiologist is Dr. Harris. In the ED, she was tachycardic to 107, normotensive, saturating 93%. She had increased work of breathing. Labs notable for Hgb 10, Na 123, Hs trop neg x1, proBNP 7548, COVID and RVP neg. CT chest w/ severe cardiomegaly with enlarging large bilateral pleural effusions. Severe pulmonary arterial hypertension. Admitted for further management of acute respiratory failure.     Acute hypoxic and hypercapneic respiratory failure  Likely multi factorial due to CHF exacerbation, severe AS, severe TR, severe pHTN, pleural effusions, compressive atelectasis, COPD with probable exacerbation. No sign of pneumonia at this point. Had required NIPPV with BiPAP to reduce work of breathing and improve hypercapnea. Discontinued BiPAP this AM as she has not been using it, citing discomfort with the mask etc. Patient now on O2 via NC, 3L/min. Subjectively, she feels only marginally improved since admission. Continues to have dyspnea supine. No chest pain or tightness. No cough. No fever or chills. No other complaints aside for generalized weakness. Had unsuccessful attempt at R side thoracentesis / thoracostomy tube placement 2/15. Reattempt deferred.   - Continue O2 supplementation as needed, wean as tolerated, goal SPO2 92% or greater  - Continue to treat underlying issues, see below    Acute on chronic diastolic CHF  Patient presenting with SOB, pleural effusions, elevated BNP in setting known valvular disease, HFpEF. Last TTE with LVEF 50-55%, severe TR, severe AS. At home, shes on torsemide 20mg BID. Some improvement in oxygenation with IV Lasix but Cr up-trended significantly and diuretics were placed on hold. Appreciate input from Cardiology and Nephrology today. Still find patient volume overloaded and note that patient is now with indwelling Espinoza placed yesterday for retention, recommended administering Lasix 40mg IV x 1 today. Follow up TTE obtained. EF 45-50%. Septal flattening consistent with right heart pressure / volume overload. RB severely dilated with reduced function. RA severely dilated. LA mildly dilated. AS described as mod-severe. TR severe. Probably severe pHTN. Treated with Lasix drip.   - Appreciate input from Cardiology and Nephrology who are holding Lasix drip today  - Continue metoprolol  - Monitor Is and Os, daily wt, trend Cr and lytes  - Continue fluid restriction   - F/u with Cardiology, Nephrology, CHF Team    Severe aortic stenosis   Previously evaluated by Dr Levin for structural intervention / JAYNA. Patient and family had opted for medical management.  - Continue medical management    CAD  Recent cath 9/2023 with mild diffuse atherosclerosis with moderate severe disease of small caliber Cx in a nondominant territory.  - Continue med management with metoprolol, statin    Chronic atrial fibrillation, hx of PPM  RVR in the ED in setting of active respiratory symptoms. PPM interrogated 2/12/24. Normal functioning single chamber PPM with battery longevity 5.6 years. Afib with V-pacing 19%, overall rate histogram is acceptable, brief RVR noted. No setting change made.  - Continue metoprolol  - For stroke risk-reduction, switched AC over from Xarelto to Eliquis given CrCl    COPD with acute exacerbation  Appreciate input from Pulmonary Medicine  - Continue systemic steroids with methylprednisolone, tapering  - Continue DuoNeb q6h    Significant pleural effusions B/L  Difficult to diurese patient as rising. Still with hypoxia, sizable pleural effusions. Thoracic Surgery consulted for input re possible drainage, patient in agreement.   - For pleural drainage catheter placement this afternoon  - F/u with Thoracic Surgery    Hyponatremia  Suspect hypervolemic due to fluid overload. Persisting.   - Free water/fluid restriction   - Avoid thiazide diuretics if Na remains low ( HCTZ, metolazone etc)   - Nephrology following    Hyperkalemia  In setting of concurrent spironolactone and potassium supplementation tabs. Stopped both and administered hyperkalemia protocol. K has improved but remains >5. Now could be related to JOAQUÍN, see below.  - Continue to monitor, Susan PRN     JOAQUÍN   Baseline Cr ~0.9 as was on presentation. Increased to significantly since then, now 2.19. With patient having received IV Lasix. She has not received any contrast studies. No NSAIDs. She did have UTI diagnosed a day or two prior to admission and has since completed a 3-day course of ceftriaxone but doubt related. Thought she could have retention as she has been rather immobile and constipated. Nephrology consulted. Obtained renal bladder US yesterday 2/14. No sign of hydronephrosis, mass or calculi. Bladder was distended however, volume 436cc. No bladder wall thickening. No bladder mass. Back up on the floors thereafter, patient was not able to void, had bladder scan up to ~540cc, Espinoza placed. Note UA collected upon Espinoza placement is negative for infection.  - Continue with Espinoza (2/14-)  - Strict Is and Os  - Trend Cr  - Avoid nephrotic medications  - Renally dose medications where applicable    UTI, present prior to admission  Recent dx of UTI. Was treated with macrobid for 1 day before getting admitted to hospital. Here she received an empiric 3 day course of ceftriaxone. Urine and blood cultures returned negative. UA collected from Espinoza insertion negative for infection.    DM  type II  A1c 6.3. Well controlled.   - Continue on insulin correctional scale for now    Hypothyroidism  Stable.   - Continue levothyroxine    Hx of gout  Stable.   - Continue allopurinol    Constipation  No nausea or emesis. Intact bowel sounds.   - Continue bowel regimen    Physical deconditioning and debility  Appreciate input from PT. Recommended home with home PT and more assistance vs EDEN  - Continue restorative PT sessions  - OOB to chair daily    Severe protein calorie malnutrition  Appreciate dietician input  - Trend wt  - Added MVI with minerals daily, thiamine 100mg daily  - Diet supplemented with Ensure High Protein BID      DVT px: On Eliquis for afib  Code Status: DNR/DNI/Trial NIV   Dispo: To be determined pending further clinical assessment

## 2024-02-19 NOTE — PROGRESS NOTE ADULT - PROBLEM SELECTOR PLAN 1
Pt presenting with SOB likely multifactorial 2/2 pleural effusions, CHF exacerbation, elevated BNP in setting known valvular disease severe AS, severe TR, severe pHTN, pleural effusions, compressive atelectasis, COPD  Thoracentesis attempted 2/15/24, s/p small apical PTX .    F/U Echo reveals mild LVH, Mildly reduced systolic function. EF 45-50%. Septal flattening is seen; c/w right heart pressure / volume overload.  Mild MR, Moderate to severe AS, Severe TR, Probable severe pulmonary hypertension.  Lasix gtt suspended 2/2 renal insuffiencey/soft BP

## 2024-02-20 NOTE — PROGRESS NOTE ADULT - NS ATTEND AMEND GEN_ALL_CORE FT
Patient  off of Diuretic ,breathing is better , resume oral diuretic when ok with nephrology , patient had poor prognosis as patient severe AS with diastolic heart failure , Patient  off of Diuretic ,breathing is better , resume oral diuretic when ok with nephrology , patient had poor prognosis as patient severe AS with worsened EF 45 %

## 2024-02-20 NOTE — PROGRESS NOTE ADULT - PROBLEM SELECTOR PLAN 1
Pt presenting with SOB likely multifactorial 2/2 pleural effusions, CHF exacerbation, elevated BNP in setting known valvular disease severe AS, severe TR, severe pHTN, pleural effusions, compressive atelectasis, COPD  Thoracentesis attempted 2/15/24, s/p small apical PTX .    F/U Echo reveals mild LVH, Mildly reduced systolic function. EF 45-50%. Septal flattening is seen; c/w right heart pressure / volume overload.  Mild MR, Moderate to severe AS, Severe TR, Probable severe pulmonary hypertension.  Lasix gtt suspended 2/2 renal insuffiencey/soft BP Pt presenting with SOB likely multifactorial 2/2 pleural effusions, CHF exacerbation, elevated BNP in setting known valvular disease severe AS, severe TR, severe pHTN, pleural effusions, compressive atelectasis, COPD  Thoracentesis attempted 2/15/24, s/p small apical PTX .    F/U Echo reveals mild LVH, Mildly reduced systolic function. EF 45-50%. Septal flattening is seen; c/w right heart pressure / volume overload.  Mild MR, Moderate to severe AS, Severe TR, Probable severe pulmonary hypertension.  Lasix gtt suspended 2/2 renal insuffiencey/soft BP.  LE edema improving.  Would consider restarting IV lasix BID once ok with renal

## 2024-02-20 NOTE — CHART NOTE - NSCHARTNOTEFT_GEN_A_CORE
Noted on labs this evening to once again have hyperkalemia. Ordered patient for insulin IVP with dextrose IVP, ca gluconate IVPB, Lokelma PO. Patient already receiving albuterol. Will repeat K in the AM. Discuss further with Nephrology.
Pt has a CHF history and is currently being treated for PNA, his last admission to this hospital was 8/23. Will continue to monitor for the dc needs.
Called by RN to report that patient SPO2 drop to the 80s on 6L NC. Patient denies SOB, wheezing, CP or palpitations    Vital Signs Last 24 Hrs  T(C): 36.3 (17 Feb 2024 20:43), Max: 36.5 (17 Feb 2024 08:12)  T(F): 97.4 (17 Feb 2024 20:43), Max: 97.7 (17 Feb 2024 08:12)  HR: 82 (17 Feb 2024 20:43) (82 - 99)  BP: 101/66 (17 Feb 2024 20:43) (93/54 - 101/66)  RR: 18 (17 Feb 2024 20:43) (17 - 18)  SpO2: 96% (17 Feb 2024 20:43) (93% - 96%)    Parameters below as of 17 Feb 2024 20:43  Patient On (Oxygen Delivery Method): nasal cannula  O2 Flow (L/min): 4    89 year-old woman with DM type II, HTN, CAD, chronic diastolic CHF, chronic atrial fibrillation on Xarelto, hx of PPM placement, severe aortic stenosis, severe tricuspid regurgitation, severe pulmonary HTN, chronic orthostatic hypotension on midodrine, COPD, hypothyroidism, gout    # Acute hypoxic and hypercapneic respiratory failure  - ABG  - CXR  - Lasix 10 mg IV push once (bp is soft)  - uptitrate supplemental oxygen  - c/w tele monitoring
Notified by Thoracic Surgery that attempt at R pigtail chest tube insertion was unsuccessful. Post procedure CXR reviewed, report pending, appears to demonstrate a small R side pneumothorax. Patient's respiratory status remains stable post-procedure. Continue O2 supplementation. Continue with serial CXR monitoring, appropriate bedside clinical monitoring by RN and rest of care team. Signed out [facility] Medicine Night Team.
Patient with declining clinical condition, as marked by increased lethargy, decreased PO intake, in addition to CHF and valvular heart disease that is not improving with inpatient care, now also has abdominal distension and discomfort. While patient is planned to transition to hospice care soon, CT A/P requested to see if finding was from fecal burden that could be addressed or whether there was a more ominous finding. CT report pending.
Reviewed results of CT Abdomen/pelvis, which showed a moderate to large pneumothorax in the Right chest. Ordered and reviewed a chest x-ray to evaluate for pneumothorax. Discussed case and reviewed images with ICU PA's who advised that there is nothing to do at this time. Advised to monitor patient and if patient decompensates to call ICU PA's.
Spoke to radiology, who looked at both chest x-rays from today. Radiology advised that right sided apical pneumothorax is similar in size and stable.

## 2024-02-20 NOTE — PROGRESS NOTE ADULT - ASSESSMENT
The patient is a 89 yo female with Hx. of Diastolic CHF EF 45%, CAD, HTN, Orthostatic hypotension, Atrial fib. on Xarelto, s/p PPM, COPD, Hypothyroidism, Gout , valvular heart disease, sever AS,  severe TR, severe pulmonary HTN, presented in ED BIB form home for SOB. The patient  lives alone and has a private aid. She developed worsening SOB, She had iron infusion 5 days ago and was started on Macrobid for UTI  yesterday.   renal eval for acute hyponatremia in setting of acute CHF/pulm HTN and bilateral plueral effusions    Hyponatremia - improving   suspect hypervolemic due to fluid overload   monitor Na trend during diuresis    IV diuresis as per medicine/cardiology teams    free water/fluid restriction    avoid thiazide diuretics if Na remains low -  ( HCTZ, metalozone etc)    k borderline on Aldactone and KCl supplements - would stop KCL if K rises     2/12 SY  --Hyponatremia with CHF : Sodium level slowly improving : will resume IV lasix.  --JOAQUÍN : creat elevated but remains fluid overloaded : continue IV lasix to stabilize resp status first.  --Decompensated CHF with severe pulm HTN : prognosis grave.  --GOC evaluation ongoing.  --D/c spironolactone for now,  until electrolytes and creat level holding steady.    2/13  Pt with CHF, on IV lasix  HYperkalemia treated with Ca IV and PO Lokelma  overall clinical status improved    2/14 SY  --JOAQUÍN: creat remains elevated.  Resp status improved.  Diuretics on hold for now.     Follow up with Renal sonogram.  --Decompensated CHF/severe pulm HTN : improved and stable   --Persistent Hyponatremia and Hyperkalemia in the setting of JOAQUÍN.  Check Cortisol level as well.    2/15  Seen for worsening dyspnea, hyponatremia/ JOAQUÍN/ CKD  CXR shows effusion viewed on PACs myself- agree with Lasix 40 mg IVp x 1 today, for pigtail procedure for effusion  Hyponatremia, may be from obstructive uropathy Jackson placed  Low Na may also be due to worsening HF, Lasix given  Mild hyperkalemia due to obstr uropathy, CKD , jackson placed and Lasix given  Labs ordered for 8 PM  raise in creat may be noted post diuretic but pt is c/o  SOB supine    2/16  d/w Jose Melgar   CKD/ JOAQUÍN dyspnea, small R apical PTX clearing, CXR still w R effusion and PVC  -agree with Lasix 40 mg IV x 1 now  Monitor serum bicarb, contraction alkalosis, to prevent compensatory hypoventilation  Hyponatremia improving with diuresis  Creat slightly up due to volume contraction  Mild hyperkalemia will monitor may improve with lasix IV  Monitor UO, jackson in place  Anemia, chronic, Hgb 10 range, will check iron stores, Retic count    2/17  CKD/JOAQUÍN- stable renal function creat slightly up with diuretics  Hyperkalemia- even with iv lasix drip, may need qod Lokelma  Hyponatremia stable at 128-130 range  Hypotension- more pronounced w Lasix drip, will dc Lasix drip monitor clinically in am  Elevated bicarb levels improved down from 36 to 32      2/18  CKD/ JOAQUÍN creatinine holding stable  with diuresis  As above,  Lasix drip held due to low BP,   CHF UO on 2-17 900 ml after 5 mg /hr  iv Lasix from ~ 8 pm 2/16 to 9 pm 2/17  discussed with dr Braxton   And discussed with Dr Kat   Will resume the lasix drip and increase the Midodrine to 5 or 10 mg tid to maintain the BP  If bp does not tolerate will reduce or dc     2/19  CKD/JOAQUÍN/ CHF/ Hypotension  joaquín, Creat improving w diuresis  elevated bicarb level improving slightly on BMP  pH and bicarb improved on ABG, pCO2 remains 61  Tolerating Lasix drip intermittently  Will dc lasix drip today pt feels weak and washed out, will allow pt to redistribute  third spacing  Hypotension stable on Midodrine, will continue for now  Discussed with Dr Ruiz and Dr Palla / Cardiology  CXR reviewed and compared to prior by myself mild improvement noted especially R base  Will evaluate in am for cont of lasix drip vs bid IVP dosing    2/20  CKD/JOAQUÍN stable  Hyponatremia stable 128-129 range  Hypotension improved, furosemide drip dcd  Venous blood gas shows elevated bicarb and base excess  -pCO2 55 down from 61, w V pH of 7.45, metabolic alkalosis  Will dc diuretics for now due to contraction alkalosis  Abd Pain no official result I reviewed the CT my self- no free air  + bowel gas retained contrast

## 2024-02-20 NOTE — PROGRESS NOTE ADULT - SUBJECTIVE AND OBJECTIVE BOX
HPI:  Ms. Brennan is a 89 yo female with a hx HFpEF (LVEF 50-55% based on 9/2023 TTE, severe TR, severe AS, severe pulmonary hypertension, atrial fibrillation on xarelto s/p PPM, COPD, hypothyroidism, hypertension presenting with several days of worsening SOB. Of note, was started on macrobid for UTI on day prior to admission. Reports adherence with medication. Lives alone, with health aides. Denies chest pain, fevers, chills, cough.     Pt was most recently admitted to  in 9/2023. At the time, she was evaluated for TAVR. Underwent RHC/LHC showing Mild diffuse atherosclerosis with moderate severe disease of small caliber Cx in a nondominant territory; Elevated right sided filling pressures in setting of severe TR with normal left sided filling pressures. Adequate perfusion. No intervention performed.     Outpatient cardiologist: Dr. Harris      In the ED, she was tachycardic to 107, normotensive, saturating 93%.   Labs notable for hgb 10, Na 123, Hs trop neg x1, proBNP 7548, COVID and RVP neg  CT chest: Severe cardiomegaly with enlarging large bilateral pleural effusions.  Severe pulmonary arterial hypertension.    Cardiology consulted for decompensated HF.   2/12/24: Awake alert comfortable, tel AF 90's  2/13/24 Patient is comfortable ,  HR controlled   2/14/24: Awake alert semirecumbent in bed tele AF HR controlled   2/15/24: Pt sleeping comfortably, NAD.  Thoracentesis planned for today. Tele: Afib rate controlled  2/16/24: Pt sleeping but arouses when name called.  Denies cp, + SOB.  Tele: Afib with V pacing, PVC's  2/17/'24: no complaints.  2/18/'24: daughter & son at bedside, dyspnea unchanged.  2/19/24: Sleeping but arousable  tele AF  V oaced  2/20/24:      MEDICATIONS:  OUTPATIENT  Home Medications:  allopurinol 100 mg oral tablet: 1 tab(s) orally once a day (10 Feb 2024 14:48)  escitalopram 5 mg oral tablet: 1 tab(s) orally (10 Feb 2024 14:55)  levothyroxine 125 mcg (0.125 mg) oral tablet: 1 tab(s) orally once a day (10 Feb 2024 14:48)  metoprolol succinate 25 mg oral tablet, extended release: 1 tab(s) orally once a day (10 Feb 2024 14:55)  midodrine 5 mg oral tablet: 1 tab(s) orally 2 times a day (10 Feb 2024 14:56)  Multiple Vitamins oral tablet: 1 tab(s) orally once a day (10 Feb 2024 14:55)  nitrofurantoin macrocrystals 100 mg oral capsule: 1 cap(s) orally 2 times a day for 5 days ***course not complete*** (10 Feb 2024 14:55)  potassium chloride 20 mEq oral tablet, extended release: 1 tab(s) orally once a day (10 Feb 2024 14:55)  spironolactone 25 mg oral tablet: 1 tab(s) orally once a day (10 Feb 2024 14:48)  thiamine 100 mg oral tablet: 1 tab(s) orally once a day (10 Feb 2024 14:48)  torsemide 20 mg oral tablet: 1 tab(s) orally 2 times a day (10 Feb 2024 14:48)  Xarelto 10 mg oral tablet: 1 tab(s) orally once a day (10 Feb 2024 14:55)    MEDICATIONS  (STANDING):  acetaminophen     Tablet .. 975 milliGRAM(s) Oral every 8 hours  albuterol/ipratropium for Nebulization 3 milliLiter(s) Nebulizer every 6 hours  allopurinol 100 milliGRAM(s) Oral daily  apixaban 2.5 milliGRAM(s) Oral two times a day  atorvastatin 20 milliGRAM(s) Oral at bedtime  buDESOnide    Inhalation Suspension 0.5 milliGRAM(s) Inhalation two times a day  escitalopram 5 milliGRAM(s) Oral daily  folic acid 1 milliGRAM(s) Oral daily  levothyroxine 125 MICROGram(s) Oral daily  lidocaine   4% Patch 1 Patch Transdermal daily  methylPREDNISolone sodium succinate Injectable 20 milliGRAM(s) IV Push daily  metoprolol succinate ER 25 milliGRAM(s) Oral at bedtime  midodrine. 5 milliGRAM(s) Oral three times a day  multivitamin 1 Tablet(s) Oral daily  polyethylene glycol 3350 17 Gram(s) Oral daily  senna 2 Tablet(s) Oral at bedtime  thiamine 100 milliGRAM(s) Oral daily    MEDICATIONS  (PRN):  aluminum hydroxide/magnesium hydroxide/simethicone Suspension 30 milliLiter(s) Oral every 4 hours PRN Dyspepsia  melatonin 3 milliGRAM(s) Oral at bedtime PRN Insomnia  ondansetron Injectable 4 milliGRAM(s) IV Push every 6 hours PRN Nausea and/or Vomiting  oxyCODONE    IR 2.5 milliGRAM(s) Oral every 8 hours PRN Severe Pain (7 - 10)  traMADol 25 milliGRAM(s) Oral every 12 hours PRN Mild Pain (1 - 3)    Vital Signs Last 24 Hrs  T(C): 36.7 (20 Feb 2024 07:38), Max: 36.8 (19 Feb 2024 20:34)  T(F): 98 (20 Feb 2024 07:38), Max: 98.2 (19 Feb 2024 20:34)  HR: 88 (20 Feb 2024 08:51) (74 - 89)  BP: 127/53 (20 Feb 2024 07:38) (112/81 - 127/53)  BP(mean): --  RR: 18 (20 Feb 2024 07:38) (18 - 18)  SpO2: 96% (20 Feb 2024 08:51) (95% - 100%)    Parameters below as of 20 Feb 2024 08:51  Patient On (Oxygen Delivery Method): nasal cannula        I&O's Detail    17 Feb 2024 07:01  -  18 Feb 2024 07:00  --------------------------------------------------------  IN:  Total IN: 0 mL    OUT:    Indwelling Catheter - Urethral (mL): 300 mL  Total OUT: 300 mL    Total NET: -300 mL      18 Feb 2024 07:01  -  18 Feb 2024 15:17  --------------------------------------------------------  IN:  Total IN: 0 mL    OUT:    Indwelling Catheter - Urethral (mL): 550 mL  Total OUT: 550 mL    Total NET: -550 mL          I&O's Summary    17 Feb 2024 07:01  -  18 Feb 2024 07:00  --------------------------------------------------------  IN: 0 mL / OUT: 300 mL / NET: -300 mL    18 Feb 2024 07:01  -  18 Feb 2024 15:17  --------------------------------------------------------  IN: 0 mL / OUT: 550 mL / NET: -550 mL        PHYSICAL EXAM:  Constitutional: NAD, awake and frail   HEENT:  EOMI,  Pupils round, No oral cyanosis.  Pulmonary: Non-labored, diminished at bases bilaterally  Cardiovascular: irregular, + systolic murmur   Gastrointestinal: Abd soft, nontender.   Lymph: bilateral LE edema noted 1+  Neurological: Alert and oriented x2, no focal deficits  Psych:  Mood & affect appropriate    ===============================  ===============================  LABS:                             9.8    5.48  )-----------( 116      ( 17 Feb 2024 06:05 )             30.6                         9.9    4.66  )-----------( 146      ( 16 Feb 2024 05:46 )             30.9     02-20    134<L>  |  94<L>  |  113<H>  ----------------------------<  163<H>  4.7   |  37<H>  |  2.23<H>    Ca    8.3<L>      20 Feb 2024 05:54  Phos  5.0     02-19    TPro  x   /  Alb  3.3  /  TBili  x   /  DBili  x   /  AST  x   /  ALT  x   /  AlkPhos  x   02-19 18 Feb 2024 07:02    128    |  91     |  102    ----------------------------<  167    5.4     |  32     |  2.40   17 Feb 2024 06:05    128    |  90     |  93     ----------------------------<  165    5.5     |  32     |  2.36   16 Feb 2024 05:46    129    |  91     |  86     ----------------------------<  181    5.3     |  36     |  2.46     Ca    8.6        18 Feb 2024 07:02  Ca    9.0        17 Feb 2024 06:05  Ca    9.1        16 Feb 2024 05:46  Phos  4.7       18 Feb 2024 07:02  Phos  4.6       17 Feb 2024 06:05    TPro  x      /  Alb  3.3    /  TBili  x      /  DBili  x      /  AST  x      /  ALT  x      /  AlkPhos  x      18 Feb 2024 07:02  TPro  x      /  Alb  3.2    /  TBili  x      /  DBili  x      /  AST  x      /  ALT  x      /  AlkPhos  x      17 Feb 2024 06:05      ===============================  ===============================  CARDIAC BIOMARKERS:  BNP  Pro-Brain Natriuretic Peptide: 40297 pg/mL (02.15.24 @ 06:18)   Serum Pro-Brain Natriuretic Peptide: 79886 pg/mL *H* [0 - 450] (03-11-22 @ 09:30)  Serum Pro-Brain Natriuretic Peptide: 64405 pg/mL *H* [0 - 450] (02-23-22 @ 04:40)  Serum Pro-Brain Natriuretic Peptide: 16208 pg/mL *H* [0 - 450] (02-21-22 @ 14:26)      TROPONIN  Troponin I, High Sensitivity Result: 22.91 ng/L (02-10-24 @ 12:54)  Troponin I, High Sensitivity Result: 37.45 ng/L (08-30-23 @ 16:07)  Troponin I, High Sensitivity Result: 141.88 ng/L *H* (02-22-22 @ 07:20)  Troponin I, High Sensitivity Result: 161.96 ng/L *H* (02-22-22 @ 05:51)  Troponin I, High Sensitivity Result: 210.61 ng/L *H* (02-21-22 @ 14:26)      ===============================  ===============================      e< from: Transthoracic Echocardiogram Follow Up (09.11.23 @ 08:36) >  Impression     Summary     Limited study to assess tricuspid insufficiency and pulmonary pressure.   Severe (4+) tricuspid valve regurgitation is present.   Severe pulmonary hypertension.   The left ventricle is normal in size, paradoxical septal motion   The interventricular septum appears flattened consistent with an RV   pressure/volume overload.   An apically displaced papillary muscle is noted.   Estimated left ventricular ejection fraction is 50-55%.   The right atrium appears severely dilated.   A device wire is seen in the RV and RA.   The right ventricle is moderately dilated with decreased contractility.     Signature     ----------------------------------------------------------------   Electronically signed by Venugopal Palla, MD(Interpreting   physician) on 09/11/2023 04:51 PM   ----------------------------------------------------------------    < end of copied text >    < from: Cardiac Catheterization (09.06.23 @ 11:59) >    Diagnostic Findings:     Coronary Angiography   The coronary circulation is right dominant.      LM   Left main artery: Angiography shows mild atherosclerosis.      LAD   Proximal left anterior descending: There is a 30 % stenosis.      CX   Distal circumflex: The segment is extremely small. There is a 70 %  stenosis.    RCA   Proximal right coronary artery: There is a 40 % stenosis.      < end of copied text >    Diagnostic Conclusions:     1. Mild diffuse atherosclerosis with moderate severe disease of small  caliber Cx in a nondominant territory.  2. Elevated right sided filling pressures in setting of severe TR with  normal left sided filling pressures. Adequate perfusion.  Recommendations:     1. Recommend aggressive medical management for CAD as per ACC/AHA  guidelines  2. Structural heart follow up at  for ongoing [facility] evaluation.        < from: Xray Chest 1 View- PORTABLE-Urgent (Xray Chest 1 View- PORTABLE-Urgent .) (02.13.24 @ 18:46) >  IMPRESSION:  Decreased congestive changes.    < end of copied text >    < from: CT Chest No Cont (02.10.24 @ 14:34) >  IMPRESSION:    Severe cardiomegaly with enlarging large bilateral pleural effusions.    Severe pulmonary arterial hypertension.      < end of copied text >    < from: Xray Chest 1 View-PORTABLE IMMEDIATE (Xray Chest 1 View-PORTABLE IMMEDIATE .) (02.16.24 @ 03:16) >  FINDINGS:    2/15/2024 at 7:15 AM: The cardiac silhouette is enlarged, unchanged.   There is a left-sided single lead pacemaker, unchanged. There are   bilateral pleural effusions and mild pulmonary vascular congestion, not   significant changed. No focal consolidation is seen. Surgical clips   overlie the left axilla. There is bilateral glenohumeral arthrosis. No   pneumothorax is seen.    2/15/2024 at 5:14 PM: The patient's chin obscures the bilateral lung   apices. Otherwise, there has been no significant interval change.    12/16/2024 at 2:48 AM: No significant interval change.    IMPRESSION: Stable cardiomegaly. Bilateral pleural effusions and mild   pulmonary edema, not significantly changed. No pneumothorax is seen.    < end of copied text >    < from: TTE Echo Complete w/o Contrast w/ Doppler (02.15.24 @ 09:19) >   Impression     Summary     Mild concentric left ventricular hypertrophy is present.   Mildly reduced systolic function. Estimated LVEF 45-50%.   Septal flattening is seen; this finding is consistent with right heart   pressure / volume overload.   The right ventricle is severely dilated with reduced function.   The right atrium appears severely dilated.   The left atrium is mildly dilated.   Mild mitral regurgitation is present.   The aortic valve appears severely calcified. Valve opening seems to be   restricted.   Moderate to severe aortic stenosis.   Severe tricuspid valve regurgitation is present.   Probable severe pulmonary hypertension.   Trace pulmonic valvular regurgitation is present.     HPI:  Ms. Brennan is a 89 yo female with a hx HFpEF (LVEF 50-55% based on 9/2023 TTE, severe TR, severe AS, severe pulmonary hypertension, atrial fibrillation on xarelto s/p PPM, COPD, hypothyroidism, hypertension presenting with several days of worsening SOB. Of note, was started on macrobid for UTI on day prior to admission. Reports adherence with medication. Lives alone, with health aides. Denies chest pain, fevers, chills, cough.     Pt was most recently admitted to  in 9/2023. At the time, she was evaluated for TAVR. Underwent RHC/LHC showing Mild diffuse atherosclerosis with moderate severe disease of small caliber Cx in a nondominant territory; Elevated right sided filling pressures in setting of severe TR with normal left sided filling pressures. Adequate perfusion. No intervention performed.     Outpatient cardiologist: Dr. Harris      In the ED, she was tachycardic to 107, normotensive, saturating 93%.   Labs notable for hgb 10, Na 123, Hs trop neg x1, proBNP 7548, COVID and RVP neg  CT chest: Severe cardiomegaly with enlarging large bilateral pleural effusions.  Severe pulmonary arterial hypertension.    Cardiology consulted for decompensated HF.   2/12/24: Awake alert comfortable, tel AF 90's  2/13/24 Patient is comfortable ,  HR controlled   2/14/24: Awake alert semirecumbent in bed tele AF HR controlled   2/15/24: Pt sleeping comfortably, NAD.  Thoracentesis planned for today. Tele: Afib rate controlled  2/16/24: Pt sleeping but arouses when name called.  Denies cp, + SOB.  Tele: Afib with V pacing, PVC's  2/17/'24: no complaints.  2/18/'24: daughter & son at bedside, dyspnea unchanged.  2/19/24: Sleeping but arousable  tele AF  V oaced  2/20/24: Pt sitting up in chair.  Denies cp or SOB. + abd pain. States she has not had BM in days. Tele: Afib with V pacing      MEDICATIONS:  OUTPATIENT  Home Medications:  allopurinol 100 mg oral tablet: 1 tab(s) orally once a day (10 Feb 2024 14:48)  escitalopram 5 mg oral tablet: 1 tab(s) orally (10 Feb 2024 14:55)  levothyroxine 125 mcg (0.125 mg) oral tablet: 1 tab(s) orally once a day (10 Feb 2024 14:48)  metoprolol succinate 25 mg oral tablet, extended release: 1 tab(s) orally once a day (10 Feb 2024 14:55)  midodrine 5 mg oral tablet: 1 tab(s) orally 2 times a day (10 Feb 2024 14:56)  Multiple Vitamins oral tablet: 1 tab(s) orally once a day (10 Feb 2024 14:55)  nitrofurantoin macrocrystals 100 mg oral capsule: 1 cap(s) orally 2 times a day for 5 days ***course not complete*** (10 Feb 2024 14:55)  potassium chloride 20 mEq oral tablet, extended release: 1 tab(s) orally once a day (10 Feb 2024 14:55)  spironolactone 25 mg oral tablet: 1 tab(s) orally once a day (10 Feb 2024 14:48)  thiamine 100 mg oral tablet: 1 tab(s) orally once a day (10 Feb 2024 14:48)  torsemide 20 mg oral tablet: 1 tab(s) orally 2 times a day (10 Feb 2024 14:48)  Xarelto 10 mg oral tablet: 1 tab(s) orally once a day (10 Feb 2024 14:55)    MEDICATIONS  (STANDING):  acetaminophen     Tablet .. 975 milliGRAM(s) Oral every 8 hours  albuterol/ipratropium for Nebulization 3 milliLiter(s) Nebulizer every 6 hours  allopurinol 100 milliGRAM(s) Oral daily  apixaban 2.5 milliGRAM(s) Oral two times a day  atorvastatin 20 milliGRAM(s) Oral at bedtime  buDESOnide    Inhalation Suspension 0.5 milliGRAM(s) Inhalation two times a day  escitalopram 5 milliGRAM(s) Oral daily  folic acid 1 milliGRAM(s) Oral daily  levothyroxine 125 MICROGram(s) Oral daily  lidocaine   4% Patch 1 Patch Transdermal daily  methylPREDNISolone sodium succinate Injectable 20 milliGRAM(s) IV Push daily  metoprolol succinate ER 25 milliGRAM(s) Oral at bedtime  midodrine. 5 milliGRAM(s) Oral three times a day  multivitamin 1 Tablet(s) Oral daily  polyethylene glycol 3350 17 Gram(s) Oral daily  senna 2 Tablet(s) Oral at bedtime  thiamine 100 milliGRAM(s) Oral daily    MEDICATIONS  (PRN):  aluminum hydroxide/magnesium hydroxide/simethicone Suspension 30 milliLiter(s) Oral every 4 hours PRN Dyspepsia  melatonin 3 milliGRAM(s) Oral at bedtime PRN Insomnia  ondansetron Injectable 4 milliGRAM(s) IV Push every 6 hours PRN Nausea and/or Vomiting  oxyCODONE    IR 2.5 milliGRAM(s) Oral every 8 hours PRN Severe Pain (7 - 10)  traMADol 25 milliGRAM(s) Oral every 12 hours PRN Mild Pain (1 - 3)    Vital Signs Last 24 Hrs  T(C): 36.7 (20 Feb 2024 07:38), Max: 36.8 (19 Feb 2024 20:34)  T(F): 98 (20 Feb 2024 07:38), Max: 98.2 (19 Feb 2024 20:34)  HR: 88 (20 Feb 2024 08:51) (74 - 89)  BP: 127/53 (20 Feb 2024 07:38) (112/81 - 127/53)  BP(mean): --  RR: 18 (20 Feb 2024 07:38) (18 - 18)  SpO2: 96% (20 Feb 2024 08:51) (95% - 100%)    Parameters below as of 20 Feb 2024 08:51  Patient On (Oxygen Delivery Method): nasal cannula        I&O's Detail    17 Feb 2024 07:01  -  18 Feb 2024 07:00  --------------------------------------------------------  IN:  Total IN: 0 mL    OUT:    Indwelling Catheter - Urethral (mL): 300 mL  Total OUT: 300 mL    Total NET: -300 mL      18 Feb 2024 07:01  -  18 Feb 2024 15:17  --------------------------------------------------------  IN:  Total IN: 0 mL    OUT:    Indwelling Catheter - Urethral (mL): 550 mL  Total OUT: 550 mL    Total NET: -550 mL          I&O's Summary    17 Feb 2024 07:01  -  18 Feb 2024 07:00  --------------------------------------------------------  IN: 0 mL / OUT: 300 mL / NET: -300 mL    18 Feb 2024 07:01  -  18 Feb 2024 15:17  --------------------------------------------------------  IN: 0 mL / OUT: 550 mL / NET: -550 mL        PHYSICAL EXAM:  Constitutional: NAD, awake  HEENT:  EOMI,  Pupils round, No oral cyanosis.  Pulmonary: Non-labored, diminished at bases bilaterally  Cardiovascular: +JVD,  irregular, + systolic murmur   Gastrointestinal: Abd firm and distended, +BS.   Lymph: 1+RLE edema  Neurological: Alert and oriented x2, no focal deficits  Psych:  Mood & affect appropriate    ===============================  ===============================  LABS:                                 9.8    5.48  )-----------( 116      ( 17 Feb 2024 06:05 )             30.6                         9.9    4.66  )-----------( 146      ( 16 Feb 2024 05:46 )             30.9       02-20    134<L>  |  94<L>  |  113<H>  ----------------------------<  163<H>  4.7   |  37<H>  |  2.23<H>    Ca    8.3<L>      20 Feb 2024 05:54  Phos  5.0     02-19    TPro  x   /  Alb  3.3  /  TBili  x   /  DBili  x   /  AST  x   /  ALT  x   /  AlkPhos  x   02-19      18 Feb 2024 07:02    128    |  91     |  102    ----------------------------<  167    5.4     |  32     |  2.40   17 Feb 2024 06:05    128    |  90     |  93     ----------------------------<  165    5.5     |  32     |  2.36   16 Feb 2024 05:46    129    |  91     |  86     ----------------------------<  181    5.3     |  36     |  2.46     Ca    8.6        18 Feb 2024 07:02  Ca    9.0        17 Feb 2024 06:05  Ca    9.1        16 Feb 2024 05:46  Phos  4.7       18 Feb 2024 07:02  Phos  4.6       17 Feb 2024 06:05    TPro  x      /  Alb  3.3    /  TBili  x      /  DBili  x      /  AST  x      /  ALT  x      /  AlkPhos  x      18 Feb 2024 07:02  TPro  x      /  Alb  3.2    /  TBili  x      /  DBili  x      /  AST  x      /  ALT  x      /  AlkPhos  x      17 Feb 2024 06:05      ===============================  ===============================  CARDIAC BIOMARKERS:  BNP  Pro-Brain Natriuretic Peptide: 16452 pg/mL (02.15.24 @ 06:18)   Serum Pro-Brain Natriuretic Peptide: 23567 pg/mL *H* [0 - 450] (03-11-22 @ 09:30)  Serum Pro-Brain Natriuretic Peptide: 46800 pg/mL *H* [0 - 450] (02-23-22 @ 04:40)  Serum Pro-Brain Natriuretic Peptide: 67714 pg/mL *H* [0 - 450] (02-21-22 @ 14:26)      TROPONIN  Troponin I, High Sensitivity Result: 22.91 ng/L (02-10-24 @ 12:54)  Troponin I, High Sensitivity Result: 37.45 ng/L (08-30-23 @ 16:07)  Troponin I, High Sensitivity Result: 141.88 ng/L *H* (02-22-22 @ 07:20)  Troponin I, High Sensitivity Result: 161.96 ng/L *H* (02-22-22 @ 05:51)  Troponin I, High Sensitivity Result: 210.61 ng/L *H* (02-21-22 @ 14:26)      ===============================  ===============================      e< from: Transthoracic Echocardiogram Follow Up (09.11.23 @ 08:36) >  Impression     Summary     Limited study to assess tricuspid insufficiency and pulmonary pressure.   Severe (4+) tricuspid valve regurgitation is present.   Severe pulmonary hypertension.   The left ventricle is normal in size, paradoxical septal motion   The interventricular septum appears flattened consistent with an RV   pressure/volume overload.   An apically displaced papillary muscle is noted.   Estimated left ventricular ejection fraction is 50-55%.   The right atrium appears severely dilated.   A device wire is seen in the RV and RA.   The right ventricle is moderately dilated with decreased contractility.     Signature     ----------------------------------------------------------------   Electronically signed by Venugopal Palla, MD(Interpreting   physician) on 09/11/2023 04:51 PM   ----------------------------------------------------------------    < end of copied text >    < from: Cardiac Catheterization (09.06.23 @ 11:59) >    Diagnostic Findings:     Coronary Angiography   The coronary circulation is right dominant.      LM   Left main artery: Angiography shows mild atherosclerosis.      LAD   Proximal left anterior descending: There is a 30 % stenosis.      CX   Distal circumflex: The segment is extremely small. There is a 70 %  stenosis.    RCA   Proximal right coronary artery: There is a 40 % stenosis.      < end of copied text >    Diagnostic Conclusions:     1. Mild diffuse atherosclerosis with moderate severe disease of small  caliber Cx in a nondominant territory.  2. Elevated right sided filling pressures in setting of severe TR with  normal left sided filling pressures. Adequate perfusion.  Recommendations:     1. Recommend aggressive medical management for CAD as per ACC/AHA  guidelines  2. Structural heart follow up at  for ongoing [facility] evaluation.        < from: Xray Chest 1 View- PORTABLE-Urgent (Xray Chest 1 View- PORTABLE-Urgent .) (02.13.24 @ 18:46) >  IMPRESSION:  Decreased congestive changes.    < end of copied text >    < from: CT Chest No Cont (02.10.24 @ 14:34) >  IMPRESSION:    Severe cardiomegaly with enlarging large bilateral pleural effusions.    Severe pulmonary arterial hypertension.      < end of copied text >    < from: Xray Chest 1 View-PORTABLE IMMEDIATE (Xray Chest 1 View-PORTABLE IMMEDIATE .) (02.16.24 @ 03:16) >  FINDINGS:    2/15/2024 at 7:15 AM: The cardiac silhouette is enlarged, unchanged.   There is a left-sided single lead pacemaker, unchanged. There are   bilateral pleural effusions and mild pulmonary vascular congestion, not   significant changed. No focal consolidation is seen. Surgical clips   overlie the left axilla. There is bilateral glenohumeral arthrosis. No   pneumothorax is seen.    2/15/2024 at 5:14 PM: The patient's chin obscures the bilateral lung   apices. Otherwise, there has been no significant interval change.    12/16/2024 at 2:48 AM: No significant interval change.    IMPRESSION: Stable cardiomegaly. Bilateral pleural effusions and mild   pulmonary edema, not significantly changed. No pneumothorax is seen.    < end of copied text >    < from: TTE Echo Complete w/o Contrast w/ Doppler (02.15.24 @ 09:19) >   Impression     Summary     Mild concentric left ventricular hypertrophy is present.   Mildly reduced systolic function. Estimated LVEF 45-50%.   Septal flattening is seen; this finding is consistent with right heart   pressure / volume overload.   The right ventricle is severely dilated with reduced function.   The right atrium appears severely dilated.   The left atrium is mildly dilated.   Mild mitral regurgitation is present.   The aortic valve appears severely calcified. Valve opening seems to be   restricted.   Moderate to severe aortic stenosis.   Severe tricuspid valve regurgitation is present.   Probable severe pulmonary hypertension.   Trace pulmonic valvular regurgitation is present.

## 2024-02-20 NOTE — PROGRESS NOTE ADULT - SUBJECTIVE AND OBJECTIVE BOX
NEPHROLOGY INTERVAL HPI/OVERNIGHT EVENTS:    Date of Service: 02-14-24 @ 10:24  2/20- c/o abd pain, constipated, received MOM, discussed with Dr Ruiz will get Ct abd pelvis  2/19-pt sleeping arousable, somnolent  2/18- in bed her son is at the bedside, pt c/o generalize things including back pain  2/17- pt comfortable, breathing well, on lasix drip 5 mg/ hr  2/16- OOB in chair looks comfortable, responsive her home aide at bedside  2/15- supine, sob, raised head of bed, more comfortable, discussed with HF team Ramón and Dr Ruiz  jackson placed for retention, 500 ml  2/14--appears comfortable.  but c/o intermittent cough and SOB.  2/13- OOB sleeping arousable NAD, evaluated for CKD JOAQUÍN and heart failure  2/12--seen by Dr Hopper yesterday.  Alert,  SOB only slightly better.  Feeling weak.    HPI:   The patient is a 89 yo female with Hx. of Diastolic CHF EF 45%, CAD, HTN, Orthostatic hypotension, Atrial fib. on Xarelto, s/p PPM, COPD, Hypothyroidism, Gout , valvular heart disease, sever AS,  severe TR, severe pumonary HTN, presented in ED BIB form home for SOB. The patient  lives alone and has a private aid. She developed worsening SOB, She had iron infusion 5 days ago and was started on Macrobid for UTI  yesterday.   renal eval for acute hyponatremia in setting of acute CHF/pulm HTN and bilateral plueral effusions    MEDICATIONS  (STANDING):  acetaminophen     Tablet .. 975 milliGRAM(s) Oral every 8 hours  albuterol/ipratropium for Nebulization 3 milliLiter(s) Nebulizer every 6 hours  allopurinol 100 milliGRAM(s) Oral daily  apixaban 2.5 milliGRAM(s) Oral two times a day  atorvastatin 20 milliGRAM(s) Oral at bedtime  buDESOnide    Inhalation Suspension 0.5 milliGRAM(s) Inhalation two times a day  escitalopram 5 milliGRAM(s) Oral daily  folic acid 1 milliGRAM(s) Oral daily  levothyroxine 125 MICROGram(s) Oral daily  lidocaine   4% Patch 1 Patch Transdermal daily  methylPREDNISolone sodium succinate Injectable 20 milliGRAM(s) IV Push daily  metoprolol succinate ER 25 milliGRAM(s) Oral at bedtime  midodrine. 5 milliGRAM(s) Oral three times a day  multivitamin 1 Tablet(s) Oral daily  polyethylene glycol 3350 17 Gram(s) Oral daily  senna 2 Tablet(s) Oral at bedtime  thiamine 100 milliGRAM(s) Oral daily    MEDICATIONS  (PRN):  aluminum hydroxide/magnesium hydroxide/simethicone Suspension 30 milliLiter(s) Oral every 4 hours PRN Dyspepsia  melatonin 3 milliGRAM(s) Oral at bedtime PRN Insomnia  ondansetron Injectable 4 milliGRAM(s) IV Push every 6 hours PRN Nausea and/or Vomiting  oxyCODONE    IR 2.5 milliGRAM(s) Oral every 8 hours PRN Severe Pain (7 - 10)  traMADol 25 milliGRAM(s) Oral every 12 hours PRN Mild Pain (1 - 3)    Vital Signs Last 24 Hrs  T(C): 36.7 (20 Feb 2024 07:38), Max: 36.8 (19 Feb 2024 20:34)  T(F): 98 (20 Feb 2024 07:38), Max: 98.2 (19 Feb 2024 20:34)  HR: 88 (20 Feb 2024 08:51) (74 - 89)  BP: 127/53 (20 Feb 2024 07:38) (112/81 - 127/53)  BP(mean): --  RR: 18 (20 Feb 2024 07:38) (18 - 18)  SpO2: 96% (20 Feb 2024 08:51) (95% - 100%)    Parameters below as of 20 Feb 2024 08:51  Patient On (Oxygen Delivery Method): nasal cannula      PHYSICAL EXAM:  GENERAL: c/o abd pain  CHEST/LUNG:  clear anteriorly  HEART: S1S2 RRR  ABDOMEN: softly distended, mild tenderness on palpation no guarding  EXTREMITIES: NO LE edema  SKIN:     LABS:                                      9.8    5.48  )-----------( 116      ( 17 Feb 2024 06:05 )             30.6                9.9    4.66  )-----------( 146      ( 16 Feb 2024 05:46 )             30.9     02-20    134<L>  |  94<L>  |  113<H>  ----------------------------<  163<H>  4.7   |  37<H>  |  2.23<H>    Ca    8.3<L>      20 Feb 2024 05:54  Phos  5.0     02-19    TPro  x   /  Alb  3.3  /  TBili  x   /  DBili  x   /  AST  x   /  ALT  x   /  AlkPhos  x   02-19 02-19    129<L>  |  93<L>  |  106<H>  ----------------------------<  161<H>  5.0   |  31  |  2.22<H>    Ca    8.6      19 Feb 2024 05:56  Phos  5.0     02-19    TPro  x   /  Alb  3.3  /  TBili  x   /  DBili  x   /  AST  x   /  ALT  x   /  AlkPhos  x   02-19 02-18    128<L>  |  91<L>  |  102<H>  ----------------------------<  167<H>  5.4<H>   |  32<H>  |  2.40<H>    Ca    8.6      18 Feb 2024 07:02  Phos  4.7     02-18    TPro  x   /  Alb  3.3  /  TBili  x   /  DBili  x   /  AST  x   /  ALT  x   /  AlkPhos  x   02-18 02-17    128<L>  |  90<L>  |  93<H>  ----------------------------<  165<H>  5.5<H>   |  32<H>  |  2.36<H>    Ca    9.0      17 Feb 2024 06:05  Phos  4.6     02-17    TPro  x   /  Alb  3.2<L>  /  TBili  x   /  DBili  x   /  AST  x   /  ALT  x   /  AlkPhos  x   02-17 02-16    129<L>  |  91<L>  |  86<H>  ----------------------------<  181<H>  5.3   |  36<H>  |  2.46<H>    Ca    9.1      16 Feb 2024 05:46  Phos  3.9     02-15  Mg     2.3     02-15    TPro  x   /  Alb  3.7  /  TBili  x   /  DBili  x   /  AST  x   /  ALT  x   /  AlkPhos  x   02-15      02-15    127<L>  |  90<L>  |  75<H>  ----------------------------<  177<H>  5.2   |  32<H>  |  2.37<H>    Ca    8.9      15 Feb 2024 06:18  Phos  3.9     02-15  Mg     2.3     02-15    TPro  x   /  Alb  3.7  /  TBili  x   /  DBili  x   /  AST  x   /  ALT  x   /  AlkPhos  x   02-15      02-14    127<L>  |  91<L>  |  61<H>  ----------------------------<  167<H>  5.7<H>   |  33<H>  |  2.19<H>    Ca    9.1      14 Feb 2024 06:03  Phos  3.8     02-14  Mg     2.2     02-14        Urinalysis Basic - ( 14 Feb 2024 06:03 )    Color: x / Appearance: x / SG: x / pH: x  Gluc: 167 mg/dL / Ketone: x  / Bili: x / Urobili: x   Blood: x / Protein: x / Nitrite: x   Leuk Esterase: x / RBC: x / WBC x   Sq Epi: x / Non Sq Epi: x / Bacteria: x      Magnesium: 2.2 mg/dL (02-14 @ 06:03)  Phosphorus: 3.8 mg/dL (02-14 @ 06:03)          RADIOLOGY & ADDITIONAL TESTS:

## 2024-02-20 NOTE — PROGRESS NOTE ADULT - SUBJECTIVE AND OBJECTIVE BOX
Subjective:    pat better, sitting in bed, feeling weak, c/o abd pain, seen by PT.    Home Medications:  allopurinol 100 mg oral tablet: 1 tab(s) orally once a day (10 Feb 2024 14:48)  escitalopram 5 mg oral tablet: 1 tab(s) orally (10 Feb 2024 14:55)  levothyroxine 125 mcg (0.125 mg) oral tablet: 1 tab(s) orally once a day (10 Feb 2024 14:48)  metoprolol succinate 25 mg oral tablet, extended release: 1 tab(s) orally once a day (10 Feb 2024 14:55)  midodrine 5 mg oral tablet: 1 tab(s) orally 2 times a day (10 Feb 2024 14:56)  Multiple Vitamins oral tablet: 1 tab(s) orally once a day (10 Feb 2024 14:55)  nitrofurantoin macrocrystals 100 mg oral capsule: 1 cap(s) orally 2 times a day for 5 days ***course not complete*** (10 Feb 2024 14:55)  potassium chloride 20 mEq oral tablet, extended release: 1 tab(s) orally once a day (10 Feb 2024 14:55)  spironolactone 25 mg oral tablet: 1 tab(s) orally once a day (10 Feb 2024 14:48)  thiamine 100 mg oral tablet: 1 tab(s) orally once a day (10 Feb 2024 14:48)  torsemide 20 mg oral tablet: 1 tab(s) orally 2 times a day (10 Feb 2024 14:48)  Xarelto 10 mg oral tablet: 1 tab(s) orally once a day (10 Feb 2024 14:55)    MEDICATIONS  (STANDING):  acetaminophen     Tablet .. 975 milliGRAM(s) Oral every 8 hours  albuterol/ipratropium for Nebulization 3 milliLiter(s) Nebulizer every 6 hours  allopurinol 100 milliGRAM(s) Oral daily  apixaban 2.5 milliGRAM(s) Oral two times a day  atorvastatin 20 milliGRAM(s) Oral at bedtime  buDESOnide    Inhalation Suspension 0.5 milliGRAM(s) Inhalation two times a day  escitalopram 5 milliGRAM(s) Oral daily  folic acid 1 milliGRAM(s) Oral daily  levothyroxine 125 MICROGram(s) Oral daily  lidocaine   4% Patch 1 Patch Transdermal daily  methylPREDNISolone sodium succinate Injectable 20 milliGRAM(s) IV Push daily  metoprolol succinate ER 25 milliGRAM(s) Oral at bedtime  midodrine. 5 milliGRAM(s) Oral three times a day  multivitamin 1 Tablet(s) Oral daily  polyethylene glycol 3350 17 Gram(s) Oral daily  senna 2 Tablet(s) Oral at bedtime  thiamine 100 milliGRAM(s) Oral daily    MEDICATIONS  (PRN):  aluminum hydroxide/magnesium hydroxide/simethicone Suspension 30 milliLiter(s) Oral every 4 hours PRN Dyspepsia  melatonin 3 milliGRAM(s) Oral at bedtime PRN Insomnia  ondansetron Injectable 4 milliGRAM(s) IV Push every 6 hours PRN Nausea and/or Vomiting  oxyCODONE    IR 2.5 milliGRAM(s) Oral every 8 hours PRN Severe Pain (7 - 10)  traMADol 25 milliGRAM(s) Oral every 12 hours PRN Mild Pain (1 - 3)      Allergies    codeine (Unknown)    Intolerances        Vital Signs Last 24 Hrs  T(C): 36.7 (20 Feb 2024 07:38), Max: 36.8 (19 Feb 2024 20:34)  T(F): 98 (20 Feb 2024 07:38), Max: 98.2 (19 Feb 2024 20:34)  HR: 88 (20 Feb 2024 08:51) (74 - 89)  BP: 127/53 (20 Feb 2024 07:38) (112/81 - 127/53)  BP(mean): --  RR: 18 (20 Feb 2024 07:38) (18 - 18)  SpO2: 96% (20 Feb 2024 08:51) (95% - 100%)    Parameters below as of 20 Feb 2024 08:51  Patient On (Oxygen Delivery Method): nasal cannula          PHYSICAL EXAMINATION:    NECK:  Supple. No lymphadenopathy. Jugular venous pressure not elevated. Carotids equal.   HEART:   The cardiac impulse has a normal quality. Reg., Nl S1 and S2.  There are no murmurs, rubs or gallops noted  CHEST:  Chest crackles to auscultation. Normal respiratory effort.  ABDOMEN:  Soft and nontender.   EXTREMITIES:  There is no edema.       LABS:    02-20    134<L>  |  94<L>  |  113<H>  ----------------------------<  163<H>  4.7   |  37<H>  |  2.23<H>    Ca    8.3<L>      20 Feb 2024 05:54  Phos  5.0     02-19    TPro  x   /  Alb  3.3  /  TBili  x   /  DBili  x   /  AST  x   /  ALT  x   /  AlkPhos  x   02-19      Urinalysis Basic - ( 20 Feb 2024 05:54 )    Color: x / Appearance: x / SG: x / pH: x  Gluc: 163 mg/dL / Ketone: x  / Bili: x / Urobili: x   Blood: x / Protein: x / Nitrite: x   Leuk Esterase: x / RBC: x / WBC x   Sq Epi: x / Non Sq Epi: x / Bacteria: x          Xray Chest 1 View- PORTABLE-Routine (Xray Chest 1 View- PORTABLE-Routine in AM.) (02.19.24 @ 09:15) >  IMPRESSION:  Similar small effusions.

## 2024-02-20 NOTE — PROGRESS NOTE ADULT - SUBJECTIVE AND OBJECTIVE BOX
HPI: Pt is a 89y old Female with hx of Diastolic CHF EF 45%, CAD, HTN, Orthostatic hypotension, Atrial fib. on Xarelto, s/p PPM, COPD, Hypothyroidism, Gout , valvular heart disease, sever AS,  severe TR, severe pumonary HTN, presented in ED BIB form home for SOB. The patient  lives alone and has a private aid. She developed worsening SOB, She had iron infusion 5 days ago and was started on Macrobid for UTI  yesterday.  Palliative consulted for GOC.    : Seen and examined at bedside. Awake, alert, oriented x3 with notable short-term memory loss. Denies pain, SOB, on 5LNC in NAD. Patient defers to her children for any medical decision making. Spoke with patient's daughter, Citlalli, see GOC discussion below.  : Drowsy today, reported not sleeping well overnight. A&Ox3, forgetful. On 5LNC, mildly dyspneic at rest. Denies pain.  2/15: Drowsy, arousable to voice. Remains on supplemental O2, no dyspnea at rest. Worsening JOAQUÍN- diuretics on hold. Pending thoracentesis today.  : Drowsy this AM, very tired, not sleeping well overnight. Had attempted thora yesterday afternoon with resulting small PTX but repeat CXR with resolving PTX. Remains on 5LNC, breathing appears slightly more comfortable today.   : Up in chair. Tired/withdrawn. Eating very little even with encouragement. Wanting to go home. Denies pain, sob, in NAD. Spoke with patient's daughter - requesting referral for home hospice with [facility].      CODE STATUS: DNR/DNI trial NIV      Pain and Dyspnea:     denies pain  denies dyspnea, on 4LNC  in NAD at rest      Review of Systems:    Anxiety- denies  Depression- denies but appears withdrawn  Physical Discomfort- denies  Dyspnea- denies currently  Constipation- denies  Diarrhea- denies  Nausea- denies  Vomiting- denies  Anorexia- ++  Weight Loss-   Cough- ++  Secretions- denies  Fatigue- ++  Weakness- ++  Delirium- denies    All other systems reviewed and negative  Unable to obtain/Limited due to:        PHYSICAL EXAM:    Vital Signs Last 24 Hrs  T(C): 36.7 (2024 07:38), Max: 36.8 (2024 20:34)  T(F): 98 (2024 07:38), Max: 98.2 (2024 20:34)  HR: 88 (2024 08:51) (74 - 89)  BP: 127/53 (2024 07:38) (112/81 - 127/53)  BP(mean): --  RR: 18 (2024 07:38) (18 - 18)  SpO2: 96% (2024 08:51) (95% - 100%)    Parameters below as of 2024 08:51  Patient On (Oxygen Delivery Method): nasal cannula      Daily     Daily Weight in k.2 (2024 07:38)    PPSV2:  20 %  FAST:    General: drowsy, withdrawn, chronically-ill appearing  HEENT: on 4LNC  Lungs: Diminished to anterior lung fields  Cardiac: Irregular rate and rhythm  GI: Abdomen soft, nontender, nondistended. +BSx4  : Espinoza draining clear yellow urine  Ext: +PVD bilateral lower extremities. no edema  Neuro: A&Ox2. Speech intact. No focal neuro deficits.      LABS and RADIOLOGY: REVIEWED

## 2024-02-20 NOTE — PROGRESS NOTE ADULT - ASSESSMENT
Pt is a 89y old Female with hx of Diastolic CHF EF 45%, CAD, HTN, Orthostatic hypotension, Atrial fib. on Xarelto, s/p PPM, COPD, Hypothyroidism, Gout , valvular heart disease, sever AS,  severe TR, severe pumonary HTN, presented in ED BIB form home for SOB. The patient  lives alone and has a private aid. She developed worsening SOB, She had iron infusion 5 days ago and was started on Macrobid for UTI  yesterday.     1. Acute hypoxic and hypercapnic respiratory failure 2/2 CHF, severe valvular disease, pHTN, probable COPD excaerbation  -NIPPV nightly; on 4-5LNC currently  -Lasix gtt d/c due to renal insufficiency and low BPs  -s/p attempted thora on 2/15 with resulting small stable PTX  -CXR daily  -monitor BMP closely  -IV Solumedrol  -strict I&O, daily weight, 1.2L FR  -cardiology following  -CHF team following  -Pulmicort and Duoneb nebs  -referral for home hospice    2. Severe aortic stenosis  -was evaluated by Dr. Levin for TAVR; family opted for medical management  -referral for home hospice    3. Severe protein calorie malnutrition  -albumin 3.3  - +anorexia  -appreciate dietary consult  -encourage PO intake as patient tolerates, ensure TID      Process of Care   --Reviewed dx/treatment problems and alignment with Goals of Care       Physical Aspects of Care   --Pain   patient denies at this time   c/w current management     --Bowel Regimen   denies constipation   risk for constipation d/t immobility   daily dulcolax as needed     --Dyspnea   no dyspnea at rest  on 4LNC  NIPPV at nightly  lasix gtt d/c'd due to renal insufficiency and low BPs  comfortable and in NAD currently  referral for home hospice    --Nausea Vomiting   denies     --Weakness   PT as tolerated         Psychological and Psychiatric Aspects of Care:    --Grief/ Bereavement: emotional support provided   --Hx of psychiatric dx: none   --Pastoral Care Available PRN          Social Aspects of Care   --SW involved         Cultural Aspects   --Primary Language: English           Goals of Care:  d/w daughter; agreeable to referral to home hospice as she recognizes patient's condition is not improving despite all medical interventions          We discussed Palliative Care team being a supportive team when a patient has ongoing illnesses.  We also discussed that it is not an end of life care service, but can help navigate symptoms and emotional support throughout their hospital stay here.           Ethical and Legal Aspects: NA           Capacity- A&Ox2, does not understand complexity of illness; defers to her children         HCP/Surrogate: Citlalli Aponte, daughter [phone] & son Tutu [phone]          Code Status: DNR/DNI trial NIV    MOLST: MOLST with limitations of DNR/DNI trial NIV    Dispo Plan: home with hospice          Discussed With: Dr. Fonseca & Dr. Ruiz          Case coordinated with attending and SW and RN            Time Spent: 60 minutes including the care, coordination and counseling of this patient, 50% of which was spent coordinating and counseling.

## 2024-02-20 NOTE — PROGRESS NOTE ADULT - SUBJECTIVE AND OBJECTIVE BOX
Chief Complaint: Shortness of breath    Interval Hx: Patient seen and examined this AM. Overall condition appears to be declining. PO intake is now very low. She is with increased lethargy. She removed her IV catheter. CHF and valvular heart disease seem unlikely to improve. Met with patient and her daughter and discussed how prognosis is looking poorer and it is reasonable to transition care plan to one that would focus on comfort. Patient and daughter in agreement. Had meeting with Palliative Care and plan is now to transition to hospice at home. Daughter now working on arranging for 24/7 care for patient.     ROS: Multi system review is comprehensively negative x 10 systems except as above    Vitals:  T(F): 98 (20 Feb 2024 07:38), Max: 98.2 (19 Feb 2024 20:34)  HR: 88 (20 Feb 2024 08:51) (74 - 89)  BP: 127/53 (20 Feb 2024 07:38) (112/81 - 127/53)  RR: 18 (20 Feb 2024 07:38) (18 - 18)  SpO2: 96% (20 Feb 2024 08:51) (95% - 100%) on O2    Exam:  Constitutional: Drowsy, ill appearing  HEENT: NCAT PERRL EOMI MMM clear oropharynx  Neck: Supple, no JVD  CVS: S1 and S2, irregular, rate ~90, 2/6 REBECA   Chest: Normal resp effort at rest, diminished breath sounds at bases B/L  Abd: +BS, soft NT ND   : Indwelling Espinoza catheter in place, clear yellow urine in gravity bag  Ext: B/L LE edema  Skin: Warm, dry  Psych: Mood & affect appropriate  Neurological: Drowsy but arousable, no focal deficits    Labs:                      9.8   5.48)--------(116            30.6    Iron 66  TIBC 296  Iron sat 22%  Ferritin 802      134 |   94  |  113  ---------------------<  163  4.7   |  37  |  2.23    Ca  8.3    Phos  5.0        TPro  6.6  /  Alb  3.3  /  TBili  0.6  /  DBili  0.2   /  AST  25  /  ALT  19  /  AlkPhos  110    ABG 2/18: pH 7.36  /  pCO2: 61  /  pO2: 72   /  HCO3: 34   /  SaO2: 96        Troponin negative   proBNP 7548    A1c 6.3    Fasting AM cortisol 15.7     UA 2/10: Yellow, clear, neg ketone, neg nitrite, trace LE, 2 WBC, 0 RBC, bact neg    Micro:  COVID19 PCR 2/10: Negative  Flu PCR 2/10: Negative  RSV PCR 2/10: Negative  Urine culture 2/10: Negative  Blood culture 2/10: Negative    Imaging:  US kidneys and bladder 2/14: Right kidney 9.1 cm. No renal mass, hydronephrosis or calculi. Left kidney 6.9 cm. Limited visualization. No gross mass, hydronephrosis or calculi. Bladder distended with volume 436 cc. No bladder wall thickening. No intraluminal mass.    CXR 2/13: Frontal expiratory view of the chest shows the heart to be similarly enlarged in size. Left cardiac pacemaker is again noted. The lungs show less pulmonary congestion with smaller pleural effusions and there is no evidence of pneumothorax.    CT chest WO 2/10: Bibasilar compressive atelectasis. No pulmonary consolidation or mass. There are enlarging moderate to large bilateral pleural effusions. No lymphadenopathy. There is enlargement of the main pulmonary artery spanning 3.4cm, consistent with pulmonary arterial hypertension. Severe arterial vascular calcification. There is severe cardiomegaly with four-chamber enlargement.  There are permanent pacemaker with lead tips in the right ventricle. Severe coronary arterial calcification.    CXR 2/10: Bilateral pleural effusions with adjacent atelectasis versus pneumonia at the left base.    Cardiac Testing:  Tele 2/19: Afib , occ V-paced    Tele 2/18: Afib, intermittent desat    Tele 2/17: Afib, vpaced,     Tele 2/16: Afib , vpaced    Follow-up TTE 2/15:  Mild concentric left ventricular hypertrophy is present. Mildly reduced systolic function. Estimated LVEF 45-50%. Septal flattening is seen; this finding is consistent with right heart pressure / volume overload. The right ventricle is severely dilated with reduced function. The right atrium appears severely dilated. The left atrium is mildly dilated. Mild mitral regurgitation is present. The aortic valve appears severely calcified. Valve opening seems to be restricted. Moderate to severe aortic stenosis. Severe tricuspid valve regurgitation is present. Probable severe pulmonary hypertension. Trace pulmonic valvular regurgitation is present.    Tele 2/14: Rate controlled afib    EKG 2/13: Afib with RVR, rate 112    PPM interrogation 2/12: Underlying rhythm afib. Normal functioning single chamber PPM with battery longevity 5.6 years. Afib with V-pacing 19%, overall rate histogram is acceptable, brief RVR noted. No setting change made.    Tele 2/10: Afib rate     EKG 2/10: Afib rate 100s    Prior visit cardiac testing   TTE 9/11:  Limited study to assess tricuspid insufficiency and pulmonary pressure. Severe (4+) tricuspid valve regurgitation is present. Severe pulmonary hypertension. The left ventricle is normal in size, paradoxical septal motion The interventricular septum appears flattened consistent with an RV pressure/volume overload. An apically displaced papillary muscle is noted. Estimated left ventricular ejection fraction is 50-55%. The right atrium appears severely dilated. A device wire is seen in the RV and RA. The right ventricle is moderately dilated with decreased contractility.    LHC 9/6: Mild diffuse atherosclerosis with moderate severe disease of small caliber Cx in a nondominant territory. Elevated right sided filling pressures in setting of severe TR with normal left sided filling pressures. Adequate perfusion.    TTE 8/29: Mild to mod MR. Severe AS. Severe TR. Mild to mod FL. Severe pHTN. Severely dilated LA. LV systolic function mildly impaired; segmental wall motion abnormalities noted. Mild concentric LVH. LVEF 45%. The interventricular septum appears flattened consistent with an RV pressure/volume overload. RA severely dilated. RV with moderate dilation, diffuse hypokinesis, and depression of contractility based on TAPSE. A device wire is seen in the RV and RA. IVC is dilated and not collapsing with inspiration.    Meds:  MEDICATIONS  (STANDING):  acetaminophen     Tablet .. 975 milliGRAM(s) Oral every 8 hours  albuterol/ipratropium for Nebulization 3 milliLiter(s) Nebulizer every 6 hours  allopurinol 100 milliGRAM(s) Oral daily  apixaban 2.5 milliGRAM(s) Oral two times a day  atorvastatin 20 milliGRAM(s) Oral at bedtime  buDESOnide    Inhalation Suspension 0.5 milliGRAM(s) Inhalation two times a day  escitalopram 5 milliGRAM(s) Oral daily  folic acid 1 milliGRAM(s) Oral daily  levothyroxine 125 MICROGram(s) Oral daily  lidocaine   4% Patch 1 Patch Transdermal daily  methylPREDNISolone sodium succinate Injectable 20 milliGRAM(s) IV Push daily  metoprolol succinate ER 25 milliGRAM(s) Oral at bedtime  midodrine. 5 milliGRAM(s) Oral three times a day  multivitamin 1 Tablet(s) Oral daily  polyethylene glycol 3350 17 Gram(s) Oral daily  senna 2 Tablet(s) Oral at bedtime  thiamine 100 milliGRAM(s) Oral daily    MEDICATIONS  (PRN):  aluminum hydroxide/magnesium hydroxide/simethicone Suspension 30 milliLiter(s) Oral every 4 hours PRN Dyspepsia  melatonin 3 milliGRAM(s) Oral at bedtime PRN Insomnia  ondansetron Injectable 4 milliGRAM(s) IV Push every 6 hours PRN Nausea and/or Vomiting  oxyCODONE    IR 2.5 milliGRAM(s) Oral every 8 hours PRN Severe Pain (7 - 10)  traMADol 25 milliGRAM(s) Oral every 12 hours PRN Mild Pain (1 - 3)

## 2024-02-20 NOTE — PROGRESS NOTE ADULT - ASSESSMENT
89 yo female with Hx. of Diastolic CHF EF 45%, CAD, HTN, Orthostatic hypotension, Atrial fib. on Xarelto, s/p PPM, COPD, Hypothyroidism, Gout , valvular heart disease, sever AS,  severe TR, severe pumonary HTN, presented in ED BIB form home for SOB. The patient  lives alone and has a private aid. She developed worsening SOB, She had iron infusion 5 days ago and was started on Macrobid for UTI  yesterday.     PROBLEMS:    AC hypoxaemic & hypercapnic respiratory failure  Acute excerbation of COPD  Acute CHF   Bilateral pleural effusion  Hypnatremia   Orthostatic hypotension  Atrial fib-s/p PPM  Non obs CAD  Severe in oul HTN  Hypothyroidism  Severe valvular heart disease  UTI     PLAN:    pulmonary stable  IV Lasix   BIPAP PRN & TITRATE FIO2  IV SOLU-MEDROLS 20MG Qdaily  AEROSOLS/ NEB  Fluid restrictions  off abx  VTEP-on Xarelto

## 2024-02-20 NOTE — PROGRESS NOTE ADULT - ASSESSMENT
89 year-old woman with DM type II, HTN, CAD, chronic diastolic CHF, chronic atrial fibrillation on Xarelto, hx of PPM placement, severe aortic stenosis, severe tricuspid regurgitation, severe pulmonary HTN, chronic orthostatic hypotension on midodrine, COPD, hypothyroidism, gout, just started on macrobid for 1 day for UTI, presented with worsening shortness of breath. Reports adherence with medication. Lives alone, with health aides. Denied chest pain, fevers, chills, cough. She was most recently admitted to  in 9/2023. At the time, she was evaluated for TAVR. Underwent RHC/LHC showing mild diffuse atherosclerosis with moderate severe disease of small caliber Cx in a nondominant territory. She had elevated right sided filling pressures in setting of severe TR with normal left sided filling pressures. Adequate perfusion. No intervention performed. Her outpatient cardiologist is Dr. Harris. In the ED, she was tachycardic to 107, normotensive, saturating 93%. She had increased work of breathing. Labs notable for Hgb 10, Na 123, Hs trop neg x1, proBNP 7548, COVID and RVP neg. CT chest w/ severe cardiomegaly with enlarging large bilateral pleural effusions. Severe pulmonary arterial hypertension. Admitted for further management of acute respiratory failure.     Acute hypoxic and hypercapneic respiratory failure  Likely multi factorial due to CHF exacerbation, severe AS, severe TR, severe pHTN, pleural effusions, compressive atelectasis, COPD with probable exacerbation. No sign of pneumonia at this point. Had required NIPPV with BiPAP to reduce work of breathing and improve hypercapnea. Discontinued BiPAP this AM as she has not been using it, citing discomfort with the mask etc. Patient now on O2 via NC, 3L/min. Subjectively, she feels only marginally improved since admission. Continues to have dyspnea supine. No chest pain or tightness. No cough. No fever or chills. No other complaints aside for generalized weakness. Had unsuccessful attempt at R side thoracentesis / thoracostomy tube placement 2/15. Reattempt deferred.   - Continue O2 supplementation   - Continue to address underlying issues, see below    Acute on chronic diastolic CHF  Patient presenting with SOB, pleural effusions, elevated BNP in setting known valvular disease, HFpEF. Last TTE with LVEF 50-55%, severe TR, severe AS. At home, shes on torsemide 20mg BID. Some improvement in oxygenation with IV Lasix but Cr up-trended significantly and diuretics were placed on hold. Appreciate input from Cardiology and Nephrology today. Still find patient volume overloaded and note that patient is now with indwelling Espinoza placed yesterday for retention, recommended administering Lasix 40mg IV x 1 today. Follow up TTE obtained. EF 45-50%. Septal flattening consistent with right heart pressure / volume overload. RB severely dilated with reduced function. RA severely dilated. LA mildly dilated. AS described as mod-severe. TR severe. Probably severe pHTN. Treated with Lasix drip but this is now on hold.    - Not expected to improve, continue metoprolol, arranging transition to hospice    Severe aortic stenosis   Previously evaluated by Dr Levin for structural intervention / JAYNA. Patient and family had opted for medical management.  - Continue medical management    CAD  Recent cath 9/2023 with mild diffuse atherosclerosis with moderate severe disease of small caliber Cx in a nondominant territory.  - Continue metoprolol, statin    Chronic atrial fibrillation, hx of PPM  RVR in the ED in setting of active respiratory symptoms. PPM interrogated 2/12/24. Normal functioning single chamber PPM with battery longevity 5.6 years. Afib with V-pacing 19%, overall rate histogram is acceptable, brief RVR noted. No setting change made.  - Continue metoprolol  - For stroke risk-reduction, switched AC over from Xarelto to Eliquis given CrCl    COPD with acute exacerbation  Appreciate input from Pulmonary Medicine  - Continue systemic steroids with methylprednisolone, tapering  - Continue DuoNeb q6h    Significant pleural effusions B/L  Difficult to diurese patient as rising. Still with hypoxia, sizable pleural effusions. Thoracic Surgery consulted for input re possible drainage, patient in agreement. Attempted thoracentesis but was unsucessful   - Managed medically with diuretics but not improved, patient wishes to defer procedures    Hyponatremia  Suspect hypervolemic due to fluid overload. Persisting.   - Free water/fluid restriction   - Avoid thiazide diuretics if Na remains low ( HCTZ, metolazone etc)   - Nephrology following    Hyperkalemia  In setting of concurrent spironolactone and potassium supplementation tabs. Stopped both and administered hyperkalemia protocol. K has improved but remains >5. Now could be related to JOAQUÍN, see below.  - Continue to monitor, Marykelma PRN     JOAQUÍN   Baseline Cr ~0.9 as was on presentation. Increased to significantly since then, now 2.19. With patient having received IV Lasix. She has not received any contrast studies. No NSAIDs. She did have UTI diagnosed a day or two prior to admission and has since completed a 3-day course of ceftriaxone but doubt related. Thought she could have retention as she has been rather immobile and constipated. Nephrology consulted. Obtained renal bladder US yesterday 2/14. No sign of hydronephrosis, mass or calculi. Bladder was distended however, volume 436cc. No bladder wall thickening. No bladder mass. Back up on the floors thereafter, patient was not able to void, had bladder scan up to ~540cc, Espinoza placed. Note UA collected upon Espinoza placement is negative for infection.  - Continue with Espinoza (2/14-)  - Strict Is and Os  - Avoid nephrotic medications, renally dose medications where applicable    UTI, present prior to admission  Recent dx of UTI. Was treated with macrobid for 1 day before getting admitted to hospital. Here she received an empiric 3 day course of ceftriaxone. Urine and blood cultures returned negative. UA collected from Espinoza insertion negative for infection.    DM  type II  A1c 6.3. Well controlled.   - Continue on insulin correctional scale for now    Hypothyroidism  Stable.   - Continue levothyroxine    Hx of gout  Stable.   - Continue allopurinol    Constipation  No nausea or emesis. Intact bowel sounds.   - Continue bowel regimen    Physical deconditioning and debility  Appreciate input from PT. Recommended home with home PT and more assistance vs [facility]  - Continue restorative PT sessions  - OOB to chair daily    Severe protein calorie malnutrition  Appreciate dietician input  - Added MVI with minerals daily, thiamine 100mg daily  - Diet supplemented with Ensure High Protein BID      DVT px: On Eliquis for afib  Code Status: DNR/DNI/Trial NIV   Dispo: For transition to hospice at home, SW, CM assisting, daughter in process of arranging 24/7 care

## 2024-02-21 NOTE — PROGRESS NOTE ADULT - PROBLEM SELECTOR PLAN 2
Previously evaluated by Dr Levin for structural intervention- pt and family had opted for medical management.    Pt to be referred for home hospice.

## 2024-02-21 NOTE — PROGRESS NOTE ADULT - SUBJECTIVE AND OBJECTIVE BOX
Subjective:    pat better, sitting in bed, CT abd/ CXR hydro-pneumothorax, 4lpm nc 100%.    Home Medications:  allopurinol 100 mg oral tablet: 1 tab(s) orally once a day (10 Feb 2024 14:48)  escitalopram 5 mg oral tablet: 1 tab(s) orally (10 Feb 2024 14:55)  levothyroxine 125 mcg (0.125 mg) oral tablet: 1 tab(s) orally once a day (10 Feb 2024 14:48)  metoprolol succinate 25 mg oral tablet, extended release: 1 tab(s) orally once a day (10 Feb 2024 14:55)  midodrine 5 mg oral tablet: 1 tab(s) orally 2 times a day (10 Feb 2024 14:56)  Multiple Vitamins oral tablet: 1 tab(s) orally once a day (10 Feb 2024 14:55)  nitrofurantoin macrocrystals 100 mg oral capsule: 1 cap(s) orally 2 times a day for 5 days ***course not complete*** (10 Feb 2024 14:55)  potassium chloride 20 mEq oral tablet, extended release: 1 tab(s) orally once a day (10 Feb 2024 14:55)  spironolactone 25 mg oral tablet: 1 tab(s) orally once a day (10 Feb 2024 14:48)  thiamine 100 mg oral tablet: 1 tab(s) orally once a day (10 Feb 2024 14:48)  torsemide 20 mg oral tablet: 1 tab(s) orally 2 times a day (10 Feb 2024 14:48)  Xarelto 10 mg oral tablet: 1 tab(s) orally once a day (10 Feb 2024 14:55)    MEDICATIONS  (STANDING):  acetaminophen     Tablet .. 975 milliGRAM(s) Oral every 8 hours  albuterol/ipratropium for Nebulization 3 milliLiter(s) Nebulizer every 6 hours  allopurinol 100 milliGRAM(s) Oral daily  apixaban 2.5 milliGRAM(s) Oral two times a day  atorvastatin 20 milliGRAM(s) Oral at bedtime  buDESOnide    Inhalation Suspension 0.5 milliGRAM(s) Inhalation two times a day  escitalopram 5 milliGRAM(s) Oral daily  folic acid 1 milliGRAM(s) Oral daily  levothyroxine 125 MICROGram(s) Oral daily  lidocaine   4% Patch 1 Patch Transdermal daily  metoprolol succinate ER 25 milliGRAM(s) Oral at bedtime  midodrine. 5 milliGRAM(s) Oral three times a day  multivitamin 1 Tablet(s) Oral daily  polyethylene glycol 3350 17 Gram(s) Oral daily  senna 2 Tablet(s) Oral at bedtime  thiamine 100 milliGRAM(s) Oral daily    MEDICATIONS  (PRN):  aluminum hydroxide/magnesium hydroxide/simethicone Suspension 30 milliLiter(s) Oral every 4 hours PRN Dyspepsia  melatonin 3 milliGRAM(s) Oral at bedtime PRN Insomnia  ondansetron Injectable 4 milliGRAM(s) IV Push every 6 hours PRN Nausea and/or Vomiting  oxyCODONE    IR 2.5 milliGRAM(s) Oral every 8 hours PRN Severe Pain (7 - 10)  traMADol 25 milliGRAM(s) Oral every 12 hours PRN Mild Pain (1 - 3)      Allergies    codeine (Unknown)    Intolerances        Vital Signs Last 24 Hrs  T(C): 36.6 (21 Feb 2024 08:32), Max: 36.6 (21 Feb 2024 08:32)  T(F): 97.8 (21 Feb 2024 08:32), Max: 97.8 (21 Feb 2024 08:32)  HR: 84 (21 Feb 2024 13:29) (84 - 99)  BP: 99/71 (21 Feb 2024 13:29) (99/57 - 121/72)  BP(mean): --  RR: 19 (21 Feb 2024 13:29) (19 - 20)  SpO2: 100% (21 Feb 2024 13:29) (94% - 100%)    Parameters below as of 21 Feb 2024 08:32  Patient On (Oxygen Delivery Method): nasal cannula  O2 Flow (L/min): 4        PHYSICAL EXAMINATION:    NECK:  Supple. No lymphadenopathy. Jugular venous pressure not elevated. Carotids equal.   HEART:   The cardiac impulse has a normal quality. Reg., Nl S1 and S2.  There are no murmurs, rubs or gallops noted  CHEST:  Chest crackles to auscultation. Normal respiratory effort.  ABDOMEN:  Soft and nontender.   EXTREMITIES:  There is no edema.       LABS:    02-20    134<L>  |  94<L>  |  113<H>  ----------------------------<  163<H>  4.7   |  37<H>  |  2.23<H>    Ca    8.3<L>      20 Feb 2024 05:54        Urinalysis Basic - ( 20 Feb 2024 05:54 )    Color: x / Appearance: x / SG: x / pH: x  Gluc: 163 mg/dL / Ketone: x  / Bili: x / Urobili: x   Blood: x / Protein: x / Nitrite: x   Leuk Esterase: x / RBC: x / WBC x   Sq Epi: x / Non Sq Epi: x / Bacteria: x

## 2024-02-21 NOTE — PROGRESS NOTE ADULT - SUBJECTIVE AND OBJECTIVE BOX
NEPHROLOGY INTERVAL HPI/OVERNIGHT EVENTS:    Date of Service: 24 @ 10:32    --Denies SOB or abd pain.  C/e back pain today. No acute distress.  - c/o abd pain, constipated, received MOM, discussed with Dr Ruiz will get Ct abd pelvis  -pt sleeping arousable, somnolent  - in bed her son is at the bedside, pt c/o generalize things including back pain  - pt comfortable, breathing well, on lasix drip 5 mg/ hr  - OOB in chair looks comfortable, responsive her home aide at bedside  2/15- supine, sob, raised head of bed, more comfortable, discussed with HF team Ramón and Dr Sara jackson placed for retention, 500 ml  --appears comfortable.  but c/o intermittent cough and SOB.  - OOB sleeping arousable NAD, evaluated for CKD JOAQUÍN and heart failure  --seen by Dr Hopper yesterday.  Alert,  SOB only slightly better.  Feeling weak.    HPI:   The patient is a 89 yo female with Hx. of Diastolic CHF EF 45%, CAD, HTN, Orthostatic hypotension, Atrial fib. on Xarelto, s/p PPM, COPD, Hypothyroidism, Gout , valvular heart disease, sever AS,  severe TR, severe pumonary HTN, presented in ED BIB form home for SOB. The patient  lives alone and has a private aid. She developed worsening SOB, She had iron infusion 5 days ago and was started on Macrobid for UTI  yesterday.   renal eval for acute hyponatremia in setting of acute CHF/pulm HTN and bilateral plueral effusions    MEDICATIONS  (STANDING):  acetaminophen     Tablet .. 975 milliGRAM(s) Oral every 8 hours  albuterol/ipratropium for Nebulization 3 milliLiter(s) Nebulizer every 6 hours  allopurinol 100 milliGRAM(s) Oral daily  apixaban 2.5 milliGRAM(s) Oral two times a day  atorvastatin 20 milliGRAM(s) Oral at bedtime  buDESOnide    Inhalation Suspension 0.5 milliGRAM(s) Inhalation two times a day  escitalopram 5 milliGRAM(s) Oral daily  folic acid 1 milliGRAM(s) Oral daily  levothyroxine 125 MICROGram(s) Oral daily  lidocaine   4% Patch 1 Patch Transdermal daily  methylPREDNISolone sodium succinate Injectable 20 milliGRAM(s) IV Push daily  metoprolol succinate ER 25 milliGRAM(s) Oral at bedtime  midodrine. 5 milliGRAM(s) Oral three times a day  multivitamin 1 Tablet(s) Oral daily  polyethylene glycol 3350 17 Gram(s) Oral daily  senna 2 Tablet(s) Oral at bedtime  thiamine 100 milliGRAM(s) Oral daily    MEDICATIONS  (PRN):  aluminum hydroxide/magnesium hydroxide/simethicone Suspension 30 milliLiter(s) Oral every 4 hours PRN Dyspepsia  melatonin 3 milliGRAM(s) Oral at bedtime PRN Insomnia  ondansetron Injectable 4 milliGRAM(s) IV Push every 6 hours PRN Nausea and/or Vomiting  oxyCODONE    IR 2.5 milliGRAM(s) Oral every 8 hours PRN Severe Pain (7 - 10)  traMADol 25 milliGRAM(s) Oral every 12 hours PRN Mild Pain (1 - 3)    Vital Signs Last 24 Hrs  T(C): 36.6 (2024 08:32), Max: 36.6 (2024 08:32)  T(F): 97.8 (2024 08:32), Max: 97.8 (2024 08:32)  HR: 97 (2024 08:32) (88 - 99)  BP: 121/72 (2024 08:32) (99/57 - 121/72)  BP(mean): --  RR: 20 (2024 08:32) (19 - 20)  SpO2: 100% (2024 08:32) (94% - 100%)    Parameters below as of 2024 08:32  Patient On (Oxygen Delivery Method): nasal cannula  O2 Flow (L/min): 4    Daily     Daily Weight in k.5 (2024 05:48)    PHYSICAL EXAM:  GENERAL: no acute distress  CHEST/LUNG: poor insp effort. scattered rhonchi  HEART: S1S2 RRR  ABDOMEN: distended,  soft, nontender  EXTREMITIES: 1+ edema  SKIN:     LABS:        134<L>  |  94<L>  |  113<H>  ----------------------------<  163<H>  4.7   |  37<H>  |  2.23<H>    Ca    8.3<L>      2024 05:54        Urinalysis Basic - ( 2024 05:54 )    Color: x / Appearance: x / SG: x / pH: x  Gluc: 163 mg/dL / Ketone: x  / Bili: x / Urobili: x   Blood: x / Protein: x / Nitrite: x   Leuk Esterase: x / RBC: x / WBC x   Sq Epi: x / Non Sq Epi: x / Bacteria: x              RADIOLOGY & ADDITIONAL TESTS:

## 2024-02-21 NOTE — PROGRESS NOTE ADULT - PROBLEM SELECTOR PLAN 1
Pt presenting with SOB likely multifactorial 2/2 pleural effusions, CHF exacerbation, elevated BNP in setting known valvular disease severe AS, severe TR, severe pHTN, pleural effusions, compressive atelectasis, COPD  Thoracentesis attempted 2/15/24, s/p small apical PTX .    F/U Echo reveals mild LVH, Mildly reduced systolic function. EF 45-50%. Septal flattening is seen; c/w right heart pressure / volume overload.  Mild MR, Moderate to severe AS, Severe TR, Probable severe pulmonary hypertension.  Lasix gtt suspended 2/2 renal insuffiencey/soft BP.  Palliative care following.  Plan for home hospice.      Will sign off.  Please call with questions.

## 2024-02-21 NOTE — PROGRESS NOTE ADULT - TIME BILLING
Review of history, imaging, labs, medications, examination of patient, medical decision making
Time spent  coordinating the patient's care. This includes reviewing documentation pertinent to this admission, results and imaging. Further tests, medications, and procedures have been ordered as indicated. Laboratory results and the plan of care were communicated to the patient and family. Discussed care plan with consultants including nephrology. Supporting documentation was completed and added to the patient's chart.
Time spent  coordinating the patient's care. This includes reviewing documentation pertinent to this admission, results and imaging. Further tests, medications, and procedures have been ordered as indicated. Laboratory results and the plan of care were communicated to the patient. Discussed care plan with consultants including CHF/structural PA. Supporting documentation was completed and added to the patient's chart.

## 2024-02-21 NOTE — PROGRESS NOTE ADULT - PROBLEM SELECTOR PLAN 3
Recent cath 9/2023 as above: Mild diffuse atherosclerosis with moderate severe disease of small  caliber Cx in a nondominant territory.  cont medical management

## 2024-02-21 NOTE — PROGRESS NOTE ADULT - SUBJECTIVE AND OBJECTIVE BOX
HPI:  Ms. Brennan is a 89 yo female with a hx HFpEF (LVEF 50-55% based on 2023 TTE, severe TR, severe AS, severe pulmonary hypertension, atrial fibrillation on xarelto s/p PPM, COPD, hypothyroidism, hypertension presenting with several days of worsening SOB. Of note, was started on macrobid for UTI on day prior to admission. Reports adherence with medication. Lives alone, with health aides. Denies chest pain, fevers, chills, cough.     Pt was most recently admitted to  in 2023. At the time, she was evaluated for TAVR. Underwent RHC/LHC showing Mild diffuse atherosclerosis with moderate severe disease of small caliber Cx in a nondominant territory; Elevated right sided filling pressures in setting of severe TR with normal left sided filling pressures. Adequate perfusion. No intervention performed.     Outpatient cardiologist: Dr. Harris      In the ED, she was tachycardic to 107, normotensive, saturating 93%.   Labs notable for hgb 10, Na 123, Hs trop neg x1, proBNP 7548, COVID and RVP neg  CT chest: Severe cardiomegaly with enlarging large bilateral pleural effusions.  Severe pulmonary arterial hypertension.    Cardiology consulted for decompensated HF.   24: Awake alert comfortable, tel AF 90's  24 Patient is comfortable ,  HR controlled   24: Awake alert semirecumbent in bed tele AF HR controlled   2/15/24: Pt sleeping comfortably, NAD.  Thoracentesis planned for today. Tele: Afib rate controlled  24: Pt sleeping but arouses when name called.  Denies cp, + SOB.  Tele: Afib with V pacing, PVC's  : no complaints.  : daughter & son at bedside, dyspnea unchanged.  24: Sleeping but arousable  tele AF  V oaced  24: Pt sitting up in chair.  Denies cp or SOB. + abd pain. States she has not had BM in days. Tele: Afib with V pacing  24: Pt awake. Denies cp or sob.  Still with abd discomfort.  Tele: Afib with V pacing      MEDICATIONS:  OUTPATIENT  Home Medications:  allopurinol 100 mg oral tablet: 1 tab(s) orally once a day (10 Feb 2024 14:48)  escitalopram 5 mg oral tablet: 1 tab(s) orally (10 Feb 2024 14:55)  levothyroxine 125 mcg (0.125 mg) oral tablet: 1 tab(s) orally once a day (10 Feb 2024 14:48)  metoprolol succinate 25 mg oral tablet, extended release: 1 tab(s) orally once a day (10 Feb 2024 14:55)  midodrine 5 mg oral tablet: 1 tab(s) orally 2 times a day (10 Feb 2024 14:56)  Multiple Vitamins oral tablet: 1 tab(s) orally once a day (10 Feb 2024 14:55)  nitrofurantoin macrocrystals 100 mg oral capsule: 1 cap(s) orally 2 times a day for 5 days ***course not complete*** (10 Feb 2024 14:55)  potassium chloride 20 mEq oral tablet, extended release: 1 tab(s) orally once a day (10 Feb 2024 14:55)  spironolactone 25 mg oral tablet: 1 tab(s) orally once a day (10 Feb 2024 14:48)  thiamine 100 mg oral tablet: 1 tab(s) orally once a day (10 Feb 2024 14:48)  torsemide 20 mg oral tablet: 1 tab(s) orally 2 times a day (10 Feb 2024 14:48)  Xarelto 10 mg oral tablet: 1 tab(s) orally once a day (10 Feb 2024 14:55)    MEDICATIONS  (STANDING):  acetaminophen     Tablet .. 975 milliGRAM(s) Oral every 8 hours  albuterol/ipratropium for Nebulization 3 milliLiter(s) Nebulizer every 6 hours  allopurinol 100 milliGRAM(s) Oral daily  apixaban 2.5 milliGRAM(s) Oral two times a day  atorvastatin 20 milliGRAM(s) Oral at bedtime  bisacodyl Suppository 10 milliGRAM(s) Rectal once  buDESOnide    Inhalation Suspension 0.5 milliGRAM(s) Inhalation two times a day  escitalopram 5 milliGRAM(s) Oral daily  folic acid 1 milliGRAM(s) Oral daily  levothyroxine 125 MICROGram(s) Oral daily  lidocaine   4% Patch 1 Patch Transdermal daily  metoprolol succinate ER 25 milliGRAM(s) Oral at bedtime  midodrine. 5 milliGRAM(s) Oral three times a day  multivitamin 1 Tablet(s) Oral daily  polyethylene glycol 3350 17 Gram(s) Oral daily  senna 2 Tablet(s) Oral at bedtime  thiamine 100 milliGRAM(s) Oral daily    MEDICATIONS  (PRN):  aluminum hydroxide/magnesium hydroxide/simethicone Suspension 30 milliLiter(s) Oral every 4 hours PRN Dyspepsia  melatonin 3 milliGRAM(s) Oral at bedtime PRN Insomnia  ondansetron Injectable 4 milliGRAM(s) IV Push every 6 hours PRN Nausea and/or Vomiting  oxyCODONE    IR 2.5 milliGRAM(s) Oral every 8 hours PRN Severe Pain (7 - 10)  traMADol 25 milliGRAM(s) Oral every 12 hours PRN Mild Pain (1 - 3)    Vital Signs Last 24 Hrs  T(C): 36.6 (2024 08:32), Max: 36.6 (2024 08:32)  T(F): 97.8 (2024 08:32), Max: 97.8 (2024 08:32)  HR: 97 (2024 08:32) (88 - 99)  BP: 121/72 (2024 08:32) (99/57 - 121/72)  BP(mean): --  RR: 20 (2024 08:32) (19 - 20)  SpO2: 100% (2024 08:32) (94% - 100%)    Parameters below as of 2024 08:32  Patient On (Oxygen Delivery Method): nasal cannula  O2 Flow (L/min): 4    Daily     Daily Weight in k.5 (2024 05:48)    I&O's Detail    2024 07:01  -  2024 07:00  --------------------------------------------------------  IN:  Total IN: 0 mL    OUT:    Indwelling Catheter - Urethral (mL): 300 mL  Total OUT: 300 mL    Total NET: -300 mL      2024 07:01  -  2024 15:17  --------------------------------------------------------  IN:  Total IN: 0 mL    OUT:    Indwelling Catheter - Urethral (mL): 550 mL  Total OUT: 550 mL    Total NET: -550 mL          I&O's Summary    2024 07:01  -  2024 07:00  --------------------------------------------------------  IN: 0 mL / OUT: 300 mL / NET: -300 mL    2024 07:01  -  2024 15:17  --------------------------------------------------------  IN: 0 mL / OUT: 550 mL / NET: -550 mL        PHYSICAL EXAM:  Constitutional: NAD, awake  HEENT:  EOMI,  Pupils round, No oral cyanosis.  Pulmonary: Non-labored, diminished at bases bilaterally  Cardiovascular: +JVD,  irregular, + systolic murmur   Gastrointestinal: Abd firm and distended, +BS.   Lymph: trace pretibial edema  Neurological: Alert and oriented x2, no focal deficits  Psych:  Mood & affect appropriate    ===============================  ===============================  LABS:                             9.8    5.48  )-----------( 116      ( 2024 06:05 )             30.6                         9.9    4.66  )-----------( 146      ( 2024 05:46 )             30.9       02-20    134<L>  |  94<L>  |  113<H>  ----------------------------<  163<H>  4.7   |  37<H>  |  2.23<H>    Ca    8.3<L>      2024 05:54  Phos  5.0         TPro  x   /  Alb  3.3  /  TBili  x   /  DBili  x   /  AST  x   /  ALT  x   /  AlkPhos  x    07:02    128    |  91     |  102    ----------------------------<  167    5.4     |  32     |  2.40   2024 06:05    128    |  90     |  93     ----------------------------<  165    5.5     |  32     |  2.36   2024 05:46    129    |  91     |  86     ----------------------------<  181    5.3     |  36     |  2.46     Ca    8.6        2024 07:02  Ca    9.0        2024 06:05  Ca    9.1        2024 05:46  Phos  4.7       2024 07:02  Phos  4.6       2024 06:05    TPro  x      /  Alb  3.3    /  TBili  x      /  DBili  x      /  AST  x      /  ALT  x      /  AlkPhos  x      2024 07:02  TPro  x      /  Alb  3.2    /  TBili  x      /  DBili  x      /  AST  x      /  ALT  x      /  AlkPhos  x      2024 06:05      ===============================  ===============================  CARDIAC BIOMARKERS:  BNP  Pro-Brain Natriuretic Peptide: 48036 pg/mL (02.15.24 @ 06:18)   Serum Pro-Brain Natriuretic Peptide: 48682 pg/mL *H* [0 - 450] (22 @ 09:30)  Serum Pro-Brain Natriuretic Peptide: 82906 pg/mL *H* [0 - 450] (22 @ 04:40)  Serum Pro-Brain Natriuretic Peptide: 95697 pg/mL *H* [0 - 450] (22 @ 14:26)      TROPONIN  Troponin I, High Sensitivity Result: 22.91 ng/L (02-10-24 @ 12:54)  Troponin I, High Sensitivity Result: 37.45 ng/L (23 @ 16:07)  Troponin I, High Sensitivity Result: 141.88 ng/L *H* (22 @ 07:20)  Troponin I, High Sensitivity Result: 161.96 ng/L *H* (22 @ 05:51)  Troponin I, High Sensitivity Result: 210.61 ng/L *H* (22 @ 14:26)      ===============================  ===============================      e< from: Transthoracic Echocardiogram Follow Up (23 @ 08:36) >  Impression     Summary     Limited study to assess tricuspid insufficiency and pulmonary pressure.   Severe (4+) tricuspid valve regurgitation is present.   Severe pulmonary hypertension.   The left ventricle is normal in size, paradoxical septal motion   The interventricular septum appears flattened consistent with an RV   pressure/volume overload.   An apically displaced papillary muscle is noted.   Estimated left ventricular ejection fraction is 50-55%.   The right atrium appears severely dilated.   A device wire is seen in the RV and RA.   The right ventricle is moderately dilated with decreased contractility.     Signature     ----------------------------------------------------------------   Electronically signed by Venugopal Palla, MD(Interpreting   physician) on 2023 04:51 PM   ----------------------------------------------------------------    < end of copied text >    < from: Cardiac Catheterization (23 @ 11:59) >    Diagnostic Findings:     Coronary Angiography   The coronary circulation is right dominant.      LM   Left main artery: Angiography shows mild atherosclerosis.      LAD   Proximal left anterior descending: There is a 30 % stenosis.      CX   Distal circumflex: The segment is extremely small. There is a 70 %  stenosis.    RCA   Proximal right coronary artery: There is a 40 % stenosis.      < end of copied text >    Diagnostic Conclusions:     1. Mild diffuse atherosclerosis with moderate severe disease of small  caliber Cx in a nondominant territory.  2. Elevated right sided filling pressures in setting of severe TR with  normal left sided filling pressures. Adequate perfusion.  Recommendations:     1. Recommend aggressive medical management for CAD as per ACC/AHA  guidelines  2. Structural heart follow up at  for ongoing [facility] evaluation.        < from: Xray Chest 1 View- PORTABLE-Urgent (Xray Chest 1 View- PORTABLE-Urgent .) (24 @ 18:46) >  IMPRESSION:  Decreased congestive changes.    < end of copied text >    < from: CT Chest No Cont (02.10.24 @ 14:34) >  IMPRESSION:    Severe cardiomegaly with enlarging large bilateral pleural effusions.    Severe pulmonary arterial hypertension.      < end of copied text >    < from: Xray Chest 1 View-PORTABLE IMMEDIATE (Xray Chest 1 View-PORTABLE IMMEDIATE .) (24 @ 03:16) >  FINDINGS:    2/15/2024 at 7:15 AM: The cardiac silhouette is enlarged, unchanged.   There is a left-sided single lead pacemaker, unchanged. There are   bilateral pleural effusions and mild pulmonary vascular congestion, not   significant changed. No focal consolidation is seen. Surgical clips   overlie the left axilla. There is bilateral glenohumeral arthrosis. No   pneumothorax is seen.    2/15/2024 at 5:14 PM: The patient's chin obscures the bilateral lung   apices. Otherwise, there has been no significant interval change.    2024 at 2:48 AM: No significant interval change.    IMPRESSION: Stable cardiomegaly. Bilateral pleural effusions and mild   pulmonary edema, not significantly changed. No pneumothorax is seen.    < end of copied text >    < from: TTE Echo Complete w/o Contrast w/ Doppler (02.15.24 @ 09:19) >   Impression     Summary     Mild concentric left ventricular hypertrophy is present.   Mildly reduced systolic function. Estimated LVEF 45-50%.   Septal flattening is seen; this finding is consistent with right heart   pressure / volume overload.   The right ventricle is severely dilated with reduced function.   The right atrium appears severely dilated.   The left atrium is mildly dilated.   Mild mitral regurgitation is present.   The aortic valve appears severely calcified. Valve opening seems to be   restricted.   Moderate to severe aortic stenosis.   Severe tricuspid valve regurgitation is present.   Probable severe pulmonary hypertension.   Trace pulmonic valvular regurgitation is present.

## 2024-02-21 NOTE — PROGRESS NOTE ADULT - ASSESSMENT
89 year-old woman with DM type II, HTN, CAD, chronic diastolic CHF, chronic atrial fibrillation on Xarelto, hx of PPM placement, severe aortic stenosis, severe tricuspid regurgitation, severe pulmonary HTN, chronic orthostatic hypotension on midodrine, COPD, hypothyroidism, gout, just started on Macrobid for 1 day for UTI, presented with worsening shortness of breath. Reports adherence with medication. Lives alone, with health aides. Denied chest pain, fevers, chills, cough. She was most recently admitted to  in 9/2023. At the time, she was evaluated for TAVR. Underwent RHC/LHC showing mild diffuse atherosclerosis with moderate severe disease of small caliber Cx in a nondominant territory. She had elevated right sided filling pressures in setting of severe TR with normal left sided filling pressures. Adequate perfusion. No intervention performed. Her outpatient cardiologist is Dr. Harris. In the ED, she was tachycardic to 107, normotensive, saturating 93%. She had increased work of breathing. Labs notable for Hgb 10, Na 123, Hs trop neg x1, proBNP 7548, COVID19 and RVP neg. CT chest w/ severe cardiomegaly with enlarging large bilateral pleural effusions. Severe pulmonary arterial hypertension. Admitted for further management of acute respiratory failure.     Acute hypoxic and hypercapneic respiratory failure  Likely multi factorial due to CHF exacerbation, severe AS, severe TR, severe pHTN, pleural effusions, compressive atelectasis, COPD with probable exacerbation. No sign of pneumonia at this point. Had required NIPPV with BiPAP to reduce work of breathing and improve hypercapnea. Discontinued BiPAP this AM as she has not been using it, citing discomfort with the mask etc. Patient now on O2 via NC, 3L/min. Subjectively, she feels only marginally improved since admission. Continues to have dyspnea supine. No chest pain or tightness. No cough. No fever or chills. No other complaints aside for generalized weakness. Had unsuccessful attempt at R side thoracentesis / thoracostomy tube placement 2/15. Reattempt deferred. Continue O2 supplementation     Acute on chronic diastolic CHF  Patient presenting with SOB, pleural effusions, elevated BNP in setting known valvular disease, HFpEF. Last TTE with LVEF 50-55%, severe TR, severe AS. At home, shes on torsemide 20mg BID. Some improvement in oxygenation with IV Lasix but Cr up-trended significantly and diuretics were placed on hold. Appreciate input from Cardiology and Nephrology today. Still find patient volume overloaded and note that patient is now with indwelling Espinoza placed yesterday for retention, recommended administering Lasix 40mg IV x 1 today. Follow up TTE obtained. EF 45-50%. Septal flattening consistent with right heart pressure / volume overload. RB severely dilated with reduced function. RA severely dilated. LA mildly dilated. AS described as mod-severe. TR severe. Probably severe pHTN. Treated with Lasix drip but this is now on hold.  Not expected to improve, continue metoprolol, arranging transition to hospice    Severe aortic stenosis   Previously evaluated by Dr Levin for structural intervention / JAYNA. Patient and family had opted for medical management.     CAD  Recent cath 9/2023 with mild diffuse atherosclerosis with moderate severe disease of small caliber Cx in a nondominant territory. Continue metoprolol, statin    Chronic atrial fibrillation, hx of PPM  RVR in the ED in setting of active respiratory symptoms. PPM interrogated 2/12/24. Normal functioning single chamber PPM with battery longevity 5.6 years. Afib with V-pacing 19%, overall rate histogram is acceptable, brief RVR noted. No setting change made. Continue metoprolol. For stroke risk-reduction, switched AC over from Xarelto to Eliquis given CrCl    COPD with acute exacerbation  Appreciate input from Pulmonary Medicine. S/P systemic steroids with methylprednisolone, switched to PO prednisone with tape. Continue DuoNeb q6h    Significant pleural effusions B/L  Difficult to diurese patient as rising. Still with hypoxia, sizable pleural effusions. Thoracic Surgery consulted for input re possible drainage, patient in agreement. Attempted thoracentesis but was unsuccessful. Managed medically with diuretics but not improved, patient wishes to defer procedures    R sided pneumothorax  Iatrogenic from attempt at pigtail chest tube catheter placement. As per Thoracic Surgery, extent of pneumothorax is improving with oxygen, supportive care.     Hyponatremia  Suspect hypervolemic due to fluid overload. Persisting despite fluid restriction, CHF management. Deferring further monitoring     Hyperkalemia  In setting of concurrent spironolactone and potassium supplementation tabs. Stopped both and administered hyperkalemia protocol. K has improved. Deferring further monitoring    JOAQUÍN   Baseline Cr ~0.9 as was on presentation. Increased to significantly since then, now 2.19. With patient having received IV Lasix. She has not received any contrast studies. No NSAIDs. She did have UTI diagnosed a day or two prior to admission and has since completed a 3-day course of ceftriaxone but doubt related. Thought she could have retention as she has been rather immobile and constipated. Nephrology consulted. Obtained renal bladder US yesterday 2/14. No sign of hydronephrosis, mass or calculi. Bladder was distended however, volume 436cc. No bladder wall thickening. No bladder mass. Back up on the floors thereafter, patient was not able to void, had bladder scan up to ~540cc, Espinoza placed. Note UA collected upon Espinoza placement is negative for infection. Continue with Espinoza (2/14-), continue on discharge for patient comfort, risk of symptomatic urinary retention without indwelling catheter, daughter in agreement    UTI, present prior to admission  Recent dx of UTI. Was treated with Macrobid for 1 day before getting admitted to hospital. Here she received an empiric 3 day course of ceftriaxone. Urine and blood cultures returned negative. UA collected from Espinoza insertion negative for infection.    DM  type II  A1c 6.3. Well controlled. Can discontinue fingerstick glucose checks     Hypothyroidism  Stable. Continue levothyroxine    Hx of gout  Stable. Continue allopurinol    Constipation  No nausea or emesis. Intact bowel sounds. Continue bowel regimen    Physical deconditioning and debility  Appreciate input from PT. Had been attempting to improve condition such that she could work with PT but condition declining, transitioning to hospice now. Mobility as tolerated, as desired.    Severe protein calorie malnutrition  Appreciate dietician input. Added MVI with minerals daily, thiamine 100mg daily. May consider discontinuing to reduce pill burden. Diet supplemented with Ensure High Protein BID      DVT px: On Eliquis for afib  Code Status: DNR/DNI/Trial NIV   Dispo: For transition to hospice at home, JOSE ARMANDO, FELICITY assisting, daughter in process of arranging 24/7 care   89 year-old woman with DM type II, HTN, CAD, chronic diastolic CHF, chronic atrial fibrillation on Xarelto, hx of PPM placement, severe aortic stenosis, severe tricuspid regurgitation, severe pulmonary HTN, chronic orthostatic hypotension on midodrine, COPD, hypothyroidism, gout, just started on Macrobid for 1 day for UTI, presented with worsening shortness of breath. Reports adherence with medication. Lives alone, with health aides. Denied chest pain, fevers, chills, cough. She was most recently admitted to  in 9/2023. At the time, she was evaluated for TAVR. Underwent RHC/LHC showing mild diffuse atherosclerosis with moderate severe disease of small caliber Cx in a nondominant territory. She had elevated right sided filling pressures in setting of severe TR with normal left sided filling pressures. Adequate perfusion. No intervention performed. Her outpatient cardiologist is Dr. Harris. In the ED, she was tachycardic to 107, normotensive, saturating 93%. She had increased work of breathing. Labs notable for Hgb 10, Na 123, Hs trop neg x1, proBNP 7548, COVID19 and RVP neg. CT chest w/ severe cardiomegaly with enlarging large bilateral pleural effusions. Severe pulmonary arterial hypertension. Admitted for further management of acute respiratory failure.     Acute hypoxic and hypercapneic respiratory failure  Likely multi factorial due to CHF exacerbation, severe AS, severe TR, severe pHTN, pleural effusions, compressive atelectasis, COPD with probable exacerbation. No sign of pneumonia at this point. Had required NIPPV with BiPAP to reduce work of breathing and improve hypercapnea. Discontinued BiPAP this AM as she has not been using it, citing discomfort with the mask etc. Patient now on O2 via NC, 3L/min. Subjectively, she feels only marginally improved since admission. Continues to have dyspnea supine. No chest pain or tightness. No cough. No fever or chills. No other complaints aside for generalized weakness. Had unsuccessful attempt at R side thoracentesis / thoracostomy tube placement 2/15. Reattempt deferred. Continue O2 supplementation     Acute on chronic diastolic CHF  Patient presenting with SOB, pleural effusions, elevated BNP in setting known valvular disease, HFpEF. Last TTE with LVEF 50-55%, severe TR, severe AS. At home, shes on torsemide 20mg BID. Some improvement in oxygenation with IV Lasix but Cr up-trended significantly and diuretics were placed on hold. Appreciate input from Cardiology and Nephrology today. Still find patient volume overloaded and note that patient is now with indwelling Espinoza placed yesterday for retention, recommended administering Lasix 40mg IV x 1 today. Follow up TTE obtained. EF 45-50%. Septal flattening consistent with right heart pressure / volume overload. RB severely dilated with reduced function. RA severely dilated. LA mildly dilated. AS described as mod-severe. TR severe. Probably severe pHTN. Treated with Lasix drip but this is now on hold.  Not expected to improve, continue metoprolol, arranging transition to hospice    Severe aortic stenosis   Previously evaluated by Dr Levin for structural intervention / JAYNA. Patient and family had opted for medical management.     CAD  Recent cath 9/2023 with mild diffuse atherosclerosis with moderate severe disease of small caliber Cx in a nondominant territory. Continue metoprolol, statin    Chronic atrial fibrillation, hx of PPM  RVR in the ED in setting of active respiratory symptoms. PPM interrogated 2/12/24. Normal functioning single chamber PPM with battery longevity 5.6 years. Afib with V-pacing 19%, overall rate histogram is acceptable, brief RVR noted. No setting change made. Continue metoprolol. For stroke risk-reduction, switched AC over from Xarelto to Eliquis given CrCl    COPD with acute exacerbation  Appreciate input from Pulmonary Medicine. S/P systemic steroids. Continue bronchodilators unless patient wishes to defer.     Significant pleural effusions B/L  Difficult to diurese patient as rising. Still with hypoxia, sizable pleural effusions. Thoracic Surgery consulted for input re possible drainage, patient in agreement. Attempted thoracentesis but was unsuccessful. Managed medically with diuretics but not improved, patient wishes to defer procedures    R sided pneumothorax  Iatrogenic from attempt at pigtail chest tube catheter placement. As per Thoracic Surgery, extent of pneumothorax is improving with oxygen, supportive care.     Hyponatremia  Suspect hypervolemic due to fluid overload. Persisting despite fluid restriction, CHF management. Deferring further monitoring     Hyperkalemia  In setting of concurrent spironolactone and potassium supplementation tabs. Stopped both and administered hyperkalemia protocol. K has improved. Deferring further monitoring    JOAQUÍN   Baseline Cr ~0.9 as was on presentation. Increased to significantly since then, now 2.19. With patient having received IV Lasix. She has not received any contrast studies. No NSAIDs. She did have UTI diagnosed a day or two prior to admission and has since completed a 3-day course of ceftriaxone but doubt related. Thought she could have retention as she has been rather immobile and constipated. Nephrology consulted. Obtained renal bladder US yesterday 2/14. No sign of hydronephrosis, mass or calculi. Bladder was distended however, volume 436cc. No bladder wall thickening. No bladder mass. Back up on the floors thereafter, patient was not able to void, had bladder scan up to ~540cc, Espinoza placed. Note UA collected upon Espinoza placement is negative for infection. Continue with Espinoza (2/14-), continue on discharge for patient comfort, risk of symptomatic urinary retention without indwelling catheter, daughter in agreement    UTI, present prior to admission  Recent dx of UTI. Was treated with Macrobid for 1 day before getting admitted to hospital. Here she received an empiric 3 day course of ceftriaxone. Urine and blood cultures returned negative. UA collected from Espinoza insertion negative for infection.    DM  type II  A1c 6.3. Well controlled. Can discontinue fingerstick glucose checks     Hypothyroidism  Stable. Continue levothyroxine    Hx of gout  Stable. Continue allopurinol    Constipation  No nausea or emesis. Intact bowel sounds. Continue bowel regimen    Physical deconditioning and debility  Appreciate input from PT. Had been attempting to improve condition such that she could work with PT but condition declining, transitioning to hospice now. Mobility as tolerated, as desired.    Severe protein calorie malnutrition  Appreciate dietician input. Added MVI with minerals daily, thiamine 100mg daily. May consider discontinuing to reduce pill burden. Diet supplemented with Ensure High Protein BID      DVT px: On Eliquis for afib  Code Status: DNR/DNI/Trial NIV   Dispo: For transition to hospice at home, JOSE ARMANDO, CM assisting, daughter in process of arranging 24/7 care

## 2024-02-21 NOTE — PROGRESS NOTE ADULT - ASSESSMENT
Pt is a 89y old Female with hx of Diastolic CHF EF 45%, CAD, HTN, Orthostatic hypotension, Atrial fib. on Xarelto, s/p PPM, COPD, Hypothyroidism, Gout , valvular heart disease, sever AS,  severe TR, severe pumonary HTN, presented in ED BIB form home for SOB. The patient  lives alone and has a private aid. She developed worsening SOB, She had iron infusion 5 days ago and was started on Macrobid for UTI  yesterday.     1. Acute hypoxic and hypercapnic respiratory failure 2/2 CHF, severe valvular disease, pHTN, probable COPD excaerbation  -NIPPV nightly; on 4-5LNC currently  -Lasix gtt d/c due to renal insufficiency and low BPs  -s/p attempted thora on 2/15 with resulting small stable PTX  -CXR daily  -monitor BMP closely  -IV Solumedrol  -strict I&O, daily weight, 1.2L FR  -cardiology following  -CHF team following  -Pulmicort and Duoneb nebs  -referral for home hospice    2. Severe aortic stenosis  -was evaluated by Dr. Levin for TAVR; family opted for medical management  -referral for home hospice    3. Severe protein calorie malnutrition  -albumin 3.3  - +anorexia  -appreciate dietary consult  -encourage PO intake as patient tolerates, ensure TID      Process of Care   --Reviewed dx/treatment problems and alignment with Goals of Care       Physical Aspects of Care   --Pain   patient denies at this time   Tylenol 975 mg TID  Tramadol 25 mg every 12 hours PRN moderate pain  Oxycodone 2.5 mg every 8 hours PRN severe pain  c/w current management     --Bowel Regimen   risk for constipation d/t immobility   Senna 2 tabs nightly  daily dulcolax as needed     --Dyspnea   no dyspnea at rest  on 4LNC  lasix gtt d/c'd due to renal insufficiency and low BPs  comfortable and in NAD currently  referral for home hospice  Oxy PRN for respiratory distress     --Nausea Vomiting   denies     --Weakness   PT as tolerated         Psychological and Psychiatric Aspects of Care:    --Grief/ Bereavement: emotional support provided   --Hx of psychiatric dx: none   --Pastoral Care Available PRN          Social Aspects of Care   --SW involved         Cultural Aspects   --Primary Language: English           Goals of Care:  d/w daughter; agreeable to referral to home hospice as she recognizes patient's condition is not improving despite all medical interventions          We discussed Palliative Care team being a supportive team when a patient has ongoing illnesses.  We also discussed that it is not an end of life care service, but can help navigate symptoms and emotional support throughout their hospital stay here.           Ethical and Legal Aspects: NA           Capacity- A&Ox2, does not understand complexity of illness; defers to her children         HCP/Surrogate: Citlalli Aponte, daughter [phone] & son Tutu [phone]          Code Status: DNR/DNI trial NIV    MOLST: MOLST with limitations of DNR/DNI trial NIV    Dispo Plan: home with hospice          Discussed With: Dr. Fonseca & Dr. Ruiz          Case coordinated with attending and SW and RN            Time Spent: 60 minutes including the care, coordination and counseling of this patient, 50% of which was spent coordinating and counseling.         Palliative will sign off as goals are clear and symptoms are managed. MOLST with DNR/DNI. Pending d/c home with the support of hospice. Please re-consult if needed.

## 2024-02-21 NOTE — PROGRESS NOTE ADULT - PROBLEM SELECTOR PROBLEM 2
Severe aortic stenosis

## 2024-02-21 NOTE — PROGRESS NOTE ADULT - ASSESSMENT
The patient is a 89 yo female with Hx. of Diastolic CHF EF 45%, CAD, HTN, Orthostatic hypotension, Atrial fib. on Xarelto, s/p PPM, COPD, Hypothyroidism, Gout , valvular heart disease, sever AS,  severe TR, severe pulmonary HTN, presented in ED BIB form home for SOB. The patient  lives alone and has a private aid. She developed worsening SOB, She had iron infusion 5 days ago and was started on Macrobid for UTI  yesterday.   renal eval for acute hyponatremia in setting of acute CHF/pulm HTN and bilateral plueral effusions    Hyponatremia - improving   suspect hypervolemic due to fluid overload   monitor Na trend during diuresis    IV diuresis as per medicine/cardiology teams    free water/fluid restriction    avoid thiazide diuretics if Na remains low -  ( HCTZ, metalozone etc)    k borderline on Aldactone and KCl supplements - would stop KCL if K rises     2/12 SY  --Hyponatremia with CHF : Sodium level slowly improving : will resume IV lasix.  --JOAQUÍN : creat elevated but remains fluid overloaded : continue IV lasix to stabilize resp status first.  --Decompensated CHF with severe pulm HTN : prognosis grave.  --GOC evaluation ongoing.  --D/c spironolactone for now,  until electrolytes and creat level holding steady.    2/13  Pt with CHF, on IV lasix  HYperkalemia treated with Ca IV and PO Lokelma  overall clinical status improved    2/14 SY  --JOAQUÍN: creat remains elevated.  Resp status improved.  Diuretics on hold for now.     Follow up with Renal sonogram.  --Decompensated CHF/severe pulm HTN : improved and stable   --Persistent Hyponatremia and Hyperkalemia in the setting of JOAQUÍN.  Check Cortisol level as well.    2/15  Seen for worsening dyspnea, hyponatremia/ JOAQUÍN/ CKD  CXR shows effusion viewed on PACs myself- agree with Lasix 40 mg IVp x 1 today, for pigtail procedure for effusion  Hyponatremia, may be from obstructive uropathy Jackson placed  Low Na may also be due to worsening HF, Lasix given  Mild hyperkalemia due to obstr uropathy, CKD , jackson placed and Lasix given  Labs ordered for 8 PM  raise in creat may be noted post diuretic but pt is c/o  SOB supine    2/16  d/w Jose Melgar   CKD/ JOAQUÍN dyspnea, small R apical PTX clearing, CXR still w R effusion and PVC  -agree with Lasix 40 mg IV x 1 now  Monitor serum bicarb, contraction alkalosis, to prevent compensatory hypoventilation  Hyponatremia improving with diuresis  Creat slightly up due to volume contraction  Mild hyperkalemia will monitor may improve with lasix IV  Monitor UO, jackson in place  Anemia, chronic, Hgb 10 range, will check iron stores, Retic count    2/17  CKD/JOAQUÍN- stable renal function creat slightly up with diuretics  Hyperkalemia- even with iv lasix drip, may need qod Lokelma  Hyponatremia stable at 128-130 range  Hypotension- more pronounced w Lasix drip, will dc Lasix drip monitor clinically in am  Elevated bicarb levels improved down from 36 to 32      2/18  CKD/ JOAQUÍN creatinine holding stable  with diuresis  As above,  Lasix drip held due to low BP,   CHF UO on 2-17 900 ml after 5 mg /hr  iv Lasix from ~ 8 pm 2/16 to 9 pm 2/17  discussed with dr Braxton   And discussed with Dr Kat   Will resume the lasix drip and increase the Midodrine to 5 or 10 mg tid to maintain the BP  If bp does not tolerate will reduce or dc     2/19  CKD/JOAQUÍN/ CHF/ Hypotension  joaquín, Creat improving w diuresis  elevated bicarb level improving slightly on BMP  pH and bicarb improved on ABG, pCO2 remains 61  Tolerating Lasix drip intermittently  Will dc lasix drip today pt feels weak and washed out, will allow pt to redistribute  third spacing  Hypotension stable on Midodrine, will continue for now  Discussed with Dr Ruiz and Dr Palla / Cardiology  CXR reviewed and compared to prior by myself mild improvement noted especially R base  Will evaluate in am for cont of lasix drip vs bid IVP dosing    2/20  CKD/JOAQUÍN stable  Hyponatremia stable 128-129 range  Hypotension improved, furosemide drip dcd  Venous blood gas shows elevated bicarb and base excess  -pCO2 55 down from 61, w V pH of 7.45, metabolic alkalosis  Will dc diuretics for now due to contraction alkalosis  Abd Pain no official result I reviewed the CT my self- no free air  + bowel gas retained contrast    2/21 SY  --JOAQUÍN/CKD : slowly worsening with diuresis.  no labs today.    --Resp status stable now.  --GOC : Home hospice per daughter's decision.  Will hold further labs while d/c planning with home hospice in progress.

## 2024-02-21 NOTE — PROGRESS NOTE ADULT - PROBLEM SELECTOR PROBLEM 1
Acute decompensated heart failure

## 2024-02-21 NOTE — PROGRESS NOTE ADULT - PROBLEM SELECTOR PLAN 4
persistent Afib rate controlled, on AC with xarelto  PPM Interrogated 2/12/24 Normal functioning single chamber PPM with battery longevity 5.6 years. Afib with V-pacing 19%, overall rate histogram is acceptable, brief RVR noted.    xarelto changed to eliquis reduced dose given renal insuff. (>80y, <60 kg, Cr >1.5)    No events noted on tele.  May DC tele    Will sign off. Please call with questions

## 2024-02-21 NOTE — PROGRESS NOTE ADULT - ASSESSMENT
89 yo female with Hx. of Diastolic CHF EF 45%, CAD, HTN, Orthostatic hypotension, Atrial fib. on Xarelto, s/p PPM, COPD, Hypothyroidism, Gout , valvular heart disease, sever AS,  severe TR, severe pumonary HTN, presented in ED BIB form home for SOB. The patient  lives alone and has a private aid. She developed worsening SOB, She had iron infusion 5 days ago and was started on Macrobid for UTI  yesterday.     PROBLEMS:    AC hypoxaemic & hypercapnic respiratory failure  Acute excerbation of COPD  Acute CHF   Bilateral pleural effusion  Hypnatremia   Orthostatic hypotension  Atrial fib-s/p PPM  Non obs CAD  Severe in oul HTN  Hypothyroidism  Severe valvular heart disease  UTI     PLAN:    pulmonary stable-small hydro-pneumothorax-improving on CXR- d/w Ct surgery  IV Lasix   BIPAP PRN & TITRATE FIO2  off steroids  AEROSOLS/ NEB  Fluid restrictions  off abx  VTEP-on Xarelto

## 2024-02-21 NOTE — PROGRESS NOTE ADULT - SUBJECTIVE AND OBJECTIVE BOX
Chief Complaint: Shortness of breath    Interval Hx: Patient seen and examined this AM. Overall condition appears to be declining. PO intake is now very low. She is with increased lethargy. Plan is now to transition to hospice at home. Daughter working on arranging for 24/7 care for patient. Comfort care measures only.     ROS: Multi system review is comprehensively negative x 10 systems except as above    Vitals:  T(F): 97.8 (21 Feb 2024 08:32), Max: 97.8 (21 Feb 2024 08:32)  HR: 105 (21 Feb 2024 14:30) (84 - 105)  BP: 108/63 (21 Feb 2024 14:30) (99/57 - 121/72)  RR: 19 (21 Feb 2024 13:29) (19 - 20)  SpO2: 100% (21 Feb 2024 13:29) (97% - 100%) on O2 4L/min    Exam:  Constitutional: Drowsy, ill appearing  HEENT: NCAT PERRL EOMI MMM clear oropharynx  Neck: Supple, no JVD  CVS: S1 and S2, irregular, rate ~100, 2/6 REBECA   Chest: Normal resp effort at rest, diminished breath sounds at bases B/L  Abd: +BS, soft NT ND   : Indwelling Espinoza catheter in place, clear yellow urine in gravity bag  Ext: B/L LE edema  Skin: Warm, dry  Neurological: Drowsy but arousable, no focal deficits    Labs:                      9.8   5.48)--------(116            30.6    Iron 66  TIBC 296  Iron sat 22%  Ferritin 802      134 |   94  |  113  ---------------------<  163  4.7   |  37  |  2.23    Ca  8.3    Phos  5.0        TPro  6.6  /  Alb  3.3  /  TBili  0.6  /  DBili  0.2   /  AST  25  /  ALT  19  /  AlkPhos  110    ABG 2/18: pH 7.36  /  pCO2: 61  /  pO2: 72   /  HCO3: 34   /  SaO2: 96        Troponin negative   proBNP 7548    A1c 6.3    Fasting AM cortisol 15.7     UA 2/10: Yellow, clear, neg ketone, neg nitrite, trace LE, 2 WBC, 0 RBC, bact neg    Micro:  COVID19 PCR 2/10: Negative  Flu PCR 2/10: Negative  RSV PCR 2/10: Negative  Urine culture 2/10: Negative  Blood culture 2/10: Negative    Imaging:  CXR 2/20: Left ICD. No change heart mediastinum. No significant change bilateral congestive changes small bilateral pleural effusions. Small right pneumothorax described on recent CT of the abdomen.    CT abdomen, pelvis W/ PO cont 2/20: Right pneumothorax, partially imaged. Mild nonspecific gaseous distention of small bowel without evidence of obstruction. Foci of gas within the bladder could be due to recent catheterization or urinary tract infection.    CXR 2/19: Frontal expiratory view of the chest shows the heart to be similarly enlarged in size. Left cardiac pacemaker is again noted. The lungs show mild pulmonary congestion with similar small effusions and there is no evidence of pneumothorax.    CXR 2/17:  Significant congestive findings are slightly improved.    CXR 2/16:  Persistent moderate bibasilar effusions. Some increasing CHF and the lungs is noted. No visible pneumothorax.    CXR 2/15:  The cardiac silhouette is enlarged, unchanged. There is a left-sided single lead pacemaker, unchanged. There are bilateral pleural effusions and mild pulmonary vascular congestion, not significant changed. No focal consolidation is seen. Surgical clips over the left axilla. There is bilateral glenohumeral arthrosis. No pneumothorax is seen.    US kidneys and bladder 2/14: Right kidney 9.1 cm. No renal mass, hydronephrosis or calculi. Left kidney 6.9 cm. Limited visualization. No gross mass, hydronephrosis or calculi. Bladder distended with volume 436 cc. No bladder wall thickening. No intraluminal mass.    CXR 2/13: Frontal expiratory view of the chest shows the heart to be similarly enlarged in size. Left cardiac pacemaker is again noted. The lungs show less pulmonary congestion with smaller pleural effusions and there is no evidence of pneumothorax.    CT chest WO 2/10: Bibasilar compressive atelectasis. No pulmonary consolidation or mass. There are enlarging moderate to large bilateral pleural effusions. No lymphadenopathy. There is enlargement of the main pulmonary artery spanning 3.4cm, consistent with pulmonary arterial hypertension. Severe arterial vascular calcification. There is severe cardiomegaly with four-chamber enlargement.  There are permanent pacemaker with lead tips in the right ventricle. Severe coronary arterial calcification.    CXR 2/10: Bilateral pleural effusions with adjacent atelectasis versus pneumonia at the left base.    Cardiac Testing:  Tele 2/19: Afib , occ V-paced    Tele 2/18: Afib, intermittent desat    Tele 2/17: Afib, vpaced,     Tele 2/16: Afib , vpaced    Follow-up TTE 2/15:  Mild concentric left ventricular hypertrophy is present. Mildly reduced systolic function. Estimated LVEF 45-50%. Septal flattening is seen; this finding is consistent with right heart pressure / volume overload. The right ventricle is severely dilated with reduced function. The right atrium appears severely dilated. The left atrium is mildly dilated. Mild mitral regurgitation is present. The aortic valve appears severely calcified. Valve opening seems to be restricted. Moderate to severe aortic stenosis. Severe tricuspid valve regurgitation is present. Probable severe pulmonary hypertension. Trace pulmonic valvular regurgitation is present.    Tele 2/14: Rate controlled afib    EKG 2/13: Afib with RVR, rate 112    PPM interrogation 2/12: Underlying rhythm afib. Normal functioning single chamber PPM with battery longevity 5.6 years. Afib with V-pacing 19%, overall rate histogram is acceptable, brief RVR noted. No setting change made.    Tele 2/10: Afib rate     EKG 2/10: Afib rate 100s    Prior visit cardiac testing   TTE 9/11:  Limited study to assess tricuspid insufficiency and pulmonary pressure. Severe (4+) tricuspid valve regurgitation is present. Severe pulmonary hypertension. The left ventricle is normal in size, paradoxical septal motion The interventricular septum appears flattened consistent with an RV pressure/volume overload. An apically displaced papillary muscle is noted. Estimated left ventricular ejection fraction is 50-55%. The right atrium appears severely dilated. A device wire is seen in the RV and RA. The right ventricle is moderately dilated with decreased contractility.    LHC 9/6: Mild diffuse atherosclerosis with moderate severe disease of small caliber Cx in a nondominant territory. Elevated right sided filling pressures in setting of severe TR with normal left sided filling pressures. Adequate perfusion.    TTE 8/29: Mild to mod MR. Severe AS. Severe TR. Mild to mod VA. Severe pHTN. Severely dilated LA. LV systolic function mildly impaired; segmental wall motion abnormalities noted. Mild concentric LVH. LVEF 45%. The interventricular septum appears flattened consistent with an RV pressure/volume overload. RA severely dilated. RV with moderate dilation, diffuse hypokinesis, and depression of contractility based on TAPSE. A device wire is seen in the RV and RA. IVC is dilated and not collapsing with inspiration.    Meds:  MEDICATIONS  (STANDING):  acetaminophen     Tablet .. 975 milliGRAM(s) Oral every 8 hours  albuterol/ipratropium for Nebulization 3 milliLiter(s) Nebulizer every 6 hours  allopurinol 100 milliGRAM(s) Oral daily  apixaban 2.5 milliGRAM(s) Oral two times a day  atorvastatin 20 milliGRAM(s) Oral at bedtime  buDESOnide    Inhalation Suspension 0.5 milliGRAM(s) Inhalation two times a day  escitalopram 5 milliGRAM(s) Oral daily  folic acid 1 milliGRAM(s) Oral daily  levothyroxine 125 MICROGram(s) Oral daily  lidocaine   4% Patch 1 Patch Transdermal daily  metoprolol succinate ER 25 milliGRAM(s) Oral at bedtime  midodrine. 5 milliGRAM(s) Oral three times a day  multivitamin 1 Tablet(s) Oral daily  polyethylene glycol 3350 17 Gram(s) Oral daily  senna 2 Tablet(s) Oral at bedtime  thiamine 100 milliGRAM(s) Oral daily    MEDICATIONS  (PRN):  aluminum hydroxide/magnesium hydroxide/simethicone Suspension 30 milliLiter(s) Oral every 4 hours PRN Dyspepsia  melatonin 3 milliGRAM(s) Oral at bedtime PRN Insomnia  ondansetron Injectable 4 milliGRAM(s) IV Push every 6 hours PRN Nausea and/or Vomiting  oxyCODONE    IR 2.5 milliGRAM(s) Oral every 8 hours PRN Severe Pain (7 - 10)  traMADol 25 milliGRAM(s) Oral every 12 hours PRN Mild Pain (1 - 3)

## 2024-02-21 NOTE — PROGRESS NOTE ADULT - SUBJECTIVE AND OBJECTIVE BOX
89y Female s/p omar    Subjective: Seen at bedside, working with PT. complains of weakness, fatigue.  Asked to reconsult due to right pneumothorax noted on upper images of CT Abdomen.      T(C): 36.6 (02-21-24 @ 08:32), Max: 36.6 (02-21-24 @ 08:32)  HR: 97 (02-21-24 @ 08:32) (88 - 99)  BP: 121/72 (02-21-24 @ 08:32) (99/57 - 121/72)  ABP: --  ABP(mean): --  RR: 20 (02-21-24 @ 08:32) (19 - 20)  SpO2: 100% (02-21-24 @ 08:32) (94% - 100%)  Wt(kg): --  CVP(mm Hg): --  CO: --  CI: --  PA: --         02-20    134<L>  |  94<L>  |  113<H>  ----------------------------<  163<H>  4.7   |  37<H>  |  2.23<H>    Ca    8.3<L>      20 Feb 2024 05:54                             CAPILLARY BLOOD GLUCOSE               CXR:     I&O's Detail      MEDICATIONS  (STANDING):  acetaminophen     Tablet .. 975 milliGRAM(s) Oral every 8 hours  albuterol/ipratropium for Nebulization 3 milliLiter(s) Nebulizer every 6 hours  allopurinol 100 milliGRAM(s) Oral daily  apixaban 2.5 milliGRAM(s) Oral two times a day  atorvastatin 20 milliGRAM(s) Oral at bedtime  buDESOnide    Inhalation Suspension 0.5 milliGRAM(s) Inhalation two times a day  escitalopram 5 milliGRAM(s) Oral daily  folic acid 1 milliGRAM(s) Oral daily  levothyroxine 125 MICROGram(s) Oral daily  lidocaine   4% Patch 1 Patch Transdermal daily  methylPREDNISolone sodium succinate Injectable 20 milliGRAM(s) IV Push daily  metoprolol succinate ER 25 milliGRAM(s) Oral at bedtime  midodrine. 5 milliGRAM(s) Oral three times a day  multivitamin 1 Tablet(s) Oral daily  polyethylene glycol 3350 17 Gram(s) Oral daily  senna 2 Tablet(s) Oral at bedtime  thiamine 100 milliGRAM(s) Oral daily    MEDICATIONS  (PRN):  aluminum hydroxide/magnesium hydroxide/simethicone Suspension 30 milliLiter(s) Oral every 4 hours PRN Dyspepsia  melatonin 3 milliGRAM(s) Oral at bedtime PRN Insomnia  ondansetron Injectable 4 milliGRAM(s) IV Push every 6 hours PRN Nausea and/or Vomiting  oxyCODONE    IR 2.5 milliGRAM(s) Oral every 8 hours PRN Severe Pain (7 - 10)  traMADol 25 milliGRAM(s) Oral every 12 hours PRN Mild Pain (1 - 3)      Physical Exam  Neuro: Generalized weakness, unable to stand independantly  CVS: S1 and S2, irregular, rate ~90, 2/6 REBECA   Chest: Normal resp effort at rest, diminished breath sounds at bases B/L  No subcutaneous emphysema.      -----------------------------------------------------------------------------------------------------------------------------------------------------------------------------  -----------------------------------------------------------------------------------------------------------------------------------------------------------------------------

## 2024-02-21 NOTE — PROGRESS NOTE ADULT - SUBJECTIVE AND OBJECTIVE BOX
HPI: Pt is a 89y old Female with hx of Diastolic CHF EF 45%, CAD, HTN, Orthostatic hypotension, Atrial fib. on Xarelto, s/p PPM, COPD, Hypothyroidism, Gout , valvular heart disease, sever AS,  severe TR, severe pumonary HTN, presented in ED BIB form home for SOB. The patient  lives alone and has a private aid. She developed worsening SOB, She had iron infusion 5 days ago and was started on Macrobid for UTI  yesterday.  Palliative consulted for GOC.    : Seen and examined at bedside. Awake, alert, oriented x3 with notable short-term memory loss. Denies pain, SOB, on 5LNC in NAD. Patient defers to her children for any medical decision making. Spoke with patient's daughter, Citlalli, see GOC discussion below.  : Drowsy today, reported not sleeping well overnight. A&Ox3, forgetful. On 5LNC, mildly dyspneic at rest. Denies pain.  2/15: Drowsy, arousable to voice. Remains on supplemental O2, no dyspnea at rest. Worsening JOAQUÍN- diuretics on hold. Pending thoracentesis today.  : Drowsy this AM, very tired, not sleeping well overnight. Had attempted thora yesterday afternoon with resulting small PTX but repeat CXR with resolving PTX. Remains on 5LNC, breathing appears slightly more comfortable today.   : Up in chair. Tired/withdrawn. Eating very little even with encouragement. Wanting to go home. Denies pain, sob, in NAD. Spoke with patient's daughter - requesting referral for home hospice with [facility].  : Drowsy, friend at bedside. No pain, or SOB, just c/o fatigue. Pending d/c home with support of hospice.       CODE STATUS: DNR/DNI      Pain and Dyspnea:     denies pain  Tylenol 975 mg three times daily  Oxycodone 2.5 mg every 8 hours PRN severe pain  Tramadol 25 mg every 12 hours PRN moderate pain    denies dyspnea  on 4LNC in NAD  Oxycodone PRN respiratory distress      Review of Systems:    unable to obtain: drowsy/ poor historian        PHYSICAL EXAM:    Vital Signs Last 24 Hrs  T(C): 36.6 (2024 08:32), Max: 36.6 (2024 08:32)  T(F): 97.8 (2024 08:32), Max: 97.8 (2024 08:32)  HR: 84 (2024 13:29) (84 - 99)  BP: 99/71 (2024 13:29) (99/57 - 121/72)  BP(mean): --  RR: 19 (2024 13:29) (19 - 20)  SpO2: 100% (2024 13:29) (94% - 100%)    Parameters below as of 2024 14:11  Patient On (Oxygen Delivery Method): nasal cannula      Daily     Daily Weight in k.5 (2024 05:48)    PPSV2:   20%  FAST:    General: drowsy, withdrawn, chronically-ill appearing  HEENT: on 4LNC  Lungs: Diminished to anterior lung fields  Cardiac: Irregular rate and rhythm  GI: Abdomen soft, nontender, nondistended. +BSx4  : Espinoza draining clear yellow urine  Ext: +PVD bilateral lower extremities. no edema  Neuro: A&Ox2. Speech intact. No focal neuro deficits.        LABS and RADIOLOGY: REVIEWED

## 2024-02-21 NOTE — PROGRESS NOTE ADULT - ASSESSMENT
89 year old female PMHx of Diastolic CHF EF 45%, CAD, sever AS,  severe TR, severe pulmonary HTN, AFIB on Xarelto ,COPD, Orthostatic hypotension, recent pneumonia who was    admitted with SOB found to have bilateral pleural effusions.  s/p attempted thoracentesis complicated by PTX, steadily resolving on serial CXR.  Also noted on CT Scan.   No intervention indicated at this time in setting of improving ptx and while on anticoagulation.      D/W Dr. Garcia

## 2024-02-22 NOTE — PROGRESS NOTE ADULT - ASSESSMENT
89 year-old woman with DM type II, HTN, CAD, chronic diastolic CHF, chronic atrial fibrillation on Xarelto, hx of PPM placement, severe aortic stenosis, severe tricuspid regurgitation, severe pulmonary HTN, chronic orthostatic hypotension on midodrine, COPD, hypothyroidism, gout, just started on Macrobid for 1 day for UTI, presented with worsening shortness of breath. Reports adherence with medication. Lives alone, with health aides. Denied chest pain, fevers, chills, cough. She was most recently admitted to  in 9/2023. At the time, she was evaluated for TAVR. Underwent RHC/LHC showing mild diffuse atherosclerosis with moderate severe disease of small caliber Cx in a nondominant territory. She had elevated right sided filling pressures in setting of severe TR with normal left sided filling pressures. Adequate perfusion. No intervention performed. Her outpatient cardiologist is Dr. Harris. In the ED, she was tachycardic to 107, normotensive, saturating 93%. She had increased work of breathing. Labs notable for Hgb 10, Na 123, Hs trop neg x1, proBNP 7548, COVID19 and RVP neg. CT chest w/ severe cardiomegaly with enlarging large bilateral pleural effusions. Severe pulmonary arterial hypertension. Admitted for further management of acute respiratory failure.     Acute hypoxic and hypercapneic respiratory failure  Likely multi factorial due to CHF exacerbation, severe AS, severe TR, severe pHTN, pleural effusions, compressive atelectasis, COPD with probable exacerbation. No sign of pneumonia at this point. Had required NIPPV with BiPAP to reduce work of breathing and improve hypercapnea. Discontinued BiPAP this AM as she has not been using it, citing discomfort with the mask etc. Patient now on O2 via NC, 3L/min. Subjectively, she feels only marginally improved since admission. Continues to have dyspnea supine. No chest pain or tightness. No cough. No fever or chills. No other complaints aside for generalized weakness. Had unsuccessful attempt at R side thoracentesis / thoracostomy tube placement 2/15. Reattempt deferred. Continue O2 supplementation     Acute on chronic diastolic CHF  Patient presenting with SOB, pleural effusions, elevated BNP in setting known valvular disease, HFpEF. Last TTE with LVEF 50-55%, severe TR, severe AS. At home, shes on torsemide 20mg BID. Some improvement in oxygenation with IV Lasix but Cr up-trended significantly and diuretics were placed on hold. Appreciate input from Cardiology and Nephrology today. Still find patient volume overloaded and note that patient is now with indwelling Espinoza placed yesterday for retention, recommended administering Lasix 40mg IV x 1 today. Follow up TTE obtained. EF 45-50%. Septal flattening consistent with right heart pressure / volume overload. RB severely dilated with reduced function. RA severely dilated. LA mildly dilated. AS described as mod-severe. TR severe. Probably severe pHTN. Treated with Lasix drip but this is now on hold.  Not expected to improve, continue metoprolol, arranging transition to hospice    Severe aortic stenosis   Previously evaluated by Dr Levin for structural intervention / JAYNA. Patient and family had opted for medical management.     CAD  Recent cath 9/2023 with mild diffuse atherosclerosis with moderate severe disease of small caliber Cx in a nondominant territory. Continue metoprolol, statin    Chronic atrial fibrillation, hx of PPM  RVR in the ED in setting of active respiratory symptoms. PPM interrogated 2/12/24. Normal functioning single chamber PPM with battery longevity 5.6 years. Afib with V-pacing 19%, overall rate histogram is acceptable, brief RVR noted. No setting change made. Continue metoprolol. For stroke risk-reduction, switched AC over from Xarelto to Eliquis given CrCl    COPD with acute exacerbation  Appreciate input from Pulmonary Medicine. S/P systemic steroids. Continue bronchodilators unless patient wishes to defer.     Significant pleural effusions B/L  Difficult to diurese patient as rising. Still with hypoxia, sizable pleural effusions. Thoracic Surgery consulted for input re possible drainage, patient in agreement. Attempted thoracentesis but was unsuccessful. Managed medically with diuretics but not improved, patient wishes to defer procedures    R sided pneumothorax  Iatrogenic from attempt at pigtail chest tube catheter placement. As per Thoracic Surgery, extent of pneumothorax is improving with oxygen, supportive care.     Hyponatremia  Suspect hypervolemic due to fluid overload. Persisting despite fluid restriction, CHF management. Deferring further monitoring     Hyperkalemia  In setting of concurrent spironolactone and potassium supplementation tabs. Stopped both and administered hyperkalemia protocol. K has improved. Deferring further monitoring    JOAQUÍN   Baseline Cr ~0.9 as was on presentation. Increased to significantly since then, now 2.19. With patient having received IV Lasix. She has not received any contrast studies. No NSAIDs. She did have UTI diagnosed a day or two prior to admission and has since completed a 3-day course of ceftriaxone but doubt related. Thought she could have retention as she has been rather immobile and constipated. Nephrology consulted. Obtained renal bladder US yesterday 2/14. No sign of hydronephrosis, mass or calculi. Bladder was distended however, volume 436cc. No bladder wall thickening. No bladder mass. Back up on the floors thereafter, patient was not able to void, had bladder scan up to ~540cc, Espinoza placed. Note UA collected upon Espinoza placement is negative for infection. Continue with Espinoza (2/14-), continue on discharge for patient comfort, risk of symptomatic urinary retention without indwelling catheter, daughter in agreement.    UTI, present prior to admission  Recent dx of UTI. Was treated with Macrobid for 1 day before getting admitted to hospital. Here she received an empiric 3 day course of ceftriaxone. Urine and blood cultures returned negative. UA collected from Espinoza insertion negative for infection.    DM  type II  A1c 6.3. Well controlled. Can discontinue fingerstick glucose checks.    Hypothyroidism  Stable. Continue levothyroxine.    Hx of gout  Stable. Continue allopurinol.    Constipation  No nausea or emesis. Intact bowel sounds. Continue bowel regimen.    Physical deconditioning and debility  Appreciate input from PT. Had been attempting to improve condition such that she could work with PT but condition declining, transitioning to hospice now. Mobility as tolerated, as desired.    Severe protein calorie malnutrition  Appreciate dietician input. Added MVI with minerals daily, thiamine 100mg daily. May consider discontinuing to reduce pill burden. Diet supplemented with Ensure High Protein BID.      DVT px: On Eliquis for afib  Code Status: DNR/DNI/Trial NIV   Dispo: For transition to hospice at home, JOSE ARMANDO, FELICITY assisting, daughter in process of arranging 24/7 care

## 2024-02-22 NOTE — DISCHARGE NOTE NURSING/CASE MANAGEMENT/SOCIAL WORK - NSDCPEFALRISK_GEN_ALL_CORE
For information on Fall & Injury Prevention, visit: [email or web address] OR  [email or web address] OR  https://www.cdc.gov/steadi/patient.html

## 2024-02-22 NOTE — PROGRESS NOTE ADULT - SUBJECTIVE AND OBJECTIVE BOX
Subjective:    pat lying in bed, dysneic on activities, 4lpm nc sat 98%.    Home Medications:  allopurinol 100 mg oral tablet: 1 tab(s) orally once a day (10 Feb 2024 14:48)  escitalopram 5 mg oral tablet: 1 tab(s) orally (10 Feb 2024 14:55)  levothyroxine 125 mcg (0.125 mg) oral tablet: 1 tab(s) orally once a day (10 Feb 2024 14:48)  metoprolol succinate 25 mg oral tablet, extended release: 1 tab(s) orally once a day (10 Feb 2024 14:55)  midodrine 5 mg oral tablet: 1 tab(s) orally 2 times a day (10 Feb 2024 14:56)  Multiple Vitamins oral tablet: 1 tab(s) orally once a day (10 Feb 2024 14:55)  nitrofurantoin macrocrystals 100 mg oral capsule: 1 cap(s) orally 2 times a day for 5 days ***course not complete*** (10 Feb 2024 14:55)  potassium chloride 20 mEq oral tablet, extended release: 1 tab(s) orally once a day (10 Feb 2024 14:55)  spironolactone 25 mg oral tablet: 1 tab(s) orally once a day (10 Feb 2024 14:48)  thiamine 100 mg oral tablet: 1 tab(s) orally once a day (10 Feb 2024 14:48)  torsemide 20 mg oral tablet: 1 tab(s) orally 2 times a day (10 Feb 2024 14:48)  Xarelto 10 mg oral tablet: 1 tab(s) orally once a day (10 Feb 2024 14:55)    MEDICATIONS  (STANDING):  albuterol/ipratropium for Nebulization 3 milliLiter(s) Nebulizer every 6 hours  allopurinol 100 milliGRAM(s) Oral daily  apixaban 2.5 milliGRAM(s) Oral two times a day  atorvastatin 20 milliGRAM(s) Oral at bedtime  buDESOnide    Inhalation Suspension 0.5 milliGRAM(s) Inhalation two times a day  escitalopram 5 milliGRAM(s) Oral daily  folic acid 1 milliGRAM(s) Oral daily  levothyroxine 125 MICROGram(s) Oral daily  lidocaine   4% Patch 1 Patch Transdermal daily  metoprolol succinate ER 25 milliGRAM(s) Oral at bedtime  midodrine. 5 milliGRAM(s) Oral three times a day  multivitamin 1 Tablet(s) Oral daily  polyethylene glycol 3350 17 Gram(s) Oral daily  senna 2 Tablet(s) Oral at bedtime  thiamine 100 milliGRAM(s) Oral daily    MEDICATIONS  (PRN):  acetaminophen     Tablet .. 650 milliGRAM(s) Oral every 6 hours PRN Temp greater or equal to 38C (100.4F), Mild Pain (1 - 3)  aluminum hydroxide/magnesium hydroxide/simethicone Suspension 30 milliLiter(s) Oral every 4 hours PRN Dyspepsia  melatonin 3 milliGRAM(s) Oral at bedtime PRN Insomnia  ondansetron Injectable 4 milliGRAM(s) IV Push every 6 hours PRN Nausea and/or Vomiting  oxyCODONE    IR 2.5 milliGRAM(s) Oral every 8 hours PRN Severe Pain (7 - 10)  traMADol 25 milliGRAM(s) Oral every 12 hours PRN Moderate Pain (4 - 6)      Allergies    codeine (Unknown)    Intolerances        Vital Signs Last 24 Hrs  T(C): 36.7 (21 Feb 2024 20:30), Max: 36.7 (21 Feb 2024 20:30)  T(F): 98.1 (21 Feb 2024 20:30), Max: 98.1 (21 Feb 2024 20:30)  HR: 104 (22 Feb 2024 13:28) (79 - 118)  BP: 90/52 (22 Feb 2024 07:24) (90/52 - 136/110)  BP(mean): --  RR: 18 (22 Feb 2024 07:24) (18 - 20)  SpO2: 98% (22 Feb 2024 07:24) (94% - 98%)    Parameters below as of 22 Feb 2024 13:28  Patient On (Oxygen Delivery Method): nasal cannula          PHYSICAL EXAMINATION:    NECK:  Supple. No lymphadenopathy. Jugular venous pressure not elevated. Carotids equal.   HEART:   The cardiac impulse has a normal quality. Reg., Nl S1 and S2.  There are no murmurs, rubs or gallops noted  CHEST:  Chest crackles to auscultation. Normal respiratory effort.  ABDOMEN:  Soft and nontender.   EXTREMITIES:  There is no edema.       LABS:

## 2024-02-22 NOTE — DISCHARGE NOTE NURSING/CASE MANAGEMENT/SOCIAL WORK - PATIENT PORTAL LINK FT
You can access the FollowMyHealth Patient Portal offered by [facility] by registering at the following website: [email or web address]. By joining [facility]’s FollowMyHealth portal, you will also be able to view your health information using other applications (apps) compatible with our system.

## 2024-02-22 NOTE — PROGRESS NOTE ADULT - ASSESSMENT
89 yo female with Hx. of Diastolic CHF EF 45%, CAD, HTN, Orthostatic hypotension, Atrial fib. on Xarelto, s/p PPM, COPD, Hypothyroidism, Gout , valvular heart disease, sever AS,  severe TR, severe pumonary HTN, presented in ED BIB form home for SOB. The patient  lives alone and has a private aid. She developed worsening SOB, She had iron infusion 5 days ago and was started on Macrobid for UTI  yesterday.     PROBLEMS:    AC hypoxaemic & hypercapnic respiratory failure  Acute excerbation of COPD  Acute CHF   Bilateral pleural effusion  Hypnatremia   Orthostatic hypotension  Atrial fib-s/p PPM  Non obs CAD  Severe in oul HTN  Hypothyroidism  Severe valvular heart disease  UTI     PLAN:    pulmonary unchanged-family might be considering comfort care  Small hydro-pneumothorax-improving on CXR- d/w Ct surgery  IV Lasix   BIPAP PRN & TITRATE FIO2  off steroids  AEROSOLS/ NEB  Fluid restrictions  off abx  VTEP-on Xarelto

## 2024-02-22 NOTE — DISCHARGE NOTE NURSING/CASE MANAGEMENT/SOCIAL WORK - NSDCVIVACCINE_GEN_ALL_CORE_FT
Tdap; 25-Aug-2023 08:46; Clara Clark (RN); [employer]; [id number] (Exp. Date: 19-Sep-2025); IntraMuscular; Deltoid Left.; 0.5 milliLiter(s); VIS (VIS Published: 09-May-2013, VIS Presented: 25-Aug-2023);

## 2024-02-22 NOTE — PROGRESS NOTE ADULT - SUBJECTIVE AND OBJECTIVE BOX
Chief Complaint: Shortness of breath    Interval Hx: Patient seen and examined this AM. Overall condition declining. PO intake is now extremely. She is increasingly lethargic. Plan is now to transition to hospice. Daughter prefers to have patient home for hospice and daughter is finalizing arrangements for patient to have 24/7 care. On comfort care measures only.     ROS: Multi system review is comprehensively negative x 10 systems except as above    Vitals:  T(F): 98.1 (21 Feb 2024 20:30), Max: 98.1 (21 Feb 2024 20:30)  HR: 104 (22 Feb 2024 13:28) (79 - 118)  BP: 90/52 (22 Feb 2024 07:24) (90/52 - 136/110)  RR: 18 (22 Feb 2024 07:24) (18 - 20)  SpO2: 98% (22 Feb 2024 07:24) (94% - 98%) on O2 via NC    Exam:  Constitutional: Drowsy, ill appearing  HEENT: NCAT PERRL EOMI MMM clear oropharynx  Neck: Supple, no JVD  CVS: S1 and S2, irregular, rate ~100, 2/6 REBECA   Chest: Normal resp effort at rest, diminished breath sounds at bases B/L  Abd: +BS, soft NT ND   : Indwelling Espinoza catheter in place, clear yellow urine in gravity bag  Ext: B/L LE edema  Skin: Warm, dry  Neurological: Drowsy but arousable, no focal deficits    Labs:                      9.8   5.48)--------(116            30.6    Iron 66  TIBC 296  Iron sat 22%  Ferritin 802      134 |   94  |  113  ---------------------<  163  4.7   |  37  |  2.23    Ca  8.3    Phos  5.0        TPro  6.6  /  Alb  3.3  /  TBili  0.6  /  DBili  0.2   /  AST  25  /  ALT  19  /  AlkPhos  110    ABG 2/18: pH 7.36  /  pCO2: 61  /  pO2: 72   /  HCO3: 34   /  SaO2: 96        Troponin negative   proBNP 7548    A1c 6.3    Fasting AM cortisol 15.7     UA 2/10: Yellow, clear, neg ketone, neg nitrite, trace LE, 2 WBC, 0 RBC, bact neg    Micro:  COVID19 PCR 2/10: Negative  Flu PCR 2/10: Negative  RSV PCR 2/10: Negative  Urine culture 2/10: Negative  Blood culture 2/10: Negative    Imaging:  CXR 2/20: Left ICD. No change heart mediastinum. No significant change bilateral congestive changes small bilateral pleural effusions. Small right pneumothorax described on recent CT of the abdomen.    CT abdomen, pelvis W/ PO cont 2/20: Right pneumothorax, partially imaged. Mild nonspecific gaseous distention of small bowel without evidence of obstruction. Foci of gas within the bladder could be due to recent catheterization or urinary tract infection.    CXR 2/19: Frontal expiratory view of the chest shows the heart to be similarly enlarged in size. Left cardiac pacemaker is again noted. The lungs show mild pulmonary congestion with similar small effusions and there is no evidence of pneumothorax.    CXR 2/17:  Significant congestive findings are slightly improved.    CXR 2/16:  Persistent moderate bibasilar effusions. Some increasing CHF and the lungs is noted. No visible pneumothorax.    CXR 2/15:  The cardiac silhouette is enlarged, unchanged. There is a left-sided single lead pacemaker, unchanged. There are bilateral pleural effusions and mild pulmonary vascular congestion, not significant changed. No focal consolidation is seen. Surgical clips over the left axilla. There is bilateral glenohumeral arthrosis. No pneumothorax is seen.    US kidneys and bladder 2/14: Right kidney 9.1 cm. No renal mass, hydronephrosis or calculi. Left kidney 6.9 cm. Limited visualization. No gross mass, hydronephrosis or calculi. Bladder distended with volume 436 cc. No bladder wall thickening. No intraluminal mass.    CXR 2/13: Frontal expiratory view of the chest shows the heart to be similarly enlarged in size. Left cardiac pacemaker is again noted. The lungs show less pulmonary congestion with smaller pleural effusions and there is no evidence of pneumothorax.    CT chest WO 2/10: Bibasilar compressive atelectasis. No pulmonary consolidation or mass. There are enlarging moderate to large bilateral pleural effusions. No lymphadenopathy. There is enlargement of the main pulmonary artery spanning 3.4cm, consistent with pulmonary arterial hypertension. Severe arterial vascular calcification. There is severe cardiomegaly with four-chamber enlargement.  There are permanent pacemaker with lead tips in the right ventricle. Severe coronary arterial calcification.    CXR 2/10: Bilateral pleural effusions with adjacent atelectasis versus pneumonia at the left base.    Cardiac Testing:  Tele 2/20: DC'd    Tele 2/19: Afib 80s-100s, occ v-paced    Tele 2/18: Afib, 80s-100s, intermittent desat    Tele 2/17: Afib, rate 80s-120s, occ v-paced    Tele 2/16: Afib 90s-110s, occ v-paced    Follow-up TTE 2/15:  Mild concentric left ventricular hypertrophy is present. Mildly reduced systolic function. Estimated LVEF 45-50%. Septal flattening is seen; this finding is consistent with right heart pressure / volume overload. The right ventricle is severely dilated with reduced function. The right atrium appears severely dilated. The left atrium is mildly dilated. Mild mitral regurgitation is present. The aortic valve appears severely calcified. Valve opening seems to be restricted. Moderate to severe aortic stenosis. Severe tricuspid valve regurgitation is present. Probable severe pulmonary hypertension. Trace pulmonic valvular regurgitation is present.    Tele 2/14: Rate controlled afib    EKG 2/13: Afib, episodes of RVR to 110s    PPM interrogation 2/12: Underlying rhythm afib. Normal functioning single chamber PPM with battery longevity 5.6 years. Afib with V-pacing 19%, overall rate histogram is acceptable, brief RVR noted. No setting change made.    Tele 2/10: Afib rate 100s    EKG 2/10: Afib rate 100s    Prior visit cardiac testing   TTE 9/11:  Limited study to assess tricuspid insufficiency and pulmonary pressure. Severe (4+) tricuspid valve regurgitation is present. Severe pulmonary hypertension. The left ventricle is normal in size, paradoxical septal motion The interventricular septum appears flattened consistent with an RV pressure/volume overload. An apically displaced papillary muscle is noted. Estimated left ventricular ejection fraction is 50-55%. The right atrium appears severely dilated. A device wire is seen in the RV and RA. The right ventricle is moderately dilated with decreased contractility.    LHC 9/6: Mild diffuse atherosclerosis with moderate severe disease of small caliber Cx in a nondominant territory. Elevated right sided filling pressures in setting of severe TR with normal left sided filling pressures. Adequate perfusion.    TTE 8/29: Mild to mod MR. Severe AS. Severe TR. Mild to mod TN. Severe pHTN. Severely dilated LA. LV systolic function mildly impaired; segmental wall motion abnormalities noted. Mild concentric LVH. LVEF 45%. The interventricular septum appears flattened consistent with an RV pressure/volume overload. RA severely dilated. RV with moderate dilation, diffuse hypokinesis, and depression of contractility based on TAPSE. A device wire is seen in the RV and RA. IVC is dilated and not collapsing with inspiration.    Meds:  MEDICATIONS  (STANDING):  acetaminophen     Tablet .. 975 milliGRAM(s) Oral every 8 hours  albuterol/ipratropium for Nebulization 3 milliLiter(s) Nebulizer every 6 hours  allopurinol 100 milliGRAM(s) Oral daily  apixaban 2.5 milliGRAM(s) Oral two times a day  atorvastatin 20 milliGRAM(s) Oral at bedtime  buDESOnide    Inhalation Suspension 0.5 milliGRAM(s) Inhalation two times a day  escitalopram 5 milliGRAM(s) Oral daily  folic acid 1 milliGRAM(s) Oral daily  levothyroxine 125 MICROGram(s) Oral daily  lidocaine   4% Patch 1 Patch Transdermal daily  metoprolol succinate ER 25 milliGRAM(s) Oral at bedtime  midodrine. 5 milliGRAM(s) Oral three times a day  multivitamin 1 Tablet(s) Oral daily  polyethylene glycol 3350 17 Gram(s) Oral daily  senna 2 Tablet(s) Oral at bedtime  thiamine 100 milliGRAM(s) Oral daily    MEDICATIONS  (PRN):  aluminum hydroxide/magnesium hydroxide/simethicone Suspension 30 milliLiter(s) Oral every 4 hours PRN Dyspepsia  melatonin 3 milliGRAM(s) Oral at bedtime PRN Insomnia  ondansetron Injectable 4 milliGRAM(s) IV Push every 6 hours PRN Nausea and/or Vomiting  oxyCODONE    IR 2.5 milliGRAM(s) Oral every 8 hours PRN Severe Pain (7 - 10)  traMADol 25 milliGRAM(s) Oral every 12 hours PRN Mild Pain (1 - 3)

## 2024-02-23 NOTE — DISCHARGE NOTE PROVIDER - NSDCPNSUBOBJ_GEN_ALL_CORE
Chief Complaint: Shortness of breath    Interval Hx: Patient seen and examined this AM. Overall condition declining. PO intake is now extremely low. She is increasingly lethargic. On comfort care measures only. For discharge to home with hospice.     ROS: Multi system review is comprehensively negative x 10 systems except as above    Vitals:  T(F): 97.2 (23 Feb 2024 08:48), Max: 97.7 (22 Feb 2024 20:10)  HR: 61 (23 Feb 2024 14:07) (61 - 117)  BP: 108/52 (23 Feb 2024 08:48) (96/50 - 108/52)  RR: 18 (23 Feb 2024 08:48) (18 - 20)  SpO2: 92% (23 Feb 2024 14:07) (92% - 100%) on O2 3L    Exam:  Constitutional: Drowsy, ill appearing  HEENT: NCAT PERRL EOMI MMM clear oropharynx  Neck: Supple, no JVD  CVS: S1 and S2, irregular, rate ~60, 2/6 REBECA   Chest: Normal resp effort at rest, diminished breath sounds at bases B/L  Abd: +BS, soft NT ND   : Indwelling Espinoza catheter in place, clear yellow urine in gravity bag  Ext: B/L LE edema  Skin: Warm, dry  Neurological: Drowsy but arousable, no focal deficits    Labs:                      9.8   5.48)--------(116            30.6    Iron 66  TIBC 296  Iron sat 22%  Ferritin 802      134 |   94  |  113  ---------------------<  163  4.7   |  37  |  2.23    Ca  8.3    Phos  5.0        TPro  6.6  /  Alb  3.3  /  TBili  0.6  /  DBili  0.2   /  AST  25  /  ALT  19  /  AlkPhos  110    ABG 2/18: pH 7.36  /  pCO2: 61  /  pO2: 72   /  HCO3: 34   /  SaO2: 96        Troponin negative   proBNP 7548    A1c 6.3    Fasting AM cortisol 15.7     UA 2/10: Yellow, clear, neg ketone, neg nitrite, trace LE, 2 WBC, 0 RBC, bact neg    Micro:  COVID19 PCR 2/10: Negative  Flu PCR 2/10: Negative  RSV PCR 2/10: Negative  Urine culture 2/10: Negative  Blood culture 2/10: Negative    Imaging:  CXR 2/20: Left ICD. No change heart mediastinum. No significant change bilateral congestive changes small bilateral pleural effusions. Small right pneumothorax described on recent CT of the abdomen.    CT abdomen, pelvis W/ PO cont 2/20: Right pneumothorax, partially imaged. Mild nonspecific gaseous distention of small bowel without evidence of obstruction. Foci of gas within the bladder could be due to recent catheterization or urinary tract infection.    CXR 2/19: Frontal expiratory view of the chest shows the heart to be similarly enlarged in size. Left cardiac pacemaker is again noted. The lungs show mild pulmonary congestion with similar small effusions and there is no evidence of pneumothorax.    CXR 2/17:  Significant congestive findings are slightly improved.    CXR 2/16:  Persistent moderate bibasilar effusions. Some increasing CHF and the lungs is noted. No visible pneumothorax.    CXR 2/15:  The cardiac silhouette is enlarged, unchanged. There is a left-sided single lead pacemaker, unchanged. There are bilateral pleural effusions and mild pulmonary vascular congestion, not significant changed. No focal consolidation is seen. Surgical clips over the left axilla. There is bilateral glenohumeral arthrosis. No pneumothorax is seen.    US kidneys and bladder 2/14: Right kidney 9.1 cm. No renal mass, hydronephrosis or calculi. Left kidney 6.9 cm. Limited visualization. No gross mass, hydronephrosis or calculi. Bladder distended with volume 436 cc. No bladder wall thickening. No intraluminal mass.    CXR 2/13: Frontal expiratory view of the chest shows the heart to be similarly enlarged in size. Left cardiac pacemaker is again noted. The lungs show less pulmonary congestion with smaller pleural effusions and there is no evidence of pneumothorax.    CT chest WO 2/10: Bibasilar compressive atelectasis. No pulmonary consolidation or mass. There are enlarging moderate to large bilateral pleural effusions. No lymphadenopathy. There is enlargement of the main pulmonary artery spanning 3.4cm, consistent with pulmonary arterial hypertension. Severe arterial vascular calcification. There is severe cardiomegaly with four-chamber enlargement.  There are permanent pacemaker with lead tips in the right ventricle. Severe coronary arterial calcification.    CXR 2/10: Bilateral pleural effusions with adjacent atelectasis versus pneumonia at the left base.    Cardiac Testing:  Tele 2/20: DC'd    Tele 2/19: Afib 80s-100s, occ v-paced    Tele 2/18: Afib, 80s-100s, intermittent desat    Tele 2/17: Afib, rate 80s-120s, occ v-paced    Tele 2/16: Afib 90s-110s, occ v-paced    Follow-up TTE 2/15:  Mild concentric left ventricular hypertrophy is present. Mildly reduced systolic function. Estimated LVEF 45-50%. Septal flattening is seen; this finding is consistent with right heart pressure / volume overload. The right ventricle is severely dilated with reduced function. The right atrium appears severely dilated. The left atrium is mildly dilated. Mild mitral regurgitation is present. The aortic valve appears severely calcified. Valve opening seems to be restricted. Moderate to severe aortic stenosis. Severe tricuspid valve regurgitation is present. Probable severe pulmonary hypertension. Trace pulmonic valvular regurgitation is present.    Tele 2/14: Rate controlled afib    EKG 2/13: Afib, episodes of RVR to 110s    PPM interrogation 2/12: Underlying rhythm afib. Normal functioning single chamber PPM with battery longevity 5.6 years. Afib with V-pacing 19%, overall rate histogram is acceptable, brief RVR noted. No setting change made.    Tele 2/10: Afib rate 100s    EKG 2/10: Afib rate 100s    Prior visit cardiac testing   TTE 9/11:  Limited study to assess tricuspid insufficiency and pulmonary pressure. Severe (4+) tricuspid valve regurgitation is present. Severe pulmonary hypertension. The left ventricle is normal in size, paradoxical septal motion The interventricular septum appears flattened consistent with an RV pressure/volume overload. An apically displaced papillary muscle is noted. Estimated left ventricular ejection fraction is 50-55%. The right atrium appears severely dilated. A device wire is seen in the RV and RA. The right ventricle is moderately dilated with decreased contractility.    LHC 9/6: Mild diffuse atherosclerosis with moderate severe disease of small caliber Cx in a nondominant territory. Elevated right sided filling pressures in setting of severe TR with normal left sided filling pressures. Adequate perfusion.    TTE 8/29: Mild to mod MR. Severe AS. Severe TR. Mild to mod [place]. Severe pHTN. Severely dilated LA. LV systolic function mildly impaired; segmental wall motion abnormalities noted. Mild concentric LVH. LVEF 45%. The interventricular septum appears flattened consistent with an RV pressure/volume overload. RA severely dilated. RV with moderate dilation, diffuse hypokinesis, and depression of contractility based on TAPSE. A device wire is seen in the RV and RA. IVC is dilated and not collapsing with inspiration.

## 2024-02-23 NOTE — PROGRESS NOTE ADULT - BILLING PROVIDER USE ONLY
Attending or GARFIELD Only

## 2024-02-23 NOTE — DISCHARGE NOTE PROVIDER - NSDCCAREPROVSEEN_GEN_ALL_CORE_FT
Carlos Solomon (Thoracic Surgery)  Vern Madden (Pulmonary Medicine)  Carolyn Galicia (Infectious Disease)  Carlos Garcia (Thoracic Surgery)  Gail Vicente (Cardiology)  Rob Yanez (Medicine)  Meseret Urena (Nephrology)  Colin Hannah (Medicine)  Jose Melgar (Cardiology)  David Davis (Medicine)  Alfred Sparks (Cardiology)  Fanny Corona (Thoracic Surgery)  Laci Harris (Cardiology)  Isra Crowder (Nephrology)  Palla, Venugopal R (Cardiology)  Anderson Ruiz (Medicine)  Yun, Suk-Hyeon (Nephrology)

## 2024-02-23 NOTE — DISCHARGE NOTE PROVIDER - NSDCMRMEDTOKEN_GEN_ALL_CORE_FT
acetaminophen 325 mg oral tablet: 2 tab(s) orally every 6 hours As needed Temp greater or equal to 38C (100.4F), Mild Pain (1 - 3)  allopurinol 100 mg oral tablet: 1 tab(s) orally once a day  apixaban 2.5 mg oral tablet: 1 tab(s) orally 2 times a day  escitalopram 5 mg oral tablet: 1 tab(s) orally  levothyroxine 125 mcg (0.125 mg) oral tablet: 1 tab(s) orally once a day  lidocaine 4% topical film: Apply topically to affected area once a day as needed for Pain Apply to affected area, 12 hrs on 12 hrs off  metoprolol succinate 25 mg oral tablet, extended release: 1 tab(s) orally once a day (at bedtime)  midodrine 5 mg oral tablet: 1 tab(s) orally 3 times a day  morphine 20 mg/mL oral concentrate: 0.25 milliliter(s) sublingual every 6 hours as needed for  severe pain or shortness of breath Place 0.25 milliliter(s) under tongue every 6 hours, As Needed for pain or shortness of breath MDD: 1 mL  ondansetron 4 mg oral tablet, disintegratin tab(s) orally every 8 hours as needed for Nausea  polyethylene glycol 3350 oral powder for reconstitution: 17 gram(s) orally once a day  senna leaf extract oral tablet: 2 tab(s) orally once a day (at bedtime)   acetaminophen 325 mg oral tablet: 2 tab(s) orally every 6 hours As needed Temp greater or equal to 38C (100.4F), Mild Pain (1 - 3)  allopurinol 100 mg oral tablet: 1 tab(s) orally once a day  apixaban 2.5 mg oral tablet: 1 tab(s) orally 2 times a day  escitalopram 5 mg oral tablet: 1 tab(s) orally once a day  levothyroxine 125 mcg (0.125 mg) oral tablet: 1 tab(s) orally once a day  lidocaine 4% topical film: Apply topically to affected area once a day as needed for Pain Apply to affected area, 12 hrs on 12 hrs off  metoprolol succinate 25 mg oral tablet, extended release: 1 tab(s) orally once a day (at bedtime)  midodrine 5 mg oral tablet: 1 tab(s) orally 3 times a day  morphine 20 mg/mL oral concentrate: 0.25 milliliter(s) sublingual every 6 hours as needed for  severe pain or shortness of breath Place 0.25 milliliter(s) under tongue every 6 hours, As Needed for pain or shortness of breath MDD: 1 mL  ondansetron 4 mg oral tablet, disintegratin tab(s) orally every 8 hours as needed for Nausea  polyethylene glycol 3350 oral powder for reconstitution: 17 gram(s) orally once a day  senna leaf extract oral tablet: 2 tab(s) orally once a day (at bedtime)

## 2024-02-23 NOTE — PROGRESS NOTE ADULT - SUBJECTIVE AND OBJECTIVE BOX
Chief Complaint: Shortness of breath    Interval Hx: Patient seen and examined this AM. Overall condition declining. PO intake is now extremely low. She is increasingly lethargic. Plan is now to transition to hospice. Daughter prefers to have patient home for hospice and daughter is finalizing arrangements for patient to have 24/7 care. On comfort care measures only. For DC to home tomorrow with hospice. Transportation arranged for 12 noon.      ROS: Multi system review is comprehensively negative x 10 systems except as above    Vitals:  T(F): 97.2 (23 Feb 2024 08:48), Max: 97.7 (22 Feb 2024 20:10)  HR: 61 (23 Feb 2024 14:07) (61 - 117)  BP: 108/52 (23 Feb 2024 08:48) (96/50 - 108/52)  RR: 18 (23 Feb 2024 08:48) (18 - 20)  SpO2: 92% (23 Feb 2024 14:07) (92% - 100%) on O2 3L    Exam:  Constitutional: Drowsy, ill appearing  HEENT: NCAT PERRL EOMI MMM clear oropharynx  Neck: Supple, no JVD  CVS: S1 and S2, irregular, rate ~60, 2/6 REBECA   Chest: Normal resp effort at rest, diminished breath sounds at bases B/L  Abd: +BS, soft NT ND   : Indwelling Espinoza catheter in place, clear yellow urine in gravity bag  Ext: B/L LE edema  Skin: Warm, dry  Neurological: Drowsy but arousable, no focal deficits    Labs:                      9.8   5.48)--------(116            30.6    Iron 66  TIBC 296  Iron sat 22%  Ferritin 802      134 |   94  |  113  ---------------------<  163  4.7   |  37  |  2.23    Ca  8.3    Phos  5.0        TPro  6.6  /  Alb  3.3  /  TBili  0.6  /  DBili  0.2   /  AST  25  /  ALT  19  /  AlkPhos  110    ABG 2/18: pH 7.36  /  pCO2: 61  /  pO2: 72   /  HCO3: 34   /  SaO2: 96        Troponin negative   proBNP 7548    A1c 6.3    Fasting AM cortisol 15.7     UA 2/10: Yellow, clear, neg ketone, neg nitrite, trace LE, 2 WBC, 0 RBC, bact neg    Micro:  COVID19 PCR 2/10: Negative  Flu PCR 2/10: Negative  RSV PCR 2/10: Negative  Urine culture 2/10: Negative  Blood culture 2/10: Negative    Imaging:  CXR 2/20: Left ICD. No change heart mediastinum. No significant change bilateral congestive changes small bilateral pleural effusions. Small right pneumothorax described on recent CT of the abdomen.    CT abdomen, pelvis W/ PO cont 2/20: Right pneumothorax, partially imaged. Mild nonspecific gaseous distention of small bowel without evidence of obstruction. Foci of gas within the bladder could be due to recent catheterization or urinary tract infection.    CXR 2/19: Frontal expiratory view of the chest shows the heart to be similarly enlarged in size. Left cardiac pacemaker is again noted. The lungs show mild pulmonary congestion with similar small effusions and there is no evidence of pneumothorax.    CXR 2/17:  Significant congestive findings are slightly improved.    CXR 2/16:  Persistent moderate bibasilar effusions. Some increasing CHF and the lungs is noted. No visible pneumothorax.    CXR 2/15:  The cardiac silhouette is enlarged, unchanged. There is a left-sided single lead pacemaker, unchanged. There are bilateral pleural effusions and mild pulmonary vascular congestion, not significant changed. No focal consolidation is seen. Surgical clips over the left axilla. There is bilateral glenohumeral arthrosis. No pneumothorax is seen.    US kidneys and bladder 2/14: Right kidney 9.1 cm. No renal mass, hydronephrosis or calculi. Left kidney 6.9 cm. Limited visualization. No gross mass, hydronephrosis or calculi. Bladder distended with volume 436 cc. No bladder wall thickening. No intraluminal mass.    CXR 2/13: Frontal expiratory view of the chest shows the heart to be similarly enlarged in size. Left cardiac pacemaker is again noted. The lungs show less pulmonary congestion with smaller pleural effusions and there is no evidence of pneumothorax.    CT chest WO 2/10: Bibasilar compressive atelectasis. No pulmonary consolidation or mass. There are enlarging moderate to large bilateral pleural effusions. No lymphadenopathy. There is enlargement of the main pulmonary artery spanning 3.4cm, consistent with pulmonary arterial hypertension. Severe arterial vascular calcification. There is severe cardiomegaly with four-chamber enlargement.  There are permanent pacemaker with lead tips in the right ventricle. Severe coronary arterial calcification.    CXR 2/10: Bilateral pleural effusions with adjacent atelectasis versus pneumonia at the left base.    Cardiac Testing:  Tele 2/20: DC'd    Tele 2/19: Afib 80s-100s, occ v-paced    Tele 2/18: Afib, 80s-100s, intermittent desat    Tele 2/17: Afib, rate 80s-120s, occ v-paced    Tele 2/16: Afib 90s-110s, occ v-paced    Follow-up TTE 2/15:  Mild concentric left ventricular hypertrophy is present. Mildly reduced systolic function. Estimated LVEF 45-50%. Septal flattening is seen; this finding is consistent with right heart pressure / volume overload. The right ventricle is severely dilated with reduced function. The right atrium appears severely dilated. The left atrium is mildly dilated. Mild mitral regurgitation is present. The aortic valve appears severely calcified. Valve opening seems to be restricted. Moderate to severe aortic stenosis. Severe tricuspid valve regurgitation is present. Probable severe pulmonary hypertension. Trace pulmonic valvular regurgitation is present.    Tele 2/14: Rate controlled afib    EKG 2/13: Afib, episodes of RVR to 110s    PPM interrogation 2/12: Underlying rhythm afib. Normal functioning single chamber PPM with battery longevity 5.6 years. Afib with V-pacing 19%, overall rate histogram is acceptable, brief RVR noted. No setting change made.    Tele 2/10: Afib rate 100s    EKG 2/10: Afib rate 100s    Prior visit cardiac testing   TTE 9/11:  Limited study to assess tricuspid insufficiency and pulmonary pressure. Severe (4+) tricuspid valve regurgitation is present. Severe pulmonary hypertension. The left ventricle is normal in size, paradoxical septal motion The interventricular septum appears flattened consistent with an RV pressure/volume overload. An apically displaced papillary muscle is noted. Estimated left ventricular ejection fraction is 50-55%. The right atrium appears severely dilated. A device wire is seen in the RV and RA. The right ventricle is moderately dilated with decreased contractility.    LHC 9/6: Mild diffuse atherosclerosis with moderate severe disease of small caliber Cx in a nondominant territory. Elevated right sided filling pressures in setting of severe TR with normal left sided filling pressures. Adequate perfusion.    TTE 8/29: Mild to mod MR. Severe AS. Severe TR. Mild to mod WA. Severe pHTN. Severely dilated LA. LV systolic function mildly impaired; segmental wall motion abnormalities noted. Mild concentric LVH. LVEF 45%. The interventricular septum appears flattened consistent with an RV pressure/volume overload. RA severely dilated. RV with moderate dilation, diffuse hypokinesis, and depression of contractility based on TAPSE. A device wire is seen in the RV and RA. IVC is dilated and not collapsing with inspiration.    Meds:  MEDICATIONS  (STANDING):  albuterol/ipratropium for Nebulization 3 milliLiter(s) Nebulizer every 6 hours  allopurinol 100 milliGRAM(s) Oral daily  apixaban 2.5 milliGRAM(s) Oral two times a day  atorvastatin 20 milliGRAM(s) Oral at bedtime  buDESOnide    Inhalation Suspension 0.5 milliGRAM(s) Inhalation two times a day  escitalopram 5 milliGRAM(s) Oral daily  folic acid 1 milliGRAM(s) Oral daily  levothyroxine 125 MICROGram(s) Oral daily  lidocaine   4% Patch 1 Patch Transdermal daily  metoprolol succinate ER 25 milliGRAM(s) Oral at bedtime  midodrine. 5 milliGRAM(s) Oral three times a day  multivitamin 1 Tablet(s) Oral daily  ondansetron   Disintegrating Tablet 4 milliGRAM(s) Oral once  polyethylene glycol 3350 17 Gram(s) Oral daily  senna 2 Tablet(s) Oral at bedtime  thiamine 100 milliGRAM(s) Oral daily    MEDICATIONS  (PRN):  acetaminophen     Tablet .. 650 milliGRAM(s) Oral every 6 hours PRN Temp greater or equal to 38C (100.4F), Mild Pain (1 - 3)  aluminum hydroxide/magnesium hydroxide/simethicone Suspension 30 milliLiter(s) Oral every 4 hours PRN Dyspepsia  melatonin 3 milliGRAM(s) Oral at bedtime PRN Insomnia  ondansetron Injectable 4 milliGRAM(s) IV Push every 6 hours PRN Nausea and/or Vomiting  oxyCODONE    IR 2.5 milliGRAM(s) Oral every 8 hours PRN Severe Pain (7 - 10)  traMADol 25 milliGRAM(s) Oral every 12 hours PRN Moderate Pain (4 - 6)

## 2024-02-23 NOTE — DISCHARGE NOTE PROVIDER - NSDCFUADDAPPT_GEN_ALL_CORE_FT
Heart Follow Up Appointment  02/29/2024  NP ANA PAULA GUSTAFSON   2PM Cardiology Appointment 2/29/2024 at 2PM with CHERY Noble but you may wish to defer visit given current condition

## 2024-02-23 NOTE — DISCHARGE NOTE PROVIDER - REASON FOR ADMISSION
Acute hypoxic and hypercapneic respiratory failure  Acute on chronic diastolic CHF  Severe aortic stenosis  Large pleural effusions  COPD unable to rule out exacerbation

## 2024-02-23 NOTE — PROGRESS NOTE ADULT - CONVERSATION/DISCUSSION
Diagnosis/Prognosis/MOLST Discussed/Hospice Referral
Diagnosis/Prognosis/Treatment Options/Hospice Referral

## 2024-02-23 NOTE — PROGRESS NOTE ADULT - TREATMENT GUIDELINE COMMENT
DNR DNI  Referred to hospice
DNR/DNI trial NIV
DNR DNI  Referred to hospice

## 2024-02-23 NOTE — PROGRESS NOTE ADULT - FUTURE HOSPITALIZATION/TRANSFER
Do not send to hospital unless pain or severe symptoms cannot be otherwise controlled

## 2024-02-23 NOTE — DISCHARGE NOTE PROVIDER - NSDCCPCAREPLAN_GEN_ALL_CORE_FT
PRINCIPAL DISCHARGE DIAGNOSIS  Diagnosis: Acute respiratory failure with hypoxia and hypercapnia  Assessment and Plan of Treatment: You were admitted to the hospital for worsening breathing, found to have acute hypoxic and hypercapneic respiratory failure (low oxygen and elevated carbon dioxide levels), likely multi factorial due to congestive heart failure exacerbation, severe aortic stenosis, severe pulmonary hypertension, large pleural effusions with associated compressive atelectasis, and COPD with exacerbation. There was no sign of pneumonia. You were treated with non-invasive positive pressure ventilation to ease your work of breathing and help clear your chest. You also received intravenous diuretic medications as well as other medications to try to optimize your heart function, steroids and bronchodilators to reduce lung inflammation and open up your lung airways. You also had large pleural effusions that were unable to be improved with diuretics and an attempt at placing a chest tube to drain the fluid resulting in pneumothorax (a partial lung collapse). Despite best efforts, your condition did not improve and you remain weak, tired, short of breath with minimal activity. Appetite is declining and overall your condition is poor. Reasonable and appropriate to shift focus to maximizing your comfort so you have the best quality of life possibly at this point. Continue oxygen supplementation as needed, currently 3L/min via nasal cannula. Take medications as prescribed that will focus on either management of symptoms or prevention of you developing symptoms.      SECONDARY DISCHARGE DIAGNOSES  Diagnosis: Acute on chronic diastolic congestive heart failure  Assessment and Plan of Treatment: As mentioned above, you have heart failure, in setting of known severe heart valve disease, developed worsening volume overload, failure on the right side of the heart, not able to be improved with medication or other measures at our disposal. Continue on current medications, ordered to help ease the work your heart has to do.    Diagnosis: Severe aortic stenosis  Assessment and Plan of Treatment: Severe aortic stenosis  previously evaluated by Dr Levin for structural intervention / JAYNA. Reassessed by Cardiology, not a candidate for intervention.    Diagnosis: CAD (coronary artery disease)  Assessment and Plan of Treatment: Recent cath 9/2023 with mild diffuse atherosclerosis with moderate severe disease of small caliber Cx in a nondominant territory. Continue metoprolol. Will stop statin to reduce pill burden.       Diagnosis: Chronic atrial fibrillation with rapid ventricular response  Assessment and Plan of Treatment: Chronic atrial fibrillation, rapid (RVR) earlier this admission. Had pacemaker interrogated. Normal functioning single chamber PPM. Afib with V-pacing 19%, overall rate histogram is acceptable, brief RVR noted. No setting change made. Continue metoprolol. For stroke risk-reduction, switched AC over from Xarelto to Eliquis given diminished kidney function. Continue Eliquis but ultimately, will defer to hospice team and your family re Eliquis continuation while in hospice.    Diagnosis: COPD with exacerbation  Assessment and Plan of Treatment: Appreciate input from Pulmonary Medicine. You were treated with intravenous steroids and bronchodilators. Steroid course is complete. Can continue breathing treatments on as-needed basis if found to be helpful at home.    Diagnosis: Bilateral pleural effusion  Assessment and Plan of Treatment: Significant pleural effusions. Difficult to decrease fluid using medication as your kidney function worsened. Thoracic Surgeyr attempted to drain fluid with chest tube placement but that was unsuccessful and you developed a pneumothorax (air entry into the chest cavity causing partial collapse of the lung) which improved with time. No further attempts to drain the fluid at this point. Defer procedures.    Diagnosis: Pneumothorax on right  Assessment and Plan of Treatment: As described above. As per Thoracic Surgery, extent of pneumothorax is improving with oxygen, supportive care.    Diagnosis: Hyponatremia  Assessment and Plan of Treatment: Low concentration of sodium in the blood, likely due to hypervolemia (volume overload) from heart failure, possibly superimposed low solute intake (low oral intake). Deferring further monitoring at this point. No more phlebotomy.    Diagnosis: Hyperkalemia  Assessment and Plan of Treatment: Elevated blood potassium concentration. Likely due to concurrent home medications spironolactone and potassium supplementation tabs. Stopped both and administered hyperkalemia protocol medications, which improved potassium levels. Deferring further monitoring.    Diagnosis: JOAQUÍN (acute kidney injury)  Assessment and Plan of Treatment: Etiology unclear. Possibly iatrogenic due to intravenous diuretic medication used to try to help your volume status, help your breathing and your heart. Alternatively or additionally, could have been related to cardio-renal syndrome where heart failure causes worsening kidney function. You also had a urinary tract infection, though this was treated with antibiotics while you were here. And lastly, you developed urinary retention for which an indwelling jackson catheter was placed. Continue with Jackson (2/14-), continue on discharge for your comfort, risk of symptomatic urinary retention without indwelling catheter.    Diagnosis: Urinary tract infection  Assessment and Plan of Treatment: UTI, present prior to admission, treated with intravenous antibiotics early into this admission here. Urine and blood cultures returned negative. When you had urinary rentention issue, urine was re-tested and was negative for infection.    Diagnosis: Type II diabetes mellitus  Assessment and Plan of Treatment: A1c 6.3. Well controlled. Can discontinue fingerstick glucose checks.    Diagnosis: Hypothyroidism  Assessment and Plan of Treatment: Stable. Continue levothyroxine.    Diagnosis: History of gout  Assessment and Plan of Treatment: Stable. Continue allopurinol - to avoid a painful gouty attack    Diagnosis: Constipation  Assessment and Plan of Treatment: Continue bowel regimen.    Diagnosis: Physical deconditioning  Assessment and Plan of Treatment: Appreciate input from PT. Had been attempting to improve condition such that you could work with PT but condition declining, transitioning to comfort care. Mobility as tolerated, as desired.    Diagnosis: Malnutrition  Assessment and Plan of Treatment: Appreciate dietician input. Added multivitamin and thiamine but at this point discontnued to reduce your pill burden. Diet supplemented with Ensure High Protein BID if desired and alert enough to swallow safely without causing coughing and choking, which would be counter to your care plan to maximize comfort.

## 2024-02-23 NOTE — DISCHARGE NOTE PROVIDER - HOSPITAL COURSE
89 year-old woman with DM type II, HTN, CAD, chronic diastolic CHF, chronic atrial fibrillation on Xarelto, hx of PPM placement, severe aortic stenosis, severe tricuspid regurgitation, severe pulmonary HTN, chronic orthostatic hypotension on midodrine, COPD, hypothyroidism, gout, just started on Macrobid for 1 day for UTI, presented with worsening shortness of breath. Reports adherence with medication. Lives alone, with health aides. Denied chest pain, fevers, chills, cough. She was most recently admitted to  in 9/2023. At the time, she was evaluated for TAVR. Underwent RHC/LHC showing mild diffuse atherosclerosis with moderate severe disease of small caliber Cx in a nondominant territory. She had elevated right sided filling pressures in setting of severe TR with normal left sided filling pressures. Adequate perfusion. No intervention performed. Her outpatient cardiologist is Dr. Harris. In the ED, she was tachycardic to 107, normotensive, saturating 93%. She had increased work of breathing. Labs notable for Hgb 10, Na 123, Hs trop neg x1, proBNP 7548, COVID19 and RVP neg. CT chest w/ severe cardiomegaly with enlarging large bilateral pleural effusions. Severe pulmonary arterial hypertension. Admitted for further management of acute respiratory failure.     Acute hypoxic and hypercapneic respiratory failure  Likely multi factorial due to CHF exacerbation, severe AS, severe TR, severe pHTN, pleural effusions, compressive atelectasis, COPD with probable exacerbation. No sign of pneumonia at this point. Had required NIPPV with BiPAP to reduce work of breathing and improve hypercapnea. Discontinued BiPAP this AM as she has not been using it, citing discomfort with the mask etc. Patient now on O2 via NC, 3L/min. Subjectively, she feels only marginally improved since admission. Continues to have dyspnea supine. No chest pain or tightness. No cough. No fever or chills. No other complaints aside for generalized weakness. Had unsuccessful attempt at R side thoracentesis / thoracostomy tube placement 2/15. Reattempt deferred. Continue O2 supplementation     Acute on chronic diastolic CHF  Patient presenting with SOB, pleural effusions, elevated BNP in setting known valvular disease, HFpEF. Last TTE with LVEF 50-55%, severe TR, severe AS. At home, shes on torsemide 20mg BID. Some improvement in oxygenation with IV Lasix but Cr up-trended significantly and diuretics were placed on hold. Appreciate input from Cardiology and Nephrology today. Still find patient volume overloaded and note that patient is now with indwelling Espinoza placed yesterday for retention, recommended administering Lasix 40mg IV x 1 today. Follow up TTE obtained. EF 45-50%. Septal flattening consistent with right heart pressure / volume overload. RB severely dilated with reduced function. RA severely dilated. LA mildly dilated. AS described as mod-severe. TR severe. Probably severe pHTN. Treated with Lasix drip but this is now on hold.  Not expected to improve, continue metoprolol, arranging transition to hospice    Severe aortic stenosis   Previously evaluated by Dr Levin for structural intervention / JAYNA. Patient and family had opted for medical management.     CAD  Recent cath 9/2023 with mild diffuse atherosclerosis with moderate severe disease of small caliber Cx in a nondominant territory. Continue metoprolol. Will stop statin to reduce pill burden.     Chronic atrial fibrillation, hx of PPM  RVR in the ED in setting of active respiratory symptoms. PPM interrogated 2/12/24. Normal functioning single chamber PPM with battery longevity 5.6 years. Afib with V-pacing 19%, overall rate histogram is acceptable, brief RVR noted. No setting change made. Continue metoprolol. For stroke risk-reduction, switched AC over from Xarelto to Eliquis given CrCl. Will defer to hospice team and patient's family re Eliquis continuation while in hospice.     COPD with acute exacerbation  Appreciate input from Pulmonary Medicine. S/P systemic steroids. Continue bronchodilators unless patient wishes to defer.     Significant pleural effusions B/L  Difficult to diurese patient as rising. Still with hypoxia, sizable pleural effusions. Thoracic Surgery consulted for input re possible drainage, patient in agreement. Attempted thoracentesis but was unsuccessful. Managed medically with diuretics but not improved, patient wishes to defer procedures    R sided pneumothorax  Iatrogenic from attempt at pigtail chest tube catheter placement. As per Thoracic Surgery, extent of pneumothorax is improving with oxygen, supportive care.     Hyponatremia  Suspect hypervolemic due to fluid overload. Persisting despite fluid restriction, CHF management. Deferring further monitoring     Hyperkalemia  In setting of concurrent spironolactone and potassium supplementation tabs. Stopped both and administered hyperkalemia protocol. K has improved. Deferring further monitoring    JOAQUÍN   Baseline Cr ~0.9 as was on presentation. Increased to significantly since then, now 2.19. With patient having received IV Lasix. She has not received any contrast studies. No NSAIDs. She did have UTI diagnosed a day or two prior to admission and has since completed a 3-day course of ceftriaxone but doubt related. Thought she could have retention as she has been rather immobile and constipated. Nephrology consulted. Obtained renal bladder US yesterday 2/14. No sign of hydronephrosis, mass or calculi. Bladder was distended however, volume 436cc. No bladder wall thickening. No bladder mass. Back up on the floors thereafter, patient was not able to void, had bladder scan up to ~540cc, Espinoza placed. Note UA collected upon Espinoza placement is negative for infection. Continue with Espinoza (2/14-), continue on discharge for patient comfort, risk of symptomatic urinary retention without indwelling catheter, daughter in agreement.    UTI, present prior to admission  Recent dx of UTI. Was treated with Macrobid for 1 day before getting admitted to hospital. Here she received an empiric 3 day course of ceftriaxone. Urine and blood cultures returned negative. UA collected from Espinoza insertion negative for infection.    DM  type II  A1c 6.3. Well controlled. Can discontinue fingerstick glucose checks.    Hypothyroidism  Stable. Continue levothyroxine.    Hx of gout  Stable. Continue allopurinol.    Constipation  No nausea or emesis. Intact bowel sounds. Continue bowel regimen.    Physical deconditioning and debility  Appreciate input from PT. Had been attempting to improve condition such that she could work with PT but condition declining, transitioning to hospice now. Mobility as tolerated, as desired.    Severe protein calorie malnutrition  Appreciate dietician input. Added MVI with minerals daily, thiamine 100mg daily. Will discontinue to reduce pill burden. Diet supplemented with Ensure High Protein BID if she desires. PO intake has been very low and alertness reduced such that she may not be appropriate for PO much longer.

## 2024-02-23 NOTE — DISCHARGE NOTE PROVIDER - EXTENDED VTE YES NO FOR MLM ENOXAPARIN
Tacrolimus 12.5 at 12 hours.  Goal is 8-10.  Current dose is 0.5 mg twice daily.  Patient is on itraconazole.  Will decrease dose to 0.  4 mg twice daily and recheck level in 3 to 4 days.  Sent prescription to compounding pharmacy.  Patient agreeable to plan and will start medication when he receives it.  Patient reports that he did miss p.m. dose on 5/4 as he saw his level was supratherapeutic.   ,

## 2024-02-23 NOTE — PROGRESS NOTE ADULT - CONVERSATION DETAILS
Met with patient and her daughter and discussed how prognosis is looking poor and it is reasonable to transition care plan to one that would focus on comfort (comfort only). Patient and daughter in agreement. Had meeting with Palliative Care and plan is now to transition to hospice at home as daughter would like patient home. Daughter now working on arranging for 24/7 care for patient.
Met with patient and her daughter and discussed how prognosis is looking poor and it is reasonable to transition care plan to one that would focus on comfort (comfort only). Patient and daughter in agreement. Had meeting with Palliative Care and plan is now to transition to hospice at home as daughter would like patient home. Daughter now working on arranging for 24/7 care for patient.
Met with patient and her daughter and discussed how prognosis is looking poorer and it is reasonable to transition care plan to one that would focus on comfort. Patient and daughter in agreement. Had meeting with Palliative Care and plan is now to transition to hospice at home. Daughter now working on arranging for 24/7 care for patient.
Met with patient and her daughter and discussed how prognosis is looking poorer and it is reasonable to transition care plan to one that would focus on comfort. Patient and daughter in agreement. Had meeting with Palliative Care and plan is now to transition to hospice at home. Daughter now working on arranging for 24/7 care for patient.
Spoke with patient's daughter, Citlalli, via phone.    Citlalli explained that she talked with Dr. Ruiz, and he recommended she speak with our team regarding hospice.    Citlalli is understanding that her mother's condition is continuing to deteriorate despite all medical treatments. We discussed transitioning to hospice. Citlalli and her brother, Tutu make medical decisions together. Citlalli explained that Tutu is currently on vacation, but she is to discuss her mother's plan of care with his brother via phone.    Philosophy of hospice was discussed with daughter. We discussed home hospice vs. [facility] with comfort [facility] vs. inpatient hospice. Explained that at this point, patient is appropriate candidate for home hospice. Citlalli would like patient to return home as this is the environment she is most comfortable in. Patient has prior aide services, but Citlalli is worried they will not be able to handle complexity of care that patient now requires. She wants to set up 24/7 aide services privately, is working on getting aide services set up upon discharge.     Referral to be sent for home hospice via [facility]. [facility] with DNR/DNI trial NIV on chart. Palliative will continue to follow.

## 2024-02-23 NOTE — PROGRESS NOTE ADULT - NUTRITIONAL ASSESSMENT
This patient has been assessed with a concern for Malnutrition and has been determined to have a diagnosis/diagnoses of Severe protein-calorie malnutrition.    This patient is being managed with:   Diet Regular-  1200mL Fluid Restriction ([id number])  Supplement Feeding Modality:  Oral  Ensure Plus High Protein Cans or Servings Per Day:  1       Frequency:  Two Times a day  Entered: Feb 11 2024 10:30AM  
This patient has been assessed with a concern for Malnutrition and has been determined to have a diagnosis/diagnoses of Severe protein-calorie malnutrition.    This patient is being managed with:   Diet Regular-  1200mL Fluid Restriction ([id number])  Supplement Feeding Modality:  Oral  Ensure Plus High Protein Cans or Servings Per Day:  1       Frequency:  Two Times a day  Entered: Feb 11 2024 10:30AM  
This patient has been assessed with a concern for Malnutrition and has been determined to have a diagnosis/diagnoses of Severe protein-calorie malnutrition.    This patient is being managed with:   Diet Regular-  1200mL Fluid Restriction ([id number])  Supplement Feeding Modality:  Oral  Ensure Plus High Protein Cans or Servings Per Day:  1       Frequency:  Two Times a day  Entered: Feb 11 2024 10:30AM  
This patient has been assessed with a concern for Malnutrition and has been determined to have a diagnosis/diagnoses of Severe protein-calorie malnutrition.    This patient is being managed with:   Diet Regular-  1200mL Fluid Restriction ([id number])  Supplement Feeding Modality:  Oral  Ensure Plus High Protein Cans or Servings Per Day:  1       Frequency:  Two Times a day  Entered: Feb 11 2024 10:30AM  
This patient has been assessed with a concern for Malnutrition and has been determined to have a diagnosis/diagnoses of Severe protein-calorie malnutrition.    This patient is being managed with:   Diet Regular-  1200mL Fluid Restriction ([id number])  Supplement Feeding Modality:  Oral  Ensure Plus High Protein Cans or Servings Per Day:  1       Frequency:  Two Times a day  Entered: Feb 11 2024 10:30AM  
This patient has been assessed with a concern for Malnutrition and has been determined to have a diagnosis/diagnoses of Severe protein-calorie malnutrition.    This patient is being managed with:   Diet Regular-  1200mL Fluid Restriction ([id number])  Supplement Feeding Modality:  Oral  Ensure Plus High Protein Cans or Servings Per Day:  1       Frequency:  Two Times a day  Entered: Feb 11 2024 10:30AM  
This patient has been assessed with a concern for Malnutrition and has been determined to have a diagnosis/diagnoses of Severe protein-calorie malnutrition.    This patient is being managed with:   Diet Regular-  1200mL Fluid Restriction ([id number])  Supplement Feeding Modality:  Oral  Ensure Plus High Protein Cans or Servings Per Day:  1       Frequency:  Two Times a day  Entered: Feb 11 2024 10:30AM  
This patient has been assessed with a concern for Malnutrition and has been determined to have a diagnosis/diagnoses of Severe protein-calorie malnutrition.    This patient is being managed with:   Diet Regular-  1200mL Fluid Restriction ([id number])  Supplement Feeding Modality:  Oral  Ensure Plus High Protein Cans or Servings Per Day:  1       Frequency:  Two Times a day  Entered: Feb 11 2024 10:30AM  
This patient has been assessed with a concern for Malnutrition and has been determined to have a diagnosis/diagnoses of Severe protein-calorie malnutrition.    This patient is being managed with:   Diet Regular-  1200mL Fluid Restriction ([id number])  Supplement Feeding Modality:  Oral  Ensure Plus High Protein Cans or Servings Per Day:  1       Frequency:  Two Times a day  Entered: Feb 11 2024 10:30AM  
This patient has been assessed with a concern for Malnutrition and has been determined to have a diagnosis/diagnoses of Severe protein-calorie malnutrition.    This patient is being managed with:   Diet Regular-  1200mL Fluid Restriction ([id number])  Supplement Feeding Modality:  Oral  Ensure Plus High Protein Cans or Servings Per Day:  1       Frequency:  Two Times a day  Entered: Feb 11 2024 10:30AM  
This patient has been assessed with a concern for Malnutrition and has been determined to have a diagnosis/diagnoses of Severe protein-calorie malnutrition.    This patient is being managed with:   Diet Regular-  1200mL Fluid Restriction ([id number])  Supplement Feeding Modality:  Oral  Ensure Plus High Protein Cans or Servings Per Day:  1       Frequency:  Two Times a day  Entered: Feb 11 2024 10:30AM  
This patient has been assessed with a concern for Malnutrition and has been determined to have a diagnosis/diagnoses of Severe protein-calorie malnutrition.    This patient is being managed with:   Diet Regular-  1200mL Fluid Restriction ([id number])  Supplement Feeding Modality:  Oral  Ensure Plus High Protein Cans or Servings Per Day:  1       Frequency:  Two Times a day  Entered: Feb 11 2024 10:30AM  
This patient has been assessed with a concern for Malnutrition and has been determined to have a diagnosis/diagnoses of Severe protein-calorie malnutrition.    This patient is being managed with:   Diet Regular-  1200mL Fluid Restriction ([id number])  Supplement Feeding Modality:  Oral  Ensure Plus High Protein Cans or Servings Per Day:  1       Frequency:  Two Times a day  Entered: Feb 11 2024 10:30AM  
This patient has been assessed with a concern for Malnutrition and has been determined to have a diagnosis/diagnoses of Severe protein-calorie malnutrition.    This patient is being managed with:   Diet Regular-  1200mL Fluid Restriction ([id number])  Supplement Feeding Modality:  Oral  Ensure Plus High Protein Cans or Servings Per Day:  1       Frequency:  Two Times a day  Entered: Feb 11 2024 10:30AM  
This patient has been assessed with a concern for Malnutrition and has been determined to have a diagnosis/diagnoses of Severe protein-calorie malnutrition.    This patient is being managed with:   Diet Regular-  1200mL Fluid Restriction ([id number])  Supplement Feeding Modality:  Oral  Ensure Plus High Protein Cans or Servings Per Day:  1       Frequency:  Two Times a day  Entered: Feb 11 2024 10:30AM  
This patient has been assessed with a concern for Malnutrition and has been determined to have a diagnosis/diagnoses of Severe protein-calorie malnutrition.    This patient is being managed with:   Diet Regular-  1200mL Fluid Restriction ([id number])  Supplement Feeding Modality:  Oral  Ensure Plus High Protein Cans or Servings Per Day:  1       Frequency:  Two Times a day  Entered: Feb 11 2024 10:30AM  
This patient has been assessed with a concern for Malnutrition and has been determined to have a diagnosis/diagnoses of Severe protein-calorie malnutrition.    This patient is being managed with:   Diet Regular-  1200mL Fluid Restriction ([id number])  Supplement Feeding Modality:  Oral  Ensure Plus High Protein Cans or Servings Per Day:  1       Frequency:  Two Times a day  Entered: Feb 11 2024 10:30AM  
This patient has been assessed with a concern for Malnutrition and has been determined to have a diagnosis/diagnoses of Severe protein-calorie malnutrition.    This patient is being managed with:   Diet Regular-  1200mL Fluid Restriction ([id number])  Supplement Feeding Modality:  Oral  Ensure Plus High Protein Cans or Servings Per Day:  1       Frequency:  Two Times a day  Entered: Feb 11 2024 10:30AM  
This patient has been assessed with a concern for Malnutrition and has been determined to have a diagnosis/diagnoses of Severe protein-calorie malnutrition.    This patient is being managed with:   Diet Regular-  1200mL Fluid Restriction ([id number])  Supplement Feeding Modality:  Oral  Ensure Plus High Protein Cans or Servings Per Day:  1       Frequency:  Two Times a day  Entered: Feb 11 2024 10:30AM

## 2024-02-23 NOTE — PROGRESS NOTE ADULT - SUBJECTIVE AND OBJECTIVE BOX
Subjective:    pat feeling weak, sob on minimal activities, 4lpm nc sat 94%.    Home Medications:  allopurinol 100 mg oral tablet: 1 tab(s) orally once a day (10 Feb 2024 14:48)  escitalopram 5 mg oral tablet: 1 tab(s) orally (10 Feb 2024 14:55)  levothyroxine 125 mcg (0.125 mg) oral tablet: 1 tab(s) orally once a day (10 Feb 2024 14:48)  metoprolol succinate 25 mg oral tablet, extended release: 1 tab(s) orally once a day (10 Feb 2024 14:55)  midodrine 5 mg oral tablet: 1 tab(s) orally 2 times a day (10 Feb 2024 14:56)  Multiple Vitamins oral tablet: 1 tab(s) orally once a day (10 Feb 2024 14:55)  nitrofurantoin macrocrystals 100 mg oral capsule: 1 cap(s) orally 2 times a day for 5 days ***course not complete*** (10 Feb 2024 14:55)  potassium chloride 20 mEq oral tablet, extended release: 1 tab(s) orally once a day (10 Feb 2024 14:55)  spironolactone 25 mg oral tablet: 1 tab(s) orally once a day (10 Feb 2024 14:48)  thiamine 100 mg oral tablet: 1 tab(s) orally once a day (10 Feb 2024 14:48)  torsemide 20 mg oral tablet: 1 tab(s) orally 2 times a day (10 Feb 2024 14:48)  Xarelto 10 mg oral tablet: 1 tab(s) orally once a day (10 Feb 2024 14:55)    MEDICATIONS  (STANDING):  albuterol/ipratropium for Nebulization 3 milliLiter(s) Nebulizer every 6 hours  allopurinol 100 milliGRAM(s) Oral daily  apixaban 2.5 milliGRAM(s) Oral two times a day  atorvastatin 20 milliGRAM(s) Oral at bedtime  buDESOnide    Inhalation Suspension 0.5 milliGRAM(s) Inhalation two times a day  escitalopram 5 milliGRAM(s) Oral daily  folic acid 1 milliGRAM(s) Oral daily  levothyroxine 125 MICROGram(s) Oral daily  lidocaine   4% Patch 1 Patch Transdermal daily  metoprolol succinate ER 25 milliGRAM(s) Oral at bedtime  midodrine. 5 milliGRAM(s) Oral three times a day  multivitamin 1 Tablet(s) Oral daily  ondansetron   Disintegrating Tablet 4 milliGRAM(s) Oral once  polyethylene glycol 3350 17 Gram(s) Oral daily  senna 2 Tablet(s) Oral at bedtime  thiamine 100 milliGRAM(s) Oral daily    MEDICATIONS  (PRN):  acetaminophen     Tablet .. 650 milliGRAM(s) Oral every 6 hours PRN Temp greater or equal to 38C (100.4F), Mild Pain (1 - 3)  aluminum hydroxide/magnesium hydroxide/simethicone Suspension 30 milliLiter(s) Oral every 4 hours PRN Dyspepsia  melatonin 3 milliGRAM(s) Oral at bedtime PRN Insomnia  ondansetron Injectable 4 milliGRAM(s) IV Push every 6 hours PRN Nausea and/or Vomiting  oxyCODONE    IR 2.5 milliGRAM(s) Oral every 8 hours PRN Severe Pain (7 - 10)  traMADol 25 milliGRAM(s) Oral every 12 hours PRN Moderate Pain (4 - 6)      Allergies    codeine (Unknown)    Intolerances        Vital Signs Last 24 Hrs  T(C): 36.2 (23 Feb 2024 08:48), Max: 36.5 (22 Feb 2024 20:10)  T(F): 97.2 (23 Feb 2024 08:48), Max: 97.7 (22 Feb 2024 20:10)  HR: 117 (23 Feb 2024 08:48) (88 - 117)  BP: 108/52 (23 Feb 2024 08:48) (96/50 - 108/52)  BP(mean): --  RR: 18 (23 Feb 2024 08:48) (18 - 20)  SpO2: 93% (23 Feb 2024 14:07) (93% - 100%)    Parameters below as of 23 Feb 2024 14:07  Patient On (Oxygen Delivery Method): nasal cannula, 4l          PHYSICAL EXAMINATION:    NECK:  Supple. No lymphadenopathy. Jugular venous pressure not elevated. Carotids equal.   HEART:   The cardiac impulse has a normal quality. Reg., Nl S1 and S2.  There are no murmurs, rubs or gallops noted  CHEST:  Chest crackles to auscultation. Normal respiratory effort.  ABDOMEN:  Soft and nontender.   EXTREMITIES:  There is no edema.       LABS:

## 2024-02-23 NOTE — PROGRESS NOTE ADULT - ASSESSMENT
89 year-old woman with DM type II, HTN, CAD, chronic diastolic CHF, chronic atrial fibrillation on Xarelto, hx of PPM placement, severe aortic stenosis, severe tricuspid regurgitation, severe pulmonary HTN, chronic orthostatic hypotension on midodrine, COPD, hypothyroidism, gout, just started on Macrobid for 1 day for UTI, presented with worsening shortness of breath. Reports adherence with medication. Lives alone, with health aides. Denied chest pain, fevers, chills, cough. She was most recently admitted to  in 9/2023. At the time, she was evaluated for TAVR. Underwent RHC/LHC showing mild diffuse atherosclerosis with moderate severe disease of small caliber Cx in a nondominant territory. She had elevated right sided filling pressures in setting of severe TR with normal left sided filling pressures. Adequate perfusion. No intervention performed. Her outpatient cardiologist is Dr. Harris. In the ED, she was tachycardic to 107, normotensive, saturating 93%. She had increased work of breathing. Labs notable for Hgb 10, Na 123, Hs trop neg x1, proBNP 7548, COVID19 and RVP neg. CT chest w/ severe cardiomegaly with enlarging large bilateral pleural effusions. Severe pulmonary arterial hypertension. Admitted for further management of acute respiratory failure.     Acute hypoxic and hypercapneic respiratory failure  Likely multi factorial due to CHF exacerbation, severe AS, severe TR, severe pHTN, pleural effusions, compressive atelectasis, COPD with probable exacerbation. No sign of pneumonia at this point. Had required NIPPV with BiPAP to reduce work of breathing and improve hypercapnea. Discontinued BiPAP this AM as she has not been using it, citing discomfort with the mask etc. Patient now on O2 via NC, 3L/min. Subjectively, she feels only marginally improved since admission. Continues to have dyspnea supine. No chest pain or tightness. No cough. No fever or chills. No other complaints aside for generalized weakness. Had unsuccessful attempt at R side thoracentesis / thoracostomy tube placement 2/15. Reattempt deferred. Continue O2 supplementation     Acute on chronic diastolic CHF  Patient presenting with SOB, pleural effusions, elevated BNP in setting known valvular disease, HFpEF. Last TTE with LVEF 50-55%, severe TR, severe AS. At home, shes on torsemide 20mg BID. Some improvement in oxygenation with IV Lasix but Cr up-trended significantly and diuretics were placed on hold. Appreciate input from Cardiology and Nephrology today. Still find patient volume overloaded and note that patient is now with indwelling Espinoza placed yesterday for retention, recommended administering Lasix 40mg IV x 1 today. Follow up TTE obtained. EF 45-50%. Septal flattening consistent with right heart pressure / volume overload. RB severely dilated with reduced function. RA severely dilated. LA mildly dilated. AS described as mod-severe. TR severe. Probably severe pHTN. Treated with Lasix drip but this is now on hold.  Not expected to improve, continue metoprolol, arranging transition to hospice    Severe aortic stenosis   Previously evaluated by Dr Levin for structural intervention / JAYNA. Patient and family had opted for medical management.     CAD  Recent cath 9/2023 with mild diffuse atherosclerosis with moderate severe disease of small caliber Cx in a nondominant territory. Continue metoprolol. Will stop statin to reduce pill burden.     Chronic atrial fibrillation, hx of PPM  RVR in the ED in setting of active respiratory symptoms. PPM interrogated 2/12/24. Normal functioning single chamber PPM with battery longevity 5.6 years. Afib with V-pacing 19%, overall rate histogram is acceptable, brief RVR noted. No setting change made. Continue metoprolol. For stroke risk-reduction, switched AC over from Xarelto to Eliquis given CrCl. Will defer to hospice team and patient's family re Eliquis continuation while in hospice.     COPD with acute exacerbation  Appreciate input from Pulmonary Medicine. S/P systemic steroids. Continue bronchodilators unless patient wishes to defer.     Significant pleural effusions B/L  Difficult to diurese patient as rising. Still with hypoxia, sizable pleural effusions. Thoracic Surgery consulted for input re possible drainage, patient in agreement. Attempted thoracentesis but was unsuccessful. Managed medically with diuretics but not improved, patient wishes to defer procedures    R sided pneumothorax  Iatrogenic from attempt at pigtail chest tube catheter placement. As per Thoracic Surgery, extent of pneumothorax is improving with oxygen, supportive care.     Hyponatremia  Suspect hypervolemic due to fluid overload. Persisting despite fluid restriction, CHF management. Deferring further monitoring     Hyperkalemia  In setting of concurrent spironolactone and potassium supplementation tabs. Stopped both and administered hyperkalemia protocol. K has improved. Deferring further monitoring    JOAQUÍN   Baseline Cr ~0.9 as was on presentation. Increased to significantly since then, now 2.19. With patient having received IV Lasix. She has not received any contrast studies. No NSAIDs. She did have UTI diagnosed a day or two prior to admission and has since completed a 3-day course of ceftriaxone but doubt related. Thought she could have retention as she has been rather immobile and constipated. Nephrology consulted. Obtained renal bladder US yesterday 2/14. No sign of hydronephrosis, mass or calculi. Bladder was distended however, volume 436cc. No bladder wall thickening. No bladder mass. Back up on the floors thereafter, patient was not able to void, had bladder scan up to ~540cc, Espinoza placed. Note UA collected upon Espinoza placement is negative for infection. Continue with Espinoza (2/14-), continue on discharge for patient comfort, risk of symptomatic urinary retention without indwelling catheter, daughter in agreement.    UTI, present prior to admission  Recent dx of UTI. Was treated with Macrobid for 1 day before getting admitted to hospital. Here she received an empiric 3 day course of ceftriaxone. Urine and blood cultures returned negative. UA collected from Espinoza insertion negative for infection.    DM  type II  A1c 6.3. Well controlled. Can discontinue fingerstick glucose checks.    Hypothyroidism  Stable. Continue levothyroxine.    Hx of gout  Stable. Continue allopurinol.    Constipation  No nausea or emesis. Intact bowel sounds. Continue bowel regimen.    Physical deconditioning and debility  Appreciate input from PT. Had been attempting to improve condition such that she could work with PT but condition declining, transitioning to hospice now. Mobility as tolerated, as desired.    Severe protein calorie malnutrition  Appreciate dietician input. Added MVI with minerals daily, thiamine 100mg daily. Will discontinue to reduce pill burden. Diet supplemented with Ensure High Protein BID if she desires. PO intake has been very low and alertness reduced such that she may not be appropriate for PO much longer.       DVT px: On Eliquis for afib  Code Status: DNR/DNI/Trial NIV   Dispo: For transition to hospice at home, FELICITY SUÁREZ assisting, daughter in process of arranging 24/7 care - for DC to home tomorrow at noon

## 2024-02-23 NOTE — DISCHARGE NOTE PROVIDER - CARE PROVIDER_API CALL
Nadia Horan  Internal Medicine  [address] [place] [id number]  Phone: [phone]  Fax: [phone]  Follow Up Time: 1 week

## 2024-02-23 NOTE — PROGRESS NOTE ADULT - ASSESSMENT
89 yo female with Hx. of Diastolic CHF EF 45%, CAD, HTN, Orthostatic hypotension, Atrial fib. on Xarelto, s/p PPM, COPD, Hypothyroidism, Gout , valvular heart disease, sever AS,  severe TR, severe pumonary HTN, presented in ED BIB form home for SOB. The patient  lives alone and has a private aid. She developed worsening SOB, She had iron infusion 5 days ago and was started on Macrobid for UTI  yesterday.     PROBLEMS:    AC hypoxaemic & hypercapnic respiratory failure  Acute excerbation of COPD  Acute CHF   Bilateral pleural effusion  Hypnatremia   Orthostatic hypotension  Atrial fib-s/p PPM  Non obs CAD  Severe in oul HTN  Hypothyroidism  Severe valvular heart disease  UTI     PLAN:    pulmonary unchanged-family might be considering comfort care  Small hydro-pneumothorax-improving on CXR- d/w Ct surgery  IV Lasix   BIPAP PRN & TITRATE FIO2  AEROSOLS/ NEB  Fluid restrictions  off abx  VTEP-on Xarelto

## 2024-02-23 NOTE — DISCHARGE NOTE PROVIDER - NSDCFUSCHEDAPPT_GEN_ALL_CORE_FT
[facility]  CARDIOLOGY [address]  Scheduled Appointment: 02/29/2024

## 2024-02-23 NOTE — DISCHARGE NOTE PROVIDER - NSDCFUADDINST_GEN_ALL_CORE_FT
Ensure Plus High Protein, 1 serving, 2 times per day, if patient is sufficiently awake and alert to take by mouth

## 2024-02-24 NOTE — PROGRESS NOTE ADULT - SUBJECTIVE AND OBJECTIVE BOX
Subjective:    pat lying in bed, no respiratory distress, 3lpm nc sat 97%.    Home Medications:  acetaminophen 325 mg oral tablet: 2 tab(s) orally every 6 hours As needed Temp greater or equal to 38C (100.4F), Mild Pain (1 - 3) (23 Feb 2024 16:58)  allopurinol 100 mg oral tablet: 1 tab(s) orally once a day (10 Feb 2024 14:48)  escitalopram 5 mg oral tablet: 1 tab(s) orally once a day (23 Feb 2024 17:34)  levothyroxine 125 mcg (0.125 mg) oral tablet: 1 tab(s) orally once a day (10 Feb 2024 14:48)  metoprolol succinate 25 mg oral tablet, extended release: 1 tab(s) orally once a day (at bedtime) (23 Feb 2024 16:58)    MEDICATIONS  (STANDING):  albuterol/ipratropium for Nebulization 3 milliLiter(s) Nebulizer every 6 hours  allopurinol 100 milliGRAM(s) Oral daily  apixaban 2.5 milliGRAM(s) Oral two times a day  buDESOnide    Inhalation Suspension 0.5 milliGRAM(s) Inhalation two times a day  escitalopram 5 milliGRAM(s) Oral daily  levothyroxine 125 MICROGram(s) Oral daily  lidocaine   4% Patch 1 Patch Transdermal daily  metoprolol succinate ER 25 milliGRAM(s) Oral at bedtime  midodrine. 5 milliGRAM(s) Oral three times a day  polyethylene glycol 3350 17 Gram(s) Oral daily  senna 2 Tablet(s) Oral at bedtime    MEDICATIONS  (PRN):  acetaminophen     Tablet .. 650 milliGRAM(s) Oral every 6 hours PRN Temp greater or equal to 38C (100.4F), Mild Pain (1 - 3)  aluminum hydroxide/magnesium hydroxide/simethicone Suspension 30 milliLiter(s) Oral every 4 hours PRN Dyspepsia  melatonin 3 milliGRAM(s) Oral at bedtime PRN Insomnia  ondansetron Injectable 4 milliGRAM(s) IV Push every 6 hours PRN Nausea and/or Vomiting  oxyCODONE    IR 2.5 milliGRAM(s) Oral every 8 hours PRN Severe Pain (7 - 10)  traMADol 25 milliGRAM(s) Oral every 12 hours PRN Moderate Pain (4 - 6)      Allergies    codeine (Unknown)    Intolerances        Vital Signs Last 24 Hrs  T(C): 36.4 (24 Feb 2024 08:05), Max: 36.4 (23 Feb 2024 20:33)  T(F): 97.5 (24 Feb 2024 08:05), Max: 97.5 (23 Feb 2024 20:33)  HR: 77 (24 Feb 2024 08:05) (61 - 108)  BP: 112/71 (24 Feb 2024 08:05) (94/54 - 112/71)  BP(mean): --  RR: 18 (24 Feb 2024 08:05) (18 - 18)  SpO2: 97% (24 Feb 2024 08:05) (92% - 97%)    Parameters below as of 24 Feb 2024 08:05  Patient On (Oxygen Delivery Method): nasal cannula          PHYSICAL EXAMINATION:    NECK:  Supple. No lymphadenopathy. Jugular venous pressure not elevated. Carotids equal.   HEART:   The cardiac impulse has a normal quality. Reg., Nl S1 and S2.  There are no murmurs, rubs or gallops noted  CHEST:  Chest crackles to auscultation. Normal respiratory effort.  ABDOMEN:  Soft and nontender.   EXTREMITIES:  There is no edema.       LABS:

## 2024-02-24 NOTE — PROGRESS NOTE ADULT - REASON FOR ADMISSION
Acute hypoxic and hypercapneic respiratory failure
Acute hypoxic and hypercapneic respiratory failure
CHF, pleural effusion,
Acute hypoxic and hypercapneic respiratory failure
Acute hypoxic and hypercapneic respiratory failure  Acute on chronic diastolic CHF  Severe aortic stenosis  Large pleural effusions  COPD unable to rule out exacerbation
CHF, pleural effusion,
Acute hypoxic and hypercapneic respiratory failure
Acute hypoxic and hypercapneic respiratory failure
Acute respiratory failure with hypoxia
CHF, pleural effusion,
Acute respiratory failure with hypoxia
CHF, pleural effusion,
Acute respiratory failure with hypoxia
CHF, pleural effusion,
Acute hypoxic and hypercapneic respiratory failure  Acute on chronic diastolic CHF  Severe aortic stenosis  Large pleural effusions  COPD unable to rule out exacerbation
CHF, pleural effusion,
CHF, pleural effusion,

## 2024-02-24 NOTE — PROGRESS NOTE ADULT - ASSESSMENT
89 yo female with Hx. of Diastolic CHF EF 45%, CAD, HTN, Orthostatic hypotension, Atrial fib. on Xarelto, s/p PPM, COPD, Hypothyroidism, Gout , valvular heart disease, sever AS,  severe TR, severe pumonary HTN, presented in ED BIB form home for SOB. The patient  lives alone and has a private aid. She developed worsening SOB, She had iron infusion 5 days ago and was started on Macrobid for UTI  yesterday.     PROBLEMS:    AC hypoxaemic & hypercapnic respiratory failure  Acute excerbation of COPD  Acute CHF   Bilateral pleural effusion  Hypnatremia   Orthostatic hypotension  Atrial fib-s/p PPM  Non obs CAD  Severe in oul HTN  Hypothyroidism  Severe valvular heart disease  UTI     PLAN:    pulmonary unchanged-family might be considering comfort care  Small hydro-pneumothorax-improving on CXR- d/w Ct surgery  IV Lasix   BIPAP PRN & TITRATE FIO2  AEROSOLS/ NEB  Fluid restrictions  off abx  VTEP-on Xarelto

## 2024-02-24 NOTE — PROGRESS NOTE ADULT - PROVIDER SPECIALTY LIST ADULT
Hospitalist
Infectious Disease
Nephrology
Palliative Care
Pulmonology
Pulmonology
Thoracic Surgery
Cardiology
Hospitalist
Nephrology
Pulmonology
Hospitalist
Nephrology
Palliative Care
Pulmonology
Nephrology
Pulmonology
Thoracic Surgery
Hospitalist
Hospitalist
Palliative Care
Cardiology
Palliative Care
Cardiology
Hospitalist
Hospitalist
Cardiology
Palliative Care

## 2024-02-28 DIAGNOSIS — K59.00 CONSTIPATION, UNSPECIFIED: ICD-10-CM

## 2024-02-28 DIAGNOSIS — J93.9 PNEUMOTHORAX, UNSPECIFIED: ICD-10-CM

## 2024-02-28 DIAGNOSIS — N39.0 URINARY TRACT INFECTION, SITE NOT SPECIFIED: ICD-10-CM

## 2024-02-28 DIAGNOSIS — J96.02 ACUTE RESPIRATORY FAILURE WITH HYPERCAPNIA: ICD-10-CM

## 2024-02-28 DIAGNOSIS — E03.9 HYPOTHYROIDISM, UNSPECIFIED: ICD-10-CM

## 2024-02-28 DIAGNOSIS — I11.0 HYPERTENSIVE HEART DISEASE WITH HEART FAILURE: ICD-10-CM

## 2024-02-28 DIAGNOSIS — I07.1 RHEUMATIC TRICUSPID INSUFFICIENCY: ICD-10-CM

## 2024-02-28 DIAGNOSIS — I50.33 ACUTE ON CHRONIC DIASTOLIC (CONGESTIVE) HEART FAILURE: ICD-10-CM

## 2024-02-28 DIAGNOSIS — Z79.890 HORMONE REPLACEMENT THERAPY: ICD-10-CM

## 2024-02-28 DIAGNOSIS — Z88.5 ALLERGY STATUS TO NARCOTIC AGENT: ICD-10-CM

## 2024-02-28 DIAGNOSIS — M10.9 GOUT, UNSPECIFIED: ICD-10-CM

## 2024-02-28 DIAGNOSIS — J44.1 CHRONIC OBSTRUCTIVE PULMONARY DISEASE WITH (ACUTE) EXACERBATION: ICD-10-CM

## 2024-02-28 DIAGNOSIS — Z96.659 PRESENCE OF UNSPECIFIED ARTIFICIAL KNEE JOINT: ICD-10-CM

## 2024-02-28 DIAGNOSIS — Z11.52 ENCOUNTER FOR SCREENING FOR COVID-19: ICD-10-CM

## 2024-02-28 DIAGNOSIS — E87.1 HYPO-OSMOLALITY AND HYPONATREMIA: ICD-10-CM

## 2024-02-28 DIAGNOSIS — Z95.0 PRESENCE OF CARDIAC PACEMAKER: ICD-10-CM

## 2024-02-28 DIAGNOSIS — Z79.01 LONG TERM (CURRENT) USE OF ANTICOAGULANTS: ICD-10-CM

## 2024-02-28 DIAGNOSIS — F41.9 ANXIETY DISORDER, UNSPECIFIED: ICD-10-CM

## 2024-02-28 DIAGNOSIS — I35.0 NONRHEUMATIC AORTIC (VALVE) STENOSIS: ICD-10-CM

## 2024-02-28 DIAGNOSIS — E11.9 TYPE 2 DIABETES MELLITUS WITHOUT COMPLICATIONS: ICD-10-CM

## 2024-02-28 DIAGNOSIS — I95.1 ORTHOSTATIC HYPOTENSION: ICD-10-CM

## 2024-02-28 DIAGNOSIS — I48.19 OTHER PERSISTENT ATRIAL FIBRILLATION: ICD-10-CM

## 2024-02-28 DIAGNOSIS — Z66 DO NOT RESUSCITATE: ICD-10-CM

## 2024-02-28 DIAGNOSIS — J96.01 ACUTE RESPIRATORY FAILURE WITH HYPOXIA: ICD-10-CM

## 2024-02-28 DIAGNOSIS — R54 AGE-RELATED PHYSICAL DEBILITY: ICD-10-CM

## 2024-02-28 DIAGNOSIS — I27.20 PULMONARY HYPERTENSION, UNSPECIFIED: ICD-10-CM

## 2024-02-28 DIAGNOSIS — E87.5 HYPERKALEMIA: ICD-10-CM

## 2024-02-28 DIAGNOSIS — N17.9 ACUTE KIDNEY FAILURE, UNSPECIFIED: ICD-10-CM

## 2024-02-28 DIAGNOSIS — E43 UNSPECIFIED SEVERE PROTEIN-CALORIE MALNUTRITION: ICD-10-CM

## 2024-02-28 DIAGNOSIS — E83.42 HYPOMAGNESEMIA: ICD-10-CM

## 2024-02-28 DIAGNOSIS — I25.10 ATHEROSCLEROTIC HEART DISEASE OF NATIVE CORONARY ARTERY WITHOUT ANGINA PECTORIS: ICD-10-CM

## 2024-02-29 ENCOUNTER — APPOINTMENT (OUTPATIENT)
Dept: CARDIOLOGY | Facility: CLINIC | Age: 89
End: 2024-02-29

## 2024-03-07 ENCOUNTER — RX RENEWAL (OUTPATIENT)
Age: 89
End: 2024-03-07

## 2024-04-24 ENCOUNTER — APPOINTMENT (OUTPATIENT)
Dept: INTERNAL MEDICINE | Facility: CLINIC | Age: 89
End: 2024-04-24

## 2024-05-09 NOTE — PROGRESS NOTE ADULT - PROBLEM SELECTOR PROBLEM 4
Atrial fibrillation
supervision/verbal cues/nonverbal cues (demo/gestures)

## 2024-05-13 NOTE — PROGRESS NOTE ADULT - PROBLEM SELECTOR PROBLEM 2
Neurology Progress Note      Chief Complaint:  f/u stroke  Length of Stay:  1   Subjective     Subjective:  Sitting up in bed. Wife at bedside. Patient clarifies that when he arrived to ED right sided numbness and weakness had resolved. About 0200 this AM, he noted right sided numnbess of face, arm and leg as well as weakness of RUE as well as dysarthria and word finding. MRI brain w & w/o done this AM did not reveal acute stroke  but did show chronic small vessel disease and a tiny area in right pontine stroke that may have occurred about 2 weeks ago. Patient tells me about 2 weeks ago he had sudden onset of impaired speech and unilateral weakness.. CTA head and neck last PM negative for significant stenosis or occlusion. He was not taking ASA prior to this admission. Patient did have iron infusion about 2 weeks ago.     At baseline, patient uses a walker for ambulation as he suffered a fall in 1/2022 with renal hematoma and ever since then has had ataxic gait. In regards to reported lower GI bleeding, he reports this as occasional smear on tissue paper. He has had colonoscopies in the past that showed hemorrhoids. He takes a nightly bowel regimen MOM and Miralax.     LDL 60  A1C 5.2  TSH 2.01        Medications:  Current Facility-Administered Medications   Medication Dose Route Frequency Provider Last Rate Last Admin    acetaminophen (TYLENOL) tablet 650 mg  650 mg Oral Q4H PRN Michael Balbuena MD        Or    acetaminophen (TYLENOL) 160 MG/5ML oral solution 650 mg  650 mg Oral Q4H PRN Michael Balbuena MD        Or    acetaminophen (TYLENOL) suppository 650 mg  650 mg Rectal Q4H PRN Michael Balbuena MD        aluminum-magnesium hydroxide-simethicone (MAALOX MAX) 400-400-40 MG/5ML suspension 15 mL  15 mL Oral Q6H PRN Michael Balbuena MD   15 mL at 05/13/24 0800    aspirin chewable tablet 81 mg  81 mg Oral Daily Michael Balbuena MD   81 mg at 05/13/24 0800    Or    aspirin suppository 300  Severe aortic stenosis mg  300 mg Rectal Daily Michael Balbuena MD        atorvastatin (LIPITOR) tablet 80 mg  80 mg Oral Nightly Michael Balbuena MD   80 mg at 05/12/24 2016    sennosides-docusate (PERICOLACE) 8.6-50 MG per tablet 2 tablet  2 tablet Oral BID Michael Balbuena MD   2 tablet at 05/13/24 0800    And    polyethylene glycol (MIRALAX) packet 17 g  17 g Oral Daily Michael Balbuena MD   17 g at 05/13/24 0800    And    bisacodyl (DULCOLAX) EC tablet 5 mg  5 mg Oral Daily PRN Michael Balbuena MD        And    bisacodyl (DULCOLAX) suppository 10 mg  10 mg Rectal Daily PRN Michael Balbuena MD        citalopram (CeleXA) tablet 20 mg  20 mg Oral Daily Michael Balbuena MD   20 mg at 05/13/24 0800    diazePAM (VALIUM) tablet 5 mg  5 mg Oral Q6H PRN Michael Balbuena MD        labetalol (NORMODYNE,TRANDATE) injection 10 mg  10 mg Intravenous Q4H PRN Michael Balbuena MD        magnesium hydroxide (MILK OF MAGNESIA) suspension 10 mL  10 mL Oral Daily Michael Balbuena MD        melatonin tablet 3 mg  3 mg Oral Nightly PRN Michael Balbuena MD        ondansetron (ZOFRAN) injection 4 mg  4 mg Intravenous Q6H PRN Michael Balbuena MD   4 mg at 05/13/24 1301    pantoprazole (PROTONIX) EC tablet 40 mg  40 mg Oral Q AM Michael Balbuena MD   40 mg at 05/13/24 0537    rOPINIRole (REQUIP) tablet 2 mg  2 mg Oral Nightly Michael Balbuena MD        sodium chloride 0.9 % flush 10 mL  10 mL Intravenous PRN Marco Koo MD   10 mL at 05/12/24 2020    sodium chloride 0.9 % flush 10 mL  10 mL Intravenous Q12H Michael Balbuena MD   10 mL at 05/13/24 0800    sodium chloride 0.9 % flush 10 mL  10 mL Intravenous PRN Michael Balbuena MD        sodium chloride 0.9 % infusion 40 mL  40 mL Intravenous PRN Michael Balbuena MD        tamsulosin (FLOMAX) 24 hr capsule 0.4 mg  0.4 mg Oral Nightly Michael Balbuena MD   0.4 mg at 05/12/24 2016    traZODone (DESYREL) tablet 50  mg  50 mg Oral Nightly Michael Balbuena MD                 Objective      Vital Signs  Temp:  [97.3 °F (36.3 °C)-98 °F (36.7 °C)] 97.3 °F (36.3 °C)  Heart Rate:  [58-74] 58  Resp:  [16-20] 16  BP: (123-174)/(63-96) 140/68    Physical Exam:    Neurologic Exam:    Mental Status:    -Alert, Oriented X 3  -No word-finding difficulties  -No aphasia  -No dysarthria  -Follows simple and complex commands    CN II:  Visual fields full.  Pupils equally reactive to light  CN III, IV, VI:  Extraocular Muscles full with no signs of nystagmus  CN V:  Facial sensory is symmetric with no asymmetries.  CN VII:  Facial motor symmetric  CN VIII:  Gross hearing intact bilaterally  CN IX:  Palate elevates symmetrically  CN X:  Palate elevates symmetrically  CN XI:  Shoulder shrug symmetric  CN XII:  Tongue protrudes to midline  Motor: (strength out of 5:  1= minimal movement, 2 = movement in plane of gravity, 3 = movement against gravity, 4 = movement against some resistance, 5 = full strength)    -Right Upper Ext: Proximal: 4 Distal: 3 with pronation  -Left Upper Ext: Proximal: 5 Distal: 5    -Right Lower Ext: Proximal: 5 Distal: 5  -Left Lower Ext: Proximal: 5 Distal: 5    DTR:  -Right   Bicep: 2+ Tricep: 2+ Brachoradialis: 2+   Patella: 2+ Ankle: 2+ Neg Babinski  -Left   Bicep: 2+ Tricep: 2+ Brachoradialis: 2+   Patella: 2+ Ankle: 2+ Neg Babinski    Sensory:  -Intact to light touch, with decreased sensation to light touch on right face, arm, and leg    Coordination:  -Finger to nose with ataxia on right  -Heel to shin with clumsiness bilaterally (chronic)      Gait  -ataxic gait documented per therapy  At baseline ataxic gait since 2022 and uses walker.       Pertinent Neuro Exam:    Last nurse assessment:  Interval: baseline  1a. Level of Consciousness: 0-->Alert, keenly responsive  1b. LOC Questions: 0-->Answers both questions correctly  1c. LOC Commands: 0-->Performs both tasks correctly  2. Best Gaze: 0-->Normal  3. Visual:  0-->No visual loss  4. Facial Palsy: 0-->Normal symmetrical movements  5a. Motor Arm, Left: 0-->No drift, limb holds 90 (or 45) degrees for full 10 secs  5b. Motor Arm, Right: 1-->Drift, limb holds 90 (or 45) degrees, but drifts down before full 10 secs, does not hit bed or other support  6a. Motor Leg, Left: 0-->No drift, leg holds 30 degree position for full 5 secs  6b. Motor Leg, Right: 0-->No drift, leg holds 30 degree position for full 5 secs  7. Limb Ataxia: 1-->Present in one limb  8. Sensory: 1-->Mild-to-moderate sensory loss, patient feels pinprick is less sharp or is dull on the affected side, or there is a loss of superficial pain with pinprick, but patient is aware of being touched  9. Best Language: 0-->No aphasia, normal  10. Dysarthria: 1-->Mild-to-moderate dysarthria, patient slurs at least some words and, at worst, can be understood with some difficulty  11. Extinction and Inattention (formerly Neglect): 0-->No abnormality    Total (NIH Stroke Scale): 4       Results Review:      Labs:  Result Review:  I have personally reviewed the results from the time of this admission to 5/13/2024 14:45 CDT and agree with these findings:  [x]  Laboratory list / accordion  [x]  Microbiology  []  Radiology  []  EKG/Telemetry   []  Cardiology/Vascular   []  Pathology  []  Old records  []  Other:  Most notable findings include: LDL 60  A1C 5.2  TSH 2.01       Imaging:  CT Chest Without Contrast Diagnostic    Result Date: 5/13/2024  1. Mild patchy groundglass opacity in the right upper lobe may represent pneumonia. 2. Small hiatal hernia and mild scarring in the lungs.  This report was signed and finalized on 5/13/2024 11:56 AM by Brent Smith.      CT Head Without Contrast    Result Date: 5/13/2024  1. Chronic changes and no acute intracranial findings.  This report was signed and finalized on 5/13/2024 11:53 AM by Brent Smith.      MRI Brain With & Without Contrast    Result Date: 5/13/2024   1.  No acute  intracranial process. In particular, no acute ischemia. 2.  No intracranial mass lesion or pathologic enhancement. 3.  Advanced chronic small vessel ischemic changes. There is quite a bit of white matter volume loss, especially in the right frontal lobe which I suspect is related to some old cortical/subcortical ischemia.  This report was signed and finalized on 5/13/2024 8:55 AM by Dr Joshua Sin.      XR Chest 1 View    Result Date: 5/12/2024  1.  There appears to be a 4-5 cm right upper lobe consolidation. This could reflect an upper lobe pneumonia. Upper lobe neoplasm is also a consideration. Chest CT versus short interval follow-up is recommended for further evaluation.  This report was signed and finalized on 5/12/2024 2:32 PM by Dr Joshua Sin.      CT CEREBRAL PERFUSION WITH & WITHOUT CONTRAST    Result Date: 5/12/2024  Impression: 1. Normal, symmetric cerebral perfusion. No discrete infarct or at risk territory detected by the perfusion software.  This report was signed and finalized on 5/12/2024 2:29 PM by Dr Joshua Sin.      CT Angiogram Head w AI Analysis of LVO    Result Date: 5/12/2024   1. No arterial occlusion or flow-limiting stenosis in the neck. 2. No intracranial large vessel occlusion or flow-limiting stenosis.  This report was signed and finalized on 5/12/2024 2:07 PM by Dr Joshua Sin.      CT Angiogram Neck    Result Date: 5/12/2024   1. No arterial occlusion or flow-limiting stenosis in the neck. 2. No intracranial large vessel occlusion or flow-limiting stenosis.  This report was signed and finalized on 5/12/2024 2:07 PM by Dr Joshua Sin.      CT Head Without Contrast Stroke Protocol    Result Date: 5/12/2024  1. No acute intracranial process. In particular, there is no acute hemorrhage identified. 2. Advanced chronic small vessel ischemic changes. 3. Ventriculomegaly is present, pattern suspicious for communicating hydrocephalus such as NPH.  Findings were discussed with   Hanane on 5/12/2024 2:02 PM.  This report was signed and finalized on 5/12/2024 2:02 PM by Dr Joshua Sin.        Assessment/Plan   Agueda Tyler is 79 y.o. with PMH HTN, HLD, renal hematoma after a fall 2022 and also chronic impaired gait since then, RLS, chronic anemia. Patient presented on 5/12/2024 with acute onset of right sided paresthesia and weakness that resolved after arrival and then returned at 0200 this AM. He has had ataxia RUE since 0200 as well as dysarthria. MRI brain done last PM did not reveal an acute stroke but does show moderate small vessel disease and a tiny area in right pontine stroke that may have occurred about 2 weeks ago. Patient tells me about 2 weeks ago he had sudden onset of impaired speech and unilateral weakness.  CT head repeated this AM after return of symptoms and no acute process.   In regards to reported lower GI bleeding, he reports this as occasional smear on tissue paper. He has had colonoscopies in the past that showed hemorrhoids. He takes a nightly bowel regimen MOM and Miralax.       Hospital Problem List      Gastroesophageal reflux disease without esophagitis    Iron deficiency anemia    Essential hypertension    Stroke-like symptom    Right upper lobe consolidation    Impression:  Stuttering acute stroke like symptoms that returned at 0200 this AM and has not resolved. MRI brain 5/12 did not show acute stroke.  HLD. LDL at goal at 60.  HTN  Impaired gait since 2022 after fall  RLS managed by Dr. Freeman.  RUL pneumonia.      Plan:  Continue ASA 81 mg daily. Consider DUAP after MRI brain repeated.  Lipitor 80 mg daily.  Repeat MRI brain w/o.  OT/PT/ST.  Continue home RLS treatment per primary team.        Medical Decision Making    Number/Complexity of Problems  Moderate  1 undiagnosed new problem with uncertain prognosis -   1 acute illness with systemic symptoms -   High  1 acute or chronic illness that pose a threat to life/body function -   high     MDM  Data  Moderate - 1/3 categories  Extensive - 2/3 categories    Category 1: 3 of the following  Review of external notes  Review of results  Ordering of each unique test  Independent historian  Category 2:  Independent interpretation of test (ex: imaging)  Category 3:  Discussion of management with another provider    extensive     Treatment Plan  Moderate - Prescription Drug management  High  Drug therapy requiring intensive monitoring for toxicity  Decision regarding hospitalization or escalation of care  De-escalate care/DNR decisions  high       Mary Beth Sawant, APRN  05/13/24  14:34 CDT

## 2024-05-20 NOTE — PROGRESS NOTE ADULT - ASSESSMENT
Pt placed on continuous cardiac monitoring, pulse ox, and blood pressure monitoring at this time.      89 yo f pmhx CHF, COPD, PPM, gout, hypothyroidism, HTN and STEMI admitted 3/11 hypoxic/hypercapnic respiratory failure 2/2 RSV.      NEURO: no active issues.  CV: continue lasix/dig/lopressor   RESP: Hypoxic/hypercapnic respiratory failure, avaps prn, continue steroid therapy. inh bronchodilators   RENAL: Monitor lytes, replace as needed  GI: Dash/TLC dysphagia diet  ENDO: Hypothyroidism on synthroid   ID: RSV, supportive care  HEME: Xarelto for ac  DISPO: DNR/I.

## 2024-05-22 NOTE — DIETITIAN INITIAL EVALUATION ADULT - NSICDXPASTMEDICALHX_GEN_ALL_CORE_FT
Patient is extremely restless and verbally abusive toward staff.   PAST MEDICAL HISTORY:  Afib     Anemia     Anxiety     Aortic valve regurgitation     CAD (coronary artery disease)     Cardiac pacemaker     CHF (congestive heart failure)     Chronic CHF     Chronic obstructive pulmonary disease (COPD)     COPD (chronic obstructive pulmonary disease)     Gout     Gout     History of ST elevation myocardial infarction (STEMI)     HTN (hypertension)     HTN (hypertension)     Hypothyroid     Hypothyroidism     Insomnia     Mitral valve insufficiency     Osteopenia

## 2024-08-28 NOTE — INPATIENT CERTIFICATION FOR MEDICARE PATIENTS - PHYSICIAN CONCUR
I concur with the Admission Order and I certify that services are provided in accordance with Section 42 CFR § 412.3 SDH (subdural hematoma)

## 2024-09-09 NOTE — PROGRESS NOTE ADULT - PROBLEM SELECTOR PROBLEM 3
The patient's goals for the shift include      The clinical goals for the shift include pt will remain injury free    Problem: Pain  Goal: Takes deep breaths with improved pain control throughout the shift  9/8/2024 2013 by Concepcion Grady RN  Outcome: Progressing  9/8/2024 1716 by Concepcion Grady RN  Outcome: Progressing  Goal: Turns in bed with improved pain control throughout the shift  9/8/2024 2013 by Concepcion Grady RN  Outcome: Progressing  9/8/2024 1716 by Concepcion Grady RN  Outcome: Progressing  Goal: Walks with improved pain control throughout the shift  9/8/2024 2013 by Concepcion Grady RN  Outcome: Progressing  9/8/2024 1716 by Concepcion Grady RN  Outcome: Progressing  Goal: Performs ADL's with improved pain control throughout shift  9/8/2024 2013 by Concepcion Grady RN  Outcome: Progressing  9/8/2024 1716 by Concepcion Grady RN  Outcome: Progressing  Goal: Participates in PT with improved pain control throughout the shift  9/8/2024 2013 by Concepcion Grady RN  Outcome: Progressing  9/8/2024 1716 by Concepcion Grady RN  Outcome: Progressing  Goal: Free from opioid side effects throughout the shift  9/8/2024 2013 by Concepcion Grady RN  Outcome: Progressing  9/8/2024 1716 by Concepcion Grady RN  Outcome: Progressing  Goal: Free from acute confusion related to pain meds throughout the shift  9/8/2024 2013 by Concepcion Grady RN  Outcome: Progressing  9/8/2024 1716 by Concepcion Grady RN  Outcome: Progressing     Problem: Skin  Goal: Decreased wound size/increased tissue granulation at next dressing change  9/8/2024 2013 by Concepcion Grady RN  Outcome: Progressing  Flowsheets (Taken 9/8/2024 2013)  Decreased wound size/increased tissue granulation at next dressing change: Promote sleep for wound healing  9/8/2024 1716 by Concepcion Grady RN  Outcome: Progressing  Flowsheets (Taken 9/8/2024 1716)  Decreased wound size/increased tissue granulation at next dressing change: Promote sleep for wound healing  Goal: Participates in 
CAD (coronary artery disease)
plan/prevention/treatment measures  9/8/2024 2013 by Concepcion Grady RN  Outcome: Progressing  9/8/2024 1716 by Concepcion Grady RN  Outcome: Progressing  Goal: Prevent/manage excess moisture  9/8/2024 2013 by Concepcion Grady RN  Outcome: Progressing  9/8/2024 1716 by Concepcion Grady RN  Outcome: Progressing  Goal: Prevent/minimize sheer/friction injuries  9/8/2024 2013 by Concepcion Grady RN  Outcome: Progressing  9/8/2024 1716 by Concepcion Grady RN  Outcome: Progressing  Goal: Promote/optimize nutrition  9/8/2024 2013 by Concepcion Grady RN  Outcome: Progressing  9/8/2024 1716 by Concepcion Grady RN  Outcome: Progressing  Goal: Promote skin healing  9/8/2024 2013 by Concepcion Grady RN  Outcome: Progressing  9/8/2024 1716 by Concepcion Grady RN  Outcome: Progressing     Problem: Pain - Adult  Goal: Verbalizes/displays adequate comfort level or baseline comfort level  9/8/2024 2013 by Concepcion Grady RN  Outcome: Progressing  9/8/2024 1716 by Concepcion Grady RN  Outcome: Progressing     Problem: Safety - Adult  Goal: Free from fall injury  9/8/2024 2013 by Concepcion Grady RN  Outcome: Progressing  9/8/2024 1716 by Concepcion Grady RN  Outcome: Progressing     Problem: Discharge Planning  Goal: Discharge to home or other facility with appropriate resources  9/8/2024 2013 by Concepcion Grady RN  Outcome: Progressing  9/8/2024 1716 by Concepcion Grady RN  Outcome: Progressing     Problem: Chronic Conditions and Co-morbidities  Goal: Patient's chronic conditions and co-morbidity symptoms are monitored and maintained or improved  9/8/2024 2013 by Concepcion Grady RN  Outcome: Progressing  9/8/2024 1716 by Concepcion Grady RN  Outcome: Progressing     Problem: Nutrition  Goal: Less than 5 days NPO/clear liquids  9/8/2024 2013 by Concepcion Grady RN  Outcome: Progressing  9/8/2024 1716 by Concepcion Grady RN  Outcome: Progressing  Goal: Oral intake greater than 50%  9/8/2024 2013 by Concepcion Grady RN  Outcome: Progressing  9/8/2024 1716 by Concepcion Grady 
RN  Outcome: Progressing  Goal: Oral intake greater 75%  9/8/2024 2013 by Concepcion Grady RN  Outcome: Progressing  9/8/2024 1716 by Concepcion Grady RN  Outcome: Progressing  Goal: Consume prescribed supplement  9/8/2024 2013 by Concepcion Grady RN  Outcome: Progressing  9/8/2024 1716 by Concepcion Grady RN  Outcome: Progressing  Goal: Adequate PO fluid intake  9/8/2024 2013 by Concepcion Grady RN  Outcome: Progressing  9/8/2024 1716 by Concepcion Grady RN  Outcome: Progressing  Goal: Nutrition support goals are met within 48 hrs  9/8/2024 2013 by Concepcion Grady RN  Outcome: Progressing  9/8/2024 1716 by Concepcion Grady RN  Outcome: Progressing  Goal: Nutrition support is meeting 75% of nutrient needs  9/8/2024 2013 by Concepcion Grady RN  Outcome: Progressing  9/8/2024 1716 by Concepcion Grady RN  Outcome: Progressing  Goal: Tube feed tolerance  9/8/2024 2013 by Concepcion Grady RN  Outcome: Progressing  9/8/2024 1716 by Concepcion Grady RN  Outcome: Progressing  Goal: BG  mg/dL  9/8/2024 2013 by Concepcion Grady RN  Outcome: Progressing  9/8/2024 1716 by Concepcion Grady RN  Outcome: Progressing  Goal: Lab values WNL  9/8/2024 2013 by Concepcion Grady RN  Outcome: Progressing  9/8/2024 1716 by Concepcion Grady RN  Outcome: Progressing  Goal: Electrolytes WNL  9/8/2024 2013 by Concepcion Grady RN  Outcome: Progressing  9/8/2024 1716 by Concepcion Grady RN  Outcome: Progressing  Goal: Promote healing  9/8/2024 2013 by Concepcion Grady RN  Outcome: Progressing  9/8/2024 1716 by Concepcion Grady RN  Outcome: Progressing  Goal: Maintain stable weight  9/8/2024 2013 by Concepcion Grady RN  Outcome: Progressing  9/8/2024 1716 by Concepcion Grady RN  Outcome: Progressing  Goal: Reduce weight from edema/fluid  9/8/2024 2013 by Concepcion Grady RN  Outcome: Progressing  9/8/2024 1716 by Concepcion Grady, RN  Outcome: Progressing  Goal: Gradual weight gain  9/8/2024 2013 by Concepcion Grady RN  Outcome: Progressing  9/8/2024 1716 by Concepcion Grady RN  Outcome: 
CAD (coronary artery disease)
Progressing  Goal: Improve ostomy output  9/8/2024 2013 by Concepcion Grady RN  Outcome: Progressing  9/8/2024 1716 by Concepcion Grady RN  Outcome: Progressing     Problem: Fall/Injury  Goal: Not fall by end of shift  9/8/2024 2013 by Concepcion Grady RN  Outcome: Progressing  9/8/2024 1716 by Concepcion Grady RN  Outcome: Progressing  Goal: Be free from injury by end of the shift  9/8/2024 2013 by Concepcion Grady RN  Outcome: Progressing  9/8/2024 1716 by Concepcion Grady RN  Outcome: Progressing  Goal: Verbalize understanding of personal risk factors for fall in the hospital  9/8/2024 2013 by Concepcion Grady RN  Outcome: Progressing  9/8/2024 1716 by Concepcion Grady RN  Outcome: Progressing  Goal: Verbalize understanding of risk factor reduction measures to prevent injury from fall in the home  9/8/2024 2013 by Concepcion Grady RN  Outcome: Progressing  9/8/2024 1716 by Concepcion Grady RN  Outcome: Progressing  Goal: Use assistive devices by end of the shift  9/8/2024 2013 by Concepcion Grady RN  Outcome: Progressing  9/8/2024 1716 by Concepcion Grady RN  Outcome: Progressing  Goal: Pace activities to prevent fatigue by end of the shift  9/8/2024 2013 by Concepcion Grady RN  Outcome: Progressing  9/8/2024 1716 by Concepcion Grady RN  Outcome: Progressing     Problem: Swallowing  Goal: LTG - Patient will tolerate the least restrictive diet consistency to allow for safe consumption of daily meals  9/8/2024 2013 by Concepcion Grady RN  Outcome: Progressing  9/8/2024 1716 by Concepcion Grady RN  Outcome: Progressing  Goal: STG - Patient will tolerate recommended food and liquid consistencies without clinical signs and symptoms of aspirations on 100% of trials  9/8/2024 2013 by Concepcion Grady RN  Outcome: Progressing  9/8/2024 1716 by Concepcion Grady RN  Outcome: Progressing     
CAD (coronary artery disease)
Severe protein-calorie malnutrition
CAD (coronary artery disease)
Severe protein-calorie malnutrition
CAD (coronary artery disease)
CAD (coronary artery disease)

## 2024-10-28 NOTE — ED ADULT NURSE NOTE - NS ED NOTE ABUSE SUSPICION NEGLECT YN
Problem: Potential for Falls  Goal: Patient will remain free of falls  Description: INTERVENTIONS:  - Educate patient/family on patient safety including physical limitations  - Instruct patient to call for assistance with activity   - Consult OT/PT to assist with strengthening/mobility   - Keep Call bell within reach  - Keep bed low and locked with side rails adjusted as appropriate  - Keep care items and personal belongings within reach  - Initiate and maintain comfort rounds  - Make Fall Risk Sign visible to staff  - Offer Toileting every 2 Hours, in advance of need  - Initiate/Maintain bed alarm  - Obtain necessary fall risk management equipment: non skid socks  - Apply yellow socks and bracelet for high fall risk patients  - Consider moving patient to room near nurses station  Outcome: Progressing     Problem: PAIN - ADULT  Goal: Verbalizes/displays adequate comfort level or baseline comfort level  Description: Interventions:  - Encourage patient to monitor pain and request assistance  - Assess pain using appropriate pain scale  - Administer analgesics based on type and severity of pain and evaluate response  - Implement non-pharmacological measures as appropriate and evaluate response  - Consider cultural and social influences on pain and pain management  - Notify physician/advanced practitioner if interventions unsuccessful or patient reports new pain  Outcome: Progressing     Problem: INFECTION - ADULT  Goal: Absence or prevention of progression during hospitalization  Description: INTERVENTIONS:  - Assess and monitor for signs and symptoms of infection  - Monitor lab/diagnostic results  - Monitor all insertion sites, i.e. indwelling lines, tubes, and drains  - Monitor endotracheal if appropriate and nasal secretions for changes in amount and color  - Wake appropriate cooling/warming therapies per order  - Administer medications as ordered  - Instruct and encourage patient and family to use good hand  hygiene technique  - Identify and instruct in appropriate isolation precautions for identified infection/condition  Outcome: Progressing  Goal: Absence of fever/infection during neutropenic period  Description: INTERVENTIONS:  - Monitor WBC    Outcome: Progressing     Problem: SAFETY ADULT  Goal: Patient will remain free of falls  Description: INTERVENTIONS:  - Educate patient/family on patient safety including physical limitations  - Instruct patient to call for assistance with activity   - Consult OT/PT to assist with strengthening/mobility   - Keep Call bell within reach  - Keep bed low and locked with side rails adjusted as appropriate  - Keep care items and personal belongings within reach  - Initiate and maintain comfort rounds  - Make Fall Risk Sign visible to staff  - Offer Toileting every 2 Hours, in advance of need  - Initiate/Maintain bed alarm  - Obtain necessary fall risk management equipment: non skid socks  - Apply yellow socks and bracelet for high fall risk patients  - Consider moving patient to room near nurses station  Outcome: Progressing  Goal: Maintain or return to baseline ADL function  Description: INTERVENTIONS:  -  Assess patient's ability to carry out ADLs; assess patient's baseline for ADL function and identify physical deficits which impact ability to perform ADLs (bathing, care of mouth/teeth, toileting, grooming, dressing, etc.)  - Assess/evaluate cause of self-care deficits   - Assess range of motion  - Assess patient's mobility; develop plan if impaired  - Assess patient's need for assistive devices and provide as appropriate  - Encourage maximum independence but intervene and supervise when necessary  - Involve family in performance of ADLs  - Assess for home care needs following discharge   - Consider OT consult to assist with ADL evaluation and planning for discharge  - Provide patient education as appropriate  Outcome: Progressing  Goal: Maintains/Returns to pre admission  functional level  Description: INTERVENTIONS:  - Perform AM-PAC 6 Click Basic Mobility/ Daily Activity assessment daily.  - Set and communicate daily mobility goal to care team and patient/family/caregiver.   - Collaborate with rehabilitation services on mobility goals if consulted  - Perform Range of Motion 2 times a day.  - Reposition patient every 2 hours.  - Dangle patient 2 times a day  - Stand patient 2 times a day  - Ambulate patient 2 times a day  - Out of bed to chair 3 times a day   - Out of bed for meals 3 times a day  - Out of bed for toileting  - Record patient progress and toleration of activity level   Outcome: Progressing     Problem: DISCHARGE PLANNING  Goal: Discharge to home or other facility with appropriate resources  Description: INTERVENTIONS:  - Identify barriers to discharge w/patient and caregiver  - Identify discharge learning needs (meds, wound care, etc.)  - Arrange for interpretive services to assist at discharge as needed  - Refer to Case Management Department for coordinating discharge planning if the patient needs post-hospital services based on physician/advanced practitioner order or complex needs related to functional status, cognitive ability, or social support system  Outcome: Progressing     Problem: Knowledge Deficit  Goal: Patient/family/caregiver demonstrates understanding of disease process, treatment plan, medications, and discharge instructions  Description: Complete learning assessment and assess knowledge base.  Interventions:  - Provide teaching at level of understanding  - Provide teaching via preferred learning methods  Outcome: Progressing     Problem: CARDIOVASCULAR - ADULT  Goal: Maintains optimal cardiac output and hemodynamic stability  Description: INTERVENTIONS:  - Monitor I/O, vital signs and rhythm  - Monitor for S/S and trends of decreased cardiac output  - Administer and titrate ordered vasoactive medications to optimize hemodynamic stability  - Assess  quality of pulses, skin color and temperature  - Assess for signs of decreased coronary artery perfusion  - Instruct patient to report change in severity of symptoms  Outcome: Progressing  Goal: Absence of cardiac dysrhythmias or at baseline rhythm  Description: INTERVENTIONS:  - Continuous cardiac monitoring, vital signs, obtain 12 lead EKG if ordered  - Administer antiarrhythmic and heart rate control medications as ordered  - Monitor electrolytes and administer replacement therapy as ordered  Outcome: Progressing      No

## 2024-11-01 NOTE — SWALLOW BEDSIDE ASSESSMENT ADULT - ASR SWALLOW ASPIRATION MONITOR
If any s/s aspiration noted, d/c diet and initiate NPO with speech tx to reassess/change of breathing pattern/oral hygiene/position upright (90Y)/cough/gurgly voice/fever/pneumonia/throat clearing/upper respiratory infection
change of breathing pattern/oral hygiene/position upright (90Y)/cough/gurgly voice/fever/pneumonia/throat clearing/upper respiratory infection
No
change of breathing pattern/oral hygiene/position upright (90Y)/cough/gurgly voice/fever/pneumonia/throat clearing/upper respiratory infection

## 2025-01-06 NOTE — PATIENT PROFILE ADULT - NSPROHARDOFHEAROTHER_GEN_ALL_CORE
Show Aperture Variable?: Yes Render Post-Care Instructions In Note?: no Detail Level: Detailed Consent: The patient's consent was obtained including but not limited to risks of crusting, scabbing, blistering, scarring, darker or lighter pigmentary change, recurrence, incomplete removal and infection. Number Of Freeze-Thaw Cycles: 2 freeze-thaw cycles Post-Care Instructions: I reviewed with the patient in detail post-care instructions. Patient is to wear sunprotection, and avoid picking at any of the treated lesions. Pt may apply Vaseline to crusted or scabbing areas. Duration Of Freeze Thaw-Cycle (Seconds): 1 Application Tool (Optional): Liquid Nitrogen Sprayer 11th grade/[employer]

## 2025-04-11 NOTE — PROGRESS NOTE ADULT - GASTROINTESTINAL
Last visit:  2/25/2025  Next Visit Date:    Future Appointments   Date Time Provider Department Center   5/27/2025  6:00 AM Woody Fraga DNP [facility]   6/3/2025  6:00 AM Woody Fraga DNP [facility]         Medication List:  Prior to Admission medications    Medication Sig Start Date End Date Taking? Authorizing Provider   metFORMIN (GLUCOPHAGE-XR) 500 MG extended release tablet Take 2 tablets by mouth daily (with breakfast) 2/13/25   Woody Fraga DNP   atorvastatin (LIPITOR) 80 MG tablet Take 1 tablet by mouth daily 10/14/24   Destiny Lopez,    blood glucose monitor kit and supplies Please dispense glucometer per patient's insurance. 2/7/22   Woody Fraga DNP   Lancets MISC 1 each by Does not apply route 2 times daily Please dispense per patient's insurance 2/7/22   Woody Fraga DNP   blood glucose monitor strips Please check blood glucose daily, dispense per patient's insurance 2/7/22   Woody Fraga DNP               detailed exam